# Patient Record
Sex: FEMALE | Race: WHITE | Employment: OTHER | URBAN - METROPOLITAN AREA
[De-identification: names, ages, dates, MRNs, and addresses within clinical notes are randomized per-mention and may not be internally consistent; named-entity substitution may affect disease eponyms.]

---

## 2017-01-17 ENCOUNTER — TELEPHONE (OUTPATIENT)
Dept: CARDIOLOGY CLINIC | Age: 67
End: 2017-01-17

## 2017-01-20 ENCOUNTER — OFFICE VISIT (OUTPATIENT)
Dept: BARIATRICS/WEIGHT MGMT | Age: 67
End: 2017-01-20

## 2017-01-20 VITALS
SYSTOLIC BLOOD PRESSURE: 139 MMHG | RESPIRATION RATE: 16 BRPM | BODY MASS INDEX: 41.23 KG/M2 | HEART RATE: 61 BPM | WEIGHT: 210 LBS | HEIGHT: 60 IN | DIASTOLIC BLOOD PRESSURE: 76 MMHG

## 2017-01-20 DIAGNOSIS — K95.09 GASTRIC BAND SLIPPAGE: Primary | ICD-10-CM

## 2017-01-20 PROCEDURE — 99024 POSTOP FOLLOW-UP VISIT: CPT | Performed by: SURGERY

## 2017-01-20 PROCEDURE — 1036F TOBACCO NON-USER: CPT | Performed by: SURGERY

## 2017-01-23 ENCOUNTER — TELEPHONE (OUTPATIENT)
Dept: CARDIOLOGY CLINIC | Age: 67
End: 2017-01-23

## 2017-01-26 ENCOUNTER — EPISODE CHANGES (OUTPATIENT)
Dept: CASE MANAGEMENT | Age: 67
End: 2017-01-26

## 2017-02-03 ENCOUNTER — OFFICE VISIT (OUTPATIENT)
Dept: CARDIOLOGY CLINIC | Age: 67
End: 2017-02-03

## 2017-02-03 VITALS
DIASTOLIC BLOOD PRESSURE: 70 MMHG | WEIGHT: 212 LBS | HEIGHT: 60 IN | SYSTOLIC BLOOD PRESSURE: 144 MMHG | OXYGEN SATURATION: 97 % | HEART RATE: 60 BPM | BODY MASS INDEX: 41.62 KG/M2

## 2017-02-03 DIAGNOSIS — I25.119 CORONARY ARTERY DISEASE INVOLVING NATIVE CORONARY ARTERY OF NATIVE HEART WITH ANGINA PECTORIS (HCC): Primary | ICD-10-CM

## 2017-02-03 DIAGNOSIS — E78.5 HYPERLIPIDEMIA LDL GOAL <70: ICD-10-CM

## 2017-02-03 DIAGNOSIS — I10 ESSENTIAL HYPERTENSION, BENIGN: ICD-10-CM

## 2017-02-03 PROCEDURE — 3014F SCREEN MAMMO DOC REV: CPT | Performed by: NURSE PRACTITIONER

## 2017-02-03 PROCEDURE — 1111F DSCHRG MED/CURRENT MED MERGE: CPT | Performed by: NURSE PRACTITIONER

## 2017-02-03 PROCEDURE — G8484 FLU IMMUNIZE NO ADMIN: HCPCS | Performed by: NURSE PRACTITIONER

## 2017-02-03 PROCEDURE — 1036F TOBACCO NON-USER: CPT | Performed by: NURSE PRACTITIONER

## 2017-02-03 PROCEDURE — G8400 PT W/DXA NO RESULTS DOC: HCPCS | Performed by: NURSE PRACTITIONER

## 2017-02-03 PROCEDURE — G8427 DOCREV CUR MEDS BY ELIG CLIN: HCPCS | Performed by: NURSE PRACTITIONER

## 2017-02-03 PROCEDURE — 1090F PRES/ABSN URINE INCON ASSESS: CPT | Performed by: NURSE PRACTITIONER

## 2017-02-03 PROCEDURE — G8417 CALC BMI ABV UP PARAM F/U: HCPCS | Performed by: NURSE PRACTITIONER

## 2017-02-03 PROCEDURE — 4040F PNEUMOC VAC/ADMIN/RCVD: CPT | Performed by: NURSE PRACTITIONER

## 2017-02-03 PROCEDURE — 1123F ACP DISCUSS/DSCN MKR DOCD: CPT | Performed by: NURSE PRACTITIONER

## 2017-02-03 PROCEDURE — G8598 ASA/ANTIPLAT THER USED: HCPCS | Performed by: NURSE PRACTITIONER

## 2017-02-03 PROCEDURE — 3017F COLORECTAL CA SCREEN DOC REV: CPT | Performed by: NURSE PRACTITIONER

## 2017-02-03 PROCEDURE — 99214 OFFICE O/P EST MOD 30 MIN: CPT | Performed by: NURSE PRACTITIONER

## 2017-02-03 RX ORDER — SUCRALFATE 1 G/1
1 TABLET ORAL 4 TIMES DAILY
COMMUNITY
End: 2017-06-20 | Stop reason: ALTCHOICE

## 2017-02-03 RX ORDER — RANOLAZINE 500 MG/1
500 TABLET, EXTENDED RELEASE ORAL 2 TIMES DAILY
Qty: 180 TABLET | Refills: 3 | Status: ON HOLD | OUTPATIENT
Start: 2017-02-03 | End: 2017-10-03 | Stop reason: HOSPADM

## 2017-02-03 RX ORDER — GABAPENTIN 100 MG/1
400 CAPSULE ORAL 3 TIMES DAILY
Status: ON HOLD | COMMUNITY
End: 2022-08-15

## 2017-03-03 ENCOUNTER — HOSPITAL ENCOUNTER (OUTPATIENT)
Dept: VASCULAR LAB | Age: 67
Discharge: HOME | End: 2017-03-03
Attending: GENERAL PRACTICE | Admitting: GENERAL PRACTICE

## 2017-03-03 DIAGNOSIS — H54.7 VISUAL LOSS: ICD-10-CM

## 2017-03-22 ENCOUNTER — OFFICE VISIT (OUTPATIENT)
Dept: BARIATRICS/WEIGHT MGMT | Age: 67
End: 2017-03-22

## 2017-03-22 VITALS
DIASTOLIC BLOOD PRESSURE: 70 MMHG | HEIGHT: 60 IN | SYSTOLIC BLOOD PRESSURE: 138 MMHG | BODY MASS INDEX: 40.93 KG/M2 | RESPIRATION RATE: 16 BRPM | WEIGHT: 208.5 LBS | HEART RATE: 72 BPM

## 2017-03-22 DIAGNOSIS — E66.01 MORBID OBESITY WITH BMI OF 40.0-44.9, ADULT (HCC): Primary | ICD-10-CM

## 2017-03-22 PROCEDURE — 1123F ACP DISCUSS/DSCN MKR DOCD: CPT | Performed by: SURGERY

## 2017-03-22 PROCEDURE — 1090F PRES/ABSN URINE INCON ASSESS: CPT | Performed by: SURGERY

## 2017-03-22 PROCEDURE — G8427 DOCREV CUR MEDS BY ELIG CLIN: HCPCS | Performed by: SURGERY

## 2017-03-22 PROCEDURE — 4040F PNEUMOC VAC/ADMIN/RCVD: CPT | Performed by: SURGERY

## 2017-03-22 PROCEDURE — 3014F SCREEN MAMMO DOC REV: CPT | Performed by: SURGERY

## 2017-03-22 PROCEDURE — 1036F TOBACCO NON-USER: CPT | Performed by: SURGERY

## 2017-03-22 PROCEDURE — G8400 PT W/DXA NO RESULTS DOC: HCPCS | Performed by: SURGERY

## 2017-03-22 PROCEDURE — G8417 CALC BMI ABV UP PARAM F/U: HCPCS | Performed by: SURGERY

## 2017-03-22 PROCEDURE — 99213 OFFICE O/P EST LOW 20 MIN: CPT | Performed by: SURGERY

## 2017-03-22 PROCEDURE — G8598 ASA/ANTIPLAT THER USED: HCPCS | Performed by: SURGERY

## 2017-03-22 PROCEDURE — 3017F COLORECTAL CA SCREEN DOC REV: CPT | Performed by: SURGERY

## 2017-03-22 PROCEDURE — G8484 FLU IMMUNIZE NO ADMIN: HCPCS | Performed by: SURGERY

## 2017-03-31 ENCOUNTER — TELEPHONE (OUTPATIENT)
Dept: ORTHOPEDIC SURGERY | Age: 67
End: 2017-03-31

## 2017-04-03 ENCOUNTER — OFFICE VISIT (OUTPATIENT)
Dept: ORTHOPEDIC SURGERY | Age: 67
End: 2017-04-03

## 2017-04-03 VITALS
BODY MASS INDEX: 41.23 KG/M2 | HEART RATE: 69 BPM | DIASTOLIC BLOOD PRESSURE: 74 MMHG | WEIGHT: 210 LBS | SYSTOLIC BLOOD PRESSURE: 142 MMHG | HEIGHT: 60 IN

## 2017-04-03 DIAGNOSIS — M67.951 TENDINOPATHY OF RIGHT GLUTEUS MEDIUS: Primary | ICD-10-CM

## 2017-04-03 DIAGNOSIS — M70.61 TROCHANTERIC BURSITIS OF RIGHT HIP: ICD-10-CM

## 2017-04-03 PROCEDURE — G8427 DOCREV CUR MEDS BY ELIG CLIN: HCPCS | Performed by: ORTHOPAEDIC SURGERY

## 2017-04-03 PROCEDURE — G8400 PT W/DXA NO RESULTS DOC: HCPCS | Performed by: ORTHOPAEDIC SURGERY

## 2017-04-03 PROCEDURE — 1090F PRES/ABSN URINE INCON ASSESS: CPT | Performed by: ORTHOPAEDIC SURGERY

## 2017-04-03 PROCEDURE — 99213 OFFICE O/P EST LOW 20 MIN: CPT | Performed by: ORTHOPAEDIC SURGERY

## 2017-04-03 PROCEDURE — 1036F TOBACCO NON-USER: CPT | Performed by: ORTHOPAEDIC SURGERY

## 2017-04-03 PROCEDURE — G8417 CALC BMI ABV UP PARAM F/U: HCPCS | Performed by: ORTHOPAEDIC SURGERY

## 2017-04-03 PROCEDURE — 3017F COLORECTAL CA SCREEN DOC REV: CPT | Performed by: ORTHOPAEDIC SURGERY

## 2017-04-03 PROCEDURE — 4040F PNEUMOC VAC/ADMIN/RCVD: CPT | Performed by: ORTHOPAEDIC SURGERY

## 2017-04-03 PROCEDURE — 3014F SCREEN MAMMO DOC REV: CPT | Performed by: ORTHOPAEDIC SURGERY

## 2017-04-03 PROCEDURE — 1123F ACP DISCUSS/DSCN MKR DOCD: CPT | Performed by: ORTHOPAEDIC SURGERY

## 2017-04-03 PROCEDURE — 20610 DRAIN/INJ JOINT/BURSA W/O US: CPT | Performed by: ORTHOPAEDIC SURGERY

## 2017-04-03 PROCEDURE — G8598 ASA/ANTIPLAT THER USED: HCPCS | Performed by: ORTHOPAEDIC SURGERY

## 2017-04-03 RX ORDER — ISOSORBIDE MONONITRATE 30 MG/1
10 TABLET, EXTENDED RELEASE ORAL DAILY
COMMUNITY
End: 2018-10-01 | Stop reason: DRUGHIGH

## 2017-04-24 ENCOUNTER — OFFICE VISIT (OUTPATIENT)
Dept: NEUROLOGY | Age: 67
End: 2017-04-24

## 2017-04-24 VITALS
HEART RATE: 74 BPM | HEIGHT: 60 IN | WEIGHT: 210 LBS | DIASTOLIC BLOOD PRESSURE: 70 MMHG | BODY MASS INDEX: 41.23 KG/M2 | SYSTOLIC BLOOD PRESSURE: 136 MMHG

## 2017-04-24 DIAGNOSIS — H53.123 VISUAL LOSS, TRANSIENT, BILATERAL: ICD-10-CM

## 2017-04-24 DIAGNOSIS — I65.23 BILATERAL CAROTID ARTERY STENOSIS: Primary | ICD-10-CM

## 2017-04-24 PROCEDURE — G8400 PT W/DXA NO RESULTS DOC: HCPCS | Performed by: PSYCHIATRY & NEUROLOGY

## 2017-04-24 PROCEDURE — 1090F PRES/ABSN URINE INCON ASSESS: CPT | Performed by: PSYCHIATRY & NEUROLOGY

## 2017-04-24 PROCEDURE — 3017F COLORECTAL CA SCREEN DOC REV: CPT | Performed by: PSYCHIATRY & NEUROLOGY

## 2017-04-24 PROCEDURE — G8427 DOCREV CUR MEDS BY ELIG CLIN: HCPCS | Performed by: PSYCHIATRY & NEUROLOGY

## 2017-04-24 PROCEDURE — 99215 OFFICE O/P EST HI 40 MIN: CPT | Performed by: PSYCHIATRY & NEUROLOGY

## 2017-04-24 PROCEDURE — G8417 CALC BMI ABV UP PARAM F/U: HCPCS | Performed by: PSYCHIATRY & NEUROLOGY

## 2017-04-24 PROCEDURE — G8598 ASA/ANTIPLAT THER USED: HCPCS | Performed by: PSYCHIATRY & NEUROLOGY

## 2017-04-24 PROCEDURE — 1036F TOBACCO NON-USER: CPT | Performed by: PSYCHIATRY & NEUROLOGY

## 2017-04-24 PROCEDURE — 1123F ACP DISCUSS/DSCN MKR DOCD: CPT | Performed by: PSYCHIATRY & NEUROLOGY

## 2017-04-24 PROCEDURE — 3014F SCREEN MAMMO DOC REV: CPT | Performed by: PSYCHIATRY & NEUROLOGY

## 2017-04-24 PROCEDURE — 4040F PNEUMOC VAC/ADMIN/RCVD: CPT | Performed by: PSYCHIATRY & NEUROLOGY

## 2017-04-28 ENCOUNTER — HOSPITAL ENCOUNTER (OUTPATIENT)
Dept: CT IMAGING | Age: 67
Discharge: HOME | End: 2017-04-28
Attending: PSYCHIATRY & NEUROLOGY | Admitting: PSYCHIATRY & NEUROLOGY

## 2017-04-28 DIAGNOSIS — I65.23 OCCLUSION AND STENOSIS OF BILATERAL CAROTID ARTERIES: ICD-10-CM

## 2017-04-28 DIAGNOSIS — H53.123 VISUAL LOSS, TRANSIENT, BILATERAL: ICD-10-CM

## 2017-04-28 DIAGNOSIS — I77.9 BILATERAL CAROTID ARTERY DISEASE (HCC): Primary | ICD-10-CM

## 2017-04-28 DIAGNOSIS — I65.23 BILATERAL CAROTID ARTERY STENOSIS: ICD-10-CM

## 2017-04-28 LAB
ANION GAP SERPL CALCULATED.3IONS-SCNC: 13 MMOL/L (ref 3–16)
BUN BLDV-MCNC: 13 MG/DL (ref 7–20)
CALCIUM SERPL-MCNC: 9.9 MG/DL (ref 8.3–10.6)
CHLORIDE BLD-SCNC: 93 MMOL/L (ref 99–110)
CO2: 25 MMOL/L (ref 21–32)
CREAT SERPL-MCNC: 0.6 MG/DL (ref 0.6–1.2)
GFR AFRICAN AMERICAN: >60
GFR NON-AFRICAN AMERICAN: >60
GLUCOSE BLD-MCNC: 115 MG/DL (ref 70–99)
POTASSIUM SERPL-SCNC: 4.7 MMOL/L (ref 3.5–5.1)
SODIUM BLD-SCNC: 131 MMOL/L (ref 136–145)

## 2017-04-28 RX ORDER — 0.9 % SODIUM CHLORIDE 0.9 %
10 VIAL (ML) INJECTION ONCE
Status: COMPLETED | OUTPATIENT
Start: 2017-04-28 | End: 2017-04-28

## 2017-04-28 RX ADMIN — Medication 10 ML: at 17:48

## 2017-05-03 ENCOUNTER — OFFICE VISIT (OUTPATIENT)
Dept: CARDIOLOGY CLINIC | Age: 67
End: 2017-05-03

## 2017-05-03 VITALS
BODY MASS INDEX: 41.43 KG/M2 | SYSTOLIC BLOOD PRESSURE: 130 MMHG | HEART RATE: 60 BPM | HEIGHT: 60 IN | WEIGHT: 211 LBS | DIASTOLIC BLOOD PRESSURE: 60 MMHG

## 2017-05-03 DIAGNOSIS — I10 ESSENTIAL HYPERTENSION, BENIGN: ICD-10-CM

## 2017-05-03 DIAGNOSIS — E78.5 HYPERLIPIDEMIA LDL GOAL <70: ICD-10-CM

## 2017-05-03 DIAGNOSIS — R07.89 CHEST DISCOMFORT: Primary | ICD-10-CM

## 2017-05-03 PROCEDURE — G8417 CALC BMI ABV UP PARAM F/U: HCPCS | Performed by: NURSE PRACTITIONER

## 2017-05-03 PROCEDURE — 1123F ACP DISCUSS/DSCN MKR DOCD: CPT | Performed by: NURSE PRACTITIONER

## 2017-05-03 PROCEDURE — G8427 DOCREV CUR MEDS BY ELIG CLIN: HCPCS | Performed by: NURSE PRACTITIONER

## 2017-05-03 PROCEDURE — 4040F PNEUMOC VAC/ADMIN/RCVD: CPT | Performed by: NURSE PRACTITIONER

## 2017-05-03 PROCEDURE — G8400 PT W/DXA NO RESULTS DOC: HCPCS | Performed by: NURSE PRACTITIONER

## 2017-05-03 PROCEDURE — 1036F TOBACCO NON-USER: CPT | Performed by: NURSE PRACTITIONER

## 2017-05-03 PROCEDURE — 1090F PRES/ABSN URINE INCON ASSESS: CPT | Performed by: NURSE PRACTITIONER

## 2017-05-03 PROCEDURE — 3017F COLORECTAL CA SCREEN DOC REV: CPT | Performed by: NURSE PRACTITIONER

## 2017-05-03 PROCEDURE — G8598 ASA/ANTIPLAT THER USED: HCPCS | Performed by: NURSE PRACTITIONER

## 2017-05-03 PROCEDURE — 3014F SCREEN MAMMO DOC REV: CPT | Performed by: NURSE PRACTITIONER

## 2017-05-03 PROCEDURE — 99214 OFFICE O/P EST MOD 30 MIN: CPT | Performed by: NURSE PRACTITIONER

## 2017-05-26 ENCOUNTER — OFFICE VISIT (OUTPATIENT)
Dept: NEUROLOGY | Age: 67
End: 2017-05-26

## 2017-05-26 VITALS
SYSTOLIC BLOOD PRESSURE: 136 MMHG | WEIGHT: 214 LBS | DIASTOLIC BLOOD PRESSURE: 70 MMHG | BODY MASS INDEX: 42.01 KG/M2 | HEIGHT: 60 IN | HEART RATE: 59 BPM

## 2017-05-26 PROCEDURE — 1036F TOBACCO NON-USER: CPT | Performed by: PSYCHIATRY & NEUROLOGY

## 2017-05-26 PROCEDURE — 1123F ACP DISCUSS/DSCN MKR DOCD: CPT | Performed by: PSYCHIATRY & NEUROLOGY

## 2017-05-26 PROCEDURE — G8417 CALC BMI ABV UP PARAM F/U: HCPCS | Performed by: PSYCHIATRY & NEUROLOGY

## 2017-05-26 PROCEDURE — 1090F PRES/ABSN URINE INCON ASSESS: CPT | Performed by: PSYCHIATRY & NEUROLOGY

## 2017-05-26 PROCEDURE — G8598 ASA/ANTIPLAT THER USED: HCPCS | Performed by: PSYCHIATRY & NEUROLOGY

## 2017-05-26 PROCEDURE — G8427 DOCREV CUR MEDS BY ELIG CLIN: HCPCS | Performed by: PSYCHIATRY & NEUROLOGY

## 2017-05-26 PROCEDURE — 99213 OFFICE O/P EST LOW 20 MIN: CPT | Performed by: PSYCHIATRY & NEUROLOGY

## 2017-05-26 PROCEDURE — 4040F PNEUMOC VAC/ADMIN/RCVD: CPT | Performed by: PSYCHIATRY & NEUROLOGY

## 2017-05-26 PROCEDURE — 3017F COLORECTAL CA SCREEN DOC REV: CPT | Performed by: PSYCHIATRY & NEUROLOGY

## 2017-05-26 PROCEDURE — 3014F SCREEN MAMMO DOC REV: CPT | Performed by: PSYCHIATRY & NEUROLOGY

## 2017-05-26 PROCEDURE — G8400 PT W/DXA NO RESULTS DOC: HCPCS | Performed by: PSYCHIATRY & NEUROLOGY

## 2017-06-20 PROBLEM — A41.9 SEPSIS DUE TO URINARY TRACT INFECTION (HCC): Status: ACTIVE | Noted: 2017-06-20

## 2017-06-20 PROBLEM — N39.0 SEPSIS DUE TO URINARY TRACT INFECTION (HCC): Status: ACTIVE | Noted: 2017-06-20

## 2017-06-23 ENCOUNTER — CARE COORDINATION (OUTPATIENT)
Dept: CASE MANAGEMENT | Age: 67
End: 2017-06-23

## 2017-06-24 ENCOUNTER — CARE COORDINATION (OUTPATIENT)
Dept: CASE MANAGEMENT | Age: 67
End: 2017-06-24

## 2017-06-27 ENCOUNTER — CARE COORDINATION (OUTPATIENT)
Dept: CASE MANAGEMENT | Age: 67
End: 2017-06-27

## 2017-07-05 ENCOUNTER — CARE COORDINATION (OUTPATIENT)
Dept: CASE MANAGEMENT | Age: 67
End: 2017-07-05

## 2017-07-06 ENCOUNTER — CARE COORDINATION (OUTPATIENT)
Dept: CASE MANAGEMENT | Age: 67
End: 2017-07-06

## 2017-10-02 ENCOUNTER — NURSE ONLY (OUTPATIENT)
Dept: CARDIOLOGY CLINIC | Age: 67
End: 2017-10-02

## 2017-10-02 DIAGNOSIS — R00.1 SYMPTOMATIC BRADYCARDIA: Primary | ICD-10-CM

## 2017-10-02 DIAGNOSIS — R00.1 SYMPTOMATIC BRADYCARDIA: ICD-10-CM

## 2017-10-03 ENCOUNTER — TELEPHONE (OUTPATIENT)
Dept: CARDIOLOGY CLINIC | Age: 67
End: 2017-10-03

## 2017-10-03 NOTE — TELEPHONE ENCOUNTER
Please call patient and schedule an ep follow up with rmm for about 6 wks out to review MCOT. She has been discharged already.  Thanks

## 2017-10-06 ENCOUNTER — TELEPHONE (OUTPATIENT)
Dept: CARDIOLOGY CLINIC | Age: 67
End: 2017-10-06

## 2017-10-06 NOTE — TELEPHONE ENCOUNTER
I talked to her about the situation, tried to reassure her that Dr. Rosario Fairly will still get the information he needs to evaluate her AF burden. And cell towers do not go down that often. She will keep in touch with [employer] if it occurs again.

## 2017-10-06 NOTE — TELEPHONE ENCOUNTER
Pt called and yesterday the monitor kept beeping and when she called the service they adv the [employer] cell towers are down. Pt has concerns about what would happen if something went wrong with her while she was wearing the monitor and the system was down. Please call pt to adv.   Thank you [facility]

## 2017-10-18 ENCOUNTER — TELEPHONE (OUTPATIENT)
Dept: CASE MANAGEMENT | Age: 67
End: 2017-10-18

## 2017-10-18 NOTE — TELEPHONE ENCOUNTER
[address] Transitions Follow Up Call    10/18/2017    Patient: Rema tSeele  Patient : 1950   MRN: [id number]  Reason for Admission: There are no discharge diagnoses documented for the most recent discharge. Discharge Date: 10/3/17 RARS: @MANJU([id number])@       Spoke with: Eric Berkowitz    Non-face-to-face services provided:  Still tired  Wearing 30 day Holter monitor - \"I'm pushing that button>\"      Primary C/O ankle swelling L > R  PCP provider encouraged her to wear elastic stockings - didn't want to wear them wit daria pants when visiting her sister @ [facility] today  Also encouraged her to limit salt intake - \"I'm trying\"    Toprol discontinued by cardiologist / low BP    Encouraged patient not to cancel upcoming OV as it'sbest to ensure she continues to recover post-discharged    Provided CB cell # for any additional questions of concerns      Inpatient Assessment  Care Transitions Subsequent and Final Call    Subsequent and Final Calls  Care Transitions Interventions  Other Interventions:             Follow Up  Future Appointments  Date Time Provider Elsa Sheth   2017 11:00 AM PRIETO Saucedoo Car NEELAM   2017 1:45 PM Iram Elmore MD [facility] NEELAM Vera RN

## 2017-11-08 ENCOUNTER — OFFICE VISIT (OUTPATIENT)
Dept: CARDIOLOGY CLINIC | Age: 67
End: 2017-11-08

## 2017-11-08 VITALS
WEIGHT: 224 LBS | HEART RATE: 62 BPM | DIASTOLIC BLOOD PRESSURE: 60 MMHG | HEIGHT: 60 IN | BODY MASS INDEX: 43.98 KG/M2 | SYSTOLIC BLOOD PRESSURE: 130 MMHG

## 2017-11-08 DIAGNOSIS — E78.2 MIXED HYPERLIPIDEMIA: ICD-10-CM

## 2017-11-08 DIAGNOSIS — R00.1 SYMPTOMATIC BRADYCARDIA: Primary | ICD-10-CM

## 2017-11-08 PROCEDURE — G8400 PT W/DXA NO RESULTS DOC: HCPCS | Performed by: NURSE PRACTITIONER

## 2017-11-08 PROCEDURE — 3014F SCREEN MAMMO DOC REV: CPT | Performed by: NURSE PRACTITIONER

## 2017-11-08 PROCEDURE — 1123F ACP DISCUSS/DSCN MKR DOCD: CPT | Performed by: NURSE PRACTITIONER

## 2017-11-08 PROCEDURE — 3017F COLORECTAL CA SCREEN DOC REV: CPT | Performed by: NURSE PRACTITIONER

## 2017-11-08 PROCEDURE — 99214 OFFICE O/P EST MOD 30 MIN: CPT | Performed by: NURSE PRACTITIONER

## 2017-11-08 PROCEDURE — G8427 DOCREV CUR MEDS BY ELIG CLIN: HCPCS | Performed by: NURSE PRACTITIONER

## 2017-11-08 PROCEDURE — 4040F PNEUMOC VAC/ADMIN/RCVD: CPT | Performed by: NURSE PRACTITIONER

## 2017-11-08 PROCEDURE — 1036F TOBACCO NON-USER: CPT | Performed by: NURSE PRACTITIONER

## 2017-11-08 PROCEDURE — 93228 REMOTE 30 DAY ECG REV/REPORT: CPT | Performed by: INTERNAL MEDICINE

## 2017-11-08 PROCEDURE — G8598 ASA/ANTIPLAT THER USED: HCPCS | Performed by: NURSE PRACTITIONER

## 2017-11-08 PROCEDURE — G8417 CALC BMI ABV UP PARAM F/U: HCPCS | Performed by: NURSE PRACTITIONER

## 2017-11-08 PROCEDURE — G8484 FLU IMMUNIZE NO ADMIN: HCPCS | Performed by: NURSE PRACTITIONER

## 2017-11-08 PROCEDURE — 1090F PRES/ABSN URINE INCON ASSESS: CPT | Performed by: NURSE PRACTITIONER

## 2017-11-08 RX ORDER — FLUTICASONE PROPIONATE 50 MCG
1 SPRAY, SUSPENSION (ML) NASAL DAILY
COMMUNITY
End: 2018-08-15

## 2017-11-08 RX ORDER — AMPICILLIN TRIHYDRATE 500 MG
CAPSULE ORAL DAILY
COMMUNITY
End: 2022-08-25 | Stop reason: ALTCHOICE

## 2017-11-08 NOTE — LETTER
[address] Cardiology [place] [address] [place]. [place] [address]  Phone: [phone]  Fax: [phone]    Tim Rodriguez NP        November 8, 2017     Hosea Velez, [address] [facility] [address]    Patient: Jn Felipe  MR Number: [id number]  YOB: 1950  Date of Visit: 11/8/2017    Dear Dr. Hosea Velez: Thank you for the request for consultation for Jn Felipe to me for the evaluation of ***. Below are the relevant portions of my assessment and plan of care. No notes on file  If you have questions, please do not hesitate to call me. I look forward to following Darold March along with you.     Sincerely,        Tim Rodriguez NP

## 2017-11-08 NOTE — PROGRESS NOTES
[address]     Outpatient Follow Up Note    CHIEF COMPLAINT / HPI:    Foster Gain is 78 y.o. female who presents today for a hospital  follow up. 10/17 Hospital Course:  Presented with symptomatic bradycardia and chest presure. She was seen by cardiology and her BB was discontinued. She was noted to be in a junctional rhythm.   Her  Ranolazine, BB were DC   Heart rates  improved. She was DC with MCOT monitor.     SInce home she feels good, weight has been stable  Subjective: There is AMAYA at times depending on what she's doing. She attributes some of her AMAYA to her weight gain after having her lab band reversed Nov '13. With regard to medication therapy the patient has been compliant with prescribed regimen. They have tolerated therapy to date. Past Medical History:   Diagnosis Date    Anemia     Anesthesia     troble after egd 10/2016    Atrial fibrillation ([address] 10/01/2017    A. fib with SVR    CAD (coronary artery disease)     Chest pain 10/01/2017    Chronic kidney disease     ? ??    Depression     GERD (gastroesophageal reflux disease)     Glaucoma     Hiatal hernia     Hyperlipidemia     Hypertension     Migraines, neuralgic     Morbid obesity ([address]     Osteoarthritis     Type II or unspecified type diabetes mellitus without mention of complication, not stated as uncontrolled     Unspecified sleep apnea     no longer uses cpap    Urinary incontinence     UTI (urinary tract infection)      Social History:    History   Smoking Status    Never Smoker   Smokeless Tobacco    Never Used     Current Medications:  Current Outpatient Prescriptions   Medication Sig Dispense Refill    fluticasone (FLONASE) 50 MCG/ACT nasal spray 1 spray by Nasal route daily      Cranberry 450 MG CAPS Take by mouth 2 times daily      pioglitazone (ACTOS) 15 MG tablet Take 15 mg by mouth daily      ferrous sulfate 325 (65 Fe) MG tablet Take 325 mg by mouth daily (with breakfast)      vitamin D (CHOLECALCIFEROL) 1000 UNIT TABS tablet Take 1,000 Units by mouth daily      spironolactone (ALDACTONE) 25 MG tablet Take 25 mg by mouth See Admin Instructions 1/2 tab day      dexlansoprazole (DEXILANT) 60 MG CPDR delayed release capsule Take 60 mg by mouth daily      isosorbide mononitrate (IMDUR) 30 MG extended release tablet Take 30 mg by mouth daily      gabapentin (NEURONTIN) 100 MG capsule Take 100 mg by mouth 2 times daily      TraZODone HCl (DESYREL PO) Take 50 mg by mouth daily      aspirin 81 MG EC tablet Take 1 tablet by mouth daily 30 tablet 3    amLODIPine (NORVASC) 5 MG tablet TK 1 T PO QD TAKES AT HS  3    losartan-hydrochlorothiazide (HYZAAR) 100-25 MG per tablet TK 1 T PO QAM  3    Calcium Citrate-Vitamin D (CALCIUM CITRATE + D PO) Take 2 tablets by mouth daily      SLOW-MAG 71.5-119 MG TBEC tablet Take 1 tablet by mouth daily   5    fexofenadine (ALLEGRA) 180 MG tablet Take 180 mg by mouth daily      rosuvastatin (CRESTOR) 40 MG tablet Take 20 mg by mouth every evening       metFORMIN (GLUCOPHAGE) 500 MG tablet Take 500 mg by mouth daily.  ezetimibe (ZETIA) 10 MG tablet Take 5 mg by mouth daily       escitalopram (LEXAPRO) 20 MG tablet Take 20 mg by mouth daily.  latanoprost (XALATAN) 0.005 % ophthalmic solution Place 1 drop into both eyes nightly 1 drop in each eye       nitroGLYCERIN (NITROLINGUAL) 0.4 MG/SPRAY spray Place 1 spray under the tongue every 5 minutes as needed. As directed for chest pain       solifenacin (VESICARE) 10 MG tablet Take 10 mg by mouth daily      phenazopyridine (PYRIDIUM) 100 MG tablet Take 100 mg by mouth 3 times daily as needed for Pain      ciprofloxacin (CIPRO) 500 MG tablet Take 500 mg by mouth 2 times daily       No current facility-administered medications for this visit. REVIEW OF SYSTEMS:   CONSTITUTIONAL: No major weight gain or loss, fatigue, weakness, night sweats or fever.  There's been no change in energy level, sleep pattern, or activity level. HEENT: No new vision difficulties or ringing in the ears. RESPIRATORY: No new SOB, PND, orthopnea or cough. CARDIOVASCULAR: See HPI  GI: No nausea, vomiting, diarrhea, constipation, abdominal pain or changes in bowel habits. : No urinary frequency, urgency, incontinence hematuria or dysuria. SKIN: No cyanosis or skin lesions. MUSCULOSKELETAL: No new muscle or joint pain. NEUROLOGICAL: No syncope or TIA-like symptoms. PSYCHIATRIC: No anxiety, pain, insomnia or depression    Objective:   PHYSICAL EXAM:        Vitals:    11/08/17 1124   BP: 130/60   Site: Left Arm   Position: Sitting   Cuff Size: Large Adult   Pulse: 62   Weight: 224 lb (101.6 kg)   Height: 4' 11.5\" (1.511 m)        BMI 42.1 kg/m2       CONSTITUTIONAL: Cooperative, no apparent distress, and appears well nourished / over weight  NEUROLOGIC:  Awake and orientated to person, place and time. PSYCH: Calm affect. SKIN: Warm and dry. HEENT: Sclera non-icteric, normocephalic, neck supple, no elevation of JVP, normal carotid pulses with no bruits and thyroid normal size. LUNGS:  No increased work of breathing and clear to auscultation, no crackles or wheezing. CARDIOVASCULAR:  Regular rate and rhythm with no murmurs, gallops, rubs, or abnormal heart sounds, normal PMI. The apical impulses not displaced. Heart tones are crisp and normal Cervical veins are not engorged                 JVP less than 8 cm H2O                                                                              The carotid upstroke is normal in amplitude and contour without delay or bruit    ABDOMEN:  Normal bowel sounds, non-distended and non-tender to palpation   EXT: Kota LE  Trace edema  no calf tenderness. Pulses are present bilaterally.     DATA:    Lab Results   Component Value Date    ALT 17 10/01/2017    AST 17 10/01/2017    ALKPHOS 68 10/01/2017    BILITOT <0.2 10/01/2017     Lab Results   Component Value Date    CREATININE 0.8 10/02/2017    BUN 19 10/02/2017     10/02/2017    K 4.6 10/02/2017     10/02/2017    CO2 26 10/02/2017     Lab Results   Component Value Date    TSH 1.63 09/23/2011    D2XEHQQ 5.7 04/26/2011     Lab Results   Component Value Date    WBC 7.8 10/02/2017    HGB 11.6 (L) 10/02/2017    HCT 34.6 (L) 10/02/2017    MCV 90.2 10/02/2017     10/02/2017     No components found for: CHLPL  Lab Results   Component Value Date    TRIG 265 (H) 01/24/2017    TRIG 303 (H) 01/18/2017    TRIG 311 (H) 09/24/2016     Lab Results   Component Value Date    HDL 35 (L) 01/24/2017    HDL 34 (L) 01/18/2017    HDL 35 (L) 09/24/2016     Lab Results   Component Value Date    LDLCALC 73 01/24/2017    [facility] see below 01/18/2017    [facility] see below 09/24/2016     Lab Results   Component Value Date    LABVLDL 53 01/24/2017    LABVLDL see below 01/18/2017    LABVLDL see below 09/24/2016     Radiology Review:  Pertinent images / reports were reviewed as a part of this visit and reveals the following:    Last Echo: Sept '15:  Summary   Technically difficult examination.   Normal left ventricle size, wall thickness and systolic function with an   estimated ejection fraction of 55%.   Mitral annular calcification is present.   Mild mitral regurgitation is present.   Aortic valve appears sclerotic but opens adequately.   No evidence of aortic valve regurgitation.   There is mild tricuspid regurgitation with RVSP estimated at 36 mmHg.   Mild pulmonic regurgitation present.         Last Angiogram: 1/24/17:  [facility] with grafts      Coronary Findings   LM Normal  LAD Mid 100%  Cx 30% mid, 30% OM1 mid  RCA Mid 100%, multiple 90% prox to mid lesions (small vessel)  LVG 55%  RAD-OM Patent  LIMA-LAD Patent      Intervention  ~None     Recommendation  Continued aggressive medical therapy  Pursuit of noncardiac sources of chest pain     Pt is chest pain free post procedure  At this time, I feel that anxiety likely has a large

## 2017-11-08 NOTE — COMMUNICATION BODY
[address]     Outpatient Follow Up Note    CHIEF COMPLAINT / HPI:    Jn Felipe is 78 y.o. female who presents today for a hospital  follow up. 10/17 Hospital Course:  Presented with symptomatic bradycardia and chest presure. She was seen by cardiology and her BB was discontinued. She was noted to be in a junctional rhythm.   Her  Ranolazine, BB were DC   Heart rates  improved. She was DC with MCOT monitor.     SInce home she feels good, weight has been stable  Subjective: There is AMAYA at times depending on what she's doing. She attributes some of her AMAYA to her weight gain after having her lab band reversed Nov '13. With regard to medication therapy the patient has been compliant with prescribed regimen. They have tolerated therapy to date. Past Medical History:   Diagnosis Date    Anemia     Anesthesia     troble after egd 10/2016    Atrial fibrillation ([address] 10/01/2017    A. fib with SVR    CAD (coronary artery disease)     Chest pain 10/01/2017    Chronic kidney disease     ? ??    Depression     GERD (gastroesophageal reflux disease)     Glaucoma     Hiatal hernia     Hyperlipidemia     Hypertension     Migraines, neuralgic     Morbid obesity ([address]     Osteoarthritis     Type II or unspecified type diabetes mellitus without mention of complication, not stated as uncontrolled     Unspecified sleep apnea     no longer uses cpap    Urinary incontinence     UTI (urinary tract infection)      Social History:    History   Smoking Status    Never Smoker   Smokeless Tobacco    Never Used     Current Medications:  Current Outpatient Prescriptions   Medication Sig Dispense Refill    fluticasone (FLONASE) 50 MCG/ACT nasal spray 1 spray by Nasal route daily      Cranberry 450 MG CAPS Take by mouth 2 times daily      pioglitazone (ACTOS) 15 MG tablet Take 15 mg by mouth daily      ferrous sulfate 325 (65 Fe) MG tablet Take 325 mg by mouth daily (with breakfast)      Date    CREATININE 0.8 10/02/2017    BUN 19 10/02/2017     10/02/2017    K 4.6 10/02/2017     10/02/2017    CO2 26 10/02/2017     Lab Results   Component Value Date    TSH 1.63 09/23/2011    [id number] 5.7 04/26/2011     Lab Results   Component Value Date    WBC 7.8 10/02/2017    HGB 11.6 (L) 10/02/2017    HCT 34.6 (L) 10/02/2017    MCV 90.2 10/02/2017     10/02/2017     No components found for: CHLPL  Lab Results   Component Value Date    TRIG 265 (H) 01/24/2017    TRIG 303 (H) 01/18/2017    TRIG 311 (H) 09/24/2016     Lab Results   Component Value Date    HDL 35 (L) 01/24/2017    HDL 34 (L) 01/18/2017    HDL 35 (L) 09/24/2016     Lab Results   Component Value Date    LDLCALC 73 01/24/2017 [address] see below 01/18/2017    [address] see below 09/24/2016     Lab Results   Component Value Date    LABVLDL 53 01/24/2017    LABVLDL see below 01/18/2017    LABVLDL see below 09/24/2016     Radiology Review:  Pertinent images / reports were reviewed as a part of this visit and reveals the following:    Last Echo: Sept '15:  Summary   Technically difficult examination.   Normal left ventricle size, wall thickness and systolic function with an   estimated ejection fraction of 55%.   Mitral annular calcification is present.   Mild mitral regurgitation is present.   Aortic valve appears sclerotic but opens adequately.   No evidence of aortic valve regurgitation.   There is mild tricuspid regurgitation with RVSP estimated at 36 mmHg.   Mild pulmonic regurgitation present.         Last Angiogram: 1/24/17:  [facility] with grafts      Coronary Findings   LM Normal  LAD Mid 100%  Cx 30% mid, 30% OM1 mid  RCA Mid 100%, multiple 90% prox to mid lesions (small vessel)  LVG 55%  RAD-OM Patent  LIMA-LAD Patent      Intervention  ~None     Recommendation  Continued aggressive medical therapy  Pursuit of noncardiac sources of chest pain     Pt is chest pain free post procedure  At this time, I feel that anxiety likely has a large

## 2017-11-08 NOTE — LETTER
[address] Cardiology [facility]  [address] [place]. [place] [address]  Phone: [phone]  Fax: [phone]    Sherice Kennedy NP        November 8, 2017     Yun Mauricio, [address] [facility] [address]    Patient: Aisha Granda  MR Number: [id number]  YOB: 1950  Date of Visit: 11/8/2017    Dear Dr. Yun Mauricio: Thank you for the request for consultation for Aisha Granda to me for the evaluation of ***. Below are the relevant portions of my assessment and plan of care. No notes on file  If you have questions, please do not hesitate to call me. I look forward to following Margarita Lemos along with you.     Sincerely,        Sherice Kennedy NP

## 2017-11-08 NOTE — LETTER
no longer uses cpap    Urinary incontinence     UTI (urinary tract infection)      Social History:    History   Smoking Status    Never Smoker   Smokeless Tobacco    Never Used     Current Medications:  Current Outpatient Prescriptions   Medication Sig Dispense Refill    fluticasone (FLONASE) 50 MCG/ACT nasal spray 1 spray by Nasal route daily      Cranberry 450 MG CAPS Take by mouth 2 times daily      pioglitazone (ACTOS) 15 MG tablet Take 15 mg by mouth daily      ferrous sulfate 325 (65 Fe) MG tablet Take 325 mg by mouth daily (with breakfast)      vitamin D (CHOLECALCIFEROL) 1000 UNIT TABS tablet Take 1,000 Units by mouth daily      spironolactone (ALDACTONE) 25 MG tablet Take 25 mg by mouth See Admin Instructions 1/2 tab day      dexlansoprazole (DEXILANT) 60 MG CPDR delayed release capsule Take 60 mg by mouth daily      isosorbide mononitrate (IMDUR) 30 MG extended release tablet Take 30 mg by mouth daily      gabapentin (NEURONTIN) 100 MG capsule Take 100 mg by mouth 2 times daily      TraZODone HCl (DESYREL PO) Take 50 mg by mouth daily      aspirin 81 MG EC tablet Take 1 tablet by mouth daily 30 tablet 3    amLODIPine (NORVASC) 5 MG tablet TK 1 T PO QD TAKES AT HS  3    losartan-hydrochlorothiazide (HYZAAR) 100-25 MG per tablet TK 1 T PO QAM  3    Calcium Citrate-Vitamin D (CALCIUM CITRATE + D PO) Take 2 tablets by mouth daily      SLOW-MAG 71.5-119 MG TBEC tablet Take 1 tablet by mouth daily   5    fexofenadine (ALLEGRA) 180 MG tablet Take 180 mg by mouth daily      rosuvastatin (CRESTOR) 40 MG tablet Take 20 mg by mouth every evening       metFORMIN (GLUCOPHAGE) 500 MG tablet Take 500 mg by mouth daily.  ezetimibe (ZETIA) 10 MG tablet Take 5 mg by mouth daily       escitalopram (LEXAPRO) 20 MG tablet Take 20 mg by mouth daily.       latanoprost (XALATAN) 0.005 % ophthalmic solution Place 1 drop into both eyes nightly 1 drop in each eye  Aortic valve appears sclerotic but opens adequately.   No evidence of aortic valve regurgitation.   There is mild tricuspid regurgitation with RVSP estimated at 36 mmHg.   Mild pulmonic regurgitation present. Last Angiogram: 1/24/17:  [facility] with grafts      Coronary Findings   LM Normal  LAD Mid 100%  Cx 30% mid, 30% OM1 mid  RCA Mid 100%, multiple 90% prox to mid lesions (small vessel)  LVG 55%  RAD-OM Patent  LIMA-LAD Patent      Intervention  ~None     Recommendation  Continued aggressive medical therapy  Pursuit of noncardiac sources of chest pain     Pt is chest pain free post procedure  At this time, I feel that anxiety likely has a large role in the avani of her chest pain  Treatment options are discussed with patient  She does not want to start anything without talking to her PCP    Assessment:     1. Coronary artery disease involving native coronary artery of native heart with angina pectoris ([address]   ~patent bypass conduits : radial > OM and LIMA > LAD  ~multiple lesions in the RCA mid & prox: small vessel >> states to have been referred to [address] [facility] for second option   ~denies recurrence of throat / ear radation after starting Ranexa  ~amlodipine / statin / ASA     2. Essential hypertension, benign   ~reasonable    3. Hyperlipidemia LDL goal <70   ~crestor / zetia     4. Edema-support hose  5. Bradycardia--resolved,  going to FU with DR. Lanier      Plan: Continue present management, no change medications          The patient  currently  is not smoking. The risks related to smoking were reviewed with the patient. Recommend maintaining a smoke-free lifestyle. Dual Antiplatelet therapy has been recommended / prescribed for this patient. Education conducted on adverse reactions including bleeding was discussed. The patient verbalizes understanding.     Pt is on a BB  Pt is on an ARB  Pt is on a statin      Saturated fat diet discussed  Exercise program discussed Thank you for allowing to us to participate in the care of Ricardo Rasta. [address]  Documentation of today's visit sent to PCP    If you have questions, please do not hesitate to call me. I look forward to following Jennifer Finney along with you.     Sincerely,        Julia Luna NP

## 2017-11-09 ENCOUNTER — TELEPHONE (OUTPATIENT)
Dept: CARDIOLOGY CLINIC | Age: 67
End: 2017-11-09

## 2017-11-14 ENCOUNTER — OFFICE VISIT (OUTPATIENT)
Dept: CARDIOLOGY CLINIC | Age: 67
End: 2017-11-14

## 2017-11-14 VITALS
BODY MASS INDEX: 43.81 KG/M2 | SYSTOLIC BLOOD PRESSURE: 136 MMHG | HEART RATE: 68 BPM | OXYGEN SATURATION: 96 % | DIASTOLIC BLOOD PRESSURE: 70 MMHG | HEIGHT: 60 IN | WEIGHT: 223.12 LBS

## 2017-11-14 DIAGNOSIS — G47.33 OSA (OBSTRUCTIVE SLEEP APNEA): ICD-10-CM

## 2017-11-14 DIAGNOSIS — I47.1 SVT (SUPRAVENTRICULAR TACHYCARDIA) (HCC): ICD-10-CM

## 2017-11-14 DIAGNOSIS — I10 ESSENTIAL HYPERTENSION: ICD-10-CM

## 2017-11-14 DIAGNOSIS — R00.2 PALPITATIONS: ICD-10-CM

## 2017-11-14 DIAGNOSIS — E66.01 MORBID OBESITY WITH BMI OF 40.0-44.9, ADULT (HCC): ICD-10-CM

## 2017-11-14 DIAGNOSIS — R00.1 SYMPTOMATIC BRADYCARDIA: Primary | ICD-10-CM

## 2017-11-14 DIAGNOSIS — I25.119 CORONARY ARTERY DISEASE INVOLVING NATIVE CORONARY ARTERY OF NATIVE HEART WITH ANGINA PECTORIS (HCC): ICD-10-CM

## 2017-11-14 PROCEDURE — 1123F ACP DISCUSS/DSCN MKR DOCD: CPT | Performed by: INTERNAL MEDICINE

## 2017-11-14 PROCEDURE — G8598 ASA/ANTIPLAT THER USED: HCPCS | Performed by: INTERNAL MEDICINE

## 2017-11-14 PROCEDURE — G8484 FLU IMMUNIZE NO ADMIN: HCPCS | Performed by: INTERNAL MEDICINE

## 2017-11-14 PROCEDURE — 99214 OFFICE O/P EST MOD 30 MIN: CPT | Performed by: INTERNAL MEDICINE

## 2017-11-14 PROCEDURE — G8427 DOCREV CUR MEDS BY ELIG CLIN: HCPCS | Performed by: INTERNAL MEDICINE

## 2017-11-14 PROCEDURE — G8417 CALC BMI ABV UP PARAM F/U: HCPCS | Performed by: INTERNAL MEDICINE

## 2017-11-14 PROCEDURE — 93000 ELECTROCARDIOGRAM COMPLETE: CPT | Performed by: INTERNAL MEDICINE

## 2017-11-14 PROCEDURE — 1036F TOBACCO NON-USER: CPT | Performed by: INTERNAL MEDICINE

## 2017-11-14 PROCEDURE — 3017F COLORECTAL CA SCREEN DOC REV: CPT | Performed by: INTERNAL MEDICINE

## 2017-11-14 PROCEDURE — G8400 PT W/DXA NO RESULTS DOC: HCPCS | Performed by: INTERNAL MEDICINE

## 2017-11-14 PROCEDURE — 3014F SCREEN MAMMO DOC REV: CPT | Performed by: INTERNAL MEDICINE

## 2017-11-14 PROCEDURE — 4040F PNEUMOC VAC/ADMIN/RCVD: CPT | Performed by: INTERNAL MEDICINE

## 2017-11-14 PROCEDURE — 1090F PRES/ABSN URINE INCON ASSESS: CPT | Performed by: INTERNAL MEDICINE

## 2017-11-14 NOTE — PROGRESS NOTES
[facility]   Electrophysiology   Date: 11/14/2017  I had the privilege of visiting Sarah Vallejo in the office. CC: Bradycardia, review MCOT results  HPI: Sarah Vallejo is a 78 y.o. is here for a hospital follow up. She has history of CAD, s/p CABG who presented to the hospital on 10/1/17 with chest pressure, associated with dizziness. Due to her recurrent chest pressure, she has had a recent cardiac catheterization by Dr. Gay Salazar which showed patent graft and medical therapy recommended. On the day of admission, she noted chest pressure, then checked her pulse which was in 40s and her BP was low at 80s. She felt lightheaded but no syncope. She came to the hospital and found to be in junctional rhythm. She has had similar episodes before. Heart rates increased with walking in the hospital.  She is morbidly obese and has history of RAHUL but not using her CPAP regularly. She has gained significant weight recently. Metoprolol was held during hospitalization and her HR improved. Event monitor was recommended at discharge. MCOT 10/3/ to 11/1/17 - SR with avg HR 63 ( bpm). Brief SVR noted. No Atrial fib    Interval History: Since discharge, she has had intermittent heart fluttering lasting seconds to minutes which bothers her somewhat. She was previously on Metoprolol 50 mg twice a day. She monitors her heart rate and blood pressure twice a day. HR averages in the 60's. SBP has been elevated at times in the evenings (150-180 mmHg). She denies dizziness, light headedness, or syncope. She is using CPAP since discharge. Past Medical History:   Diagnosis Date    Anemia     Anesthesia     troble after egd 10/2016    Atrial fibrillation ([address] 10/01/2017    A. fib with SVR    CAD (coronary artery disease)     Chest pain 10/01/2017    Chronic kidney disease     ? ??    Depression     GERD (gastroesophageal reflux disease)     Glaucoma     Hiatal hernia     Hyperlipidemia     Hypertension     Migraines, neuralgic     Morbid obesity (HCC)     Osteoarthritis     Type II or unspecified type diabetes mellitus without mention of complication, not stated as uncontrolled     Unspecified sleep apnea     no longer uses cpap    Urinary incontinence     UTI (urinary tract infection)         Past Surgical History:   Procedure Laterality Date    BREAST LUMPECTOMY      CARDIAC CATHETERIZATION      CARDIAC SURGERY  2/22/1999    quadruple by-pass, [facility] BYPASS GRAFT  1999    quad    COSMETIC SURGERY      face lift    ESOPHAGEAL DILATATION      GASTRIC BAND  12/20/11    hiatal hernia repair    GASTRIC BAND REMOVAL  11/03/2016    laparoscopic     HAMMER TOE SURGERY      JOINT REPLACEMENT  1995    both    JOINT REPLACEMENT  1995    [facility] knees, [facility]    KNEE ARTHROSCOPY      1982 left    ORIF HUMERUS DECOMPRESSION Left 2/25/2016    OPEN REDUCTION INTERNAL FIXATION LEFT PROXIMAL HUMERUS    OVARY REMOVAL  1997    UPPER GASTROINTESTINAL ENDOSCOPY      UPPER GASTROINTESTINAL ENDOSCOPY  10/30/15    UPPER GASTROINTESTINAL ENDOSCOPY  10/14/2016    with biopsy       Allergies   Allergen Reactions    Albuterol Other (See Comments)     Crushing chest pain    Heparin Other (See Comments)     MARKED BRUISING/HEMATOMAS    Avelox [Moxifloxacin Hcl In Nacl] Rash    Niaspan [Niacin Er] Itching and Rash    Proventil [Albuterol Sulfate] Palpitations    Tagamet [Cimetidine] Rash       Social History:  Reviewed. reports that she has never smoked. She has never used smokeless tobacco. She reports that she does not drink alcohol or use drugs. Family History:  Reviewed. family history includes Cancer in her mother; Heart Disease in her father; High Blood Pressure in her father and mother; Stroke in her sister. Review of System:  All other systems reviewed and are negative except for that noted above.  Pertinent negatives are:     · General: negative for fever, chills   · Ophthalmic ROS: negative for - eye pain or loss of vision  · ENT ROS: negative for - headaches, sore throat   · Respiratory: negative for - cough, sputum  · Cardiovascular: Reviewed in HPI  · Gastrointestinal: negative for - abdominal pain, diarrhea, N/V  · Hematology: negative for - bleeding, blood clots, bruising or jaundice  · Genito-Urinary:  negative for - Dysuria or incontinence  · Musculoskeletal: negative for - Joint swelling, muscle pain  · Neurological: negative for - confusion, dizziness, headaches   · Psychiatric: No anxiety, no depression. · Dermatological: negative for - rash    Physical Examination:  Vitals:    11/14/17 1350   BP: 136/70   Pulse: 68   SpO2: 96%        · Constitutional: Oriented. No distress. · Head: Normocephalic and atraumatic. · Mouth/Throat: Oropharynx is clear and moist.   · Eyes: Conjunctivae normal. EOM are normal.   · Neck: Neck supple. No rigidity. No JVD present. · Cardiovascular: Regular rate, regular rhythm, S1&S2. · Pulmonary/Chest: Bilateral respiratory sounds. No wheezes, No rhonchi. · Abdominal: Soft. Bowel sounds present. No distension, No tenderness. · Musculoskeletal: No tenderness. No edema    · Lymphadenopathy: Has no cervical adenopathy. · Neurological: Alert and oriented. Cranial nerve appears intact, No Gross deficit   · Skin: Skin is warm and dry. No rash noted. · Psychiatric: Has a normal behavior     Labs, diagnostic and imaging results reviewed.      ECG: Sinus rhythm, 63 bpm  Echo: 9/2015   Summary   Technically difficult examination.   Normal left ventricle size, wall thickness and systolic function with an   estimated ejection fraction of 55%.   Mitral annular calcification is present.   Mild mitral regurgitation is present.   Aortic valve appears sclerotic but opens adequately.   No evidence of aortic valve regurgitation.   There is mild tricuspid regurgitation with RVSP estimated at 36 mmHg.   Mild pulmonic regurgitation present.   Cath:  1/24/17  [facility] with grafts      Coronary Findings   LM Normal  LAD Mid 100%  Cx 30% mid, 30% OM1 mid  RCA Mid 100%, multiple 90% prox to mid lesions (small vessel)  LVG 55%  RAD-OM Patent  LIMA-LAD Patent        Medication:  Current Outpatient Prescriptions   Medication Sig Dispense Refill    fluticasone (FLONASE) 50 MCG/ACT nasal spray 1 spray by Nasal route daily      Cranberry 450 MG CAPS Take by mouth 2 times daily      pioglitazone (ACTOS) 15 MG tablet Take 15 mg by mouth daily      ferrous sulfate 325 (65 Fe) MG tablet Take 325 mg by mouth daily (with breakfast)      vitamin D (CHOLECALCIFEROL) 1000 UNIT TABS tablet Take 1,000 Units by mouth daily      solifenacin (VESICARE) 10 MG tablet Take 10 mg by mouth daily      phenazopyridine (PYRIDIUM) 100 MG tablet Take 100 mg by mouth 3 times daily as needed for Pain      ciprofloxacin (CIPRO) 500 MG tablet Take 500 mg by mouth 2 times daily      spironolactone (ALDACTONE) 25 MG tablet Take 25 mg by mouth See Admin Instructions 1/2 tab day      dexlansoprazole (DEXILANT) 60 MG CPDR delayed release capsule Take 60 mg by mouth daily      isosorbide mononitrate (IMDUR) 30 MG extended release tablet Take 30 mg by mouth daily      gabapentin (NEURONTIN) 100 MG capsule Take 100 mg by mouth 2 times daily      TraZODone HCl (DESYREL PO) Take 50 mg by mouth daily      aspirin 81 MG EC tablet Take 1 tablet by mouth daily 30 tablet 3    amLODIPine (NORVASC) 5 MG tablet TK 1 T PO QD TAKES AT HS  3    losartan-hydrochlorothiazide (HYZAAR) 100-25 MG per tablet TK 1 T PO QAM  3    Calcium Citrate-Vitamin D (CALCIUM CITRATE + D PO) Take 2 tablets by mouth daily      SLOW-MAG 71.5-119 MG TBEC tablet Take 1 tablet by mouth daily   5    fexofenadine (ALLEGRA) 180 MG tablet Take 180 mg by mouth daily      rosuvastatin (CRESTOR) 40 MG tablet Take 20 mg by mouth every evening       metFORMIN (GLUCOPHAGE) 500 MG

## 2017-11-14 NOTE — LETTER
[address] Cardiology - [facility]  [address] [address] [address] [address]  Phone: [phone]  Fax: [phone]    Leyda Wheeler MD        November 16, 2017     Mayela Caal, [address] [facility] [address]    Patient: Jose Grier  MR Number: [id number]  YOB: 1950  Date of Visit: 11/14/2017    Dear Dr. Mayela Caal:    Below are the relevant portions of my assessment and plan of care. [employer]   Electrophysiology   Date: 11/14/2017  I had the privilege of visiting Jose Grier in the office. CC: Bradycardia, review MCOT results  HPI: Jose Grier is a 78 y.o. is here for a hospital follow up. She has history of CAD, s/p CABG who presented to the hospital on 10/1/17 with chest pressure, associated with dizziness. Due to her recurrent chest pressure, she has had a recent cardiac catheterization by Dr. Loly Kelly which showed patent graft and medical therapy recommended. On the day of admission, she noted chest pressure, then checked her pulse which was in 40s and her BP was low at 80s. She felt lightheaded but no syncope. She came to the hospital and found to be in junctional rhythm. She has had similar episodes before. Heart rates increased with walking in the hospital.  She is morbidly obese and has history of RAHUL but not using her CPAP regularly. She has gained significant weight recently. Metoprolol was held during hospitalization and her HR improved. Event monitor was recommended at discharge. MCOT 10/3/ to 11/1/17 - SR with avg HR 63 ( bpm). Brief SVR noted. No Atrial fib    Interval History: Since discharge, she has had intermittent heart fluttering lasting seconds to minutes which bothers her somewhat. She was previously on Metoprolol 50 mg twice a day. She monitors her heart rate and blood pressure twice a day. HR averages in the 60's.   SBP has been

## 2017-11-15 ENCOUNTER — TELEPHONE (OUTPATIENT)
Dept: CARDIOLOGY CLINIC | Age: 67
End: 2017-11-15

## 2017-11-16 NOTE — COMMUNICATION BODY
36 mmHg.   Mild pulmonic regurgitation present.   Cath:  1/24/17  [facility] with grafts      Coronary Findings   LM Normal  LAD Mid 100%  Cx 30% mid, 30% OM1 mid  RCA Mid 100%, multiple 90% prox to mid lesions (small vessel)  LVG 55%  RAD-OM Patent  LIMA-LAD Patent        Medication:  Current Outpatient Prescriptions   Medication Sig Dispense Refill    fluticasone (FLONASE) 50 MCG/ACT nasal spray 1 spray by Nasal route daily      Cranberry 450 MG CAPS Take by mouth 2 times daily      pioglitazone (ACTOS) 15 MG tablet Take 15 mg by mouth daily      ferrous sulfate 325 (65 Fe) MG tablet Take 325 mg by mouth daily (with breakfast)      vitamin D (CHOLECALCIFEROL) 1000 UNIT TABS tablet Take 1,000 Units by mouth daily      solifenacin (VESICARE) 10 MG tablet Take 10 mg by mouth daily      phenazopyridine (PYRIDIUM) 100 MG tablet Take 100 mg by mouth 3 times daily as needed for Pain      ciprofloxacin (CIPRO) 500 MG tablet Take 500 mg by mouth 2 times daily      spironolactone (ALDACTONE) 25 MG tablet Take 25 mg by mouth See Admin Instructions 1/2 tab day      dexlansoprazole (DEXILANT) 60 MG CPDR delayed release capsule Take 60 mg by mouth daily      isosorbide mononitrate (IMDUR) 30 MG extended release tablet Take 30 mg by mouth daily      gabapentin (NEURONTIN) 100 MG capsule Take 100 mg by mouth 2 times daily      TraZODone HCl (DESYREL PO) Take 50 mg by mouth daily      aspirin 81 MG EC tablet Take 1 tablet by mouth daily 30 tablet 3    amLODIPine (NORVASC) 5 MG tablet TK 1 T PO QD TAKES AT HS  3    losartan-hydrochlorothiazide (HYZAAR) 100-25 MG per tablet TK 1 T PO QAM  3    Calcium Citrate-Vitamin D (CALCIUM CITRATE + D PO) Take 2 tablets by mouth daily      SLOW-MAG 71.5-119 MG TBEC tablet Take 1 tablet by mouth daily   5    fexofenadine (ALLEGRA) 180 MG tablet Take 180 mg by mouth daily      rosuvastatin (CRESTOR) 40 MG tablet Take 20 mg by mouth every evening       metFORMIN (GLUCOPHAGE) 500 MG clinical course and testing results. All questions and concerns were addressed to the patient/family. Alternatives to my treatment were discussed. I have discussed the above stated plan and the patient verbalized understanding and agreed with the plan.     Agapito Coe MD, MPH  [facility]   Office: [phone]

## 2018-02-17 ENCOUNTER — HOSPITAL ENCOUNTER (OUTPATIENT)
Dept: CT IMAGING | Age: 68
Discharge: HOME | End: 2018-02-17
Attending: UROLOGY | Admitting: UROLOGY

## 2018-02-17 DIAGNOSIS — R10.2 PELVIC AND PERINEAL PAIN: ICD-10-CM

## 2018-02-17 LAB
BUN BLDV-MCNC: 18 MG/DL (ref 7–20)
CREAT SERPL-MCNC: 0.7 MG/DL (ref 0.6–1.2)
GFR AFRICAN AMERICAN: >60
GFR NON-AFRICAN AMERICAN: >60

## 2018-04-04 ENCOUNTER — HOSPITAL ENCOUNTER (OUTPATIENT)
Dept: OTHER | Age: 68
Discharge: HOME | End: 2018-04-04
Attending: GENERAL PRACTICE | Admitting: GENERAL PRACTICE

## 2018-04-04 LAB
FERRITIN: 46.1 NG/ML (ref 15–150)
HCT VFR BLD CALC: 36.4 % (ref 36–48)
HEMOGLOBIN: 12.4 G/DL (ref 12–16)
IMMATURE RETIC FRACT: 0.44 (ref 0.21–0.37)
IRON SATURATION: 25 % (ref 15–50)
IRON: 100 UG/DL (ref 37–145)
MCH RBC QN AUTO: 31.1 PG (ref 26–34)
MCHC RBC AUTO-ENTMCNC: 34 G/DL (ref 31–36)
MCV RBC AUTO: 91.4 FL (ref 80–100)
PDW BLD-RTO: 14.1 % (ref 12.4–15.4)
PLATELET # BLD: 280 K/UL (ref 135–450)
PMV BLD AUTO: 8.8 FL (ref 5–10.5)
RBC # BLD: 3.98 M/UL (ref 4–5.2)
RETICULOCYTE ABSOLUTE COUNT: 0.09 M/UL (ref 0.02–0.1)
RETICULOCYTE COUNT PCT: 2.3 % (ref 0.5–2.18)
TOTAL IRON BINDING CAPACITY: 402 UG/DL (ref 260–445)
WBC # BLD: 9.2 K/UL (ref 4–11)

## 2018-04-19 ENCOUNTER — HOSPITAL ENCOUNTER (OUTPATIENT)
Dept: VASCULAR LAB | Age: 68
Discharge: HOME | End: 2018-04-19
Attending: NURSE PRACTITIONER | Admitting: NURSE PRACTITIONER

## 2018-04-19 DIAGNOSIS — I65.23 BILATERAL CAROTID ARTERY STENOSIS: Primary | ICD-10-CM

## 2018-04-19 DIAGNOSIS — I65.23 OCCLUSION AND STENOSIS OF BILATERAL CAROTID ARTERIES: ICD-10-CM

## 2018-05-15 ENCOUNTER — OFFICE VISIT (OUTPATIENT)
Dept: CARDIOLOGY CLINIC | Age: 68
End: 2018-05-15

## 2018-05-15 VITALS
BODY MASS INDEX: 43.27 KG/M2 | HEART RATE: 56 BPM | WEIGHT: 220.4 LBS | SYSTOLIC BLOOD PRESSURE: 124 MMHG | DIASTOLIC BLOOD PRESSURE: 84 MMHG | HEIGHT: 60 IN

## 2018-05-15 DIAGNOSIS — R00.2 PALPITATIONS: Primary | ICD-10-CM

## 2018-05-15 DIAGNOSIS — I25.119 CORONARY ARTERY DISEASE INVOLVING NATIVE CORONARY ARTERY OF NATIVE HEART WITH ANGINA PECTORIS (HCC): ICD-10-CM

## 2018-05-15 DIAGNOSIS — R00.1 SYMPTOMATIC BRADYCARDIA: ICD-10-CM

## 2018-05-15 DIAGNOSIS — I10 ESSENTIAL HYPERTENSION: ICD-10-CM

## 2018-05-15 DIAGNOSIS — G47.33 OSA (OBSTRUCTIVE SLEEP APNEA): ICD-10-CM

## 2018-05-15 PROCEDURE — G8400 PT W/DXA NO RESULTS DOC: HCPCS | Performed by: INTERNAL MEDICINE

## 2018-05-15 PROCEDURE — 93000 ELECTROCARDIOGRAM COMPLETE: CPT | Performed by: INTERNAL MEDICINE

## 2018-05-15 PROCEDURE — 3017F COLORECTAL CA SCREEN DOC REV: CPT | Performed by: INTERNAL MEDICINE

## 2018-05-15 PROCEDURE — G8417 CALC BMI ABV UP PARAM F/U: HCPCS | Performed by: INTERNAL MEDICINE

## 2018-05-15 PROCEDURE — G8599 NO ASA/ANTIPLAT THER USE RNG: HCPCS | Performed by: INTERNAL MEDICINE

## 2018-05-15 PROCEDURE — 1036F TOBACCO NON-USER: CPT | Performed by: INTERNAL MEDICINE

## 2018-05-15 PROCEDURE — 1123F ACP DISCUSS/DSCN MKR DOCD: CPT | Performed by: INTERNAL MEDICINE

## 2018-05-15 PROCEDURE — 1090F PRES/ABSN URINE INCON ASSESS: CPT | Performed by: INTERNAL MEDICINE

## 2018-05-15 PROCEDURE — G8427 DOCREV CUR MEDS BY ELIG CLIN: HCPCS | Performed by: INTERNAL MEDICINE

## 2018-05-15 PROCEDURE — 4040F PNEUMOC VAC/ADMIN/RCVD: CPT | Performed by: INTERNAL MEDICINE

## 2018-05-15 PROCEDURE — 99214 OFFICE O/P EST MOD 30 MIN: CPT | Performed by: INTERNAL MEDICINE

## 2018-06-21 ENCOUNTER — TELEPHONE (OUTPATIENT)
Dept: CARDIOLOGY CLINIC | Age: 68
End: 2018-06-21

## 2018-07-10 ENCOUNTER — TELEPHONE (OUTPATIENT)
Dept: CARDIOLOGY CLINIC | Age: 68
End: 2018-07-10

## 2018-07-18 ENCOUNTER — TELEPHONE (OUTPATIENT)
Dept: CARDIOLOGY CLINIC | Age: 68
End: 2018-07-18

## 2018-07-19 NOTE — TELEPHONE ENCOUNTER
Called patient to check on her to see how she is feeling and if she needs appt tomorrow at 8am as Fay Barcenas offered ? Patient states she is doing much better , doing everything Fay Barcenas advised her to do and she doesn't think she needs the appt of Friday . Told patient if things change or she has any questions please call the office and we will get her seen tomorrow .

## 2018-07-19 NOTE — TELEPHONE ENCOUNTER
Since RMM will be in office on Friday at 730 to see another patient would he see this patient as well?  There is no such thing as a nurse appt unless Abimael Bautista meant NP appt

## 2018-07-20 NOTE — TELEPHONE ENCOUNTER
Spoke to patient, her bruising is resolving with cool compress. She states it has improved and she will call Monday if she feels it worsens at all.

## 2018-08-08 ENCOUNTER — TELEPHONE (OUTPATIENT)
Dept: CARDIOLOGY CLINIC | Age: 68
End: 2018-08-08

## 2018-08-08 DIAGNOSIS — I48.91 ATRIAL FIBRILLATION, UNSPECIFIED TYPE (HCC): Primary | ICD-10-CM

## 2018-08-08 NOTE — TELEPHONE ENCOUNTER
Calling needs order for ct cardiac for vein mapping placed in system. Pt is scheduled 8/9/2018. Please call to advise,Thank You!

## 2018-08-09 ENCOUNTER — TELEPHONE (OUTPATIENT)
Dept: CARDIOLOGY CLINIC | Age: 68
End: 2018-08-09

## 2018-08-09 ENCOUNTER — HOSPITAL ENCOUNTER (OUTPATIENT)
Dept: CT IMAGING | Age: 68
Discharge: HOME | End: 2018-08-09
Attending: INTERNAL MEDICINE | Admitting: INTERNAL MEDICINE

## 2018-08-09 DIAGNOSIS — I48.91 ATRIAL FIBRILLATION, UNSPECIFIED TYPE (HCC): ICD-10-CM

## 2018-08-09 DIAGNOSIS — I48.0 PAROXYSMAL ATRIAL FIBRILLATION (HCC): ICD-10-CM

## 2018-08-09 DIAGNOSIS — R00.2 PALPITATION: ICD-10-CM

## 2018-08-09 DIAGNOSIS — I25.10 CORONARY ARTERY DISEASE INVOLVING NATIVE CORONARY ARTERY OF NATIVE HEART WITHOUT ANGINA PECTORIS: ICD-10-CM

## 2018-08-09 DIAGNOSIS — I47.1 SVT (SUPRAVENTRICULAR TACHYCARDIA) (HCC): ICD-10-CM

## 2018-08-09 DIAGNOSIS — R55 SYNCOPE AND COLLAPSE: ICD-10-CM

## 2018-08-09 DIAGNOSIS — I10 ESSENTIAL HYPERTENSION: ICD-10-CM

## 2018-08-09 LAB
BUN BLDV-MCNC: 17 MG/DL (ref 7–20)
CREAT SERPL-MCNC: 0.6 MG/DL (ref 0.6–1.2)
GFR AFRICAN AMERICAN: >60
GFR NON-AFRICAN AMERICAN: >60

## 2018-08-09 NOTE — TELEPHONE ENCOUNTER
Pt calling to adv rmm that she went into afib when she was having vein mapping w/contrast done. They took her to ED and now is back into rhythm and they are releasing her. Pt want to speak with someone before they release her.  Please call to adv thank you

## 2018-08-13 ENCOUNTER — TELEPHONE (OUTPATIENT)
Dept: CARDIOLOGY CLINIC | Age: 68
End: 2018-08-13

## 2018-08-15 PROBLEM — I48.0 PAROXYSMAL ATRIAL FIBRILLATION (HCC): Status: ACTIVE | Noted: 2018-08-15

## 2018-08-16 ENCOUNTER — HOSPITAL ENCOUNTER (OUTPATIENT)
Dept: CARDIAC CATH/INVASIVE PROCEDURES | Age: 68
Discharge: HOME | End: 2018-09-04
Attending: GENERAL PRACTICE | Admitting: INTERNAL MEDICINE

## 2018-08-17 ENCOUNTER — TELEPHONE (OUTPATIENT)
Dept: CARDIOLOGY CLINIC | Age: 68
End: 2018-08-17

## 2018-08-20 NOTE — TELEPHONE ENCOUNTER
gab made but she wants to speak to [facility] again because she thinks she needs to be seen sooner .

## 2018-08-22 ENCOUNTER — TELEPHONE (OUTPATIENT)
Dept: CARDIOLOGY CLINIC | Age: 68
End: 2018-08-22

## 2018-08-27 ENCOUNTER — TELEPHONE (OUTPATIENT)
Dept: CARDIOLOGY CLINIC | Age: 68
End: 2018-08-27

## 2018-08-27 NOTE — TELEPHONE ENCOUNTER
Calling again with another question for [facility] . Needs to speak to her today please .

## 2018-08-29 DIAGNOSIS — R06.02 SOB (SHORTNESS OF BREATH): ICD-10-CM

## 2018-08-29 DIAGNOSIS — I48.91 ATRIAL FIBRILLATION, UNSPECIFIED TYPE (HCC): Primary | ICD-10-CM

## 2018-08-30 ENCOUNTER — HOSPITAL ENCOUNTER (OUTPATIENT)
Dept: NON INVASIVE DIAGNOSTICS | Age: 68
Discharge: HOME | End: 2018-08-30
Attending: INTERNAL MEDICINE | Admitting: INTERNAL MEDICINE

## 2018-08-30 DIAGNOSIS — R06.02 SHORTNESS OF BREATH: ICD-10-CM

## 2018-08-30 LAB
LEFT VENTRICULAR EJECTION FRACTION HIGH VALUE: 55 %
LEFT VENTRICULAR EJECTION FRACTION MODE: NORMAL
LV EF: 55 %
LVEF MODALITY: NORMAL

## 2018-08-31 ENCOUNTER — TELEPHONE (OUTPATIENT)
Dept: CARDIOLOGY CLINIC | Age: 68
End: 2018-08-31

## 2018-09-10 ENCOUNTER — NURSE ONLY (OUTPATIENT)
Dept: CARDIOLOGY CLINIC | Age: 68
End: 2018-09-10

## 2018-09-10 ENCOUNTER — OFFICE VISIT (OUTPATIENT)
Dept: CARDIOLOGY CLINIC | Age: 68
End: 2018-09-10

## 2018-09-10 VITALS
BODY MASS INDEX: 44.14 KG/M2 | WEIGHT: 224.8 LBS | HEIGHT: 60 IN | DIASTOLIC BLOOD PRESSURE: 58 MMHG | HEART RATE: 49 BPM | SYSTOLIC BLOOD PRESSURE: 118 MMHG

## 2018-09-10 DIAGNOSIS — I47.1 SVT (SUPRAVENTRICULAR TACHYCARDIA) (HCC): ICD-10-CM

## 2018-09-10 DIAGNOSIS — R00.1 BRADYCARDIA: Primary | ICD-10-CM

## 2018-09-10 DIAGNOSIS — R00.1 BRADYCARDIA: ICD-10-CM

## 2018-09-10 PROCEDURE — 1090F PRES/ABSN URINE INCON ASSESS: CPT | Performed by: NURSE PRACTITIONER

## 2018-09-10 PROCEDURE — G8427 DOCREV CUR MEDS BY ELIG CLIN: HCPCS | Performed by: NURSE PRACTITIONER

## 2018-09-10 PROCEDURE — 3017F COLORECTAL CA SCREEN DOC REV: CPT | Performed by: NURSE PRACTITIONER

## 2018-09-10 PROCEDURE — 1036F TOBACCO NON-USER: CPT | Performed by: NURSE PRACTITIONER

## 2018-09-10 PROCEDURE — G8598 ASA/ANTIPLAT THER USED: HCPCS | Performed by: NURSE PRACTITIONER

## 2018-09-10 PROCEDURE — 1123F ACP DISCUSS/DSCN MKR DOCD: CPT | Performed by: NURSE PRACTITIONER

## 2018-09-10 PROCEDURE — 1101F PT FALLS ASSESS-DOCD LE1/YR: CPT | Performed by: NURSE PRACTITIONER

## 2018-09-10 PROCEDURE — G8417 CALC BMI ABV UP PARAM F/U: HCPCS | Performed by: NURSE PRACTITIONER

## 2018-09-10 PROCEDURE — 99215 OFFICE O/P EST HI 40 MIN: CPT | Performed by: NURSE PRACTITIONER

## 2018-09-10 PROCEDURE — 4040F PNEUMOC VAC/ADMIN/RCVD: CPT | Performed by: NURSE PRACTITIONER

## 2018-09-10 PROCEDURE — G8400 PT W/DXA NO RESULTS DOC: HCPCS | Performed by: NURSE PRACTITIONER

## 2018-09-10 PROCEDURE — 93000 ELECTROCARDIOGRAM COMPLETE: CPT | Performed by: NURSE PRACTITIONER

## 2018-09-10 NOTE — PROGRESS NOTES
Heart Disease in her father; High Blood Pressure in her father and mother; Stroke in her sister. Review of System:  All other systems reviewed and are negative except for that noted above. Pertinent negatives are:     · General: negative for fever, chills   · Ophthalmic ROS: negative for - eye pain or loss of vision  · ENT ROS: negative for - headaches, sore throat   · Respiratory: negative for - cough, sputum  · Cardiovascular: Reviewed in HPI  · Gastrointestinal: negative for - abdominal pain, diarrhea, N/V  · Hematology: negative for - bleeding, blood clots, bruising or jaundice  · Genito-Urinary:  negative for - Dysuria or incontinence  · Musculoskeletal: negative for - Joint swelling, muscle pain  · Neurological: negative for - confusion, dizziness, headaches   · Psychiatric: No anxiety, no depression. · Dermatological: negative for - rash    Physical Examination:  Vitals:    09/10/18 1515   BP: (!) 118/58   Pulse: (!) 49        · Constitutional: Oriented. No distress. · Head: Normocephalic and atraumatic. · Mouth/Throat: Oropharynx is clear and moist.   · Eyes: Conjunctivae normal. EOM are normal.   · Neck: Neck supple. No rigidity. No JVD present. · Cardiovascular: Normal rate, regular rhythm, S1&S2. · Pulmonary/Chest: Bilateral respiratory sounds. No wheezes, No rhonchi. · Abdominal: Soft. Bowel sounds present. No distension, No tenderness. · Musculoskeletal: No tenderness. No edema    · Lymphadenopathy: Has no cervical adenopathy. · Neurological: Alert and oriented. Cranial nerve appears intact, No Gross deficit   · Skin: Skin is warm and dry. No rash noted. · Psychiatric: Has a normal behavior     Labs, diagnostic and imaging results reviewed. ECG: SB, rate 49    Echo: 9/2015   Summary   Technically difficult examination. Normal left ventricle size, wall thickness and systolic function with an   estimated ejection fraction of 55%. Mitral annular calcification is present. 180 MG tablet Take 180 mg by mouth daily      rosuvastatin (CRESTOR) 40 MG tablet Take 20 mg by mouth every evening       metFORMIN (GLUCOPHAGE) 500 MG tablet Take 500 mg by mouth 2 times daily (with meals)       ezetimibe (ZETIA) 10 MG tablet Take 5 mg by mouth nightly       escitalopram (LEXAPRO) 20 MG tablet Take 20 mg by mouth daily.  latanoprost (XALATAN) 0.005 % ophthalmic solution Place 1 drop into both eyes nightly 1 drop in each eye       nitroGLYCERIN (NITROLINGUAL) 0.4 MG/SPRAY spray Place 1 spray under the tongue every 5 minutes as needed. As directed for chest pain       Ciprofloxacin (CIPRO PO) Take 1 tablet by mouth daily For 6 days starting 9/5/18      ferrous sulfate 325 (65 Fe) MG tablet Take 45 mg by mouth daily (with breakfast)       isosorbide mononitrate (IMDUR) 30 MG extended release tablet Take 10 mg by mouth daily Takes 1/2 tab in am 1/2 tab at night       No current facility-administered medications for this visit. Assessment and plan:     S/p RFCA AF Ablation (8/16/18) per Dr. Otto Lyons  -PVI  -Started on Xarelto  -Continue PPI  -Initially felt well post op and after discharge. Came to the ED (9/5/18) with symptoms (palps/shortness of breath/anxiety) and found to be back in AF w/RVR. .  Currently in SB.  -Reviewed case w/Dr. Harris Pop. Start low dose BB w/parapeters and/or prn  -Metoprolol 12.5 mg twice a day if your heart rate is 60 or above  -If you feel like you go back into AF check your pulse irregular and fast immediately take a metoprolol 12.5 mg.  Recheck your pulse in 30 minutes if your heart rate is greater than 80 take another metoprolol 12.5 mg.   If no improvement after 1 hour and your still symptomatic can come to the ER  -MCOT  -Will have to watch for tachy linda  -Over 45 minutes were spent discussing case, options, and plan with patient and        AF  -s/p ablation as above  -Xarelto, no BB 2/2 linda hx, was prev on metoprolol 50

## 2018-09-11 ENCOUNTER — TELEPHONE (OUTPATIENT)
Dept: CARDIOLOGY CLINIC | Age: 68
End: 2018-09-11

## 2018-09-11 NOTE — TELEPHONE ENCOUNTER
Pt calling. Said her event monitor stopped working so she had to call the company. They are sending her a new one and she should receive it on Wednesday.  Thank you

## 2018-09-20 ENCOUNTER — HOSPITAL ENCOUNTER (OUTPATIENT)
Dept: OTHER | Age: 68
Discharge: HOME | End: 2018-09-20
Attending: INTERNAL MEDICINE | Admitting: INTERNAL MEDICINE

## 2018-09-20 ENCOUNTER — OFFICE VISIT (OUTPATIENT)
Dept: CARDIOLOGY CLINIC | Age: 68
End: 2018-09-20

## 2018-09-20 VITALS
HEART RATE: 66 BPM | DIASTOLIC BLOOD PRESSURE: 68 MMHG | BODY MASS INDEX: 44.17 KG/M2 | SYSTOLIC BLOOD PRESSURE: 114 MMHG | WEIGHT: 225 LBS | HEIGHT: 60 IN

## 2018-09-20 DIAGNOSIS — G47.33 OSA (OBSTRUCTIVE SLEEP APNEA): ICD-10-CM

## 2018-09-20 DIAGNOSIS — I25.10 CORONARY ARTERY DISEASE INVOLVING NATIVE CORONARY ARTERY OF NATIVE HEART WITHOUT ANGINA PECTORIS: ICD-10-CM

## 2018-09-20 DIAGNOSIS — R00.1 BRADYCARDIA: ICD-10-CM

## 2018-09-20 DIAGNOSIS — R00.2 PALPITATIONS: ICD-10-CM

## 2018-09-20 DIAGNOSIS — I10 ESSENTIAL HYPERTENSION: ICD-10-CM

## 2018-09-20 DIAGNOSIS — I48.0 PAROXYSMAL ATRIAL FIBRILLATION (HCC): Primary | ICD-10-CM

## 2018-09-20 LAB
ANION GAP SERPL CALCULATED.3IONS-SCNC: 15 MMOL/L (ref 3–16)
BUN BLDV-MCNC: 19 MG/DL (ref 7–20)
CALCIUM SERPL-MCNC: 9.5 MG/DL (ref 8.3–10.6)
CHLORIDE BLD-SCNC: 100 MMOL/L (ref 99–110)
CO2: 20 MMOL/L (ref 21–32)
CREAT SERPL-MCNC: 0.7 MG/DL (ref 0.6–1.2)
GFR AFRICAN AMERICAN: >60
GFR NON-AFRICAN AMERICAN: >60
GLUCOSE BLD-MCNC: 108 MG/DL (ref 70–99)
MAGNESIUM: 2 MG/DL (ref 1.8–2.4)
POTASSIUM SERPL-SCNC: 5.1 MMOL/L (ref 3.5–5.1)
SODIUM BLD-SCNC: 135 MMOL/L (ref 136–145)

## 2018-09-20 PROCEDURE — G8417 CALC BMI ABV UP PARAM F/U: HCPCS | Performed by: INTERNAL MEDICINE

## 2018-09-20 PROCEDURE — 93000 ELECTROCARDIOGRAM COMPLETE: CPT | Performed by: INTERNAL MEDICINE

## 2018-09-20 PROCEDURE — G8427 DOCREV CUR MEDS BY ELIG CLIN: HCPCS | Performed by: INTERNAL MEDICINE

## 2018-09-20 PROCEDURE — G8400 PT W/DXA NO RESULTS DOC: HCPCS | Performed by: INTERNAL MEDICINE

## 2018-09-20 PROCEDURE — 1123F ACP DISCUSS/DSCN MKR DOCD: CPT | Performed by: INTERNAL MEDICINE

## 2018-09-20 PROCEDURE — 3017F COLORECTAL CA SCREEN DOC REV: CPT | Performed by: INTERNAL MEDICINE

## 2018-09-20 PROCEDURE — 4040F PNEUMOC VAC/ADMIN/RCVD: CPT | Performed by: INTERNAL MEDICINE

## 2018-09-20 PROCEDURE — 1090F PRES/ABSN URINE INCON ASSESS: CPT | Performed by: INTERNAL MEDICINE

## 2018-09-20 PROCEDURE — 99214 OFFICE O/P EST MOD 30 MIN: CPT | Performed by: INTERNAL MEDICINE

## 2018-09-20 PROCEDURE — 1036F TOBACCO NON-USER: CPT | Performed by: INTERNAL MEDICINE

## 2018-09-20 PROCEDURE — 1101F PT FALLS ASSESS-DOCD LE1/YR: CPT | Performed by: INTERNAL MEDICINE

## 2018-09-20 PROCEDURE — G8598 ASA/ANTIPLAT THER USED: HCPCS | Performed by: INTERNAL MEDICINE

## 2018-09-20 RX ORDER — POTASSIUM CHLORIDE 1.5 G/1.77G
20 POWDER, FOR SOLUTION ORAL 2 TIMES DAILY
Qty: 30 EACH | Refills: 3 | Status: SHIPPED | OUTPATIENT
Start: 2018-09-20 | End: 2018-10-01 | Stop reason: DRUGHIGH

## 2018-09-20 RX ORDER — FUROSEMIDE 40 MG/1
40 TABLET ORAL DAILY
Qty: 30 TABLET | Refills: 5 | Status: SHIPPED | OUTPATIENT
Start: 2018-09-20

## 2018-09-20 NOTE — PROGRESS NOTES
Hiatal hernia     Hyperlipidemia     Hypertension     Migraines, neuralgic     Morbid obesity ([facility] [address]     Osteoarthritis     Type II or unspecified type diabetes mellitus without mention of complication, not stated as uncontrolled     Unspecified sleep apnea     no longer uses cpap    Urinary incontinence     UTI (urinary tract infection)         Past Surgical History:   Procedure Laterality Date    ABLATION OF DYSRHYTHMIC FOCUS      BREAST LUMPECTOMY      CARDIAC CATHETERIZATION      CARDIAC SURGERY  2/22/1999    quadruple by-pass, [address]    quad    COSMETIC SURGERY      face lift    ESOPHAGEAL DILATATION      GASTRIC BAND  12/20/11    hiatal hernia repair    GASTRIC BAND REMOVAL  11/03/2016    laparoscopic     HAMMER TOE SURGERY      JOINT REPLACEMENT  1995    both    JOINT REPLACEMENT  1995    [facility]    KNEE ARTHROSCOPY      1982 left    ORIF HUMERUS DECOMPRESSION Left 2/25/2016    OPEN REDUCTION INTERNAL FIXATION LEFT PROXIMAL HUMERUS    OVARY REMOVAL  1997    UPPER GASTROINTESTINAL ENDOSCOPY      UPPER GASTROINTESTINAL ENDOSCOPY  10/30/15    UPPER GASTROINTESTINAL ENDOSCOPY  10/14/2016    with biopsy       Allergies   Allergen Reactions    Albuterol Other (See Comments)     Crushing chest pain    Heparin Other (See Comments)     MARKED BRUISING/HEMATOMAS    Avelox [Moxifloxacin Hcl In Nacl] Rash    Niaspan [Niacin Er] Itching and Rash    Proventil [Albuterol Sulfate] Palpitations    Tagamet [Cimetidine] Rash       Social History:  Reviewed. reports that she has never smoked. She has never used smokeless tobacco. She reports that she does not drink alcohol or use drugs. Family History:  Reviewed. family history includes Cancer in her mother; Heart Disease in her father; High Blood Pressure in her father and mother; Stroke in her sister.        Review of System:  All other systems but opens adequately.   -Mild aortic valve regurgitation.   -There is mild tricuspid regurgitation with a RVSP of 24 mmHg.   -Mild pulmonic regurgitation present.   -There is a trivial pericardial effusion noted. Cath:  1/24/17  [facility] with grafts      Coronary Findings   LM Normal  LAD Mid 100%  Cx 30% mid, 30% OM1 mid  RCA Mid 100%, multiple 90% prox to mid lesions (small vessel)  LVG 55%  RAD-OM Patent  LIMA-LAD Patent      Medication:  Current Outpatient Prescriptions   Medication Sig Dispense Refill    metoprolol tartrate (LOPRESSOR) 25 MG tablet Take 0.5 tablets by mouth 2 times daily 60 tablet 2    Meth-Hyo-M Bl-Na Phos-Ph Sal (URIBEL PO) Take by mouth      Ciprofloxacin (CIPRO PO) Take 1 tablet by mouth daily For 6 days starting 9/5/18      amLODIPine (NORVASC) 2.5 MG tablet Take 2.5 mg by mouth every evening      isosorbide mononitrate (ISMO;MONOKET) 10 MG tablet Take 5 mg by mouth 2 times daily      Ascorbic Acid (VITAMIN C) 1000 MG tablet Take 1,000 mg by mouth daily      Cyanocobalamin (VITAMIN B-12 PO) Take 3,000 mcg by mouth daily      estradiol (ESTRACE) 0.1 MG/GM vaginal cream Place 2 g vaginally nightly      rivaroxaban (XARELTO) 20 MG TABS tablet Take 1 tablet by mouth daily 90 tablet 5    Cranberry 450 MG CAPS Take by mouth 2 times daily      pioglitazone (ACTOS) 15 MG tablet Take 15 mg by mouth daily      ferrous sulfate 325 (65 Fe) MG tablet Take 45 mg by mouth daily (with breakfast)       vitamin D (CHOLECALCIFEROL) 1000 UNIT TABS tablet Take 1,000 Units by mouth 2 times daily       spironolactone (ALDACTONE) 25 MG tablet Take 25 mg by mouth daily In pm      dexlansoprazole (DEXILANT) 60 MG CPDR delayed release capsule Take 60 mg by mouth daily      isosorbide mononitrate (IMDUR) 30 MG extended release tablet Take 10 mg by mouth daily Takes 1/2 tab in am 1/2 tab at night      gabapentin (NEURONTIN) 100 MG capsule Take 400 mg by mouth 3 times daily.  Colby Baca TraZODone HCl (DESYREL PO) Take 50 mg by mouth nightly       aspirin 81 MG EC tablet Take 1 tablet by mouth daily 30 tablet 3    losartan-hydrochlorothiazide (HYZAAR) 100-25 MG per tablet TK 1 T PO QAM  3    Calcium Citrate-Vitamin D (CALCIUM CITRATE + D PO) Take 2 tablets by mouth daily      SLOW-MAG 71.5-119 MG TBEC tablet Take 1 tablet by mouth daily   5    fexofenadine (ALLEGRA) 180 MG tablet Take 180 mg by mouth daily      rosuvastatin (CRESTOR) 40 MG tablet Take 20 mg by mouth every evening       metFORMIN (GLUCOPHAGE) 500 MG tablet Take 500 mg by mouth 2 times daily (with meals)       ezetimibe (ZETIA) 10 MG tablet Take 5 mg by mouth nightly       escitalopram (LEXAPRO) 20 MG tablet Take 20 mg by mouth daily.  latanoprost (XALATAN) 0.005 % ophthalmic solution Place 1 drop into both eyes nightly 1 drop in each eye       nitroGLYCERIN (NITROLINGUAL) 0.4 MG/SPRAY spray Place 1 spray under the tongue every 5 minutes as needed. As directed for chest pain        No current facility-administered medications for this visit. Assessment and plan:     -SOB/ chest tightness/ fatigue   Recommend daily weights   Start lasix 40mg daily and then prn for weight gain >2lbs in a day   Mg and BMP ordered   Referral placed to cardiac rehab    - Paroxysmal atrial fibrillation:       S/p PVI on 8/16/2018   Remains in sinus rhythm   Continue with MCOT  High [id number] score and high risk for stroke and thromboembolism.  On Xarelto.      - History of bradycardia:               Admitted with chest pain and dizziness Oct 2017 and ECG showed Junctional rhythm, She was on Metorolol 50 mg twice a day                Metoprolol discontinued                 HR has been in 60's off beta blocker               MCOT monitor (Oct 2017) showed SR with avg HR 63     - CAD              Hx of CABG              1/2017 [facility] showed patent grafts              Follows with               No current anginal symptoms              On ASA and

## 2018-09-21 ENCOUNTER — TELEPHONE (OUTPATIENT)
Dept: CARDIOLOGY CLINIC | Age: 68
End: 2018-09-21

## 2018-09-21 NOTE — TELEPHONE ENCOUNTER
Attempted to reach patient's  on cell phone and home phone x 3 but was unable to reach him. Also unable to view detailed records from [facility] in [facility]. LMOR that we will try to contact them on Monday 9/24/18.

## 2018-09-21 NOTE — TELEPHONE ENCOUNTER
Calling to adv that pt is at [facility] and the Dr there stopped her Xarelto and  is concerned.  Please call to adv thank you

## 2018-09-24 NOTE — TELEPHONE ENCOUNTER
I spoke with Hernán Staples. He states EGD was negative for source of bleeding and he is unsure if Min Leal had colonoscopy during hospital stay. He states if she did not she is supposed to have one within a week of discharge. I informed him that we cannot restart her xarelto until she has been cleared by GI.  He will follow up to see if she has had colonoscopy and if so what the results are, if not he will follow up with GI.

## 2018-09-24 NOTE — TELEPHONE ENCOUNTER
Pt  Ad Carranza called back on answering service, pt was in recovery from EGD and he missed the call.  Pls call to advise Thank you

## 2018-09-25 ENCOUNTER — OFFICE VISIT (OUTPATIENT)
Dept: CARDIOLOGY CLINIC | Age: 68
End: 2018-09-25
Payer: MEDICARE

## 2018-09-25 VITALS
HEART RATE: 48 BPM | DIASTOLIC BLOOD PRESSURE: 60 MMHG | WEIGHT: 221.4 LBS | BODY MASS INDEX: 43.47 KG/M2 | SYSTOLIC BLOOD PRESSURE: 130 MMHG | HEIGHT: 60 IN

## 2018-09-25 DIAGNOSIS — I48.0 PAROXYSMAL ATRIAL FIBRILLATION (HCC): Primary | ICD-10-CM

## 2018-09-25 DIAGNOSIS — R00.1 SYMPTOMATIC BRADYCARDIA: ICD-10-CM

## 2018-09-25 DIAGNOSIS — D64.9 ANEMIA, UNSPECIFIED TYPE: ICD-10-CM

## 2018-09-25 PROCEDURE — 1101F PT FALLS ASSESS-DOCD LE1/YR: CPT | Performed by: NURSE PRACTITIONER

## 2018-09-25 PROCEDURE — 93000 ELECTROCARDIOGRAM COMPLETE: CPT | Performed by: NURSE PRACTITIONER

## 2018-09-25 PROCEDURE — G8427 DOCREV CUR MEDS BY ELIG CLIN: HCPCS | Performed by: NURSE PRACTITIONER

## 2018-09-25 PROCEDURE — 1123F ACP DISCUSS/DSCN MKR DOCD: CPT | Performed by: NURSE PRACTITIONER

## 2018-09-25 PROCEDURE — 1090F PRES/ABSN URINE INCON ASSESS: CPT | Performed by: NURSE PRACTITIONER

## 2018-09-25 PROCEDURE — 4040F PNEUMOC VAC/ADMIN/RCVD: CPT | Performed by: NURSE PRACTITIONER

## 2018-09-25 PROCEDURE — G8417 CALC BMI ABV UP PARAM F/U: HCPCS | Performed by: NURSE PRACTITIONER

## 2018-09-25 PROCEDURE — G8400 PT W/DXA NO RESULTS DOC: HCPCS | Performed by: NURSE PRACTITIONER

## 2018-09-25 PROCEDURE — 1036F TOBACCO NON-USER: CPT | Performed by: NURSE PRACTITIONER

## 2018-09-25 PROCEDURE — 3017F COLORECTAL CA SCREEN DOC REV: CPT | Performed by: NURSE PRACTITIONER

## 2018-09-25 PROCEDURE — 99215 OFFICE O/P EST HI 40 MIN: CPT | Performed by: NURSE PRACTITIONER

## 2018-09-25 PROCEDURE — G8598 ASA/ANTIPLAT THER USED: HCPCS | Performed by: NURSE PRACTITIONER

## 2018-09-25 RX ORDER — POTASSIUM CHLORIDE 1.5 G/1.77G
20 POWDER, FOR SOLUTION ORAL DAILY
COMMUNITY
End: 2018-11-16 | Stop reason: ALTCHOICE

## 2018-09-25 NOTE — PROGRESS NOTES
GI    4) Keep previously scheduled follow up to review KAREN w/Dr. Jas Blanchard        Thank you for allowing me to participate in the [facility]. Further evaluation will be based upon the patient's clinical course and testing results. All questions and concerns were addressed to the patient/family. Alternatives to my treatment were discussed. I have discussed the above stated plan and the patient verbalized understanding and agreed with the plan. NOTE: This report was transcribed using voice recognition software. Every effort was made to ensure accuracy, however, inadvertent computerized transcription errors may be present.        BILL [address]   Office: [phone]

## 2018-09-28 ENCOUNTER — HOSPITAL ENCOUNTER (OUTPATIENT)
Dept: ONCOLOGY | Age: 68
Setting detail: INFUSION SERIES
Discharge: HOME | End: 2018-09-28
Payer: MEDICARE

## 2018-09-28 LAB
HCT VFR BLD CALC: 26.7 % (ref 36–48)
HEMOGLOBIN: 8.8 G/DL (ref 12–16)
MCH RBC QN AUTO: 29.2 PG (ref 26–34)
MCHC RBC AUTO-ENTMCNC: 32.9 G/DL (ref 31–36)
MCV RBC AUTO: 88.6 FL (ref 80–100)
PDW BLD-RTO: 14.8 % (ref 12.4–15.4)
PLATELET # BLD: 281 K/UL (ref 135–450)
PMV BLD AUTO: 7.6 FL (ref 5–10.5)
RBC # BLD: 3.02 M/UL (ref 4–5.2)
WBC # BLD: 7.5 K/UL (ref 4–11)

## 2018-09-28 PROCEDURE — 99201 HC NEW PT, OUTPT VISIT LEVEL 1: CPT

## 2018-09-28 PROCEDURE — 85027 COMPLETE CBC AUTOMATED: CPT

## 2018-09-28 RX ORDER — SODIUM CHLORIDE 0.9 % (FLUSH) 0.9 %
10 SYRINGE (ML) INJECTION 2 TIMES DAILY
Status: DISCONTINUED | OUTPATIENT
Start: 2018-09-28 | End: 2018-09-28 | Stop reason: ALTCHOICE

## 2018-09-28 RX ORDER — 0.9 % SODIUM CHLORIDE 0.9 %
250 INTRAVENOUS SOLUTION INTRAVENOUS ONCE
Status: DISCONTINUED | OUTPATIENT
Start: 2018-09-28 | End: 2018-09-28 | Stop reason: ALTCHOICE

## 2018-09-28 NOTE — PROGRESS NOTES
Patient ambulatory to department from Dr Corky Crandall office. Here for tranfusion of one unit of prcs. After arrival, received a call from Dr Corky Crandall office requesting a stat cbc to recheck patient's level. Cbc drawn. hgb is 8.8. Tranfusion cancelled. Patient now going back to Dr Corky Crandall office for iron infusion .

## 2018-10-02 ENCOUNTER — ANESTHESIA EVENT (OUTPATIENT)
Dept: ENDOSCOPY | Age: 68
End: 2018-10-02
Payer: MEDICARE

## 2018-10-02 RX ORDER — SODIUM CHLORIDE 0.9 % (FLUSH) 0.9 %
10 SYRINGE (ML) INJECTION PRN
Status: CANCELLED | OUTPATIENT
Start: 2018-10-02

## 2018-10-02 RX ORDER — SODIUM CHLORIDE 0.9 % (FLUSH) 0.9 %
10 SYRINGE (ML) INJECTION EVERY 12 HOURS SCHEDULED
Status: CANCELLED | OUTPATIENT
Start: 2018-10-02

## 2018-10-02 RX ORDER — SODIUM CHLORIDE, SODIUM LACTATE, POTASSIUM CHLORIDE, CALCIUM CHLORIDE 600; 310; 30; 20 MG/100ML; MG/100ML; MG/100ML; MG/100ML
INJECTION, SOLUTION INTRAVENOUS CONTINUOUS
Status: CANCELLED | OUTPATIENT
Start: 2018-10-02

## 2018-10-02 RX ORDER — LIDOCAINE HYDROCHLORIDE 10 MG/ML
1 INJECTION, SOLUTION EPIDURAL; INFILTRATION; INTRACAUDAL; PERINEURAL
Status: CANCELLED | OUTPATIENT
Start: 2018-10-02 | End: 2018-10-02

## 2018-10-07 PROBLEM — D64.9 ANEMIA: Status: ACTIVE | Noted: 2018-10-07

## 2018-10-08 ENCOUNTER — ANESTHESIA (OUTPATIENT)
Dept: ENDOSCOPY | Age: 68
End: 2018-10-08
Payer: MEDICARE

## 2018-10-08 ENCOUNTER — HOSPITAL ENCOUNTER (OUTPATIENT)
Age: 68
Setting detail: OUTPATIENT SURGERY
Discharge: HOME | End: 2018-10-08
Attending: INTERNAL MEDICINE | Admitting: INTERNAL MEDICINE
Payer: MEDICARE

## 2018-10-08 VITALS — DIASTOLIC BLOOD PRESSURE: 58 MMHG | OXYGEN SATURATION: 96 % | SYSTOLIC BLOOD PRESSURE: 129 MMHG

## 2018-10-08 VITALS
WEIGHT: 218 LBS | BODY MASS INDEX: 42.8 KG/M2 | SYSTOLIC BLOOD PRESSURE: 144 MMHG | RESPIRATION RATE: 24 BRPM | OXYGEN SATURATION: 95 % | HEIGHT: 60 IN | DIASTOLIC BLOOD PRESSURE: 60 MMHG | TEMPERATURE: 98.2 F | HEART RATE: 59 BPM

## 2018-10-08 LAB
GLUCOSE BLD-MCNC: 112 MG/DL (ref 70–99)
GLUCOSE BLD-MCNC: 114 MG/DL (ref 70–99)
PERFORMED ON: ABNORMAL
PERFORMED ON: ABNORMAL

## 2018-10-08 PROCEDURE — 3700000000 HC ANESTHESIA ATTENDED CARE: Performed by: INTERNAL MEDICINE

## 2018-10-08 PROCEDURE — 7100000011 HC PHASE II RECOVERY - ADDTL 15 MIN: Performed by: INTERNAL MEDICINE

## 2018-10-08 PROCEDURE — 7100000001 HC PACU RECOVERY - ADDTL 15 MIN: Performed by: INTERNAL MEDICINE

## 2018-10-08 PROCEDURE — 3700000001 HC ADD 15 MINUTES (ANESTHESIA): Performed by: INTERNAL MEDICINE

## 2018-10-08 PROCEDURE — 88305 TISSUE EXAM BY PATHOLOGIST: CPT

## 2018-10-08 PROCEDURE — 2709999900 HC NON-CHARGEABLE SUPPLY: Performed by: INTERNAL MEDICINE

## 2018-10-08 PROCEDURE — 2580000003 HC RX 258: Performed by: ANESTHESIOLOGY

## 2018-10-08 PROCEDURE — 6360000002 HC RX W HCPCS

## 2018-10-08 PROCEDURE — 3609010600 HC COLONOSCOPY POLYPECTOMY SNARE/COLD BIOPSY: Performed by: INTERNAL MEDICINE

## 2018-10-08 PROCEDURE — 6360000002 HC RX W HCPCS: Performed by: NURSE ANESTHETIST, CERTIFIED REGISTERED

## 2018-10-08 PROCEDURE — 7100000000 HC PACU RECOVERY - FIRST 15 MIN: Performed by: INTERNAL MEDICINE

## 2018-10-08 PROCEDURE — 2500000003 HC RX 250 WO HCPCS: Performed by: NURSE ANESTHETIST, CERTIFIED REGISTERED

## 2018-10-08 PROCEDURE — 7100000010 HC PHASE II RECOVERY - FIRST 15 MIN: Performed by: INTERNAL MEDICINE

## 2018-10-08 RX ORDER — PROPOFOL 10 MG/ML
INJECTION, EMULSION INTRAVENOUS PRN
Status: DISCONTINUED | OUTPATIENT
Start: 2018-10-08 | End: 2018-10-08 | Stop reason: SDUPTHER

## 2018-10-08 RX ORDER — SODIUM CHLORIDE 9 MG/ML
INJECTION, SOLUTION INTRAVENOUS CONTINUOUS
Status: DISCONTINUED | OUTPATIENT
Start: 2018-10-08 | End: 2018-10-08 | Stop reason: HOSPADM

## 2018-10-08 RX ORDER — LORAZEPAM 2 MG/ML
INJECTION INTRAMUSCULAR
Status: COMPLETED
Start: 2018-10-08 | End: 2018-10-08

## 2018-10-08 RX ORDER — LORAZEPAM 2 MG/ML
2 INJECTION INTRAMUSCULAR ONCE
Status: COMPLETED | OUTPATIENT
Start: 2018-10-08 | End: 2018-10-08

## 2018-10-08 RX ORDER — MIDAZOLAM HYDROCHLORIDE 5 MG/ML
INJECTION INTRAMUSCULAR; INTRAVENOUS PRN
Status: DISCONTINUED | OUTPATIENT
Start: 2018-10-08 | End: 2018-10-08 | Stop reason: SDUPTHER

## 2018-10-08 RX ORDER — MIDAZOLAM HYDROCHLORIDE 1 MG/ML
INJECTION INTRAMUSCULAR; INTRAVENOUS
Status: COMPLETED
Start: 2018-10-08 | End: 2018-10-08

## 2018-10-08 RX ORDER — LIDOCAINE HYDROCHLORIDE 20 MG/ML
INJECTION, SOLUTION INFILTRATION; PERINEURAL PRN
Status: DISCONTINUED | OUTPATIENT
Start: 2018-10-08 | End: 2018-10-08 | Stop reason: SDUPTHER

## 2018-10-08 RX ORDER — MIDAZOLAM HYDROCHLORIDE 1 MG/ML
2 INJECTION INTRAMUSCULAR; INTRAVENOUS ONCE
Status: COMPLETED | OUTPATIENT
Start: 2018-10-08 | End: 2018-10-08

## 2018-10-08 RX ADMIN — SODIUM CHLORIDE: 9 INJECTION, SOLUTION INTRAVENOUS at 09:30

## 2018-10-08 RX ADMIN — LIDOCAINE HYDROCHLORIDE 20 MG: 20 INJECTION, SOLUTION INFILTRATION; PERINEURAL at 09:51

## 2018-10-08 RX ADMIN — MIDAZOLAM HYDROCHLORIDE 1 MG: 1 INJECTION INTRAMUSCULAR; INTRAVENOUS at 10:24

## 2018-10-08 RX ADMIN — MIDAZOLAM HYDROCHLORIDE 1 MG: 5 INJECTION INTRAMUSCULAR; INTRAVENOUS at 09:43

## 2018-10-08 RX ADMIN — LORAZEPAM 2 MG: 2 INJECTION INTRAMUSCULAR at 10:14

## 2018-10-08 RX ADMIN — LORAZEPAM 2 MG: 2 INJECTION INTRAMUSCULAR; INTRAVENOUS at 10:14

## 2018-10-08 RX ADMIN — LIDOCAINE HYDROCHLORIDE 20 MG: 20 INJECTION, SOLUTION INFILTRATION; PERINEURAL at 09:47

## 2018-10-08 RX ADMIN — PROPOFOL 40 MG: 10 INJECTION, EMULSION INTRAVENOUS at 09:50

## 2018-10-08 RX ADMIN — PROPOFOL 40 MG: 10 INJECTION, EMULSION INTRAVENOUS at 09:47

## 2018-10-08 RX ADMIN — MIDAZOLAM 1 MG: 1 INJECTION INTRAMUSCULAR; INTRAVENOUS at 10:24

## 2018-10-08 RX ADMIN — PROPOFOL 90 MG: 10 INJECTION, EMULSION INTRAVENOUS at 09:43

## 2018-10-08 ASSESSMENT — PULMONARY FUNCTION TESTS
PIF_VALUE: 0

## 2018-10-08 ASSESSMENT — PAIN - FUNCTIONAL ASSESSMENT: PAIN_FUNCTIONAL_ASSESSMENT: 0-10

## 2018-10-08 NOTE — PROGRESS NOTES
Pt arrived from gi lab, pt left side lying with HOB elevated. Pt asleep upon arrival. Pt had colonoscopy with 1 polyp removed.

## 2018-10-08 NOTE — ANESTHESIA PRE PROCEDURE
Department of Anesthesiology  Preprocedure Note       Name:  Carlos A Edouard   Age:  80 y.o.  :  1950                                          MRN:  [id number]         Date:  10/8/2018      Surgeon: Mery Anderson):  Jerry Ozuna MD    Procedure: Procedure(s):  COLONOSCOPY    Medications prior to admission:   Prior to Admission medications    Medication Sig Start Date End Date Taking? Authorizing Provider   potassium chloride (KLOR-CON) 20 MEQ packet Take 20 mEq by mouth daily   Yes Historical Provider, MD   furosemide (LASIX) 40 MG tablet Take 1 tablet by mouth daily 18  Yes Ena Riddle MD   amLODIPine (NORVASC) 2.5 MG tablet Take 2.5 mg by mouth every evening   Yes Historical Provider, MD   isosorbide mononitrate (ISMO;MONOKET) 10 MG tablet Take 5 mg by mouth 2 times daily   Yes Historical Provider, MD   Ascorbic Acid (VITAMIN C) 1000 MG tablet Take 1,000 mg by mouth daily   Yes Historical Provider, MD   Cyanocobalamin (VITAMIN B-12 PO) Take 3,000 mcg by mouth daily   Yes Historical Provider, MD   estradiol (ESTRACE) 0.1 MG/GM vaginal cream Place 2 g vaginally nightly   Yes Historical Provider, MD   Cranberry 450 MG CAPS Take by mouth 2 times daily   Yes Historical Provider, MD   pioglitazone (ACTOS) 15 MG tablet Take 15 mg by mouth daily   Yes Historical Provider, MD   ferrous sulfate 325 (65 Fe) MG tablet Take 45 mg by mouth daily (with breakfast)    Yes Historical Provider, MD   vitamin D (CHOLECALCIFEROL) 1000 UNIT TABS tablet Take 1,000 Units by mouth 2 times daily    Yes Historical Provider, MD   spironolactone (ALDACTONE) 25 MG tablet Take 25 mg by mouth daily In pm   Yes Historical Provider, MD   dexlansoprazole (DEXILANT) 60 MG CPDR delayed release capsule Take 60 mg by mouth daily   Yes Historical Provider, MD   gabapentin (NEURONTIN) 100 MG capsule Take 400 mg by mouth 3 times daily.  .   Yes Historical Provider, MD   TraZODone HCl (DESYREL PO) Take 50 mg by mouth nightly    Yes Historical

## 2018-10-08 NOTE — H&P
OPEN REDUCTION INTERNAL FIXATION LEFT PROXIMAL HUMERUS    OVARY REMOVAL  1997    UPPER GASTROINTESTINAL ENDOSCOPY      UPPER GASTROINTESTINAL ENDOSCOPY  10/30/15    UPPER GASTROINTESTINAL ENDOSCOPY  10/14/2016    with biopsy     Medications Prior to Admission:   No current facility-administered medications on file prior to encounter. Current Outpatient Prescriptions on File Prior to Encounter   Medication Sig Dispense Refill    potassium chloride (KLOR-CON) 20 MEQ packet Take 20 mEq by mouth daily      furosemide (LASIX) 40 MG tablet Take 1 tablet by mouth daily 30 tablet 5    amLODIPine (NORVASC) 2.5 MG tablet Take 2.5 mg by mouth every evening      isosorbide mononitrate (ISMO;MONOKET) 10 MG tablet Take 5 mg by mouth 2 times daily      Ascorbic Acid (VITAMIN C) 1000 MG tablet Take 1,000 mg by mouth daily      Cyanocobalamin (VITAMIN B-12 PO) Take 3,000 mcg by mouth daily      estradiol (ESTRACE) 0.1 MG/GM vaginal cream Place 2 g vaginally nightly      Cranberry 450 MG CAPS Take by mouth 2 times daily      pioglitazone (ACTOS) 15 MG tablet Take 15 mg by mouth daily      ferrous sulfate 325 (65 Fe) MG tablet Take 45 mg by mouth daily (with breakfast)       vitamin D (CHOLECALCIFEROL) 1000 UNIT TABS tablet Take 1,000 Units by mouth 2 times daily       spironolactone (ALDACTONE) 25 MG tablet Take 25 mg by mouth daily In pm      dexlansoprazole (DEXILANT) 60 MG CPDR delayed release capsule Take 60 mg by mouth daily      gabapentin (NEURONTIN) 100 MG capsule Take 400 mg by mouth 3 times daily.  Mariam Butts TraZODone HCl (DESYREL PO) Take 50 mg by mouth nightly       losartan-hydrochlorothiazide (HYZAAR) 100-25 MG per tablet TK 1 T PO QAM  3    Calcium Citrate-Vitamin D (CALCIUM CITRATE + D PO) Take 2 tablets by mouth daily      SLOW-MAG 71.5-119 MG TBEC tablet Take 1 tablet by mouth daily   5    fexofenadine (ALLEGRA) 180 MG tablet Take 180 mg by mouth daily      rosuvastatin (CRESTOR) 40 MG uvula visible  ____X______  Class IV: Hard palate only visible   __________      ASSESSMENT AND PLAN:    1. Patient is a 80 y.o. female here for COLONOSCOPY with deep sedation. 2.  Procedure options, risks and benefits reviewed with patient. Patient expresses understanding.       Chris Fernandez MD  [address] [facility]

## 2018-10-08 NOTE — PROGRESS NOTES
Reviewed patient's medical and surgical history in electronic record and with patient at the bedside. All questions regarding procedure answered. Scope number and equipment verified using a two person system. Family in waiting room.     Electronically signed by Pinky Boswell RN on 10/8/2018 at 9:39 AM

## 2018-10-09 ENCOUNTER — TELEPHONE (OUTPATIENT)
Dept: CARDIOLOGY CLINIC | Age: 68
End: 2018-10-09

## 2018-10-22 ENCOUNTER — OFFICE VISIT (OUTPATIENT)
Dept: ORTHOPEDIC SURGERY | Age: 68
End: 2018-10-22
Payer: MEDICARE

## 2018-10-22 VITALS
DIASTOLIC BLOOD PRESSURE: 52 MMHG | HEART RATE: 53 BPM | BODY MASS INDEX: 43.19 KG/M2 | SYSTOLIC BLOOD PRESSURE: 138 MMHG | HEIGHT: 60 IN | WEIGHT: 220 LBS

## 2018-10-22 DIAGNOSIS — M25.552 BILATERAL HIP PAIN: Primary | ICD-10-CM

## 2018-10-22 DIAGNOSIS — M25.551 BILATERAL HIP PAIN: Primary | ICD-10-CM

## 2018-10-22 DIAGNOSIS — M53.3 SACROILIAC JOINT PAIN: ICD-10-CM

## 2018-10-22 PROCEDURE — 1036F TOBACCO NON-USER: CPT | Performed by: ORTHOPAEDIC SURGERY

## 2018-10-22 PROCEDURE — 3017F COLORECTAL CA SCREEN DOC REV: CPT | Performed by: ORTHOPAEDIC SURGERY

## 2018-10-22 PROCEDURE — G8400 PT W/DXA NO RESULTS DOC: HCPCS | Performed by: ORTHOPAEDIC SURGERY

## 2018-10-22 PROCEDURE — 99213 OFFICE O/P EST LOW 20 MIN: CPT | Performed by: ORTHOPAEDIC SURGERY

## 2018-10-22 PROCEDURE — G8427 DOCREV CUR MEDS BY ELIG CLIN: HCPCS | Performed by: ORTHOPAEDIC SURGERY

## 2018-10-22 PROCEDURE — 1090F PRES/ABSN URINE INCON ASSESS: CPT | Performed by: ORTHOPAEDIC SURGERY

## 2018-10-22 PROCEDURE — 4040F PNEUMOC VAC/ADMIN/RCVD: CPT | Performed by: ORTHOPAEDIC SURGERY

## 2018-10-22 PROCEDURE — G8484 FLU IMMUNIZE NO ADMIN: HCPCS | Performed by: ORTHOPAEDIC SURGERY

## 2018-10-22 PROCEDURE — G8598 ASA/ANTIPLAT THER USED: HCPCS | Performed by: ORTHOPAEDIC SURGERY

## 2018-10-22 PROCEDURE — 1101F PT FALLS ASSESS-DOCD LE1/YR: CPT | Performed by: ORTHOPAEDIC SURGERY

## 2018-10-22 PROCEDURE — G8417 CALC BMI ABV UP PARAM F/U: HCPCS | Performed by: ORTHOPAEDIC SURGERY

## 2018-10-22 PROCEDURE — 1123F ACP DISCUSS/DSCN MKR DOCD: CPT | Performed by: ORTHOPAEDIC SURGERY

## 2018-10-22 NOTE — PROGRESS NOTES
at [address]    COSMETIC SURGERY      face lift    ESOPHAGEAL DILATATION      GASTRIC BAND  12/20/11    hiatal hernia repair    GASTRIC BAND REMOVAL  11/03/2016    laparoscopic     HAMMER TOE SURGERY      JOINT REPLACEMENT  1995    both    JOINT REPLACEMENT  1995    Left and [facility], [facility]    KNEE ARTHROSCOPY      1982 left    ORIF HUMERUS DECOMPRESSION Left 2/25/2016    OPEN REDUCTION INTERNAL FIXATION LEFT PROXIMAL HUMERUS    OVARY REMOVAL  1997    UPPER GASTROINTESTINAL ENDOSCOPY      UPPER GASTROINTESTINAL ENDOSCOPY  10/30/15    UPPER GASTROINTESTINAL ENDOSCOPY  10/14/2016    with biopsy       Allergies:  Albuterol; Heparin; Avelox [moxifloxacin hcl in nacl]; Niaspan [niacin er];  Proventil [albuterol sulfate]; and Tagamet [cimetidine]    Medications:  Outpatient Prescriptions Marked as Taking for the 10/22/18 encounter (Office Visit) with Chris Hopson MD   Medication Sig Dispense Refill    potassium chloride (KLOR-CON) 20 MEQ packet Take 20 mEq by mouth daily      furosemide (LASIX) 40 MG tablet Take 1 tablet by mouth daily 30 tablet 5    metoprolol tartrate (LOPRESSOR) 25 MG tablet Take 0.5 tablets by mouth 2 times daily (Patient taking differently: Take 12.5 mg by mouth as needed ) 60 tablet 2    amLODIPine (NORVASC) 2.5 MG tablet Take 2.5 mg by mouth every evening      isosorbide mononitrate (ISMO;MONOKET) 10 MG tablet Take 5 mg by mouth 2 times daily      Ascorbic Acid (VITAMIN C) 1000 MG tablet Take 1,000 mg by mouth daily      Cyanocobalamin (VITAMIN B-12 PO) Take 3,000 mcg by mouth daily      estradiol (ESTRACE) 0.1 MG/GM vaginal cream Place 2 g vaginally nightly      rivaroxaban (XARELTO) 20 MG TABS tablet Take 1 tablet by mouth daily 90 tablet 5    Cranberry 450 MG CAPS Take by mouth 2 times daily      pioglitazone (ACTOS) 15 MG tablet Take 15 mg by mouth daily      ferrous sulfate 325 (65 Fe) MG tablet lengths equal  [x] Ecchymosis:  [x] none  [] mild  [] moderate  [] severe   [x] Atrophy:  [x] none  [] mild  [] moderate  [] severe      Range of Motion:  [x] No flexion contracture         [] Deferred: acute injury/post-surgery/pain  [] Flexion contracture    Forward flexion: 100  Supine Internal rotation: 20 Negative labral stress test  Supine External rotation: 60  Abduction: 55  Adduction: 30    Palpation:   No Tenderness over greater trochanter    Provocative Tests:  [x] Negative: log roll, FADIR  Positive Tests:  [] Log Roll   [] Lisa Test: ITB Tightness   [] FADIR Anterior impingement   [] Posterior Impingement    [] Shuck test for insufficient suction seal   [] Dial test for capsular insufficiency:    [] Resisted adduction for athletic pubalgia   [] Resisted curl up for athletic pubalgia     Motor Function:  [x] No gross motor weakness of hip [x] No gross motor weakness of knee  [x] No gross motor weakness of ankle    [x] No gross motor weakness of great toe    [x] Motor strength:   [x] Hip Flex [x] 5/5 [] 4/5 [] 3/5 [] 2/5 [] 1/5 [] 0/5   [x] Hip ABductors [x] 5-/5 [] 4/5 [] 3/5 [] 2/5 [] 1/5 [] 0/5   [x] Hip ADductors [x] 5/5 [] 4/5 [] 3/5 [] 2/5 [] 1/5 [] 0/5     Neurologic:   [x] Sensation to light touch intact  [x] Coordination / proprioception intact  Motor function intact L2-S1    Circulation:  [x] The limb is warm and well perfused. [x] Capillary refill is intact.   [x] Edema:  [x] none  [] mild  [] moderate  [] severe     LEFT HIP ORTHOPAEDIC  EXAM:  Inspection:  [x] Skin intact without abrasion, lacerations or rashes  [x] Leg lengths equal  [x] Ecchymosis:  [x] none  [] mild  [] moderate  [] severe   [x] Atrophy:  [x] none  [] mild  [] moderate  [] severe      Range of Motion:  [x] No flexion contracture         [] Deferred: acute injury/post-surgery/pain  [] Flexion contracture     Forward flexion: 100  Supine Internal rotation: 20 Negative labral stress test  Supine External rotation: 60  Abduction: 55  Adduction: 30    Palpation:   No Tenderness over greater trochanter    Provocative Tests:  [x] Negative: log roll, FADIR  Positive Tests:  [] Log Roll   [] Lisa Test: ITB Tightness   [] FADIR Anterior impingement   [] Posterior Impingement    [] Shuck test for insufficient suction seal   [] Dial test for capsular insufficiency:    [] Resisted adduction for athletic pubalgia   [] Resisted curl up for athletic pubalgia     Motor Function:  [x] No gross motor weakness of hip [x] No gross motor weakness of knee  [x] No gross motor weakness of ankle    [x] No gross motor weakness of great toe    [x] Motor strength:   [x] Hip Flex [x] 5/5 [] 4/5 [] 3/5 [] 2/5 [] 1/5 [] 0/5   [x] Hip ABductors [x] 5-/5 [] 4/5 [] 3/5 [] 2/5 [] 1/5 [] 0/5   [x] Hip ADductors [x] 5/5 [] 4/5 [] 3/5 [] 2/5 [] 1/5 [] 0/5     Neurologic:  [x] Sensation to light touch intact  [x] Coordination / proprioception intact    Circulation:  [x] The limb is warm and well perfused. [x] Capillary refill is intact. [x] Edema:  [x] none  [] mild  [] moderate  [] severe     Data Reviewed:     XRays:  (2 views: Standing AP, 45 degree [place]) of her bilateral hip and pelvis taken today 10/22/18 in the office and reviewed by me personally showed: Well preserved joint space with mild degenerative changes. No radiolucent lines to suggest fracture or any osteoblastic lesions. Assessment:     Kelli Dee is a 80y.o. year old female who presents with low back SI joint pain since April with no known injury. Diagnosis:    Diagnosis Orders   1. Bilateral hip pain  XR HIP BILATERAL W AP PELVIS (2 VIEWS)         Plan:     I discussed the diagnosis and the treatment options with Kelli Dee today as well as the nature of the condition. Based on her exam today, her symptoms appear to be coming from her low back/ SI joint area. She does not have any pain laterally to suggest bursitis.  I do recommend that she follow up with Dr. Munira Matthews for further evaluation and treatment. She does have a appointment scheduled with her this Friday. All questions were answered today. She is in agreement with this plan. Return to Clinic/Follow - Up:  6-8 weeks MIRACLE Ricketts was instructed to call the office if her symptoms worsen or if new symptoms appear prior to the next scheduled visit. She is specifically instructed to contact the office between now & her scheduled appointment if she has concerns related to her condition or if she needs assistance in scheduling the above tests. She is welcome to call for an appointment sooner if she has any additional concerns or questions. Orders Placed This Encounter   Procedures    XR HIP BILATERAL W AP PELVIS (2 VIEWS)     Order Specific Question:   Reason for exam:     Answer:   room 11, ap and dun both sides. Refills/New Prescriptions:  No orders of the defined types were placed in this encounter. Today's prescription medications will be e-scribed (when appropriate) to the Patient's Preferred Pharmacy:   [facility] David Mckenzie, [address] [place] [address] [phone]  [facility] [address] [place] [id number]  Phone: [phone] Fax: [phone] [facility] [address] - P [phone] - Laila Rob [phone]  [address] [place] [address]  [address] [place] [address]  Phone: [phone] Fax: [phone]       Ole Combs PA-C  Physician Assistant, Orthopedic Surgery    During this examination, Ole Combs PA-C, functioned as a scribe for Dr. Yumiko Dumont. This dictation was performed with a verbal recognition program (DRAGON) and it was checked for errors. It is possible that there are still dictated errors within this office note. If so, please bring any errors to my attention for an addendum. All efforts were made to ensure that this office note is accurate.     Attending Attestation Note:    I, Dr. Yumiko Dumont MD,

## 2018-10-23 ENCOUNTER — HOSPITAL ENCOUNTER (OUTPATIENT)
Dept: CARDIAC REHAB | Age: 68
Setting detail: THERAPIES SERIES
Discharge: HOME | End: 2018-10-23

## 2018-10-26 ENCOUNTER — OFFICE VISIT (OUTPATIENT)
Dept: ORTHOPEDIC SURGERY | Age: 68
End: 2018-10-26
Payer: MEDICARE

## 2018-10-26 VITALS
WEIGHT: 220.02 LBS | HEIGHT: 59 IN | BODY MASS INDEX: 44.36 KG/M2 | DIASTOLIC BLOOD PRESSURE: 78 MMHG | SYSTOLIC BLOOD PRESSURE: 125 MMHG

## 2018-10-26 DIAGNOSIS — M51.36 DDD (DEGENERATIVE DISC DISEASE), LUMBAR: ICD-10-CM

## 2018-10-26 DIAGNOSIS — M47.816 SPONDYLOSIS OF LUMBAR REGION WITHOUT MYELOPATHY OR RADICULOPATHY: ICD-10-CM

## 2018-10-26 DIAGNOSIS — M48.062 LUMBAR STENOSIS WITH NEUROGENIC CLAUDICATION: ICD-10-CM

## 2018-10-26 DIAGNOSIS — M43.16 SPONDYLOLISTHESIS OF LUMBAR REGION: Primary | ICD-10-CM

## 2018-10-26 PROCEDURE — G8427 DOCREV CUR MEDS BY ELIG CLIN: HCPCS | Performed by: PHYSICAL MEDICINE & REHABILITATION

## 2018-10-26 PROCEDURE — 4040F PNEUMOC VAC/ADMIN/RCVD: CPT | Performed by: PHYSICAL MEDICINE & REHABILITATION

## 2018-10-26 PROCEDURE — G8400 PT W/DXA NO RESULTS DOC: HCPCS | Performed by: PHYSICAL MEDICINE & REHABILITATION

## 2018-10-26 PROCEDURE — 99214 OFFICE O/P EST MOD 30 MIN: CPT | Performed by: PHYSICAL MEDICINE & REHABILITATION

## 2018-10-26 PROCEDURE — G8417 CALC BMI ABV UP PARAM F/U: HCPCS | Performed by: PHYSICAL MEDICINE & REHABILITATION

## 2018-10-26 PROCEDURE — 1036F TOBACCO NON-USER: CPT | Performed by: PHYSICAL MEDICINE & REHABILITATION

## 2018-10-26 PROCEDURE — 3017F COLORECTAL CA SCREEN DOC REV: CPT | Performed by: PHYSICAL MEDICINE & REHABILITATION

## 2018-10-26 PROCEDURE — 1101F PT FALLS ASSESS-DOCD LE1/YR: CPT | Performed by: PHYSICAL MEDICINE & REHABILITATION

## 2018-10-26 PROCEDURE — 1123F ACP DISCUSS/DSCN MKR DOCD: CPT | Performed by: PHYSICAL MEDICINE & REHABILITATION

## 2018-10-26 PROCEDURE — 1090F PRES/ABSN URINE INCON ASSESS: CPT | Performed by: PHYSICAL MEDICINE & REHABILITATION

## 2018-10-26 PROCEDURE — G8598 ASA/ANTIPLAT THER USED: HCPCS | Performed by: PHYSICAL MEDICINE & REHABILITATION

## 2018-10-26 PROCEDURE — G8484 FLU IMMUNIZE NO ADMIN: HCPCS | Performed by: PHYSICAL MEDICINE & REHABILITATION

## 2018-10-26 NOTE — PROGRESS NOTES
  aspirin 81 MG EC tablet, Take 1 tablet by mouth daily, Disp: 30 tablet, Rfl: 3    losartan-hydrochlorothiazide (HYZAAR) 100-25 MG per tablet, TK 1 T PO QAM, Disp: , Rfl: 3    Calcium Citrate-Vitamin D (CALCIUM CITRATE + D PO), Take 2 tablets by mouth daily, Disp: , Rfl:     SLOW-MAG 71.5-119 MG TBEC tablet, Take 1 tablet by mouth daily , Disp: , Rfl: 5    fexofenadine (ALLEGRA) 180 MG tablet, Take 180 mg by mouth daily, Disp: , Rfl:     rosuvastatin (CRESTOR) 40 MG tablet, Take 20 mg by mouth every evening , Disp: , Rfl:     metFORMIN (GLUCOPHAGE) 500 MG tablet, Take 500 mg by mouth 2 times daily (with meals) , Disp: , Rfl:     ezetimibe (ZETIA) 10 MG tablet, Take 5 mg by mouth nightly , Disp: , Rfl:     escitalopram (LEXAPRO) 20 MG tablet, Take 20 mg by mouth daily. , Disp: , Rfl:     latanoprost (XALATAN) 0.005 % ophthalmic solution, Place 1 drop into both eyes nightly 1 drop in each eye , Disp: , Rfl:     nitroGLYCERIN (NITROLINGUAL) 0.4 MG/SPRAY spray, Place 1 spray under the tongue every 5 minutes as needed. As directed for chest pain , Disp: , Rfl:   Allergies:  Albuterol; Heparin; Avelox [moxifloxacin hcl in nacl]; Niaspan [niacin er]; Proventil [albuterol sulfate]; and Tagamet [cimetidine]  Social History:    reports that she has never smoked. She has never used smokeless tobacco. She reports that she does not drink alcohol or use drugs.   Family History:   Family History   Problem Relation Age of Onset   [facility] Cancer Mother         ovarian, colon    High Blood Pressure Mother     Heart Disease Father     High Blood Pressure Father     Stroke Sister         paralysed on right side B/C of stroke       REVIEW OF SYSTEMS:   CONSTITUTIONAL: Denies unexplained weight loss, fevers, chills or fatigue  NEUROLOGICAL: Denies unsteady gait or progressive weakness  MUSCULOSKELETAL: Denies joint swelling or redness  GI: Denies nausea, vomiting, diarrhea   : Denies bowel or bladder issues       PHYSICAL EXAM:    Vitals: Blood pressure 125/78, height 4' 11.49\" (1.511 m), weight 220 lb 0.3 oz (99.8 kg), not currently breastfeeding. GENERAL EXAM:  · General Apparence: Patient is adequately groomed with no evidence of malnutrition. · Psychiatric: Orientation: The patient is oriented to time, place and person. The patient's mood and affect are appropriate   · Vascular: Examination reveals no swelling and palpation reveals no tenderness in upper or lower extremities. Good capillary refill. · The lymphatic examination of the neck, axillae and groin reveals all areas to be without enlargement or induration  · Sensation is intact without deficit in the upper and lower extremities to light touch and pinprick  · Coordination of the upper and lower extremities are normal.    LUMBAR/SACRAL EXAMINATION:  · Inspection: Local inspection shows no step-off or bruising. Lumbar alignment is normal. No instability is noted. · Palpation:   No evidence of tenderness at the midline. No tenderness bilaterally at the paraspinal or trochanters. There is no paraspinal spasm. · Range of Motion: limited by 50% in all planes due to pain  · Strength:   Strength testing is 5/5 in all muscle groups tested. · Special Tests:   Straight leg raise and crossed SLR negative. · Skin: There are no rashes, ulcerations or lesions. · Reflexes: Reflexes are symmetrically 1+ at the patellar and ankle tendons. Clonus absent bilaterally at the feet. · Gait & station: normal, patient ambulates without assistance  · Additional Examinations:  · RIGHT LOWER EXTREMITY: Inspection/examination of the right lower extremity does not show any tenderness, deformity or injury. Range of motion is normal and pain-free. There is no gross instability. There are no rashes, ulcerations or lesions. Strength and tone are normal. No atrophy or abnormal movements are noted.   · LEFT LOWER EXTREMITY:  Inspection/examination of the left lower extremity does not show any

## 2018-10-26 NOTE — LETTER
Please schedule the following with:     Date:   2018 @ 9:30    Account: [de-identified]  Patient: Elissa Liz    : 1950  Address:  [address]    Phone (H):  [phone] (home)      ----------------------------------------------------------------------------------------------  Diagnosis:     ICD-10-CM    1. Spondylolisthesis of lumbar region M43.16    2. Lumbar stenosis with neurogenic claudication M48.062    3. DDD (degenerative disc disease), lumbar M51.36    4. Spondylosis of lumbar region without myelopathy or radiculopathy M47.816          Levels: L4/L5  midline  Interlaminar LEYLA  CPT Codes 73695    ----------------------------------------------------------------------------------------------  Injection #   [id number]    Attending Physician       Job Yue Rodriguez MD.      ----------------------------------------------------------------------------------------------  Injection Scheduled For:    At:    [facility] [facility]    Pre-Cert#    2[employer] [facility]    Pre-Cert#    Comments:    · Infection control  · Tested positive for MRSA in past 12 months:  no  · Tested positive for MSSA \"staph infection\" in past 12 months: no  · Tested positive for VRE (Vancomycin Resistant Enterococci) in past 12 months:   no  · Currently on any antibiotics for an infection: no  · Anticoagulants:  · On a blood thinner:  ASA/XARELTO HOLD 3 DAYS  [facility]   · Any history of bleeding disorder: yes GI BLEEDS  · Advanced Liver disease: no   · Advanced Renal disease: no   · Glaucoma: yes   · Diabetes: yes     Sedation:  No  -----------------------------------------------------------------------------------------------  Allergies   Allergen Reactions    Albuterol Other (See Comments)     Crushing chest pain    Heparin Other (See Comments)     MARKED BRUISING/HEMATOMAS    Avelox [Moxifloxacin Hcl In Nacl] Rash    Niaspan [Niacin Er] Itching and Rash    Proventil [Albuterol Sulfate] Palpitations

## 2018-10-29 ENCOUNTER — TELEPHONE (OUTPATIENT)
Dept: ORTHOPEDIC SURGERY | Age: 68
End: 2018-10-29

## 2018-10-29 ENCOUNTER — TELEPHONE (OUTPATIENT)
Dept: CARDIOLOGY CLINIC | Age: 68
End: 2018-10-29

## 2018-10-29 NOTE — TELEPHONE ENCOUNTER
L/m for approval to hold Afsaneh Luna for 3 days prior to [facility] on 11/5/2018. Patient aware of hold date.

## 2018-10-30 ENCOUNTER — OFFICE VISIT (OUTPATIENT)
Dept: CARDIOLOGY CLINIC | Age: 68
End: 2018-10-30
Payer: MEDICARE

## 2018-10-30 VITALS
HEART RATE: 51 BPM | SYSTOLIC BLOOD PRESSURE: 136 MMHG | BODY MASS INDEX: 45.36 KG/M2 | HEIGHT: 59 IN | DIASTOLIC BLOOD PRESSURE: 54 MMHG | WEIGHT: 225 LBS

## 2018-10-30 DIAGNOSIS — R55 SYNCOPE AND COLLAPSE: ICD-10-CM

## 2018-10-30 DIAGNOSIS — I10 ESSENTIAL HYPERTENSION: ICD-10-CM

## 2018-10-30 DIAGNOSIS — I47.1 SVT (SUPRAVENTRICULAR TACHYCARDIA) (HCC): ICD-10-CM

## 2018-10-30 DIAGNOSIS — I48.91 ATRIAL FIBRILLATION WITH RVR (HCC): Primary | ICD-10-CM

## 2018-10-30 DIAGNOSIS — I48.0 PAROXYSMAL ATRIAL FIBRILLATION (HCC): ICD-10-CM

## 2018-10-30 DIAGNOSIS — I25.10 CORONARY ARTERY DISEASE INVOLVING NATIVE CORONARY ARTERY OF NATIVE HEART WITHOUT ANGINA PECTORIS: ICD-10-CM

## 2018-10-30 PROCEDURE — 1123F ACP DISCUSS/DSCN MKR DOCD: CPT | Performed by: INTERNAL MEDICINE

## 2018-10-30 PROCEDURE — 1090F PRES/ABSN URINE INCON ASSESS: CPT | Performed by: INTERNAL MEDICINE

## 2018-10-30 PROCEDURE — 4040F PNEUMOC VAC/ADMIN/RCVD: CPT | Performed by: INTERNAL MEDICINE

## 2018-10-30 PROCEDURE — G8484 FLU IMMUNIZE NO ADMIN: HCPCS | Performed by: INTERNAL MEDICINE

## 2018-10-30 PROCEDURE — G8598 ASA/ANTIPLAT THER USED: HCPCS | Performed by: INTERNAL MEDICINE

## 2018-10-30 PROCEDURE — G8427 DOCREV CUR MEDS BY ELIG CLIN: HCPCS | Performed by: INTERNAL MEDICINE

## 2018-10-30 PROCEDURE — G8417 CALC BMI ABV UP PARAM F/U: HCPCS | Performed by: INTERNAL MEDICINE

## 2018-10-30 PROCEDURE — 3017F COLORECTAL CA SCREEN DOC REV: CPT | Performed by: INTERNAL MEDICINE

## 2018-10-30 PROCEDURE — G8400 PT W/DXA NO RESULTS DOC: HCPCS | Performed by: INTERNAL MEDICINE

## 2018-10-30 PROCEDURE — 1101F PT FALLS ASSESS-DOCD LE1/YR: CPT | Performed by: INTERNAL MEDICINE

## 2018-10-30 PROCEDURE — 99214 OFFICE O/P EST MOD 30 MIN: CPT | Performed by: INTERNAL MEDICINE

## 2018-10-30 PROCEDURE — 93000 ELECTROCARDIOGRAM COMPLETE: CPT | Performed by: INTERNAL MEDICINE

## 2018-10-30 PROCEDURE — 1036F TOBACCO NON-USER: CPT | Performed by: INTERNAL MEDICINE

## 2018-10-30 NOTE — COMMUNICATION BODY
by mouth daily (with breakfast)       vitamin D (CHOLECALCIFEROL) 1000 UNIT TABS tablet Take 1,000 Units by mouth 2 times daily       spironolactone (ALDACTONE) 25 MG tablet Take 25 mg by mouth daily In pm      dexlansoprazole (DEXILANT) 60 MG CPDR delayed release capsule Take 60 mg by mouth daily      gabapentin (NEURONTIN) 100 MG capsule Take 400 mg by mouth 3 times daily. Ova Rubin TraZODone HCl (DESYREL PO) Take 50 mg by mouth nightly       losartan-hydrochlorothiazide (HYZAAR) 100-25 MG per tablet TK 1 T PO QAM  3    Calcium Citrate-Vitamin D (CALCIUM CITRATE + D PO) Take 2 tablets by mouth daily      SLOW-MAG 71.5-119 MG TBEC tablet Take 1 tablet by mouth daily   5    fexofenadine (ALLEGRA) 180 MG tablet Take 180 mg by mouth daily      rosuvastatin (CRESTOR) 40 MG tablet Take 20 mg by mouth every evening       metFORMIN (GLUCOPHAGE) 500 MG tablet Take 500 mg by mouth 2 times daily (with meals)       ezetimibe (ZETIA) 10 MG tablet Take 5 mg by mouth nightly       escitalopram (LEXAPRO) 20 MG tablet Take 20 mg by mouth daily.  latanoprost (XALATAN) 0.005 % ophthalmic solution Place 1 drop into both eyes nightly 1 drop in each eye       nitroGLYCERIN (NITROLINGUAL) 0.4 MG/SPRAY spray Place 1 spray under the tongue every 5 minutes as needed. As directed for chest pain        No current facility-administered medications for this visit. Assessment and plan:     - Paroxysmal atrial fibrillation:    S/p PVI on 8/16/2018    Remains in sinus rhythm    OT 9/10/18> Sinus rhythm, short atrial runs   High [id number] score and high risk for stroke and thromboembolism. On Xarelto. Patient has had GI bleeding, has history of MGUS and receiving iron transfusion for anemia. Discussed options including left atrial appendage closure. Patient has high [id number] score and requires anticoagulation to prevent thromboembolic events.  Unfortunately patient has had GI bleeding and is at high risk of

## 2018-10-30 NOTE — PROGRESS NOTES
([facility] 10/01/2017    A. fib with SVR    CAD (coronary artery disease)     Chest pain 10/01/2017    Chronic kidney disease     ? ??    Depression     GERD (gastroesophageal reflux disease)     Glaucoma     Hiatal hernia     Hyperlipidemia     Hypertension     Migraines, neuralgic     Morbid obesity ([facility]     Osteoarthritis     Sleep apnea     uses CPAP    Type II or unspecified type diabetes mellitus without mention of complication, not stated as uncontrolled     Unspecified sleep apnea     no longer uses cpap    Urinary incontinence     UTI (urinary tract infection)         Past Surgical History:   Procedure Laterality Date    ABLATION OF DYSRHYTHMIC FOCUS      BREAST LUMPECTOMY      CARDIAC CATHETERIZATION      CARDIAC SURGERY  2/22/1999    quadruple by-pass, [facility]    COLONOSCOPY      COLONOSCOPY  10/08/2018    1 polyp removed    COLONOSCOPY N/A 10/8/2018    COLONOSCOPY POLYPECTOMY SNARE/COLD BIOPSY performed by Xiang Pleitez MD at [facility]    COSMETIC SURGERY      face lift    ESOPHAGEAL DILATATION      GASTRIC BAND  12/20/11    hiatal hernia repair    GASTRIC BAND REMOVAL  11/03/2016    laparoscopic     HAMMER TOE SURGERY      JOINT REPLACEMENT  1995    both   [address]    Left and Right knees, [facility]    KNEE ARTHROSCOPY      1982 left    ORIF HUMERUS DECOMPRESSION Left 2/25/2016    OPEN REDUCTION INTERNAL FIXATION LEFT PROXIMAL HUMERUS    OVARY REMOVAL  1997    UPPER GASTROINTESTINAL ENDOSCOPY      UPPER GASTROINTESTINAL ENDOSCOPY  10/30/15    UPPER GASTROINTESTINAL ENDOSCOPY  10/14/2016    with biopsy       Allergies   Allergen Reactions    Albuterol Other (See Comments)     Crushing chest pain    Heparin Other (See Comments)     MARKED BRUISING/HEMATOMAS    Avelox [Moxifloxacin Hcl In Nacl] Rash    Niaspan [Niacin Er] Itching and Rash    Proventil [Albuterol Sulfate] Palpitations   

## 2018-11-05 ENCOUNTER — APPOINTMENT (OUTPATIENT)
Dept: GENERAL RADIOLOGY | Age: 68
End: 2018-11-05
Attending: PHYSICAL MEDICINE & REHABILITATION
Payer: MEDICARE

## 2018-11-05 ENCOUNTER — HOSPITAL ENCOUNTER (OUTPATIENT)
Age: 68
Setting detail: OUTPATIENT SURGERY
Discharge: HOME | End: 2018-11-05
Attending: PHYSICAL MEDICINE & REHABILITATION | Admitting: PHYSICAL MEDICINE & REHABILITATION
Payer: MEDICARE

## 2018-11-05 VITALS
OXYGEN SATURATION: 94 % | DIASTOLIC BLOOD PRESSURE: 58 MMHG | SYSTOLIC BLOOD PRESSURE: 146 MMHG | RESPIRATION RATE: 18 BRPM | HEART RATE: 52 BPM

## 2018-11-05 LAB
GLUCOSE BLD-MCNC: 128 MG/DL (ref 70–99)
INR BLD: 1 (ref 0.86–1.14)
PERFORMED ON: ABNORMAL
PROTHROMBIN TIME: 11.4 SEC (ref 9.8–13)

## 2018-11-05 PROCEDURE — 3610000054 HC PAIN LEVEL 3 BASE (NON-OR): Performed by: PHYSICAL MEDICINE & REHABILITATION

## 2018-11-05 PROCEDURE — 77003 FLUOROGUIDE FOR SPINE INJECT: CPT

## 2018-11-05 PROCEDURE — 2500000003 HC RX 250 WO HCPCS: Performed by: PHYSICAL MEDICINE & REHABILITATION

## 2018-11-05 PROCEDURE — 36415 COLL VENOUS BLD VENIPUNCTURE: CPT

## 2018-11-05 PROCEDURE — 85610 PROTHROMBIN TIME: CPT

## 2018-11-05 PROCEDURE — 2709999900 HC NON-CHARGEABLE SUPPLY: Performed by: PHYSICAL MEDICINE & REHABILITATION

## 2018-11-05 PROCEDURE — 6360000002 HC RX W HCPCS: Performed by: PHYSICAL MEDICINE & REHABILITATION

## 2018-11-05 PROCEDURE — 2580000003 HC RX 258: Performed by: PHYSICAL MEDICINE & REHABILITATION

## 2018-11-05 PROCEDURE — 99152 MOD SED SAME PHYS/QHP 5/>YRS: CPT | Performed by: PHYSICAL MEDICINE & REHABILITATION

## 2018-11-05 RX ORDER — MIDAZOLAM HYDROCHLORIDE 1 MG/ML
INJECTION INTRAMUSCULAR; INTRAVENOUS
Status: COMPLETED | OUTPATIENT
Start: 2018-11-05 | End: 2018-11-05

## 2018-11-05 RX ORDER — LIDOCAINE HYDROCHLORIDE 10 MG/ML
INJECTION, SOLUTION INFILTRATION; PERINEURAL
Status: COMPLETED | OUTPATIENT
Start: 2018-11-05 | End: 2018-11-05

## 2018-11-05 RX ORDER — METHYLPREDNISOLONE ACETATE 80 MG/ML
INJECTION, SUSPENSION INTRA-ARTICULAR; INTRALESIONAL; INTRAMUSCULAR; SOFT TISSUE
Status: COMPLETED | OUTPATIENT
Start: 2018-11-05 | End: 2018-11-05

## 2018-11-05 RX ORDER — 0.9 % SODIUM CHLORIDE 0.9 %
VIAL (ML) INJECTION
Status: COMPLETED | OUTPATIENT
Start: 2018-11-05 | End: 2018-11-05

## 2018-11-05 ASSESSMENT — PAIN SCALES - GENERAL: PAINLEVEL_OUTOF10: 2

## 2018-11-12 NOTE — PROGRESS NOTES
[address]       H+P // CONSULT // OUTPATIENT VISIT // Abiodun Whittaker     Referring Doctor Marivel Donato, DO   Encounter Type Followup     CHIEF COMPLAINT     VisitType [] Acute [x] Chronic     Sxs [x] None [] CP [] SOB [] Dizzy [] Palps [] Fatigue     Problems CAD s/p CABG, pAFIB, HTN, CHOL, OBESITY      HISTORY OF PRESENT ILLNESS     Doing well. Denies cp, sob. Compliant with meds. Debating watchman device. Symptom Y N Frequency Duration Severity Modify Assoc Sx Other   CP [] [x]         SOB [] [x]         Dizzy [] [x]         Syncope [] [x]         Palpitations [] [x]           COMPLIANCE     Category Meds Diet Salt Exercise Tobacco Alcohol Drugs   Compliant [x] [x] [x] [x] [x] [x] [x]   [x]Counseling given on all above above categories    HISTORY/ALLERGIES/ROS     MedHx:  has a past medical history of Anemia; Anesthesia; Anesthesia complication; Atrial fibrillation ([address] CAD (coronary artery disease); Chest pain; Chronic kidney disease; Depression; GERD (gastroesophageal reflux disease); Glaucoma; Hiatal hernia; Hyperlipidemia; Hypertension; Migraines, neuralgic; Morbid obesity ([address] Osteoarthritis; Sleep apnea; Type II or unspecified type diabetes mellitus without mention of complication, not stated as uncontrolled; Unspecified sleep apnea; Urinary incontinence; and UTI (urinary tract infection). SurgHx:  has a past surgical history that includes Coronary artery bypass graft (1999); Hammer toe surgery; Ovary removal (1997); Esophagus dilation; Breast lumpectomy; Knee arthroscopy; Gastric Band (12/20/11); Colonoscopy; Upper gastrointestinal endoscopy; Cardiac surgery (2/22/1999); Upper gastrointestinal endoscopy (10/30/15); Cosmetic surgery; joint replacement (1995); joint replacement (1995); orif humerus decompression (Left, 2/25/2016); Upper gastrointestinal endoscopy (10/14/2016); Bariatric Surgery (11/03/2016);  Cardiac catheterization; ablation of dysrhythmic focus;

## 2018-11-14 ENCOUNTER — HOSPITAL ENCOUNTER (OUTPATIENT)
Dept: ULTRASOUND IMAGING | Age: 68
Discharge: HOME | End: 2018-11-14
Payer: MEDICARE

## 2018-11-14 ENCOUNTER — HOSPITAL ENCOUNTER (OUTPATIENT)
Dept: GENERAL RADIOLOGY | Age: 68
Discharge: HOME | End: 2018-11-14
Payer: MEDICARE

## 2018-11-14 ENCOUNTER — HOSPITAL ENCOUNTER (OUTPATIENT)
Dept: WOMENS IMAGING | Age: 68
Discharge: HOME | End: 2018-11-14
Payer: MEDICARE

## 2018-11-14 DIAGNOSIS — M85.852 OSTEOPENIA OF LEFT HIP: ICD-10-CM

## 2018-11-14 DIAGNOSIS — N63.23 BREAST LUMP ON LEFT SIDE AT 4 O'CLOCK POSITION: ICD-10-CM

## 2018-11-14 PROCEDURE — 77080 DXA BONE DENSITY AXIAL: CPT

## 2018-11-14 PROCEDURE — 76642 ULTRASOUND BREAST LIMITED: CPT

## 2018-11-14 PROCEDURE — G0279 TOMOSYNTHESIS, MAMMO: HCPCS

## 2018-11-16 ENCOUNTER — OFFICE VISIT (OUTPATIENT)
Dept: CARDIOLOGY CLINIC | Age: 68
End: 2018-11-16
Payer: MEDICARE

## 2018-11-16 VITALS
OXYGEN SATURATION: 97 % | SYSTOLIC BLOOD PRESSURE: 124 MMHG | HEIGHT: 59 IN | BODY MASS INDEX: 45.16 KG/M2 | DIASTOLIC BLOOD PRESSURE: 62 MMHG | HEART RATE: 72 BPM | WEIGHT: 224 LBS

## 2018-11-16 DIAGNOSIS — E66.9 OBESITY, UNSPECIFIED CLASSIFICATION, UNSPECIFIED OBESITY TYPE, UNSPECIFIED WHETHER SERIOUS COMORBIDITY PRESENT: ICD-10-CM

## 2018-11-16 DIAGNOSIS — I25.10 CORONARY ARTERY DISEASE INVOLVING NATIVE CORONARY ARTERY OF NATIVE HEART WITHOUT ANGINA PECTORIS: Primary | ICD-10-CM

## 2018-11-16 DIAGNOSIS — E78.00 HYPERCHOLESTEREMIA: ICD-10-CM

## 2018-11-16 DIAGNOSIS — I48.0 PAF (PAROXYSMAL ATRIAL FIBRILLATION) (HCC): ICD-10-CM

## 2018-11-16 DIAGNOSIS — I10 ESSENTIAL HYPERTENSION: ICD-10-CM

## 2018-11-16 PROCEDURE — G8417 CALC BMI ABV UP PARAM F/U: HCPCS | Performed by: INTERNAL MEDICINE

## 2018-11-16 PROCEDURE — G8598 ASA/ANTIPLAT THER USED: HCPCS | Performed by: INTERNAL MEDICINE

## 2018-11-16 PROCEDURE — 4040F PNEUMOC VAC/ADMIN/RCVD: CPT | Performed by: INTERNAL MEDICINE

## 2018-11-16 PROCEDURE — G8427 DOCREV CUR MEDS BY ELIG CLIN: HCPCS | Performed by: INTERNAL MEDICINE

## 2018-11-16 PROCEDURE — 1090F PRES/ABSN URINE INCON ASSESS: CPT | Performed by: INTERNAL MEDICINE

## 2018-11-16 PROCEDURE — 3017F COLORECTAL CA SCREEN DOC REV: CPT | Performed by: INTERNAL MEDICINE

## 2018-11-16 PROCEDURE — 1101F PT FALLS ASSESS-DOCD LE1/YR: CPT | Performed by: INTERNAL MEDICINE

## 2018-11-16 PROCEDURE — 99214 OFFICE O/P EST MOD 30 MIN: CPT | Performed by: INTERNAL MEDICINE

## 2018-11-16 PROCEDURE — G8399 PT W/DXA RESULTS DOCUMENT: HCPCS | Performed by: INTERNAL MEDICINE

## 2018-11-16 PROCEDURE — 1123F ACP DISCUSS/DSCN MKR DOCD: CPT | Performed by: INTERNAL MEDICINE

## 2018-11-16 PROCEDURE — G8484 FLU IMMUNIZE NO ADMIN: HCPCS | Performed by: INTERNAL MEDICINE

## 2018-11-16 PROCEDURE — 1036F TOBACCO NON-USER: CPT | Performed by: INTERNAL MEDICINE

## 2018-11-16 RX ORDER — POTASSIUM CHLORIDE 20 MEQ/1
20 TABLET, EXTENDED RELEASE ORAL DAILY
Qty: 90 TABLET | Refills: 3 | Status: SHIPPED | OUTPATIENT
Start: 2018-11-16 | End: 2019-09-16 | Stop reason: SDUPTHER

## 2018-11-26 ENCOUNTER — OFFICE VISIT (OUTPATIENT)
Dept: ORTHOPEDIC SURGERY | Age: 68
End: 2018-11-26
Payer: MEDICARE

## 2018-11-26 VITALS
DIASTOLIC BLOOD PRESSURE: 85 MMHG | HEIGHT: 59 IN | BODY MASS INDEX: 45.16 KG/M2 | SYSTOLIC BLOOD PRESSURE: 136 MMHG | WEIGHT: 224 LBS

## 2018-11-26 DIAGNOSIS — M54.16 LUMBAR RADICULOPATHY: ICD-10-CM

## 2018-11-26 DIAGNOSIS — M48.062 LUMBAR STENOSIS WITH NEUROGENIC CLAUDICATION: Primary | ICD-10-CM

## 2018-11-26 DIAGNOSIS — M47.816 SPONDYLOSIS OF LUMBAR REGION WITHOUT MYELOPATHY OR RADICULOPATHY: ICD-10-CM

## 2018-11-26 DIAGNOSIS — M51.36 DDD (DEGENERATIVE DISC DISEASE), LUMBAR: ICD-10-CM

## 2018-11-26 PROCEDURE — 1090F PRES/ABSN URINE INCON ASSESS: CPT | Performed by: PHYSICAL MEDICINE & REHABILITATION

## 2018-11-26 PROCEDURE — G8417 CALC BMI ABV UP PARAM F/U: HCPCS | Performed by: PHYSICAL MEDICINE & REHABILITATION

## 2018-11-26 PROCEDURE — 1101F PT FALLS ASSESS-DOCD LE1/YR: CPT | Performed by: PHYSICAL MEDICINE & REHABILITATION

## 2018-11-26 PROCEDURE — 3017F COLORECTAL CA SCREEN DOC REV: CPT | Performed by: PHYSICAL MEDICINE & REHABILITATION

## 2018-11-26 PROCEDURE — G8399 PT W/DXA RESULTS DOCUMENT: HCPCS | Performed by: PHYSICAL MEDICINE & REHABILITATION

## 2018-11-26 PROCEDURE — 4040F PNEUMOC VAC/ADMIN/RCVD: CPT | Performed by: PHYSICAL MEDICINE & REHABILITATION

## 2018-11-26 PROCEDURE — 1123F ACP DISCUSS/DSCN MKR DOCD: CPT | Performed by: PHYSICAL MEDICINE & REHABILITATION

## 2018-11-26 PROCEDURE — G8484 FLU IMMUNIZE NO ADMIN: HCPCS | Performed by: PHYSICAL MEDICINE & REHABILITATION

## 2018-11-26 PROCEDURE — G8427 DOCREV CUR MEDS BY ELIG CLIN: HCPCS | Performed by: PHYSICAL MEDICINE & REHABILITATION

## 2018-11-26 PROCEDURE — 99214 OFFICE O/P EST MOD 30 MIN: CPT | Performed by: PHYSICAL MEDICINE & REHABILITATION

## 2018-11-26 PROCEDURE — 1036F TOBACCO NON-USER: CPT | Performed by: PHYSICAL MEDICINE & REHABILITATION

## 2018-11-26 PROCEDURE — G8598 ASA/ANTIPLAT THER USED: HCPCS | Performed by: PHYSICAL MEDICINE & REHABILITATION

## 2018-11-26 RX ORDER — METHYLPREDNISOLONE 4 MG/1
TABLET ORAL
Qty: 1 KIT | Refills: 0 | Status: ON HOLD | OUTPATIENT
Start: 2018-11-26 | End: 2018-12-17 | Stop reason: ALTCHOICE

## 2018-11-26 NOTE — PROGRESS NOTES
OVARY REMOVAL  1997    AR NJX DX/THER SBST INTRLMNR CRV/THRC W/IMG GDN N/A 11/5/2018    MIDLINE L4/L5 LUMBAR INTERLAMINAR EPIDURAL STEROID INJECTION WITH FLUOROSCOPY performed by Marta Recinos MD at [address] [place] [address] ENDOSCOPY  10/30/15    UPPER GASTROINTESTINAL ENDOSCOPY  10/14/2016    with biopsy     Current Medications:     Current Outpatient Prescriptions:     potassium chloride (KLOR-CON M) 20 MEQ extended release tablet, Take 1 tablet by mouth daily, Disp: 90 tablet, Rfl: 3    aspirin 81 MG tablet, Take 81 mg by mouth daily, Disp: , Rfl:     furosemide (LASIX) 40 MG tablet, Take 1 tablet by mouth daily, Disp: 30 tablet, Rfl: 5    metoprolol tartrate (LOPRESSOR) 25 MG tablet, Take 0.5 tablets by mouth 2 times daily (Patient taking differently: Take 12.5 mg by mouth as needed ), Disp: 60 tablet, Rfl: 2    amLODIPine (NORVASC) 2.5 MG tablet, Take 2.5 mg by mouth every evening, Disp: , Rfl:     isosorbide mononitrate (ISMO;MONOKET) 10 MG tablet, Take 5 mg by mouth 2 times daily, Disp: , Rfl:     Ascorbic Acid (VITAMIN C) 1000 MG tablet, Take 1,000 mg by mouth daily, Disp: , Rfl:     Cyanocobalamin (VITAMIN B-12 PO), Take 3,000 mcg by mouth daily, Disp: , Rfl:     estradiol (ESTRACE) 0.1 MG/GM vaginal cream, Place 2 g vaginally nightly, Disp: , Rfl:     rivaroxaban (XARELTO) 20 MG TABS tablet, Take 1 tablet by mouth daily, Disp: 90 tablet, Rfl: 5    Cranberry 450 MG CAPS, Take by mouth 2 times daily, Disp: , Rfl:     pioglitazone (ACTOS) 15 MG tablet, Take 15 mg by mouth daily, Disp: , Rfl:     ferrous sulfate 325 (65 Fe) MG tablet, Take 45 mg by mouth daily (with breakfast) , Disp: , Rfl:     vitamin D (CHOLECALCIFEROL) 1000 UNIT TABS tablet, Take 1,000 Units by mouth 2 times daily , Disp: , Rfl:     spironolactone (ALDACTONE) 25 MG tablet, Take 25 mg by mouth daily In pm, Disp: , Rfl:     dexlansoprazole (DEXILANT) 60 MG CPDR delayed

## 2018-11-28 ENCOUNTER — HOSPITAL ENCOUNTER (OUTPATIENT)
Dept: MRI IMAGING | Age: 68
Discharge: HOME | End: 2018-11-28
Payer: MEDICARE

## 2018-11-28 DIAGNOSIS — M47.816 SPONDYLOSIS OF LUMBAR REGION WITHOUT MYELOPATHY OR RADICULOPATHY: ICD-10-CM

## 2018-11-28 DIAGNOSIS — M48.062 LUMBAR STENOSIS WITH NEUROGENIC CLAUDICATION: ICD-10-CM

## 2018-11-28 DIAGNOSIS — M54.16 LUMBAR RADICULOPATHY: ICD-10-CM

## 2018-11-28 DIAGNOSIS — M51.36 DDD (DEGENERATIVE DISC DISEASE), LUMBAR: ICD-10-CM

## 2018-11-28 PROCEDURE — 72148 MRI LUMBAR SPINE W/O DYE: CPT

## 2018-12-07 ENCOUNTER — OFFICE VISIT (OUTPATIENT)
Dept: ORTHOPEDIC SURGERY | Age: 68
End: 2018-12-07
Payer: MEDICARE

## 2018-12-07 VITALS
SYSTOLIC BLOOD PRESSURE: 129 MMHG | WEIGHT: 221 LBS | DIASTOLIC BLOOD PRESSURE: 85 MMHG | BODY MASS INDEX: 43.39 KG/M2 | HEIGHT: 60 IN

## 2018-12-07 DIAGNOSIS — M48.061 LUMBAR FORAMINAL STENOSIS: ICD-10-CM

## 2018-12-07 DIAGNOSIS — M48.061 SPINAL STENOSIS OF LUMBAR REGION WITHOUT NEUROGENIC CLAUDICATION: ICD-10-CM

## 2018-12-07 DIAGNOSIS — S16.1XXA STRAIN OF NECK MUSCLE, INITIAL ENCOUNTER: Primary | ICD-10-CM

## 2018-12-07 DIAGNOSIS — M53.3 SACROILIAC JOINT DYSFUNCTION OF LEFT SIDE: ICD-10-CM

## 2018-12-07 PROCEDURE — 1036F TOBACCO NON-USER: CPT | Performed by: PHYSICAL MEDICINE & REHABILITATION

## 2018-12-07 PROCEDURE — 1101F PT FALLS ASSESS-DOCD LE1/YR: CPT | Performed by: PHYSICAL MEDICINE & REHABILITATION

## 2018-12-07 PROCEDURE — G8598 ASA/ANTIPLAT THER USED: HCPCS | Performed by: PHYSICAL MEDICINE & REHABILITATION

## 2018-12-07 PROCEDURE — G8399 PT W/DXA RESULTS DOCUMENT: HCPCS | Performed by: PHYSICAL MEDICINE & REHABILITATION

## 2018-12-07 PROCEDURE — G8484 FLU IMMUNIZE NO ADMIN: HCPCS | Performed by: PHYSICAL MEDICINE & REHABILITATION

## 2018-12-07 PROCEDURE — G8427 DOCREV CUR MEDS BY ELIG CLIN: HCPCS | Performed by: PHYSICAL MEDICINE & REHABILITATION

## 2018-12-07 PROCEDURE — 1090F PRES/ABSN URINE INCON ASSESS: CPT | Performed by: PHYSICAL MEDICINE & REHABILITATION

## 2018-12-07 PROCEDURE — 99214 OFFICE O/P EST MOD 30 MIN: CPT | Performed by: PHYSICAL MEDICINE & REHABILITATION

## 2018-12-07 PROCEDURE — 4040F PNEUMOC VAC/ADMIN/RCVD: CPT | Performed by: PHYSICAL MEDICINE & REHABILITATION

## 2018-12-07 PROCEDURE — 1123F ACP DISCUSS/DSCN MKR DOCD: CPT | Performed by: PHYSICAL MEDICINE & REHABILITATION

## 2018-12-07 PROCEDURE — G8417 CALC BMI ABV UP PARAM F/U: HCPCS | Performed by: PHYSICAL MEDICINE & REHABILITATION

## 2018-12-07 PROCEDURE — 3017F COLORECTAL CA SCREEN DOC REV: CPT | Performed by: PHYSICAL MEDICINE & REHABILITATION

## 2018-12-07 RX ORDER — TIZANIDINE 4 MG/1
4 TABLET ORAL NIGHTLY
Qty: 30 TABLET | Refills: 0 | Status: SHIPPED | OUTPATIENT
Start: 2018-12-07 | End: 2019-01-06

## 2018-12-07 NOTE — PROGRESS NOTES
Follow up: Andrea Muhammad  1950  [id number]      CHIEF COMPLAINT:    Chief Complaint   Patient presents with    Lower Back Pain     MRI LSP RESULTS         HISTORY OF PRESENT ILLNESS:  Ms. Crys Monroe is a 80 y.o. female returns for a follow up visit for multiple medical problems. Her current presenting problems are   1. Strain of neck muscle, initial encounter    2. Sacroiliac joint dysfunction of left side    3. Spinal stenosis of lumbar region without neurogenic claudication    4. Lumbar foraminal stenosis    . As per information/history obtained from the PADT(patient assessment and documentation tool) - She complains of pain in the lower back with radiation to the upper leg Left and lower leg Left She rates the pain 9/10 and describes it as sharp, aching, throbbing. Pain is made worse by: movement, walking, standing, bending, lifting. She denies side effects from the current pain regimen. Patient reports that since the last follow up visit the physical functioning is unchanged, family/social relationships are unchanged, mood is unchanged and sleep patterns are unchanged, and that the overall functioning is unchanged. Patient denies neurological bowel or bladder. Patient follows up today for the results of the MRI lumbar spine completed on 11/28/18. Patient continues to have a constant discomfort that increases with movement. She presents today with continued low back pain starting in the left gluteal area rating to left knee. She reports her pain is persistent and not improving. She reports no significant relief from a  Prednisone pack. She denies having any focal weakness. She complains of new neck pain on the left side radiating to her left shoulder. She again denies having any focal weakness. Her pain is throbbing and aching and aggravated by laying on her left side. She denies having any alleviating measures. Her pain is a 6 out of 10.     Associated signs and show any tenderness, deformity or injury. Range of motion is normal and pain-free. There is no gross instability. There are no rashes, ulcerations or lesions. Strength and tone are normal. No atrophy or abnormal movements are noted. · LEFT UPPER EXTREMITY: Inspection/examination of the left upper extremity does not show any tenderness, deformity or injury. Range of motion is normal and pain-free. There is no gross instability. There are no rashes, ulcerations or lesions. Strength and tone are normal. No atrophy or abnormal movements are noted. LUMBAR/SACRAL EXAMINATION:  · Inspection: Local inspection shows no step-off or bruising. Lumbar alignment is normal. No instability is noted. · Palpation:   No evidence of tenderness at the midline. Lumbar paraspinal tenderness: Mild L4/5 and L5/S1 tenderness  Bursal tenderness No tenderness bilaterally  There is no paraspinal spasm. · Range of Motion: limited by 25% in all planes due to pain  · Strength:   Strength testing is 5/5 in all muscle groups tested. · Special Tests:   Straight leg raise and crossed SLR negative. Luis's testing is + on left. FADIR's testing is negative bilaterally. · Skin: There are no rashes, ulcerations or lesions. · Reflexes: Reflexes are symmetrically 1+ at the patellar and ankle tendons. Clonus absent bilaterally at the feet. · Gait & station: antalgic on the left  · Additional Examinations:  · RIGHT LOWER EXTREMITY: Inspection/examination of the right lower extremity does not show any tenderness, deformity or injury. Range of motion is normal and pain-free. There is no gross instability. There are no rashes, ulcerations or lesions. Strength and tone are normal. No atrophy or abnormal movements are noted. · LEFT LOWER EXTREMITY:  Inspection/examination of the left lower extremity does not show any tenderness, deformity or injury. Range of motion is normal and pain-free. There is no gross instability.  There are no rashes,

## 2018-12-10 ENCOUNTER — TELEPHONE (OUTPATIENT)
Dept: ORTHOPEDIC SURGERY | Age: 68
End: 2018-12-10

## 2018-12-17 ENCOUNTER — APPOINTMENT (OUTPATIENT)
Dept: GENERAL RADIOLOGY | Age: 68
End: 2018-12-17
Attending: PHYSICAL MEDICINE & REHABILITATION
Payer: MEDICARE

## 2018-12-17 ENCOUNTER — HOSPITAL ENCOUNTER (OUTPATIENT)
Age: 68
Setting detail: OUTPATIENT SURGERY
Discharge: HOME | End: 2018-12-17
Attending: PHYSICAL MEDICINE & REHABILITATION | Admitting: PHYSICAL MEDICINE & REHABILITATION
Payer: MEDICARE

## 2018-12-17 VITALS
RESPIRATION RATE: 16 BRPM | BODY MASS INDEX: 43.78 KG/M2 | TEMPERATURE: 97.1 F | WEIGHT: 223 LBS | SYSTOLIC BLOOD PRESSURE: 159 MMHG | HEIGHT: 60 IN | OXYGEN SATURATION: 99 % | DIASTOLIC BLOOD PRESSURE: 57 MMHG | HEART RATE: 59 BPM

## 2018-12-17 LAB
GLUCOSE BLD-MCNC: 132 MG/DL (ref 70–99)
PERFORMED ON: ABNORMAL

## 2018-12-17 PROCEDURE — 2500000003 HC RX 250 WO HCPCS: Performed by: PHYSICAL MEDICINE & REHABILITATION

## 2018-12-17 PROCEDURE — 2709999900 HC NON-CHARGEABLE SUPPLY: Performed by: PHYSICAL MEDICINE & REHABILITATION

## 2018-12-17 PROCEDURE — G0260 INJ FOR SACROILIAC JT ANESTH: HCPCS

## 2018-12-17 PROCEDURE — 6360000002 HC RX W HCPCS: Performed by: PHYSICAL MEDICINE & REHABILITATION

## 2018-12-17 PROCEDURE — 99152 MOD SED SAME PHYS/QHP 5/>YRS: CPT | Performed by: PHYSICAL MEDICINE & REHABILITATION

## 2018-12-17 PROCEDURE — 3610000054 HC PAIN LEVEL 3 BASE (NON-OR): Performed by: PHYSICAL MEDICINE & REHABILITATION

## 2018-12-17 RX ORDER — MIDAZOLAM HYDROCHLORIDE 1 MG/ML
INJECTION INTRAMUSCULAR; INTRAVENOUS
Status: COMPLETED | OUTPATIENT
Start: 2018-12-17 | End: 2018-12-17

## 2018-12-17 RX ORDER — BUPIVACAINE HYDROCHLORIDE 5 MG/ML
INJECTION, SOLUTION PERINEURAL
Status: COMPLETED | OUTPATIENT
Start: 2018-12-17 | End: 2018-12-17

## 2018-12-17 RX ORDER — METHYLPREDNISOLONE ACETATE 80 MG/ML
INJECTION, SUSPENSION INTRA-ARTICULAR; INTRALESIONAL; INTRAMUSCULAR; SOFT TISSUE
Status: COMPLETED | OUTPATIENT
Start: 2018-12-17 | End: 2018-12-17

## 2018-12-17 RX ORDER — LIDOCAINE HYDROCHLORIDE 10 MG/ML
INJECTION, SOLUTION EPIDURAL; INFILTRATION; INTRACAUDAL; PERINEURAL
Status: COMPLETED | OUTPATIENT
Start: 2018-12-17 | End: 2018-12-17

## 2018-12-17 ASSESSMENT — PAIN - FUNCTIONAL ASSESSMENT: PAIN_FUNCTIONAL_ASSESSMENT: 0-10

## 2018-12-17 ASSESSMENT — PAIN DESCRIPTION - DESCRIPTORS: DESCRIPTORS: ACHING

## 2018-12-17 ASSESSMENT — PAIN SCALES - GENERAL: PAINLEVEL_OUTOF10: 0

## 2018-12-17 NOTE — PROGRESS NOTES
IV discontinued, catheter intact, and dressing applied. Procedural dressing dry and intact. Bilateral lower extremities equal in strength. Discharge instructions reviewed with patient or responsible adult, signed and copy given. All home medications have been reviewed. All questions answered and patient or responsible adult verbalized understanding.   PAIN LEVEL AT DISCHARGE _0____

## 2018-12-17 NOTE — H&P
HISTORY AND PHYSICAL/PRE-SEDATION ASSESSMENT    Patient:  Katherine Caceres   :  1950  Medical Record No.:  [id number]   Date:  2018  Physician:  Davin Murry M.D. Facility: [facility]    Nursing History and Physical reviewed and agreed upon. Additional findings:    Allergies:  Albuterol; Heparin; Avelox [moxifloxacin hcl in nacl]; Niaspan [niacin er]; Proventil [albuterol sulfate]; and Tagamet [cimetidine]    Home Medications:    Prior to Admission medications    Medication Sig Start Date End Date Taking? Authorizing Provider   tiZANidine (ZANAFLEX) 4 MG tablet Take 1 tablet by mouth nightly 18  Davin Murry MD   methylPREDNISolone (MEDROL, MEGAN,) 4 MG tablet Take by mouth as directed.  18   Davin Murry MD   potassium chloride (KLOR-CON M) 20 MEQ extended release tablet Take 1 tablet by mouth daily 18   Allan Tripp MD   aspirin 81 MG tablet Take 81 mg by mouth daily    Historical Provider, MD   furosemide (LASIX) 40 MG tablet Take 1 tablet by mouth daily 18   Negra Ovalles MD   metoprolol tartrate (LOPRESSOR) 25 MG tablet Take 0.5 tablets by mouth 2 times daily  Patient taking differently: Take 12.5 mg by mouth as needed  9/10/18   BILL Black - CNP   amLODIPine (NORVASC) 2.5 MG tablet Take 2.5 mg by mouth every evening    Historical Provider, MD   isosorbide mononitrate (ISMO;MONOKET) 10 MG tablet Take 5 mg by mouth 2 times daily    Historical Provider, MD   Ascorbic Acid (VITAMIN C) 1000 MG tablet Take 1,000 mg by mouth daily    Historical Provider, MD   Cyanocobalamin (VITAMIN B-12 PO) Take 3,000 mcg by mouth daily    Historical Provider, MD   estradiol (ESTRACE) 0.1 MG/GM vaginal cream Place 2 g vaginally nightly    Historical Provider, MD   rivaroxaban (XARELTO) 20 MG TABS tablet Take 1 tablet by mouth daily 18   Negra Ovalles MD   Cranberry 450 MG CAPS Take by mouth 2 times daily    Historical Provider,

## 2019-01-28 ENCOUNTER — OFFICE VISIT (OUTPATIENT)
Dept: CARDIOLOGY CLINIC | Age: 69
End: 2019-01-28
Payer: MEDICARE

## 2019-01-28 VITALS
HEIGHT: 59 IN | SYSTOLIC BLOOD PRESSURE: 116 MMHG | HEART RATE: 77 BPM | BODY MASS INDEX: 46.93 KG/M2 | WEIGHT: 232.8 LBS | DIASTOLIC BLOOD PRESSURE: 66 MMHG

## 2019-01-28 DIAGNOSIS — E66.01 MORBID OBESITY WITH BMI OF 40.0-44.9, ADULT (HCC): ICD-10-CM

## 2019-01-28 DIAGNOSIS — I47.1 SVT (SUPRAVENTRICULAR TACHYCARDIA) (HCC): ICD-10-CM

## 2019-01-28 DIAGNOSIS — I48.0 PAF (PAROXYSMAL ATRIAL FIBRILLATION) (HCC): ICD-10-CM

## 2019-01-28 DIAGNOSIS — I25.810 CORONARY ARTERY DISEASE INVOLVING CORONARY BYPASS GRAFT OF NATIVE HEART WITHOUT ANGINA PECTORIS: ICD-10-CM

## 2019-01-28 DIAGNOSIS — G47.33 OSA (OBSTRUCTIVE SLEEP APNEA): Primary | ICD-10-CM

## 2019-01-28 DIAGNOSIS — I48.91 ATRIAL FIBRILLATION WITH RVR (HCC): ICD-10-CM

## 2019-01-28 DIAGNOSIS — R55 SYNCOPE AND COLLAPSE: ICD-10-CM

## 2019-01-28 PROCEDURE — 1090F PRES/ABSN URINE INCON ASSESS: CPT | Performed by: INTERNAL MEDICINE

## 2019-01-28 PROCEDURE — G8427 DOCREV CUR MEDS BY ELIG CLIN: HCPCS | Performed by: INTERNAL MEDICINE

## 2019-01-28 PROCEDURE — G8598 ASA/ANTIPLAT THER USED: HCPCS | Performed by: INTERNAL MEDICINE

## 2019-01-28 PROCEDURE — 99214 OFFICE O/P EST MOD 30 MIN: CPT | Performed by: INTERNAL MEDICINE

## 2019-01-28 PROCEDURE — G8399 PT W/DXA RESULTS DOCUMENT: HCPCS | Performed by: INTERNAL MEDICINE

## 2019-01-28 PROCEDURE — G8417 CALC BMI ABV UP PARAM F/U: HCPCS | Performed by: INTERNAL MEDICINE

## 2019-01-28 PROCEDURE — 1036F TOBACCO NON-USER: CPT | Performed by: INTERNAL MEDICINE

## 2019-01-28 PROCEDURE — 4040F PNEUMOC VAC/ADMIN/RCVD: CPT | Performed by: INTERNAL MEDICINE

## 2019-01-28 PROCEDURE — 1123F ACP DISCUSS/DSCN MKR DOCD: CPT | Performed by: INTERNAL MEDICINE

## 2019-01-28 PROCEDURE — G8484 FLU IMMUNIZE NO ADMIN: HCPCS | Performed by: INTERNAL MEDICINE

## 2019-01-28 PROCEDURE — 3017F COLORECTAL CA SCREEN DOC REV: CPT | Performed by: INTERNAL MEDICINE

## 2019-01-28 PROCEDURE — 1101F PT FALLS ASSESS-DOCD LE1/YR: CPT | Performed by: INTERNAL MEDICINE

## 2019-01-28 PROCEDURE — 93000 ELECTROCARDIOGRAM COMPLETE: CPT | Performed by: INTERNAL MEDICINE

## 2019-02-08 ENCOUNTER — OFFICE VISIT (OUTPATIENT)
Dept: ORTHOPEDIC SURGERY | Age: 69
End: 2019-02-08
Payer: MEDICARE

## 2019-02-08 VITALS
DIASTOLIC BLOOD PRESSURE: 49 MMHG | HEIGHT: 59 IN | BODY MASS INDEX: 46.93 KG/M2 | HEART RATE: 50 BPM | SYSTOLIC BLOOD PRESSURE: 109 MMHG | WEIGHT: 232.81 LBS

## 2019-02-08 DIAGNOSIS — M51.9 LUMBAR DISC DISEASE: ICD-10-CM

## 2019-02-08 DIAGNOSIS — E66.01 CLASS 3 SEVERE OBESITY DUE TO EXCESS CALORIES WITH BODY MASS INDEX (BMI) OF 45.0 TO 49.9 IN ADULT, UNSPECIFIED WHETHER SERIOUS COMORBIDITY PRESENT (HCC): ICD-10-CM

## 2019-02-08 DIAGNOSIS — M54.16 LUMBAR RADICULOPATHY: ICD-10-CM

## 2019-02-08 DIAGNOSIS — M48.061 LUMBAR FORAMINAL STENOSIS: Primary | ICD-10-CM

## 2019-02-08 PROCEDURE — G8399 PT W/DXA RESULTS DOCUMENT: HCPCS | Performed by: PHYSICAL MEDICINE & REHABILITATION

## 2019-02-08 PROCEDURE — 3017F COLORECTAL CA SCREEN DOC REV: CPT | Performed by: PHYSICAL MEDICINE & REHABILITATION

## 2019-02-08 PROCEDURE — 1101F PT FALLS ASSESS-DOCD LE1/YR: CPT | Performed by: PHYSICAL MEDICINE & REHABILITATION

## 2019-02-08 PROCEDURE — 1090F PRES/ABSN URINE INCON ASSESS: CPT | Performed by: PHYSICAL MEDICINE & REHABILITATION

## 2019-02-08 PROCEDURE — 99214 OFFICE O/P EST MOD 30 MIN: CPT | Performed by: PHYSICAL MEDICINE & REHABILITATION

## 2019-02-08 PROCEDURE — 1123F ACP DISCUSS/DSCN MKR DOCD: CPT | Performed by: PHYSICAL MEDICINE & REHABILITATION

## 2019-02-08 PROCEDURE — G8417 CALC BMI ABV UP PARAM F/U: HCPCS | Performed by: PHYSICAL MEDICINE & REHABILITATION

## 2019-02-08 PROCEDURE — G8598 ASA/ANTIPLAT THER USED: HCPCS | Performed by: PHYSICAL MEDICINE & REHABILITATION

## 2019-02-08 PROCEDURE — G8484 FLU IMMUNIZE NO ADMIN: HCPCS | Performed by: PHYSICAL MEDICINE & REHABILITATION

## 2019-02-08 PROCEDURE — G8427 DOCREV CUR MEDS BY ELIG CLIN: HCPCS | Performed by: PHYSICAL MEDICINE & REHABILITATION

## 2019-02-08 PROCEDURE — 4040F PNEUMOC VAC/ADMIN/RCVD: CPT | Performed by: PHYSICAL MEDICINE & REHABILITATION

## 2019-02-08 PROCEDURE — 1036F TOBACCO NON-USER: CPT | Performed by: PHYSICAL MEDICINE & REHABILITATION

## 2019-02-09 ENCOUNTER — APPOINTMENT (OUTPATIENT)
Dept: CT IMAGING | Age: 69
End: 2019-02-09
Payer: MEDICARE

## 2019-02-09 ENCOUNTER — HOSPITAL ENCOUNTER (EMERGENCY)
Age: 69
Discharge: HOME | End: 2019-02-09
Attending: EMERGENCY MEDICINE
Payer: MEDICARE

## 2019-02-09 VITALS
TEMPERATURE: 98.6 F | OXYGEN SATURATION: 99 % | DIASTOLIC BLOOD PRESSURE: 56 MMHG | BODY MASS INDEX: 45.16 KG/M2 | SYSTOLIC BLOOD PRESSURE: 133 MMHG | WEIGHT: 230 LBS | HEART RATE: 59 BPM | RESPIRATION RATE: 18 BRPM | HEIGHT: 60 IN

## 2019-02-09 DIAGNOSIS — Z87.19 HISTORY OF MELENA: ICD-10-CM

## 2019-02-09 DIAGNOSIS — R10.9 ABDOMINAL PAIN, UNSPECIFIED ABDOMINAL LOCATION: Primary | ICD-10-CM

## 2019-02-09 DIAGNOSIS — E87.5 HYPERKALEMIA: ICD-10-CM

## 2019-02-09 LAB
ANION GAP SERPL CALCULATED.3IONS-SCNC: 13 MMOL/L (ref 3–16)
BASOPHILS ABSOLUTE: 0 K/UL (ref 0–0.2)
BASOPHILS RELATIVE PERCENT: 0.3 %
BUN BLDV-MCNC: 33 MG/DL (ref 7–20)
CALCIUM SERPL-MCNC: 9.6 MG/DL (ref 8.3–10.6)
CHLORIDE BLD-SCNC: 94 MMOL/L (ref 99–110)
CO2: 23 MMOL/L (ref 21–32)
CREAT SERPL-MCNC: 0.8 MG/DL (ref 0.6–1.2)
EOSINOPHILS ABSOLUTE: 0.1 K/UL (ref 0–0.6)
EOSINOPHILS RELATIVE PERCENT: 1.5 %
GFR AFRICAN AMERICAN: >60
GFR NON-AFRICAN AMERICAN: >60
GLUCOSE BLD-MCNC: 138 MG/DL (ref 70–99)
HCT VFR BLD CALC: 34 % (ref 36–48)
HEMOGLOBIN: 11.4 G/DL (ref 12–16)
INR BLD: 1.49 (ref 0.86–1.14)
LYMPHOCYTES ABSOLUTE: 2.5 K/UL (ref 1–5.1)
LYMPHOCYTES RELATIVE PERCENT: 35.8 %
MCH RBC QN AUTO: 31.1 PG (ref 26–34)
MCHC RBC AUTO-ENTMCNC: 33.6 G/DL (ref 31–36)
MCV RBC AUTO: 92.7 FL (ref 80–100)
MONOCYTES ABSOLUTE: 0.8 K/UL (ref 0–1.3)
MONOCYTES RELATIVE PERCENT: 11.7 %
NEUTROPHILS ABSOLUTE: 3.5 K/UL (ref 1.7–7.7)
NEUTROPHILS RELATIVE PERCENT: 50.7 %
OCCULT BLOOD DIAGNOSTIC: NORMAL
PDW BLD-RTO: 14.3 % (ref 12.4–15.4)
PLATELET # BLD: 259 K/UL (ref 135–450)
PMV BLD AUTO: 8 FL (ref 5–10.5)
POTASSIUM SERPL-SCNC: 5.6 MMOL/L (ref 3.5–5.1)
PROTHROMBIN TIME: 17 SEC (ref 9.8–13)
RBC # BLD: 3.66 M/UL (ref 4–5.2)
SODIUM BLD-SCNC: 130 MMOL/L (ref 136–145)
WBC # BLD: 6.9 K/UL (ref 4–11)

## 2019-02-09 PROCEDURE — 85025 COMPLETE CBC W/AUTO DIFF WBC: CPT

## 2019-02-09 PROCEDURE — 96374 THER/PROPH/DIAG INJ IV PUSH: CPT

## 2019-02-09 PROCEDURE — 80048 BASIC METABOLIC PNL TOTAL CA: CPT

## 2019-02-09 PROCEDURE — 99285 EMERGENCY DEPT VISIT HI MDM: CPT

## 2019-02-09 PROCEDURE — 6360000004 HC RX CONTRAST MEDICATION: Performed by: EMERGENCY MEDICINE

## 2019-02-09 PROCEDURE — 74177 CT ABD & PELVIS W/CONTRAST: CPT

## 2019-02-09 PROCEDURE — 6360000002 HC RX W HCPCS: Performed by: EMERGENCY MEDICINE

## 2019-02-09 PROCEDURE — 85610 PROTHROMBIN TIME: CPT

## 2019-02-09 PROCEDURE — 96361 HYDRATE IV INFUSION ADD-ON: CPT

## 2019-02-09 PROCEDURE — 2580000003 HC RX 258: Performed by: EMERGENCY MEDICINE

## 2019-02-09 PROCEDURE — G0328 FECAL BLOOD SCRN IMMUNOASSAY: HCPCS

## 2019-02-09 PROCEDURE — 93005 ELECTROCARDIOGRAM TRACING: CPT | Performed by: EMERGENCY MEDICINE

## 2019-02-09 RX ORDER — 0.9 % SODIUM CHLORIDE 0.9 %
500 INTRAVENOUS SOLUTION INTRAVENOUS ONCE
Status: COMPLETED | OUTPATIENT
Start: 2019-02-09 | End: 2019-02-09

## 2019-02-09 RX ORDER — FUROSEMIDE 10 MG/ML
40 INJECTION INTRAMUSCULAR; INTRAVENOUS ONCE
Status: COMPLETED | OUTPATIENT
Start: 2019-02-09 | End: 2019-02-09

## 2019-02-09 RX ADMIN — FUROSEMIDE 40 MG: 10 INJECTION, SOLUTION INTRAVENOUS at 18:26

## 2019-02-09 RX ADMIN — IOPAMIDOL 75 ML: 755 INJECTION, SOLUTION INTRAVENOUS at 16:58

## 2019-02-09 RX ADMIN — SODIUM CHLORIDE 500 ML: 9 INJECTION, SOLUTION INTRAVENOUS at 16:10

## 2019-02-10 LAB
EKG ATRIAL RATE: 52 BPM
EKG DIAGNOSIS: NORMAL
EKG P AXIS: 53 DEGREES
EKG P-R INTERVAL: 206 MS
EKG Q-T INTERVAL: 438 MS
EKG QRS DURATION: 86 MS
EKG QTC CALCULATION (BAZETT): 407 MS
EKG R AXIS: 29 DEGREES
EKG T AXIS: 71 DEGREES
EKG VENTRICULAR RATE: 52 BPM

## 2019-02-10 PROCEDURE — 93010 ELECTROCARDIOGRAM REPORT: CPT | Performed by: INTERNAL MEDICINE

## 2019-02-12 ENCOUNTER — HOSPITAL ENCOUNTER (OUTPATIENT)
Age: 69
Discharge: HOME | End: 2019-02-12
Payer: MEDICARE

## 2019-02-12 LAB
BASOPHILS ABSOLUTE: 0 K/UL (ref 0–0.2)
BASOPHILS RELATIVE PERCENT: 0.7 %
EOSINOPHILS ABSOLUTE: 0.3 K/UL (ref 0–0.6)
EOSINOPHILS RELATIVE PERCENT: 5 %
HCT VFR BLD CALC: 32.6 % (ref 36–48)
HEMOGLOBIN: 11.1 G/DL (ref 12–16)
LYMPHOCYTES ABSOLUTE: 2.4 K/UL (ref 1–5.1)
LYMPHOCYTES RELATIVE PERCENT: 35.5 %
MCH RBC QN AUTO: 31.8 PG (ref 26–34)
MCHC RBC AUTO-ENTMCNC: 33.8 G/DL (ref 31–36)
MCV RBC AUTO: 93.8 FL (ref 80–100)
MONOCYTES ABSOLUTE: 0.8 K/UL (ref 0–1.3)
MONOCYTES RELATIVE PERCENT: 11.4 %
NEUTROPHILS ABSOLUTE: 3.2 K/UL (ref 1.7–7.7)
NEUTROPHILS RELATIVE PERCENT: 47.4 %
PDW BLD-RTO: 14.2 % (ref 12.4–15.4)
PLATELET # BLD: 294 K/UL (ref 135–450)
PMV BLD AUTO: 9.1 FL (ref 5–10.5)
RBC # BLD: 3.48 M/UL (ref 4–5.2)
WBC # BLD: 6.8 K/UL (ref 4–11)

## 2019-02-12 PROCEDURE — 36415 COLL VENOUS BLD VENIPUNCTURE: CPT

## 2019-02-12 PROCEDURE — 85025 COMPLETE CBC W/AUTO DIFF WBC: CPT

## 2019-02-19 RX ORDER — SUCRALFATE 1 G/1
1 TABLET ORAL 4 TIMES DAILY
Status: ON HOLD | COMMUNITY
End: 2019-04-30

## 2019-02-22 ENCOUNTER — TELEPHONE (OUTPATIENT)
Dept: CARDIOLOGY CLINIC | Age: 69
End: 2019-02-22

## 2019-02-26 ENCOUNTER — ANESTHESIA EVENT (OUTPATIENT)
Dept: ENDOSCOPY | Age: 69
End: 2019-02-26
Payer: MEDICARE

## 2019-02-26 ENCOUNTER — ANESTHESIA (OUTPATIENT)
Dept: ENDOSCOPY | Age: 69
End: 2019-02-26
Payer: MEDICARE

## 2019-02-26 ENCOUNTER — HOSPITAL ENCOUNTER (OUTPATIENT)
Age: 69
Setting detail: OUTPATIENT SURGERY
Discharge: HOME | End: 2019-02-26
Attending: INTERNAL MEDICINE | Admitting: INTERNAL MEDICINE
Payer: MEDICARE

## 2019-02-26 VITALS
HEIGHT: 60 IN | OXYGEN SATURATION: 92 % | DIASTOLIC BLOOD PRESSURE: 61 MMHG | SYSTOLIC BLOOD PRESSURE: 129 MMHG | HEART RATE: 74 BPM | BODY MASS INDEX: 45.75 KG/M2 | RESPIRATION RATE: 25 BRPM | TEMPERATURE: 97.8 F | WEIGHT: 233 LBS

## 2019-02-26 VITALS
DIASTOLIC BLOOD PRESSURE: 73 MMHG | SYSTOLIC BLOOD PRESSURE: 146 MMHG | OXYGEN SATURATION: 86 % | RESPIRATION RATE: 15 BRPM

## 2019-02-26 LAB
GLUCOSE BLD-MCNC: 134 MG/DL (ref 70–99)
GLUCOSE BLD-MCNC: 148 MG/DL (ref 70–99)
PERFORMED ON: ABNORMAL
PERFORMED ON: ABNORMAL

## 2019-02-26 PROCEDURE — 7100000011 HC PHASE II RECOVERY - ADDTL 15 MIN: Performed by: INTERNAL MEDICINE

## 2019-02-26 PROCEDURE — 2500000003 HC RX 250 WO HCPCS: Performed by: NURSE ANESTHETIST, CERTIFIED REGISTERED

## 2019-02-26 PROCEDURE — 2580000003 HC RX 258: Performed by: ANESTHESIOLOGY

## 2019-02-26 PROCEDURE — 6360000002 HC RX W HCPCS: Performed by: ANESTHESIOLOGY

## 2019-02-26 PROCEDURE — 7100000010 HC PHASE II RECOVERY - FIRST 15 MIN: Performed by: INTERNAL MEDICINE

## 2019-02-26 PROCEDURE — 7100000000 HC PACU RECOVERY - FIRST 15 MIN: Performed by: INTERNAL MEDICINE

## 2019-02-26 PROCEDURE — 6360000002 HC RX W HCPCS: Performed by: NURSE ANESTHETIST, CERTIFIED REGISTERED

## 2019-02-26 PROCEDURE — 3609017000 HC ENTEROSCOPY: Performed by: INTERNAL MEDICINE

## 2019-02-26 PROCEDURE — 2709999900 HC NON-CHARGEABLE SUPPLY: Performed by: INTERNAL MEDICINE

## 2019-02-26 PROCEDURE — 7100000001 HC PACU RECOVERY - ADDTL 15 MIN: Performed by: INTERNAL MEDICINE

## 2019-02-26 PROCEDURE — 3700000001 HC ADD 15 MINUTES (ANESTHESIA): Performed by: INTERNAL MEDICINE

## 2019-02-26 PROCEDURE — 3700000000 HC ANESTHESIA ATTENDED CARE: Performed by: INTERNAL MEDICINE

## 2019-02-26 RX ORDER — SODIUM CHLORIDE 9 MG/ML
INJECTION, SOLUTION INTRAVENOUS CONTINUOUS
Status: DISCONTINUED | OUTPATIENT
Start: 2019-02-26 | End: 2019-02-26 | Stop reason: HOSPADM

## 2019-02-26 RX ORDER — MIDAZOLAM HYDROCHLORIDE 1 MG/ML
INJECTION INTRAMUSCULAR; INTRAVENOUS
Status: DISCONTINUED
Start: 2019-02-26 | End: 2019-02-26 | Stop reason: HOSPADM

## 2019-02-26 RX ORDER — LIDOCAINE HYDROCHLORIDE 10 MG/ML
1 INJECTION, SOLUTION EPIDURAL; INFILTRATION; INTRACAUDAL; PERINEURAL
Status: DISCONTINUED | OUTPATIENT
Start: 2019-02-26 | End: 2019-02-26 | Stop reason: HOSPADM

## 2019-02-26 RX ORDER — KETAMINE HYDROCHLORIDE 10 MG/ML
INJECTION, SOLUTION INTRAMUSCULAR; INTRAVENOUS PRN
Status: DISCONTINUED | OUTPATIENT
Start: 2019-02-26 | End: 2019-02-26 | Stop reason: SDUPTHER

## 2019-02-26 RX ORDER — MIDAZOLAM HYDROCHLORIDE 1 MG/ML
INJECTION INTRAMUSCULAR; INTRAVENOUS PRN
Status: DISCONTINUED | OUTPATIENT
Start: 2019-02-26 | End: 2019-02-26 | Stop reason: SDUPTHER

## 2019-02-26 RX ORDER — SODIUM CHLORIDE 0.9 % (FLUSH) 0.9 %
10 SYRINGE (ML) INJECTION PRN
Status: DISCONTINUED | OUTPATIENT
Start: 2019-02-26 | End: 2019-02-26 | Stop reason: HOSPADM

## 2019-02-26 RX ORDER — MIDAZOLAM HYDROCHLORIDE 1 MG/ML
2 INJECTION INTRAMUSCULAR; INTRAVENOUS ONCE
Status: COMPLETED | OUTPATIENT
Start: 2019-02-26 | End: 2019-02-26

## 2019-02-26 RX ORDER — LIDOCAINE HYDROCHLORIDE 20 MG/ML
INJECTION, SOLUTION INFILTRATION; PERINEURAL PRN
Status: DISCONTINUED | OUTPATIENT
Start: 2019-02-26 | End: 2019-02-26 | Stop reason: SDUPTHER

## 2019-02-26 RX ORDER — PROPOFOL 10 MG/ML
INJECTION, EMULSION INTRAVENOUS PRN
Status: DISCONTINUED | OUTPATIENT
Start: 2019-02-26 | End: 2019-02-26 | Stop reason: SDUPTHER

## 2019-02-26 RX ORDER — ONDANSETRON 2 MG/ML
4 INJECTION INTRAMUSCULAR; INTRAVENOUS PRN
Status: DISCONTINUED | OUTPATIENT
Start: 2019-02-26 | End: 2019-02-26 | Stop reason: HOSPADM

## 2019-02-26 RX ORDER — SODIUM CHLORIDE 0.9 % (FLUSH) 0.9 %
10 SYRINGE (ML) INJECTION EVERY 12 HOURS SCHEDULED
Status: DISCONTINUED | OUTPATIENT
Start: 2019-02-26 | End: 2019-02-26 | Stop reason: HOSPADM

## 2019-02-26 RX ADMIN — KETAMINE HYDROCHLORIDE 2.5 MG: 10 INJECTION, SOLUTION INTRAMUSCULAR; INTRAVENOUS at 10:15

## 2019-02-26 RX ADMIN — SODIUM CHLORIDE: 9 INJECTION, SOLUTION INTRAVENOUS at 09:15

## 2019-02-26 RX ADMIN — PROPOFOL 50 MG: 10 INJECTION, EMULSION INTRAVENOUS at 10:15

## 2019-02-26 RX ADMIN — PROPOFOL 20 MG: 10 INJECTION, EMULSION INTRAVENOUS at 10:20

## 2019-02-26 RX ADMIN — PROPOFOL 40 MG: 10 INJECTION, EMULSION INTRAVENOUS at 10:30

## 2019-02-26 RX ADMIN — PROPOFOL 20 MG: 10 INJECTION, EMULSION INTRAVENOUS at 10:25

## 2019-02-26 RX ADMIN — MIDAZOLAM HYDROCHLORIDE 1 MG: 1 INJECTION, SOLUTION INTRAMUSCULAR; INTRAVENOUS at 10:15

## 2019-02-26 RX ADMIN — MIDAZOLAM HYDROCHLORIDE 2 MG: 1 INJECTION, SOLUTION INTRAMUSCULAR; INTRAVENOUS at 10:50

## 2019-02-26 RX ADMIN — LIDOCAINE HYDROCHLORIDE 100 MG: 20 INJECTION, SOLUTION INFILTRATION; PERINEURAL at 10:15

## 2019-02-26 ASSESSMENT — PULMONARY FUNCTION TESTS
PIF_VALUE: 3
PIF_VALUE: 2
PIF_VALUE: 1
PIF_VALUE: 2
PIF_VALUE: 0
PIF_VALUE: 1
PIF_VALUE: 3
PIF_VALUE: 2
PIF_VALUE: 1
PIF_VALUE: 4
PIF_VALUE: 2
PIF_VALUE: 1
PIF_VALUE: 2
PIF_VALUE: 2
PIF_VALUE: 5
PIF_VALUE: 2
PIF_VALUE: 1
PIF_VALUE: 2
PIF_VALUE: 2
PIF_VALUE: 1
PIF_VALUE: 2
PIF_VALUE: 1

## 2019-02-26 ASSESSMENT — PAIN - FUNCTIONAL ASSESSMENT: PAIN_FUNCTIONAL_ASSESSMENT: 0-10

## 2019-02-26 ASSESSMENT — ENCOUNTER SYMPTOMS: DYSPNEA ACTIVITY LEVEL: AFTER AMBULATING 1 FLIGHT OF STAIRS

## 2019-03-08 ENCOUNTER — HOSPITAL ENCOUNTER (OUTPATIENT)
Age: 69
Discharge: HOME | End: 2019-03-08
Payer: MEDICARE

## 2019-03-08 LAB
BASOPHILS ABSOLUTE: 0 K/UL (ref 0–0.2)
BASOPHILS RELATIVE PERCENT: 0.6 %
EOSINOPHILS ABSOLUTE: 0.2 K/UL (ref 0–0.6)
EOSINOPHILS RELATIVE PERCENT: 2.2 %
HCT VFR BLD CALC: 31.9 % (ref 36–48)
HEMOGLOBIN: 10.5 G/DL (ref 12–16)
LYMPHOCYTES ABSOLUTE: 2.2 K/UL (ref 1–5.1)
LYMPHOCYTES RELATIVE PERCENT: 31.3 %
MCH RBC QN AUTO: 31.5 PG (ref 26–34)
MCHC RBC AUTO-ENTMCNC: 33 G/DL (ref 31–36)
MCV RBC AUTO: 95.7 FL (ref 80–100)
MONOCYTES ABSOLUTE: 0.8 K/UL (ref 0–1.3)
MONOCYTES RELATIVE PERCENT: 11.4 %
NEUTROPHILS ABSOLUTE: 3.9 K/UL (ref 1.7–7.7)
NEUTROPHILS RELATIVE PERCENT: 54.5 %
PDW BLD-RTO: 15.1 % (ref 12.4–15.4)
PLATELET # BLD: 286 K/UL (ref 135–450)
PMV BLD AUTO: 8.5 FL (ref 5–10.5)
RBC # BLD: 3.33 M/UL (ref 4–5.2)
WBC # BLD: 7.1 K/UL (ref 4–11)

## 2019-03-08 PROCEDURE — 36415 COLL VENOUS BLD VENIPUNCTURE: CPT

## 2019-03-08 PROCEDURE — 85025 COMPLETE CBC W/AUTO DIFF WBC: CPT

## 2019-04-02 ENCOUNTER — NURSE ONLY (OUTPATIENT)
Dept: CARDIOLOGY CLINIC | Age: 69
End: 2019-04-02
Payer: MEDICARE

## 2019-04-02 DIAGNOSIS — I48.91 ATRIAL FIBRILLATION WITH RVR (HCC): ICD-10-CM

## 2019-04-23 ENCOUNTER — TELEPHONE (OUTPATIENT)
Dept: CARDIOLOGY CLINIC | Age: 69
End: 2019-04-23

## 2019-04-23 NOTE — TELEPHONE ENCOUNTER
ABNORMAL EKG already faxed . Auto trigger event showing severe ilnda HR 28 beats per minute with high rate of 44 beats per minute .
Spoke to pt and pt stated that she doesn't take that medication unless she is in afib - she stated that she does wear a c-pap at night as well. .... States the monitor comes of on may 2nd and that she does have her appt with Dr Lauren Ferrer and Dr Cristian Calvin. ..she will wait to see what they think of the final report and see them at her appt
Transmission was already reviewed and signed by RMM. During sleeping hours.   Rec dc metoprolol if she's been taking it and keep f/up w/RMM
IV discontinued, cath removed intact

## 2019-04-29 ENCOUNTER — TELEPHONE (OUTPATIENT)
Dept: CARDIOLOGY CLINIC | Age: 69
End: 2019-04-29

## 2019-04-29 NOTE — TELEPHONE ENCOUNTER
EGM and time of occurrence unavailable for review.   Continue w/same plan as previous transmission for now

## 2019-04-29 NOTE — TELEPHONE ENCOUNTER
ABNORMAL EKG already faxed to the office . Auto triggered event showing junctional rhythm dipped to 30 beats per minute meeting the severe linda criteria.

## 2019-04-30 ENCOUNTER — HOSPITAL ENCOUNTER (OUTPATIENT)
Age: 69
Setting detail: OBSERVATION
Discharge: HOME | End: 2019-05-02
Attending: EMERGENCY MEDICINE | Admitting: INTERNAL MEDICINE
Payer: MEDICARE

## 2019-04-30 ENCOUNTER — APPOINTMENT (OUTPATIENT)
Dept: GENERAL RADIOLOGY | Age: 69
End: 2019-04-30
Payer: MEDICARE

## 2019-04-30 ENCOUNTER — TELEPHONE (OUTPATIENT)
Dept: CARDIOLOGY CLINIC | Age: 69
End: 2019-04-30

## 2019-04-30 DIAGNOSIS — E87.1 HYPONATREMIA: ICD-10-CM

## 2019-04-30 DIAGNOSIS — R00.1 SYMPTOMATIC BRADYCARDIA: Primary | ICD-10-CM

## 2019-04-30 DIAGNOSIS — Z79.01 ANTICOAGULATED: ICD-10-CM

## 2019-04-30 LAB
A/G RATIO: 1.3 (ref 1.1–2.2)
ALBUMIN SERPL-MCNC: 4.3 G/DL (ref 3.4–5)
ALP BLD-CCNC: 58 U/L (ref 40–129)
ALT SERPL-CCNC: 23 U/L (ref 10–40)
ANION GAP SERPL CALCULATED.3IONS-SCNC: 12 MMOL/L (ref 3–16)
APTT: 40 SEC (ref 26–36)
AST SERPL-CCNC: 23 U/L (ref 15–37)
BASOPHILS ABSOLUTE: 0 K/UL (ref 0–0.2)
BASOPHILS RELATIVE PERCENT: 0.4 %
BILIRUB SERPL-MCNC: <0.2 MG/DL (ref 0–1)
BUN BLDV-MCNC: 33 MG/DL (ref 7–20)
CALCIUM SERPL-MCNC: 9.3 MG/DL (ref 8.3–10.6)
CHLORIDE BLD-SCNC: 93 MMOL/L (ref 99–110)
CO2: 24 MMOL/L (ref 21–32)
CREAT SERPL-MCNC: 0.9 MG/DL (ref 0.6–1.2)
EOSINOPHILS ABSOLUTE: 0.1 K/UL (ref 0–0.6)
EOSINOPHILS RELATIVE PERCENT: 1.8 %
GFR AFRICAN AMERICAN: >60
GFR NON-AFRICAN AMERICAN: >60
GLOBULIN: 3.3 G/DL
GLUCOSE BLD-MCNC: 175 MG/DL (ref 70–99)
HCT VFR BLD CALC: 34 % (ref 36–48)
HEMOGLOBIN: 11.3 G/DL (ref 12–16)
INR BLD: 1.12 (ref 0.86–1.14)
LYMPHOCYTES ABSOLUTE: 2.5 K/UL (ref 1–5.1)
LYMPHOCYTES RELATIVE PERCENT: 31.5 %
MAGNESIUM: 2.1 MG/DL (ref 1.8–2.4)
MCH RBC QN AUTO: 31.5 PG (ref 26–34)
MCHC RBC AUTO-ENTMCNC: 33.3 G/DL (ref 31–36)
MCV RBC AUTO: 94.7 FL (ref 80–100)
MONOCYTES ABSOLUTE: 1 K/UL (ref 0–1.3)
MONOCYTES RELATIVE PERCENT: 12.8 %
NEUTROPHILS ABSOLUTE: 4.2 K/UL (ref 1.7–7.7)
NEUTROPHILS RELATIVE PERCENT: 53.5 %
PDW BLD-RTO: 14.2 % (ref 12.4–15.4)
PLATELET # BLD: 259 K/UL (ref 135–450)
PMV BLD AUTO: 8.1 FL (ref 5–10.5)
POTASSIUM SERPL-SCNC: 5.2 MMOL/L (ref 3.5–5.1)
PRO-BNP: 1283 PG/ML (ref 0–124)
PROTHROMBIN TIME: 12.8 SEC (ref 9.8–13)
RBC # BLD: 3.59 M/UL (ref 4–5.2)
SODIUM BLD-SCNC: 129 MMOL/L (ref 136–145)
TOTAL PROTEIN: 7.6 G/DL (ref 6.4–8.2)
TROPONIN: <0.01 NG/ML
WBC # BLD: 7.9 K/UL (ref 4–11)

## 2019-04-30 PROCEDURE — G0378 HOSPITAL OBSERVATION PER HR: HCPCS

## 2019-04-30 PROCEDURE — 83735 ASSAY OF MAGNESIUM: CPT

## 2019-04-30 PROCEDURE — 93005 ELECTROCARDIOGRAM TRACING: CPT | Performed by: EMERGENCY MEDICINE

## 2019-04-30 PROCEDURE — 85025 COMPLETE CBC W/AUTO DIFF WBC: CPT

## 2019-04-30 PROCEDURE — 4500000025 HC ED LEVEL 5 PROCEDURE

## 2019-04-30 PROCEDURE — 83880 ASSAY OF NATRIURETIC PEPTIDE: CPT

## 2019-04-30 PROCEDURE — 71045 X-RAY EXAM CHEST 1 VIEW: CPT

## 2019-04-30 PROCEDURE — 84443 ASSAY THYROID STIM HORMONE: CPT

## 2019-04-30 PROCEDURE — 85610 PROTHROMBIN TIME: CPT

## 2019-04-30 PROCEDURE — 85730 THROMBOPLASTIN TIME PARTIAL: CPT

## 2019-04-30 PROCEDURE — 80053 COMPREHEN METABOLIC PANEL: CPT

## 2019-04-30 PROCEDURE — 36415 COLL VENOUS BLD VENIPUNCTURE: CPT

## 2019-04-30 PROCEDURE — 99285 EMERGENCY DEPT VISIT HI MDM: CPT

## 2019-04-30 PROCEDURE — 6370000000 HC RX 637 (ALT 250 FOR IP): Performed by: EMERGENCY MEDICINE

## 2019-04-30 PROCEDURE — 84484 ASSAY OF TROPONIN QUANT: CPT

## 2019-04-30 PROCEDURE — 94760 N-INVAS EAR/PLS OXIMETRY 1: CPT

## 2019-04-30 RX ORDER — ACETAMINOPHEN 325 MG/1
650 TABLET ORAL EVERY 4 HOURS PRN
Status: DISCONTINUED | OUTPATIENT
Start: 2019-04-30 | End: 2019-05-02 | Stop reason: HOSPADM

## 2019-04-30 RX ORDER — SODIUM CHLORIDE 0.9 % (FLUSH) 0.9 %
10 SYRINGE (ML) INJECTION PRN
Status: DISCONTINUED | OUTPATIENT
Start: 2019-04-30 | End: 2019-05-02 | Stop reason: HOSPADM

## 2019-04-30 RX ORDER — ONDANSETRON 2 MG/ML
4 INJECTION INTRAMUSCULAR; INTRAVENOUS EVERY 6 HOURS PRN
Status: DISCONTINUED | OUTPATIENT
Start: 2019-04-30 | End: 2019-05-02 | Stop reason: HOSPADM

## 2019-04-30 RX ORDER — DOXYCYCLINE HYCLATE 50 MG/1
21 CAPSULE, GELATIN COATED ORAL 2 TIMES DAILY
COMMUNITY
End: 2019-05-28 | Stop reason: ALTCHOICE

## 2019-04-30 RX ORDER — TIZANIDINE 4 MG/1
4 TABLET ORAL EVERY 6 HOURS PRN
Status: DISCONTINUED | OUTPATIENT
Start: 2019-04-30 | End: 2019-05-02 | Stop reason: HOSPADM

## 2019-04-30 RX ORDER — ACETAMINOPHEN 500 MG
1000 TABLET ORAL ONCE
Status: COMPLETED | OUTPATIENT
Start: 2019-04-30 | End: 2019-04-30

## 2019-04-30 RX ORDER — SODIUM CHLORIDE 0.9 % (FLUSH) 0.9 %
10 SYRINGE (ML) INJECTION EVERY 12 HOURS SCHEDULED
Status: DISCONTINUED | OUTPATIENT
Start: 2019-05-01 | End: 2019-05-02 | Stop reason: HOSPADM

## 2019-04-30 RX ADMIN — ACETAMINOPHEN 1000 MG: 500 TABLET ORAL at 19:53

## 2019-04-30 ASSESSMENT — PAIN DESCRIPTION - LOCATION: LOCATION: NECK

## 2019-04-30 ASSESSMENT — PAIN SCALES - GENERAL
PAINLEVEL_OUTOF10: 3
PAINLEVEL_OUTOF10: 10

## 2019-04-30 NOTE — ED PROVIDER NOTES
[facility] Emergency Department    CHIEF COMPLAINT  Chief Complaint   Patient presents with    Bradycardia     Pt reports that shes had holter monitor since 4/2 - states that she has gotten calls from monitoring company for low heart rate - pt feels like heart is skipping beats. Dr. Gibson Gabriel for electrophysiology sent her to ER for eval      HISTORY OF PRESENT ILLNESS  Bernard Loyola is a 80 y.o. female  who presents to the ED complaining of being told multiple times that her [facility] Holter monitor is recording bradycardia. She wears CPAP at night and has intermittent palpitations and \"thudding\" in her chest.  She feels like her heart is skipping beats. Has history of ablation by Dr. Gibson Gabriel and is getting the Holter in order to hopefully discontinue her Xarelto. She has a lengthy log of her HR's and they are mostly in the 50's-60's but as low as 48. She thinks she was told it was in the 30's at home when she was called. Per documentation it appears she may have been in a junctional rhythm at 30bpm.  She has not passed out. She does not take metoprolol except PRN when in afib and not recently. Currently when in ED cot she is asymptomatic but has intermittent palpitations sometimes since she has been here. No CP SOB or abd pain. She has off-and-on leg swelling but nothing new recently. No other complaints, modifying factors or associated symptoms. I have reviewed the following from the nursing documentation. Past Medical History:   Diagnosis Date    Anemia     Anesthesia     trouble after egd 10/2016. Anxiety and severe tremors after AF ablation 8/2018-responded well to Ativan    Anesthesia complication     flailing, yelling when waking up from anesthesia-ativan helps    Atrial fibrillation ([address] 10/01/2017    A. fib with SVR    CAD (coronary artery disease)     Chest pain 10/01/2017    Chronic kidney disease     ? ??    Depression     GERD (gastroesophageal reflux disease)  Glaucoma     Hiatal hernia     Hyperlipidemia     Hypertension     Migraines, neuralgic     Morbid obesity ([address]     Osteoarthritis     Sleep apnea     uses CPAP    Type II or unspecified type diabetes mellitus without mention of complication, not stated as uncontrolled     Unspecified sleep apnea     no longer uses cpap    Urinary incontinence     UTI (urinary tract infection)      Past Surgical History:   Procedure Laterality Date    ABLATION OF DYSRHYTHMIC FOCUS      BREAST LUMPECTOMY      CARDIAC CATHETERIZATION      CARDIAC SURGERY  2/22/1999    quadruple by-pass, [address] COLONOSCOPY  10/08/2018    1 polyp removed    COLONOSCOPY N/A 10/8/2018    COLONOSCOPY POLYPECTOMY SNARE/COLD BIOPSY performed by Kylee Petersen MD at [address]    COSMETIC SURGERY      face lift    ENTEROSCOPY N/A 2/26/2019    ENTEROSCOPY performed by [facility]e [facility]en MD at [facility]  12/20/11    hiatal hernia repair    GASTRIC BAND REMOVAL  11/03/2016    laparoscopic     HAMMER TOE SURGERY      [facility] INJECTION PROCEDURE FOR SACROILIAC JOINT Left 12/17/2018    SACROILIAC JOINT INJECTION ON THE LEFT WITH FLUOROSCOPY performed by Rick Colón MD at [address]    both   [address]    Left and Right knees, [facility]    KNEE ARTHROSCOPY      1982 left    ORIF HUMERUS DECOMPRESSION Left 2/25/2016    OPEN REDUCTION INTERNAL FIXATION LEFT PROXIMAL HUMERUS    OVARY REMOVAL Bilateral 1997    [facility] [address] DX/THER SBST INTRLMNR CRV/THRC W/IMG GDN N/A 11/5/2018    MIDLINE L4/L5 LUMBAR INTERLAMINAR EPIDURAL STEROID INJECTION WITH FLUOROSCOPY performed by Rick Colón MD at [address] ENDOSCOPY  10/30/15    UPPER GASTROINTESTINAL ENDOSCOPY  10/14/2016    with biopsy     Family History   Problem by mouth daily 90 tablet 3    aspirin 81 MG tablet Take 81 mg by mouth daily      furosemide (LASIX) 40 MG tablet Take 1 tablet by mouth daily 30 tablet 5    metoprolol tartrate (LOPRESSOR) 25 MG tablet Take 0.5 tablets by mouth 2 times daily (Patient taking differently: Take 12.5 mg by mouth as needed For a-fib, repeat in 1 hr.-if still symptomatic go to ER) 60 tablet 2    amLODIPine (NORVASC) 2.5 MG tablet Take 2.5 mg by mouth every evening      isosorbide mononitrate (ISMO;MONOKET) 10 MG tablet Take 5 mg by mouth 2 times daily      Ascorbic Acid (VITAMIN C) 1000 MG tablet Take 1,000 mg by mouth daily      Cyanocobalamin (VITAMIN B-12 PO) Take 3,000 mcg by mouth daily      estradiol (ESTRACE) 0.1 MG/GM vaginal cream Place 2 g vaginally nightly      rivaroxaban (XARELTO) 20 MG TABS tablet Take 1 tablet by mouth daily 90 tablet 5    Cranberry 450 MG CAPS Take by mouth 2 times daily      pioglitazone (ACTOS) 15 MG tablet Take 15 mg by mouth daily      ferrous sulfate 325 (65 Fe) MG tablet Take 45 mg by mouth daily (with breakfast)       vitamin D (CHOLECALCIFEROL) 1000 UNIT TABS tablet Take 1,000 Units by mouth 2 times daily       spironolactone (ALDACTONE) 25 MG tablet Take 25 mg by mouth daily In pm      dexlansoprazole (DEXILANT) 60 MG CPDR delayed release capsule Take 60 mg by mouth daily      gabapentin (NEURONTIN) 100 MG capsule Take 400 mg by mouth 3 times daily.  Dianna Agustin TraZODone HCl (DESYREL PO) Take 50 mg by mouth nightly       losartan-hydrochlorothiazide (HYZAAR) 100-25 MG per tablet TK 1 T PO QAM  3    Calcium Citrate-Vitamin D (CALCIUM CITRATE + D PO) Take 2 tablets by mouth daily      SLOW-MAG 71.5-119 MG TBEC tablet Take 1 tablet by mouth daily   5    fexofenadine (ALLEGRA) 180 MG tablet Take 180 mg by mouth daily      rosuvastatin (CRESTOR) 40 MG tablet Take 20 mg by mouth every evening       metFORMIN (GLUCOPHAGE) 500 MG tablet Take 500 mg by mouth 2 times daily (with meals)  ezetimibe (ZETIA) 10 MG tablet Take 5 mg by mouth nightly       escitalopram (LEXAPRO) 20 MG tablet Take 20 mg by mouth daily.  latanoprost (XALATAN) 0.005 % ophthalmic solution Place 1 drop into both eyes nightly 1 drop in each eye       nitroGLYCERIN (NITROLINGUAL) 0.4 MG/SPRAY spray Place 1 spray under the tongue every 5 minutes as needed. As directed for chest pain        Allergies   Allergen Reactions    Albuterol Other (See Comments)     Other reaction(s): Chest Pain  Crushing chest pain    Heparin Other (See Comments)     Caused bruises and hematomas immediately when drip started. MARKED BRUISING/HEMATOMAS    Niacin     Avelox [Moxifloxacin Hcl In Nacl] Rash    Moxifloxacin Rash    Niaspan [Niacin Er] Itching and Rash    Proventil [Albuterol Sulfate] Palpitations    Tagamet [Cimetidine] Rash       REVIEW OF SYSTEMS  10 systems reviewed, pertinent positives per HPI otherwise noted to be negative. PHYSICAL EXAM  BP (!) 151/58   Pulse 56   Temp 98.5 °F (36.9 °C)   Resp 23   Ht 4' 11\" (1.499 m)   Wt 232 lb 4.8 oz (105.4 kg)   SpO2 91%   BMI 46.92 kg/m²    GENERAL APPEARANCE: Awake and alert. Cooperative. No distress. HENT: Normocephalic. Atraumatic. Mucous membranes are moist.  NECK: Supple. EYES: PERRL. EOM's grossly intact. HEART/CHEST: Reg rhythm, borderline bradycardia. No murmurs. No chest wall tenderness. LUNGS: Respirations unlabored. CTAB. Good air exchange. Speaking comfortably in full sentences. ABDOMEN: No tenderness. Soft. Non-distended. No masses. No organomegaly. No guarding or rebound. Normal bowel sounds throughout. MUSCULOSKELETAL: No extremity edema. Compartments soft. No deformity. No tenderness in the extremities. All extremities neurovascularly intact. SKIN: Warm and dry. No acute rashes. NEUROLOGICAL: Alert and oriented. CN's 2-12 intact. No gross facial drooping. Strength 5/5, sensation intact. 2 plus DTR's in knees bilaterally.   Gait normal.  PSYCHIATRIC: Normal mood and affect. LABS  I have reviewed all labs for this visit.    Results for orders placed or performed during the hospital encounter of 04/30/19   CBC Auto Differential   Result Value Ref Range    WBC 7.9 4.0 - 11.0 K/uL    RBC 3.59 (L) 4.00 - 5.20 M/uL    Hemoglobin 11.3 (L) 12.0 - 16.0 g/dL    Hematocrit 34.0 (L) 36.0 - 48.0 %    MCV 94.7 80.0 - 100.0 fL    MCH 31.5 26.0 - 34.0 pg    MCHC 33.3 31.0 - 36.0 g/dL    RDW 14.2 12.4 - 15.4 %    Platelets 784 025 - 175 K/uL    MPV 8.1 5.0 - 10.5 fL    Neutrophils % 53.5 %    Lymphocytes % 31.5 %    Monocytes % 12.8 %    Eosinophils % 1.8 %    Basophils % 0.4 %    Neutrophils # 4.2 1.7 - 7.7 K/uL    Lymphocytes # 2.5 1.0 - 5.1 K/uL    Monocytes # 1.0 0.0 - 1.3 K/uL    Eosinophils # 0.1 0.0 - 0.6 K/uL    Basophils # 0.0 0.0 - 0.2 K/uL   Comprehensive Metabolic Panel   Result Value Ref Range    Sodium 129 (L) 136 - 145 mmol/L    Potassium 5.2 (H) 3.5 - 5.1 mmol/L    Chloride 93 (L) 99 - 110 mmol/L    CO2 24 21 - 32 mmol/L    Anion Gap 12 3 - 16    Glucose 175 (H) 70 - 99 mg/dL    BUN 33 (H) 7 - 20 mg/dL    CREATININE 0.9 0.6 - 1.2 mg/dL    GFR Non-African American >60 >60    GFR African American >60 >60    Calcium 9.3 8.3 - 10.6 mg/dL    Total Protein 7.6 6.4 - 8.2 g/dL    Alb 4.3 3.4 - 5.0 g/dL    Albumin/Globulin Ratio 1.3 1.1 - 2.2    Total Bilirubin <0.2 0.0 - 1.0 mg/dL    Alkaline Phosphatase 58 40 - 129 U/L    ALT 23 10 - 40 U/L    AST 23 15 - 37 U/L    Globulin 3.3 g/dL   Protime-INR   Result Value Ref Range    Protime 12.8 9.8 - 13.0 sec    INR 1.12 0.86 - 1.14   APTT   Result Value Ref Range    aPTT 40.0 (H) 26.0 - 36.0 sec   Troponin   Result Value Ref Range    Troponin <0.01 <0.01 ng/mL   Brain Natriuretic Peptide   Result Value Ref Range    Pro-BNP 1,283 (H) 0 - 124 pg/mL   Magnesium   Result Value Ref Range    Magnesium 2.10 1.80 - 2.40 mg/dL   EKG 12 Lead   Result Value Ref Range    Ventricular Rate 53 BPM    Atrial Rate 53 Nonetheless she has had multiple alerts stating that she has had severe bradycardia in the 30's at home. She may be having symptoms from it with the palpitations. Sodium slightly low but other electrolytes are generally unremarkable. No fever. May be slightly volume overloaded based on BNP and equivocal bibasilar edema on CXR but clinically she is without SOB or symptoms of acute CHF exacerbation. I consulted and spoke with Dr. Yael Landin from cardiology about the patient's ED history, physical, workup, and course so far. Recommendations from this consultant included admit, stop all rate slowing medicines, telemetry overnight. During the patient's ED course, the patient was given:  Medications   acetaminophen (TYLENOL) tablet 1,000 mg (1,000 mg Oral Given 4/30/19 1953)      CLINICAL IMPRESSION  1. Symptomatic bradycardia    2. Hyponatremia    3. Anticoagulated      Blood pressure (!) 151/58, pulse 56, temperature 98.5 °F (36.9 °C), resp. rate 23, height 4' 11\" (1.499 m), weight 232 lb 4.8 oz (105.4 kg), SpO2 91 %, not currently breastfeeding. DISPOSITION  Sirena Calles was admitted in fair condition. I have discussed the findings of today's workup with the patient and addressed the patient's questions and concerns. The plan is to admit to the hospital at this time under the hospitalist service. I spoke with hospitalist who accepted the patient and will take over the patient's care. The patient is agreeable with this plan. DISCLAIMER: This chart was created using Dragon dictation software. Efforts were made by me to ensure accuracy, however some errors may be present due to limitations of this technology and occasionally words are not transcribed correctly.         Lima Herrera MD  04/30/19 7723

## 2019-04-30 NOTE — TELEPHONE ENCOUNTER
Spoke to the pt with NPJM instructions below. The pt was very anxious on the phone and wanted to come to the ER today. She states she feels \"skipped heart beats daily. \" She is unsure of what is going on with her and wanted to be \"checked out. \"

## 2019-04-30 NOTE — TELEPHONE ENCOUNTER
[phone] (home)   Pt says doesn't take that everyday only prn if she is in a fib. Pt says she woke up this am when her heart rate low and that she wasn't getting air but she isn't sure if from her low heart rate or from her c pap. She is worried about this some but is not sure what to do?

## 2019-05-01 ENCOUNTER — APPOINTMENT (OUTPATIENT)
Dept: CARDIAC CATH/INVASIVE PROCEDURES | Age: 69
End: 2019-05-01
Payer: MEDICARE

## 2019-05-01 PROBLEM — Z95.0 PACEMAKER: Status: ACTIVE | Noted: 2019-05-01

## 2019-05-01 LAB
EKG ATRIAL RATE: 53 BPM
EKG DIAGNOSIS: NORMAL
EKG P AXIS: 32 DEGREES
EKG P-R INTERVAL: 206 MS
EKG Q-T INTERVAL: 436 MS
EKG QRS DURATION: 90 MS
EKG QTC CALCULATION (BAZETT): 409 MS
EKG R AXIS: 29 DEGREES
EKG T AXIS: 67 DEGREES
EKG VENTRICULAR RATE: 53 BPM
GLUCOSE BLD-MCNC: 110 MG/DL (ref 70–99)
GLUCOSE BLD-MCNC: 122 MG/DL (ref 70–99)
GLUCOSE BLD-MCNC: 140 MG/DL (ref 70–99)
PERFORMED ON: ABNORMAL
TSH REFLEX: 2.63 UIU/ML (ref 0.27–4.2)

## 2019-05-01 PROCEDURE — 6370000000 HC RX 637 (ALT 250 FOR IP): Performed by: NURSE PRACTITIONER

## 2019-05-01 PROCEDURE — 6370000000 HC RX 637 (ALT 250 FOR IP): Performed by: HOSPITALIST

## 2019-05-01 PROCEDURE — 99223 1ST HOSP IP/OBS HIGH 75: CPT | Performed by: INTERNAL MEDICINE

## 2019-05-01 PROCEDURE — G0378 HOSPITAL OBSERVATION PER HR: HCPCS

## 2019-05-01 PROCEDURE — C1887 CATHETER, GUIDING: HCPCS

## 2019-05-01 PROCEDURE — C1769 GUIDE WIRE: HCPCS

## 2019-05-01 PROCEDURE — 6360000002 HC RX W HCPCS

## 2019-05-01 PROCEDURE — C1898 LEAD, PMKR, OTHER THAN TRANS: HCPCS

## 2019-05-01 PROCEDURE — 99152 MOD SED SAME PHYS/QHP 5/>YRS: CPT

## 2019-05-01 PROCEDURE — 33208 INSRT HEART PM ATRIAL & VENT: CPT | Performed by: INTERNAL MEDICINE

## 2019-05-01 PROCEDURE — 33208 INSRT HEART PM ATRIAL & VENT: CPT

## 2019-05-01 PROCEDURE — 2580000003 HC RX 258: Performed by: NURSE PRACTITIONER

## 2019-05-01 PROCEDURE — 2500000003 HC RX 250 WO HCPCS

## 2019-05-01 PROCEDURE — 99153 MOD SED SAME PHYS/QHP EA: CPT

## 2019-05-01 PROCEDURE — C1785 PMKR, DUAL, RATE-RESP: HCPCS

## 2019-05-01 PROCEDURE — 93010 ELECTROCARDIOGRAM REPORT: CPT | Performed by: INTERNAL MEDICINE

## 2019-05-01 PROCEDURE — C1781 MESH (IMPLANTABLE): HCPCS

## 2019-05-01 PROCEDURE — C1894 INTRO/SHEATH, NON-LASER: HCPCS

## 2019-05-01 RX ORDER — FUROSEMIDE 40 MG/1
40 TABLET ORAL DAILY
Status: DISCONTINUED | OUTPATIENT
Start: 2019-05-01 | End: 2019-05-02 | Stop reason: HOSPADM

## 2019-05-01 RX ORDER — SODIUM CHLORIDE 0.9 % (FLUSH) 0.9 %
10 SYRINGE (ML) INJECTION PRN
Status: DISCONTINUED | OUTPATIENT
Start: 2019-05-01 | End: 2019-05-02 | Stop reason: HOSPADM

## 2019-05-01 RX ORDER — EZETIMIBE 10 MG/1
10 TABLET ORAL NIGHTLY
Status: DISCONTINUED | OUTPATIENT
Start: 2019-05-01 | End: 2019-05-02 | Stop reason: HOSPADM

## 2019-05-01 RX ORDER — HYDROCODONE BITARTRATE AND ACETAMINOPHEN 5; 325 MG/1; MG/1
2 TABLET ORAL EVERY 4 HOURS PRN
Status: DISCONTINUED | OUTPATIENT
Start: 2019-05-01 | End: 2019-05-02 | Stop reason: HOSPADM

## 2019-05-01 RX ORDER — CEFAZOLIN SODIUM 2 G/100ML
2 INJECTION, SOLUTION INTRAVENOUS
Status: ACTIVE | OUTPATIENT
Start: 2019-05-01 | End: 2019-05-01

## 2019-05-01 RX ORDER — EZETIMIBE 10 MG/1
5 TABLET ORAL NIGHTLY
Status: DISCONTINUED | OUTPATIENT
Start: 2019-05-01 | End: 2019-05-01 | Stop reason: CLARIF

## 2019-05-01 RX ORDER — ASPIRIN 81 MG/1
81 TABLET, CHEWABLE ORAL DAILY
Status: DISCONTINUED | OUTPATIENT
Start: 2019-05-01 | End: 2019-05-02 | Stop reason: HOSPADM

## 2019-05-01 RX ORDER — GABAPENTIN 400 MG/1
400 CAPSULE ORAL 3 TIMES DAILY
Status: DISCONTINUED | OUTPATIENT
Start: 2019-05-01 | End: 2019-05-02 | Stop reason: HOSPADM

## 2019-05-01 RX ORDER — CETIRIZINE HYDROCHLORIDE 10 MG/1
10 TABLET ORAL DAILY
Status: DISCONTINUED | OUTPATIENT
Start: 2019-05-01 | End: 2019-05-02 | Stop reason: HOSPADM

## 2019-05-01 RX ORDER — ISOSORBIDE MONONITRATE 30 MG/1
30 TABLET, EXTENDED RELEASE ORAL DAILY
Status: DISCONTINUED | OUTPATIENT
Start: 2019-05-01 | End: 2019-05-02 | Stop reason: HOSPADM

## 2019-05-01 RX ORDER — ESCITALOPRAM OXALATE 10 MG/1
20 TABLET ORAL DAILY
Status: DISCONTINUED | OUTPATIENT
Start: 2019-05-01 | End: 2019-05-02 | Stop reason: HOSPADM

## 2019-05-01 RX ORDER — SODIUM CHLORIDE 0.9 % (FLUSH) 0.9 %
10 SYRINGE (ML) INJECTION EVERY 12 HOURS SCHEDULED
Status: DISCONTINUED | OUTPATIENT
Start: 2019-05-01 | End: 2019-05-02 | Stop reason: HOSPADM

## 2019-05-01 RX ORDER — LATANOPROST 50 UG/ML
1 SOLUTION/ DROPS OPHTHALMIC NIGHTLY
Status: DISCONTINUED | OUTPATIENT
Start: 2019-05-01 | End: 2019-05-02 | Stop reason: HOSPADM

## 2019-05-01 RX ORDER — ROSUVASTATIN CALCIUM 40 MG/1
40 TABLET, COATED ORAL NIGHTLY
Status: DISCONTINUED | OUTPATIENT
Start: 2019-05-01 | End: 2019-05-02 | Stop reason: HOSPADM

## 2019-05-01 RX ORDER — TRAZODONE HYDROCHLORIDE 50 MG/1
50 TABLET ORAL NIGHTLY
Status: DISCONTINUED | OUTPATIENT
Start: 2019-05-01 | End: 2019-05-02 | Stop reason: HOSPADM

## 2019-05-01 RX ORDER — PANTOPRAZOLE SODIUM 40 MG/1
40 TABLET, DELAYED RELEASE ORAL
Status: DISCONTINUED | OUTPATIENT
Start: 2019-05-02 | End: 2019-05-02 | Stop reason: HOSPADM

## 2019-05-01 RX ORDER — HYDROCODONE BITARTRATE AND ACETAMINOPHEN 5; 325 MG/1; MG/1
1 TABLET ORAL EVERY 4 HOURS PRN
Status: DISCONTINUED | OUTPATIENT
Start: 2019-05-01 | End: 2019-05-02 | Stop reason: HOSPADM

## 2019-05-01 RX ADMIN — ACETAMINOPHEN 650 MG: 325 TABLET, FILM COATED ORAL at 21:48

## 2019-05-01 RX ADMIN — Medication 10 ML: at 09:55

## 2019-05-01 RX ADMIN — LATANOPROST 1 DROP: 50 SOLUTION OPHTHALMIC at 21:28

## 2019-05-01 RX ADMIN — ROSUVASTATIN CALCIUM 40 MG: 40 TABLET, FILM COATED ORAL at 22:42

## 2019-05-01 RX ADMIN — TRAZODONE HYDROCHLORIDE 50 MG: 50 TABLET ORAL at 22:42

## 2019-05-01 RX ADMIN — TIZANIDINE 4 MG: 4 TABLET ORAL at 04:40

## 2019-05-01 RX ADMIN — Medication 10 ML: at 21:28

## 2019-05-01 RX ADMIN — GABAPENTIN 400 MG: 400 CAPSULE ORAL at 21:28

## 2019-05-01 ASSESSMENT — PAIN SCALES - GENERAL
PAINLEVEL_OUTOF10: 0
PAINLEVEL_OUTOF10: 5
PAINLEVEL_OUTOF10: 4
PAINLEVEL_OUTOF10: 3
PAINLEVEL_OUTOF10: 0

## 2019-05-01 ASSESSMENT — PAIN DESCRIPTION - LOCATION: LOCATION: NECK

## 2019-05-01 ASSESSMENT — PAIN DESCRIPTION - PAIN TYPE: TYPE: CHRONIC PAIN

## 2019-05-01 NOTE — CARE COORDINATION
Discharge Planning Assessment     met with patient and spouse to discuss reason for admission, current living situation, and potential needs at the time of discharge    Demographics/Insurance verified : Yes    Current type of dwelling: Two-level home    Living arrangements: Patient reports that she lives at home with her spouse. Level of function/Support: Patient reports that she is independent in her ADLs. Patient reports that her spouse is supportive. PCP: Dr. Trina Patrick    Last Visit to PCP: About 3 months ago, per patient. DME: [employer] (which the patient uses in public); [employer]. Patient denies any other DME needs at this time. Active with any community resources/agencies/skilled home care: None identified. Patient denies home care needs at this time. Medication compliance issues: None identified. Financial issues that could impact healthcare: None identified. Transportation at the time of discharge: Family will transport. Tentative discharge plan: Home with family. Discussed with the patient and family at the bedside, who identify no other needs at this time.     Will continue to follow for support and discharge planning.    -[place], MSW, LSW

## 2019-05-01 NOTE — PROGRESS NOTES
[facility]    5/1/2019 10:29 AM        Name: Greg Townsend . Admitted: 4/30/2019  Primary Care Provider: Bobby Dykes DO (Tel: [phone])                        Subjective:  . No acute events overnight. Resting well. Pain control. Diet ok. Labs reviewed  Denies any chest pain sob. Reviewed interval ancillary notes    Current Medications    sodium chloride flush 0.9 % injection 10 mL 2 times per day   sodium chloride flush 0.9 % injection 10 mL PRN   magnesium hydroxide (MILK OF MAGNESIA) 400 MG/5ML suspension 30 mL Daily PRN   ondansetron (ZOFRAN) injection 4 mg Q6H PRN   acetaminophen (TYLENOL) tablet 650 mg Q4H PRN   tiZANidine (ZANAFLEX) tablet 4 mg Q6H PRN       Objective:  /66   Pulse 62   Temp 97.2 °F (36.2 °C) (Temporal)   Resp 16   Ht 4' 11.5\" (1.511 m)   Wt 235 lb 8 oz (106.8 kg)   SpO2 95%   BMI 46.77 kg/m²   No intake or output data in the 24 hours ending 05/01/19 1029 Wt Readings from Last 3 Encounters:   05/01/19 235 lb 8 oz (106.8 kg)   02/26/19 233 lb (105.7 kg)   02/09/19 230 lb (104.3 kg)       General appearance:  Appears comfortable  Eyes: Sclera clear. Pupils equal.  ENT: Moist oral mucosa. Trachea midline, no adenopathy. Cardiovascular: Regular rhythm, normal S1, S2. No murmur. No edema in lower extremities  Respiratory: Not using accessory muscles. Good inspiratory effort. Clear to auscultation bilaterally, no wheeze or crackles. GI: Abdomen soft, no tenderness, not distended, normal bowel sounds  Musculoskeletal: No cyanosis in digits, neck supple  Neurology: CN 2-12 grossly intact. No speech or motor deficits  Psych: Normal affect.  Alert and oriented in time, place and person  Skin: Warm, dry, normal turgor    Labs and Tests:  CBC:   Recent Labs     04/30/19  1749   WBC 7.9   HGB 11.3*    BMP:  Recent Labs     04/30/19  1749   *   K 5.2*   CL 93*   CO2 24   BUN 33*   CREATININE 0.9   GLUCOSE 175*     Hepatic: Recent Labs     04/30/19  1749   AST 23   ALT 23   BILITOT <0.2   ALKPHOS 58       Discussed care with family and patient             Spent 30  minutes with patient and family at bedside and on unit reviewing medical records and labs, spent greater than 50% time counseling patient and family on diagnosis and plan   Problem List  Principal Problem:    Bradycardia  Active Problems:    Essential hypertension    RAHUL (obstructive sleep apnea)  Resolved Problems:    * No resolved hospital problems. *       Assessment & Plan:   1.  Bradycardia  - with HR in 30s on holter monitor  - currently stable  - await EP recs  - holding Xarelto    HTN  Hold BB       DM   ssi    RAHUL            Diet: Diet NPO, After Midnight  Code:Full Code  DVT PPX lovenox       Shruti Fields MD   5/1/2019 10:29 AM

## 2019-05-01 NOTE — PROGRESS NOTES
4 Eyes Skin Assessment     The patient is being assess for  Admission    I agree that 2 RN's have performed a thorough Head to Toe Skin Assessment on the patient. ALL assessment sites listed below have been assessed. Areas assessed by both nurses:   [x]   Head, Face, and Ears   [x]   Shoulders, Back, and Chest  [x]   Arms, Elbows, and Hands   [x]   Coccyx, Sacrum, and IschIum  [x]   Legs, Feet, and Heels  Pt's skin folds also assessed. Does the Patient have Skin Breakdown?   No         Sergey Prevention initiated:  Yes   Wound Care Orders initiated:  NA      [facility] nurse consulted for Pressure Injury (Stage 3,4, Unstageable, DTI, NWPT, and Complex wounds), New and Established Ostomies:  NA      Nurse 1 eSignature: Electronically signed by Karo Wagner RN on 5/1/19 at 4:31 AM    **SHARE this note so that the co-signing nurse is able to place an eSignature**    Nurse 2 eSignature: Electronically signed by Foster Ramos RN on 5/1/19 at 4:33 AM

## 2019-05-01 NOTE — PROGRESS NOTES
Pt transferred to cath lab for pacer placement. Pt has new 20 left ac. Handoff bedside to Cath lab RN area of skin identified reddened from previous heart continuous monitor. Upper left chest .  Pt states Dr Angel Davidson stated he would go above this site. As reported per pt.   VSS AOx4

## 2019-05-01 NOTE — ED NOTES
Pt is alert and oriented x4, in bed with side rails up x2, wheels locked in lowest position with call light in reach    RN at bedside to introduce self to patient, updated pt and spouse on plan of care. 20 G IV placed to R wrist on first attempt without difficulty, pt tolerated well. Pt has easy, even RR. Sinus Wyatt, lung sounds CTA. Active bowel sounds in all quadrants, non distended abdomen.       Ambulatory to bathroom with out difficulty back in bed resting at this time     Nellie Oliva [facility]  04/30/19 2007

## 2019-05-01 NOTE — PLAN OF CARE
Pt remains free from falls. Safety precautions in place. Bed in lowest position, bed wheels locked, call light with in reach, bed alarm on, yellow blanket in place, fall risk wrist band on, SAFE outside of doorway. Will continue to monitor.

## 2019-05-01 NOTE — PROCEDURES
placed in the axillary vein. Medtronic C315 sheath in addition to Glide wire was used and the sheath was advanced into the RVOT. Then Medtronic lead 3830 was advanced into the sheath. The sheath and lead were used for His bundle location and pace mapping. Finding the location of the His bundle was challenging, but  we were able to have non-selective his bundle pacing. The the sheath was cut and removed. A new sheath was advanced over a second previously placed wire into the vein. The atrial lead was advanced to the right atrial appendage under fluoroscopic guidance. The lead was actively fixated. After confirming appropriate function, the sheath was split and removed. The lead was secured to the underlying tissue using suture. The pocket was irrigated with an antibiotic solution. The leads were then connected to the new pulse generator, dual chamber pacemaker, which was then placed into the cleaned pocket. A dual chamber pacemaker was implanted for non-reversible symptomatic bradycardia due to sinus node dysfunction, to provide physiological pacing, the need for atrial pacing and avoiding ventricular pacing in the setting of heart failure, and avoid pacemaker syndrome. The pulse generator was sutured inside the pocket. The pocket was then closed in three separate subcutaneous layers using 2-0 & 3-0 Vicryl and subcuticular layer using V-Loc. The skin was covered with pressure dressing. Patient has had allergic reaction to tape and has some blisters on skin. Tyrx was used to reduce infection. Dermabond was used on the skin. All sponge and needle counts were reported as correct at the end of the procedure. The patient tolerated the procedure well and there were no complications. Post-sedation evaluation was completed. Patient was transported to the holding area in stable condition.      Lead and device information:   Pacemaker generator is: Medtronic Medtronic Model: Lashonda XT DR COLÓN with Serial number: [id number] implant date 5/1/2019   Atrial lead is 5076 with serial number: [id number] implant date 5/1/2019   Ventricular lead is 3038 with serial number: [id number] implant date 5/1/2019   Right atrial sensing P wave of 4.3 mV with impedance of 761 and pacing threshold of 0.5@ 0.5 ms  Right ventricular lead sensing R  wave of 9.3 mV with impedance of 1073 and pacing threshold of 2@ 1 ms  Device was programmed to AAIR/DDD with lower rate of 60 and upper tracking rate of 130. Plan:   The patient will be admitted and have usual post-implant care, including chest x-ray, and antibiotic therapy and interrogation of the device.

## 2019-05-01 NOTE — CONSULTS
[address]   Electrophysiology Consultation   Date: 5/1/2019  Reason for Consultation: Bradycardia   Consult Requesting Physician: Mary Serrato MD     Chief Complaint   Patient presents with    Bradycardia     Pt reports that shes had holter monitor since 4/2 - states that she has gotten calls from monitoring company for low heart rate - pt feels like heart is skipping beats. Dr. Jeromy Haro for electrophysiology sent her to ER for eval     HPI: Aric Wolff is a 80 y.o. female past medical history significant for coronary artery disease, status post CABG, paroxysmal atrial fibrillation, status post PVI on 8/16/2018 who has presented to the hospital with complaining of fatigue, slow heart rate, and occasional lightheadedness dizziness. She is morbidly obese and uses her CPAP. She was seen in office because of her symptoms outpatient Holter monitor recommended. She's had multiple episodes of profound bradycardia with heart rate down to 30's. She is not on any beta blocker therapy or antiarrhythmic. She has history of sinus node dysfunction and junctional rhythm in the past.  She states that for the past few weeks she has been feeling fatigue and tired. She is compliant with her CPAP. While in hospital on telemetry she's had marked bradycardia heart rates down to 30's. Past Medical History:   Diagnosis Date    Anemia     Anesthesia     trouble after egd 10/2016. Anxiety and severe tremors after AF ablation 8/2018-responded well to Ativan    Anesthesia complication     flailing, yelling when waking up from anesthesia-ativan helps    Atrial fibrillation ([address] 10/01/2017    A. fib with SVR    CAD (coronary artery disease)     Chest pain 10/01/2017    Chronic kidney disease     ? ??    Depression     GERD (gastroesophageal reflux disease)     Glaucoma     Hiatal hernia     Hyperlipidemia     Hypertension     Migraines, neuralgic     Morbid obesity (HCC)     Osteoarthritis     Sleep apnea     uses CPAP    Type II or unspecified type diabetes mellitus without mention of complication, not stated as uncontrolled     Unspecified sleep apnea     uses cpap    Urinary incontinence     UTI (urinary tract infection)         Past Surgical History:   Procedure Laterality Date    ABLATION OF DYSRHYTHMIC FOCUS      BREAST LUMPECTOMY      CARDIAC CATHETERIZATION      CARDIAC SURGERY  2/22/1999    quadruple by-pass, [address] COLONOSCOPY  10/08/2018    1 polyp removed    COLONOSCOPY N/A 10/8/2018    COLONOSCOPY POLYPECTOMY SNARE/COLD BIOPSY performed by Roxanne Mackey MD at [facility]    COSMETIC SURGERY      face lift    ENTEROSCOPY N/A 2/26/2019    ENTEROSCOPY [facility]  12/20/11    hiatal hernia repair    GASTRIC BAND REMOVAL  11/03/2016    laparoscopic     HAMMER TOE SURGERY      [facility]. INJECTION PROCEDURE FOR SACROILIAC JOINT Left 12/17/2018    SACROILIAC JOINT INJECTION ON THE LEFT WITH FLUOROSCOPY performed by Anuja Figueroa MD at [address]    both   [address]    Left and Right knees, [facility]    KNEE ARTHROSCOPY      1982 left    ORIF HUMERUS DECOMPRESSION Left 2/25/2016    OPEN REDUCTION INTERNAL FIXATION LEFT PROXIMAL HUMERUS    OVARY REMOVAL Bilateral 1997    [place] [address] DX/THER SBST INTRLMNR CRV/THRC W/IMG GDN N/A 11/5/2018    MIDLINE L4/L5 LUMBAR INTERLAMINAR EPIDURAL STEROID INJECTION WITH FLUOROSCOPY performed by Anuja Figueroa MD at [address] ENDOSCOPY  10/30/15    UPPER GASTROINTESTINAL ENDOSCOPY  10/14/2016    with biopsy       Allergies   Allergen Reactions    Albuterol Other (See Comments)     Other reaction(s): Chest Pain  Crushing chest pain    Heparin Other (See Comments)     Caused bruises and hematomas immediately when drip started. MARKED BRUISING/HEMATOMAS    Niacin     Avelox [Moxifloxacin Hcl In Nacl] Rash    Moxifloxacin Rash    Niaspan [Niacin Er] Itching and Rash    Proventil [Albuterol Sulfate] Palpitations    Tagamet [Cimetidine] Rash       Social History:  Reviewed. reports that she has never smoked. She has never used smokeless tobacco. She reports that she does not drink alcohol or use drugs. Family History:  Reviewed. family history includes Cancer in her mother; Heart Disease in her father; High Blood Pressure in her father and mother; Stroke in her sister. Review of System:  All other systems reviewed except for that noted above. Pertinent negatives and positives are:       · General: negative for fever, chills + fatigue, Tired   · Ophthalmic ROS: negative for - eye pain or loss of vision  · ENT ROS: negative for - headaches, sore throat   · Respiratory: negative for - cough, sputum  · Cardiovascular: Reviewed in HPI  · Gastrointestinal: negative for - abdominal pain, diarrhea, N/V  · Hematology: negative for - bleeding, blood clots, bruising or jaundice  · Genito-Urinary:  negative for - Dysuria or incontinence  · Musculoskeletal: negative for - Joint swelling, muscle pain  · Neurological: negative for - confusion, dizziness, headaches   · Psychiatric: No anxiety, no depression. · Dermatological: negative for - rash    Physical Examination:  Vitals:    19 0811   BP: 133/66   Pulse: 62   Resp: 16   Temp: 97.2 °F (36.2 °C)   SpO2: 95%      No intake/output data recorded.    Wt Readings from Last 3 Encounters:   19 235 lb 8 oz (106.8 kg)   19 233 lb (105.7 kg)   19 230 lb (104.3 kg)     Temp  Av.7 °F (36.5 °C)  Min: 97.2 °F (36.2 °C)  Max: 98.5 °F (36.9 °C)  Pulse  Av.7  Min: 51  Max: 62  BP  Min: 119/45  Max: 181/59  SpO2  Av.3 %  Min: 91 %  Max: 97 %  No intake or output data in the 24 hours ending 19 1027    · Telemetry: Sinus mmHg.   -Mild pulmonic regurgitation present.   -There is a trivial pericardial effusion noted.       Scheduled Meds:   sodium chloride flush  10 mL Intravenous 2 times per day     Continuous Infusions:  PRN Meds:.sodium chloride flush, magnesium hydroxide, ondansetron, acetaminophen, tiZANidine     Patient Active Problem List    Diagnosis Date Noted    Atrial fibrillation with RVR ([facility]      Priority: High    Bradycardia 04/30/2019    Anemia 10/07/2018    Coronary artery disease involving coronary bypass graft of native heart without angina pectoris     PAF (paroxysmal atrial fibrillation) ([facility] 08/15/2018    SVT [facility]supraventricular tachycardia) [facility]     Symptomatic bradycardia     Obesity     Sepsis due to urinary tract infection ([facility] 06/20/2017    Chest discomfort     Gastric band slippage 11/03/2016    Chest pain at rest 10/15/2016    Syncope and collapse     New onset seizure ([facility]     Non-intractable cyclical vomiting with nausea 09/16/2016    Pharyngoesophageal dysphagia 09/16/2016    Closed fracture of humerus 02/18/2016    Hypercholesteremia 09/15/2015    Abdominal pain, LUQ 05/28/2014    Diabetes mellitus type 2 in obese ([facility] 04/23/2014    Dysphagia 12/18/2013    Status post gastric banding 01/04/2012    Vitamin D deficiency 10/24/2011    Osteoarthritis     Chronic kidney disease     Depression     GERD without esophagitis     Morbid obesity with BMI of 40.0-44.9, adult ([facility]     RAHUL (obstructive sleep apnea) 05/26/2011    Chronic respiratory failure ([facility] 05/26/2011    Restrictive lung disease 05/26/2011    Palpitation 04/26/2011    Coronary artery disease involving native coronary artery of native heart without angina pectoris 04/26/2011    Essential hypertension 04/26/2011    Mixed hyperlipidemia 04/26/2011      Active Hospital Problems    Diagnosis Date Noted    Bradycardia [R00.1] 04/30/2019    RAHUL (obstructive sleep apnea) [G47.33] 05/26/2011    Essential hypertension [I10] 04/26/2011       Assessment and plan:     - Symptomatic bradycardia   Patient has history of sinus node dysfunction and bradycardia, junctional rhythm in the past.   She has fatigue and is symptomatic with her bradycardia. No reversible cause identified. She needs PPM implantation. The risks, benefits and alternatives of the procedure were discussed with the patient. The risks including, but not limited to, the risks of bleeding, infection, pain, device malfunction, lead dislodgement, radiation exposure, injury to cardiac and surrounding structures (including pneumothorax), stroke, cardiac perforation, tamponade, need for emergent heart surgery, myocardial infarction and death were discussed in detail. Dual vs single chamber device, including risks and benefits were discussed with patient. The patient opted to proceed with the device implantation. Plan for PPM today. - History of persistent atrial fibrillation:    S/p ablation. No recurrence. Not on any antiarrhythmic therapy and/or BB due to bradycardia. - Morbid obesity: Body mass index is 46.77 kg/m². Thank you for allowing me to participate in the care of Mary Lagos       All questions and concerns were addressed to the patient/family. Alternatives to my treatment were discussed. I have discussed the above stated plan and the patient verbalized understanding and agreed with the plan. NOTE: This report was transcribed using voice recognition software. Every effort was made to ensure accuracy, however, inadvertent computerized transcription errors may be present.      Latanya aSba MD, MPH  [facility]   Office: [phone]

## 2019-05-01 NOTE — PRE SEDATION
Sedation Pre-Procedure Note    Patient Name: Arturo Schwab   YOB: 1950  Room/Bed: [facility]-3978/3255-68  Medical Record Number: [id number]  Date: 5/1/2019   Time: 2:36 PM       Indication:  PPM implantation     Consent: I have discussed with the patient and/or the patient representative the indication, alternatives, and the possible risks and/or complications of the planned procedure and the anesthesia methods. The patient and/or patient representative appear to understand and agree to proceed. Vital Signs:   Vitals:    05/01/19 1228   BP: (!) 145/61   Pulse: 56   Resp: 18   Temp: 97.4 °F (36.3 °C)   SpO2: 92%       Past Medical History:   has a past medical history of Anemia, Anesthesia, Anesthesia complication, Atrial fibrillation ([address] CAD (coronary artery disease), Chest pain, Chronic kidney disease, Depression, GERD (gastroesophageal reflux disease), Glaucoma, Hiatal hernia, Hyperlipidemia, Hypertension, Migraines, neuralgic, Morbid obesity ([address] Osteoarthritis, Sleep apnea, Type II or unspecified type diabetes mellitus without mention of complication, not stated as uncontrolled, Unspecified sleep apnea, Urinary incontinence, and UTI (urinary tract infection). Past Surgical History:   has a past surgical history that includes Coronary artery bypass graft (1999); Hammer toe surgery; Ovary removal (Bilateral, 1997); Esophagus dilation; Breast lumpectomy; Knee arthroscopy; Gastric Band (12/20/11); Colonoscopy; Upper gastrointestinal endoscopy; Upper gastrointestinal endoscopy (10/30/15); Cosmetic surgery; joint replacement (1995); joint replacement (1995); orif humerus decompression (Left, 2/25/2016); Upper gastrointestinal endoscopy (10/14/2016); Bariatric Surgery (11/03/2016); ablation of dysrhythmic focus; Colonoscopy (10/08/2018); Colonoscopy (N/A, 10/8/2018); pr njx dx/ther sbst intrlmnr crv/thrc w/img gdn (N/A, 11/5/2018); Cardiac surgery (2/22/1999); Cardiac catheterization;  Injection Procedure For Sacroiliac Joint (Left, 12/17/2018); and Enteroscopy (N/A, 2/26/2019). Medications:   Scheduled Meds:    sodium chloride flush  10 mL Intravenous 2 times per day    ceFAZolin (ANCEF) IVPB  2 g Intravenous On Call to OR    aspirin  81 mg Oral Daily    [START ON 5/2/2019] pantoprazole  40 mg Oral QAM AC    escitalopram  20 mg Oral Daily    cetirizine  10 mg Oral Daily    furosemide  40 mg Oral Daily    gabapentin  400 mg Oral TID    isosorbide mononitrate  30 mg Oral Daily    latanoprost  1 drop Both Eyes Nightly    sodium chloride flush  10 mL Intravenous 2 times per day     Continuous Infusions:   PRN Meds: sodium chloride flush, sodium chloride flush, magnesium hydroxide, ondansetron, acetaminophen, tiZANidine  Home Meds:   Prior to Admission medications    Medication Sig Start Date End Date Taking?  Authorizing Provider   ferrous gluconate (FERGON) 324 (38 Fe) MG tablet Take 21 mg by mouth 2 times daily   Yes Historical Provider, MD   Cyclobenzaprine HCl (FLEXERIL PO) Take by mouth as needed   Yes Historical Provider, MD   potassium chloride (KLOR-CON M) 20 MEQ extended release tablet Take 1 tablet by mouth daily 11/16/18  Yes Alexandria Murray MD   aspirin 81 MG tablet Take 81 mg by mouth daily   Yes Historical Provider, MD   furosemide (LASIX) 40 MG tablet Take 1 tablet by mouth daily 9/20/18  Yes Josee Turner MD   amLODIPine (NORVASC) 2.5 MG tablet Take 2.5 mg by mouth every evening   Yes Historical Provider, MD   isosorbide mononitrate (ISMO;MONOKET) 10 MG tablet Take 5 mg by mouth 2 times daily   Yes Historical Provider, MD   Ascorbic Acid (VITAMIN C) 1000 MG tablet Take 1,000 mg by mouth daily   Yes Historical Provider, MD   Cyanocobalamin (VITAMIN B-12 PO) Take 3,000 mcg by mouth daily   Yes Historical Provider, MD   estradiol (ESTRACE) 0.1 MG/GM vaginal cream Place 2 g vaginally nightly   Yes Historical Provider, MD   rivaroxaban (XARELTO) 20 MG TABS tablet Take 1 tablet by as needed For a-fib, repeat in 1 hr.-if still symptomatic go to ER 9/10/18   BILL Cruz - CNP   Cranberry 450 MG CAPS Take by mouth 2 times daily    Historical Provider, MD       Pre-Sedation Documentation and Exam:   I have personally completed a history, physical exam & review of systems for this patient (see notes).     Mallampati Airway Assessment:  normal    Prior History of Anesthesia Complications:   none    ASA Classification:  Class 2 - A normal healthy patient with mild systemic disease    Sedation/ Anesthesia Plan:   intravenous sedation    Medications Planned:   midazolam (Versed) intravenously    Patient is an appropriate candidate for plan of sedation: yes    Electronically signed by Yonatan Greenfield MD on 5/1/2019 at 2:36 PM

## 2019-05-01 NOTE — H&P
Procedure Laterality Date    ABLATION OF DYSRHYTHMIC FOCUS      BREAST LUMPECTOMY      CARDIAC CATHETERIZATION      CARDIAC SURGERY  2/22/1999    quadruple [facility]-pass, [facility]    COLONOSCOPY      COLONOSCOPY  10/08/2018    1 polyp removed    COLONOSCOPY N/A 10/8/2018    COLONOSCOPY POLYPECTOMY SNARE/COLD BIOPSY performed by Saritha Live MD at [address]    [facility]    COSMETIC SURGERY      face lift    ENTEROSCOPY N/A 2/26/2019    ENTEROSCOPY performed by Saritha Live MD at [address] GASTRIC BAND  12/20/11    hiatal hernia repair    GASTRIC BAND REMOVAL  11/03/2016    laparoscopic     HAMMER TOE SURGERY      [facility] INJECTION PROCEDURE FOR SACROILIAC JOINT Left 12/17/2018    SACROILIAC JOINT INJECTION ON THE LEFT WITH FLUOROSCOPY performed by Mary Escamilla MD at [address]    both   [address]    Left and Right knees, [facility]    KNEE ARTHROSCOPY      1982 left    ORIF HUMERUS DECOMPRESSION Left 2/25/2016    OPEN REDUCTION INTERNAL FIXATION LEFT PROXIMAL HUMERUS    OVARY REMOVAL Bilateral 1997    [place] [address] DX/THER SBST INTRLMNR CRV/THRC W/IMG GDN N/A 11/5/2018    MIDLINE L4/L5 LUMBAR INTERLAMINAR EPIDURAL STEROID INJECTION WITH FLUOROSCOPY performed by Mary Escamilla MD at [address] [address] ENDOSCOPY  10/30/15    UPPER GASTROINTESTINAL ENDOSCOPY  10/14/2016    with biopsy       Medications Prior to Admission:      Prior to Admission medications    Medication Sig Start Date End Date Taking?  Authorizing Provider   sucralfate (CARAFATE) 1 GM tablet Take 1 g by mouth 4 times daily    Historical Provider, MD   Cyclobenzaprine HCl (FLEXERIL PO) Take by mouth as needed    Historical Provider, MD   potassium chloride (KLOR-CON M) 20 MEQ extended release tablet Take 1 tablet by mouth daily 11/16/18   Ander Felix MD aspirin 81 MG tablet Take 81 mg by mouth daily    Historical Provider, MD   furosemide (LASIX) 40 MG tablet Take 1 tablet by mouth daily 9/20/18   Vidya Alves MD   metoprolol tartrate (LOPRESSOR) 25 MG tablet Take 0.5 tablets by mouth 2 times daily  Patient taking differently: Take 12.5 mg by mouth as needed For a-fib, repeat in 1 hr.-if still symptomatic go to ER 9/10/18   BILL Murphy - CNP   amLODIPine (NORVASC) 2.5 MG tablet Take 2.5 mg by mouth every evening    Historical Provider, MD   isosorbide mononitrate (ISMO;MONOKET) 10 MG tablet Take 5 mg by mouth 2 times daily    Historical Provider, MD   Ascorbic Acid (VITAMIN C) 1000 MG tablet Take 1,000 mg by mouth daily    Historical Provider, MD   Cyanocobalamin (VITAMIN B-12 PO) Take 3,000 mcg by mouth daily    Historical Provider, MD   estradiol (ESTRACE) 0.1 MG/GM vaginal cream Place 2 g vaginally nightly    Historical Provider, MD   rivaroxaban (XARELTO) 20 MG TABS tablet Take 1 tablet by mouth daily 8/29/18   Vidya Alves MD   Cranberry 450 MG CAPS Take by mouth 2 times daily    Historical Provider, MD   pioglitazone (ACTOS) 15 MG tablet Take 15 mg by mouth daily    Historical Provider, MD   ferrous sulfate 325 (65 Fe) MG tablet Take 45 mg by mouth daily (with breakfast)     Historical Provider, MD   vitamin D (CHOLECALCIFEROL) 1000 UNIT TABS tablet Take 1,000 Units by mouth 2 times daily     Historical Provider, MD   spironolactone (ALDACTONE) 25 MG tablet Take 25 mg by mouth daily In pm    Historical Provider, MD   dexlansoprazole (DEXILANT) 60 MG CPDR delayed release capsule Take 60 mg by mouth daily    Historical Provider, MD   gabapentin (NEURONTIN) 100 MG capsule Take 400 mg by mouth 3 times daily. Sara January     Historical Provider, MD   TraZODone HCl (DESYREL PO) Take 50 mg by mouth nightly     Historical Provider, MD   losartan-hydrochlorothiazide (HYZAAR) 100-25 MG per tablet TK 1 T PO QAM 9/13/16   Historical Provider, MD   Calcium 46.92 kg/m²     General appearance:  No apparent distress, appears stated age and cooperative. HEENT:  Normal cephalic, atraumatic without obvious deformity. Pupils equal, round, and reactive to light. Extra ocular muscles intact. Conjunctivae/corneas clear. Neck: Supple, with full range of motion. No jugular venous distention. Trachea midline. Respiratory:  Normal respiratory effort. Clear to auscultation, bilaterally without Rales/Wheezes/Rhonchi. Cardiovascular:  Bradycardic rate 38-52 and rhythm without murmurs, rubs or gallops. Is one pitting edema in the bilateral ankles  Abdomen: Soft, non-tender, non-distended, without rebound or guarding. Normal bowel sounds. Musculoskeletal:  No clubbing, cyanosis or edema bilaterally. Full range of motion without deformity. Skin: Skin color, texture, turgor normal.  No rashes or lesions. Neurologic:  Neurovascularly intact without any focal sensory/motor deficits.  Cranial nerves: II-XII intact, grossly non-focal.  Psychiatric:  Alert and oriented, thought content appropriate, normal insight  Capillary Refill: Brisk,< 3 seconds   Peripheral Pulses: +2 palpable, equal bilaterally       Labs:     Recent Labs     04/30/19  1749   WBC 7.9   HGB 11.3*   HCT 34.0*        Recent Labs     04/30/19  1749   *   K 5.2*   CL 93*   CO2 24   BUN 33*   CREATININE 0.9   CALCIUM 9.3     Recent Labs     04/30/19  1749   AST 23   ALT 23   BILITOT <0.2   ALKPHOS 58     Recent Labs     04/30/19  1749   INR 1.12     Recent Labs     04/30/19  1749   TROPONINI <0.01       Urinalysis:      Lab Results   Component Value Date    NITRU Negative 08/09/2018    WBCUA 0 08/09/2018    BACTERIA 4+ 06/20/2017    RBCUA 1 08/09/2018    BLOODU Negative 08/09/2018    SPECGRAV 1.021 08/09/2018    GLUCOSEU Negative 08/09/2018       Radiology:     CXR: I have reviewed the CXR with the following interpretation:   EKG:  I have reviewed the EKG with the following interpretation:     XR CHEST

## 2019-05-02 ENCOUNTER — APPOINTMENT (OUTPATIENT)
Dept: GENERAL RADIOLOGY | Age: 69
End: 2019-05-02
Payer: MEDICARE

## 2019-05-02 VITALS
RESPIRATION RATE: 16 BRPM | DIASTOLIC BLOOD PRESSURE: 75 MMHG | HEIGHT: 60 IN | HEART RATE: 68 BPM | SYSTOLIC BLOOD PRESSURE: 168 MMHG | OXYGEN SATURATION: 95 % | WEIGHT: 235.7 LBS | BODY MASS INDEX: 46.27 KG/M2 | TEMPERATURE: 97.3 F

## 2019-05-02 LAB
GLUCOSE BLD-MCNC: 138 MG/DL (ref 70–99)
GLUCOSE BLD-MCNC: 171 MG/DL (ref 70–99)
PERFORMED ON: ABNORMAL
PERFORMED ON: ABNORMAL

## 2019-05-02 PROCEDURE — G0378 HOSPITAL OBSERVATION PER HR: HCPCS

## 2019-05-02 PROCEDURE — 2580000003 HC RX 258: Performed by: NURSE PRACTITIONER

## 2019-05-02 PROCEDURE — 93280 PM DEVICE PROGR EVAL DUAL: CPT | Performed by: INTERNAL MEDICINE

## 2019-05-02 PROCEDURE — 6370000000 HC RX 637 (ALT 250 FOR IP): Performed by: NURSE PRACTITIONER

## 2019-05-02 PROCEDURE — 2580000003 HC RX 258: Performed by: INTERNAL MEDICINE

## 2019-05-02 PROCEDURE — 6370000000 HC RX 637 (ALT 250 FOR IP): Performed by: HOSPITALIST

## 2019-05-02 PROCEDURE — 71046 X-RAY EXAM CHEST 2 VIEWS: CPT

## 2019-05-02 RX ORDER — LOSARTAN POTASSIUM 100 MG/1
100 TABLET ORAL DAILY
Status: DISCONTINUED | OUTPATIENT
Start: 2019-05-02 | End: 2019-05-02 | Stop reason: HOSPADM

## 2019-05-02 RX ORDER — HYDROCHLOROTHIAZIDE 25 MG/1
25 TABLET ORAL DAILY
Status: DISCONTINUED | OUTPATIENT
Start: 2019-05-02 | End: 2019-05-02 | Stop reason: HOSPADM

## 2019-05-02 RX ORDER — MINOCYCLINE HYDROCHLORIDE 100 MG/1
100 TABLET ORAL 2 TIMES DAILY
Qty: 10 TABLET | Refills: 0 | Status: SHIPPED | OUTPATIENT
Start: 2019-05-02 | End: 2019-05-28 | Stop reason: ALTCHOICE

## 2019-05-02 RX ORDER — LOSARTAN POTASSIUM AND HYDROCHLOROTHIAZIDE 25; 100 MG/1; MG/1
1 TABLET ORAL DAILY
Status: DISCONTINUED | OUTPATIENT
Start: 2019-05-02 | End: 2019-05-02 | Stop reason: CLARIF

## 2019-05-02 RX ADMIN — Medication 10 ML: at 09:00

## 2019-05-02 RX ADMIN — ASPIRIN 81 MG 81 MG: 81 TABLET ORAL at 08:55

## 2019-05-02 RX ADMIN — INSULIN LISPRO 1 UNITS: 100 INJECTION, SOLUTION INTRAVENOUS; SUBCUTANEOUS at 08:58

## 2019-05-02 RX ADMIN — PANTOPRAZOLE SODIUM 40 MG: 40 TABLET, DELAYED RELEASE ORAL at 05:12

## 2019-05-02 RX ADMIN — ISOSORBIDE MONONITRATE 30 MG: 30 TABLET, EXTENDED RELEASE ORAL at 08:55

## 2019-05-02 RX ADMIN — CETIRIZINE HYDROCHLORIDE 10 MG: 10 TABLET, FILM COATED ORAL at 08:56

## 2019-05-02 RX ADMIN — LOSARTAN POTASSIUM 100 MG: 100 TABLET, FILM COATED ORAL at 08:56

## 2019-05-02 RX ADMIN — ACETAMINOPHEN 650 MG: 325 TABLET, FILM COATED ORAL at 05:12

## 2019-05-02 RX ADMIN — HYDROCHLOROTHIAZIDE 25 MG: 25 TABLET ORAL at 08:55

## 2019-05-02 RX ADMIN — ESCITALOPRAM OXALATE 20 MG: 10 TABLET ORAL at 08:55

## 2019-05-02 RX ADMIN — GABAPENTIN 400 MG: 400 CAPSULE ORAL at 08:55

## 2019-05-02 RX ADMIN — FUROSEMIDE 40 MG: 40 TABLET ORAL at 08:55

## 2019-05-02 ASSESSMENT — PAIN SCALES - GENERAL
PAINLEVEL_OUTOF10: 3
PAINLEVEL_OUTOF10: 8

## 2019-05-02 ASSESSMENT — PAIN SCALES - WONG BAKER: WONGBAKER_NUMERICALRESPONSE: 0

## 2019-05-02 NOTE — PROGRESS NOTES
ADVANCED CARE PLANNING    Name:Lexie Villa       :  1950              MRN:  [id number]      Purpose of Encounter: Advanced care planning in light of problem listed above   Parties in attendance: :Kristina Kemp MD, Family members:  Decisional Capacity:Yes    Diagnosis: Principal Problem:    Bradycardia  Active Problems:    Essential hypertension    RAHUL (obstructive sleep apnea)    Sinus node dysfunction ([facility]  Resolved Problems:    * No resolved hospital problems. *    Patients Medical Story:Presented with worsening symptom of dx above. With at risk for life threatening event. Procedure and testing as noted in progress noted. We discussed patient long term goal and also wishes and aggressive care. Discussed in detail about code status and what it means with detailed explanation. Goals of Care Determinations: Patient wishes to focus on full code with aggressive care, CPR, intubation long term vent and facility as well. Plan: Will notify Butch Rob DO of change in care plan. Will look at further interventions as needed. Code Status: At this time patient wishes to be Full Code  Time Spent with Patient: 21 minutes      Electronically signed by Alex Lux MD on 2019 at 3:22 PM  Thank you Butch Rob DO for the opportunity to be involved in this patient's care.

## 2019-05-02 NOTE — DISCHARGE SUMMARY
[facility] DISCHARGE SUMMARY    Patient Demographics    Patient. Miguelina Ramos  Date of Birth. 1950  MRN. [id number]     Primary care provider. [address] [place], [place]  (Tel: [phone])    Admit date: 4/30/2019    Discharge date (blank if same as Note Date): 5/2/2019  Note Date: 5/2/2019     Reason for Hospitalization. Chief Complaint   Patient presents with    Bradycardia     Pt reports that shes had holter monitor since 4/2 - states that she has gotten calls from monitoring company for low heart rate - pt feels like heart is skipping beats. Dr. Eric Ashford for electrophysiology [facility]. Bradycardia  - s/p pacemaker placement  - did well post op  - interrogated  - resume home meds  - AC  - disharged stable     Consults. IP CONSULT TO CARDIOLOGY  IP CONSULT TO HOSPITALIST  IP CONSULT TO CARDIOLOGY    Physical examination on discharge day. BP (!) 168/75   Pulse 68   Temp 97.3 °F (36.3 °C) (Temporal)   Resp 16   Ht 4' 11.5\" (1.511 m)   Wt 235 lb 11.2 oz (106.9 kg)   SpO2 95%   BMI 46.81 kg/m²   General appearance. Alert. Looks comfortable. HEENT. Sclera clear. Moist mucus membranes. Cardiovascular. Regular rate and rhythm, normal S1, S2. No murmur. Respiratory. Not using accessory muscles. Clear to auscultation bilaterally, no wheeze. Gastrointestinal. Abdomen soft, non-tender, not distended, normal bowel sounds  Neurology. Facial symmetry. No speech deficits. Moving all extremities equally. Extremities. No edema in lower extremities. Skin. Warm, dry, normal turgor    Condition at time of discharge stable     Medication instructions provided to patient at discharge.      Medication List      START taking these medications    minocycline 100 MG tablet  Commonly known as:  DYNACIN  Take 1 tablet by mouth 2 times daily        CONTINUE swelling of face, hands, legs and/or feet     FLEXERIL PO  Notes to patient:  Cyclobenzaprine (Flexeril)  Use: Calm the muscles, ease pain  Side Effects: feeling lightheaded, sleepy     furosemide 40 MG tablet  Commonly known as:  LASIX  Take 1 tablet by mouth daily  Notes to patient:  Use: treat heart failure, fluid retention, lower blood pressure. Side effects: frequent urination, weakness, muscle cramps, increased sensitivity to light, nausea and dizziness. gabapentin 100 MG capsule  Commonly known as:  NEURONTIN  Notes to patient:  Gabapentin (Neurontin)  Use: to treat nerve pain, seizures  Side effects: nausea, unsteady gait     isosorbide mononitrate 10 MG tablet  Commonly known as:  ISMO;MONOKET  Notes to patient:  Use: prevent and treat chest pain, treat heart failure,  Side effects: headache, flushing, and dizziness     latanoprost 0.005 % ophthalmic solution  Commonly known as:  XALATAN  Notes to patient:  Use: decrease elevated pressure in the eye  Side effects: blurred vision, eyelash changes     LEXAPRO 20 MG tablet  Generic drug:  escitalopram  Notes to patient:  Escitalopram (Lexapro)  Use: anti-depressant  Side effects: fatigue, dizziness, confusion, headache, fast or irregular heartbeat     metFORMIN 500 MG tablet  Commonly known as:  GLUCOPHAGE  Notes to patient:  Use: treats diabetes or high blood sugar  Side effects: upset stomach, low blood sugar     nitroGLYCERIN 0.4 MG/SPRAY 0.4 mg spray  Commonly known as:  Delfino Mohs  Notes to patient:  Use: Treat chest pain  Side effects: headache, flushing, dizziness    ** Take 1 tablet every 5 minutes for chest pain up to 3 times. Call doctor right away.      pioglitazone 15 MG tablet  Commonly known as:  ACTOS  Notes to patient:  Pioglitazone/ Actos  Use: treats diabetes or high blood sugar  Side effects: increased water weight gain, low blood sugar, shakiness, dizziness, headache, upset stomach     potassium chloride 20 MEQ extended release breathing. Diuretic:  Use: to prevent fluid retention  Side effects: abdominal discomfort, stomach upset, leg or back pain, tarry stools, bleeding gums           Where to Get Your Medications      These medications were sent to [address] [place] [address]  Abrahan Watkins [address]    Phone:  [phone]   · minocycline 100 MG tablet         Discharge recommendations given to patient. Follow Up. pcp in 1 week   Disposition. home  Activity. activity as tolerated  Diet: DIET CARDIAC; Carb Control: 4 carb choices (60 gms)/meal      Spent 45  minutes in discharge process.     Signed:  Brissa Valdez MD     5/2/2019 3:23 PM

## 2019-05-02 NOTE — PLAN OF CARE
Problem: Pain:  Goal: Control of acute pain  Description  Control of acute pain  Outcome: Ongoing  Note:   Tylenol given x1. Pt satisfied and resting in bed with swath in place. Will continue to monitor. Problem: Falls - Risk of:  Goal: Will remain free from falls  Description  Will remain free from falls  Outcome: Ongoing  Note:   Pt remains free from falls and accidental injury at this time. Bed locked and in lowest position. Floor free from obstacles and pt encouraged to call before getting OOB and as needed. Using call light appropriately. Will continue to monitor additional needs. Problem: Cardiovascular  Goal: Hemodynamic stability  Outcome: Ongoing  Note:   Vitals:    05/02/19 0035   BP: (!) 161/70   Pulse: 62   Resp: 16   Temp: 96.2 °F (35.7 °C)   SpO2: 97%     Will continue to monitor.

## 2019-05-06 PROCEDURE — 93228 REMOTE 30 DAY ECG REV/REPORT: CPT | Performed by: INTERNAL MEDICINE

## 2019-05-07 ENCOUNTER — FOLLOWUP TELEPHONE ENCOUNTER (OUTPATIENT)
Dept: INPATIENT UNIT | Age: 69
End: 2019-05-07

## 2019-05-08 ENCOUNTER — PROCEDURE VISIT (OUTPATIENT)
Dept: CARDIOLOGY CLINIC | Age: 69
End: 2019-05-08
Payer: MEDICARE

## 2019-05-08 DIAGNOSIS — R00.1 BRADYCARDIA: ICD-10-CM

## 2019-05-08 DIAGNOSIS — Z95.0 PACEMAKER: Primary | ICD-10-CM

## 2019-05-08 PROCEDURE — 93280 PM DEVICE PROGR EVAL DUAL: CPT | Performed by: INTERNAL MEDICINE

## 2019-05-08 NOTE — PROGRESS NOTES
Patient comes in for programming evaluation for her pacemaker. All sensing and pacing parameters are stable. Her R wave was 3.7 from 9 at implant. Her incision is healing nicely. No redness or drainage seen. No changes need to be made at this time. Please see interrogation for more detail. Patient will follow up in 3 months in office or remotely.

## 2019-05-13 ENCOUNTER — TELEPHONE (OUTPATIENT)
Dept: CARDIOLOGY CLINIC | Age: 69
End: 2019-05-13

## 2019-05-22 NOTE — PROGRESS NOTES
[address]       H+P // CONSULT // OUTPATIENT VISIT // FOLLOWUP VISIT     Referring Doctor Hernan Salvador, DO   Encounter Type Followup     CHIEF COMPLAINT     Visit Type Chronic   Symptoms None   Problems CAD s/p CABG, pAFIB, HTN, CHOL, OBESITY      HISTORY OF PRESENT ILLNESS      GEN - Doing well. No new concerns. Stable mild sob.  CAD - Denies cp, dizziness, syncope, palpitations. Mild chronic sob unchanged   pAFIB - no palpitations. Recent device interrogation with no afib.  HTN - Ambulatory BP readings in good range   CHOL - Last cholesterol reviewed and in good range.  OBESITY - Remains problematic. Noncompliant with diet and exercise.  MED - Compliant with CV meds listed below without notable side effects     COMPLIANCE     Category Meds Diet Salt Exercise Tobacco Alcohol Drugs   Compliant [x] [] [] [] [x] [x] [x]   [x]Counseling given on all above above categories    HISTORY/ALLERGIES/ROS     MedHx:  has a past medical history of Anemia, Anesthesia, Anesthesia complication, Atrial fibrillation ([address] CAD (coronary artery disease), Chest pain, Chronic kidney disease, Depression, GERD (gastroesophageal reflux disease), Glaucoma, Hiatal hernia, Hyperlipidemia, Hypertension, Migraines, neuralgic, Morbid obesity ([address] Osteoarthritis, Sleep apnea, Type II or unspecified type diabetes mellitus without mention of complication, not stated as uncontrolled, Unspecified sleep apnea, Urinary incontinence, and UTI (urinary tract infection). SurgHx:  has a past surgical history that includes Coronary artery bypass graft (1999); Hammer toe surgery; Ovary removal (Bilateral, 1997); Esophagus dilation; Breast lumpectomy; Knee arthroscopy; Gastric Band (12/20/11); Colonoscopy; Upper gastrointestinal endoscopy; Upper gastrointestinal endoscopy (10/30/15); Cosmetic surgery; joint replacement (1995); joint replacement (1995); orif humerus decompression (Left, 2/25/2016);  Upper gastrointestinal endoscopy (10/14/2016); Bariatric Surgery (11/03/2016); ablation of dysrhythmic focus; Colonoscopy (10/08/2018); Colonoscopy (N/A, 10/8/2018); pr njx dx/ther sbst intrlmnr crv/thrc w/img gdn (N/A, 11/5/2018); Cardiac surgery (2/22/1999); Cardiac catheterization; Injection Procedure For Sacroiliac Joint (Left, 12/17/2018); and Enteroscopy (N/A, 2/26/2019). SocHx:   reports that she has never smoked. She has never used smokeless tobacco. She reports that she does not drink alcohol or use drugs. FamHx: family history includes Cancer in her mother; Heart Disease in her father; High Blood Pressure in her father and mother; Stroke in her sister. Allergies: Albuterol; Heparin; Niacin; Avelox [moxifloxacin hcl in nacl]; Moxifloxacin; Niaspan [niacin er];  Proventil [albuterol sulfate]; and Tagamet [cimetidine]   ROS:  [x]Full ROS obtained and negative except as mentioned in HPI     MEDICATIONS      Current Outpatient Medications   Medication Sig Dispense Refill    minocycline (DYNACIN) 100 MG tablet Take 1 tablet by mouth 2 times daily 10 tablet 0    ferrous gluconate (FERGON) 324 (38 Fe) MG tablet Take 21 mg by mouth 2 times daily      Cyclobenzaprine HCl (FLEXERIL PO) Take by mouth as needed      potassium chloride (KLOR-CON M) 20 MEQ extended release tablet Take 1 tablet by mouth daily 90 tablet 3    aspirin 81 MG tablet Take 81 mg by mouth daily      furosemide (LASIX) 40 MG tablet Take 1 tablet by mouth daily 30 tablet 5    amLODIPine (NORVASC) 2.5 MG tablet Take 2.5 mg by mouth every evening      isosorbide mononitrate (ISMO;MONOKET) 10 MG tablet Take 5 mg by mouth 2 times daily      Ascorbic Acid (VITAMIN C) 1000 MG tablet Take 1,000 mg by mouth daily      Cyanocobalamin (VITAMIN B-12 PO) Take 3,000 mcg by mouth daily      estradiol (ESTRACE) 0.1 MG/GM vaginal cream Place 2 g vaginally nightly      rivaroxaban (XARELTO) 20 MG TABS tablet Take 1 tablet by mouth daily 90 tablet 5    Cranberry 450 MG CAPS Take by mouth 2 times daily      pioglitazone (ACTOS) 15 MG tablet Take 15 mg by mouth daily      vitamin D (CHOLECALCIFEROL) 1000 UNIT TABS tablet Take 1,000 Units by mouth 2 times daily       spironolactone (ALDACTONE) 25 MG tablet Take 25 mg by mouth daily In pm      dexlansoprazole (DEXILANT) 60 MG CPDR delayed release capsule Take 60 mg by mouth daily      gabapentin (NEURONTIN) 100 MG capsule Take 400 mg by mouth 3 times daily. [place] TraZODone HCl (DESYREL PO) Take 50 mg by mouth nightly       losartan-hydrochlorothiazide (HYZAAR) 100-25 MG per tablet TK 1 T PO QAM  3    Calcium Citrate-Vitamin D (CALCIUM CITRATE + D PO) Take 2 tablets by mouth daily      SLOW-MAG 71.5-119 MG TBEC tablet Take 1 tablet by mouth daily   5    fexofenadine (ALLEGRA) 180 MG tablet Take 180 mg by mouth daily      rosuvastatin (CRESTOR) 40 MG tablet Take 20 mg by mouth every evening       metFORMIN (GLUCOPHAGE) 500 MG tablet Take 500 mg by mouth 2 times daily (with meals)       ezetimibe (ZETIA) 10 MG tablet Take 5 mg by mouth nightly       escitalopram (LEXAPRO) 20 MG tablet Take 20 mg by mouth daily.  latanoprost (XALATAN) 0.005 % ophthalmic solution Place 1 drop into both eyes nightly 1 drop in each eye       nitroGLYCERIN (NITROLINGUAL) 0.4 MG/SPRAY spray Place 1 spray under the tongue every 5 minutes as needed. As directed for chest pain        No current facility-administered medications for this visit.       Reviewed with patient and will remain unchanged except as mentioned in A/P  PHYSICAL EXAM     Vitals:    05/24/19 1338   BP: (!) 104/44   Pulse: 64      Gen Alert, coop, no distress Heart  Rrr, 1/6   Head NC, AT, no abnorm Abd  Soft, NT, +BS, no mass, no OM   Eyes PER, conj/corn clear Ext  Ext nl, AT, no C/C/E   Nose Nares nl, no drain, NT Pulse 2+ and symmetric   Throat Lips, mucosa, tongue nl Skin Col/text/turg nl, no vis rash/les   Neck S/S, TM, NT, no bruit/JVD Psych Nl mood and affect   Lung CTA-B, unlabored, no DTP Lymph   No cervical or axillary LA   Ch wall NT, no deform Neuro  Nl gross M/S exam     ASSESSMENT AND PLAN     ~CAD   Date EF Results   Sx   No concerning   Hx 1999  CABGx3 LIMA-LAD, [id number] (ANNA)   C 9/15  1/17 55%  55% Patent grafts Reshma Mess)  Patent grafts Reshma Mess)   MPI 9/15 72% Anteroapical ischemia   TTE 9/15  8/18 55%  55%    Plan   Continue aggressive medical treatment at doses above   ~AFIB  Onset Type CV Ablation ILR CVS AP/AC Rhythm Today    parox  8/18 None >1 Xarelto Regular   Plan Continue current medical regimen at doses above   FU with EP  ~HTN  Today BP Controlled   Counseling Counseled on diet/salt, exercise and ideal body weight   Plan Continue current meds at doses above   ~Hyperchol  Goal LDL <70   Counseling Counseled on diet, exercise and ideal body weight   Plan PCP liver/lipid surveillance   ~Obesity  BMI Body mass index is 45.24 kg/m².     Plan Counseled on diet, exercise, weight loss options in detail   ~Followup  Interval: 6 months  Tests/Labs: None

## 2019-05-23 NOTE — PROGRESS NOTES
CAD (coronary artery disease)     Chest pain 10/01/2017    Chronic kidney disease     ? ??    Depression     GERD (gastroesophageal reflux disease)     Glaucoma     Hiatal hernia     Hyperlipidemia     Hypertension     Migraines, neuralgic     Morbid obesity ([address]     Osteoarthritis     Sleep apnea     uses CPAP    Type II or unspecified type diabetes mellitus without mention of complication, not stated as uncontrolled     Unspecified sleep apnea     uses cpap    Urinary incontinence     UTI (urinary tract infection)         Past Surgical History:   Procedure Laterality Date    ABLATION OF DYSRHYTHMIC FOCUS      BREAST LUMPECTOMY      CARDIAC CATHETERIZATION      CARDIAC SURGERY  2/22/1999    quadruple by-pass, [address] COLONOSCOPY  10/08/2018    1 polyp removed    COLONOSCOPY N/A 10/8/2018    COLONOSCOPY POLYPECTOMY SNARE/COLD BIOPSY performed by Eppie Cogan, MD at [facility]    COSMETIC SURGERY      face lift    ENTEROSCOPY N/A 2/26/2019    ENTEROSCOPY performed by Eppie Cogan, MD at [facility]  12/20/11    hiatal hernia repair    GASTRIC BAND REMOVAL  11/03/2016    laparoscopic     HAMMER TOE SURGERY      [place] [facility] [place]. INJECTION PROCEDURE FOR SACROILIAC JOINT Left 12/17/2018    SACROILIAC JOINT INJECTION ON THE LEFT WITH FLUOROSCOPY performed by Jordon Berkowitz MD at [address]    both   [address]    Left and Right knees, [facility]    KNEE ARTHROSCOPY      1982 left    ORIF HUMERUS DECOMPRESSION Left 2/25/2016    OPEN REDUCTION INTERNAL FIXATION LEFT PROXIMAL HUMERUS    OVARY REMOVAL Bilateral 1997    [place] [address] DX/THER SBST INTRLMNR CRV/THRC W/IMG GDN N/A 11/5/2018    MIDLINE L4/L5 LUMBAR INTERLAMINAR EPIDURAL STEROID INJECTION WITH FLUOROSCOPY performed by Jordon Berkowitz MD [address]  potassium chloride (KLOR-CON M) 20 MEQ extended release tablet Take 1 tablet by mouth daily 90 tablet 3    aspirin 81 MG tablet Take 81 mg by mouth daily      furosemide (LASIX) 40 MG tablet Take 1 tablet by mouth daily 30 tablet 5    amLODIPine (NORVASC) 2.5 MG tablet Take 2.5 mg by mouth every evening      isosorbide mononitrate (ISMO;MONOKET) 10 MG tablet Take 5 mg by mouth 2 times daily      Ascorbic Acid (VITAMIN C) 1000 MG tablet Take 1,000 mg by mouth daily      Cyanocobalamin (VITAMIN B-12 PO) Take 3,000 mcg by mouth daily      estradiol (ESTRACE) 0.1 MG/GM vaginal cream Place 2 g vaginally nightly      Cranberry 450 MG CAPS Take by mouth 2 times daily      pioglitazone (ACTOS) 15 MG tablet Take 15 mg by mouth daily      vitamin D (CHOLECALCIFEROL) 1000 UNIT TABS tablet Take 1,000 Units by mouth 2 times daily       spironolactone (ALDACTONE) 25 MG tablet Take 25 mg by mouth daily In pm      dexlansoprazole (DEXILANT) 60 MG CPDR delayed release capsule Take 60 mg by mouth daily      gabapentin (NEURONTIN) 100 MG capsule Take 400 mg by mouth 3 times daily. [facility] TraZODone HCl (DESYREL PO) Take 50 mg by mouth nightly       losartan-hydrochlorothiazide (HYZAAR) 100-25 MG per tablet TK 1 T PO QAM  3    Calcium Citrate-Vitamin D (CALCIUM CITRATE + D PO) Take 2 tablets by mouth daily      SLOW-MAG 71.5-119 MG TBEC tablet Take 1 tablet by mouth daily   5    fexofenadine (ALLEGRA) 180 MG tablet Take 180 mg by mouth daily      rosuvastatin (CRESTOR) 40 MG tablet Take 20 mg by mouth every evening       metFORMIN (GLUCOPHAGE) 500 MG tablet Take 500 mg by mouth 2 times daily (with meals)       ezetimibe (ZETIA) 10 MG tablet Take 5 mg by mouth nightly       escitalopram (LEXAPRO) 20 MG tablet Take 20 mg by mouth daily.       latanoprost (XALATAN) 0.005 % ophthalmic solution Place 1 drop into both eyes nightly 1 drop in each eye       nitroGLYCERIN (NITROLINGUAL) 0.4 MG/SPRAY spray Place 1 spray under the tongue every 5 minutes as needed. As directed for chest pain       rivaroxaban (XARELTO) 20 MG TABS tablet Take 1 tablet by mouth daily 90 tablet 5     No current facility-administered medications for this visit. Assessment and plan:      - Symptomatic bradycardia, sinus node dysfunction   S/p dual chamber PPM 5/1/19   AP 71% normal device function   The CIED was interrogated and programmed and I supervised and reviewed all the data. All findings and changes are in device interrogation sheat and reflect my personal interpretation and changes and is scanned to [facility]. Follow up in device clinic.     - Paroxysmal atrial fibrillation:    Patient is  Anemic. She has seen Dr. Jane Andrade for anemia and bone marrow dysplasia. Xarelto has been stopped. She hasn't had any Afib. Device with only brief AT. S/p PVI on 8/16/2018     MCOT 9/10/18> Sinus rhythm, short atrial runs     - GI bleeding:    Patient has had GI bleeding, has history of MGUS and receiving iron transfusion for anemia. Discussed options including left atrial appendage closure. Patient has high [id number] score and requires anticoagulation to prevent thromboembolic events. Unfortunately patient has had GI bleeding and is at high risk of bleeding and not a good candidate for long term anticoagulation therapy. I have discussed this in detail with patient and family members.    Tong Wills discussed left atrial colsure with watchman device. Alternatives including surgical ligation of MEENU also discussed. Information was given. If she develops atrial fibrillation and has recurrent GI bleeding, we'll consider ANGELITO and will proceed with watchman procedure.       - CAD              Hx of CABG              1/2017 [facility] showed patent grafts              Follows with Dr. Miroslava Waller              No current anginal symptoms              Remain on statin.      - RAHUL              Treated with CPAP     Morbid Obesity Body mass index is 43.1 kg/m².

## 2019-05-24 ENCOUNTER — OFFICE VISIT (OUTPATIENT)
Dept: CARDIOLOGY CLINIC | Age: 69
End: 2019-05-24
Payer: MEDICARE

## 2019-05-24 VITALS
WEIGHT: 227.8 LBS | BODY MASS INDEX: 44.72 KG/M2 | SYSTOLIC BLOOD PRESSURE: 104 MMHG | HEART RATE: 64 BPM | HEIGHT: 60 IN | DIASTOLIC BLOOD PRESSURE: 44 MMHG

## 2019-05-24 DIAGNOSIS — I25.810 CORONARY ARTERY DISEASE INVOLVING CORONARY BYPASS GRAFT OF NATIVE HEART WITHOUT ANGINA PECTORIS: Primary | ICD-10-CM

## 2019-05-24 DIAGNOSIS — E66.01 CLASS 3 SEVERE OBESITY DUE TO EXCESS CALORIES WITH BODY MASS INDEX (BMI) OF 45.0 TO 49.9 IN ADULT, UNSPECIFIED WHETHER SERIOUS COMORBIDITY PRESENT (HCC): ICD-10-CM

## 2019-05-24 DIAGNOSIS — I10 ESSENTIAL HYPERTENSION: ICD-10-CM

## 2019-05-24 DIAGNOSIS — E78.00 HYPERCHOLESTEREMIA: ICD-10-CM

## 2019-05-24 DIAGNOSIS — I48.0 PAF (PAROXYSMAL ATRIAL FIBRILLATION) (HCC): ICD-10-CM

## 2019-05-24 PROCEDURE — 99214 OFFICE O/P EST MOD 30 MIN: CPT | Performed by: INTERNAL MEDICINE

## 2019-05-24 PROCEDURE — G8598 ASA/ANTIPLAT THER USED: HCPCS | Performed by: INTERNAL MEDICINE

## 2019-05-24 PROCEDURE — 3017F COLORECTAL CA SCREEN DOC REV: CPT | Performed by: INTERNAL MEDICINE

## 2019-05-24 PROCEDURE — 1036F TOBACCO NON-USER: CPT | Performed by: INTERNAL MEDICINE

## 2019-05-24 PROCEDURE — G8427 DOCREV CUR MEDS BY ELIG CLIN: HCPCS | Performed by: INTERNAL MEDICINE

## 2019-05-24 PROCEDURE — 1090F PRES/ABSN URINE INCON ASSESS: CPT | Performed by: INTERNAL MEDICINE

## 2019-05-24 PROCEDURE — G8399 PT W/DXA RESULTS DOCUMENT: HCPCS | Performed by: INTERNAL MEDICINE

## 2019-05-24 PROCEDURE — 4040F PNEUMOC VAC/ADMIN/RCVD: CPT | Performed by: INTERNAL MEDICINE

## 2019-05-24 PROCEDURE — 1123F ACP DISCUSS/DSCN MKR DOCD: CPT | Performed by: INTERNAL MEDICINE

## 2019-05-24 PROCEDURE — G8417 CALC BMI ABV UP PARAM F/U: HCPCS | Performed by: INTERNAL MEDICINE

## 2019-05-24 RX ORDER — TIZANIDINE 4 MG/1
4 TABLET ORAL EVERY 6 HOURS PRN
COMMUNITY
End: 2022-01-20

## 2019-05-28 ENCOUNTER — PROCEDURE VISIT (OUTPATIENT)
Dept: CARDIOLOGY CLINIC | Age: 69
End: 2019-05-28
Payer: MEDICARE

## 2019-05-28 ENCOUNTER — OFFICE VISIT (OUTPATIENT)
Dept: CARDIOLOGY CLINIC | Age: 69
End: 2019-05-28
Payer: MEDICARE

## 2019-05-28 VITALS
WEIGHT: 217 LBS | HEART RATE: 60 BPM | RESPIRATION RATE: 16 BRPM | HEIGHT: 60 IN | SYSTOLIC BLOOD PRESSURE: 114 MMHG | BODY MASS INDEX: 42.6 KG/M2 | DIASTOLIC BLOOD PRESSURE: 65 MMHG

## 2019-05-28 DIAGNOSIS — Z95.0 PACEMAKER: ICD-10-CM

## 2019-05-28 DIAGNOSIS — I48.0 PAF (PAROXYSMAL ATRIAL FIBRILLATION) (HCC): Primary | ICD-10-CM

## 2019-05-28 DIAGNOSIS — E78.00 HYPERCHOLESTEREMIA: ICD-10-CM

## 2019-05-28 DIAGNOSIS — R00.1 BRADYCARDIA: ICD-10-CM

## 2019-05-28 DIAGNOSIS — I49.5 SINUS NODE DYSFUNCTION (HCC): ICD-10-CM

## 2019-05-28 DIAGNOSIS — I10 ESSENTIAL HYPERTENSION: ICD-10-CM

## 2019-05-28 DIAGNOSIS — I25.10 CORONARY ARTERY DISEASE INVOLVING NATIVE CORONARY ARTERY OF NATIVE HEART WITHOUT ANGINA PECTORIS: ICD-10-CM

## 2019-05-28 PROCEDURE — G8417 CALC BMI ABV UP PARAM F/U: HCPCS | Performed by: INTERNAL MEDICINE

## 2019-05-28 PROCEDURE — 3017F COLORECTAL CA SCREEN DOC REV: CPT | Performed by: INTERNAL MEDICINE

## 2019-05-28 PROCEDURE — G8399 PT W/DXA RESULTS DOCUMENT: HCPCS | Performed by: INTERNAL MEDICINE

## 2019-05-28 PROCEDURE — G8427 DOCREV CUR MEDS BY ELIG CLIN: HCPCS | Performed by: INTERNAL MEDICINE

## 2019-05-28 PROCEDURE — 1123F ACP DISCUSS/DSCN MKR DOCD: CPT | Performed by: INTERNAL MEDICINE

## 2019-05-28 PROCEDURE — G8598 ASA/ANTIPLAT THER USED: HCPCS | Performed by: INTERNAL MEDICINE

## 2019-05-28 PROCEDURE — 99024 POSTOP FOLLOW-UP VISIT: CPT | Performed by: INTERNAL MEDICINE

## 2019-05-28 PROCEDURE — 93280 PM DEVICE PROGR EVAL DUAL: CPT | Performed by: INTERNAL MEDICINE

## 2019-05-28 PROCEDURE — 1090F PRES/ABSN URINE INCON ASSESS: CPT | Performed by: INTERNAL MEDICINE

## 2019-05-28 PROCEDURE — 4040F PNEUMOC VAC/ADMIN/RCVD: CPT | Performed by: INTERNAL MEDICINE

## 2019-05-28 PROCEDURE — 93000 ELECTROCARDIOGRAM COMPLETE: CPT | Performed by: INTERNAL MEDICINE

## 2019-05-28 PROCEDURE — 1036F TOBACCO NON-USER: CPT | Performed by: INTERNAL MEDICINE

## 2019-05-28 RX ORDER — AMLODIPINE BESYLATE 5 MG/1
5 TABLET ORAL EVERY EVENING
Qty: 90 TABLET | Refills: 3 | Status: ON HOLD | OUTPATIENT
Start: 2019-05-28 | End: 2022-05-05 | Stop reason: HOSPADM

## 2019-05-28 NOTE — PROGRESS NOTES
Patient comes in for programming evaluation for her pacemaker. All sensing and pacing parameters are within normal range. Noted AT. No changes need to be made at this time. Please see interrogation for more detail. Patient will follow up in 3 months in office or remotely.

## 2019-06-07 ENCOUNTER — TELEPHONE (OUTPATIENT)
Dept: BARIATRICS/WEIGHT MGMT | Age: 69
End: 2019-06-07

## 2019-06-10 ENCOUNTER — TELEPHONE (OUTPATIENT)
Dept: CARDIOLOGY CLINIC | Age: 69
End: 2019-06-10

## 2019-06-10 NOTE — TELEPHONE ENCOUNTER
Wants to know if she can get her dntal work (bridge and crown) done now since she had procedure on 5/2/19. Also wants to know if she is allowed to swim ?

## 2019-06-11 NOTE — TELEPHONE ENCOUNTER
DCE can you please address this message? RENEE is OOT. Procedure 5/1/19  Procedures performed:     · Insertion of MRI compatible right ventricular pacing lead under fluoroscopy. · Insertion of MRI compatible right atrial lead under fluoroscopy  · Insertion of a MRI compatible dual chamber Pacemaker  · IV sedation.         Last OV with DCE 5/24/19  Last OV with RENEE 5/28/19

## 2019-06-12 ENCOUNTER — TELEPHONE (OUTPATIENT)
Dept: BARIATRICS/WEIGHT MGMT | Age: 69
End: 2019-06-12

## 2019-07-09 NOTE — TELEPHONE ENCOUNTER
Pt called back because she never got refund. I advised her to call Customer service Billing and gave her the phone # [phone].

## 2019-07-22 ENCOUNTER — TELEPHONE (OUTPATIENT)
Dept: CARDIOLOGY CLINIC | Age: 69
End: 2019-07-22

## 2019-07-22 NOTE — TELEPHONE ENCOUNTER
Pt called back and she will take her monitor with her. I advised pt that if she is gone and she has a problem we cannot get her to send a transmission so she will just take in case, I advised pt that the was a good idea.

## 2019-08-20 ENCOUNTER — PROCEDURE VISIT (OUTPATIENT)
Dept: CARDIOLOGY CLINIC | Age: 69
End: 2019-08-20
Payer: MEDICARE

## 2019-08-20 DIAGNOSIS — Z95.0 PACEMAKER: ICD-10-CM

## 2019-08-20 DIAGNOSIS — R00.1 BRADYCARDIA: ICD-10-CM

## 2019-08-20 PROCEDURE — 93280 PM DEVICE PROGR EVAL DUAL: CPT | Performed by: INTERNAL MEDICINE

## 2019-08-29 ENCOUNTER — TELEPHONE (OUTPATIENT)
Dept: CARDIOLOGY CLINIC | Age: 69
End: 2019-08-29

## 2019-09-13 ENCOUNTER — HOSPITAL ENCOUNTER (OUTPATIENT)
Dept: NON INVASIVE DIAGNOSTICS | Age: 69
Discharge: HOME | End: 2019-09-13
Payer: MEDICARE

## 2019-09-13 LAB
LV EF: 55 %
LVEF MODALITY: NORMAL

## 2019-09-13 PROCEDURE — 93306 TTE W/DOPPLER COMPLETE: CPT

## 2019-09-18 RX ORDER — POTASSIUM CHLORIDE 20 MEQ/1
20 TABLET, EXTENDED RELEASE ORAL DAILY
Qty: 90 TABLET | Refills: 2 | Status: SHIPPED | OUTPATIENT
Start: 2019-09-18 | End: 2020-02-25 | Stop reason: SDUPTHER

## 2019-09-26 ENCOUNTER — TELEPHONE (OUTPATIENT)
Dept: ORTHOPEDIC SURGERY | Age: 69
End: 2019-09-26

## 2019-10-04 ENCOUNTER — OFFICE VISIT (OUTPATIENT)
Dept: SURGERY | Age: 69
End: 2019-10-04
Payer: MEDICARE

## 2019-10-04 VITALS
DIASTOLIC BLOOD PRESSURE: 74 MMHG | WEIGHT: 222 LBS | HEIGHT: 59 IN | BODY MASS INDEX: 44.76 KG/M2 | SYSTOLIC BLOOD PRESSURE: 112 MMHG

## 2019-10-04 DIAGNOSIS — K43.9 VENTRAL HERNIA WITHOUT OBSTRUCTION OR GANGRENE: Primary | ICD-10-CM

## 2019-10-04 PROCEDURE — 1090F PRES/ABSN URINE INCON ASSESS: CPT | Performed by: SURGERY

## 2019-10-04 PROCEDURE — G8417 CALC BMI ABV UP PARAM F/U: HCPCS | Performed by: SURGERY

## 2019-10-04 PROCEDURE — 99213 OFFICE O/P EST LOW 20 MIN: CPT | Performed by: SURGERY

## 2019-10-04 PROCEDURE — G8427 DOCREV CUR MEDS BY ELIG CLIN: HCPCS | Performed by: SURGERY

## 2019-10-04 PROCEDURE — G8484 FLU IMMUNIZE NO ADMIN: HCPCS | Performed by: SURGERY

## 2019-10-04 ASSESSMENT — ENCOUNTER SYMPTOMS
EYES NEGATIVE: 1
ALLERGIC/IMMUNOLOGIC NEGATIVE: 1
RESPIRATORY NEGATIVE: 1
ABDOMINAL PAIN: 1

## 2019-10-22 ENCOUNTER — HOSPITAL ENCOUNTER (OUTPATIENT)
Age: 69
Discharge: HOME | End: 2019-10-22
Payer: MEDICARE

## 2019-10-22 ENCOUNTER — TELEPHONE (OUTPATIENT)
Dept: CARDIOLOGY CLINIC | Age: 69
End: 2019-10-22

## 2019-10-22 DIAGNOSIS — Z01.818 PREOP TESTING: ICD-10-CM

## 2019-10-22 LAB
ANION GAP SERPL CALCULATED.3IONS-SCNC: 19 MMOL/L (ref 3–16)
BUN BLDV-MCNC: 20 MG/DL (ref 7–20)
CALCIUM SERPL-MCNC: 9.5 MG/DL (ref 8.3–10.6)
CHLORIDE BLD-SCNC: 94 MMOL/L (ref 99–110)
CO2: 23 MMOL/L (ref 21–32)
CREAT SERPL-MCNC: 0.8 MG/DL (ref 0.6–1.2)
GFR AFRICAN AMERICAN: >60
GFR NON-AFRICAN AMERICAN: >60
GLUCOSE BLD-MCNC: 217 MG/DL (ref 70–99)
HCT VFR BLD CALC: 34 % (ref 36–48)
HEMOGLOBIN: 11.2 G/DL (ref 12–16)
MCH RBC QN AUTO: 30.9 PG (ref 26–34)
MCHC RBC AUTO-ENTMCNC: 33.1 G/DL (ref 31–36)
MCV RBC AUTO: 93.3 FL (ref 80–100)
PDW BLD-RTO: 14.6 % (ref 12.4–15.4)
PLATELET # BLD: 269 K/UL (ref 135–450)
PMV BLD AUTO: 8.9 FL (ref 5–10.5)
POTASSIUM SERPL-SCNC: 4.2 MMOL/L (ref 3.5–5.1)
RBC # BLD: 3.64 M/UL (ref 4–5.2)
SODIUM BLD-SCNC: 136 MMOL/L (ref 136–145)
WBC # BLD: 8.1 K/UL (ref 4–11)

## 2019-10-22 PROCEDURE — 85027 COMPLETE CBC AUTOMATED: CPT

## 2019-10-22 PROCEDURE — 36415 COLL VENOUS BLD VENIPUNCTURE: CPT

## 2019-10-22 PROCEDURE — 80048 BASIC METABOLIC PNL TOTAL CA: CPT

## 2019-10-24 ENCOUNTER — TELEPHONE (OUTPATIENT)
Dept: CARDIOLOGY CLINIC | Age: 69
End: 2019-10-24

## 2019-10-29 ENCOUNTER — HOSPITAL ENCOUNTER (OUTPATIENT)
Age: 69
Setting detail: OUTPATIENT SURGERY
Discharge: HOME | End: 2019-10-29
Attending: SURGERY | Admitting: SURGERY
Payer: MEDICARE

## 2019-10-29 ENCOUNTER — ANESTHESIA (OUTPATIENT)
Dept: OPERATING ROOM | Age: 69
End: 2019-10-29
Payer: MEDICARE

## 2019-10-29 ENCOUNTER — ANESTHESIA EVENT (OUTPATIENT)
Dept: OPERATING ROOM | Age: 69
End: 2019-10-29
Payer: MEDICARE

## 2019-10-29 VITALS
RESPIRATION RATE: 16 BRPM | HEIGHT: 60 IN | SYSTOLIC BLOOD PRESSURE: 144 MMHG | OXYGEN SATURATION: 92 % | WEIGHT: 226 LBS | HEART RATE: 61 BPM | DIASTOLIC BLOOD PRESSURE: 52 MMHG | TEMPERATURE: 97 F | BODY MASS INDEX: 44.37 KG/M2

## 2019-10-29 VITALS
OXYGEN SATURATION: 99 % | TEMPERATURE: 97 F | SYSTOLIC BLOOD PRESSURE: 171 MMHG | RESPIRATION RATE: 20 BRPM | DIASTOLIC BLOOD PRESSURE: 71 MMHG

## 2019-10-29 DIAGNOSIS — Z01.818 PREOP TESTING: Primary | ICD-10-CM

## 2019-10-29 DIAGNOSIS — K43.9 VENTRAL HERNIA WITHOUT OBSTRUCTION OR GANGRENE: ICD-10-CM

## 2019-10-29 PROCEDURE — 3600000002 HC SURGERY LEVEL 2 BASE: Performed by: SURGERY

## 2019-10-29 PROCEDURE — 2500000003 HC RX 250 WO HCPCS: Performed by: NURSE ANESTHETIST, CERTIFIED REGISTERED

## 2019-10-29 PROCEDURE — 49560 PR REPAIR INCISIONAL HERNIA,REDUCIBLE: CPT | Performed by: SURGERY

## 2019-10-29 PROCEDURE — 6360000002 HC RX W HCPCS: Performed by: ANESTHESIOLOGY

## 2019-10-29 PROCEDURE — 2580000003 HC RX 258: Performed by: SURGERY

## 2019-10-29 PROCEDURE — 2580000003 HC RX 258: Performed by: NURSE ANESTHETIST, CERTIFIED REGISTERED

## 2019-10-29 PROCEDURE — 3700000000 HC ANESTHESIA ATTENDED CARE: Performed by: SURGERY

## 2019-10-29 PROCEDURE — 7100000001 HC PACU RECOVERY - ADDTL 15 MIN: Performed by: SURGERY

## 2019-10-29 PROCEDURE — 7100000000 HC PACU RECOVERY - FIRST 15 MIN: Performed by: SURGERY

## 2019-10-29 PROCEDURE — C1781 MESH (IMPLANTABLE): HCPCS | Performed by: SURGERY

## 2019-10-29 PROCEDURE — 7100000011 HC PHASE II RECOVERY - ADDTL 15 MIN: Performed by: SURGERY

## 2019-10-29 PROCEDURE — 49585 REPAIR UMBILICAL HERN,5+Y/O,REDUC: CPT | Performed by: SURGERY

## 2019-10-29 PROCEDURE — 3700000001 HC ADD 15 MINUTES (ANESTHESIA): Performed by: SURGERY

## 2019-10-29 PROCEDURE — 2500000003 HC RX 250 WO HCPCS: Performed by: SURGERY

## 2019-10-29 PROCEDURE — 2709999900 HC NON-CHARGEABLE SUPPLY: Performed by: SURGERY

## 2019-10-29 PROCEDURE — 7100000010 HC PHASE II RECOVERY - FIRST 15 MIN: Performed by: SURGERY

## 2019-10-29 PROCEDURE — 6360000002 HC RX W HCPCS

## 2019-10-29 PROCEDURE — 3600000012 HC SURGERY LEVEL 2 ADDTL 15MIN: Performed by: SURGERY

## 2019-10-29 PROCEDURE — 6360000002 HC RX W HCPCS: Performed by: NURSE ANESTHETIST, CERTIFIED REGISTERED

## 2019-10-29 DEVICE — PATCH HERN M DIA2.5IN CIR W/ STRP SEPRA TECHNOLOGY ABSRB: Type: IMPLANTABLE DEVICE | Site: ABDOMEN | Status: FUNCTIONAL

## 2019-10-29 RX ORDER — SODIUM CHLORIDE 9 MG/ML
INJECTION, SOLUTION INTRAVENOUS CONTINUOUS PRN
Status: DISCONTINUED | OUTPATIENT
Start: 2019-10-29 | End: 2019-10-29 | Stop reason: SDUPTHER

## 2019-10-29 RX ORDER — LABETALOL HYDROCHLORIDE 5 MG/ML
5 INJECTION, SOLUTION INTRAVENOUS EVERY 10 MIN PRN
Status: DISCONTINUED | OUTPATIENT
Start: 2019-10-29 | End: 2019-10-29 | Stop reason: HOSPADM

## 2019-10-29 RX ORDER — SODIUM CHLORIDE, SODIUM LACTATE, POTASSIUM CHLORIDE, CALCIUM CHLORIDE 600; 310; 30; 20 MG/100ML; MG/100ML; MG/100ML; MG/100ML
INJECTION, SOLUTION INTRAVENOUS CONTINUOUS
Status: DISCONTINUED | OUTPATIENT
Start: 2019-10-29 | End: 2019-10-29 | Stop reason: HOSPADM

## 2019-10-29 RX ORDER — CEFAZOLIN SODIUM 2 G/100ML
INJECTION, SOLUTION INTRAVENOUS
Status: COMPLETED
Start: 2019-10-29 | End: 2019-10-29

## 2019-10-29 RX ORDER — OXYCODONE HYDROCHLORIDE AND ACETAMINOPHEN 5; 325 MG/1; MG/1
1 TABLET ORAL
Status: DISCONTINUED | OUTPATIENT
Start: 2019-10-29 | End: 2019-10-29 | Stop reason: HOSPADM

## 2019-10-29 RX ORDER — LORAZEPAM 2 MG/ML
1 INJECTION INTRAMUSCULAR ONCE
Status: DISCONTINUED | OUTPATIENT
Start: 2019-10-29 | End: 2019-10-29 | Stop reason: HOSPADM

## 2019-10-29 RX ORDER — FENTANYL CITRATE 50 UG/ML
INJECTION, SOLUTION INTRAMUSCULAR; INTRAVENOUS PRN
Status: DISCONTINUED | OUTPATIENT
Start: 2019-10-29 | End: 2019-10-29 | Stop reason: SDUPTHER

## 2019-10-29 RX ORDER — MAGNESIUM HYDROXIDE 1200 MG/15ML
LIQUID ORAL CONTINUOUS PRN
Status: COMPLETED | OUTPATIENT
Start: 2019-10-29 | End: 2019-10-29

## 2019-10-29 RX ORDER — LIDOCAINE HYDROCHLORIDE 20 MG/ML
INJECTION, SOLUTION EPIDURAL; INFILTRATION; INTRACAUDAL; PERINEURAL PRN
Status: DISCONTINUED | OUTPATIENT
Start: 2019-10-29 | End: 2019-10-29 | Stop reason: SDUPTHER

## 2019-10-29 RX ORDER — ROCURONIUM BROMIDE 10 MG/ML
INJECTION, SOLUTION INTRAVENOUS PRN
Status: DISCONTINUED | OUTPATIENT
Start: 2019-10-29 | End: 2019-10-29 | Stop reason: SDUPTHER

## 2019-10-29 RX ORDER — MIDAZOLAM HYDROCHLORIDE 1 MG/ML
INJECTION INTRAMUSCULAR; INTRAVENOUS PRN
Status: DISCONTINUED | OUTPATIENT
Start: 2019-10-29 | End: 2019-10-29 | Stop reason: SDUPTHER

## 2019-10-29 RX ORDER — CEFAZOLIN SODIUM 2 G/100ML
2 INJECTION, SOLUTION INTRAVENOUS ONCE
Status: COMPLETED | OUTPATIENT
Start: 2019-10-29 | End: 2019-10-29

## 2019-10-29 RX ORDER — LORAZEPAM 2 MG/ML
0.5 INJECTION INTRAMUSCULAR ONCE
Status: COMPLETED | OUTPATIENT
Start: 2019-10-29 | End: 2019-10-29

## 2019-10-29 RX ORDER — BUPIVACAINE HYDROCHLORIDE AND EPINEPHRINE 5; 5 MG/ML; UG/ML
INJECTION, SOLUTION EPIDURAL; INTRACAUDAL; PERINEURAL
Status: COMPLETED | OUTPATIENT
Start: 2019-10-29 | End: 2019-10-29

## 2019-10-29 RX ORDER — FENTANYL CITRATE 50 UG/ML
25 INJECTION, SOLUTION INTRAMUSCULAR; INTRAVENOUS EVERY 5 MIN PRN
Status: DISCONTINUED | OUTPATIENT
Start: 2019-10-29 | End: 2019-10-29 | Stop reason: HOSPADM

## 2019-10-29 RX ORDER — SODIUM CHLORIDE 0.9 % (FLUSH) 0.9 %
10 SYRINGE (ML) INJECTION PRN
Status: DISCONTINUED | OUTPATIENT
Start: 2019-10-29 | End: 2019-10-29 | Stop reason: HOSPADM

## 2019-10-29 RX ORDER — LIDOCAINE HYDROCHLORIDE 10 MG/ML
1 INJECTION, SOLUTION EPIDURAL; INFILTRATION; INTRACAUDAL; PERINEURAL
Status: DISCONTINUED | OUTPATIENT
Start: 2019-10-29 | End: 2019-10-29 | Stop reason: HOSPADM

## 2019-10-29 RX ORDER — SODIUM CHLORIDE 0.9 % (FLUSH) 0.9 %
10 SYRINGE (ML) INJECTION EVERY 12 HOURS SCHEDULED
Status: DISCONTINUED | OUTPATIENT
Start: 2019-10-29 | End: 2019-10-29 | Stop reason: HOSPADM

## 2019-10-29 RX ORDER — ONDANSETRON 2 MG/ML
4 INJECTION INTRAMUSCULAR; INTRAVENOUS
Status: DISCONTINUED | OUTPATIENT
Start: 2019-10-29 | End: 2019-10-29 | Stop reason: HOSPADM

## 2019-10-29 RX ORDER — SUCCINYLCHOLINE/SOD CL,ISO/PF 200MG/10ML
SYRINGE (ML) INTRAVENOUS PRN
Status: DISCONTINUED | OUTPATIENT
Start: 2019-10-29 | End: 2019-10-29 | Stop reason: SDUPTHER

## 2019-10-29 RX ORDER — HYDROCODONE BITARTRATE AND ACETAMINOPHEN 5; 325 MG/1; MG/1
1-2 TABLET ORAL EVERY 4 HOURS PRN
Qty: 18 TABLET | Refills: 0 | Status: SHIPPED | OUTPATIENT
Start: 2019-10-29 | End: 2019-11-01

## 2019-10-29 RX ORDER — ONDANSETRON 2 MG/ML
INJECTION INTRAMUSCULAR; INTRAVENOUS PRN
Status: DISCONTINUED | OUTPATIENT
Start: 2019-10-29 | End: 2019-10-29 | Stop reason: SDUPTHER

## 2019-10-29 RX ORDER — HYDRALAZINE HYDROCHLORIDE 20 MG/ML
5 INJECTION INTRAMUSCULAR; INTRAVENOUS EVERY 10 MIN PRN
Status: DISCONTINUED | OUTPATIENT
Start: 2019-10-29 | End: 2019-10-29 | Stop reason: HOSPADM

## 2019-10-29 RX ORDER — PROCHLORPERAZINE EDISYLATE 5 MG/ML
5 INJECTION INTRAMUSCULAR; INTRAVENOUS
Status: DISCONTINUED | OUTPATIENT
Start: 2019-10-29 | End: 2019-10-29 | Stop reason: HOSPADM

## 2019-10-29 RX ORDER — HYDROMORPHONE HCL 110MG/55ML
0.5 PATIENT CONTROLLED ANALGESIA SYRINGE INTRAVENOUS EVERY 5 MIN PRN
Status: DISCONTINUED | OUTPATIENT
Start: 2019-10-29 | End: 2019-10-29 | Stop reason: HOSPADM

## 2019-10-29 RX ORDER — PROPOFOL 10 MG/ML
INJECTION, EMULSION INTRAVENOUS PRN
Status: DISCONTINUED | OUTPATIENT
Start: 2019-10-29 | End: 2019-10-29 | Stop reason: SDUPTHER

## 2019-10-29 RX ORDER — DEXAMETHASONE SODIUM PHOSPHATE 4 MG/ML
INJECTION, SOLUTION INTRA-ARTICULAR; INTRALESIONAL; INTRAMUSCULAR; INTRAVENOUS; SOFT TISSUE PRN
Status: DISCONTINUED | OUTPATIENT
Start: 2019-10-29 | End: 2019-10-29 | Stop reason: SDUPTHER

## 2019-10-29 RX ADMIN — DEXAMETHASONE SODIUM PHOSPHATE 4 MG: 4 INJECTION, SOLUTION INTRAMUSCULAR; INTRAVENOUS at 13:00

## 2019-10-29 RX ADMIN — LIDOCAINE HYDROCHLORIDE 100 MG: 20 INJECTION, SOLUTION EPIDURAL; INFILTRATION; INTRACAUDAL; PERINEURAL at 12:53

## 2019-10-29 RX ADMIN — LORAZEPAM 0.5 MG: 2 INJECTION, SOLUTION INTRAMUSCULAR; INTRAVENOUS at 14:14

## 2019-10-29 RX ADMIN — ONDANSETRON 4 MG: 2 INJECTION INTRAMUSCULAR; INTRAVENOUS at 13:00

## 2019-10-29 RX ADMIN — FENTANYL CITRATE 50 MCG: 50 INJECTION, SOLUTION INTRAMUSCULAR; INTRAVENOUS at 12:53

## 2019-10-29 RX ADMIN — ROCURONIUM BROMIDE 30 MG: 10 INJECTION, SOLUTION INTRAVENOUS at 13:00

## 2019-10-29 RX ADMIN — CEFAZOLIN SODIUM 2 G: 2 INJECTION, SOLUTION INTRAVENOUS at 12:43

## 2019-10-29 RX ADMIN — SUGAMMADEX 200 MG: 100 INJECTION, SOLUTION INTRAVENOUS at 13:22

## 2019-10-29 RX ADMIN — FENTANYL CITRATE 50 MCG: 50 INJECTION, SOLUTION INTRAMUSCULAR; INTRAVENOUS at 13:25

## 2019-10-29 RX ADMIN — MIDAZOLAM 2 MG: 1 INJECTION INTRAMUSCULAR; INTRAVENOUS at 12:46

## 2019-10-29 RX ADMIN — PROPOFOL 150 MG: 10 INJECTION, EMULSION INTRAVENOUS at 12:53

## 2019-10-29 RX ADMIN — SODIUM CHLORIDE: 9 INJECTION, SOLUTION INTRAVENOUS at 12:46

## 2019-10-29 RX ADMIN — Medication 160 MG: at 12:53

## 2019-10-29 ASSESSMENT — PULMONARY FUNCTION TESTS
PIF_VALUE: 17
PIF_VALUE: 27
PIF_VALUE: 30
PIF_VALUE: 27
PIF_VALUE: 0
PIF_VALUE: 29
PIF_VALUE: 30
PIF_VALUE: 28
PIF_VALUE: 27
PIF_VALUE: 14
PIF_VALUE: 28
PIF_VALUE: 31
PIF_VALUE: 28
PIF_VALUE: 32
PIF_VALUE: 42
PIF_VALUE: 2
PIF_VALUE: 4
PIF_VALUE: 27
PIF_VALUE: 14
PIF_VALUE: 59
PIF_VALUE: 2
PIF_VALUE: 26
PIF_VALUE: 31
PIF_VALUE: 1
PIF_VALUE: 27
PIF_VALUE: 0
PIF_VALUE: 3
PIF_VALUE: 32
PIF_VALUE: 27
PIF_VALUE: 32
PIF_VALUE: 58
PIF_VALUE: 31
PIF_VALUE: 30
PIF_VALUE: 23
PIF_VALUE: 31
PIF_VALUE: 2
PIF_VALUE: 28
PIF_VALUE: 27
PIF_VALUE: 27
PIF_VALUE: 0
PIF_VALUE: 28
PIF_VALUE: 24
PIF_VALUE: 28
PIF_VALUE: 30
PIF_VALUE: 33
PIF_VALUE: 28
PIF_VALUE: 0
PIF_VALUE: 27
PIF_VALUE: 20
PIF_VALUE: 29

## 2019-10-29 ASSESSMENT — PAIN DESCRIPTION - DESCRIPTORS: DESCRIPTORS: PATIENT UNABLE TO DESCRIBE

## 2019-10-29 ASSESSMENT — PAIN SCALES - GENERAL
PAINLEVEL_OUTOF10: 0

## 2019-10-29 ASSESSMENT — PAIN DESCRIPTION - LOCATION: LOCATION: ABDOMEN

## 2019-10-29 ASSESSMENT — PAIN - FUNCTIONAL ASSESSMENT: PAIN_FUNCTIONAL_ASSESSMENT: 0-10

## 2019-10-29 ASSESSMENT — PAIN DESCRIPTION - PAIN TYPE: TYPE: SURGICAL PAIN

## 2019-11-01 ENCOUNTER — OFFICE VISIT (OUTPATIENT)
Dept: SURGERY | Age: 69
End: 2019-11-01

## 2019-11-01 VITALS — DIASTOLIC BLOOD PRESSURE: 86 MMHG | WEIGHT: 226 LBS | BODY MASS INDEX: 44.88 KG/M2 | SYSTOLIC BLOOD PRESSURE: 140 MMHG

## 2019-11-01 DIAGNOSIS — K43.9 VENTRAL HERNIA WITHOUT OBSTRUCTION OR GANGRENE: Primary | ICD-10-CM

## 2019-11-01 PROCEDURE — 99024 POSTOP FOLLOW-UP VISIT: CPT | Performed by: SURGERY

## 2019-11-01 RX ORDER — AMOXICILLIN AND CLAVULANATE POTASSIUM 875; 125 MG/1; MG/1
1 TABLET, FILM COATED ORAL 2 TIMES DAILY
Qty: 14 TABLET | Refills: 0 | Status: SHIPPED | OUTPATIENT
Start: 2019-11-01 | End: 2019-11-08

## 2019-11-08 ENCOUNTER — OFFICE VISIT (OUTPATIENT)
Dept: SURGERY | Age: 69
End: 2019-11-08

## 2019-11-08 VITALS — DIASTOLIC BLOOD PRESSURE: 82 MMHG | SYSTOLIC BLOOD PRESSURE: 140 MMHG | WEIGHT: 228 LBS | BODY MASS INDEX: 45.28 KG/M2

## 2019-11-08 DIAGNOSIS — K43.9 VENTRAL HERNIA WITHOUT OBSTRUCTION OR GANGRENE: Primary | ICD-10-CM

## 2019-11-08 PROCEDURE — 99024 POSTOP FOLLOW-UP VISIT: CPT | Performed by: SURGERY

## 2019-11-22 ENCOUNTER — OFFICE VISIT (OUTPATIENT)
Dept: SURGERY | Age: 69
End: 2019-11-22

## 2019-11-22 VITALS — SYSTOLIC BLOOD PRESSURE: 130 MMHG | BODY MASS INDEX: 44.88 KG/M2 | WEIGHT: 226 LBS | DIASTOLIC BLOOD PRESSURE: 80 MMHG

## 2019-11-22 DIAGNOSIS — K43.9 VENTRAL HERNIA WITHOUT OBSTRUCTION OR GANGRENE: Primary | ICD-10-CM

## 2019-11-22 PROCEDURE — 99024 POSTOP FOLLOW-UP VISIT: CPT | Performed by: SURGERY

## 2019-12-02 ENCOUNTER — PROCEDURE VISIT (OUTPATIENT)
Dept: CARDIOLOGY CLINIC | Age: 69
End: 2019-12-02
Payer: MEDICARE

## 2019-12-02 ENCOUNTER — OFFICE VISIT (OUTPATIENT)
Dept: CARDIOLOGY CLINIC | Age: 69
End: 2019-12-02
Payer: MEDICARE

## 2019-12-02 VITALS
RESPIRATION RATE: 18 BRPM | DIASTOLIC BLOOD PRESSURE: 64 MMHG | HEIGHT: 59 IN | WEIGHT: 214 LBS | SYSTOLIC BLOOD PRESSURE: 130 MMHG | BODY MASS INDEX: 43.14 KG/M2 | HEART RATE: 78 BPM

## 2019-12-02 DIAGNOSIS — I48.91 ATRIAL FIBRILLATION WITH RVR (HCC): ICD-10-CM

## 2019-12-02 DIAGNOSIS — E66.09 OBESITY DUE TO EXCESS CALORIES WITH SERIOUS COMORBIDITY AND BODY MASS INDEX (BMI) IN 95TH TO 98TH PERCENTILE FOR AGE IN PEDIATRIC PATIENT: ICD-10-CM

## 2019-12-02 DIAGNOSIS — G47.33 OSA (OBSTRUCTIVE SLEEP APNEA): ICD-10-CM

## 2019-12-02 DIAGNOSIS — I49.5 SINUS NODE DYSFUNCTION (HCC): ICD-10-CM

## 2019-12-02 DIAGNOSIS — I48.0 PAF (PAROXYSMAL ATRIAL FIBRILLATION) (HCC): Primary | ICD-10-CM

## 2019-12-02 DIAGNOSIS — Z95.0 PACEMAKER: ICD-10-CM

## 2019-12-02 DIAGNOSIS — R55 SYNCOPE AND COLLAPSE: ICD-10-CM

## 2019-12-02 PROCEDURE — 1036F TOBACCO NON-USER: CPT | Performed by: INTERNAL MEDICINE

## 2019-12-02 PROCEDURE — G8598 ASA/ANTIPLAT THER USED: HCPCS | Performed by: INTERNAL MEDICINE

## 2019-12-02 PROCEDURE — G8417 CALC BMI ABV UP PARAM F/U: HCPCS | Performed by: INTERNAL MEDICINE

## 2019-12-02 PROCEDURE — G8484 FLU IMMUNIZE NO ADMIN: HCPCS | Performed by: INTERNAL MEDICINE

## 2019-12-02 PROCEDURE — 1123F ACP DISCUSS/DSCN MKR DOCD: CPT | Performed by: INTERNAL MEDICINE

## 2019-12-02 PROCEDURE — G8399 PT W/DXA RESULTS DOCUMENT: HCPCS | Performed by: INTERNAL MEDICINE

## 2019-12-02 PROCEDURE — 4040F PNEUMOC VAC/ADMIN/RCVD: CPT | Performed by: INTERNAL MEDICINE

## 2019-12-02 PROCEDURE — 93000 ELECTROCARDIOGRAM COMPLETE: CPT | Performed by: INTERNAL MEDICINE

## 2019-12-02 PROCEDURE — 99214 OFFICE O/P EST MOD 30 MIN: CPT | Performed by: INTERNAL MEDICINE

## 2019-12-02 PROCEDURE — 1090F PRES/ABSN URINE INCON ASSESS: CPT | Performed by: INTERNAL MEDICINE

## 2019-12-02 PROCEDURE — G8427 DOCREV CUR MEDS BY ELIG CLIN: HCPCS | Performed by: INTERNAL MEDICINE

## 2019-12-02 PROCEDURE — 93280 PM DEVICE PROGR EVAL DUAL: CPT | Performed by: INTERNAL MEDICINE

## 2019-12-02 PROCEDURE — 3017F COLORECTAL CA SCREEN DOC REV: CPT | Performed by: INTERNAL MEDICINE

## 2019-12-06 ENCOUNTER — OFFICE VISIT (OUTPATIENT)
Dept: SURGERY | Age: 69
End: 2019-12-06

## 2019-12-06 VITALS — SYSTOLIC BLOOD PRESSURE: 130 MMHG | BODY MASS INDEX: 46.66 KG/M2 | WEIGHT: 231 LBS | DIASTOLIC BLOOD PRESSURE: 74 MMHG

## 2019-12-06 DIAGNOSIS — K43.9 VENTRAL HERNIA WITHOUT OBSTRUCTION OR GANGRENE: Primary | ICD-10-CM

## 2019-12-06 PROCEDURE — 99024 POSTOP FOLLOW-UP VISIT: CPT | Performed by: SURGERY

## 2019-12-19 ENCOUNTER — HOSPITAL ENCOUNTER (OUTPATIENT)
Dept: CT IMAGING | Age: 69
Discharge: HOME | End: 2019-12-19
Payer: MEDICARE

## 2019-12-19 ENCOUNTER — HOSPITAL ENCOUNTER (OUTPATIENT)
Age: 69
Discharge: HOME | End: 2019-12-19
Payer: MEDICARE

## 2019-12-19 DIAGNOSIS — J37.0 CHRONIC LARYNGITIS: ICD-10-CM

## 2019-12-19 LAB
CREAT SERPL-MCNC: 1 MG/DL (ref 0.6–1.2)
GFR AFRICAN AMERICAN: >60
GFR NON-AFRICAN AMERICAN: 55

## 2019-12-19 PROCEDURE — 6360000004 HC RX CONTRAST MEDICATION: Performed by: GENERAL PRACTICE

## 2019-12-19 PROCEDURE — 82565 ASSAY OF CREATININE: CPT

## 2019-12-19 PROCEDURE — 70491 CT SOFT TISSUE NECK W/DYE: CPT

## 2019-12-19 PROCEDURE — 36415 COLL VENOUS BLD VENIPUNCTURE: CPT

## 2019-12-19 RX ADMIN — IOPAMIDOL 75 ML: 755 INJECTION, SOLUTION INTRAVENOUS at 11:20

## 2019-12-19 NOTE — TELEPHONE ENCOUNTER
BROUGHT IN BY [facility] AND PLACED IN BED #6, TRIAGED AND REPORT GIVEN TO 
SIENNA Pt says that her low HR wakes her up. Pt notified and verbalized understanding.

## 2020-01-02 ENCOUNTER — APPOINTMENT (OUTPATIENT)
Dept: GENERAL RADIOLOGY | Age: 70
End: 2020-01-02
Payer: MEDICARE

## 2020-01-02 ENCOUNTER — HOSPITAL ENCOUNTER (EMERGENCY)
Age: 70
Discharge: HOME | End: 2020-01-02
Payer: MEDICARE

## 2020-01-02 VITALS
OXYGEN SATURATION: 93 % | DIASTOLIC BLOOD PRESSURE: 81 MMHG | SYSTOLIC BLOOD PRESSURE: 135 MMHG | HEART RATE: 60 BPM | RESPIRATION RATE: 16 BRPM | TEMPERATURE: 97.9 F

## 2020-01-02 PROCEDURE — 99283 EMERGENCY DEPT VISIT LOW MDM: CPT

## 2020-01-02 PROCEDURE — 73502 X-RAY EXAM HIP UNI 2-3 VIEWS: CPT

## 2020-01-02 RX ORDER — METHOCARBAMOL 750 MG/1
750 TABLET, FILM COATED ORAL 3 TIMES DAILY
Qty: 15 TABLET | Refills: 0 | Status: SHIPPED | OUTPATIENT
Start: 2020-01-02 | End: 2020-01-07

## 2020-01-02 ASSESSMENT — PAIN SCALES - GENERAL: PAINLEVEL_OUTOF10: 8

## 2020-01-02 ASSESSMENT — ENCOUNTER SYMPTOMS
VOMITING: 0
ABDOMINAL PAIN: 0
NAUSEA: 0
SHORTNESS OF BREATH: 0

## 2020-01-03 NOTE — ED PROVIDER NOTES
[facility]        Pt Name: Joce Conner  MRN: [id number]  [place] 1950  Date of evaluation: 1/2/2020  Provider: Jj Massey PA-C  PCP: Janie Ledbetter, DO    This patient was not seen and evaluated by the attending physician No att. providers found. CHIEF COMPLAINT       Chief Complaint   Patient presents with    Hip Pain     patient states she was taking food out of the oven around 1800, felt a crushing pain to R hip, denies falling or injury       HISTORY OF PRESENT ILLNESS   (Location/Symptom, Timing/Onset, Context/Setting, Quality, Duration, Modifying Factors, Severity)  Note limiting factors. Jcoe Conner is a 77 y.o. female patient presents emergency department for evaluation of right-sided hip pain while moving 6 PM.  Patient states she was bending at the waist up with straight knees when she developed a sharp stabbing pain that radiated from her buttocks down the leg. Patient states currently the pain is all in her right-sided buttocks. She denies any numbness or tingling in her leg or feet. Patient is able to ambulate with some discomfort. She denies falling on the area. She denies any bruising. Patient states she does see a spine doctor who gives her injections for pain. She takes daily gabapentin. Patient is not able to take NSAID medications due to bleeding risk. Patient also takes Zanaflex nightly. Nursing Notes were all reviewed and agreed with or any disagreements were addressed in the HPI. REVIEW OF SYSTEMS    (2-9 systems for level 4, 10 or more for level 5)     Review of Systems   Constitutional: Negative for fatigue and fever. HENT: Negative. Eyes: Negative for visual disturbance. Respiratory: Negative for shortness of breath. Cardiovascular: Negative for chest pain. Gastrointestinal: Negative for abdominal pain, nausea and vomiting. Genitourinary: Negative. dailyHistorical Med      Cyanocobalamin (VITAMIN B-12 PO) Take 3,000 mcg by mouth dailyHistorical Med      estradiol (ESTRACE) 0.1 MG/GM vaginal cream Place 2 g vaginally See Admin Instructions Twice a weekHistorical Med      Cranberry 450 MG CAPS Take by mouth 2 times dailyHistorical Med      pioglitazone (ACTOS) 15 MG tablet Take 15 mg by mouth dailyHistorical Med      vitamin D (CHOLECALCIFEROL) 1000 UNIT TABS tablet Take 1,000 Units by mouth daily Historical Med      spironolactone (ALDACTONE) 25 MG tablet Take 25 mg by mouth daily In pmHistorical Med      dexlansoprazole (DEXILANT) 60 MG CPDR delayed release capsule Take 60 mg by mouth dailyHistorical Med      gabapentin (NEURONTIN) 100 MG capsule Take 400 mg by mouth 3 times daily. Chandrika Wall Historical Med      TraZODone HCl (DESYREL PO) Take 50 mg by mouth nightly Historical Med      losartan-hydrochlorothiazide (HYZAAR) 100-25 MG per tablet TK 1 T PO QAM, R-3      Calcium Citrate-Vitamin D (CALCIUM CITRATE + D PO) Take 2 tablets by mouth daily      SLOW-MAG 71.5-119 MG TBEC tablet Take 1 tablet by mouth daily 143 mg, R-5Historical Med      fexofenadine (ALLEGRA) 180 MG tablet Take 180 mg by mouth daily      rosuvastatin (CRESTOR) 40 MG tablet Take 20 mg by mouth every evening       metFORMIN (GLUCOPHAGE) 500 MG tablet Take 500 mg by mouth 2 times daily (with meals) Historical Med      ezetimibe (ZETIA) 10 MG tablet Take 5 mg by mouth nightly Historical Med      escitalopram (LEXAPRO) 20 MG tablet Take 20 mg by mouth daily. nitroGLYCERIN (NITROLINGUAL) 0.4 MG/SPRAY spray Place 1 spray under the tongue every 5 minutes as needed. As directed for chest pain                ALLERGIES     Albuterol; Heparin; Niacin; Avelox [moxifloxacin hcl in nacl]; Moxifloxacin; Niaspan [niacin er];  Proventil [albuterol sulfate]; and Tagamet [cimetidine]    FAMILYHISTORY       Family History   Problem Relation Age of Onset    Cancer Mother         ovarian, colon    High Blood Behavior: Behavior normal.         DIAGNOSTIC RESULTS   LABS:    Labs Reviewed - No data to display    All other labs were within normal range or not returned as of this dictation. EKG: All EKG's are interpreted by the Emergency Department Physician in the absence of a cardiologist.  Please see their note for interpretation of EKG. RADIOLOGY:   Non-plain film images such as CT, Ultrasound and MRI are read by the radiologist. Plain radiographic images are visualized and preliminarily interpreted by the  ED Provider with the below findings:        Interpretation per the Radiologist below, if available at the time of this note:    XR HIP RIGHT (2-3 VIEWS)   Final Result   Negative examination. No of acute process. Mild degenerative changes of the   right hip joint. These are symmetric to the left hip. Xr Hip Right (2-3 Views)    Result Date: 1/2/2020  EXAMINATION: TWO XRAY VIEWS OF THE RIGHT HIP 1/2/2020 7:44 pm COMPARISON: None. HISTORY: ORDERING SYSTEM PROVIDED HISTORY: hip pain TECHNOLOGIST PROVIDED HISTORY: Reason for exam:->hip pain Reason for Exam: Rt hip pain Acuity: Acute Type of Exam: Initial FINDINGS: No acute finding of the bony structures and joints. No fracture demonstrated, and no dislocation. Mild symmetric degenerative changes of both hip joints. Negative examination. No of acute process. Mild degenerative changes of the right hip joint. These are symmetric to the left hip. PROCEDURES   Unless otherwise noted below, none     Procedures    CRITICAL CARE TIME   N/A    CONSULTS:  None      EMERGENCY DEPARTMENT COURSE and DIFFERENTIAL DIAGNOSIS/MDM:   Vitals:    Vitals:    01/02/20 1915   BP: 135/81   Pulse: 60   Resp: 16   Temp: 97.9 °F (36.6 °C)   SpO2: 93%       Patient was given thefollowing medications:  Medications - No data to display    Patient presents emergency department for evaluation of acute right hip pain that radiates into her right sided buttocks. Patient denies any pain to internal or external rotation of the right leg. She is able to flex the leg of the hip and extend without pain. No pain in the knee or ankle joint. Dorsalis pedis pulses 2+. Normal strength and coordination with plantarflexion and dorsiflexion. Patient has no spinal tenderness of cervical, thoracic, lumbar spine. No tenderness to bony pelvis. Tenderness to the distribution of the right sciatic nerve. Patient was able to ambulate. She has no numbness in her groin, legs or feet. She has had no bowel or bladder incontinence. She does not use IV drugs. I suspect sciatica. Patient has degenerative changes on x-ray without acute bony fracture. Patient will be treated with Robaxin throughout the day and will continue to take her Zanaflex at night. Patient will use Tylenol for further pain relief as well as heat. Patient was also shown stretching exercises to help alleviate her symptoms. She was encouraged to follow-up with her primary care doctor if symptoms worsen. Patient is amenable to this outpatient plan will be discharged home with one new prescription. Pt denies any history of new numbness, weakness, incontinence of bowel or bladder, constipation, saddle anesthesia or paresthesias. I estimate there is LOW risk for ABDOMINAL AORTIC ANEURYSM, CAUDA EQUINA SYNDROME, EPIDURAL MASS LESION, OR CORD COMPRESSION, thus I consider the discharge disposition reasonable. FINAL IMPRESSION      1. Sciatica of right side    2.  Acute pain of right hip          DISPOSITION/PLAN   DISPOSITION Decision To Discharge 01/02/2020 09:12:12 PM      PATIENT REFERREDTO:  DO Ragini Joshi [address]  [facility] [address]  [phone]          [facility] Emergency Department  [address] [place] [address]  [phone]    If symptoms worsen      DISCHARGE MEDICATIONS:  Discharge Medication List as of 1/2/2020  9:24 PM      START taking these medications Details   methocarbamol (ROBAXIN-750) 750 MG tablet Take 1 tablet by mouth 3 times daily for 5 days Use as needed for muscle pain or soreness.  May take 2 at bedtime to aid sleep., Disp-15 tablet, R-0Print             DISCONTINUED MEDICATIONS:  Discharge Medication List as of 1/2/2020  9:24 PM                 (Please note that portions of this note were completed with a voice recognition program.  Efforts were made to edit the dictations but occasionally words are mis-transcribed.)    Marly Klein PA-C (electronically signed)            Marly Klein PA-C  01/02/20 [id number]

## 2020-01-03 NOTE — ED NOTES
Pt discharged in stable condition, VSS, no signs of distress. Discharge instructions and meds reviewed. Pt verbalizes understanding and states no further questions or concerns unaddressed.        Amy Salmon RN  01/02/20 [id number]

## 2020-01-07 ENCOUNTER — TELEPHONE (OUTPATIENT)
Dept: ORTHOPEDIC SURGERY | Age: 70
End: 2020-01-07

## 2020-01-07 NOTE — TELEPHONE ENCOUNTER
S/W PATIENT offered sooner appt w/ PA-C, however patient declines. States she will call back periodically to see if there are any cancellations.

## 2020-01-13 ENCOUNTER — HOSPITAL ENCOUNTER (OUTPATIENT)
Dept: WOMENS IMAGING | Age: 70
Discharge: HOME | End: 2020-01-13
Payer: MEDICARE

## 2020-01-13 ENCOUNTER — HOSPITAL ENCOUNTER (OUTPATIENT)
Dept: ULTRASOUND IMAGING | Age: 70
Discharge: HOME | End: 2020-01-13
Payer: MEDICARE

## 2020-01-13 PROCEDURE — G0279 TOMOSYNTHESIS, MAMMO: HCPCS

## 2020-01-13 PROCEDURE — 76642 ULTRASOUND BREAST LIMITED: CPT

## 2020-01-17 ENCOUNTER — OFFICE VISIT (OUTPATIENT)
Dept: ORTHOPEDIC SURGERY | Age: 70
End: 2020-01-17
Payer: MEDICARE

## 2020-01-17 VITALS
BODY MASS INDEX: 46.58 KG/M2 | HEART RATE: 62 BPM | DIASTOLIC BLOOD PRESSURE: 54 MMHG | SYSTOLIC BLOOD PRESSURE: 128 MMHG | WEIGHT: 231.04 LBS | HEIGHT: 59 IN

## 2020-01-17 PROCEDURE — G8427 DOCREV CUR MEDS BY ELIG CLIN: HCPCS | Performed by: PHYSICAL MEDICINE & REHABILITATION

## 2020-01-17 PROCEDURE — G8417 CALC BMI ABV UP PARAM F/U: HCPCS | Performed by: PHYSICAL MEDICINE & REHABILITATION

## 2020-01-17 PROCEDURE — 1036F TOBACCO NON-USER: CPT | Performed by: PHYSICAL MEDICINE & REHABILITATION

## 2020-01-17 PROCEDURE — 1123F ACP DISCUSS/DSCN MKR DOCD: CPT | Performed by: PHYSICAL MEDICINE & REHABILITATION

## 2020-01-17 PROCEDURE — 99214 OFFICE O/P EST MOD 30 MIN: CPT | Performed by: PHYSICAL MEDICINE & REHABILITATION

## 2020-01-17 PROCEDURE — 3017F COLORECTAL CA SCREEN DOC REV: CPT | Performed by: PHYSICAL MEDICINE & REHABILITATION

## 2020-01-17 PROCEDURE — G8484 FLU IMMUNIZE NO ADMIN: HCPCS | Performed by: PHYSICAL MEDICINE & REHABILITATION

## 2020-01-17 PROCEDURE — G8399 PT W/DXA RESULTS DOCUMENT: HCPCS | Performed by: PHYSICAL MEDICINE & REHABILITATION

## 2020-01-17 PROCEDURE — 4040F PNEUMOC VAC/ADMIN/RCVD: CPT | Performed by: PHYSICAL MEDICINE & REHABILITATION

## 2020-01-17 PROCEDURE — 1090F PRES/ABSN URINE INCON ASSESS: CPT | Performed by: PHYSICAL MEDICINE & REHABILITATION

## 2020-01-17 NOTE — PROGRESS NOTES
Follow up: Michael Roy  1950  [id number]         Chief Complaint   Patient presents with    Lower Back Pain     CK LSP lov 2/8/2019         HISTORY OF PRESENT ILLNESS:  Ms. Jonah Mc is a 77 y.o. female returns for a follow up visit for multiple medical problems. Her current presenting problems are   1. Lumbar disc disease    2. Lumbar radiculopathy    3. Lumbar foraminal stenosis    4. Class 3 severe obesity due to excess calories with body mass index (BMI) of 45.0 to 49.9 in adult, unspecified whether serious comorbidity present ([facility] [address]    . As per information/history obtained from the PADT(patient assessment and documentation tool) - She complains of pain in the lower back with radiation to the buttocks, hips Right, upper leg Right, knees Right and groin Right She rates the pain 8/10 and describes it as sharp, aching, throbbing, spasm. Pain is made worse by: movement. She denies side effects from the current pain regimen. Patient reports that since the last follow up visit the physical functioning is worse, family/social relationships are worse, mood is worse and sleep patterns are worse, and that the overall functioning is worse. Patient denies neurological bowel or bladder. Patient presents today following her last visit on 2/8/2019. She describes that since her last visit, she has undergone pacemaker implantation and a GI bleed. Patient also had a hernia repair in October 2019. She describes being unable to attend physical therapy. Ms Jonah Mc states her year 2019 was just awful. Patient describes feeling increased, severe pain around 1/2/2020 for which she went to the emergency department. She was taking food out of the oven when this occurred. Patient was advised to use heat over her pain locations as well as ice. This has helped her pain, however she describes this to be a short term help. Patient also describes her pain as constant and severe.  She is currently ambulating with a cane today in the office. Associated signs and symptoms:   Neurogenic bowel or bladder symptoms:  no   Perceived weakness:  yes   Difficulty walking:  yes              Past Medical History:   Past Medical History:   Diagnosis Date    Anemia     Anesthesia     trouble after egd 10/2016. Anxiety and severe tremors after AF ablation 8/2018-responded well to Ativan    Anesthesia complication     flailing, yelling when waking up from anesthesia-ativan helps (swkqrj03/29/19: ativans works within seconds of administration)    Atrial fibrillation ([address] 10/01/2017    A. fib with SVR    CAD (coronary artery disease)     Chest pain 10/01/2017    Chronic kidney disease     ? ??    Depression     GERD (gastroesophageal reflux disease)     Glaucoma     Hiatal hernia     Hyperlipidemia     Hypertension     Migraines, neuralgic     Morbid obesity ([address]     Osteoarthritis     Sleep apnea     uses CPAP    Type II or unspecified type diabetes mellitus without mention of complication, not stated as uncontrolled     Unspecified sleep apnea     uses cpap    Urinary incontinence     UTI (urinary tract infection)       Past Surgical History:     Past Surgical History:   Procedure Laterality Date    ABLATION OF DYSRHYTHMIC FOCUS      BREAST LUMPECTOMY      CARDIAC CATHETERIZATION      CARDIAC SURGERY  2/22/1999    quadruple by-pass, [facility]    COLONOSCOPY      COLONOSCOPY  10/08/2018    1 polyp removed    COLONOSCOPY N/A 10/8/2018    COLONOSCOPY POLYPECTOMY SNARE/COLD BIOPSY performed by Malick Ta MD at [address] [place] [address]    COSMETIC SURGERY      face lift    ENTEROSCOPY N/A 2/26/2019    ENTEROSCOPY performed by [facility]ick Ta [facility] at [facility] 15  12/20/11    hiatal hernia repair    GASTRIC BAND REMOVAL  11/03/2016    laparoscopic     HAMMER TOE SURGERY      HC INJECTION PROCEDURE FOR SACROILIAC JOINT Left 12/17/2018    SACROILIAC JOINT INJECTION ON THE LEFT WITH FLUOROSCOPY performed by Fely Vanegas MD at [address]    both   [address]    Left and Right knees, [facility]    KNEE ARTHROSCOPY      1982 left    ORIF HUMERUS DECOMPRESSION Left 2/25/2016    OPEN REDUCTION INTERNAL FIXATION LEFT PROXIMAL HUMERUS    OVARY REMOVAL Bilateral 1997    VA NJX DX/THER SBST INTRLMNR CRV/THRC W/IMG GDN N/A 11/5/2018    MIDLINE L4/L5 LUMBAR INTERLAMINAR EPIDURAL STEROID INJECTION WITH FLUOROSCOPY performed by Fely Vanegas MD at [address] ENDOSCOPY  10/30/15    UPPER GASTROINTESTINAL ENDOSCOPY  10/14/2016    with biopsy    VENTRAL HERNIA REPAIR N/A 10/29/2019    OPEN VENTRAL HERNIA REPAIR WITH  MESH performed by Wilfred Lima MD at [address]     Current Medications:     Current Outpatient Medications:     METHOCARBAMOL PO, Take by mouth, Disp: , Rfl:     ferrous gluconate (FERGON) 225 (27 Fe) MG tablet, Take 240 mg by mouth 2 times daily, Disp: , Rfl:     potassium chloride (KLOR-CON M) 20 MEQ extended release tablet, Take 1 tablet by mouth daily, Disp: 90 tablet, Rfl: 2    amLODIPine (NORVASC) 5 MG tablet, Take 1 tablet by mouth every evening, Disp: 90 tablet, Rfl: 3    tiZANidine (ZANAFLEX) 4 MG tablet, Take 4 mg by mouth every 6 hours as needed (ONLY TAKES AT HS) , Disp: , Rfl:     metoprolol tartrate (LOPRESSOR) 25 MG tablet, Take 12.5 mg by mouth as needed (FOR A-FIB) , Disp: , Rfl:     aspirin 81 MG tablet, Take 81 mg by mouth daily, Disp: , Rfl:     furosemide (LASIX) 40 MG tablet, Take 1 tablet by mouth daily, Disp: 30 tablet, Rfl: 5    isosorbide mononitrate (ISMO;MONOKET) 10 MG tablet, Take 5 mg by mouth 2 times daily, Disp: , Rfl:     Ascorbic Acid (VITAMIN C) 1000 MG tablet, Take 1,000 mg by mouth daily, Disp: , Rfl:     Cyanocobalamin (VITAMIN B-12 PO), Take 3,000 mcg by mouth alcohol or use drugs. Family History:   Family History   Problem Relation Age of Onset   Jose Luis Guillen Cancer Mother         ovarian, colon    High Blood Pressure Mother     Heart Disease Father     High Blood Pressure Father     Stroke Sister         paralysed on right side B/C of stroke       REVIEW OF SYSTEMS:   CONSTITUTIONAL: Denies unexplained weight loss, fevers, chills or fatigue  NEUROLOGICAL: Denies unsteady gait or progressive weakness  MUSCULOSKELETAL: Denies joint swelling or redness  GI: Denies nausea, vomiting, diarrhea   : Denies bowel or bladder issues       PHYSICAL EXAM:    Vitals: Blood pressure (!) 128/54, pulse 62, height 4' 11.02\" (1.499 m), weight 231 lb 0.7 oz (104.8 kg), not currently breastfeeding. GENERAL EXAM:  · General Apparence: Patient is adequately groomed with no evidence of malnutrition. · Psychiatric: Orientation: The patient is oriented to time, place and person. The patient's mood and affect are appropriate   · Vascular: Examination reveals no swelling and palpation reveals no tenderness in upper or lower extremities. Good capillary refill. · The lymphatic examination of the neck, axillae and groin reveals all areas to be without enlargement or induration  · Sensation is intact without deficit in the upper and lower extremities to light touch and pinprick  · Coordination of the upper and lower extremities are normal.  · Additional Examinations:  · RIGHT UPPER EXTREMITY:  Inspection/examination of the right upper extremity does not show any tenderness, deformity or injury. Range of motion is unremarkable and pain-free. There is no gross instability. There are no rashes, ulcerations or lesions. Strength and tone are normal. No atrophy or abnormal movements are noted. · LEFT UPPER EXTREMITY: Inspection/examination of the left upper extremity does not show any tenderness, deformity or injury. Range of motion is unremarkable and pain-free. There is no gross instability.   There stenosis    4. Class 3 severe obesity due to excess calories with body mass index (BMI) of 45.0 to 49.9 in adult, unspecified whether serious comorbidity present [facility])        Plan:  Clinical Course: Above diagnoses are worsening    I discussed the diagnosis and the treatment options with Hildajoy Polanco today. In Summary:  The various treatment options were outlined and discussed with Hilda Collin including:  Conservative care options: physical therapy, ice, medications, bracing, and activity modification. The indications for therapeutic injections. The indications for additional imaging/laboratory studies. The indications for (possible future) interventions. After considering the various options discussed, Hilda Polanco elected to pursue a course of treatment that includes the followin. Medications:  No further recommendations for new medications. 2. PT:  I will start the patient on a trial of PT to work on a lumbar stabilization program to focus on core strengthening, core stabilizing, lumbar stretches, hamstring flexibility, modalities as indicated for 6-8 visits over the next 4-6 weeks. 3. Further studies:  CT lumbar spine to evaluate for foraminal stenosis    4. Interventional:  After further imaging is obtained, interventional options will be reviewed and recommended. 5. Follow up:  1-2 weeks      Hilda Polanco was instructed to call the office if her symptoms worsen or if new symptoms appear prior to the next scheduled visit. She is specifically instructed to contact the office between now & her scheduled appointment if she has concerns related to her condition or if she needs assistance in scheduling the above tests. She is welcome to call for an appointment sooner if she has any additional concerns or questions. Gabe Soliman ATC, am scribing for and in the presence of Dr. Misael Valenzuela.    20 11:55 AM Raya Klein ATC    The physical examination was performed

## 2020-01-25 ENCOUNTER — HOSPITAL ENCOUNTER (OUTPATIENT)
Dept: CT IMAGING | Age: 70
Discharge: HOME | End: 2020-01-25
Payer: MEDICARE

## 2020-01-25 PROCEDURE — 72131 CT LUMBAR SPINE W/O DYE: CPT

## 2020-01-28 ENCOUNTER — HOSPITAL ENCOUNTER (OUTPATIENT)
Dept: PHYSICAL THERAPY | Age: 70
Setting detail: THERAPIES SERIES
Discharge: HOME | End: 2020-01-28
Payer: MEDICARE

## 2020-01-28 PROCEDURE — 97161 PT EVAL LOW COMPLEX 20 MIN: CPT

## 2020-01-28 PROCEDURE — 97110 THERAPEUTIC EXERCISES: CPT

## 2020-01-28 PROCEDURE — 97140 MANUAL THERAPY 1/> REGIONS: CPT

## 2020-01-28 NOTE — FLOWSHEET NOTE
[facility]    Physical Therapy Treatment Note/ Progress Report:     Date:  2020    Patient Name:  Nilson Wilson    :  1950  MRN: [id number]  Restrictions/Precautions:    Medical/Treatment Diagnosis Information:  Diagnosis: DDD  Treatment Diagnosis: low back pain   Insurance/Certification information:  PT Insurance Information: Medicare  Physician Information:  Referring Practitioner: Dr. Francisco Javier Gracia of care signed (Y/N):     Date of Patient follow up with Physician:      Progress Report: []  Yes  [x]  No     Functional Scale: oswestry 27   Date: 20    Date Range for reporting period:  Beginning:   Ending:      Progress report due (10 Rx/or 30 days whichever is less):     Recertification due (POC duration/ or 90 days whichever is less):  3/30/20     Visit # Insurance Allowable Auth Needed   1 [facility] []Yes    [x]No     Pain level:  7/10     SUBJECTIVE:  See eval    OBJECTIVE: See eval   Observation:    Test measurements:      RESTRICTIONS/PRECAUTIONS: lumbar pain    Exercises/Interventions:     Therapeutic Ex Wt / Resistance sets/sec reps notes   SKTC  LTR  3 30s    Supine nerve glide 90-90  x10     Pelvic tilts  2 10           Manual Intervention               Prone PA       GISTM/STM  8 min   B glute med, glute min, piriformis   Lumbar Manip       SI Manip       Hip belt mobs       Hip LA distraction              NMR re-education        Mf Activation- re-ed       TrA Re-ed activation       Glute Max re-ed activation  10 10 Prone                    Therapeutic Exercise and NMR EXR  [x] (06705) Provided verbal/tactile cueing for activities related to strengthening, flexibility, endurance, ROM  for improvements in proximal hip and core control with self care, mobility, lifting and ambulation.  [] (08633) Provided verbal/tactile cueing for activities related to improving balance, coordination, kinesthetic sense, posture, Goals:   Patient stated goal: \"gain more strength and stamina\" \"easier walking\"  [] Progressing: [] Met: [] Not Met: [] Adjusted    Therapist goals for Patient:   Short Term Goals: To be achieved in: 2 weeks  1. Independent in HEP and progression per patient tolerance, in order to prevent re-injury. [] Progressing: [] Met: [x] Not Met: [] Adjusted  2. Patient will have a decrease in pain to facilitate improvement in movement, function, and ADLs as indicated by Functional Deficits. [] Progressing: [] Met: [] Not Met: [] Adjusted    Long Term Goals: To be achieved in: 8 weeks  1. Disability index score of 35% or less for the GRIS to assist with reaching prior level of function. [] Progressing: [] Met: [x] Not Met: [] Adjusted  2. Patient will demonstrate increased AROM to WNL, good LS mobility, good hip ROM to allow for proper joint functioning as indicated by patients Functional Deficits. [] Progressing: [] Met: [x] Not Met: [] Adjusted  3. Patient will demonstrate an increase in Strength to good proximal hip and core activation to allow for proper functional mobility as indicated by patients Functional Deficits. [] Progressing: [] Met: [x] Not Met: [] Adjusted  4. Patient will return to all functional activities without increased symptoms or restriction. [] Progressing: [] Met: [] Not Met: [] Adjusted  5. Pt will ambulate with a normalized gait pattern with LRAD    [] Progressing: [] Met: [x] Not Met: [] Adjusted    Progression Towards Functional goals:  [] Patient is progressing as expected towards functional goals listed. [] Progression is slowed due to complexities listed. [] Progression has been slowed due to co-morbidities.   [x] Plan just implemented, too soon to assess goals progression  [] Other:     ASSESSMENT:  See eval    Treatment/Activity Tolerance:  [x] Patient tolerated treatment well [] Patient limited by fatique  [] Patient limited by pain  [] Patient limited by other medical complications  [] Other:     Overall Progression Towards Functional goals/ Treatment Progress Update:  [] Patient is progressing as expected towards functional goals listed. [] Progression is slowed due to complexities/Impairments listed. [] Progression has been slowed due to co-morbidities. [x] Plan just implemented, too soon to assess goals progression <30days   [] Goals require adjustment due to lack of progress  [] Patient is not progressing as expected and requires additional follow up with physician  [] Other:    Prognosis for POC: [x] Good [] Fair  [] Poor    Patient requires continued skilled intervention: [x] Yes  [] No        PLAN: See eval  [] Continue per plan of care [] Alter current plan (see comments)  [x] Plan of care initiated [] Hold pending MD visit [] Discharge    Electronically signed by: Liz Cabral, PT    Note: If patient does not return for scheduled/recommended follow up visits, this note will serve as a discharge from care along with the most recent update on progress.

## 2020-01-28 NOTE — PLAN OF CARE
[facility], [address] [address]  Phone [phone]    Fax [phone]                                                       Physical Therapy Certification    Dear Referring Practitioner: Dr. Katherine Mclaughlin,    We had the pleasure of evaluating the following patient for physical therapy services at [address] [place] [address]. A summary of our findings can be found in the initial assessment below. This includes our plan of care. If you have any questions or concerns regarding these findings, please do not hesitate to contact me at the office phone number checked above. Thank you for the referral.       Physician Signature:_______________________________Date:__________________  By signing above (or electronic signature), therapists plan is approved by physician      Patient: Judith Barton   : 1950   MRN: [id number]  Referring Physician: Referring Practitioner: Dr. Katherine Mclaughlin      Evaluation Date: 2020      Medical Diagnosis Information:  Diagnosis: DDD   Treatment Diagnosis: low back pain                                          Insurance information: PT Insurance Information: Medicare     Precautions/ Contra-indications: low back pain  Latex Allergy:  [x]NO      []YES  Preferred Language for Healthcare:   [x]English       []other:    SUBJECTIVE: Patient stated complaint:Bent forward to pick something up in the kitchen  and fell to the floor d/t pain in the back. Went to ER, xray neg for fx, opinion was DDD. Most pain in the R LE. Some relief with stretches given in ER. Follow up with Dr. Dao Matthews d/t weakness and pain in L>R, sent for MRI, referred here. NOW low back pain R>L. Pt reports weakness R>L, ambulates with a SPC d/t safety and weakness. Pt reports she uses ramps and uses a chair lift at home (since ). Pain relief when standing but starts to have pain after prolonged standing. Pain with fwd bending. No pain when sleeping, best in the mornings. Pt reports the L LE has progressively gotten weaker. Relevant Medical History: pacemaker, diabetes, high BP, osteopenia  Functional Disability Index/G-Codes:   Oswestry 28    Pain Scale: 9/10 at its worst   Easing factors: rest  Provocative factors: sitting, prolonged standing    Type: []Constant   [x]Intermittent  [x]Radiating []Localized []other:     Numbness/Tingling: NT R posterior LE just above the knee    Occupation/School: retired     Living Status/Prior Level of Function: Independent with ADLs and IADLs    OBJECTIVE:     ROM     LUMBAR FLEX 60*    LUMBAR EXT 10*    Sidebend     Rotation      LEFT RIGHT   HIP Flex     HIP Abd     HIP ER     HIP IR     Knee Flex     Knee ext     Hamstring FLEX     Piriformis                Strength  LEFT RIGHT   MfA     TrA     HIP Flexors 3 3   HIP Abductors 3 3   HIP ER     Hip IR     Knee EXT (quad) 3+ 3+   Knee Flex (HS)     Ankle DF     Ankle PF     Great Toe Ext       Reflexes/Sensation:   [x]Dermatomes/Myotomes intact    []UE Reflexes     []Normal []Hypo      []Hyper   [x]LE Reflexes     []Normal [x]Hypo leroy to elicit L4     []Hyper   [x]Babinski/Clonus/Hoffmans: neg   []Other:    Joint mobility:    []Normal    [x]Hypo-SI   []Hyper    Palpation: Moderate tenderness to palpation B glute med, glute min, piriformis     Functional Mobility/Transfers: pt has difficulty with sit to stand transfers d/t increased LE weakness    Posture: WNL    Bandages/Dressings/Incisions: NA    Gait: (include devices/WB status) Pt ambulates with a slow cautious gait pattern    Orthopedic Special Tests: slump neg, SLR pos R, MIRANDA pos R                       [x] Patient history, allergies, meds reviewed. Medical chart reviewed. See intake form. Review Of Systems (ROS):  [x]Performed Review of systems (Integumentary, CardioPulmonary, Neurological) by intake and observation.  Intake form has been scanned into medical record. Patient has been instructed to contact their primary care physician regarding ROS issues if not already being addressed at this time. Co-morbidities/Complexities (which will affect course of rehabilitation):   []None           Arthritic conditions   []Rheumatoid arthritis (M05.9)  [x]Osteoarthritis (M19.91)   Cardiovascular conditions   [x]Hypertension (I10)  []Hyperlipidemia (E78.5)  []Angina pectoris (I20)  []Atherosclerosis (I70)   Musculoskeletal conditions   []Disc pathology   []Congenital spine pathologies   []Prior surgical intervention  []Osteoporosis (M81.8)  [x]Osteopenia (M85.8)   Endocrine conditions   []Hypothyroid (E03.9)  []Hyperthyroid Gastrointestinal conditions   []Constipation (O98.94)   Metabolic conditions   []Morbid obesity (E66.01)  [x]Diabetes type 1(E10.65) or 2 (E11.65)   []Neuropathy (G60.9)     Pulmonary conditions   []Asthma (J45)  []Coughing   []COPD (J44.9)   Psychological Disorders  [x]Anxiety (F41.9)  [x]Depression (F32.9)   []Other:   []Other:          Barriers to/and or personal factors that will affect rehab potential:              [x]Age  [x]Sex              [x]Motivation/Lack of Motivation                        [x]Co-Morbidities              []Cognitive Function, education/learning barriers              []Environmental, home barriers              []profession/work barriers  []past PT/medical experience  []other:  Justification:     Falls Risk Assessment (30 days)  [x] Falls Risk assessed and no intervention required.   [] Falls Risk assessed and Patient requires intervention due to being higher risk   TUG score (>12s at risk):     [] Falls education provided, including         ASSESSMENT:   Functional Impairments:     []Noted lumbar/proximal hip hypomobility   []Noted lumbosacral and/or generalized hypermobility   [x]Decreased Lumbosacral/hip/LE functional ROM   [x]Decreased core/proximal hip strength and neuromuscular control    []Decreased LE functional strength []Abnormal reflexes/sensation/myotomal/dermatomal deficits  [x]Reduced balance/proprioceptive control    []other:      Functional Activity Limitations (from functional questionnaire and intake)   []Reduced ability to tolerate prolonged functional positions   []Reduced ability or difficulty with changes of positions or transfers between positions   [x]Reduced ability to maintain good posture and demonstrate good body mechanics with sitting, bending, and lifting   []Reduced ability to sleep   [] Reduced ability or tolerance with driving and/or computer work   [x]Reduced ability to perform lifting, reaching, carrying tasks   [x]Reduced ability to squat   [x]Reduced ability to forward bend   [x]Reduced ability to ambulate prolonged functional periods/distances/surfaces   [x]Reduced ability to ascend/descend stairs   []other:       Participation Restrictions   [x]Reduced participation in self care activities   [x]Reduced participation in home management activities   [x]Reduced participation in work activities   [x]Reduced participation in social activities. [x]Reduced participation in sport/recreation activities. Classification:   [x]Signs/symptoms consistent with Lumbar instability/stabilization subgroup. []Signs/symptoms consistent with Lumbar mobilization/manipulation subgroup, myotomes and dermatomes intact. Meets manipulation criteria. []Signs/symptoms consistent with Lumbar direction specific/centralization subgroup   [x]Signs/symptoms consistent with Lumbar traction subgroup     []Signs/symptoms consistent with lumbar facet dysfunction   []Signs/symptoms consistent with lumbar stenosis type dysfunction   []Signs/symptoms consistent with nerve root involvement including myotome & dermatome dysfunction   []Signs/symptoms consistent with post-surgical status including: decreased ROM, strength and function.    []signs/symptoms consistent with pathology which may benefit from Dry needling     []other:

## 2020-01-30 ENCOUNTER — HOSPITAL ENCOUNTER (OUTPATIENT)
Dept: PHYSICAL THERAPY | Age: 70
Setting detail: THERAPIES SERIES
Discharge: HOME | End: 2020-01-30
Payer: MEDICARE

## 2020-01-30 PROCEDURE — 97110 THERAPEUTIC EXERCISES: CPT | Performed by: SPECIALIST/TECHNOLOGIST

## 2020-01-30 PROCEDURE — 97140 MANUAL THERAPY 1/> REGIONS: CPT | Performed by: SPECIALIST/TECHNOLOGIST

## 2020-01-30 NOTE — FLOWSHEET NOTE
[facility]    Physical Therapy Treatment Note/ Progress Report:     Date:  2020    Patient Name:  Terrell Mccormick    :  1950  MRN: [id number]  Restrictions/Precautions:    Medical/Treatment Diagnosis Information:  Diagnosis: DDD  Treatment Diagnosis: low back pain   Insurance/Certification information:  PT Insurance Information: Medicare  Physician Information:  Referring Practitioner: Dr. Polo Tellez of care signed (Y/N):     Date of Patient follow up with Physician:      Progress Report: []  Yes  [x]  No     Functional Scale: oswestry 27   Date: 20    Date Range for reporting period:  Beginning:   Ending:      Progress report due (10 Rx/or 30 days whichever is less):     Recertification due (POC duration/ or 90 days whichever is less):  3/30/20     Visit # Insurance Allowable Auth Needed   2 [facility] []Yes    [x]No     Pain level:  7/10     SUBJECTIVE: Pt reports having increased pain across the LT side of her hip but also has pain across the central spine. Continues to have pain down both legs and RT leg has numbness down back of leg to central knee. OBJECTIVE:    Observation:    Test measurements:      20 Cane redirected to left side due to Rt leg weakness.      RESTRICTIONS/PRECAUTIONS: lumbar pain    Exercises/Interventions:  28'  Therapeutic Ex Wt / Resistance sets/sec reps notes   SKTC  LTR  3 30s    Supine nerve glide 90-90 reviewed x10     Pelvic tilts w/ bridge/BS  2 10x    Standing incline board   30\" 3x    SL clams in HL    SL ABD [place]         Manual Intervention        LLA Traction B 5'      Prone PA              GISTM/STM  RT hip/  18 min   B glute med, glute min, piriformis/ STM to Rt side glute/ hip into  ITB Rt side PSIS   Lumbar Manip       SI Manip       Hip belt mobs       Hip LA distraction       Neutral gapping  NPV      NMR re-education        Mf Activation- re-ed       TrA Re-ed activation       Glute Max re-ed activation  10 10x Prone                    Therapeutic Exercise and NMR EXR  [x] (75503) Provided verbal/tactile cueing for activities related to strengthening, flexibility, endurance, ROM  for improvements in proximal hip and core control with self care, mobility, lifting and ambulation.  [] (44147) Provided verbal/tactile cueing for activities related to improving balance, coordination, kinesthetic sense, posture, motor skill, proprioception  to assist with core control in self care, mobility, lifting, and ambulation. Therapeutic Activities:    [] (77556 or 26013) Provided verbal/tactile cueing for activities related to improving balance, coordination, kinesthetic sense, posture, motor skill, proprioception and motor activation to allow for proper function  with self care and ADLs  [] (31700) Provided training and instruction to the patient for proper core and proximal hip recruitment and positioning with ambulation re-education     Home Exercise Program:    [x] (19392) Reviewed/Progressed HEP activities related to strengthening, flexibility, endurance, ROM of core, proximal hip and LE for functional self-care, mobility, lifting and ambulation   [] (04336) Reviewed/Progressed HEP activities related to improving balance, coordination, kinesthetic sense, posture, motor skill, proprioception of core, proximal hip and LE for self care, mobility, lifting, and ambulation      Manual Treatments:  PROM / STM / Oscillations-Mobs:  G-I, II, III, IV (PA's, Inf., Post.)  [x] (59044) Provided manual therapy to mobilize proximal hip and LS spine soft tissue/joints for the purpose of modulating pain, promoting relaxation,  increasing ROM, reducing/eliminating soft tissue swelling/inflammation/restriction, improving soft tissue extensibility and allowing for proper ROM for normal function with self care, mobility, lifting and ambulation.      Modalities:    10'/ seated      Charges:  Timed

## 2020-02-04 ENCOUNTER — HOSPITAL ENCOUNTER (OUTPATIENT)
Dept: PHYSICAL THERAPY | Age: 70
Setting detail: THERAPIES SERIES
Discharge: HOME | End: 2020-02-04
Payer: MEDICARE

## 2020-02-04 PROCEDURE — 97140 MANUAL THERAPY 1/> REGIONS: CPT | Performed by: SPECIALIST/TECHNOLOGIST

## 2020-02-04 PROCEDURE — 97110 THERAPEUTIC EXERCISES: CPT | Performed by: SPECIALIST/TECHNOLOGIST

## 2020-02-04 PROCEDURE — 97112 NEUROMUSCULAR REEDUCATION: CPT | Performed by: SPECIALIST/TECHNOLOGIST

## 2020-02-04 NOTE — FLOWSHEET NOTE
Adjusted  5. Pt will ambulate with a normalized gait pattern with LRAD    [] Progressing: [] Met: [x] Not Met: [] Adjusted    Progression Towards Functional goals:  [] Patient is progressing as expected towards functional goals listed. [] Progression is slowed due to complexities listed. [] Progression has been slowed due to co-morbidities. [x] Plan just implemented, too soon to assess goals progression  [] Other:     ASSESSMENT:  Pt. Reports having less  pain with transitioning on the table from hip to hip and performing exercises today. Has excellent flexibility with lower extremity with hamstrings/ hips. Has mild tightness with anterior hip flexors but denied pain with manual stretching. Tolerated progressions with hip strengthening but had increased burning demonstrated with SL abduction and with heel taps. Improved  Tandem balance but required hand held assist required to stabilize herself. Treatment/Activity Tolerance:  [x] Patient tolerated treatment well [] Patient limited by fatique  [] Patient limited by pain  [] Patient limited by other medical complications  [] Other:     Overall Progression Towards Functional goals/ Treatment Progress Update:  [] Patient is progressing as expected towards functional goals listed. [] Progression is slowed due to complexities/Impairments listed. [] Progression has been slowed due to co-morbidities. [x] Plan just implemented, too soon to assess goals progression <30days   [] Goals require adjustment due to lack of progress  [] Patient is not progressing as expected and requires additional follow up with physician  [] Other:    Prognosis for POC: [x] Good [] Fair  [] Poor    Patient requires continued skilled intervention: [x] Yes  [] No        PLAN: Increase core stability/ hip strength. sin on Friday, injection?     [x] Continue per plan of care [] Alter current plan (see comments)  [] Plan of care initiated [] Hold pending MD visit []

## 2020-02-06 ENCOUNTER — HOSPITAL ENCOUNTER (OUTPATIENT)
Dept: PHYSICAL THERAPY | Age: 70
Setting detail: THERAPIES SERIES
Discharge: HOME | End: 2020-02-06
Payer: MEDICARE

## 2020-02-06 PROCEDURE — 97110 THERAPEUTIC EXERCISES: CPT | Performed by: SPECIALIST/TECHNOLOGIST

## 2020-02-06 PROCEDURE — 97140 MANUAL THERAPY 1/> REGIONS: CPT | Performed by: SPECIALIST/TECHNOLOGIST

## 2020-02-06 PROCEDURE — 97112 NEUROMUSCULAR REEDUCATION: CPT | Performed by: SPECIALIST/TECHNOLOGIST

## 2020-02-06 NOTE — FLOWSHEET NOTE
[facility]    Physical Therapy Treatment Note/ Progress Report:     Date:  2020    Patient Name:  Joce Conner    :  1950  MRN: [id number]  Restrictions/Precautions:    Medical/Treatment Diagnosis Information:  Diagnosis: DDD  Treatment Diagnosis: low back pain   Insurance/Certification information:  PT Insurance Information: Medicare  Physician Information:  Referring Practitioner: Dr. Janina Chirinos of care signed (Y/N):     Date of Patient follow up with Physician: Gus Toscano   Progress Report: []  Yes  [x]  No     Functional Scale: oswestry 27   Date: 20    Date Range for reporting period:  Beginning:   Ending:      Progress report due (10 Rx/or 30 days whichever is less):     Recertification due (POC duration/ or 90 days whichever is less):  3/30/20     Visit # Insurance Allowable Auth Needed   3 [facility] []Yes    [x]No     Pain level:  4/10 RT SI primarily    SUBJECTIVE:  Pt was given muscle relaxer for her neck but only takes PRN. But lowback is doing better with overall mobility. Pain in left groin for the last 24-48 hours and unsure what it is related to. OBJECTIVE:    Observation:    Test measurements:      20 Cane redirected to left side due to Rt leg weakness.      RESTRICTIONS/PRECAUTIONS: lumbar pain    Exercises/Interventions:  39'  Therapeutic Ex Wt / Resistance sets/sec reps notes   SKTC  LTR  Supine Hip ER stretch manual 3 30s    Supine nerve glide 90-90 Reviewed/ HEP x10     Pelvic tilts w/ bridge/BS W/ teal loop 2 10x    Standing incline board    TR  B   x20 30\" 3x    SAQ   B   2 10x    SL clams    SL ABD Hold L Leg Teal      X 20   15x EA    Manual Intervention 20'       LLA Traction    Monitor ankle pain   Prone PA/ SIDE  5'             GISTM/STM- [employer]  RT hip   15 min   B glute med, glute min, piriformis/ STM to Rt side glute/ hip into  ITB Rt side PSIS   Lumbar Manip       SI Manip       Hip belt mobs       Hip distraction/ scour with 5'      Neutral gapping  NPV PRN      NMR re-education        Mf Activation- re-ed       TrA Re-ed activation       Supine alt leg heel taps X 15      Glute Max re-ed activation  10x 10x Prone    B / standing ext  15x      Sit 2 stand from Hi/lo x10 with no arm assist      Tandem stance 20\" x2 each side        Therapeutic Exercise and NMR EXR  [x] (29769) Provided verbal/tactile cueing for activities related to strengthening, flexibility, endurance, ROM  for improvements in proximal hip and core control with self care, mobility, lifting and ambulation.  [] (18036) Provided verbal/tactile cueing for activities related to improving balance, coordination, kinesthetic sense, posture, motor skill, proprioception  to assist with core control in self care, mobility, lifting, and ambulation.      Therapeutic Activities:    [] (49774 or 80721) Provided verbal/tactile cueing for activities related to improving balance, coordination, kinesthetic sense, posture, motor skill, proprioception and motor activation to allow for proper function  with self care and ADLs  [] (61154) Provided training and instruction to the patient for proper core and proximal hip recruitment and positioning with ambulation re-education     Home Exercise Program:    [x] (54523) Reviewed/Progressed HEP activities related to strengthening, flexibility, endurance, ROM of core, proximal hip and LE for functional self-care, mobility, lifting and ambulation   [] (33701) Reviewed/Progressed HEP activities related to improving balance, coordination, kinesthetic sense, posture, motor skill, proprioception of core, proximal hip and LE for self care, mobility, lifting, and ambulation      Manual Treatments:  PROM / STM / Oscillations-Mobs:  G-I, II, III, IV (PA's, Inf., Post.)  [x] (45638) Provided manual therapy to mobilize proximal hip and LS spine soft tissue/joints for the purpose of modulating pain, promoting Patient will return to all functional activities without increased symptoms or restriction. [] Progressing: [] Met: [] Not Met: [] Adjusted  5. Pt will ambulate with a normalized gait pattern with LRAD    [] Progressing: [] Met: [x] Not Met: [] Adjusted    Progression Towards Functional goals:  [] Patient is progressing as expected towards functional goals listed. [] Progression is slowed due to complexities listed. [] Progression has been slowed due to co-morbidities. [x] Plan just implemented, too soon to assess goals progression  [] Other:     ASSESSMENT:  Pt. Reports having less  pain with transitioning on the table from hip to hip and performing exercises today. Has excellent flexibility with lower extremity with hamstrings/ hips but had some increased left groin/ hip adductor strain today noticed after performing SAQ. Has mild tightness with anterior hip flexors but denied pain with manual stretching. Tolerated progressions with hip strengthening but had increased burning demonstrated with  RT SL abduction. Pt performed standing hip extension today focusing on core stabilization and glute max performance. Pt improved with tandem balance but required hand held assist required to stabilize herself at times. Increased challenge required   Treatment/Activity Tolerance:  [x] Patient tolerated treatment well [] Patient limited by fatique  [] Patient limited by pain  [] Patient limited by other medical complications  [] Other:     Overall Progression Towards Functional goals/ Treatment Progress Update:  [] Patient is progressing as expected towards functional goals listed. [] Progression is slowed due to complexities/Impairments listed. [] Progression has been slowed due to co-morbidities.   [x] Plan just implemented, too soon to assess goals progression <30days   [] Goals require adjustment due to lack of progress  [] Patient is not progressing as expected and requires additional follow up with

## 2020-02-07 ENCOUNTER — OFFICE VISIT (OUTPATIENT)
Dept: ORTHOPEDIC SURGERY | Age: 70
End: 2020-02-07
Payer: MEDICARE

## 2020-02-07 VITALS
SYSTOLIC BLOOD PRESSURE: 128 MMHG | DIASTOLIC BLOOD PRESSURE: 60 MMHG | WEIGHT: 231 LBS | HEIGHT: 59 IN | HEART RATE: 63 BPM | BODY MASS INDEX: 46.57 KG/M2

## 2020-02-07 PROCEDURE — 1090F PRES/ABSN URINE INCON ASSESS: CPT | Performed by: PHYSICAL MEDICINE & REHABILITATION

## 2020-02-07 PROCEDURE — 1036F TOBACCO NON-USER: CPT | Performed by: PHYSICAL MEDICINE & REHABILITATION

## 2020-02-07 PROCEDURE — G8484 FLU IMMUNIZE NO ADMIN: HCPCS | Performed by: PHYSICAL MEDICINE & REHABILITATION

## 2020-02-07 PROCEDURE — 3017F COLORECTAL CA SCREEN DOC REV: CPT | Performed by: PHYSICAL MEDICINE & REHABILITATION

## 2020-02-07 PROCEDURE — G8427 DOCREV CUR MEDS BY ELIG CLIN: HCPCS | Performed by: PHYSICAL MEDICINE & REHABILITATION

## 2020-02-07 PROCEDURE — 99213 OFFICE O/P EST LOW 20 MIN: CPT | Performed by: PHYSICAL MEDICINE & REHABILITATION

## 2020-02-07 PROCEDURE — G8399 PT W/DXA RESULTS DOCUMENT: HCPCS | Performed by: PHYSICAL MEDICINE & REHABILITATION

## 2020-02-07 PROCEDURE — G8417 CALC BMI ABV UP PARAM F/U: HCPCS | Performed by: PHYSICAL MEDICINE & REHABILITATION

## 2020-02-07 PROCEDURE — 1123F ACP DISCUSS/DSCN MKR DOCD: CPT | Performed by: PHYSICAL MEDICINE & REHABILITATION

## 2020-02-07 PROCEDURE — 4040F PNEUMOC VAC/ADMIN/RCVD: CPT | Performed by: PHYSICAL MEDICINE & REHABILITATION

## 2020-02-07 NOTE — PROGRESS NOTES
Follow up: Lenin Kemp  1950  [id number]         Chief Complaint   Patient presents with    Lower Back Pain     TR CT LSP         HISTORY OF PRESENT ILLNESS:  Ms. Quoc Rodas is a 77 y.o. female returns for a follow up visit for multiple medical problems. Her current presenting problems are   1. Spondylosis without myelopathy or radiculopathy, lumbar region    2. Left hip pain    3. Degenerative disc disease, lumbar    . As per information/history obtained from the PADT(patient assessment and documentation tool) - She complains of pain in the lower back with radiation to the buttocks, hips Right, upper leg Right, knees Right and groin Bilateral She rates the pain 6/10 and describes it as throbbing. Pain is made worse by: movement. She denies side effects from the current pain regimen. Patient reports that since the last follow up visit the physical functioning is unchanged, family/social relationships are unchanged, mood is unchanged and sleep patterns are unchanged, and that the overall functioning is unchanged. Patient denies neurological bowel or bladder. Ms. Quoc Rodas presents today for follow up of her ongoing low back and right leg pain. She states at last visit having right groin pain as well and is now having left sided groin pain, ongoing for 2 days. She denies any new injuries or traumas to her back or leg but feels she may have \"pulled something\" while doing her exercisew to cause onset of left groin pain. She recently underwent a CT of her lumbar spine on 1/25/20 and is here today for results. She today her pain level today to be 6/10. Associated signs and symptoms:   Neurogenic bowel or bladder symptoms:  no   Perceived weakness:  yes   Difficulty walking:  yes              Past Medical History:   Past Medical History:   Diagnosis Date    Anemia     Anesthesia     trouble after egd 10/2016.   Anxiety and severe tremors after AF ablation 8/2018-responded well to Ativan    Anesthesia complication     flailing, yelling when waking up from anesthesia-ativan helps ([id number]/29/19: ativans works within seconds of administration)    Atrial fibrillation ([address] 10/01/2017    A. fib with SVR    CAD (coronary artery disease)     Chest pain 10/01/2017    Chronic kidney disease     ? ??    Depression     GERD (gastroesophageal reflux disease)     Glaucoma     Hiatal hernia     Hyperlipidemia     Hypertension     Migraines, neuralgic     Morbid obesity (HCC)     Osteoarthritis     Sleep apnea     uses CPAP    Type II or unspecified type diabetes mellitus without mention of complication, not stated as uncontrolled     Unspecified sleep apnea     uses cpap    Urinary incontinence     UTI (urinary tract infection)       Past Surgical History:     Past Surgical History:   Procedure Laterality Date    ABLATION OF DYSRHYTHMIC FOCUS      BREAST LUMPECTOMY      CARDIAC CATHETERIZATION      CARDIAC SURGERY  2/22/1999    quadruple by-pass, [facility]    COLONOSCOPY      COLONOSCOPY  10/08/2018    1 polyp removed    COLONOSCOPY N/A 10/8/2018    COLONOSCOPY POLYPECTOMY SNARE/COLD BIOPSY performed by Moris Haro MD at [facility]    COSMETIC SURGERY      face lift    ENTEROSCOPY N/A 2/26/2019    ENTEROSCOPY performed by Moris [facility]o MD at [facility] 15  12/20/11    hiatal hernia repair    GASTRIC BAND REMOVAL  11/03/2016    laparoscopic     HAMMER TOE SURGERY      [facility] INJECTION PROCEDURE FOR SACROILIAC JOINT Left 12/17/2018    SACROILIAC JOINT INJECTION ON THE LEFT WITH FLUOROSCOPY performed by Reeves Goltz, MD at [address]    both   [address]    Left and Right knees, [facility]    KNEE ARTHROSCOPY      1982 left    ORIF HUMERUS DECOMPRESSION Left 2/25/2016    OPEN REDUCTION INTERNAL FIXATION LEFT PROXIMAL HUMERUS    lumbar paraspinals  Bursal tenderness No tenderness bilaterally  There is no paraspinal spasm. · Range of Motion: limited by 25% in all planes due to pain  · Strength:   Strength testing is 5/5 in all muscle groups tested. · Special Tests:   Straight leg raise and crossed SLR negative. · Skin: There are no rashes, ulcerations or lesions. · Reflexes: Reflexes are symmetrically 2+ at the patellar and ankle tendons. Clonus absent bilaterally at the feet. · Gait & station: uses a single point cane to ambulate and no ataxia  · Additional Examinations:  · RIGHT LOWER EXTREMITY: Inspection/examination of the right lower extremity does not show any tenderness, deformity or injury. Range of motion is normal and pain-free. There is no gross instability. There are no rashes, ulcerations or lesions. Strength and tone are normal. No atrophy or abnormal movements are noted. · LEFT LOWER EXTREMITY:  Inspection/examination of the left lower extremity does not show any tenderness, deformity or injury. Range of motion is normal and pain-free. There is no gross instability. There are no rashes, ulcerations or lesions. Strength and tone are normal. No atrophy or abnormal movements are noted. Diagnostic Testing:    CT lumbar spine shows 1/25/20:  Impression   Moderate spondylosis similar to 2017. Results for orders placed or performed during the hospital encounter of 12/19/19   Creatinine, Serum   Result Value Ref Range    CREATININE 1.0 0.6 - 1.2 mg/dL    GFR Non-African American 55 (A) >60    GFR  >60 >60     Impression:       1. Spondylosis without myelopathy or radiculopathy, lumbar region    2. Left hip pain    3. Degenerative disc disease, lumbar        Plan:  Clinical Course: Low back pain is improving, Left groin pain is worsening    I discussed the diagnosis and the treatment options with Faye Corbett today.      In Summary:  The various treatment options were outlined and discussed with

## 2020-02-11 ENCOUNTER — HOSPITAL ENCOUNTER (OUTPATIENT)
Dept: PHYSICAL THERAPY | Age: 70
Setting detail: THERAPIES SERIES
Discharge: HOME | End: 2020-02-11
Payer: MEDICARE

## 2020-02-11 PROCEDURE — 97110 THERAPEUTIC EXERCISES: CPT

## 2020-02-11 PROCEDURE — 97140 MANUAL THERAPY 1/> REGIONS: CPT

## 2020-02-11 NOTE — FLOWSHEET NOTE
proper ROM for normal function with self care, mobility, lifting and ambulation. Modalities:   MH 10'/ seated      Charges:  Timed Code Treatment Minutes: 45   Total Treatment Minutes: 60 min       [] EVAL (LOW) 83060 (typically 20 minutes face-to-face)  [] EVAL (MOD) 45259 (typically 30 minutes face-to-face)  [] EVAL (HIGH) 24381 (typically 45 minutes face-to-face)  [] RE-EVAL     [x] PS(25068) x  1   [] IONTO (40482)  [x] NMR (25583) x   [] VASO (17354)  [x] Manual (39852) x    2 [] Other:  [] TA (99982)x     [] Mech Traction (31702)  [] ES(attended) (72331)     [] ES (un) (42578):       Goals:   Patient stated goal: \"gain more strength and stamina\" \"easier walking\"  [] Progressing: [] Met: [] Not Met: [] Adjusted    Therapist goals for Patient:   Short Term Goals: To be achieved in: 2 weeks  1. Independent in HEP and progression per patient tolerance, in order to prevent re-injury. [] Progressing: [] Met: [x] Not Met: [] Adjusted  2. Patient will have a decrease in pain to facilitate improvement in movement, function, and ADLs as indicated by Functional Deficits. [] Progressing: [] Met: [] Not Met: [] Adjusted    Long Term Goals: To be achieved in: 8 weeks  1. Disability index score of 35% or less for the GRIS to assist with reaching prior level of function. [] Progressing: [] Met: [x] Not Met: [] Adjusted  2. Patient will demonstrate increased AROM to WNL, good LS mobility, good hip ROM to allow for proper joint functioning as indicated by patients Functional Deficits. [] Progressing: [] Met: [x] Not Met: [] Adjusted  3. Patient will demonstrate an increase in Strength to good proximal hip and core activation to allow for proper functional mobility as indicated by patients Functional Deficits. [] Progressing: [] Met: [x] Not Met: [] Adjusted  4. Patient will return to all functional activities without increased symptoms or restriction. [] Progressing: [] Met: [] Not Met: [] Adjusted  5.  Pt will ambulate with a normalized gait pattern with LRAD    [] Progressing: [] Met: [x] Not Met: [] Adjusted    Progression Towards Functional goals:  [] Patient is progressing as expected towards functional goals listed. [] Progression is slowed due to complexities listed. [] Progression has been slowed due to co-morbidities. [x] Plan just implemented, too soon to assess goals progression  [] Other:     ASSESSMENT:  Pt unable to tolerate standing exercises d/t increases in dizziness. Pt reports blood sugar was 130 (normal range for her) and BP was taken at 170/62 (normal range for her). Standing progressions with held, pt  came to pick her up d/t symptoms. Treatment/Activity Tolerance:  [x] Patient tolerated treatment well [] Patient limited by fatique  [] Patient limited by pain  [] Patient limited by other medical complications  [] Other:     Overall Progression Towards Functional goals/ Treatment Progress Update:  [] Patient is progressing as expected towards functional goals listed. [] Progression is slowed due to complexities/Impairments listed. [] Progression has been slowed due to co-morbidities. [x] Plan just implemented, too soon to assess goals progression <30days   [] Goals require adjustment due to lack of progress  [] Patient is not progressing as expected and requires additional follow up with physician  [] Other:    Prognosis for POC: [x] Good [] Fair  [] Poor    Patient requires continued skilled intervention: [x] Yes  [] No        PLAN: Increase core stability/ hip strength. sin on Friday, injection? [x] Continue per plan of care [] Alter current plan (see comments)  [] Plan of care initiated [] Hold pending MD visit [] Discharge    Electronically signed by: Liz Cabral, PT    Note: If patient does not return for scheduled/recommended follow up visits, this note will serve as a discharge from care along with the most recent update on progress.

## 2020-02-13 ENCOUNTER — APPOINTMENT (OUTPATIENT)
Dept: PHYSICAL THERAPY | Age: 70
End: 2020-02-13
Payer: MEDICARE

## 2020-02-14 ENCOUNTER — HOSPITAL ENCOUNTER (OUTPATIENT)
Age: 70
Discharge: HOME | End: 2020-02-14
Payer: MEDICARE

## 2020-02-14 LAB
BANDED NEUTROPHILS RELATIVE PERCENT: 2 % (ref 0–7)
BASOPHILS ABSOLUTE: 0.1 K/UL (ref 0–0.2)
BASOPHILS RELATIVE PERCENT: 1 %
CHOLESTEROL, TOTAL: 142 MG/DL (ref 0–199)
EOSINOPHILS ABSOLUTE: 0.4 K/UL (ref 0–0.6)
EOSINOPHILS RELATIVE PERCENT: 3 %
HCT VFR BLD CALC: 36 % (ref 36–48)
HDLC SERPL-MCNC: 37 MG/DL (ref 40–60)
HEMOGLOBIN: 12 G/DL (ref 12–16)
LDL CHOLESTEROL CALCULATED: 51 MG/DL
LYMPHOCYTES ABSOLUTE: 3.2 K/UL (ref 1–5.1)
LYMPHOCYTES RELATIVE PERCENT: 27 %
MCH RBC QN AUTO: 31 PG (ref 26–34)
MCHC RBC AUTO-ENTMCNC: 33.3 G/DL (ref 31–36)
MCV RBC AUTO: 93.1 FL (ref 80–100)
MONOCYTES ABSOLUTE: 1.3 K/UL (ref 0–1.3)
MONOCYTES RELATIVE PERCENT: 11 %
NEUTROPHILS ABSOLUTE: 7 K/UL (ref 1.7–7.7)
NEUTROPHILS RELATIVE PERCENT: 56 %
PDW BLD-RTO: 13.9 % (ref 12.4–15.4)
PLATELET # BLD: 270 K/UL (ref 135–450)
PLATELET SLIDE REVIEW: ADEQUATE
PMV BLD AUTO: 9 FL (ref 5–10.5)
RBC # BLD: 3.87 M/UL (ref 4–5.2)
RBC # BLD: NORMAL 10*6/UL
REASON FOR REJECTION: NORMAL
REJECTED TEST: NORMAL
SLIDE REVIEW: ABNORMAL
TRIGL SERPL-MCNC: 269 MG/DL (ref 0–150)
TSH SERPL DL<=0.05 MIU/L-ACNC: 1.53 UIU/ML (ref 0.27–4.2)
VLDLC SERPL CALC-MCNC: 54 MG/DL
WBC # BLD: 12 K/UL (ref 4–11)

## 2020-02-14 PROCEDURE — 36415 COLL VENOUS BLD VENIPUNCTURE: CPT

## 2020-02-14 PROCEDURE — 84443 ASSAY THYROID STIM HORMONE: CPT

## 2020-02-14 PROCEDURE — 80061 LIPID PANEL: CPT

## 2020-02-14 PROCEDURE — 85025 COMPLETE CBC W/AUTO DIFF WBC: CPT

## 2020-02-17 ENCOUNTER — HOSPITAL ENCOUNTER (OUTPATIENT)
Age: 70
Discharge: HOME | End: 2020-02-17
Payer: MEDICARE

## 2020-02-17 LAB
A/G RATIO: 1.4 (ref 1.1–2.2)
ALBUMIN SERPL-MCNC: 4.4 G/DL (ref 3.4–5)
ALP BLD-CCNC: 60 U/L (ref 40–129)
ALT SERPL-CCNC: 30 U/L (ref 10–40)
ANION GAP SERPL CALCULATED.3IONS-SCNC: 15 MMOL/L (ref 3–16)
AST SERPL-CCNC: 25 U/L (ref 15–37)
BILIRUB SERPL-MCNC: 0.3 MG/DL (ref 0–1)
BUN BLDV-MCNC: 16 MG/DL (ref 7–20)
CALCIUM SERPL-MCNC: 9.5 MG/DL (ref 8.3–10.6)
CHLORIDE BLD-SCNC: 92 MMOL/L (ref 99–110)
CO2: 23 MMOL/L (ref 21–32)
CREAT SERPL-MCNC: 0.7 MG/DL (ref 0.6–1.2)
GFR AFRICAN AMERICAN: >60
GFR NON-AFRICAN AMERICAN: >60
GLOBULIN: 3.2 G/DL
GLUCOSE BLD-MCNC: 155 MG/DL (ref 70–99)
POTASSIUM SERPL-SCNC: 4.9 MMOL/L (ref 3.5–5.1)
SODIUM BLD-SCNC: 130 MMOL/L (ref 136–145)
TOTAL PROTEIN: 7.6 G/DL (ref 6.4–8.2)

## 2020-02-17 PROCEDURE — 36415 COLL VENOUS BLD VENIPUNCTURE: CPT

## 2020-02-17 PROCEDURE — 80053 COMPREHEN METABOLIC PANEL: CPT

## 2020-02-18 ENCOUNTER — APPOINTMENT (OUTPATIENT)
Dept: PHYSICAL THERAPY | Age: 70
End: 2020-02-18
Payer: MEDICARE

## 2020-02-19 ENCOUNTER — OFFICE VISIT (OUTPATIENT)
Dept: ORTHOPEDIC SURGERY | Age: 70
End: 2020-02-19
Payer: MEDICARE

## 2020-02-19 VITALS
HEART RATE: 67 BPM | BODY MASS INDEX: 45.16 KG/M2 | HEIGHT: 59 IN | DIASTOLIC BLOOD PRESSURE: 57 MMHG | SYSTOLIC BLOOD PRESSURE: 123 MMHG | WEIGHT: 224 LBS

## 2020-02-19 PROCEDURE — G8417 CALC BMI ABV UP PARAM F/U: HCPCS | Performed by: ORTHOPAEDIC SURGERY

## 2020-02-19 PROCEDURE — 1036F TOBACCO NON-USER: CPT | Performed by: ORTHOPAEDIC SURGERY

## 2020-02-19 PROCEDURE — 99213 OFFICE O/P EST LOW 20 MIN: CPT | Performed by: ORTHOPAEDIC SURGERY

## 2020-02-19 PROCEDURE — G8427 DOCREV CUR MEDS BY ELIG CLIN: HCPCS | Performed by: ORTHOPAEDIC SURGERY

## 2020-02-19 PROCEDURE — G8484 FLU IMMUNIZE NO ADMIN: HCPCS | Performed by: ORTHOPAEDIC SURGERY

## 2020-02-19 PROCEDURE — 3017F COLORECTAL CA SCREEN DOC REV: CPT | Performed by: ORTHOPAEDIC SURGERY

## 2020-02-19 PROCEDURE — G8399 PT W/DXA RESULTS DOCUMENT: HCPCS | Performed by: ORTHOPAEDIC SURGERY

## 2020-02-19 PROCEDURE — 1123F ACP DISCUSS/DSCN MKR DOCD: CPT | Performed by: ORTHOPAEDIC SURGERY

## 2020-02-19 PROCEDURE — 1090F PRES/ABSN URINE INCON ASSESS: CPT | Performed by: ORTHOPAEDIC SURGERY

## 2020-02-19 PROCEDURE — 4040F PNEUMOC VAC/ADMIN/RCVD: CPT | Performed by: ORTHOPAEDIC SURGERY

## 2020-02-19 NOTE — PROGRESS NOTES
Sonny Pratt MD  [address] [place] [address]    History of Present Illness:  Chief Complaint   Patient presents with    Hip Pain     New, LT hip pain for 2 weeks, Rod Clark is a 77 y.o. female here for evaluation of left hip pain that has persisted constantly for the past 2 weeks. Patient was referred here for orthopaedic evaluation by Dr. Sid Green. She rates today's pain a 5/10 in severity. She complains of pain in the lower back and inguinal area that radiates down her left leg. She states that the hip feels like it will \"kick out\" at times. She states the pain is aggravated further by walking and standing for long periods of time. She had previously been doing a course of physical therapy and epidural injections for her lower back, but states that this was stopped due to her history of vertigo. She also has a previous history of sciatica and spondylosis of the lumbar spine and had a CT scan performed on 1/25/2020.     Pain Assessment  Location of Pain: Pelvis  Location Modifiers: Left  Severity of Pain: 5  Quality of Pain: Dull, Aching  Duration of Pain: Persistent  Frequency of Pain: Constant  Aggravating Factors: Walking, Standing  Limiting Behavior: Yes  Relieving Factors: Rest  Result of Injury: No  Work-Related Injury: No  Are there other pain locations you wish to document?: No    Medication Review:  Current Outpatient Medications   Medication Sig Dispense Refill    METHOCARBAMOL PO Take by mouth      ferrous gluconate (FERGON) 225 (27 Fe) MG tablet Take 240 mg by mouth 2 times daily      potassium chloride (KLOR-CON M) 20 MEQ extended release tablet Take 1 tablet by mouth daily 90 tablet 2    amLODIPine (NORVASC) 5 MG tablet Take 1 tablet by mouth every evening 90 tablet 3    tiZANidine (ZANAFLEX) 4 MG tablet Take 4 mg by mouth every 6 hours as needed (ONLY TAKES AT HS)       metoprolol tartrate (LOPRESSOR) 25 MG tablet Take 12.5 mg by mouth as needed (FOR A-FIB)       aspirin 81 MG tablet Take 81 mg by mouth daily      furosemide (LASIX) 40 MG tablet Take 1 tablet by mouth daily 30 tablet 5    isosorbide mononitrate (ISMO;MONOKET) 10 MG tablet Take 5 mg by mouth 2 times daily      Ascorbic Acid (VITAMIN C) 1000 MG tablet Take 1,000 mg by mouth daily      Cyanocobalamin (VITAMIN B-12 PO) Take 3,000 mcg by mouth daily      estradiol (ESTRACE) 0.1 MG/GM vaginal cream Place 2 g vaginally See Admin Instructions Twice a week      Cranberry 450 MG CAPS Take by mouth 2 times daily      pioglitazone (ACTOS) 15 MG tablet Take 15 mg by mouth daily      vitamin D (CHOLECALCIFEROL) 1000 UNIT TABS tablet Take 1,000 Units by mouth daily       spironolactone (ALDACTONE) 25 MG tablet Take 25 mg by mouth daily In pm      dexlansoprazole (DEXILANT) 60 MG CPDR delayed release capsule Take 60 mg by mouth daily      gabapentin (NEURONTIN) 100 MG capsule Take 400 mg by mouth 3 times daily. Sumi Medeiros TraZODone HCl (DESYREL PO) Take 50 mg by mouth nightly       losartan-hydrochlorothiazide (HYZAAR) 100-25 MG per tablet TK 1 T PO QAM  3    Calcium Citrate-Vitamin D (CALCIUM CITRATE + D PO) Take 2 tablets by mouth daily      SLOW-MAG 71.5-119 MG TBEC tablet Take 1 tablet by mouth daily 143 mg  5    fexofenadine (ALLEGRA) 180 MG tablet Take 180 mg by mouth daily      rosuvastatin (CRESTOR) 40 MG tablet Take 20 mg by mouth every evening       metFORMIN (GLUCOPHAGE) 500 MG tablet Take 500 mg by mouth 2 times daily (with meals)       ezetimibe (ZETIA) 10 MG tablet Take 5 mg by mouth nightly       escitalopram (LEXAPRO) 20 MG tablet Take 20 mg by mouth daily.  nitroGLYCERIN (NITROLINGUAL) 0.4 MG/SPRAY spray Place 1 spray under the tongue every 5 minutes as needed.  As directed for chest pain        No current facility-administered medications for this FLUOROSCOPY performed by Edison Robles MD at [facility]    Left and Right knees, [facility]    KNEE ARTHROSCOPY      1982 left    ORIF HUMERUS DECOMPRESSION Left 2/25/2016    OPEN REDUCTION INTERNAL FIXATION LEFT PROXIMAL HUMERUS    OVARY REMOVAL Bilateral 1997    DE NJX DX/THER SBST INTRLMNR CRV/THRC W/IMG GDN N/A 11/5/2018    MIDLINE L4/L5 LUMBAR INTERLAMINAR EPIDURAL STEROID INJECTION WITH FLUOROSCOPY performed by Edison Robles MD at [address] [address] ENDOSCOPY  10/30/15    UPPER GASTROINTESTINAL ENDOSCOPY  10/14/2016    with biopsy    VENTRAL HERNIA REPAIR N/A 10/29/2019    OPEN VENTRAL HERNIA REPAIR WITH  MESH performed by Yamilex Perez MD at [address], social and family histories, and medications were reviewed and updated as appropriate. General Exam:  Vital Signs:  BP (!) 123/57   Pulse 67   Ht 4' 11\" (1.499 m)   Wt 224 lb (101.6 kg)   BMI 45.24 kg/m²    Constitutional: Patient is adequately groomed with no evidence of malnutrition. Mental Status: The patient is oriented to time, place and person. The patient's mood and affect are appropriate. Lymphatic: The lymphatic examination bilaterally reveals all areas to be without enlargement or induration. Vascular: Examination reveals no swelling or calf tenderness. 2+ palpable pulses distally. Neurological: The patient has good coordination. There is no focal weakness or sensory deficit. Focal examination of left hip is as follows: Inspection:  Normal muscle contours and no significant limb length discrepancy. No gross atrophy in any particular myotome. Palpation: No tenderness to the greater trochanter/trochanteric bursa. Moderate tenderness to the sacroiliac joint. Mild tenderness to the knee joint. Range of Motion:    Hip flexion 90°. Hip abduction 30°.      Hip internal

## 2020-02-20 ENCOUNTER — APPOINTMENT (OUTPATIENT)
Dept: PHYSICAL THERAPY | Age: 70
End: 2020-02-20
Payer: MEDICARE

## 2020-02-25 ENCOUNTER — TELEPHONE (OUTPATIENT)
Dept: CARDIOLOGY CLINIC | Age: 70
End: 2020-02-25

## 2020-02-25 NOTE — TELEPHONE ENCOUNTER
Medication Refill    Medication needing refilled:potassium     Doseage of the medication:    How are you taking this medication (QD, BID, TID, QID, PRN):    30 or 90 day supply called in:  90 day     Which Pharmacy are we sending the medication to?:  [facility]

## 2020-02-26 RX ORDER — POTASSIUM CHLORIDE 20 MEQ/1
20 TABLET, EXTENDED RELEASE ORAL DAILY
Qty: 90 TABLET | Refills: 3 | Status: SHIPPED | OUTPATIENT
Start: 2020-02-26

## 2020-02-28 ENCOUNTER — TELEPHONE (OUTPATIENT)
Dept: ORTHOPEDIC SURGERY | Age: 70
End: 2020-02-28

## 2020-02-28 NOTE — TELEPHONE ENCOUNTER
Pt is calling about see if we heard anything about her injection that Dr Vaughan Shall order on 2/19  Please advise.

## 2020-03-04 ENCOUNTER — HOSPITAL ENCOUNTER (OUTPATIENT)
Dept: GENERAL RADIOLOGY | Age: 70
Discharge: HOME | End: 2020-03-04
Payer: MEDICARE

## 2020-03-04 PROCEDURE — 20610 DRAIN/INJ JOINT/BURSA W/O US: CPT

## 2020-03-04 PROCEDURE — 6360000002 HC RX W HCPCS

## 2020-03-04 PROCEDURE — 2500000003 HC RX 250 WO HCPCS

## 2020-03-04 PROCEDURE — 6360000004 HC RX CONTRAST MEDICATION

## 2020-03-04 RX ORDER — LIDOCAINE HYDROCHLORIDE 10 MG/ML
INJECTION, SOLUTION EPIDURAL; INFILTRATION; INTRACAUDAL; PERINEURAL
Status: COMPLETED
Start: 2020-03-04 | End: 2020-03-04

## 2020-03-04 RX ORDER — BETAMETHASONE SODIUM PHOSPHATE AND BETAMETHASONE ACETATE 3; 3 MG/ML; MG/ML
INJECTION, SUSPENSION INTRA-ARTICULAR; INTRALESIONAL; INTRAMUSCULAR; SOFT TISSUE
Status: COMPLETED
Start: 2020-03-04 | End: 2020-03-04

## 2020-03-04 RX ADMIN — BETAMETHASONE SODIUM PHOSPHATE AND BETAMETHASONE ACETATE 6 MG: 3; 3 INJECTION, SUSPENSION INTRA-ARTICULAR; INTRALESIONAL; INTRAMUSCULAR at 14:21

## 2020-03-04 RX ADMIN — IOPAMIDOL 5 ML: 612 INJECTION, SOLUTION INTRATHECAL at 14:22

## 2020-03-04 RX ADMIN — LIDOCAINE HYDROCHLORIDE 8 ML: 10 INJECTION, SOLUTION EPIDURAL; INFILTRATION; INTRACAUDAL; PERINEURAL at 14:23

## 2020-03-10 ENCOUNTER — HOSPITAL ENCOUNTER (OUTPATIENT)
Dept: PHYSICAL THERAPY | Age: 70
Setting detail: THERAPIES SERIES
Discharge: HOME | End: 2020-03-10
Payer: MEDICARE

## 2020-03-10 PROCEDURE — 97110 THERAPEUTIC EXERCISES: CPT | Performed by: SPECIALIST/TECHNOLOGIST

## 2020-03-10 PROCEDURE — 97140 MANUAL THERAPY 1/> REGIONS: CPT | Performed by: SPECIALIST/TECHNOLOGIST

## 2020-03-10 NOTE — FLOWSHEET NOTE
RT hip/ Lt hip PSIS  15 min   B glute med, glute min, piriformis/ STM to Rt side glute/ hip into  ITB Rt side PSIS   Lumbar Manip       SI Manip       Hip belt mobs       Hip LA distraction       Neutral gapping  NPV      NMR re-education        Mf Activation- re-ed       TrA Re-ed activation       Supine alt leg heel taps     Glute Max re-ed activation  10x Prone    B Prone ext      Tandem stance 1        Therapeutic Exercise and NMR EXR  [x] (16270) Provided verbal/tactile cueing for activities related to strengthening, flexibility, endurance, ROM  for improvements in proximal hip and core control with self care, mobility, lifting and ambulation.  [] (76676) Provided verbal/tactile cueing for activities related to improving balance, coordination, kinesthetic sense, posture, motor skill, proprioception  to assist with core control in self care, mobility, lifting, and ambulation.      Therapeutic Activities:    [] (64546 or 42438) Provided verbal/tactile cueing for activities related to improving balance, coordination, kinesthetic sense, posture, motor skill, proprioception and motor activation to allow for proper function  with self care and ADLs  [] (47954) Provided training and instruction to the patient for proper core and proximal hip recruitment and positioning with ambulation re-education     Home Exercise Program:    [x] (74202) Reviewed/Progressed HEP activities related to strengthening, flexibility, endurance, ROM of core, proximal hip and LE for functional self-care, mobility, lifting and ambulation   [] (09114) Reviewed/Progressed HEP activities related to improving balance, coordination, kinesthetic sense, posture, motor skill, proprioception of core, proximal hip and LE for self care, mobility, lifting, and ambulation      Manual Treatments:  PROM / STM / Oscillations-Mobs:  G-I, II, III, IV (PA's, Inf., Post.)  [x] (62359) Provided manual therapy to mobilize proximal hip and LS spine soft tissue/joints Progressing: [] Met: [x] Not Met: [] Adjusted  4. Patient will return to all functional activities without increased symptoms or restriction. [] Progressing: [] Met: [x] Not Met: [] Adjusted  5. Pt will ambulate with a normalized gait pattern with LRAD    [] Progressing: [x] Met: [] Not Met: [] Adjusted    Progression Towards Functional goals:  [] Patient is progressing as expected towards functional goals listed. [] Progression is slowed due to complexities listed. [] Progression has been slowed due to co-morbidities. [x] Plan just implemented, too soon to assess goals progression  [] Other:     ASSESSMENT:  Pt. Reports having some increased soreness over her Rt SI joint w/  sciatic leg pain. Pt reports difficulty performing PPT and maintaining good posture for seated and supine posture. Left hip and LB are doing better subjectively  But continue to have weakness and deficits. Treatment/Activity Tolerance:  [x] Patient tolerated treatment well [] Patient limited by fatique  [] Patient limited by pain  [] Patient limited by other medical complications  [] Other:     Overall Progression Towards Functional goals/ Treatment Progress Update:  [] Patient is progressing as expected towards functional goals listed. [] Progression is slowed due to complexities/Impairments listed. [] Progression has been slowed due to co-morbidities.   [] Plan just implemented, too soon to assess goals progression <30days   [] Goals require adjustment due to lack of progress  [] Patient is not progressing as expected and requires additional follow up with physician  [x] Other: New RX for RT hip/ OA  Prognosis for POC: [x] Good [] Fair  [] Poor    Patient requires continued skilled intervention:   [] Yes  [x] No        PLAN:  [] Continue per plan of care [] Alter current plan (see comments)  [] Plan of care initiated [] Hold pending MD visit  [x] Discharge due to new Rx for Rt hip  Electronically signed by: Delia Moody PTA,

## 2020-03-12 ENCOUNTER — APPOINTMENT (OUTPATIENT)
Dept: PHYSICAL THERAPY | Age: 70
End: 2020-03-12
Payer: MEDICARE

## 2020-03-12 ENCOUNTER — HOSPITAL ENCOUNTER (OUTPATIENT)
Dept: PHYSICAL THERAPY | Age: 70
Setting detail: THERAPIES SERIES
Discharge: HOME | End: 2020-03-12
Payer: MEDICARE

## 2020-03-12 PROCEDURE — 97110 THERAPEUTIC EXERCISES: CPT

## 2020-03-12 PROCEDURE — 97140 MANUAL THERAPY 1/> REGIONS: CPT

## 2020-03-12 PROCEDURE — 97164 PT RE-EVAL EST PLAN CARE: CPT

## 2020-03-12 NOTE — FLOWSHEET NOTE
Oscillations-Mobs:  G-I, II, III, IV (PA's, Inf., Post.)  [x] (52964) Provided manual therapy to mobilize proximal hip and LS spine soft tissue/joints for the purpose of modulating pain, promoting relaxation,  increasing ROM, reducing/eliminating soft tissue swelling/inflammation/restriction, improving soft tissue extensibility and allowing for proper ROM for normal function with self care, mobility, lifting and ambulation. Modalities:   MH 10'/ seated      Charges:  Timed Code Treatment Minutes: 45   Total Treatment Minutes:      55 min       [] EVAL (LOW) 23011 (typically 20 minutes face-to-face)  [] EVAL (MOD) 12469 (typically 30 minutes face-to-face)  [] EVAL (HIGH) 50304 (typically 45 minutes face-to-face)  [] RE-EVAL     [x] ZB(82556) x  1   [] IONTO (81143)  [] NMR (98341) x   [] VASO (98451)  [x] Manual (09718) x    2 [] Other:  [] TA (27030)x     [] Mech Traction (81107)  [] ES(attended) (35683)     [] ES (un) (19753):       Goals:   Patient stated goal: \"gain more strength and stamina\" \"easier walking\"  [] Progressing: [] Met: [] Not Met: [] Adjusted    Therapist goals for Patient:   Short Term Goals: To be achieved in: 2 weeks  1. Independent in HEP and progression per patient tolerance, in order to prevent re-injury. [] Progressing: [] Met: [x] Not Met: [] Adjusted  2. Patient will have a decrease in pain to facilitate improvement in movement, function, and ADLs as indicated by Functional Deficits. [] Progressing: [] Met: [x] Not Met: [] Adjusted    Long Term Goals: To be achieved in: 8 weeks  1. Disability index score of 35% or less for the GRIS to assist with reaching prior level of function. [] Progressing: [] Met: [x] Not Met: [] Adjusted  2. Patient will demonstrate increased AROM to WNL, good LS mobility, good hip ROM to allow for proper joint functioning as indicated by patients Functional Deficits. [] Progressing: [] Met: [x] Not Met: [] Adjusted  3.  Patient will demonstrate an increase

## 2020-03-17 ENCOUNTER — APPOINTMENT (OUTPATIENT)
Dept: PHYSICAL THERAPY | Age: 70
End: 2020-03-17
Payer: MEDICARE

## 2020-03-18 ENCOUNTER — APPOINTMENT (OUTPATIENT)
Dept: PHYSICAL THERAPY | Age: 70
End: 2020-03-18
Payer: MEDICARE

## 2020-03-18 ENCOUNTER — HOSPITAL ENCOUNTER (OUTPATIENT)
Dept: PHYSICAL THERAPY | Age: 70
Setting detail: THERAPIES SERIES
End: 2020-03-18
Payer: MEDICARE

## 2020-03-20 ENCOUNTER — APPOINTMENT (OUTPATIENT)
Dept: PHYSICAL THERAPY | Age: 70
End: 2020-03-20
Payer: MEDICARE

## 2020-03-23 ENCOUNTER — NURSE ONLY (OUTPATIENT)
Dept: CARDIOLOGY CLINIC | Age: 70
End: 2020-03-23
Payer: MEDICARE

## 2020-03-23 PROCEDURE — 93294 REM INTERROG EVL PM/LDLS PM: CPT | Performed by: INTERNAL MEDICINE

## 2020-03-23 PROCEDURE — 93296 REM INTERROG EVL PM/IDS: CPT | Performed by: INTERNAL MEDICINE

## 2020-03-23 NOTE — LETTER
[address] [facility] [phone]  [facility] [phone]  [facility] [address] [facility] [phone]    Pacemaker/Defibrillator Clinic          03/23/20        Travis Miramontes  [address]  [facility] [address]        Dear Travis Miramontes    This letter is to inform you that we received the transmission from your monitor at home that checks your implanted heart device. The next date your monitor will automatically transmit will be 6-24-20. If your report needs attention we will notify you. Your device and monitor are wireless and most transmit cellularly, but please periodically check your monitor is still plugged in to the electrical outlet. If you still use the telephone land line to send please ensure the connection to the phone cynthia is secure. This will help to ensure successful automatic transmissions in the future. Also, the monitor needs to be close to you while sleeping at night. Please be aware that the remote device transmission sites are periodically monitored only during regular business hours during which simultaneous in-office device clinics are being run. If your transmission requires attention, we will contact you as soon as possible. Thank you.             [facility]

## 2020-03-24 ENCOUNTER — APPOINTMENT (OUTPATIENT)
Dept: PHYSICAL THERAPY | Age: 70
End: 2020-03-24
Payer: MEDICARE

## 2020-03-27 ENCOUNTER — APPOINTMENT (OUTPATIENT)
Dept: PHYSICAL THERAPY | Age: 70
End: 2020-03-27
Payer: MEDICARE

## 2020-04-22 ENCOUNTER — TELEPHONE (OUTPATIENT)
Dept: BARIATRICS/WEIGHT MGMT | Age: 70
End: 2020-04-22

## 2020-05-22 ENCOUNTER — HOSPITAL ENCOUNTER (OUTPATIENT)
Age: 70
Discharge: HOME | End: 2020-05-22
Payer: MEDICARE

## 2020-05-22 LAB
A/G RATIO: 1.4 (ref 1.1–2.2)
ALBUMIN SERPL-MCNC: 4.3 G/DL (ref 3.4–5)
ALP BLD-CCNC: 55 U/L (ref 40–129)
ALT SERPL-CCNC: 34 U/L (ref 10–40)
ANION GAP SERPL CALCULATED.3IONS-SCNC: 14 MMOL/L (ref 3–16)
AST SERPL-CCNC: 30 U/L (ref 15–37)
BASOPHILS ABSOLUTE: 0.1 K/UL (ref 0–0.2)
BASOPHILS RELATIVE PERCENT: 0.7 %
BILIRUB SERPL-MCNC: 0.3 MG/DL (ref 0–1)
BUN BLDV-MCNC: 21 MG/DL (ref 7–20)
CALCIUM SERPL-MCNC: 9.6 MG/DL (ref 8.3–10.6)
CHLORIDE BLD-SCNC: 100 MMOL/L (ref 99–110)
CHOLESTEROL, TOTAL: 109 MG/DL (ref 0–199)
CO2: 23 MMOL/L (ref 21–32)
CREAT SERPL-MCNC: 0.7 MG/DL (ref 0.6–1.2)
EOSINOPHILS ABSOLUTE: 0.2 K/UL (ref 0–0.6)
EOSINOPHILS RELATIVE PERCENT: 2.2 %
GFR AFRICAN AMERICAN: >60
GFR NON-AFRICAN AMERICAN: >60
GLOBULIN: 3.1 G/DL
GLUCOSE BLD-MCNC: 133 MG/DL (ref 70–99)
HCT VFR BLD CALC: 34.3 % (ref 36–48)
HDLC SERPL-MCNC: 36 MG/DL (ref 40–60)
HEMOGLOBIN: 11.3 G/DL (ref 12–16)
LDL CHOLESTEROL CALCULATED: 47 MG/DL
LYMPHOCYTES ABSOLUTE: 2.1 K/UL (ref 1–5.1)
LYMPHOCYTES RELATIVE PERCENT: 29.2 %
MCH RBC QN AUTO: 31.1 PG (ref 26–34)
MCHC RBC AUTO-ENTMCNC: 33 G/DL (ref 31–36)
MCV RBC AUTO: 94.4 FL (ref 80–100)
MONOCYTES ABSOLUTE: 0.8 K/UL (ref 0–1.3)
MONOCYTES RELATIVE PERCENT: 10.8 %
NEUTROPHILS ABSOLUTE: 4.2 K/UL (ref 1.7–7.7)
NEUTROPHILS RELATIVE PERCENT: 57.1 %
PDW BLD-RTO: 14 % (ref 12.4–15.4)
PLATELET # BLD: 279 K/UL (ref 135–450)
PMV BLD AUTO: 8.4 FL (ref 5–10.5)
POTASSIUM SERPL-SCNC: 5.2 MMOL/L (ref 3.5–5.1)
RBC # BLD: 3.63 M/UL (ref 4–5.2)
SODIUM BLD-SCNC: 137 MMOL/L (ref 136–145)
TOTAL PROTEIN: 7.4 G/DL (ref 6.4–8.2)
TRIGL SERPL-MCNC: 132 MG/DL (ref 0–150)
TSH SERPL DL<=0.05 MIU/L-ACNC: 1.95 UIU/ML (ref 0.27–4.2)
VLDLC SERPL CALC-MCNC: 26 MG/DL
WBC # BLD: 7.3 K/UL (ref 4–11)

## 2020-05-22 PROCEDURE — 36415 COLL VENOUS BLD VENIPUNCTURE: CPT

## 2020-05-22 PROCEDURE — 80053 COMPREHEN METABOLIC PANEL: CPT

## 2020-05-22 PROCEDURE — 80061 LIPID PANEL: CPT

## 2020-05-22 PROCEDURE — 85025 COMPLETE CBC W/AUTO DIFF WBC: CPT

## 2020-05-22 PROCEDURE — 84443 ASSAY THYROID STIM HORMONE: CPT

## 2020-06-04 ENCOUNTER — HOSPITAL ENCOUNTER (OUTPATIENT)
Age: 70
Discharge: HOME | End: 2020-06-04
Payer: MEDICARE

## 2020-06-04 PROCEDURE — 83036 HEMOGLOBIN GLYCOSYLATED A1C: CPT

## 2020-06-04 PROCEDURE — 36415 COLL VENOUS BLD VENIPUNCTURE: CPT

## 2020-06-05 LAB
ESTIMATED AVERAGE GLUCOSE: 159.9 MG/DL
HBA1C MFR BLD: 7.2 %

## 2020-06-24 ENCOUNTER — NURSE ONLY (OUTPATIENT)
Dept: CARDIOLOGY CLINIC | Age: 70
End: 2020-06-24
Payer: MEDICARE

## 2020-06-24 PROCEDURE — 93294 REM INTERROG EVL PM/LDLS PM: CPT | Performed by: INTERNAL MEDICINE

## 2020-06-24 PROCEDURE — 93296 REM INTERROG EVL PM/IDS: CPT | Performed by: INTERNAL MEDICINE

## 2020-06-24 NOTE — PROGRESS NOTES
Carelink transmission shows normal sensing and pacing function. Noted NSVT, pt is on Lopressor. See interrogation for more details.

## 2020-07-14 ENCOUNTER — HOSPITAL ENCOUNTER (OUTPATIENT)
Age: 70
Discharge: HOME | End: 2020-07-14
Payer: MEDICARE

## 2020-07-14 LAB
BASOPHILS ABSOLUTE: 0 K/UL (ref 0–0.2)
BASOPHILS RELATIVE PERCENT: 0.7 %
EOSINOPHILS ABSOLUTE: 0.2 K/UL (ref 0–0.6)
EOSINOPHILS RELATIVE PERCENT: 2.5 %
HCT VFR BLD CALC: 33.4 % (ref 36–48)
HEMOGLOBIN: 11.1 G/DL (ref 12–16)
LYMPHOCYTES ABSOLUTE: 1.8 K/UL (ref 1–5.1)
LYMPHOCYTES RELATIVE PERCENT: 26.3 %
MCH RBC QN AUTO: 30.8 PG (ref 26–34)
MCHC RBC AUTO-ENTMCNC: 33.2 G/DL (ref 31–36)
MCV RBC AUTO: 93 FL (ref 80–100)
MONOCYTES ABSOLUTE: 0.9 K/UL (ref 0–1.3)
MONOCYTES RELATIVE PERCENT: 13.5 %
NEUTROPHILS ABSOLUTE: 4 K/UL (ref 1.7–7.7)
NEUTROPHILS RELATIVE PERCENT: 57 %
PDW BLD-RTO: 13.7 % (ref 12.4–15.4)
PLATELET # BLD: 291 K/UL (ref 135–450)
PMV BLD AUTO: 8.4 FL (ref 5–10.5)
RBC # BLD: 3.59 M/UL (ref 4–5.2)
WBC # BLD: 7 K/UL (ref 4–11)

## 2020-07-14 PROCEDURE — 36415 COLL VENOUS BLD VENIPUNCTURE: CPT

## 2020-07-14 PROCEDURE — 85025 COMPLETE CBC W/AUTO DIFF WBC: CPT

## 2020-08-06 ENCOUNTER — INITIAL CONSULT (OUTPATIENT)
Dept: SURGERY | Age: 70
End: 2020-08-06
Payer: MEDICARE

## 2020-08-06 VITALS
TEMPERATURE: 97.4 F | WEIGHT: 222 LBS | DIASTOLIC BLOOD PRESSURE: 80 MMHG | SYSTOLIC BLOOD PRESSURE: 123 MMHG | BODY MASS INDEX: 44.84 KG/M2

## 2020-08-06 PROCEDURE — 1090F PRES/ABSN URINE INCON ASSESS: CPT | Performed by: SURGERY

## 2020-08-06 PROCEDURE — 99213 OFFICE O/P EST LOW 20 MIN: CPT | Performed by: SURGERY

## 2020-08-06 PROCEDURE — G8399 PT W/DXA RESULTS DOCUMENT: HCPCS | Performed by: SURGERY

## 2020-08-06 PROCEDURE — 1123F ACP DISCUSS/DSCN MKR DOCD: CPT | Performed by: SURGERY

## 2020-08-06 PROCEDURE — 1036F TOBACCO NON-USER: CPT | Performed by: SURGERY

## 2020-08-06 PROCEDURE — G8417 CALC BMI ABV UP PARAM F/U: HCPCS | Performed by: SURGERY

## 2020-08-06 PROCEDURE — G8427 DOCREV CUR MEDS BY ELIG CLIN: HCPCS | Performed by: SURGERY

## 2020-08-06 PROCEDURE — 3017F COLORECTAL CA SCREEN DOC REV: CPT | Performed by: SURGERY

## 2020-08-06 PROCEDURE — 4040F PNEUMOC VAC/ADMIN/RCVD: CPT | Performed by: SURGERY

## 2020-08-12 ENCOUNTER — TELEPHONE (OUTPATIENT)
Dept: SURGERY | Age: 70
End: 2020-08-12

## 2020-08-14 ENCOUNTER — HOSPITAL ENCOUNTER (OUTPATIENT)
Age: 70
Discharge: HOME | End: 2020-08-14
Payer: MEDICARE

## 2020-08-14 LAB
ALBUMIN SERPL-MCNC: 4.4 G/DL (ref 3.4–5)
ALP BLD-CCNC: 58 U/L (ref 40–129)
ALT SERPL-CCNC: 32 U/L (ref 10–40)
ANION GAP SERPL CALCULATED.3IONS-SCNC: 11 MMOL/L (ref 3–16)
AST SERPL-CCNC: 27 U/L (ref 15–37)
BILIRUB SERPL-MCNC: 0.4 MG/DL (ref 0–1)
BILIRUBIN DIRECT: <0.2 MG/DL (ref 0–0.3)
BILIRUBIN, INDIRECT: NORMAL MG/DL (ref 0–1)
BUN BLDV-MCNC: 28 MG/DL (ref 7–20)
CALCIUM SERPL-MCNC: 10.1 MG/DL (ref 8.3–10.6)
CHLORIDE BLD-SCNC: 94 MMOL/L (ref 99–110)
CO2: 25 MMOL/L (ref 21–32)
CREAT SERPL-MCNC: 0.8 MG/DL (ref 0.6–1.2)
GFR AFRICAN AMERICAN: >60
GFR NON-AFRICAN AMERICAN: >60
GLUCOSE BLD-MCNC: 136 MG/DL (ref 70–99)
MAGNESIUM: 2 MG/DL (ref 1.8–2.4)
PHOSPHORUS: 4.1 MG/DL (ref 2.5–4.9)
POTASSIUM SERPL-SCNC: 5.2 MMOL/L (ref 3.5–5.1)
SODIUM BLD-SCNC: 130 MMOL/L (ref 136–145)
TOTAL PROTEIN: 7.7 G/DL (ref 6.4–8.2)

## 2020-08-14 PROCEDURE — 83735 ASSAY OF MAGNESIUM: CPT

## 2020-08-14 PROCEDURE — 36415 COLL VENOUS BLD VENIPUNCTURE: CPT

## 2020-08-14 PROCEDURE — 80076 HEPATIC FUNCTION PANEL: CPT

## 2020-08-14 PROCEDURE — 80069 RENAL FUNCTION PANEL: CPT

## 2020-08-20 ENCOUNTER — OFFICE VISIT (OUTPATIENT)
Dept: SURGERY | Age: 70
End: 2020-08-20
Payer: MEDICARE

## 2020-08-20 VITALS
DIASTOLIC BLOOD PRESSURE: 68 MMHG | TEMPERATURE: 97.1 F | WEIGHT: 221 LBS | BODY MASS INDEX: 44.64 KG/M2 | SYSTOLIC BLOOD PRESSURE: 126 MMHG

## 2020-08-20 PROCEDURE — 1090F PRES/ABSN URINE INCON ASSESS: CPT | Performed by: SURGERY

## 2020-08-20 PROCEDURE — G8417 CALC BMI ABV UP PARAM F/U: HCPCS | Performed by: SURGERY

## 2020-08-20 PROCEDURE — 1123F ACP DISCUSS/DSCN MKR DOCD: CPT | Performed by: SURGERY

## 2020-08-20 PROCEDURE — 4040F PNEUMOC VAC/ADMIN/RCVD: CPT | Performed by: SURGERY

## 2020-08-20 PROCEDURE — G8399 PT W/DXA RESULTS DOCUMENT: HCPCS | Performed by: SURGERY

## 2020-08-20 PROCEDURE — 99213 OFFICE O/P EST LOW 20 MIN: CPT | Performed by: SURGERY

## 2020-08-20 PROCEDURE — G8427 DOCREV CUR MEDS BY ELIG CLIN: HCPCS | Performed by: SURGERY

## 2020-08-20 PROCEDURE — 1036F TOBACCO NON-USER: CPT | Performed by: SURGERY

## 2020-08-20 PROCEDURE — 3017F COLORECTAL CA SCREEN DOC REV: CPT | Performed by: SURGERY

## 2020-08-27 ENCOUNTER — OFFICE VISIT (OUTPATIENT)
Dept: PRIMARY CARE CLINIC | Age: 70
End: 2020-08-27
Payer: MEDICARE

## 2020-08-27 PROCEDURE — G8417 CALC BMI ABV UP PARAM F/U: HCPCS | Performed by: NURSE PRACTITIONER

## 2020-08-27 PROCEDURE — 99211 OFF/OP EST MAY X REQ PHY/QHP: CPT | Performed by: NURSE PRACTITIONER

## 2020-08-27 PROCEDURE — G8428 CUR MEDS NOT DOCUMENT: HCPCS | Performed by: NURSE PRACTITIONER

## 2020-08-27 NOTE — PATIENT INSTRUCTIONS

## 2020-08-27 NOTE — PROGRESS NOTES
Salvador Eid received a viral test for COVID-19. They were educated on isolation and quarantine as appropriate. For any symptoms, they were directed to seek care from their PCP, given contact information to establish with a doctor, directed to an urgent care or the emergency room.

## 2020-08-28 ENCOUNTER — TELEPHONE (OUTPATIENT)
Dept: CARDIOLOGY CLINIC | Age: 70
End: 2020-08-28

## 2020-08-28 LAB — SARS-COV-2, NAA: NOT DETECTED

## 2020-08-28 NOTE — LETTER
[facility] CARDIOLOGY-[facility]  Dept: [phone]  Dept Fax: [phone]    Gisele Matthews  1950 8/28/20      To Whom It May Concern:      Gisele Matthews has been seen in the electrophysiology office for SSS s/p PPM and atrial fibrillation. She has not had any atrial fibrillation in several months. She is considered acceptable risk for her abdominal surgery and can proceed as scheduled. They already on BB therapy. There are no apparent interventions to ameliorate the cardiac risk. This clinical assessment assumes a full anesthesia/pulmonary/internal medicine evaluation for overall risk of airway management, type and route of anesthetic, and other relevant anesthesia-specific considerations. Discussion about risks, benefits and alternative of procedure per surgical team.       Please contact my office with any further questions or concerns.          Sincerely,        Natalie De La O MD

## 2020-08-28 NOTE — PROGRESS NOTES
Name_______________________________________Printed:____________________  Date and time of surgery________9/1/20 1415________________Arrival Time:______1245 main__________   1. The instructions given regarding when and if a patient needs to stop oral intake prior to surgery varies. Follow the specific instructions you were given                  _x__Nothing to eat or to drink after Midnight the night before.                             ____Endoscopy patient follow your DRS instructions-generally you will be doing a part of the prep after Midnight                   ____Carbo loading or ERAS instructions will be given to select patients-if you have been given those instructions -please do the following                           The evening before your surgery after dinner before midnight drink 40 ounces of gatorade. If you are diabetic use sugar free. The morning of surgery drink 40 ounces of water. This needs to be finished 3 hours prior to your surgery start time. 2. Take the following pills with a small sip of water on the morning of surgery________norvasc, isosorbide___________________________________________                  Do not take blood pressure medications ending in pril or sartan the bhavik prior to surgery or the morning of surgery_   3. Aspirin, Ibuprofen, Advil, Naproxen, Vitamin E and other Anti-inflammatory products and supplements should be stopped for 5 -7days before surgery or as directed by your physician. 4. Check with your Doctor regarding stopping Plavix, Coumadin,Eliquis, Lovenox,Effient,Pradaxa,Xarelto, Fragmin or other blood thinners and follow their instructions. 5. Do not smoke, and do not drink any alcoholic beverages 24 hours prior to surgery. This includes NA Beer. Refrain from the usage of any recreational drugs. 6. You may brush your teeth and gargle the morning of surgery. DO NOT SWALLOW WATER   7.  You MUST make arrangements for a responsible adult to stay on site while you are limited to one in the room at any given time. 18.  Please bring picture ID and insurance card. 19.  Visit our web site for additional information:  [email or web address]              20.During flu season no children under the age of 15 are permitted in the hospital for the safety of all patients. 21. If you take a long acting insulin in the evening only  take half of your usual  dose the night  before your procedure              22. If you use a c-pap please bring DOS if staying overnight,             23.For your convenience [address] [facility] has a pharmacy on site to fill your prescriptions. 24. If you use oxygen and have a portable tank please bring it  with you the DOS             25. Bring a complete list of all your medications with name and dose include any supplements. 26. Other__________________________________________   *Please call pre admission testing if you any further questions   [address] [place] [address]. Airy  [phone]   [address] [place] [address]       All above information reviewed with patient in person or by phone. Patient verbalizes understanding. All questions and concerns addressed. Patient/Rep________pt____________    There is a one visitor policy at [facility] for all surgeries and endoscopies. Whether the visitor can stay or will be asked to wait in the car will depend on the current policy and if social distancing can be maintained. The policy is subject to change at any time. Please make sure the visitor has a cell phone that is on,charged and able to accept calls, as this may be the way that the staff communicates with them. Pain management is NO VISITOR policyThe patients ride is expected to remain in the car with a cell phone for communication. If the ride is leaving the hospital grounds please make sure they are back in time for pickup. Have the patient inform the staff on arrival what their rides plans are while the patient is in the facility. At the MAIN there is one visitor allowed. Please note that the visitor policy is subject to change.                                                                                                                                     PRE OP INSTRUCTIONS

## 2020-08-28 NOTE — TELEPHONE ENCOUNTER
CARDIAC CLEARANCE     What type of procedure are you having? Abdominal surgery repair     Which physician is performing your procedure? Dr. Jameson Gentile    When is your procedure scheduled for?09/01/20    Where are you having this procedure? [facility]    Are you taking Blood Thinners? Baby asprin   If so what? (Name/dose/frequesncy)     Does the surgeon want you to stop your blood thinner? If so for how long?     Phone Number and Contact Name for Physicians Tai Delgadillo    Fax number to send information:

## 2020-09-01 ENCOUNTER — HOSPITAL ENCOUNTER (OUTPATIENT)
Age: 70
Setting detail: OUTPATIENT SURGERY
Discharge: HOME | End: 2020-09-01
Attending: SURGERY | Admitting: SURGERY
Payer: MEDICARE

## 2020-09-01 ENCOUNTER — ANESTHESIA EVENT (OUTPATIENT)
Dept: OPERATING ROOM | Age: 70
End: 2020-09-01
Payer: MEDICARE

## 2020-09-01 ENCOUNTER — ANESTHESIA (OUTPATIENT)
Dept: OPERATING ROOM | Age: 70
End: 2020-09-01
Payer: MEDICARE

## 2020-09-01 VITALS — SYSTOLIC BLOOD PRESSURE: 101 MMHG | DIASTOLIC BLOOD PRESSURE: 52 MMHG | OXYGEN SATURATION: 98 %

## 2020-09-01 VITALS
OXYGEN SATURATION: 96 % | WEIGHT: 218.19 LBS | TEMPERATURE: 97 F | DIASTOLIC BLOOD PRESSURE: 55 MMHG | SYSTOLIC BLOOD PRESSURE: 133 MMHG | HEIGHT: 60 IN | HEART RATE: 60 BPM | BODY MASS INDEX: 42.84 KG/M2 | RESPIRATION RATE: 16 BRPM

## 2020-09-01 LAB
ANION GAP SERPL CALCULATED.3IONS-SCNC: 9 MMOL/L (ref 3–16)
BUN BLDV-MCNC: 29 MG/DL (ref 7–20)
CALCIUM SERPL-MCNC: 9.7 MG/DL (ref 8.3–10.6)
CHLORIDE BLD-SCNC: 102 MMOL/L (ref 99–110)
CO2: 26 MMOL/L (ref 21–32)
CREAT SERPL-MCNC: 1 MG/DL (ref 0.6–1.2)
GFR AFRICAN AMERICAN: >60
GFR NON-AFRICAN AMERICAN: 55
GLUCOSE BLD-MCNC: 135 MG/DL (ref 70–99)
GLUCOSE BLD-MCNC: 143 MG/DL (ref 70–99)
HCT VFR BLD CALC: 31.8 % (ref 36–48)
HEMOGLOBIN: 10.7 G/DL (ref 12–16)
MCH RBC QN AUTO: 31 PG (ref 26–34)
MCHC RBC AUTO-ENTMCNC: 33.5 G/DL (ref 31–36)
MCV RBC AUTO: 92.6 FL (ref 80–100)
PDW BLD-RTO: 14.2 % (ref 12.4–15.4)
PERFORMED ON: ABNORMAL
PLATELET # BLD: 275 K/UL (ref 135–450)
PMV BLD AUTO: 7.9 FL (ref 5–10.5)
POTASSIUM SERPL-SCNC: 4.5 MMOL/L (ref 3.5–5.1)
RBC # BLD: 3.43 M/UL (ref 4–5.2)
SODIUM BLD-SCNC: 137 MMOL/L (ref 136–145)
WBC # BLD: 7.6 K/UL (ref 4–11)

## 2020-09-01 PROCEDURE — 7100000000 HC PACU RECOVERY - FIRST 15 MIN: Performed by: SURGERY

## 2020-09-01 PROCEDURE — 3700000001 HC ADD 15 MINUTES (ANESTHESIA): Performed by: SURGERY

## 2020-09-01 PROCEDURE — 3600000012 HC SURGERY LEVEL 2 ADDTL 15MIN: Performed by: SURGERY

## 2020-09-01 PROCEDURE — 80048 BASIC METABOLIC PNL TOTAL CA: CPT

## 2020-09-01 PROCEDURE — 2580000003 HC RX 258: Performed by: SURGERY

## 2020-09-01 PROCEDURE — 6360000002 HC RX W HCPCS: Performed by: SURGERY

## 2020-09-01 PROCEDURE — 3600000002 HC SURGERY LEVEL 2 BASE: Performed by: SURGERY

## 2020-09-01 PROCEDURE — 7100000001 HC PACU RECOVERY - ADDTL 15 MIN: Performed by: SURGERY

## 2020-09-01 PROCEDURE — 88305 TISSUE EXAM BY PATHOLOGIST: CPT

## 2020-09-01 PROCEDURE — 3700000000 HC ANESTHESIA ATTENDED CARE: Performed by: SURGERY

## 2020-09-01 PROCEDURE — 7100000011 HC PHASE II RECOVERY - ADDTL 15 MIN: Performed by: SURGERY

## 2020-09-01 PROCEDURE — 6360000002 HC RX W HCPCS

## 2020-09-01 PROCEDURE — 13160 SEC CLSR SURG WND/DEHSN XTN: CPT | Performed by: SURGERY

## 2020-09-01 PROCEDURE — 85027 COMPLETE CBC AUTOMATED: CPT

## 2020-09-01 PROCEDURE — 7100000010 HC PHASE II RECOVERY - FIRST 15 MIN: Performed by: SURGERY

## 2020-09-01 PROCEDURE — 2709999900 HC NON-CHARGEABLE SUPPLY: Performed by: SURGERY

## 2020-09-01 PROCEDURE — 2500000003 HC RX 250 WO HCPCS: Performed by: NURSE ANESTHETIST, CERTIFIED REGISTERED

## 2020-09-01 PROCEDURE — 88342 IMHCHEM/IMCYTCHM 1ST ANTB: CPT

## 2020-09-01 PROCEDURE — 2500000003 HC RX 250 WO HCPCS: Performed by: SURGERY

## 2020-09-01 PROCEDURE — 36415 COLL VENOUS BLD VENIPUNCTURE: CPT

## 2020-09-01 PROCEDURE — 2580000003 HC RX 258: Performed by: NURSE ANESTHETIST, CERTIFIED REGISTERED

## 2020-09-01 PROCEDURE — 6360000002 HC RX W HCPCS: Performed by: NURSE ANESTHETIST, CERTIFIED REGISTERED

## 2020-09-01 RX ORDER — LIDOCAINE HYDROCHLORIDE 10 MG/ML
1 INJECTION, SOLUTION EPIDURAL; INFILTRATION; INTRACAUDAL; PERINEURAL
Status: CANCELLED | OUTPATIENT
Start: 2020-09-01 | End: 2020-09-01

## 2020-09-01 RX ORDER — HYDROCODONE BITARTRATE AND ACETAMINOPHEN 5; 325 MG/1; MG/1
1 TABLET ORAL
Status: CANCELLED | OUTPATIENT
Start: 2020-09-01 | End: 2020-09-01

## 2020-09-01 RX ORDER — PROPOFOL 10 MG/ML
INJECTION, EMULSION INTRAVENOUS CONTINUOUS PRN
Status: DISCONTINUED | OUTPATIENT
Start: 2020-09-01 | End: 2020-09-01 | Stop reason: SDUPTHER

## 2020-09-01 RX ORDER — HYDROMORPHONE HCL 110MG/55ML
0.25 PATIENT CONTROLLED ANALGESIA SYRINGE INTRAVENOUS EVERY 5 MIN PRN
Status: CANCELLED | OUTPATIENT
Start: 2020-09-01

## 2020-09-01 RX ORDER — ONDANSETRON 2 MG/ML
INJECTION INTRAMUSCULAR; INTRAVENOUS PRN
Status: DISCONTINUED | OUTPATIENT
Start: 2020-09-01 | End: 2020-09-01 | Stop reason: SDUPTHER

## 2020-09-01 RX ORDER — PROPOFOL 10 MG/ML
INJECTION, EMULSION INTRAVENOUS PRN
Status: DISCONTINUED | OUTPATIENT
Start: 2020-09-01 | End: 2020-09-01 | Stop reason: SDUPTHER

## 2020-09-01 RX ORDER — MAGNESIUM HYDROXIDE 1200 MG/15ML
LIQUID ORAL CONTINUOUS PRN
Status: COMPLETED | OUTPATIENT
Start: 2020-09-01 | End: 2020-09-01

## 2020-09-01 RX ORDER — MIDAZOLAM HYDROCHLORIDE 1 MG/ML
INJECTION INTRAMUSCULAR; INTRAVENOUS PRN
Status: DISCONTINUED | OUTPATIENT
Start: 2020-09-01 | End: 2020-09-01 | Stop reason: SDUPTHER

## 2020-09-01 RX ORDER — SODIUM CHLORIDE 9 MG/ML
INJECTION, SOLUTION INTRAVENOUS CONTINUOUS
Status: CANCELLED | OUTPATIENT
Start: 2020-09-01

## 2020-09-01 RX ORDER — GLYCOPYRROLATE 0.2 MG/ML
INJECTION INTRAMUSCULAR; INTRAVENOUS PRN
Status: DISCONTINUED | OUTPATIENT
Start: 2020-09-01 | End: 2020-09-01 | Stop reason: SDUPTHER

## 2020-09-01 RX ORDER — LORAZEPAM 2 MG/ML
INJECTION INTRAMUSCULAR
Status: COMPLETED
Start: 2020-09-01 | End: 2020-09-01

## 2020-09-01 RX ORDER — HYDROMORPHONE HCL 110MG/55ML
0.5 PATIENT CONTROLLED ANALGESIA SYRINGE INTRAVENOUS EVERY 5 MIN PRN
Status: CANCELLED | OUTPATIENT
Start: 2020-09-01

## 2020-09-01 RX ORDER — HYDROCODONE BITARTRATE AND ACETAMINOPHEN 5; 325 MG/1; MG/1
1-2 TABLET ORAL EVERY 6 HOURS PRN
Qty: 12 TABLET | Refills: 0 | Status: SHIPPED | OUTPATIENT
Start: 2020-09-01 | End: 2020-09-04

## 2020-09-01 RX ORDER — ZINC SULFATE 50(220)MG
50 CAPSULE ORAL 4 TIMES DAILY
COMMUNITY

## 2020-09-01 RX ORDER — SODIUM CHLORIDE 9 MG/ML
INJECTION, SOLUTION INTRAVENOUS CONTINUOUS PRN
Status: DISCONTINUED | OUTPATIENT
Start: 2020-09-01 | End: 2020-09-01 | Stop reason: SDUPTHER

## 2020-09-01 RX ORDER — LIDOCAINE HYDROCHLORIDE AND EPINEPHRINE 10; 10 MG/ML; UG/ML
INJECTION, SOLUTION INFILTRATION; PERINEURAL
Status: COMPLETED | OUTPATIENT
Start: 2020-09-01 | End: 2020-09-01

## 2020-09-01 RX ORDER — ONDANSETRON 2 MG/ML
4 INJECTION INTRAMUSCULAR; INTRAVENOUS
Status: CANCELLED | OUTPATIENT
Start: 2020-09-01 | End: 2020-09-01

## 2020-09-01 RX ORDER — LORAZEPAM 2 MG/ML
1 INJECTION INTRAMUSCULAR ONCE
Status: COMPLETED | OUTPATIENT
Start: 2020-09-01 | End: 2020-09-01

## 2020-09-01 RX ORDER — DEXAMETHASONE SODIUM PHOSPHATE 4 MG/ML
INJECTION, SOLUTION INTRA-ARTICULAR; INTRALESIONAL; INTRAMUSCULAR; INTRAVENOUS; SOFT TISSUE PRN
Status: DISCONTINUED | OUTPATIENT
Start: 2020-09-01 | End: 2020-09-01 | Stop reason: SDUPTHER

## 2020-09-01 RX ORDER — LIDOCAINE HYDROCHLORIDE 20 MG/ML
INJECTION, SOLUTION INFILTRATION; PERINEURAL PRN
Status: DISCONTINUED | OUTPATIENT
Start: 2020-09-01 | End: 2020-09-01 | Stop reason: SDUPTHER

## 2020-09-01 RX ADMIN — PROPOFOL 10 MG: 10 INJECTION, EMULSION INTRAVENOUS at 15:00

## 2020-09-01 RX ADMIN — PHENYLEPHRINE HYDROCHLORIDE 100 MCG: 10 INJECTION INTRAVENOUS at 14:47

## 2020-09-01 RX ADMIN — PHENYLEPHRINE HYDROCHLORIDE 100 MCG: 10 INJECTION INTRAVENOUS at 14:32

## 2020-09-01 RX ADMIN — LORAZEPAM 1 MG: 2 INJECTION INTRAMUSCULAR; INTRAVENOUS at 15:34

## 2020-09-01 RX ADMIN — PHENYLEPHRINE HYDROCHLORIDE 100 MCG: 10 INJECTION INTRAVENOUS at 15:01

## 2020-09-01 RX ADMIN — MIDAZOLAM 1 MG: 1 INJECTION INTRAMUSCULAR; INTRAVENOUS at 15:12

## 2020-09-01 RX ADMIN — PHENYLEPHRINE HYDROCHLORIDE 100 MCG: 10 INJECTION INTRAVENOUS at 14:54

## 2020-09-01 RX ADMIN — LIDOCAINE HYDROCHLORIDE 100 MG: 20 INJECTION, SOLUTION INFILTRATION; PERINEURAL at 14:25

## 2020-09-01 RX ADMIN — GLYCOPYRROLATE 0.2 MG: 0.2 INJECTION INTRAMUSCULAR; INTRAVENOUS at 14:24

## 2020-09-01 RX ADMIN — PROPOFOL 20 MG: 10 INJECTION, EMULSION INTRAVENOUS at 14:35

## 2020-09-01 RX ADMIN — PHENYLEPHRINE HYDROCHLORIDE 100 MCG: 10 INJECTION INTRAVENOUS at 14:50

## 2020-09-01 RX ADMIN — SODIUM CHLORIDE: 9 INJECTION, SOLUTION INTRAVENOUS at 14:20

## 2020-09-01 RX ADMIN — PROPOFOL 100 MCG/KG/MIN: 10 INJECTION, EMULSION INTRAVENOUS at 14:25

## 2020-09-01 RX ADMIN — PHENYLEPHRINE HYDROCHLORIDE 100 MCG: 10 INJECTION INTRAVENOUS at 14:38

## 2020-09-01 RX ADMIN — LORAZEPAM 1 MG: 2 INJECTION INTRAMUSCULAR at 15:34

## 2020-09-01 RX ADMIN — PROPOFOL 50 MG: 10 INJECTION, EMULSION INTRAVENOUS at 14:26

## 2020-09-01 RX ADMIN — PROPOFOL 20 MG: 10 INJECTION, EMULSION INTRAVENOUS at 14:54

## 2020-09-01 RX ADMIN — ONDANSETRON 4 MG: 2 INJECTION INTRAMUSCULAR; INTRAVENOUS at 14:30

## 2020-09-01 RX ADMIN — PHENYLEPHRINE HYDROCHLORIDE 100 MCG: 10 INJECTION INTRAVENOUS at 15:06

## 2020-09-01 RX ADMIN — MIDAZOLAM 1 MG: 1 INJECTION INTRAMUSCULAR; INTRAVENOUS at 14:24

## 2020-09-01 RX ADMIN — DEXAMETHASONE SODIUM PHOSPHATE 10 MG: 4 INJECTION, SOLUTION INTRAMUSCULAR; INTRAVENOUS at 14:29

## 2020-09-01 RX ADMIN — PHENYLEPHRINE HYDROCHLORIDE 100 MCG: 10 INJECTION INTRAVENOUS at 14:44

## 2020-09-01 RX ADMIN — PHENYLEPHRINE HYDROCHLORIDE 100 MCG: 10 INJECTION INTRAVENOUS at 14:41

## 2020-09-01 RX ADMIN — CEFAZOLIN 2 G: 10 INJECTION, POWDER, FOR SOLUTION INTRAVENOUS at 14:18

## 2020-09-01 RX ADMIN — PHENYLEPHRINE HYDROCHLORIDE 100 MCG: 10 INJECTION INTRAVENOUS at 14:58

## 2020-09-01 ASSESSMENT — PULMONARY FUNCTION TESTS
PIF_VALUE: 1

## 2020-09-01 NOTE — ANESTHESIA POSTPROCEDURE EVALUATION
Department of Anesthesiology  Postprocedure Note    Patient: Joao Tian  MRN: [id number]  YOB: 1950  Date of evaluation: 9/1/2020  Time:  5:39 PM     Procedure Summary     Date:  09/01/20 Room / Location:  [address]    Anesthesia Start:  2733 Anesthesia Stop:  3058    Procedure:  EXPLORATION OF ABDOMINAL WOUND (N/A Abdomen) Diagnosis:  (S31.109A  CHRONIC ABDOMINAL WOUND)    Surgeon:  Yadi Ramírez MD Responsible Provider:  John Carrillo MD    Anesthesia Type:  general ASA Status:  3          Anesthesia Type: general    Rhonda Phase I: Rhonda Score: 8    Rhonda Phase II: Rhonda Score: 10    Last vitals: Reviewed and per EMR flowsheets.        Anesthesia Post Evaluation    Patient location during evaluation: PACU  Patient participation: complete - patient participated  Level of consciousness: awake  Airway patency: patent  Nausea & Vomiting: no vomiting  Complications: no  Cardiovascular status: hemodynamically stable  Respiratory status: acceptable  Hydration status: euvolemic

## 2020-09-01 NOTE — PROGRESS NOTES
CLINICAL PHARMACY NOTE: MEDS TO [address] Select Patient?: No  Total # of Prescriptions Filled: 1   The following medications were delivered to the patient:  · Norco 5-325mg  Total # of Interventions Completed: 0  Time Spent (min): 15    Additional Documentation:  Delivered to Patient     Simon David Carpenter

## 2020-09-01 NOTE — PROGRESS NOTES
Pt discharged home per private vehicle with a  responsible adult who states they will be with them for the next 24 hours. Wheeled to front of the hospital myself.

## 2020-09-01 NOTE — PROGRESS NOTES
Pt arrived to PACU from OR in stable condition and is alert to physical stimuli. Pt can move extremities to command. Respirations are even on 10L O2 per nonrebreather mask. Skin warm, dry, and with appropriate for ethnicity color. Abd is soft. Pain is tolerable at this time.   Abdominal surgical site(s) intact with dressing= dermabond prineo   7mm flat SHARON drain to bulb suction coming from below incision; entry site is covered with guaze and tape      S/P: s/p abdominal wound exploration with Dr. Savage Wheeler at [address], RN, [facility]  PACU, Pre-op, SDS

## 2020-09-01 NOTE — ANESTHESIA PRE PROCEDURE
Department of Anesthesiology  Preprocedure Note       Name:  Virgil Hill   Age:  72 y.o.  :  1950                                          MRN:  [id number]         Date:  2020      Surgeon: Karri Galindo):  Traci Lopez MD    Procedure: Procedure(s):  EXPLORATION OF ABDOMINAL WOUND    Medications prior to admission:   Prior to Admission medications    Medication Sig Start Date End Date Taking?  Authorizing Provider   zinc sulfate (ZINCATE) 220 (50 Zn) MG capsule Take 50 mg by mouth daily   Yes Historical Provider, MD   potassium chloride (KLOR-CON M) 20 MEQ extended release tablet Take 1 tablet by mouth daily 20  Yes Keisha Shrestha MD   METHOCARBAMOL PO Take by mouth   Yes Historical Provider, MD   ferrous gluconate (FERGON) 225 (27 Fe) MG tablet Take 240 mg by mouth 2 times daily   Yes Historical Provider, MD   amLODIPine (NORVASC) 5 MG tablet Take 1 tablet by mouth every evening 19  Yes Rommel Farooq MD   tiZANidine (ZANAFLEX) 4 MG tablet Take 4 mg by mouth every 6 hours as needed (ONLY TAKES AT HS)    Yes Historical Provider, MD   aspirin 81 MG tablet Take 81 mg by mouth daily   Yes Historical Provider, MD   furosemide (LASIX) 40 MG tablet Take 1 tablet by mouth daily 18  Yes Rommel Farooq MD   isosorbide mononitrate (ISMO;MONOKET) 10 MG tablet Take 5 mg by mouth 2 times daily   Yes Historical Provider, MD   Ascorbic Acid (VITAMIN C) 1000 MG tablet Take 1,000 mg by mouth daily   Yes Historical Provider, MD   Cyanocobalamin (VITAMIN B-12 PO) Take 3,000 mcg by mouth daily   Yes Historical Provider, MD   Cranberry 450 MG CAPS Take by mouth 2 times daily   Yes Historical Provider, MD   pioglitazone (ACTOS) 15 MG tablet Take 15 mg by mouth daily   Yes Historical Provider, MD   vitamin D (CHOLECALCIFEROL) 1000 UNIT TABS tablet Take 1,000 Units by mouth daily    Yes Historical Provider, MD   spironolactone (ALDACTONE) 25 MG tablet Take 25 mg by mouth daily In pm   Yes Historical Provider, MD   dexlansoprazole (DEXILANT) 60 MG CPDR delayed release capsule Take 60 mg by mouth daily   Yes Historical Provider, MD   gabapentin (NEURONTIN) 100 MG capsule Take 400 mg by mouth 3 times daily. .   Yes Historical Provider, MD   TraZODone HCl (DESYREL PO) Take 50 mg by mouth nightly    Yes Historical Provider, MD   losartan-hydrochlorothiazide (HYZAAR) 100-25 MG per tablet TK 1 T PO QAM 9/13/16  Yes Historical Provider, MD   Calcium Citrate-Vitamin D (CALCIUM CITRATE + D PO) Take 2 tablets by mouth daily   Yes Historical Provider, MD   SLOW-MAG 71.5-119 MG TBEC tablet Take 1 tablet by mouth daily 143 mg 11/3/15  Yes Historical Provider, MD   fexofenadine (ALLEGRA) 180 MG tablet Take 180 mg by mouth daily   Yes Historical Provider, MD   rosuvastatin (CRESTOR) 40 MG tablet Take 20 mg by mouth every evening    Yes Historical Provider, MD   metFORMIN (GLUCOPHAGE) 500 MG tablet Take 500 mg by mouth 2 times daily (with meals)    Yes Historical Provider, MD   ezetimibe (ZETIA) 10 MG tablet Take 5 mg by mouth nightly    Yes Historical Provider, MD   escitalopram (LEXAPRO) 20 MG tablet Take 20 mg by mouth daily. Yes Historical Provider, MD   ATORVASTATIN CALCIUM PO Take by mouth    Historical Provider, MD   metoprolol tartrate (LOPRESSOR) 25 MG tablet Take 12.5 mg by mouth as needed (FOR A-FIB)     Historical Provider, MD   estradiol (ESTRACE) 0.1 MG/GM vaginal cream Place 2 g vaginally See Admin Instructions Twice a week    Historical Provider, MD   nitroGLYCERIN (NITROLINGUAL) 0.4 MG/SPRAY spray Place 1 spray under the tongue every 5 minutes as needed. As directed for chest pain     Historical Provider, MD       Current medications:    No current facility-administered medications for this encounter. Allergies:     Allergies   Allergen Reactions    Albuterol Other (See Comments)     Other reaction(s): Chest Pain  Crushing chest pain    Heparin Other (See Comments)     Caused bruises and hematomas Diagnosis Date    Anemia     Anesthesia     trouble after egd 10/2016. Anxiety and severe tremors after AF ablation 8/2018-responded well to Ativan    Anesthesia complication     flailing, yelling when waking up from anesthesia-ativan helps ([id number]/29/19: ativans works within seconds of administration)    Atrial fibrillation ([facility] [address] 10/01/2017    A. fib with SVR    CAD (coronary artery disease)     Chest pain 10/01/2017    Chronic kidney disease     ? ??    Depression     GERD (gastroesophageal reflux disease)     Glaucoma     Hiatal hernia     Hyperlipidemia     Hypertension     Migraines, neuralgic     Morbid obesity ([address]     Osteoarthritis     Sleep apnea     uses CPAP    Type II or unspecified type diabetes mellitus without mention of complication, not stated as uncontrolled     Unspecified sleep apnea     uses cpap    Urinary incontinence     UTI (urinary tract infection)        Past Surgical History:        Procedure Laterality Date    ABLATION OF DYSRHYTHMIC FOCUS      BREAST LUMPECTOMY      CARDIAC CATHETERIZATION      CARDIAC SURGERY  2/22/1999    quad[facility]uple [facility]pass, [facility]    COLONOSCOPY      COLONOSCOPY  10/08/2018    1 polyp removed    COLONOSCOPY N/A 10/8/2018    COLONOSCOPY POLYPECTOMY SNARE/COLD BIOPSY performed by Gina Muñoz MD at [address] [place] [address]    [address] [place] [address]      face lift    ENTEROSCOPY N/A 2/26/2019    ENTEROSCOPY performed by Gina Muñoz MD at [facility] 15  12/20/11    hiatal hernia repair    GASTRIC BAND REMOVAL  11/03/2016    laparoscopic     HAMMER TOE SURGERY      [facility] INJECTION PROCEDURE FOR SACROILIAC JOINT Left 12/17/2018    SACROILIAC JOINT INJECTION ON THE LEFT WITH FLUOROSCOPY performed by Mary West MD at [facility]    Left and Right knees, [address] [place] [address] ARTHROSCOPY      1982 left    ORIF HUMERUS DECOMPRESSION Left 2/25/2016    OPEN REDUCTION INTERNAL FIXATION LEFT PROXIMAL HUMERUS    OVARY REMOVAL Bilateral 1997    [place] NJX DX/THER SBST INTRLMNR CRV/THRC W/IMG GDN N/A 11/5/2018    MIDLINE L4/L5 LUMBAR INTERLAMINAR EPIDURAL STEROID INJECTION WITH FLUOROSCOPY performed [facility] END[facility]COPY  10/30/15    UPPER GASTROINTESTINAL ENDOSCOPY  10/14/2016    with biopsy    VENTRAL HERNIA REPAIR N/A 10/29/2019    OPEN VENTRAL HERNIA REPAIR WITH  MESH performed by Kendall Ko MD at [address] [facility] [place] History:    Social History     Tobacco Use    Smoking status: Never Smoker    Smokeless tobacco: Never Used   Substance Use Topics    Alcohol use: No     Alcohol/week: 0.0 standard drinks                                Counseling given: Not Answered      Vital Signs (Current):   Vitals:    08/28/20 1413   Weight: 221 lb (100.2 kg)   Height: 4' 11.5\" (1.511 m)                                              BP Readings from Last 3 Encounters:   08/20/20 126/68   08/06/20 123/80   02/19/20 (!) 123/57       NPO Status:                                                                                 BMI:   Wt Readings from Last 3 Encounters:   08/28/20 221 lb (100.2 kg)   08/20/20 221 lb (100.2 kg)   08/06/20 222 lb (100.7 kg)     Body mass index is 43.89 kg/m².     CBC:   Lab Results   Component Value Date    WBC 7.0 07/14/2020    RBC 3.59 07/14/2020    HGB 11.1 07/14/2020    HCT 33.4 07/14/2020    MCV 93.0 07/14/2020    RDW 13.7 07/14/2020     07/14/2020       CMP:   Lab Results   Component Value Date     08/14/2020    K 5.2 08/14/2020    K 4.7 08/15/2018    CL 94 08/14/2020    CO2 25 08/14/2020    BUN 28 08/14/2020    CREATININE 0.8 08/14/2020    GFRAA >60 08/14/2020    GFRAA >60 10/26/2012    AGRATIO 1.4 05/22/2020    LABGLOM >60 08/14/2020    LABGLOM 100 06/26/2012 GLUCOSE 136 08/14/2020    GLUCOSE 143 09/23/2011    PROT 7.7 08/14/2020    PROT 7.8 10/26/2012    CALCIUM 10.1 08/14/2020    BILITOT 0.4 08/14/2020    ALKPHOS 58 08/14/2020    AST 27 08/14/2020    ALT 32 08/14/2020       POC Tests: No results for input(s): POCGLU, POCNA, POCK, POCCL, POCBUN, POCHEMO, POCHCT in the last 72 hours. Coags:   Lab Results   Component Value Date    PROTIME 12.8 04/30/2019    INR 1.12 04/30/2019    APTT 40.0 04/30/2019       HCG (If Applicable): No results found for: PREGTESTUR, PREGSERUM, HCG, HCGQUANT     ABGs: No results found for: PHART, PO2ART, IDA9CIU, ISS5HRC, BEART, F1VDOLQP     Type & Screen (If Applicable):  No results found for: LABABO, LABRH    Drug/Infectious Status (If Applicable):  No results found for: HIV, HEPCAB    COVID-19 Screening (If Applicable):   Lab Results   Component Value Date    COVID19 NOT DETECTED 08/27/2020         Anesthesia Evaluation  Patient summary reviewed history of anesthetic complications (wakes up wild, does well with ativan post op): Airway: Mallampati: II  TM distance: >3 FB   Neck ROM: full  Mouth opening: > = 3 FB Dental: normal exam         Pulmonary: breath sounds clear to auscultation  (+) sleep apnea: on CPAP,                             Cardiovascular:    (+) hypertension:, pacemaker: pacemaker, CAD:, CABG/stent (CABG 1999):, dysrhythmias (with RVR in the past, sinus node dysfunction): atrial fibrillation,         Rhythm: regular  Rate: normal                    Neuro/Psych:   (+) headaches:, psychiatric history:   (-) seizures, TIA and CVA            ROS comment: glaucoma GI/Hepatic/Renal:   (+) hiatal hernia, GERD: well controlled,           Endo/Other:    (+) Diabetes, : arthritis: OA., .                 Abdominal:   (+) obese,         Vascular:                                        Anesthesia Plan      general     ASA 3     (OETT)  Induction: intravenous.     MIPS: Postoperative opioids intended, Prophylactic antiemetics administered and Postoperative trial extubation. Anesthetic plan and risks discussed with patient. Use of blood products discussed with patient whom consented to blood products. Plan discussed with CRNA.                   Shania Goode MD   9/1/2020

## 2020-09-01 NOTE — H&P
Kimberli Chandler     CC-chronic abdominal wound      HPI: 72year old female with a chronic abdominal wound    Past Medical History:   Diagnosis Date    Anemia     Anesthesia     trouble after egd 10/2016. Anxiety and severe tremors after AF ablation 8/2018-responded well to Ativan    Anesthesia complication     flailing, yelling when waking up from anesthesia-ativan helps ([id number]/29/19: ativans works within seconds of administration)    Atrial fibrillation ([address] 10/01/2017    A. fib with SVR    CAD (coronary artery disease)     Chest pain 10/01/2017    Chronic kidney disease     ? ??    Depression     GERD (gastroesophageal reflux disease)     Glaucoma     Hiatal hernia     Hyperlipidemia     Hypertension     Migraines, neuralgic     Morbid obesity ([address]     Osteoarthritis     Sleep apnea     uses CPAP    Type II or unspecified type diabetes mellitus without mention of complication, not stated as uncontrolled     Unspecified sleep apnea     uses cpap    Urinary incontinence     UTI (urinary tract infection)        Past Surgical History:   Procedure Laterality Date    ABLATION OF DYSRHYTHMIC FOCUS      BREAST LUMPECTOMY      CARDIAC CATHETERIZATION      CARDIAC SURGERY  2/22/1999    [facility]    COLONOSCOPY      COLONOSCOPY  10/08/2018    1 polyp removed    COLONOSCOPY N/A 10/8/2018    COLONOSCOPY POLYPECTOMY SNARE/COLD BIOPSY performed by Santana Mckeon MD at [address] [place] [address]      face lift    ENTEROSCOPY N/A 2/26/2019    ENTEROSCOPY performed by Santana Mckeon MD at [place] [address]  12/20/11    hiatal hernia repair    GASTRIC BAND REMOVAL  11/03/2016    laparoscopic     HAMMER TOE SURGERY      [facility] INJECTION PROCEDURE FOR SACROILIAC JOINT Left 12/17/2018    SACROILIAC JOINT INJECTION ON THE LEFT WITH FLUOROSCOPY performed by Nazario Langford MD at [facility] Historical Provider, MD   pioglitazone (ACTOS) 15 MG tablet Take 15 mg by mouth daily   Yes Historical Provider, MD   vitamin D (CHOLECALCIFEROL) 1000 UNIT TABS tablet Take 1,000 Units by mouth daily    Yes Historical Provider, MD   spironolactone (ALDACTONE) 25 MG tablet Take 25 mg by mouth daily In pm   Yes Historical Provider, MD   dexlansoprazole (DEXILANT) 60 MG CPDR delayed release capsule Take 60 mg by mouth daily   Yes Historical Provider, MD   gabapentin (NEURONTIN) 100 MG capsule Take 400 mg by mouth 3 times daily. .   Yes Historical Provider, MD   TraZODone HCl (DESYREL PO) Take 50 mg by mouth nightly    Yes Historical Provider, MD   losartan-hydrochlorothiazide (HYZAAR) 100-25 MG per tablet TK 1 T PO QAM 9/13/16  Yes Historical Provider, MD   Calcium Citrate-Vitamin D (CALCIUM CITRATE + D PO) Take 2 tablets by mouth daily   Yes Historical Provider, MD   SLOW-MAG 71.5-119 MG TBEC tablet Take 1 tablet by mouth daily 143 mg 11/3/15  Yes Historical Provider, MD   fexofenadine (ALLEGRA) 180 MG tablet Take 180 mg by mouth daily   Yes Historical Provider, MD   rosuvastatin (CRESTOR) 40 MG tablet Take 20 mg by mouth every evening    Yes Historical Provider, MD   metFORMIN (GLUCOPHAGE) 500 MG tablet Take 500 mg by mouth 2 times daily (with meals)    Yes Historical Provider, MD   ezetimibe (ZETIA) 10 MG tablet Take 5 mg by mouth nightly    Yes Historical Provider, MD   escitalopram (LEXAPRO) 20 MG tablet Take 20 mg by mouth daily. Yes Historical Provider, MD   ATORVASTATIN CALCIUM PO Take by mouth    Historical Provider, MD   metoprolol tartrate (LOPRESSOR) 25 MG tablet Take 12.5 mg by mouth as needed (FOR A-FIB)     Historical Provider, MD   estradiol (ESTRACE) 0.1 MG/GM vaginal cream Place 2 g vaginally See Admin Instructions Twice a week    Historical Provider, MD   nitroGLYCERIN (NITROLINGUAL) 0.4 MG/SPRAY spray Place 1 spray under the tongue every 5 minutes as needed.  As directed for chest pain

## 2020-09-01 NOTE — PROGRESS NOTES
Pt had an episode of violent arm and feet shaking. 1mg Ativan given per order;  says this will make it stop.     Khoa MONTES DE OCAN, RN, [facility]  Pre-Op/Recovery   Same Day Surgery

## 2020-09-01 NOTE — PROGRESS NOTES
Pt meets d/c criteria for phase 1 in PACU and has been seen by anesthesia. Ok to transition to phase 2 care. Will alert anyone in waiting room for them and the nursing unit if applicable. Will continue to monitor for safety and comfort.     Meron MONTES DE OCAN, RN, [facility]  Pre-Op/Recovery   Same Day Surgery

## 2020-09-01 NOTE — BRIEF OP NOTE
Brief Postoperative Note      Patient: Kimberli Chandler  YOB: 1950  MRN: [id number]    Date of Procedure: 9/1/2020    Pre-Op Diagnosis: S31.109A  CHRONIC ABDOMINAL WOUND    Post-Op Diagnosis: Same       Procedure(s):  EXPLORATION OF ABDOMINAL WOUND    Surgeon(s):  Mony Doshi MD    Assistant:  First Assistant: Xiomara Vazquez RN    Anesthesia: Monitor Anesthesia Care    Estimated Blood Loss (mL): Minimal    Complications: None    Specimens:   ID Type Source Tests Collected by Time Destination   A : A) ABDOMINAL TISSUE Tissue Tissue SURGICAL PATHOLOGY Mony Doshi MD 9/1/2020 1450        Implants:  * No implants in log *      Drains:   Closed/Suction Drain LUQ Bulb 7 Bulgarian (Active)       Findings: seroma cavity from previous lap gastric band port site    Electronically signed by Mony Doshi MD on 9/1/2020 at 3:05 PM

## 2020-09-01 NOTE — PROGRESS NOTES
Pt has settled down. Refuses to open her eyes though she seems to be awake. Will continue to monitor for safety and comfort.

## 2020-09-02 NOTE — OP NOTE
[facility]                     [facility], [address] [place] [address]                                OPERATIVE REPORT    PATIENT NAME: Haydee Boucher                   :        1950  MED REC NO:   [id number]                          ROOM:  ACCOUNT NO:   [de-identified]                           ADMIT DATE: 2020  PROVIDER:     Opal Park MD    DATE OF PROCEDURE:  2020    PREOPERATIVE DIAGNOSIS:  Chronic left upper quadrant abdominal wound. POSTOPERATIVE DIAGNOSIS:  Chronic left upper quadrant abdominal wound. PROCEDURE:  Exploration of left upper quadrant abdominal wound. SURGEON:  Abbe Mendez MD    ANESTHESIA:  MAC with local.    EBL-minimal    OPERATIVE INDICATIONS AND CONSENT:  The patient is a 67-year-old female  with chronic left upper quadrant abdominal wound. This is the previous  site of her port from her gastric band, which was removed in 2016. She  was brought in today for wound exploration with possible closure. She  was explained the risks, benefits and possible complications. DETAILS OF THE PROCEDURE:  The patient was brought to the operative  suite and placed in a supine position. After MAC, she was prepped and  draped in the usual sterile fashion. The skin and subcutaneous tissue  were injected with 1% lidocaine. We made an elliptical skin incision,  oriented transversely surrounding the opening. This was approximately  2.5 to 3 cm in length. We had placed a probe within the tract. We  continued to dissect through the subcutaneous tissue circumferentially  around the tract. The tract actually went very deep and there was  initial concern that it went into the abdominal cavity. We enlarged the incision to a total length of about 8 cm. We opened the  tract and then it was evident that it was draining and that the tract  entered into a larger chronic seroma cavity.   The seroma cavity was  anterior to the fascia. There was also significantly thickened  surrounding inflammatory tissue. The entire tract and the seroma cavity  were excised using cautery. The pocket measured about 3-1/2 cm in  greatest diameter. The deepest point of dissection was down through the  external abdominal oblique. We then inspected the wound. Hemostasis was achieved with cautery. We  then irrigated the wound with warm saline. A 7-flat SHARON drain was placed  anterior to the external abdominal oblique and brought out through a  separate stab incision inferior to the main incision. The drain was  sutured in place with a 2-0 nylon suture. The deepest subcutaneous  layer was closed with interrupted 2-0 Vicryl sutures. The superficial  fascia and deep dermis was closed with interrupted 3-0 Vicryl sutures. The skin was closed with running 4-0 subcuticular suture. Dermabond  mesh was then applied. The incision length was 8 cm. The patient  tolerated the procedure without difficulty and was transferred to  recovery room in stable condition. Perez Menon.  Ayesha Tristan MD    D: 09/01/2020 17:40:42       T: 09/01/2020 17:46:59     [email or web address]  Job#: [id number]     Doc#: [id number]    CC:

## 2020-09-09 ENCOUNTER — OFFICE VISIT (OUTPATIENT)
Dept: SURGERY | Age: 70
End: 2020-09-09

## 2020-09-09 VITALS
DIASTOLIC BLOOD PRESSURE: 58 MMHG | SYSTOLIC BLOOD PRESSURE: 126 MMHG | TEMPERATURE: 97.4 F | BODY MASS INDEX: 43.29 KG/M2 | WEIGHT: 218 LBS

## 2020-09-09 PROCEDURE — 99024 POSTOP FOLLOW-UP VISIT: CPT | Performed by: SURGERY

## 2020-09-15 ENCOUNTER — TELEPHONE (OUTPATIENT)
Dept: CARDIOLOGY CLINIC | Age: 70
End: 2020-09-15

## 2020-09-15 NOTE — TELEPHONE ENCOUNTER
Tried to call the patient about the message below and was unable to left message on machine due to it not picking up.

## 2020-09-15 NOTE — TELEPHONE ENCOUNTER
Pt calling to let RMM that she is out of town for a week and will be back Saturday. pt wants to know if she can be seen next week? She is having some quivering feelings in her heart.  pls call to advise thank you

## 2020-09-21 ENCOUNTER — TELEPHONE (OUTPATIENT)
Dept: CARDIOLOGY CLINIC | Age: 70
End: 2020-09-21

## 2020-09-21 NOTE — TELEPHONE ENCOUNTER
Pt calling back regarding previous  9/15/20 message. She is sending Transmission now. Pt is still having some quivering in chest.     Also wanting to make sure her pace maker is MRI Compatible. Please call to discuss.

## 2020-09-21 NOTE — TELEPHONE ENCOUNTER
We received remote transmission from patient's monitor at home. Transmission shows normal sensing and pacing function. Current ECG shows APVS.  Battery life 12.4 years. AP 79.3%.  <0.1%. No episodes noted. EP physician will review. See interrogation under cardiology tab in the [address] [place] [address] [address] field for more details. Patient has MRI safe Pacemaker. Please advise on appointment for this week from encounter on 9/15/2020.  Thanks

## 2020-09-22 ENCOUNTER — NURSE ONLY (OUTPATIENT)
Dept: CARDIOLOGY CLINIC | Age: 70
End: 2020-09-22
Payer: MEDICARE

## 2020-09-22 ENCOUNTER — OFFICE VISIT (OUTPATIENT)
Dept: CARDIOLOGY CLINIC | Age: 70
End: 2020-09-22
Payer: MEDICARE

## 2020-09-22 VITALS
BODY MASS INDEX: 44.58 KG/M2 | DIASTOLIC BLOOD PRESSURE: 66 MMHG | HEIGHT: 59 IN | SYSTOLIC BLOOD PRESSURE: 126 MMHG | WEIGHT: 221.12 LBS | HEART RATE: 70 BPM | OXYGEN SATURATION: 96 %

## 2020-09-22 PROCEDURE — 93280 PM DEVICE PROGR EVAL DUAL: CPT | Performed by: INTERNAL MEDICINE

## 2020-09-22 PROCEDURE — 3017F COLORECTAL CA SCREEN DOC REV: CPT | Performed by: NURSE PRACTITIONER

## 2020-09-22 PROCEDURE — 99214 OFFICE O/P EST MOD 30 MIN: CPT | Performed by: NURSE PRACTITIONER

## 2020-09-22 PROCEDURE — G8399 PT W/DXA RESULTS DOCUMENT: HCPCS | Performed by: NURSE PRACTITIONER

## 2020-09-22 PROCEDURE — 93000 ELECTROCARDIOGRAM COMPLETE: CPT | Performed by: NURSE PRACTITIONER

## 2020-09-22 PROCEDURE — G8427 DOCREV CUR MEDS BY ELIG CLIN: HCPCS | Performed by: NURSE PRACTITIONER

## 2020-09-22 PROCEDURE — 1036F TOBACCO NON-USER: CPT | Performed by: NURSE PRACTITIONER

## 2020-09-22 PROCEDURE — 1123F ACP DISCUSS/DSCN MKR DOCD: CPT | Performed by: NURSE PRACTITIONER

## 2020-09-22 PROCEDURE — G8417 CALC BMI ABV UP PARAM F/U: HCPCS | Performed by: NURSE PRACTITIONER

## 2020-09-22 PROCEDURE — 4040F PNEUMOC VAC/ADMIN/RCVD: CPT | Performed by: NURSE PRACTITIONER

## 2020-09-22 PROCEDURE — 1090F PRES/ABSN URINE INCON ASSESS: CPT | Performed by: NURSE PRACTITIONER

## 2020-09-22 NOTE — PATIENT INSTRUCTIONS
- No medication changes  - Follow up with Dr. Kevin Feng for yearly follow up first available  - Follow up with EP in 6-9 months   - If symptoms worsen call office for sooner appt.

## 2020-09-22 NOTE — PROGRESS NOTES
Hcl In Nacl] Rash    Moxifloxacin Rash    Niaspan [Niacin Er] Itching and Rash    Proventil [Albuterol Sulfate] Palpitations    Tagamet [Cimetidine] Rash     Home Medications:  Prior to Visit Medications    Medication Sig Taking?  Authorizing Provider   zinc sulfate (ZINCATE) 220 (50 Zn) MG capsule Take 50 mg by mouth daily  Historical Provider, MD   ATORVASTATIN CALCIUM PO Take by mouth  Historical Provider, MD   potassium chloride (KLOR-CON M) 20 MEQ extended release tablet Take 1 tablet by mouth daily  Emeka Cabrera MD   METHOCARBAMOL PO Take by mouth  Historical Provider, MD   ferrous gluconate (FERGON) 225 (27 Fe) MG tablet Take 240 mg by mouth 2 times daily  Historical Provider, MD   amLODIPine (NORVASC) 5 MG tablet Take 1 tablet by mouth every evening  Stevie Hilliard MD   tiZANidine (ZANAFLEX) 4 MG tablet Take 4 mg by mouth every 6 hours as needed (ONLY TAKES AT HS)   Historical Provider, MD   metoprolol tartrate (LOPRESSOR) 25 MG tablet Take 12.5 mg by mouth as needed (FOR A-FIB)   Historical Provider, MD   aspirin 81 MG tablet Take 81 mg by mouth daily  Historical Provider, MD   furosemide (LASIX) 40 MG tablet Take 1 tablet by mouth daily  Stevie Hilliard MD   isosorbide mononitrate (ISMO;MONOKET) 10 MG tablet Take 5 mg by mouth 2 times daily  Historical Provider, MD   Ascorbic Acid (VITAMIN C) 1000 MG tablet Take 1,000 mg by mouth daily  Historical Provider, MD   Cyanocobalamin (VITAMIN B-12 PO) Take 3,000 mcg by mouth daily  Historical Provider, MD   estradiol (ESTRACE) 0.1 MG/GM vaginal cream Place 2 g vaginally See Admin Instructions Twice a week  Historical Provider, MD   Cranberry 450 MG CAPS Take by mouth 2 times daily  Historical Provider, MD   pioglitazone (ACTOS) 15 MG tablet Take 15 mg by mouth daily  Historical Provider, MD   vitamin D (CHOLECALCIFEROL) 1000 UNIT TABS tablet Take 1,000 Units by mouth daily   Historical Provider, MD   spironolactone (ALDACTONE) 25 MG tablet Take 25 mg by mouth daily In pm  Historical Provider, MD   dexlansoprazole (DEXILANT) 60 MG CPDR delayed release capsule Take 60 mg by mouth daily  Historical Provider, MD   gabapentin (NEURONTIN) 100 MG capsule Take 400 mg by mouth 3 times daily. Abdi Geronimo Historical Provider, MD   TraZODone HCl (DESYREL PO) Take 50 mg by mouth nightly   Historical Provider, MD   losartan-hydrochlorothiazide (HYZAAR) 100-25 MG per tablet TK 1 T PO QAM  Historical Provider, MD   Calcium Citrate-Vitamin D (CALCIUM CITRATE + D PO) Take 2 tablets by mouth daily  Historical Provider, MD   SLOW-MAG 71.5-119 MG TBEC tablet Take 1 tablet by mouth daily 143 mg  Historical Provider, MD   fexofenadine (ALLEGRA) 180 MG tablet Take 180 mg by mouth daily  Historical Provider, MD   rosuvastatin (CRESTOR) 40 MG tablet Take 20 mg by mouth every evening   Historical Provider, MD   metFORMIN (GLUCOPHAGE) 500 MG tablet Take 500 mg by mouth 2 times daily (with meals)   Historical Provider, MD   ezetimibe (ZETIA) 10 MG tablet Take 5 mg by mouth nightly   Historical Provider, MD   escitalopram (LEXAPRO) 20 MG tablet Take 20 mg by mouth daily. Historical Provider, MD   nitroGLYCERIN (NITROLINGUAL) 0.4 MG/SPRAY spray Place 1 spray under the tongue every 5 minutes as needed. As directed for chest pain   Historical Provider, MD      Past Medical History:  Past Medical History:   Diagnosis Date    Anemia     Anesthesia     trouble after egd 10/2016. Anxiety and severe tremors after AF ablation 8/2018-responded well to Ativan    Anesthesia complication     flailing, yelling when waking up from anesthesia-ativan helps (bfttoz46/29/19: ativans works within seconds of administration)    Atrial fibrillation ([address] 10/01/2017    A. fib with SVR    CAD (coronary artery disease)     Chest pain 10/01/2017    Chronic kidney disease     ? ??    Depression     GERD (gastroesophageal reflux disease)     Glaucoma     Hiatal hernia     Hyperlipidemia     Hypertension     Migraines, neuralgic     Morbid obesity (HCC)     Osteoarthritis     Sleep apnea     uses CPAP    Type II or unspecified type diabetes mellitus without mention of complication, not stated as uncontrolled     Unspecified sleep apnea     uses cpap    Urinary incontinence     UTI (urinary tract infection)      Past Surgical History:    has a past surgical history that includes Coronary artery bypass graft (1999); Hammer toe surgery; Ovary removal (Bilateral, 1997); Esophagus dilation; Breast lumpectomy; Knee arthroscopy; Gastric Band (12/20/11); Colonoscopy; Upper gastrointestinal endoscopy; Upper gastrointestinal endoscopy (10/30/15); Cosmetic surgery; joint replacement (1995); joint replacement (1995); orif humerus decompression (Left, 2/25/2016); Upper gastrointestinal endoscopy (10/14/2016); Bariatric Surgery (11/03/2016); ablation of dysrhythmic focus; Colonoscopy (10/08/2018); Colonoscopy (N/A, 10/8/2018); pr njx dx/ther sbst intrlmnr crv/thrc w/img gdn (N/A, 11/5/2018); Cardiac surgery (2/22/1999); Cardiac catheterization; Injection Procedure For Sacroiliac Joint (Left, 12/17/2018); Enteroscopy (N/A, 2/26/2019); ventral hernia repair (N/A, 10/29/2019); and Abdomen surgery (N/A, 9/1/2020). Social History:  Reviewed. reports that she has never smoked. She has never used smokeless tobacco. She reports that she does not drink alcohol or use drugs. Family History:  Reviewed. family history includes Cancer in her mother; Heart Disease in her father; High Blood Pressure in her father and mother; Stroke in her sister. Denies family history of sudden cardiac death, arrhythmia, premature CAD    Review of System:  · Constitutional: No fevers, chills, weight changes or weakness  · HEENT: No visual changes. No mouth sores or sore throat. · Cardiovascular: denies chest pain, admits to dyspnea on exertion, admits to palpitations or denies loss of consciousness.  No cough, hemoptysis, denies pleuritic pain, or phlebitis. · Respiratory: denies cough or wheezing. No hematemesis. · Gastrointestinal: No abdominal pain, blood in stools. · Genitourinary: No dysuria, urgency or hematuria. · Musculoskeletal: denies gait disturbance, No muscle weakness. · Integumentary: No rash or pruritis. · Neurological: No headache, change in muscle strength, numbness or tingling. · Psychiatric: No confusion, anxiety, or depression. · Endocrine: No temperature intolerance. No excessive thirst, fluid intake, or urination. · Hem/Lymph: No abnormal bruising or bleeding, blood clots or swollen lymph nodes. Physical Examination:  There were no vitals filed for this visit. Wt Readings from Last 3 Encounters:   09/09/20 218 lb (98.9 kg)   09/01/20 218 lb 3 oz (99 kg)   08/20/20 221 lb (100.2 kg)       Constitutional: Cooperative and in no apparent distress, and appears well nourished  Skin: Warm and pink; no pallor, cyanosis, bruising, or clubbing  HEENT: Symmetric and normocephalic. PERRL, EOM intact. Conjunctiva pink with clear sclera. Mucus membranes pink and moist. Teeth intact. Thyroid smooth without nodules or goiter  Respiratory: Respirations symmetric and unlabored. Lungs clear to auscultation bilaterally, no wheezing, rhonchi, or crackles  Cardiovascular:  regular rate and rhythm. S1 & S2 present without murmurs, rubs, or gallops. Peripheral pulses 2+, capillary refill < 3 seconds. negative elevation of JVP. No peripheral edema  Gastrointestinal: Abdomen soft and round. Bowel sounds normoactive in all quadrants without tenderness or masses. Musculoskeletal: Bilateral upper and lower extremity strength 5/5 with full ROM. Neurological/Psych: Awake and orientated to person, place and time. Calm affect, appropriate mood.      Pertinent labs, diagnostic, device, and imaging results reviewed as a part of this visit    LABS    CBC:   Lab Results   Component Value Date    WBC 7.6 09/01/2020    HGB 10.7 (L) 09/01/2020    HCT 31.8 Lopressor 12.5 mg as needed  - No episodes on device  - [id number] score: 5 (age, gender, HTN, DM, CAD); DFP2YX1 Vasc score and anticoagulation discussed. High risk for stroke and thromboembolism.   ~ She has been off AC due to GI bleeding and acute anemia  ~ MEENU closure has been discussed in the past, however she had not had any recurrent AF, will consider if recurrent  - Afib risk factors including age, HTN, obesity, inactivity and RAHUL were discussed with patient. Risk factor modification recommended   ~ TSH 1.95 (5/22/2020)     - Treatment options including cardioversion, rate control strategy, antiarrhythmics, anticoagulation and possible ablation were discussed with patient. Risks, benefits and alternative of each treatment options were explained. All questions answered    ~ No recurrence on device, can consider ablation is recurrent  HTN-goal <130/80   - Controlled   - Continue current medications   - Encouraged patient to check BP at home, log and bring to office visits  - Discussed lifestyle modifications, weight loss, low sodium diet  CAD   - Remote CABG 1999   - [facility] 1/2017 showed patent grafts   - No reports of angina   - Continue medical therapy  Plan  - No medication changes  - Follow up with Dr. Breezy Torres for routine yearly exam    F/U: Follow-up with EP in 9 months   -Follow up with device clinic as scheduled  -Call [facility] at [phone] with any questions    Diet & Exercise:   The patient is counseled to follow a low salt diet to assure blood pressure remains controlled for cardiovascular risk factor modification   The patient is counseled to avoid excess caffeine, and energy drinks as this may exacerbated ectopy and arrhythmia   The patient is counseled to lose weight to control cardiovascular risk factors   Exercise program discussed:  To improve overall cardiovascular health, the patient is instructed to increase cardiovascular related activities with a goal of 150 min/week of moderate level activity or 10,000 steps per day. Encouraged to perform as much activity as tolerated    Quality Metrics  1. Tobacco Cessation Counseling: Nonsmoker, N/A   2. Retake of BP if >140/90: N/A  3. Documentation to PCP: Note sent to PCP office visit  4. CAD patient on anti-platelet: yes  5. CAD patient on STATIN therapy: yes  6. Patient with history of CHF and atrial fibrillation on anticoagulation: no     I have addressed the patient's cardiac risk factors and adjusted pharmacologic treatment as needed. In addition, I have reinforced the need for patient directed risk factor modification. I independently reviewed the device check interrogation and ECG    All questions and concerns were addressed with the patient. Alternatives to treatment were discussed. Thank you for allowing to us to participate in the care of Haleigh Magdaleno.     BILL Poole-CNP  [facility]   Office: [phone]

## 2020-09-23 NOTE — PROGRESS NOTES
Patient comes in for programming evaluation for her pacemaker. All sensing and pacing parameters are within normal range. Battery life 12.3 years. AP 77%.  <0.1%. 1 NSVT episode noted. Last on 5/18/2020 lasting 2 seconds. Patient remains on metoprolol. No changes need to be made at this time. Please see interrogation for more detail. Patient will see Dr. Denis Loyd today and will follow up in 3 months in office or remotely.

## 2020-09-28 ENCOUNTER — NURSE ONLY (OUTPATIENT)
Dept: CARDIOLOGY CLINIC | Age: 70
End: 2020-09-28
Payer: MEDICARE

## 2020-09-28 PROCEDURE — 93294 REM INTERROG EVL PM/LDLS PM: CPT | Performed by: INTERNAL MEDICINE

## 2020-09-28 PROCEDURE — 93296 REM INTERROG EVL PM/IDS: CPT | Performed by: INTERNAL MEDICINE

## 2020-09-28 NOTE — LETTER
[address] [facility] [phone]  [place]- [address] [facility] [phone]    Pacemaker/Defibrillator Clinic          09/28/20        Gildardo Almaguer  [address]  [facility] [address]        Dear Gildardo Almaguer    This letter is to inform you that we received the transmission from your monitor at home that checks your implanted heart device. The next date your monitor will automatically transmit will be 12-29-20. If your report needs attention we will notify you. Your device and monitor are wireless and most transmit cellularly, but please periodically check your monitor is still plugged in to the electrical outlet. If you still use the telephone land line to send please ensure the connection to the phone cynthia is secure. This will help to ensure successful automatic transmissions in the future. Also, the monitor needs to be close to you while sleeping at night. Please be aware that the remote device transmission sites are periodically monitored only during regular business hours during which simultaneous in-office device clinics are being run. If your transmission requires attention, we will contact you as soon as possible. Thank you.             [address]

## 2020-09-28 NOTE — PROGRESS NOTES
We received remote transmission from patient's monitor at home. Transmission shows normal sensing and pacing function. EP physician will review. See interrogation under cardiology tab in the [facility] [address] field for more details.

## 2020-10-01 ENCOUNTER — OFFICE VISIT (OUTPATIENT)
Dept: SURGERY | Age: 70
End: 2020-10-01

## 2020-10-01 VITALS
DIASTOLIC BLOOD PRESSURE: 50 MMHG | TEMPERATURE: 97.1 F | BODY MASS INDEX: 44.43 KG/M2 | WEIGHT: 220 LBS | SYSTOLIC BLOOD PRESSURE: 118 MMHG

## 2020-10-01 PROCEDURE — 99024 POSTOP FOLLOW-UP VISIT: CPT | Performed by: SURGERY

## 2020-10-01 NOTE — PROGRESS NOTES
[facility] Note  Staff: NADIA SaraviaC    PCP: Dr. Oscar Kumar    Chief Complaint: Recent unexplained weight loss, Hx of bladder cancer    Subjective:        HPI: Cristopher Hollins is a 76 y.o. male NEW PT presents today for  workup referred by his PCP for recent unexplained weight loss.  Pt has lost 16lbs within last 6 months, and also has hx of bladder cancer in the 1970s.    [facility] Bladder [facility]  [facility] [place] [facility]  Underwent iodine treatments    Questions asked the pt during ov today:  Dysuria: No  Gross Hematuria:No    Pt has history of Bladder cancer after he returned from [place] in , then the bladder cancer relapsed in     Recent PSA done 2020:  0.88    Recent Imaging performed on 2020:  IMPRESSION:  Diverticulosis coli without CT evidence of diverticulitis.  Calcified pleural plaque at the left posterior lung gutter may be due to prior bouts of pleurisy.  Similar finding is not seen on the right.  Heavy atherosclerotic calcification of the abdominal aorta and at the origin of the superior mesenteric artery.  Chronic degenerative disc disease at every level of the lumbar spine.    Last PSA Screening:   Lab Results   Component Value Date    PSA 1.2 10/03/2014    PSA 1.54 2012    PSADIAG 0.88 2020     AUA SS Today: 4/0  Feeling of ICBE: 0  Frequency:2  Intermittency:1  Urgency:0  Weak urine stream:0  Strainin  Nocturia:1    REVIEW OF SYSTEMS:  A comprehensive 10 system review was performed and is negative except as noted above in HPI    PMHx:  Past Medical History:   Diagnosis Date    Cancer     bladder    Cataract     Hypertension     Neck pain     Wears dentures     upper and lower bridge    Wears glasses      Kidney stones: No  Cataracts? None, wears glasses    PSHx:  Past Surgical History:   Procedure Laterality Date    BLADDER SURGERY      EYE SURGERY  Subjective:      Patient ID: Gil Lin is a 72 y.o. female. HPI    Review of Systems    Objective:   Physical Exam  Incision healing well without evidence of infection  There is a rash around the incision  Assessment:      70-year-old female status post excision of a chronic left upper quadrant abdominal wound with primary closure. The incision is healing well without evidence of infection. There is a rash around the incision which is most likely an allergic reaction to the glue or the prep. This should continue to resolve with time. Plan:      Follow up in 2 weeks if the skin rash has not completely resolved.         Jose Holden MD Bilateral     cataract    HERNIA REPAIR Bilateral 04/21/2016    INJECTION OF ANESTHETIC AGENT AROUND MEDIAL BRANCH NERVES INNERVATING CERVICAL FACET JOINT Left 10/11/2018    Procedure: Block-nerve-medial branch-cervical;  Surgeon: Bon Charles MD;  Location: [facility] OR;  Service: Pain Management;  Laterality: Left;  C3, 4, 5,6     KNEE ARTHROPLASTY Left 11/27/2018    Procedure: ARTHROPLASTY, KNEE;  Surgeon: Aashish Hall MD;  Location: Health system OR;  Service: Orthopedics;  Laterality: Left;    KNEE SURGERY  1965    left knee repair     Fam Hx:   malignancies: No    kidney stones: No     Soc Hx:  +tobacco use, Cigars socially    Allergies:  Oxycontin [oxycodone]    Medications: reviewed   Anticoagulation: No    Objective:     Vitals:    07/10/20 1445   BP: 133/73   Pulse: 74     General:WDWN in NAD  Eyes: PERRLA, normal conjunctiva  Respiratory: no increased work on breathing, clear to auscultation  Cardiovascular: regular rate and rhythm. No obvious extremity edema.  GI: palpation of masses. No tenderness. No hepatosplenomegaly to palpation.  Musculoskeletal: normal range of motion of bilateral upper extremities. Normal muscle strength and tone.  Skin: no obvious rashes or lesions. No tightening of skin noted.  Neurologic: CN grossly normal. Normal sensation.   Psychiatric: awake, alert and oriented x 3. Mood and affect normal. Cooperative.    :  Inspection of anus and perineum normal  CHARITO: 25-30g mildy enlarged prostate gland without nodules, masses, tenderness. SV not palpable. Normal sphincter tone.   +Outer hemhorroids.    LABS REVIEW:  UA today:  Color:Clear, Yellow  Spec. Grav.  1.020  PH  5.0  Negative for leukocytes, nitrates, protein, glucose, ketones, urobili, bili, and blood.    Assessment:       1. Lower urinary tract symptoms    2. History of bladder cancer    3. Recent unexplained weight loss          Plan:   Hx of bladder cancer, re-establish care with Uro, recent weight loss:    1.  Urine  culture, and Urine cytology to be performed due to pt's hx of bladder cancer and no recent f/up.    2.  Consult with Urologist to see if pt is a current candidate to undergo Cystoscopy to rule out any abnormalities at this time due to hx of bladder cancer and recent weight loss issues.    F/u with Urologist.    [facility]: Active    Carla Redmond, FNP-C

## 2020-10-06 ENCOUNTER — HOSPITAL ENCOUNTER (OUTPATIENT)
Dept: MRI IMAGING | Age: 70
Discharge: HOME | End: 2020-10-06
Payer: MEDICARE

## 2020-10-06 PROCEDURE — 72148 MRI LUMBAR SPINE W/O DYE: CPT

## 2020-10-13 ENCOUNTER — HOSPITAL ENCOUNTER (OUTPATIENT)
Dept: GENERAL RADIOLOGY | Age: 70
Discharge: HOME | End: 2020-10-13
Payer: MEDICARE

## 2020-10-13 ENCOUNTER — HOSPITAL ENCOUNTER (OUTPATIENT)
Age: 70
Discharge: HOME | End: 2020-10-13
Payer: MEDICARE

## 2020-10-13 PROCEDURE — 72110 X-RAY EXAM L-2 SPINE 4/>VWS: CPT

## 2020-12-04 NOTE — PROGRESS NOTES
[address]       H+P // CONSULT // OUTPATIENT VISIT // FOLLOWUP VISIT     Referring Doctor Jo Garza, DO   Encounter Type Followup     CHIEF COMPLAINT     Visit Type Chronic   Symptoms Mild sob   Problems CAD s/p CABG, pAFIB, SSS, HTN, CHOL, OBESITY      HISTORY OF PRESENT ILLNESS      GEN - Doing well. No new concerns. Stable mild sob.  CAD - Denies cp, dizziness, syncope. Rare palpitations. Mild chronic sob unchanged   pAFIB - no palpitations. Recent device interrogation with no afib.  HTN - Ambulatory BP readings in good range. No ha or dizziness.  CHOL - Last cholesterol reviewed and in good range. Tolerating statin.  OBESITY - Remains problematic. Noncompliant with diet and exercise.  MED - Compliant with CV meds listed below without notable side effects   DATA - Most recent testing including but not limited to LHC, TTE, EKG reviewed personally and independently interpreted. HISTORY/ALLERGIES/ROS     MedHx:  has a past medical history of Anemia, Anesthesia, Anesthesia complication, Atrial fibrillation ([address] CAD (coronary artery disease), Chest pain, Chronic kidney disease, Depression, GERD (gastroesophageal reflux disease), Glaucoma, Hiatal hernia, Hyperlipidemia, Hypertension, Migraines, neuralgic, Morbid obesity ([address] Osteoarthritis, Sleep apnea, Type II or unspecified type diabetes mellitus without mention of complication, not stated as uncontrolled, Unspecified sleep apnea, Urinary incontinence, and UTI (urinary tract infection). SurgHx:  has a past surgical history that includes Coronary artery bypass graft (1999); Hammer toe surgery; Ovary removal (Bilateral, 1997); Esophagus dilation; Breast lumpectomy; Knee arthroscopy; Gastric Band (12/20/11); Colonoscopy; Upper gastrointestinal endoscopy; Upper gastrointestinal endoscopy (10/30/15);  Cosmetic surgery; joint replacement (1995); joint replacement (1995); orif humerus decompression (Left, 2/25/2016); Upper gastrointestinal endoscopy (10/14/2016); Bariatric Surgery (11/03/2016); ablation of dysrhythmic focus; Colonoscopy (10/08/2018); Colonoscopy (N/A, 10/8/2018); pr njx dx/ther sbst intrlmnr crv/thrc w/img gdn (N/A, 11/5/2018); Cardiac surgery (2/22/1999); Cardiac catheterization; Injection Procedure For Sacroiliac Joint (Left, 12/17/2018); Enteroscopy (N/A, 2/26/2019); ventral hernia repair (N/A, 10/29/2019); and Abdomen surgery (N/A, 9/1/2020). SocHx:  reports that she has never smoked. She has never used smokeless tobacco. She reports that she does not drink alcohol or use drugs. FamHx: family history includes Cancer in her mother; Heart Disease in her father; High Blood Pressure in her father and mother; Stroke in her sister. Allergies:Albuterol; Heparin; Niacin; Avelox [moxifloxacin hcl in nacl]; Moxifloxacin; Niaspan [niacin er];  Proventil [albuterol sulfate]; and Tagamet [cimetidine]   ROS: [x]Full ROS obtained and negative except as mentioned in HPI     MEDICATIONS      Current Outpatient Medications   Medication Sig Dispense Refill    zinc sulfate (ZINCATE) 220 (50 Zn) MG capsule Take 50 mg by mouth daily      ATORVASTATIN CALCIUM PO Take by mouth      potassium chloride (KLOR-CON M) 20 MEQ extended release tablet Take 1 tablet by mouth daily 90 tablet 3    METHOCARBAMOL PO Take by mouth      ferrous gluconate (FERGON) 225 (27 Fe) MG tablet Take 240 mg by mouth 2 times daily      amLODIPine (NORVASC) 5 MG tablet Take 1 tablet by mouth every evening 90 tablet 3    tiZANidine (ZANAFLEX) 4 MG tablet Take 4 mg by mouth every 6 hours as needed (ONLY TAKES AT HS)       metoprolol tartrate (LOPRESSOR) 25 MG tablet Take 12.5 mg by mouth as needed (FOR A-FIB)       aspirin 81 MG tablet Take 81 mg by mouth daily      furosemide (LASIX) 40 MG tablet Take 1 tablet by mouth daily 30 tablet 5    isosorbide mononitrate (ISMO;MONOKET) 10 MG tablet Take 5 mg by mouth 2 times daily      Ascorbic Acid (VITAMIN C) 1000 MG tablet Take 1,000 mg by mouth daily      Cyanocobalamin (VITAMIN B-12 PO) Take 3,000 mcg by mouth daily      estradiol (ESTRACE) 0.1 MG/GM vaginal cream Place 2 g vaginally See Admin Instructions Twice a week      Cranberry 450 MG CAPS Take by mouth 2 times daily      pioglitazone (ACTOS) 15 MG tablet Take 15 mg by mouth daily      vitamin D (CHOLECALCIFEROL) 1000 UNIT TABS tablet Take 1,000 Units by mouth daily       spironolactone (ALDACTONE) 25 MG tablet Take 25 mg by mouth daily In pm      dexlansoprazole (DEXILANT) 60 MG CPDR delayed release capsule Take 60 mg by mouth daily      gabapentin (NEURONTIN) 100 MG capsule Take 400 mg by mouth 3 times daily. Raynold Glad TraZODone HCl (DESYREL PO) Take 50 mg by mouth nightly       losartan-hydrochlorothiazide (HYZAAR) 100-25 MG per tablet TK 1 T PO QAM  3    Calcium Citrate-Vitamin D (CALCIUM CITRATE + D PO) Take 2 tablets by mouth daily      SLOW-MAG 71.5-119 MG TBEC tablet Take 1 tablet by mouth daily 143 mg  5    fexofenadine (ALLEGRA) 180 MG tablet Take 180 mg by mouth daily      rosuvastatin (CRESTOR) 40 MG tablet Take 20 mg by mouth every evening       metFORMIN (GLUCOPHAGE) 500 MG tablet Take 500 mg by mouth 2 times daily (with meals)       ezetimibe (ZETIA) 10 MG tablet Take 5 mg by mouth nightly       escitalopram (LEXAPRO) 20 MG tablet Take 20 mg by mouth daily.  nitroGLYCERIN (NITROLINGUAL) 0.4 MG/SPRAY spray Place 1 spray under the tongue every 5 minutes as needed. As directed for chest pain        No current facility-administered medications for this visit.       Reviewed with patient and will remain unchanged except as mentioned in A/P  PHYSICAL EXAM     Vitals:    12/07/20 1419   BP: (!) 120/58   Pulse: 59   SpO2: 94%      Gen Alert, coop, no distress Heart  Rrr, 1/6   Head NC, AT, no abnorm Abd  Soft, NT, +BS, no mass, no OM   Eyes PER, conj/corn clear Ext  Ext nl, AT, no C/C/E   Nose Nares nl, no supervision. Any additions to this intellectual property were performed in my presence and at my direction. Furthermore, the content and accuracy of this note have been reviewed by carlos Levine MD). 12/7/2020 12:07 PM     CODING     Category Diagnosis   Stable chronic illness  (00875/34806 - 2 or more) CAD  AFIB  HTN  CHOL   Chronic illness with: Exac, progr or SA of Tx  (14626/90374 - 1 or more) OBESITY   Undiagnosed new problem with: uncertain prognosis  (41165/54426 - 1 or more)    Acute illness with systemic Sx  (07202/16883 - 1 or more)    Acute, complicated injury  (09030/23412 - 1 or more)    20154 1 or more chronic illness with exacerbation, progression or SA of treatment    Time  30-39 minutes spent preparing to see patient including reviewing patient history/prior tests/prior consults, performing a medical exam, counseling and educating patient/family/caregiver, ordering medications/tests/procedures, referring and communicating with PCPs and other pertinent consultants, documenting information in the EMR, independently interpreting results and communicating to family and coordination of patient care.

## 2020-12-07 ENCOUNTER — OFFICE VISIT (OUTPATIENT)
Dept: CARDIOLOGY CLINIC | Age: 70
End: 2020-12-07
Payer: MEDICARE

## 2020-12-07 VITALS
DIASTOLIC BLOOD PRESSURE: 58 MMHG | HEART RATE: 59 BPM | WEIGHT: 222 LBS | SYSTOLIC BLOOD PRESSURE: 120 MMHG | HEIGHT: 60 IN | BODY MASS INDEX: 43.59 KG/M2 | OXYGEN SATURATION: 94 %

## 2020-12-07 PROCEDURE — G8484 FLU IMMUNIZE NO ADMIN: HCPCS | Performed by: INTERNAL MEDICINE

## 2020-12-07 PROCEDURE — 1090F PRES/ABSN URINE INCON ASSESS: CPT | Performed by: INTERNAL MEDICINE

## 2020-12-07 PROCEDURE — G8399 PT W/DXA RESULTS DOCUMENT: HCPCS | Performed by: INTERNAL MEDICINE

## 2020-12-07 PROCEDURE — 99214 OFFICE O/P EST MOD 30 MIN: CPT | Performed by: INTERNAL MEDICINE

## 2020-12-07 PROCEDURE — 3017F COLORECTAL CA SCREEN DOC REV: CPT | Performed by: INTERNAL MEDICINE

## 2020-12-07 PROCEDURE — 1036F TOBACCO NON-USER: CPT | Performed by: INTERNAL MEDICINE

## 2020-12-07 PROCEDURE — G8417 CALC BMI ABV UP PARAM F/U: HCPCS | Performed by: INTERNAL MEDICINE

## 2020-12-07 PROCEDURE — G8427 DOCREV CUR MEDS BY ELIG CLIN: HCPCS | Performed by: INTERNAL MEDICINE

## 2020-12-07 PROCEDURE — 4040F PNEUMOC VAC/ADMIN/RCVD: CPT | Performed by: INTERNAL MEDICINE

## 2020-12-07 PROCEDURE — 1123F ACP DISCUSS/DSCN MKR DOCD: CPT | Performed by: INTERNAL MEDICINE

## 2020-12-07 NOTE — LETTER
[address] [place] [address] [facility]iology [place] [address] [facility] [address]  Phone: [phone]  Fax: [phone]    Cherene Kehr, MD        December 10, 2020     Kylah Baldwin , [address] [place] [id number]    Patient: Christine Goodman  MR Number: [id number]  YOB: 1950  Date of Visit: 12/7/2020    Dear Dr. Montero Denny :      [address]       H+P // CONSULT // OUTPATIENT VISIT // Niko Moore     Referring Doctor Kylah Denny , DO   Encounter Type Followup     CHIEF COMPLAINT     Visit Type Chronic   Symptoms Mild sob   Problems CAD s/p CABG, pAFIB, SSS, HTN, CHOL, OBESITY      HISTORY OF PRESENT ILLNESS     ? GEN - Doing well. No new concerns. Stable mild sob. ? CAD - Denies cp, dizziness, syncope. Rare palpitations. Mild chronic sob unchanged  ? pAFIB - no palpitations. Recent device interrogation with no afib. ? HTN - Ambulatory BP readings in good range. No ha or dizziness. ? CHOL - Last cholesterol reviewed and in good range. Tolerating statin. ? OBESITY - Remains problematic. Noncompliant with diet and exercise. ? MED - Compliant with CV meds listed below without notable side effects  ? DATA - Most recent testing including but not limited to LHC, TTE, EKG reviewed personally and independently interpreted. HISTORY/ALLERGIES/ROS     MedHx:  has a past medical history of Anemia, Anesthesia, Anesthesia complication, Atrial fibrillation ([address] CAD (coronary artery disease), Chest pain, Chronic kidney disease, Depression, GERD (gastroesophageal reflux disease), Glaucoma, Hiatal hernia, Hyperlipidemia, Hypertension, Migraines, neuralgic, Morbid obesity ([address] Osteoarthritis, Sleep apnea, Type II or unspecified type diabetes mellitus without mention of complication, not stated as uncontrolled, Unspecified sleep apnea, Urinary incontinence, and UTI (urinary tract infection). SurgHx:  has a past surgical history that includes Coronary artery bypass graft (1999); Hammer toe surgery; Ovary removal (Bilateral, 1997); Esophagus dilation; Breast lumpectomy; Knee arthroscopy; Gastric Band (12/20/11); Colonoscopy; Upper gastrointestinal endoscopy; Upper gastrointestinal endoscopy (10/30/15); Cosmetic surgery; joint replacement (1995); joint replacement (1995); orif humerus decompression (Left, 2/25/2016); Upper gastrointestinal endoscopy (10/14/2016); Bariatric Surgery (11/03/2016); ablation of dysrhythmic focus; Colonoscopy (10/08/2018); Colonoscopy (N/A, 10/8/2018); pr njx dx/ther sbst intrlmnr crv/thrc w/img gdn (N/A, 11/5/2018); Cardiac surgery (2/22/1999); Cardiac catheterization; Injection Procedure For Sacroiliac Joint (Left, 12/17/2018); Enteroscopy (N/A, 2/26/2019); ventral hernia repair (N/A, 10/29/2019); and Abdomen surgery (N/A, 9/1/2020). SocHx:  reports that she has never smoked. She has never used smokeless tobacco. She reports that she does not drink alcohol or use drugs. FamHx: family history includes Cancer in her mother; Heart Disease in her father; High Blood Pressure in her father and mother; Stroke in her sister. Allergies:Albuterol; Heparin; Niacin; Avelox [moxifloxacin hcl in nacl]; Moxifloxacin; Niaspan [niacin er];  Proventil [albuterol sulfate]; and Tagamet [cimetidine]   ROS: [x]Full ROS obtained and negative except as mentioned in HPI     MEDICATIONS      Current Outpatient Medications   Medication Sig Dispense Refill    zinc sulfate (ZINCATE) 220 (50 Zn) MG capsule Take 50 mg by mouth daily      ATORVASTATIN CALCIUM PO Take by mouth      potassium chloride (KLOR-CON M) 20 MEQ extended release tablet Take 1 tablet by mouth daily 90 tablet 3    METHOCARBAMOL PO Take by mouth      ferrous gluconate (FERGON) 225 (27 Fe) MG tablet Take 240 mg by mouth 2 times daily      amLODIPine (NORVASC) 5 MG tablet Take 1 tablet by mouth every evening 90 tablet 3 tongue every 5 minutes as needed. As directed for chest pain        No current facility-administered medications for this visit. Reviewed with patient and will remain unchanged except as mentioned in A/P  PHYSICAL EXAM     Vitals:    12/07/20 1419   BP: (!) 120/58   Pulse: 59   SpO2: 94%      Gen Alert, coop, no distress Heart  Rrr, 1/6   Head NC, AT, no abnorm Abd  Soft, NT, +BS, no mass, no OM   Eyes PER, conj/corn clear Ext  Ext nl, AT, no C/C/E   Nose Nares nl, no drain, NT Pulse 2+ and symmetric   Throat Lips, mucosa, tongue nl Skin Col/text/turg nl, no vis rash/les   Neck S/S, TM, NT, no bruit/JVD Psych Nl mood and affect   Lung CTA-B, unlabored, no DTP Lymph   No cervical or axillary LA   Ch wall NT, no deform Neuro  Nl gross M/S exam     ASSESSMENT AND PLAN     *CAD   Date EF Results   Sx   No concerning   Hx 1999  CABGx3 LIMA-LAD, [id number] (ANNA)   LHC 9/15  1/17 55%  55% Patent grafts Faith Most)  Patent grafts Faith Most)   MPI 9/15 72% Anteroapical ischemia   TTE 9/15  8/18  9/19 55%  55%  55%    Plan   Continue aggressive medical treatment at doses above   *AFIB/SSS  Status parox, 8/18 Ablation, 5/19 PM implant, most recent TTE, monitors, EP procedures reviewed personally   Plan No further afib by device interrogations. Continue asa. *HTN  Status Controlled, vitals and available ambulatory monitoring logs reviewed personally  Plan Counseled on diet/salt/exercise/weight, continue meds at doses above  *CHOL  Status  Controlled with last LDL of 47 (goal <70) and HDL of 36 (5/20), labs reviewed personally  Plan Counseled on diet/exercise/weight, continue high-intensity statin, lipid/liver surveillance per PCP  *OBESITY  Status Uncontrolled with a BMI of Body mass index is 44.09 kg/m². , available historical weights reviewed personally  Plan Counseled on diet, exercise, weight loss options in detail  *COMPLIANCE  Status Compliant  Plan Discussed importance of compliance with meds/diet/salt/exercise; avoid tob/alc/drugs; patient verbalized understanding  *FOLLOWUP  12 months    [address], am scribing for and in the presence of Kassie Low MD.   SignedJosé 12/04/20 12:37 PM   Provider Clifford Ward is working as a scribe for and in the presence of me Kassie Low MD). Working as a scribe, José Ramirez may have prepopulated components of this note with my historical  intellectual property under my direct supervision. Any additions to this intellectual property were performed in my presence and at my direction. Furthermore, the content and accuracy of this note have been reviewed by me Kassie Low MD). 12/7/2020 12:07 PM     CODING     Category Diagnosis   Stable chronic illness  (34570/07903 - 2 or more) CAD  AFIB  HTN  CHOL   Chronic illness with: Exac, progr or SA of Tx  (71944/03486 - 1 or more) OBESITY   Undiagnosed new problem with: uncertain prognosis  (09225/02348 - 1 or more)    Acute illness with systemic Sx  (09740/36486 - 1 or more)    Acute, complicated injury  (63706/26453 - 1 or more)    02653 1 or more chronic illness with exacerbation, progression or SA of treatment    Time  30-39 minutes spent preparing to see patient including reviewing patient history/prior tests/prior consults, performing a medical exam, counseling and educating patient/family/caregiver, ordering medications/tests/procedures, referring and communicating with PCPs and other pertinent consultants, documenting information in the EMR, independently interpreting results and communicating to family and coordination of patient care. If you have questions, please do not hesitate to call me. I look forward to following Lesia Merida along with you.     Sincerely,        Gretchen Ashraf MD

## 2020-12-21 ENCOUNTER — HOSPITAL ENCOUNTER (OUTPATIENT)
Dept: WOMENS IMAGING | Age: 70
Discharge: HOME | End: 2020-12-21
Payer: MEDICARE

## 2020-12-21 ENCOUNTER — HOSPITAL ENCOUNTER (OUTPATIENT)
Dept: ULTRASOUND IMAGING | Age: 70
Discharge: HOME | End: 2020-12-21
Payer: MEDICARE

## 2020-12-21 PROCEDURE — 76642 ULTRASOUND BREAST LIMITED: CPT

## 2020-12-21 PROCEDURE — G0279 TOMOSYNTHESIS, MAMMO: HCPCS

## 2020-12-29 ENCOUNTER — TELEPHONE (OUTPATIENT)
Dept: CARDIOLOGY CLINIC | Age: 70
End: 2020-12-29

## 2020-12-29 NOTE — TELEPHONE ENCOUNTER
[facility]EE[facility] Gra[facility] She got a letter from this office that her phone transmission was not received . She called [employer] and they said her \"unit\" isnt working and they are sending her a new one . She will call when she gets the new monitor .

## 2020-12-29 NOTE — TELEPHONE ENCOUNTER
Called and spoke with the patient and requested that when she received her new monitor to send a transmission and advise once complete. She voiced understanding .

## 2021-01-04 ENCOUNTER — NURSE ONLY (OUTPATIENT)
Dept: CARDIOLOGY CLINIC | Age: 71
End: 2021-01-04
Payer: MEDICARE

## 2021-01-04 DIAGNOSIS — R00.1 BRADYCARDIA: ICD-10-CM

## 2021-01-04 DIAGNOSIS — Z95.0 PACEMAKER: ICD-10-CM

## 2021-01-04 PROCEDURE — 93294 REM INTERROG EVL PM/LDLS PM: CPT | Performed by: INTERNAL MEDICINE

## 2021-01-04 PROCEDURE — 93296 REM INTERROG EVL PM/IDS: CPT | Performed by: INTERNAL MEDICINE

## 2021-03-07 ENCOUNTER — APPOINTMENT (OUTPATIENT)
Dept: GENERAL RADIOLOGY | Age: 71
End: 2021-03-07
Payer: MEDICARE

## 2021-03-07 ENCOUNTER — HOSPITAL ENCOUNTER (EMERGENCY)
Age: 71
Discharge: HOME | End: 2021-03-07
Payer: MEDICARE

## 2021-03-07 ENCOUNTER — APPOINTMENT (OUTPATIENT)
Dept: CT IMAGING | Age: 71
End: 2021-03-07
Payer: MEDICARE

## 2021-03-07 VITALS
RESPIRATION RATE: 18 BRPM | HEART RATE: 82 BPM | SYSTOLIC BLOOD PRESSURE: 161 MMHG | DIASTOLIC BLOOD PRESSURE: 69 MMHG | OXYGEN SATURATION: 96 % | TEMPERATURE: 97.1 F

## 2021-03-07 DIAGNOSIS — S09.90XA CLOSED HEAD INJURY, INITIAL ENCOUNTER: Primary | ICD-10-CM

## 2021-03-07 DIAGNOSIS — S00.03XA SCALP HEMATOMA, INITIAL ENCOUNTER: ICD-10-CM

## 2021-03-07 PROCEDURE — 73562 X-RAY EXAM OF KNEE 3: CPT

## 2021-03-07 PROCEDURE — 72131 CT LUMBAR SPINE W/O DYE: CPT

## 2021-03-07 PROCEDURE — 72125 CT NECK SPINE W/O DYE: CPT

## 2021-03-07 PROCEDURE — 99282 EMERGENCY DEPT VISIT SF MDM: CPT

## 2021-03-07 PROCEDURE — 70450 CT HEAD/BRAIN W/O DYE: CPT

## 2021-03-07 ASSESSMENT — ENCOUNTER SYMPTOMS
ABDOMINAL PAIN: 0
SHORTNESS OF BREATH: 0
VOMITING: 0
NAUSEA: 0

## 2021-03-07 NOTE — ED PROVIDER NOTES
[address]        Pt Name: Kenyetta Berg  MRN: [id number]  [place] 1950  Date of evaluation: 3/7/2021  Provider: Magdaleno Pritchett PA-C  PCP: Ramírez Ledbetter,     OK. I have evaluated this patient. My supervising physician was available for consultation. CHIEF COMPLAINT       Chief Complaint   Patient presents with    Fall     pt. walking down steps and fell over and landed on rocks. pt. states she hit her head. c/o head pain, back pain, right knee pain       HISTORY OF PRESENT ILLNESS   (Location, Timing/Onset, Context/Setting, Quality, Duration, Modifying Factors, Severity, Associated Signs and Symptoms)  Note limiting factors. Kenyetta Berg is a 72 y.o. female patient presents emergency department for evaluation of injury after a fall. Patient was leaving her daughter's house when she tripped and fell into her daughter's quite pond. She states she struck her head on some loose rocks. She did not have any loss of consciousness. Patient states she has bilateral knee pain and has a history of bilateral knee replacements. She states she is having some neck and lower back pain. Patient states she has chronic lower back pain which hurts worse after the fall. Patient states she is having pain to her bilateral neck muscles. Denies any other injuries at this time. Denies any chest pain, shortness of breath, abdominal pain, nausea vomiting or diarrhea. Patient's  who is at bedside states she has been acting normal since the incident. Nursing Notes were all reviewed and agreed with or any disagreements were addressed in the HPI. REVIEW OF SYSTEMS    (2-9 systems for level 4, 10 or more for level 5)     Review of Systems   Constitutional: Negative for fatigue and fever. HENT: Negative. Eyes: Negative for visual disturbance. Respiratory: Negative for shortness of breath.     Cardiovascular: Negative for chest pain. Gastrointestinal: Negative for abdominal pain, nausea and vomiting. Musculoskeletal: Positive for arthralgias. Skin: Negative. Neurological: Negative. Positives and Pertinent negatives as per HPI. Except as noted above in the ROS, all other systems were reviewed and negative. PAST MEDICAL HISTORY     Past Medical History:   Diagnosis Date    Anemia     Anesthesia     trouble after egd 10/2016. Anxiety and severe tremors after AF ablation 8/2018-responded well to Ativan    Anesthesia complication     flailing, yelling when waking up from anesthesia-ativan helps ([id number]/19: ativans works within seconds of administration)    Atrial fibrillation ([facility] [address] 10/01/2017    A. fib with SVR    CAD (coronary artery disease)     Chest pain 10/01/2017    Chronic kidney disease     ? ??    Depression     GERD (gastroesophageal reflux disease)     Glaucoma     Hiatal hernia     Hyperlipidemia     Hypertension     Migraines, neuralgic     Morbid obesity ([facility] [address]     Osteoarthritis     Sleep apnea     uses CPAP    Type II or unspecified type diabetes mellitus without mention of complication, not stated as uncontrolled     Unspecified sleep apnea     uses cpap    Urinary incontinence     UTI (urinary tract infection)          SURGICAL HISTORY     Past Surgical History:   Procedure Laterality Date    ABDOMEN SURGERY N/A 9/1/2020    EXPLORATION OF ABDOMINAL WOUND performed by Erminia Primrose, MD at [facility]  2/22/1999    quadruple by-pass, [address] COLONOSCOPY  10/08/2018    1 polyp removed    COLONOSCOPY N/A 10/8/2018    COLONOSCOPY POLYPECTOMY SNARE/COLD BIOPSY performed by Rico Lincoln MD at [address] [place] [address]    COSMETIC SURGERY      face lift    ENTEROSCOPY N/A 2/26/2019 ENTEROSCOPY performed by Alison Marino MD at [address] GASTRIC BAND  12/20/11    hiatal hernia repair    GASTRIC BAND REMOVAL  11/03/2016    laparoscopic     HAMMER TOE SURGERY      HC INJECTION PROCEDURE FOR SACROILIAC JOINT Left 12/17/2018    SACROILIAC JOINT INJECTION ON THE LEFT WITH FLUOROSCOPY performed by Zina Gonzalez MD at [address]    both   [address]    Left and Right knees, [facility]    KNEE ARTHROSCOPY      1982 left    ORIF HUMERUS DECOMPRESSION Left 2/25/2016    OPEN REDUCTION INTERNAL FIXATION LEFT PROXIMAL HUMERUS    OVARY REMOVAL Bilateral 1997    MS NJX DX/THER SBST INTRLMNR CRV/THRC W/IMG GDN N/A 11/5/2018    MIDLINE L4/L5 LUMBAR INTERLAMINAR EPIDURAL STEROID INJECTION WITH FLUOROSCOPY performed by Zina Gonzalez MD at [address] [address] ENDOSCOPY  10/30/15    UPPER GASTROINTESTINAL ENDOSCOPY  10/14/2016    with biopsy    VENTRAL HERNIA REPAIR N/A 10/29/2019    OPEN VENTRAL HERNIA REPAIR WITH  MESH performed by Estanislado Krabbe, MD at [address] [place] [address]       Previous Medications    AMLODIPINE (NORVASC) 5 MG TABLET    Take 1 tablet by mouth every evening    ASCORBIC ACID (VITAMIN C) 1000 MG TABLET    Take 1,000 mg by mouth daily    ASPIRIN 81 MG TABLET    Take 81 mg by mouth daily    CALCIUM CITRATE-VITAMIN D (CALCIUM CITRATE + D PO)    Take 2 tablets by mouth daily    CRANBERRY 450 MG CAPS    Take by mouth 2 times daily    CYANOCOBALAMIN (VITAMIN B-12 PO)    Take 3,000 mcg by mouth daily    DEXLANSOPRAZOLE (DEXILANT) 60 MG CPDR DELAYED RELEASE CAPSULE    Take 60 mg by mouth daily    ESCITALOPRAM (LEXAPRO) 20 MG TABLET    Take 20 mg by mouth daily.     ESTRADIOL (ESTRACE) 0.1 MG/GM VAGINAL CREAM    Place 2 g vaginally See Admin Instructions Twice a week    EZETIMIBE (ZETIA) 10 MG TABLET    Take 5 mg by mouth nightly     FERROUS GLUCONATE (FERGON) 225 (27 FE) MG TABLET    Take 240 mg by mouth 2 times daily    FEXOFENADINE (ALLEGRA) 180 MG TABLET    Take 180 mg by mouth daily    FUROSEMIDE (LASIX) 40 MG TABLET    Take 1 tablet by mouth daily    GABAPENTIN (NEURONTIN) 100 MG CAPSULE    Take 400 mg by mouth 3 times daily. .    ISOSORBIDE MONONITRATE (ISMO;MONOKET) 10 MG TABLET    Take 5 mg by mouth 2 times daily    LOSARTAN-HYDROCHLOROTHIAZIDE (HYZAAR) 100-25 MG PER TABLET    TK 1 T PO QAM    METFORMIN (GLUCOPHAGE) 500 MG TABLET    Take 500 mg by mouth 2 times daily (with meals)     METOPROLOL TARTRATE (LOPRESSOR) 25 MG TABLET    Take 12.5 mg by mouth as needed (FOR A-FIB)     NITROGLYCERIN (NITROLINGUAL) 0.4 MG/SPRAY SPRAY    Place 1 spray under the tongue every 5 minutes as needed.  As directed for chest pain     PIOGLITAZONE (ACTOS) 15 MG TABLET    Take 15 mg by mouth daily    POTASSIUM CHLORIDE (KLOR-CON M) 20 MEQ EXTENDED RELEASE TABLET    Take 1 tablet by mouth daily    ROSUVASTATIN (CRESTOR) 40 MG TABLET    Take 20 mg by mouth every evening     SLOW-MAG 71.5-119 MG TBEC TABLET    Take 1 tablet by mouth daily 143 mg    SPIRONOLACTONE (ALDACTONE) 25 MG TABLET    Take 25 mg by mouth daily In pm    TIZANIDINE (ZANAFLEX) 4 MG TABLET    Take 4 mg by mouth every 6 hours as needed (ONLY TAKES AT HS)     TRAZODONE HCL (DESYREL PO)    Take 50 mg by mouth nightly     VITAMIN D (CHOLECALCIFEROL) 1000 UNIT TABS TABLET    Take 1,000 Units by mouth daily     ZINC SULFATE (ZINCATE) 220 (50 ZN) MG CAPSULE    Take 50 mg by mouth daily         ALLERGIES     Albuterol, Heparin, Niacin, Avelox [moxifloxacin hcl in nacl], Moxifloxacin, Niaspan [niacin er], Proventil [albuterol sulfate], and Tagamet [cimetidine]    FAMILYHISTORY       Family History   Problem Relation Age of Onset    Cancer Mother         ovarian, colon    High Blood Pressure Mother     Heart Disease Father     High Blood Pressure Father     Stroke Sister         paralysed on right side B/C of stroke          SOCIAL HISTORY       Social History     Tobacco Use    Smoking status: Never Smoker    Smokeless tobacco: Never Used   Substance Use Topics    Alcohol use: No     Alcohol/week: 0.0 standard drinks    Drug use: No       SCREENINGS             PHYSICAL EXAM    (up to 7 for level 4, 8 or more for level 5)     ED Triage Vitals [03/07/21 0104]   BP Temp Temp src Pulse Resp SpO2 Height Weight   (!) 161/69 97.1 °F (36.2 °C) -- 82 18 96 % -- --       Physical Exam  Vitals signs and nursing note reviewed. Constitutional:       General: She is not in acute distress. Appearance: Normal appearance. She is well-developed. She is not ill-appearing, toxic-appearing or diaphoretic. HENT:      Head: Normocephalic. Right Ear: Tympanic membrane, ear canal and external ear normal.      Left Ear: Tympanic membrane, ear canal and external ear normal.      Nose: Nose normal.      Mouth/Throat:      Mouth: Mucous membranes are moist.      Pharynx: Oropharynx is clear. Eyes:      General:         Right eye: No discharge. Left eye: No discharge. Conjunctiva/sclera: Conjunctivae normal.      Pupils: Pupils are equal, round, and reactive to light. Neck:      Musculoskeletal: Full passive range of motion without pain, normal range of motion and neck supple. Muscular tenderness present. No pain with movement or spinous process tenderness. Cardiovascular:      Rate and Rhythm: Normal rate and regular rhythm. Heart sounds: Normal heart sounds. No murmur. No gallop. Pulmonary:      Effort: Pulmonary effort is normal. No respiratory distress. Breath sounds: Normal breath sounds. No wheezing, rhonchi or rales. Abdominal:      General: Bowel sounds are normal.      Tenderness: There is no abdominal tenderness. There is no guarding or rebound. Musculoskeletal: Normal range of motion. General: No swelling. Right hip: Normal.      Right knee: Tenderness found. Left knee: Normal.      Right ankle: Normal.      Left ankle: Normal.      Cervical back: Normal.      Thoracic back: Normal.      Lumbar back: Normal.   Skin:     General: Skin is warm and dry. Capillary Refill: Capillary refill takes less than 2 seconds. Coloration: Skin is not jaundiced or pale. Neurological:      General: No focal deficit present. Mental Status: She is alert and oriented to person, place, and time. GCS: GCS eye subscore is 4. GCS verbal subscore is 5. GCS motor subscore is 6. Cranial Nerves: Cranial nerves are intact. Sensory: Sensation is intact. Motor: Motor function is intact. Coordination: Coordination is intact. Gait: Gait is intact. Comments: Normal strength to plantarflexion, dorsiflexion, straight leg legs bilaterally. Psychiatric:         Mood and Affect: Mood normal.         Behavior: Behavior normal.         DIAGNOSTIC RESULTS   LABS:    Labs Reviewed - No data to display    All other labs were within normal range or not returned as of this dictation. EKG: All EKG's are interpreted by the Emergency Department Physician in the absence of a cardiologist.  Please see their note for interpretation of EKG. RADIOLOGY:   Non-plain film images such as CT, Ultrasound and MRI are read by the radiologist. Plain radiographic images are visualized and preliminarily interpreted by the ED Provider with the below findings:        Interpretation per the Radiologist below, if available at the time of this note:    XR KNEE LEFT (3 VIEWS)   Final Result   Total knee arthroplasty without acute hardware complication. CT LUMBAR SPINE WO CONTRAST   Final Result   1. No acute abnormality of the lumbar spine. 2. L4 on L5 anterolisthesis measuring 4 mm, likely degenerative.    3. Unchanged L3/L4 severe right neural foraminal narrowing secondary to   encroachment by foraminal disc extrusion with associated compression of the   exiting right L3 nerve root. 4. L1/L2 moderate central canal stenosis secondary to encroachment by   posterior disc osteophyte complex. 5. L4/L5 borderline central canal compromise secondary to encroachment by   posterior disc extrusion. 6. L1/L2 mild bilateral, L4/L5 mild bilateral neural foraminal stenosis, as   discussed above. 7. Multilevel moderate to severe facet degenerative arthropathy, greatest at   levels L3/L4-L5/S1. CT CERVICAL SPINE WO CONTRAST   Final Result   No acute cervical spine fracture. Multilevel degenerative changes in the   cervical spine. No acute intracranial abnormality. Chronic small vessel ischemic disease. CT Head WO Contrast   Final Result   No acute cervical spine fracture. Multilevel degenerative changes in the   cervical spine. No acute intracranial abnormality. Chronic small vessel ischemic disease. XR KNEE RIGHT (3 VIEWS)   Final Result   Postsurgical changes of cemented total knee arthroplasty with patellar   resurfacing. No acute fracture or malalignment. Xr Knee Left (3 Views)    Result Date: 3/7/2021  EXAMINATION: THREE XRAY VIEWS OF THE LEFT KNEE 3/7/2021 3:21 am COMPARISON: None. HISTORY: ORDERING SYSTEM PROVIDED HISTORY: fall TECHNOLOGIST PROVIDED HISTORY: Reason for exam:->fall FINDINGS: There is a total knee arthroplasty with cemented components. No acute fracture or dislocation. No acute hardware failure or loosening. Grossly normal alignment. No evidence of joint effusion is identified. Total knee arthroplasty without acute hardware complication. Xr Knee Right (3 Views)    Result Date: 3/7/2021  EXAMINATION: THREE XRAY VIEWS OF THE RIGHT KNEE 3/7/2021 2:36 am COMPARISON: January 27, 2012. HISTORY: ORDERING SYSTEM PROVIDED HISTORY: fall, pain TECHNOLOGIST PROVIDED HISTORY: Reason for exam:->fall, pain FINDINGS: Frontal, lateral and sunrise patellar views of the right knee. No focal soft tissue abnormality. Postsurgical changes of cemented total knee arthroplasty with patellar resurfacing. Alignment is grossly normal.  No acute fracture. No aggressive skeletal lesion. Atherosclerosis. Postsurgical changes of cemented total knee arthroplasty with patellar resurfacing. No acute fracture or malalignment. Ct Head Wo Contrast    Result Date: 3/7/2021  EXAMINATION: CT OF THE HEAD WITHOUT CONTRAST; CT OF THE CERVICAL SPINE WITHOUT CONTRAST 3/7/2021 2:50 am TECHNIQUE: CT of the head was performed without the administration of intravenous contrast. Dose modulation, iterative reconstruction, and/or weight based adjustment of the mA/kV was utilized to reduce the radiation dose to as low as reasonably achievable.; CT of the cervical spine was performed without the administration of intravenous contrast. Multiplanar reformatted images are provided for review. Dose modulation, iterative reconstruction, and/or weight based adjustment of the mA/kV was utilized to reduce the radiation dose to as low as reasonably achievable. COMPARISON: February 21, 2017. HISTORY: ORDERING SYSTEM PROVIDED HISTORY: fall, hit head on rocks, hematoma to left frontal scalp TECHNOLOGIST PROVIDED HISTORY: Reason for exam:->fall, hit head on rocks, hematoma to left frontal scalp Has a \"code stroke\" or \"stroke alert\" been called? ->No Decision Support Exception->Emergency Medical Condition (MA) Reason for Exam: Fall (pt. walking down steps and fell over and landed on rocks. pt. states she hit her head. c/o head pain, back pain, right knee pain). Fall, hit head on rocks, hematoma to left frontal scalp. Acuity: Acute Type of Exam: Initial; ORDERING SYSTEM PROVIDED HISTORY: pain after fall, hit head on rocks TECHNOLOGIST PROVIDED HISTORY: Reason for exam:->pain after fall, hit head on rocks Decision Support Exception->Emergency Medical Condition (MA) Reason for Exam: Fall (pt. walking down steps and fell over and landed on rocks.  pt. states she hit her of the cervical spine was performed without the administration of intravenous contrast. Multiplanar reformatted images are provided for review. Dose modulation, iterative reconstruction, and/or weight based adjustment of the mA/kV was utilized to reduce the radiation dose to as low as reasonably achievable. COMPARISON: February 21, 2017. HISTORY: ORDERING SYSTEM PROVIDED HISTORY: fall, hit head on rocks, hematoma to left frontal scalp TECHNOLOGIST PROVIDED HISTORY: Reason for exam:->fall, hit head on rocks, hematoma to left frontal scalp Has a \"code stroke\" or \"stroke alert\" been called? ->No Decision Support Exception->Emergency Medical Condition (MA) Reason for Exam: Fall (pt. walking down steps and fell over and landed on rocks. pt. states she hit her head. c/o head pain, back pain, right knee pain). Fall, hit head on rocks, hematoma to left frontal scalp. Acuity: Acute Type of Exam: Initial; ORDERING SYSTEM PROVIDED HISTORY: pain after fall, hit head on rocks TECHNOLOGIST PROVIDED HISTORY: Reason for exam:->pain after fall, hit head on rocks Decision Support Exception->Emergency Medical Condition (MA) Reason for Exam: Fall (pt. walking down steps and fell over and landed on rocks. pt. states she hit her head. c/o head pain, back pain, right knee pain). Pain after fall, hit head on rocks. Acuity: Acute Type of Exam: Initial FINDINGS: CT CERVICAL SPINE: BONES/ALIGNMENT: No acute fracture. Diffusely heterogeneous marrow suggests osteopenia. Straightening of the normal cervical lordosis. Trace multilevel degenerative listhesis. The craniocervical junction is intact. No destructive osseous lesion. DEGENERATIVE CHANGES: Multilevel degenerative changes throughout the cervical spine. SOFT TISSUES: There is no prevertebral soft tissue swelling. Atherosclerosis. CT HEAD: BRAIN/VENTRICLES: There is no acute intracranial hemorrhage, mass effect or midline shift. No abnormal extra-axial fluid collection.   The gray-white differentiation is maintained without evidence of an acute infarct. There is prominence of the ventricles and sulci due to global parenchymal volume loss. There are nonspecific areas of hypoattenuation within the periventricular and subcortical white matter, which likely represent chronic microvascular ischemic change. Atherosclerosis. ORBITS: The visualized portion of the orbits demonstrate no acute abnormality. SINUSES: The visualized paranasal sinuses and mastoid air cells demonstrate no acute abnormality. SOFT TISSUES/SKULL: High left frontal and parietal scalp soft tissue swelling. No acute displaced skull fracture. No acute cervical spine fracture. Multilevel degenerative changes in the cervical spine. No acute intracranial abnormality. Chronic small vessel ischemic disease. Ct Lumbar Spine Wo Contrast    Result Date: 3/7/2021  EXAMINATION: CT OF THE LUMBAR SPINE WITHOUT CONTRAST  3/7/2021 TECHNIQUE: CT of the lumbar spine was performed without the administration of intravenous contrast. Multiplanar reformatted images are provided for review. Dose modulation, iterative reconstruction, and/or weight based adjustment of the mA/kV was utilized to reduce the radiation dose to as low as reasonably achievable. COMPARISON: Lumbar spine minimum four views October 13, 2020. MRI lumbar spine without contrast October 6, 2020. CT lumbar spine without contrast January 25, 2020. HISTORY: ORDERING SYSTEM PROVIDED HISTORY: BACK PAIN TECHNOLOGIST PROVIDED HISTORY: Reason for exam:->fall, worsening of chronic back pain Decision Support Exception->Emergency Medical Condition (MA) Reason for Exam: Fall (pt. walking down steps and fell over and landed on rocks. pt. states she hit her head. c/o head pain, back pain, right knee pain). Fall, worsening of chronic back pain. Back pain Acuity: Acute Type of Exam: Initial FINDINGS: BONES/ALIGNMENT: L4 on L5 anterolisthesis is visualized which measures 4 mm.  Lumbar spine lordosis and alignment is otherwise unremarkable. .  The vertebral body heights are maintained. No osseous destructive lesion is seen. DEGENERATIVE CHANGES: L1/L2: Severe disc height loss is noted with vacuum disc phenomenon in association with posterior disc osteophyte complex which indents the ventral thecal sac narrowing the midline AP thecal sac diameter to 8 mm consistent with moderate central canal stenosis. Disc osteophyte complex encroaches upon and causes mild bilateral neural foraminal stenosis. L3/L4: Severe right neural foraminal narrowing secondary to encroachment by foraminal disc extrusion is noted with compression of the exiting right L3 nerve root. L4/L5: Mild disc height loss, vacuum disc phenomenon is noted in association with posterior disc extrusion which indents the ventral thecal sac narrowing the midline AP thecal sac diameter to 10 mm consistent with borderline central canal compromise. Disc extrusion extends into and mildly narrows the bilateral neural foramina. Otherwise, multilevel mild spondylosis is present without further evidence of significant central canal stenosis/compromise identified. Multilevel moderate to severe facet degenerative hypertrophy is noted, greatest at levels L3/L4-L5/S1. SOFT TISSUES/RETROPERITONEUM: No paraspinal mass is seen. 1. No acute abnormality of the lumbar spine. 2. L4 on L5 anterolisthesis measuring 4 mm, likely degenerative. 3. Unchanged L3/L4 severe right neural foraminal narrowing secondary to encroachment by foraminal disc extrusion with associated compression of the exiting right L3 nerve root. 4. L1/L2 moderate central canal stenosis secondary to encroachment by posterior disc osteophyte complex. 5. L4/L5 borderline central canal compromise secondary to encroachment by posterior disc extrusion. 6. L1/L2 mild bilateral, L4/L5 mild bilateral neural foraminal stenosis, as discussed above.  7. Multilevel moderate to severe facet degenerative arthropathy, greatest at levels L3/L4-L5/S1. PROCEDURES   Unless otherwise noted below, none     Procedures    CRITICAL CARE TIME   N/A    CONSULTS:  None      EMERGENCY DEPARTMENT COURSE and DIFFERENTIAL DIAGNOSIS/MDM:   Vitals:    Vitals:    03/07/21 0104   BP: (!) 161/69   Pulse: 82   Resp: 18   Temp: 97.1 °F (36.2 °C)   SpO2: 96%       Patient was given the following medications:  Medications - No data to display        Patient presents emergency department for evaluation of mechanical fall. Patient states she does not feel lightheaded or dizzy currently. Patient states she has some pain to left frontal scalp as well as cervical spine, lumbar spine and bilateral knees. Patient is able to ambulate here in the emergency department without altered gait. Patient has bilateral surgical scars and point tenderness to superior anterior right knee. No decreased range of motion. No point tenderness to the left knee. No lacerations or abrasions noted. X-ray showed no acute bony fractures. Patient has a scalp hematoma to left frontal/parietal scalp. No underlying bony crepitus or deformity. Patient does have not have any point tenderness to cervical spine however does have bilateral trapezius muscle tenderness. Patient does not have any tenderness to lumbar spine however she states that her chronic back pain is acutely worse. Denies any saddle anesthesia, loss of bowel or bladder function. She denies any numbness in her feet. CT of the patient's head cervical and lumbar spine show multiple degenerative changes. Patient is already being followed by neurosurgery and has a procedure for chronic back pain scheduled in the next few weeks. I did encourage the patient to call her neurosurgeon and mention that she had a scan done today for his review. She was encouraged to follow-up with him for any acute worsening of her pain. She was given return precautions for concussion.   Patient is amenable to

## 2021-03-19 ENCOUNTER — TELEPHONE (OUTPATIENT)
Dept: CARDIOLOGY CLINIC | Age: 71
End: 2021-03-19

## 2021-03-19 NOTE — TELEPHONE ENCOUNTER
Spoke to the pt. She has a CT scan after she fell. The device moves more freely now. Will send in a transmission to be reviewed.

## 2021-03-19 NOTE — TELEPHONE ENCOUNTER
Pt fell a few weeks ago on left side. She was taking a shower yesterday before her therapy appointment and states it feels like her pacemaker flipped, but went back in place. She wants to know if it is normal to do that, or if something is wrong. She states she has never felt it move like that. Pls call to advise. Thank you.

## 2021-03-23 ENCOUNTER — NURSE ONLY (OUTPATIENT)
Dept: CARDIOLOGY CLINIC | Age: 71
End: 2021-03-23
Payer: MEDICARE

## 2021-03-23 DIAGNOSIS — Z95.0 PACEMAKER: ICD-10-CM

## 2021-03-23 DIAGNOSIS — I49.5 SINUS NODE DYSFUNCTION (HCC): ICD-10-CM

## 2021-03-23 DIAGNOSIS — R00.1 BRADYCARDIA: ICD-10-CM

## 2021-03-23 DIAGNOSIS — R00.1 SYMPTOMATIC BRADYCARDIA: ICD-10-CM

## 2021-03-23 DIAGNOSIS — I48.0 PAROXYSMAL ATRIAL FIBRILLATION (HCC): ICD-10-CM

## 2021-03-23 DIAGNOSIS — I48.91 ATRIAL FIBRILLATION WITH RVR (HCC): ICD-10-CM

## 2021-03-23 PROCEDURE — 93280 PM DEVICE PROGR EVAL DUAL: CPT | Performed by: INTERNAL MEDICINE

## 2021-03-23 NOTE — PROGRESS NOTES
Patient comes in for programming evaluation for her pacemaker. All sensing and pacing parameters are within normal range. Battery life 11.8 years  AP 75.8%.  <0.1%. No episodes noted. Patient remains on metoprolol  No changes need to be made at this time. Please see interrogation for more detail. Patient will follow up in 3 months in office or remotely. Patient came into the office today to have her device pocket assessed. Patient had a fall on her left side on 3/7/2021 and felt her device had moved since the full. Dr. Servando Lopez reviewed pocket and was intact.

## 2021-05-25 ENCOUNTER — OFFICE VISIT (OUTPATIENT)
Dept: CARDIOLOGY CLINIC | Age: 71
End: 2021-05-25
Payer: MEDICARE

## 2021-05-25 ENCOUNTER — NURSE ONLY (OUTPATIENT)
Dept: CARDIOLOGY CLINIC | Age: 71
End: 2021-05-25
Payer: MEDICARE

## 2021-05-25 VITALS
HEIGHT: 60 IN | OXYGEN SATURATION: 97 % | BODY MASS INDEX: 42.96 KG/M2 | DIASTOLIC BLOOD PRESSURE: 68 MMHG | HEART RATE: 60 BPM | SYSTOLIC BLOOD PRESSURE: 118 MMHG | WEIGHT: 218.8 LBS

## 2021-05-25 DIAGNOSIS — R00.1 BRADYCARDIA: ICD-10-CM

## 2021-05-25 DIAGNOSIS — I25.10 CORONARY ARTERY DISEASE DUE TO LIPID RICH PLAQUE: ICD-10-CM

## 2021-05-25 DIAGNOSIS — R00.1 SYMPTOMATIC BRADYCARDIA: ICD-10-CM

## 2021-05-25 DIAGNOSIS — I48.0 PAROXYSMAL ATRIAL FIBRILLATION (HCC): ICD-10-CM

## 2021-05-25 DIAGNOSIS — I25.83 CORONARY ARTERY DISEASE DUE TO LIPID RICH PLAQUE: ICD-10-CM

## 2021-05-25 DIAGNOSIS — I10 ESSENTIAL HYPERTENSION: ICD-10-CM

## 2021-05-25 DIAGNOSIS — E66.09 OBESITY DUE TO EXCESS CALORIES WITH SERIOUS COMORBIDITY IN PEDIATRIC PATIENT, UNSPECIFIED BMI: ICD-10-CM

## 2021-05-25 DIAGNOSIS — G47.33 OSA (OBSTRUCTIVE SLEEP APNEA): ICD-10-CM

## 2021-05-25 DIAGNOSIS — Z95.0 PACEMAKER: ICD-10-CM

## 2021-05-25 DIAGNOSIS — I49.5 SINUS NODE DYSFUNCTION (HCC): ICD-10-CM

## 2021-05-25 DIAGNOSIS — I47.1 SVT (SUPRAVENTRICULAR TACHYCARDIA) (HCC): ICD-10-CM

## 2021-05-25 DIAGNOSIS — I48.91 ATRIAL FIBRILLATION WITH RVR (HCC): Primary | ICD-10-CM

## 2021-05-25 DIAGNOSIS — I48.91 ATRIAL FIBRILLATION WITH RVR (HCC): ICD-10-CM

## 2021-05-25 PROCEDURE — 1036F TOBACCO NON-USER: CPT | Performed by: INTERNAL MEDICINE

## 2021-05-25 PROCEDURE — 1123F ACP DISCUSS/DSCN MKR DOCD: CPT | Performed by: INTERNAL MEDICINE

## 2021-05-25 PROCEDURE — 1090F PRES/ABSN URINE INCON ASSESS: CPT | Performed by: INTERNAL MEDICINE

## 2021-05-25 PROCEDURE — 99214 OFFICE O/P EST MOD 30 MIN: CPT | Performed by: INTERNAL MEDICINE

## 2021-05-25 PROCEDURE — G8417 CALC BMI ABV UP PARAM F/U: HCPCS | Performed by: INTERNAL MEDICINE

## 2021-05-25 PROCEDURE — G8399 PT W/DXA RESULTS DOCUMENT: HCPCS | Performed by: INTERNAL MEDICINE

## 2021-05-25 PROCEDURE — 4040F PNEUMOC VAC/ADMIN/RCVD: CPT | Performed by: INTERNAL MEDICINE

## 2021-05-25 PROCEDURE — 3017F COLORECTAL CA SCREEN DOC REV: CPT | Performed by: INTERNAL MEDICINE

## 2021-05-25 PROCEDURE — G8427 DOCREV CUR MEDS BY ELIG CLIN: HCPCS | Performed by: INTERNAL MEDICINE

## 2021-05-25 NOTE — PROGRESS NOTES
[address]   Electrophysiology Follow up   Date: 5/25/2021  I had the privilege of visiting Criselda Jama in the office. CC: Follow up   HPI: Criselda Jama is a 72 y.o. female with a history of CAD, s/p CABG and paroxysmal atrial fibrillation     10/1/17 presented with chest pressure, and had a cardiac catheterization by Dr. Anderson Thompson which showed patent graft and medical therapy recommended.      She is morbidly obese and has history of RAHUL      Had recurrent atrial fibrillation.      She underwent EPS and ablation for atrial fibrillation on 8/16/18.      Admitted to [facility] Sept 2018 with GI bleeding and had EGD, then OP colonoscopy which showed polyps.      EGD 9/21/18>  In the gastric antrum, linear streaks of erythematous mucosa with erosions seen suggestive of GAVE, biopsies not obtained. Rec'd iron infusions. History of MGUS and receiving Iron transfusion.      S/p dual chamber PPM 5/1/19 for sinus node dysfunction     Liliya Juan presents today in follow up. She states she feels well from a cardiac perspective . She states she does feel an occasional flutter that is very brief. She denies Any further GI bleeding. She states she has lost about 7 pounds. She states she has had back issues and was not able to move around as much as she likes. States she had ablation L3-L5 at [facility]. Assessment and plan:     Sinus node dysfunction              S/p dual chamber PPM 5/1/19   - The CIED was interrogated and programmed and I supervised and reviewed all the data.  All findings and changes are in device interrogation sheet and reflect my personal interpretation and changes and is scanned to Epic.   11.6years remaining, AP 72.5 % ,  <0.1%%                                   Follow up in device clinic.      Paroxysmal atrial fibrillation:    EKG today: Sinus Rhythm    Lopressor 12.5 mg BID              S/p PVI on 8/16/2018                MCOT 9/10/18> Sinus rhythm, short atrial runs She has been off Xarelto due to anemia and José Miguel bleeding                 Patient also has history of MGUS and receiving iron transfusion for anemia. I have discussed the left atrial appendage closure with her if she develops atrial fibrillation again. Continue monitoring   No recurrent atrial fibrillation.        CAD             - Hx of CABG 1999             - 1/2017 [facility] showed patent grafts             - Follows with Dr. Brittney Olivares            - BB,ASA, imdur, statin               RAHUL  Stable: Uses CPAP  Encourage to use CPAP to prevent long term effects of untreated RAHUL    Morbid Obesity Body mass index is 43.45 kg/m². - lab band surgery 2013, with recent repair of chronic wound 09/01/2020  Excessive weight is complicating assessment and treatment. It is placing patient at risk for multiple co-morbidities as well as early death and contributing to the patient's presentation. Discussed weight management with diet and exercise         HTN  Controlled  BP goal <130/80  Home BP monitoring encouraged, printed information provided on how to accurately measure BP at home. Counseled to follow a low salt diet to assure blood pressure remains controlled for cardiovascular risk factor modification. The patient is counseled to get regular exercise 3-5 times per week and maintain a healthy weight reduce cardiovascular risk factors.       Plan  No change in medication  Follow up in one year  Follow with device clinic as scheduled       Patient Active Problem List    Diagnosis Date Noted    Atrial fibrillation with RVR ([address]     Chronic wound infection of abdomen     Ventral hernia without obstruction or gangrene     Umbilical hernia without obstruction and without gangrene     Pacemaker 05/01/2019    Sinus node dysfunction ([address]     Bradycardia 04/30/2019    Anemia 10/07/2018    Coronary artery disease involving coronary bypass graft of native heart without angina pectoris     of GAVE, biopsies not obtained. Rec'd iron infusions.     MCOT 10/3/ to 11/1/17 - SR with avg HR 63 ( bpm).  Brief SVR noted. No Atrial fib  MCOT 9/10-10/9/18> SR, atrial runs.         Echo 9/22/18 ()  Summary:  The left ventricular wall motion is normal.  Overall left ventricular ejection fraction is estimated to be 60-65%. The diastolic function is impaired and classified as Grade 2  (psuedonormalization). The left atrium is moderately dilated. The mitral valve leaflets are mildly calcified in appearance. The Aortic Valve is mildly calcified. Mild valvular aortic stenosis. No significant valvular insufficiency noted.     Cath:  1/24/17  [facility] with grafts      Coronary Findings   LM Normal  LAD Mid 100%  Cx 30% mid, 30% OM1 mid  RCA Mid 100%, multiple 90% prox to mid lesions (small vessel)  LVG 55%  RAD-OM Patent  LIMA-LAD Patent    I independently reviewed the cardiac diagnostic studies, ECG and relevant imaging studies. Lab Results   Component Value Date    LVEF 55 09/13/2019    LVEFMODE Echo 08/30/2018     Lab Results   Component Value Date    TSH 1.95 05/22/2020         Physical Examination:  Vitals:    05/25/21 1526   BP: 118/68   Pulse: 60   SpO2: 97%      Wt Readings from Last 3 Encounters:   05/25/21 218 lb 12.8 oz (99.2 kg)   12/07/20 222 lb (100.7 kg)   10/01/20 220 lb (99.8 kg)       · Constitutional: Oriented. No distress. · Head: Normocephalic and atraumatic. · Mouth/Throat: Oropharynx is clear and moist.   · Eyes: Conjunctivae normal. EOM are normal.   · Neck: Neck supple. No JVD present. · Cardiovascular: Normal rate, regular rhythm, S1&S2. · Pulmonary/Chest: Bilateral respiratory sounds. No rhonchi. · Abdominal: Soft. No tenderness. · Musculoskeletal: No tenderness. No edema    · Lymphadenopathy: Has no cervical adenopathy. · Neurological: Alert and oriented. Follows command, No Gross deficit   · Skin: Skin is warm, No rash noted.    · Psychiatric: Has a normal behavior       Review of System:  [x] Full ROS obtained and negative except as mentioned in HPI    Prior to Admission medications    Medication Sig Start Date End Date Taking?  Authorizing Provider   zinc sulfate (ZINCATE) 220 (50 Zn) MG capsule Take 50 mg by mouth daily   Yes Historical Provider, MD   potassium chloride (KLOR-CON M) 20 MEQ extended release tablet Take 1 tablet by mouth daily 2/26/20  Yes Yohan Curtis MD   ferrous gluconate (FERGON) 225 (27 Fe) MG tablet Take 240 mg by mouth Take 2 tablets by mouth daily   Yes Historical Provider, MD   amLODIPine (NORVASC) 5 MG tablet Take 1 tablet by mouth every evening  Patient taking differently: Take 2.5 mg by mouth 2 times daily Take 0.5 tablet by mouth every morning and every evening 5/28/19  Yes Susie Campos MD   tiZANidine (ZANAFLEX) 4 MG tablet Take 4 mg by mouth every 6 hours as needed (ONLY TAKES AT HS)    Yes Historical Provider, MD   metoprolol tartrate (LOPRESSOR) 25 MG tablet Take 12.5 mg by mouth as needed (FOR A-FIB)    Yes Historical Provider, MD   aspirin 81 MG tablet Take 81 mg by mouth daily   Yes Historical Provider, MD   furosemide (LASIX) 40 MG tablet Take 1 tablet by mouth daily 9/20/18  Yes Susie Campos MD   isosorbide mononitrate (ISMO;MONOKET) 10 MG tablet Take 5 mg by mouth 2 times daily   Yes Historical Provider, MD   Ascorbic Acid (VITAMIN C) 1000 MG tablet Take 1,000 mg by mouth daily   Yes Historical Provider, MD   Cyanocobalamin (VITAMIN B-12 PO) Take 3,000 mcg by mouth daily   Yes Historical Provider, MD   Cranberry 450 MG CAPS Take by mouth daily    Yes Historical Provider, MD   pioglitazone (ACTOS) 15 MG tablet Take 15 mg by mouth daily   Yes Historical Provider, MD   vitamin D (CHOLECALCIFEROL) 1000 UNIT TABS tablet Take 1,000 Units by mouth daily    Yes Historical Provider, MD   spironolactone (ALDACTONE) 25 MG tablet Take 25 mg by mouth daily In pm   Yes Historical Provider, MD   dexlansoprazole (DEXILANT) 60 MG CPDR delayed release capsule Take 60 mg by mouth daily   Yes Historical Provider, MD   gabapentin (NEURONTIN) 100 MG capsule Take 400 mg by mouth 3 times daily. .   Yes Historical Provider, MD   TraZODone HCl (DESYREL PO) Take 50 mg by mouth nightly    Yes Historical Provider, MD   losartan-hydrochlorothiazide (HYZAAR) 100-25 MG per tablet TK 1 T PO QAM 9/13/16  Yes Historical Provider, MD   Calcium Citrate-Vitamin D (CALCIUM CITRATE + D PO) Take 2 tablets by mouth daily   Yes Historical Provider, MD   SLOW-MAG 71.5-119 MG TBEC tablet Take 1 tablet by mouth daily 143 mg 11/3/15  Yes Historical Provider, MD   fexofenadine (ALLEGRA) 180 MG tablet Take 180 mg by mouth daily   Yes Historical Provider, MD   rosuvastatin (CRESTOR) 40 MG tablet Take 20 mg by mouth every evening    Yes Historical Provider, MD   metFORMIN (GLUCOPHAGE) 500 MG tablet Take 500 mg by mouth 2 times daily (with meals)    Yes Historical Provider, MD   ezetimibe (ZETIA) 10 MG tablet Take 5 mg by mouth nightly    Yes Historical Provider, MD   escitalopram (LEXAPRO) 20 MG tablet Take 20 mg by mouth daily. Yes Historical Provider, MD   nitroGLYCERIN (NITROLINGUAL) 0.4 MG/SPRAY spray Place 1 spray under the tongue every 5 minutes as needed. As directed for chest pain    Yes Historical Provider, MD   estradiol (ESTRACE) 0.1 MG/GM vaginal cream Place 2 g vaginally See Admin Instructions Twice a week    Historical Provider, MD       Allergies   Allergen Reactions    Albuterol Other (See Comments)     Other reaction(s): Chest Pain  Crushing chest pain    Heparin Other (See Comments)     Caused bruises and hematomas immediately when drip started. MARKED BRUISING/HEMATOMAS    Niacin     Avelox [Moxifloxacin Hcl In Nacl] Rash    Moxifloxacin Rash    Niaspan [Niacin Er] Itching and Rash    Proventil [Albuterol Sulfate] Palpitations    Tagamet [Cimetidine] Rash       Social History:  Reviewed. reports that she has never smoked.  She has never used smokeless tobacco. She reports that she does not drink alcohol and does not use drugs. Family History:  Reviewed. Reviewed. No family history of SCD. Relevant and available labs, and cardiovascular diagnostics reviewed. Reviewed. I independently reviewed relevant and available cardiac diagnostic tests ECG, CXR, Echo, Stress test, Device interrogation, Holter, CT scan. Complex medical condition with multiple medical problems affecting prognosis and outcome of EP interventions    All questions and concerns were addressed to the patient/family. Alternatives to my treatment were discussed. I have discussed the above stated plan and the patient verbalized understanding and agreed with the plan. Scribe attestation: This note was scribed in the presence of Rose Leahy MD by Jory Yusuf RN  Physician Attestation: I, Dr. Rose Leahy, confirm that the scribe's documentation has been prepared under my direction and personally reviewed by me in its entirety. I also confirm that the note above accurately reflects all work, treatment, procedures, and medical decision making performed by me. NOTE: This report was transcribed using voice recognition software. Every effort was made to ensure accuracy, however, inadvertent computerized transcription errors may be present.      Rose Leahy MD, MPH  [place] [address]   Office: [phone]  Fax: [phone]

## 2021-05-26 PROCEDURE — 93280 PM DEVICE PROGR EVAL DUAL: CPT | Performed by: INTERNAL MEDICINE

## 2021-05-26 NOTE — PROGRESS NOTES
Patient comes in for programming evaluation for her pacemaker. All sensing and pacing parameters are within normal range. Battery life 11.6 years  AP 72.5%.  <0%. No episodes noted. Patient remains on metoprolol. No changes need to be made at this time. Please see interrogation for more detail. Patient will see Dr. Klaudia Franco today and follow up in 3 months in office or remotely.

## 2021-06-22 ENCOUNTER — NURSE ONLY (OUTPATIENT)
Dept: CARDIOLOGY CLINIC | Age: 71
End: 2021-06-22
Payer: MEDICARE

## 2021-06-22 DIAGNOSIS — Z95.0 PACEMAKER: Primary | ICD-10-CM

## 2021-06-22 DIAGNOSIS — R00.1 BRADYCARDIA: ICD-10-CM

## 2021-06-22 PROCEDURE — 93294 REM INTERROG EVL PM/LDLS PM: CPT | Performed by: INTERNAL MEDICINE

## 2021-06-22 PROCEDURE — 93296 REM INTERROG EVL PM/IDS: CPT | Performed by: INTERNAL MEDICINE

## 2021-06-22 NOTE — PROGRESS NOTES
We received remote transmission from patient's monitor at home. Transmission shows normal sensing and pacing function. EP physician will review. See interrogation under cardiology tab in the [address] [facility] [place] [address] field for more details.

## 2021-06-22 NOTE — LETTER
[address] [facility] [phone]  [facility] [phone]  Efrain Guidry [address] [place] [address] [phone]    Pacemaker/Defibrillator Clinic    06/22/21      Ayesha Maldonado S[address] [facility] [address]      Dear Ayesha James    This letter is to inform you that we received the transmission from your monitor at home that checks your implanted heart device. The next date your monitor will automatically transmit will be 9-21-21. If your report needs attention we will notify you. Your device and monitor are wireless and most transmit cellularly, but please periodically check your monitor is still plugged in to the electrical outlet. If you still use the telephone land line to send please ensure the connection to the phone cynthia is secure. This will help to ensure successful automatic transmissions in the future. Also, the monitor needs to be close to you while sleeping at night. Please be aware that the remote device transmission sites are periodically monitored only during regular business hours during which simultaneous in-office device clinics are being run. If your transmission requires attention, we will contact you as soon as possible. **PLEASE NOTE** that our [facility] policy and processes are changing to ensure a more seamless approach for all parties involved, allowing more time for our nurses to address patient issues and concerns. We will no longer be sending letters for NORMAL remote transmissions. You will be contacted by phone if your transmission requires attention (as previously done), and letters will only be sent regarding monitor disconnections or missed transmissions if you are unable to be reached by phone. Please do not be alarmed by this new process, as we will continue to contact you if your transmission report requires attention. This will be your final \"remote received\" letter.   From this point forward, the [facility] will be utilizing the no news is good news approach. As always, please feel free to contact your nurse with any questions or concerns. Thank you.     [address]

## 2021-08-25 ENCOUNTER — HOSPITAL ENCOUNTER (OUTPATIENT)
Dept: ULTRASOUND IMAGING | Age: 71
Discharge: HOME | End: 2021-08-25
Payer: MEDICARE

## 2021-08-25 ENCOUNTER — HOSPITAL ENCOUNTER (OUTPATIENT)
Dept: WOMENS IMAGING | Age: 71
Discharge: HOME | End: 2021-08-25
Payer: MEDICARE

## 2021-08-25 DIAGNOSIS — R92.1 BREAST CALCIFICATION SEEN ON MAMMOGRAM: ICD-10-CM

## 2021-08-25 PROCEDURE — G0279 TOMOSYNTHESIS, MAMMO: HCPCS

## 2021-08-25 PROCEDURE — 76641 ULTRASOUND BREAST COMPLETE: CPT

## 2021-09-21 ENCOUNTER — NURSE ONLY (OUTPATIENT)
Dept: CARDIOLOGY CLINIC | Age: 71
End: 2021-09-21
Payer: MEDICARE

## 2021-09-21 DIAGNOSIS — R00.1 BRADYCARDIA: ICD-10-CM

## 2021-09-21 DIAGNOSIS — Z95.0 PACEMAKER: ICD-10-CM

## 2021-09-21 NOTE — PROGRESS NOTES
We received remote transmission from patient's monitor at home. Transmission shows normal sensing and pacing function. Noted AT. EP physician will review. See interrogation under cardiology tab in the [address] [facility] [address] field for more details.

## 2021-09-22 PROCEDURE — 93296 REM INTERROG EVL PM/IDS: CPT | Performed by: INTERNAL MEDICINE

## 2021-09-22 PROCEDURE — 93294 REM INTERROG EVL PM/LDLS PM: CPT | Performed by: INTERNAL MEDICINE

## 2021-12-14 NOTE — PROGRESS NOTES
[address]  H+P // Alysia Williamson // OP VISIT // FU VISIT    REFERRING Hung Calle, DO  ENC TYPE FU    CC HX HPI   GEN  Doing well. No new concerns. CAD CABG Denies cp, sob, dizzy, syncope, palps. AFIB/SSS ABLAT  PPM Follows with EP. No palpitations. No afib last interrogation   HTN  Amb bp in good range, no ha or dizzy. CHOL  Last chol reviewed, on statin w/out sa   MED  Compliant with CV meds listed below w/out reported sa. HISTORY/ALLLERGY/ROS   MedHx  has a past medical history of Anemia, Anesthesia, Anesthesia complication, Atrial fibrillation ([address] CAD (coronary artery disease), Chest pain, Chronic kidney disease, Depression, GERD (gastroesophageal reflux disease), Glaucoma, Hiatal hernia, Hyperlipidemia, Hypertension, Migraines, neuralgic, Morbid obesity ([address] Osteoarthritis, Sleep apnea, Type II or unspecified type diabetes mellitus without mention of complication, not stated as uncontrolled, Unspecified sleep apnea, Urinary incontinence, and UTI (urinary tract infection). SurgHx  has a past surgical history that includes Coronary artery bypass graft (1999); Hammer toe surgery; Ovary removal (Bilateral, 1997); Esophagus dilation; Breast lumpectomy; Knee arthroscopy; Gastric Band (12/20/11); Colonoscopy; Upper gastrointestinal endoscopy; Upper gastrointestinal endoscopy (10/30/15); Cosmetic surgery; joint replacement (1995); joint replacement (1995); orif humerus decompression (Left, 2/25/2016); Upper gastrointestinal endoscopy (10/14/2016); Bariatric Surgery (11/03/2016); ablation of dysrhythmic focus; Colonoscopy (10/08/2018); Colonoscopy (N/A, 10/8/2018); pr njx dx/ther sbst intrlmnr crv/thrc w/img gdn (N/A, 11/5/2018); Cardiac surgery (2/22/1999); Cardiac catheterization; Injection Procedure For Sacroiliac Joint (Left, 12/17/2018); Enteroscopy (N/A, 2/26/2019); ventral hernia repair (N/A, 10/29/2019); and Abdomen surgery (N/A, 9/1/2020).    SocHx  reports that she has never smoked. She has never used smokeless tobacco. She reports that she does not drink alcohol and does not use drugs. FamHx family history includes Cancer in her mother; Heart Disease in her father; High Blood Pressure in her father and mother; Stroke in her sister.    Allerg Albuterol, Heparin, Niacin, Avelox [moxifloxacin hcl in nacl], Moxifloxacin, Niaspan [niacin er], Proventil [albuterol sulfate], and Tagamet [cimetidine]   ROS [x]Full ROS obtained and negative except as mentioned in HPI      MEDICATIONS      Current Outpatient Medications   Medication Sig Dispense Refill    zinc sulfate (ZINCATE) 220 (50 Zn) MG capsule Take 50 mg by mouth daily      potassium chloride (KLOR-CON M) 20 MEQ extended release tablet Take 1 tablet by mouth daily 90 tablet 3    ferrous gluconate (FERGON) 225 (27 Fe) MG tablet Take 240 mg by mouth Take 2 tablets by mouth daily      amLODIPine (NORVASC) 5 MG tablet Take 1 tablet by mouth every evening (Patient taking differently: Take 2.5 mg by mouth 2 times daily Take 0.5 tablet by mouth every morning and every evening) 90 tablet 3    tiZANidine (ZANAFLEX) 4 MG tablet Take 4 mg by mouth every 6 hours as needed (ONLY TAKES AT HS)       metoprolol tartrate (LOPRESSOR) 25 MG tablet Take 12.5 mg by mouth as needed (FOR A-FIB)       aspirin 81 MG tablet Take 81 mg by mouth daily      furosemide (LASIX) 40 MG tablet Take 1 tablet by mouth daily 30 tablet 5    isosorbide mononitrate (ISMO;MONOKET) 10 MG tablet Take 5 mg by mouth 2 times daily      Ascorbic Acid (VITAMIN C) 1000 MG tablet Take 1,000 mg by mouth daily      Cyanocobalamin (VITAMIN B-12 PO) Take 3,000 mcg by mouth daily      estradiol (ESTRACE) 0.1 MG/GM vaginal cream Place 2 g vaginally See Admin Instructions Twice a week      Cranberry 450 MG CAPS Take by mouth daily       pioglitazone (ACTOS) 15 MG tablet Take 15 mg by mouth daily      vitamin D (CHOLECALCIFEROL) 1000 UNIT TABS tablet Take 1,000 Units by mouth daily       spironolactone (ALDACTONE) 25 MG tablet Take 25 mg by mouth daily In pm      dexlansoprazole (DEXILANT) 60 MG CPDR delayed release capsule Take 60 mg by mouth daily      gabapentin (NEURONTIN) 100 MG capsule Take 400 mg by mouth 3 times daily. Ronen Pisano TraZODone HCl (DESYREL PO) Take 50 mg by mouth nightly       losartan-hydrochlorothiazide (HYZAAR) 100-25 MG per tablet TK 1 T PO QAM  3    Calcium Citrate-Vitamin D (CALCIUM CITRATE + D PO) Take 2 tablets by mouth daily      SLOW-MAG 71.5-119 MG TBEC tablet Take 1 tablet by mouth daily 143 mg  5    fexofenadine (ALLEGRA) 180 MG tablet Take 180 mg by mouth daily      rosuvastatin (CRESTOR) 40 MG tablet Take 20 mg by mouth every evening       metFORMIN (GLUCOPHAGE) 500 MG tablet Take 500 mg by mouth 2 times daily (with meals)       ezetimibe (ZETIA) 10 MG tablet Take 5 mg by mouth nightly       escitalopram (LEXAPRO) 20 MG tablet Take 20 mg by mouth daily.  nitroGLYCERIN (NITROLINGUAL) 0.4 MG/SPRAY spray Place 1 spray under the tongue every 5 minutes as needed. As directed for chest pain        No current facility-administered medications for this visit.      [x]Reviewed with patient and will remain unchanged except as mentioned in A/P    PHYSICAL EXAM   Vitals:    12/16/21 1347   BP: 112/60   Pulse: 60   SpO2: 98%        Gen Alert, coop, no distress Heart  Rrr, 1/6   Head NC, AT, no abnorm Abd  Soft, NT, +BS, no mass, no OM   Eyes PER, conj/corn clear Ext  Ext nl, AT, no C/C/E   Nose Nares nl, no drain, NT Pulse 2+ and symmetric   Throat Lips, mucosa, tongue nl Skin Col/text/turg nl, no vis rash/les   Neck S/S, TM, NT, no bruit/JVD Psych Nl mood and affect   Lung CTA-B, unlabored, no DTP Lymph   No cervical or axillary LA   Ch wall NT, no deform Neuro  Nl gross M/S exam     ASSESSMENT AND PLAN     *CAD   Date EF Results   Sx   No concerning   Hx 1999  CABGx3 LIMA-LAD, [id number] (ANNA)   [facility] 9/15  1/17 55%  55% Patent grafts Reese Vasquez)  Patent grafts Reese Vasquez)   MPI 9/15 72% Anteroapical ischemia   TTE 9/15  8/18  9/19 55%  55%  55%    Plan   Continue aggressive medical treatment at doses above   *AFIB/SSS  Status parox, 8/18 Ablation, 5/19 PM implant, most recent TTE, monitors, EP procedures reviewed personally   Plan No further afib by device interrogations. Continue asa. *HTN  Status Controlled, vitals and available ambulatory monitoring logs reviewed personally  Plan Counseled on diet/salt/exercise/weight, continue meds at doses above  *CHOL  Status  Controlled with last LDL of 47 (goal <70) and HDL of 36 (5/20), labs reviewed personally  Plan Counseled on diet/exercise/weight, continue high-intensity statin, lipid/liver surveillance per PCP  *OBESITY  Status Uncontrolled with a BMI of Body mass index is 41.94 kg/m². , available historical weights reviewed personally  Plan Counseled on diet, exercise, weight loss options in detail  *COMPLIANCE  Status Compliant  Plan Discussed importance of compliance with meds/diet/salt/exercise; avoid tob/alc/drugs; patient verbalized understanding  *FOLLOWUP  12 months    [address] Kirsten Figueredo, am scribing for and in the presence of Jayashree Holder MD.   SignedKirsten 12/14/21 1:44 PM   Provider David Arzola is working as a scribe for and in the presence of carlos Holder MD). Working as a scribe, Kirsten Boss may have prepopulated components of this note with my historical  intellectual property under my direct supervision. Any additions to this intellectual property were performed in my presence and at my direction.   Furthermore, the content and accuracy of this note have been reviewed by carlos Holder MD).  12/16/2021 1:58 PM    CODING   Category Diagnosis   Stable chronic illness  (00762/77709 - 2 or more) CAD, AFIB, SSS, HTN, CHOL, OBESITY   Chronic illness w/: Exac, progr or SA of Tx  (25842/47234 - 1 or more)    Time 30-39 minutes spent preparing to see patient including reviewing patient history/prior tests/prior consults, performing a medical exam, counseling and educating patient/family/caregiver, ordering medications/tests/procedures, referring and communicating with PCPs and other pertinent consultants, documenting information in the EMR, independently interpreting results and communicating to family and coordination of patient care.

## 2021-12-16 ENCOUNTER — OFFICE VISIT (OUTPATIENT)
Dept: CARDIOLOGY CLINIC | Age: 71
End: 2021-12-16
Payer: MEDICARE

## 2021-12-16 VITALS
SYSTOLIC BLOOD PRESSURE: 112 MMHG | HEART RATE: 60 BPM | OXYGEN SATURATION: 98 % | WEIGHT: 211.2 LBS | HEIGHT: 60 IN | DIASTOLIC BLOOD PRESSURE: 60 MMHG | BODY MASS INDEX: 41.46 KG/M2

## 2021-12-16 DIAGNOSIS — I48.0 PAROXYSMAL ATRIAL FIBRILLATION (HCC): ICD-10-CM

## 2021-12-16 DIAGNOSIS — Z95.0 PACEMAKER: ICD-10-CM

## 2021-12-16 DIAGNOSIS — I25.83 CORONARY ARTERY DISEASE DUE TO LIPID RICH PLAQUE: Primary | ICD-10-CM

## 2021-12-16 DIAGNOSIS — I10 ESSENTIAL HYPERTENSION: ICD-10-CM

## 2021-12-16 DIAGNOSIS — E78.00 HYPERCHOLESTEREMIA: ICD-10-CM

## 2021-12-16 DIAGNOSIS — R00.1 BRADYCARDIA: ICD-10-CM

## 2021-12-16 DIAGNOSIS — I25.10 CORONARY ARTERY DISEASE DUE TO LIPID RICH PLAQUE: Primary | ICD-10-CM

## 2021-12-16 PROCEDURE — 1090F PRES/ABSN URINE INCON ASSESS: CPT | Performed by: INTERNAL MEDICINE

## 2021-12-16 PROCEDURE — G8427 DOCREV CUR MEDS BY ELIG CLIN: HCPCS | Performed by: INTERNAL MEDICINE

## 2021-12-16 PROCEDURE — G8484 FLU IMMUNIZE NO ADMIN: HCPCS | Performed by: INTERNAL MEDICINE

## 2021-12-16 PROCEDURE — G8399 PT W/DXA RESULTS DOCUMENT: HCPCS | Performed by: INTERNAL MEDICINE

## 2021-12-16 PROCEDURE — G8417 CALC BMI ABV UP PARAM F/U: HCPCS | Performed by: INTERNAL MEDICINE

## 2021-12-16 PROCEDURE — 1123F ACP DISCUSS/DSCN MKR DOCD: CPT | Performed by: INTERNAL MEDICINE

## 2021-12-16 PROCEDURE — 1036F TOBACCO NON-USER: CPT | Performed by: INTERNAL MEDICINE

## 2021-12-16 PROCEDURE — 4040F PNEUMOC VAC/ADMIN/RCVD: CPT | Performed by: INTERNAL MEDICINE

## 2021-12-16 PROCEDURE — 3017F COLORECTAL CA SCREEN DOC REV: CPT | Performed by: INTERNAL MEDICINE

## 2021-12-16 PROCEDURE — 99214 OFFICE O/P EST MOD 30 MIN: CPT | Performed by: INTERNAL MEDICINE

## 2021-12-21 ENCOUNTER — NURSE ONLY (OUTPATIENT)
Dept: CARDIOLOGY CLINIC | Age: 71
End: 2021-12-21
Payer: MEDICARE

## 2021-12-21 DIAGNOSIS — R00.1 BRADYCARDIA: ICD-10-CM

## 2021-12-21 DIAGNOSIS — Z95.0 PACEMAKER: ICD-10-CM

## 2021-12-21 NOTE — PROGRESS NOTES
We received remote transmission from patient's monitor at home. Transmission shows normal sensing and pacing function. EP physician will review. See interrogation under cardiology tab in the [address] [facility] [address] field for more details.

## 2021-12-22 PROCEDURE — 93296 REM INTERROG EVL PM/IDS: CPT | Performed by: INTERNAL MEDICINE

## 2021-12-22 PROCEDURE — 93294 REM INTERROG EVL PM/LDLS PM: CPT | Performed by: INTERNAL MEDICINE

## 2021-12-25 ENCOUNTER — APPOINTMENT (OUTPATIENT)
Dept: GENERAL RADIOLOGY | Age: 71
DRG: 871 | End: 2021-12-25
Payer: MEDICARE

## 2021-12-25 ENCOUNTER — APPOINTMENT (OUTPATIENT)
Dept: CT IMAGING | Age: 71
DRG: 871 | End: 2021-12-25
Payer: MEDICARE

## 2021-12-25 ENCOUNTER — HOSPITAL ENCOUNTER (INPATIENT)
Age: 71
LOS: 8 days | Discharge: HOME | DRG: 871 | End: 2022-01-02
Attending: INTERNAL MEDICINE | Admitting: INTERNAL MEDICINE
Payer: MEDICARE

## 2021-12-25 DIAGNOSIS — D64.9 ANEMIA, UNSPECIFIED TYPE: ICD-10-CM

## 2021-12-25 DIAGNOSIS — J96.01 ACUTE RESPIRATORY FAILURE WITH HYPOXIA (HCC): ICD-10-CM

## 2021-12-25 DIAGNOSIS — J18.9 PNEUMONIA DUE TO INFECTIOUS ORGANISM, UNSPECIFIED LATERALITY, UNSPECIFIED PART OF LUNG: Primary | ICD-10-CM

## 2021-12-25 DIAGNOSIS — R77.8 ELEVATED TROPONIN: ICD-10-CM

## 2021-12-25 DIAGNOSIS — R09.02 HYPOXIA: ICD-10-CM

## 2021-12-25 PROBLEM — Z20.822 SUSPECTED COVID-19 VIRUS INFECTION: Status: ACTIVE | Noted: 2021-12-25

## 2021-12-25 PROBLEM — I10 HTN (HYPERTENSION): Status: ACTIVE | Noted: 2021-12-25

## 2021-12-25 PROBLEM — I21.4 NSTEMI (NON-ST ELEVATED MYOCARDIAL INFARCTION) (HCC): Status: ACTIVE | Noted: 2021-12-25

## 2021-12-25 LAB
A/G RATIO: 1.2 (ref 1.1–2.2)
ALBUMIN SERPL-MCNC: 3.9 G/DL (ref 3.4–5)
ALP BLD-CCNC: 72 U/L (ref 40–129)
ALT SERPL-CCNC: 20 U/L (ref 10–40)
ANION GAP SERPL CALCULATED.3IONS-SCNC: 16 MMOL/L (ref 3–16)
APTT: 37.3 SEC (ref 26.2–38.6)
AST SERPL-CCNC: 34 U/L (ref 15–37)
BASOPHILS ABSOLUTE: 0.1 K/UL (ref 0–0.2)
BASOPHILS RELATIVE PERCENT: 2.2 %
BILIRUB SERPL-MCNC: 0.3 MG/DL (ref 0–1)
BUN BLDV-MCNC: 31 MG/DL (ref 7–20)
C-REACTIVE PROTEIN: 24.9 MG/L (ref 0–5.1)
CALCIUM SERPL-MCNC: 9.3 MG/DL (ref 8.3–10.6)
CHLORIDE BLD-SCNC: 97 MMOL/L (ref 99–110)
CO2: 19 MMOL/L (ref 21–32)
CREAT SERPL-MCNC: 1.1 MG/DL (ref 0.6–1.2)
EOSINOPHILS ABSOLUTE: 0 K/UL (ref 0–0.6)
EOSINOPHILS RELATIVE PERCENT: 0.1 %
GFR AFRICAN AMERICAN: 59
GFR NON-AFRICAN AMERICAN: 49
GLUCOSE BLD-MCNC: 143 MG/DL (ref 70–99)
GLUCOSE BLD-MCNC: 207 MG/DL (ref 70–99)
HCT VFR BLD CALC: 27.6 % (ref 36–48)
HEMOGLOBIN: 8.9 G/DL (ref 12–16)
INR BLD: 1.12 (ref 0.88–1.12)
LACTIC ACID, SEPSIS: 1.8 MMOL/L (ref 0.4–1.9)
LACTIC ACID, SEPSIS: 2.2 MMOL/L (ref 0.4–1.9)
LYMPHOCYTES ABSOLUTE: 0.6 K/UL (ref 1–5.1)
LYMPHOCYTES RELATIVE PERCENT: 8.3 %
MCH RBC QN AUTO: 30.1 PG (ref 26–34)
MCHC RBC AUTO-ENTMCNC: 32.4 G/DL (ref 31–36)
MCV RBC AUTO: 92.8 FL (ref 80–100)
MONOCYTES ABSOLUTE: 1 K/UL (ref 0–1.3)
MONOCYTES RELATIVE PERCENT: 14 %
NEUTROPHILS ABSOLUTE: 5.2 K/UL (ref 1.7–7.7)
NEUTROPHILS RELATIVE PERCENT: 75.4 %
PDW BLD-RTO: 15 % (ref 12.4–15.4)
PERFORMED ON: ABNORMAL
PLATELET # BLD: 243 K/UL (ref 135–450)
PMV BLD AUTO: 8.2 FL (ref 5–10.5)
POTASSIUM REFLEX MAGNESIUM: 4.9 MMOL/L (ref 3.5–5.1)
PROCALCITONIN: 0.11 NG/ML (ref 0–0.15)
PROTHROMBIN TIME: 12.7 SEC (ref 9.9–12.7)
RAPID INFLUENZA  B AGN: NEGATIVE
RAPID INFLUENZA A AGN: NEGATIVE
RBC # BLD: 2.97 M/UL (ref 4–5.2)
SODIUM BLD-SCNC: 132 MMOL/L (ref 136–145)
TOTAL PROTEIN: 7.2 G/DL (ref 6.4–8.2)
TROPONIN: 0.45 NG/ML
TROPONIN: 0.56 NG/ML
WBC # BLD: 6.9 K/UL (ref 4–11)

## 2021-12-25 PROCEDURE — 84145 PROCALCITONIN (PCT): CPT

## 2021-12-25 PROCEDURE — 87804 INFLUENZA ASSAY W/OPTIC: CPT

## 2021-12-25 PROCEDURE — 1200000000 HC SEMI PRIVATE

## 2021-12-25 PROCEDURE — U0005 INFEC AGEN DETEC AMPLI PROBE: HCPCS

## 2021-12-25 PROCEDURE — 93005 ELECTROCARDIOGRAM TRACING: CPT | Performed by: GENERAL ACUTE CARE HOSPITAL

## 2021-12-25 PROCEDURE — 80053 COMPREHEN METABOLIC PANEL: CPT

## 2021-12-25 PROCEDURE — 96375 TX/PRO/DX INJ NEW DRUG ADDON: CPT

## 2021-12-25 PROCEDURE — 6360000004 HC RX CONTRAST MEDICATION: Performed by: GENERAL ACUTE CARE HOSPITAL

## 2021-12-25 PROCEDURE — 96374 THER/PROPH/DIAG INJ IV PUSH: CPT

## 2021-12-25 PROCEDURE — 6360000002 HC RX W HCPCS: Performed by: GENERAL ACUTE CARE HOSPITAL

## 2021-12-25 PROCEDURE — 85025 COMPLETE CBC W/AUTO DIFF WBC: CPT

## 2021-12-25 PROCEDURE — 6370000000 HC RX 637 (ALT 250 FOR IP): Performed by: GENERAL ACUTE CARE HOSPITAL

## 2021-12-25 PROCEDURE — 2580000003 HC RX 258: Performed by: GENERAL ACUTE CARE HOSPITAL

## 2021-12-25 PROCEDURE — XW033E5 INTRODUCTION OF REMDESIVIR ANTI-INFECTIVE INTO PERIPHERAL VEIN, PERCUTANEOUS APPROACH, NEW TECHNOLOGY GROUP 5: ICD-10-PCS | Performed by: INTERNAL MEDICINE

## 2021-12-25 PROCEDURE — U0003 INFECTIOUS AGENT DETECTION BY NUCLEIC ACID (DNA OR RNA); SEVERE ACUTE RESPIRATORY SYNDROME CORONAVIRUS 2 (SARS-COV-2) (CORONAVIRUS DISEASE [COVID-19]), AMPLIFIED PROBE TECHNIQUE, MAKING USE OF HIGH THROUGHPUT TECHNOLOGIES AS DESCRIBED BY CMS-2020-01-R: HCPCS

## 2021-12-25 PROCEDURE — 71260 CT THORAX DX C+: CPT

## 2021-12-25 PROCEDURE — 84484 ASSAY OF TROPONIN QUANT: CPT

## 2021-12-25 PROCEDURE — 83605 ASSAY OF LACTIC ACID: CPT

## 2021-12-25 PROCEDURE — 36415 COLL VENOUS BLD VENIPUNCTURE: CPT

## 2021-12-25 PROCEDURE — 99284 EMERGENCY DEPT VISIT MOD MDM: CPT

## 2021-12-25 PROCEDURE — 2580000003 HC RX 258: Performed by: INTERNAL MEDICINE

## 2021-12-25 PROCEDURE — 85730 THROMBOPLASTIN TIME PARTIAL: CPT

## 2021-12-25 PROCEDURE — 85610 PROTHROMBIN TIME: CPT

## 2021-12-25 PROCEDURE — 6370000000 HC RX 637 (ALT 250 FOR IP): Performed by: INTERNAL MEDICINE

## 2021-12-25 PROCEDURE — 71045 X-RAY EXAM CHEST 1 VIEW: CPT

## 2021-12-25 PROCEDURE — 86140 C-REACTIVE PROTEIN: CPT

## 2021-12-25 PROCEDURE — XW033H5 INTRODUCTION OF TOCILIZUMAB INTO PERIPHERAL VEIN, PERCUTANEOUS APPROACH, NEW TECHNOLOGY GROUP 5: ICD-10-PCS | Performed by: INTERNAL MEDICINE

## 2021-12-25 PROCEDURE — 87040 BLOOD CULTURE FOR BACTERIA: CPT

## 2021-12-25 RX ORDER — POTASSIUM CHLORIDE 20 MEQ/1
20 TABLET, EXTENDED RELEASE ORAL DAILY
Status: DISCONTINUED | OUTPATIENT
Start: 2021-12-26 | End: 2021-12-28

## 2021-12-25 RX ORDER — SODIUM CHLORIDE 0.9 % (FLUSH) 0.9 %
10 SYRINGE (ML) INJECTION PRN
Status: DISCONTINUED | OUTPATIENT
Start: 2021-12-25 | End: 2022-01-02 | Stop reason: HOSPADM

## 2021-12-25 RX ORDER — ZINC SULFATE 50(220)MG
50 CAPSULE ORAL DAILY
Status: DISCONTINUED | OUTPATIENT
Start: 2021-12-26 | End: 2022-01-02 | Stop reason: HOSPADM

## 2021-12-25 RX ORDER — FEXOFENADINE HCL 180 MG/1
180 TABLET ORAL DAILY
Status: DISCONTINUED | OUTPATIENT
Start: 2021-12-26 | End: 2021-12-25 | Stop reason: CLARIF

## 2021-12-25 RX ORDER — NITROGLYCERIN 400 UG/1
1 SPRAY ORAL EVERY 5 MIN PRN
Status: DISCONTINUED | OUTPATIENT
Start: 2021-12-25 | End: 2022-01-02 | Stop reason: HOSPADM

## 2021-12-25 RX ORDER — ESCITALOPRAM OXALATE 10 MG/1
20 TABLET ORAL DAILY
Status: DISCONTINUED | OUTPATIENT
Start: 2021-12-26 | End: 2022-01-02 | Stop reason: HOSPADM

## 2021-12-25 RX ORDER — ISOSORBIDE MONONITRATE 10 MG/1
5 TABLET ORAL 2 TIMES DAILY
Status: DISCONTINUED | OUTPATIENT
Start: 2021-12-25 | End: 2021-12-25 | Stop reason: CLARIF

## 2021-12-25 RX ORDER — DEXLANSOPRAZOLE 60 MG/1
60 CAPSULE, DELAYED RELEASE ORAL DAILY
Status: DISCONTINUED | OUTPATIENT
Start: 2021-12-26 | End: 2021-12-25 | Stop reason: CLARIF

## 2021-12-25 RX ORDER — FERROUS GLUCONATE 324(37.5)
162 TABLET ORAL DAILY
Status: DISCONTINUED | OUTPATIENT
Start: 2021-12-26 | End: 2022-01-02 | Stop reason: HOSPADM

## 2021-12-25 RX ORDER — ISOSORBIDE MONONITRATE 30 MG/1
30 TABLET, EXTENDED RELEASE ORAL DAILY
Status: DISCONTINUED | OUTPATIENT
Start: 2021-12-26 | End: 2022-01-02 | Stop reason: HOSPADM

## 2021-12-25 RX ORDER — LOSARTAN POTASSIUM AND HYDROCHLOROTHIAZIDE 25; 100 MG/1; MG/1
1 TABLET ORAL DAILY
Status: DISCONTINUED | OUTPATIENT
Start: 2021-12-26 | End: 2021-12-25 | Stop reason: CLARIF

## 2021-12-25 RX ORDER — PANTOPRAZOLE SODIUM 40 MG/1
40 TABLET, DELAYED RELEASE ORAL
Status: DISCONTINUED | OUTPATIENT
Start: 2021-12-26 | End: 2021-12-28 | Stop reason: ALTCHOICE

## 2021-12-25 RX ORDER — ASCORBIC ACID 500 MG
1000 TABLET ORAL DAILY
Status: DISCONTINUED | OUTPATIENT
Start: 2021-12-26 | End: 2022-01-02 | Stop reason: HOSPADM

## 2021-12-25 RX ORDER — ONDANSETRON 2 MG/ML
4 INJECTION INTRAMUSCULAR; INTRAVENOUS EVERY 6 HOURS PRN
Status: DISCONTINUED | OUTPATIENT
Start: 2021-12-25 | End: 2022-01-02 | Stop reason: HOSPADM

## 2021-12-25 RX ORDER — VITAMIN B COMPLEX
1000 TABLET ORAL DAILY
Status: DISCONTINUED | OUTPATIENT
Start: 2021-12-26 | End: 2022-01-02 | Stop reason: HOSPADM

## 2021-12-25 RX ORDER — ONDANSETRON 4 MG/1
4 TABLET, ORALLY DISINTEGRATING ORAL EVERY 8 HOURS PRN
Status: DISCONTINUED | OUTPATIENT
Start: 2021-12-25 | End: 2022-01-02 | Stop reason: HOSPADM

## 2021-12-25 RX ORDER — GABAPENTIN 400 MG/1
400 CAPSULE ORAL 3 TIMES DAILY
Status: DISCONTINUED | OUTPATIENT
Start: 2021-12-25 | End: 2022-01-02 | Stop reason: HOSPADM

## 2021-12-25 RX ORDER — ASPIRIN 81 MG/1
81 TABLET ORAL DAILY
Status: DISCONTINUED | OUTPATIENT
Start: 2021-12-26 | End: 2022-01-02 | Stop reason: HOSPADM

## 2021-12-25 RX ORDER — CETIRIZINE HYDROCHLORIDE 10 MG/1
10 TABLET ORAL DAILY
Status: DISCONTINUED | OUTPATIENT
Start: 2021-12-26 | End: 2022-01-02 | Stop reason: HOSPADM

## 2021-12-25 RX ORDER — AMLODIPINE BESYLATE 5 MG/1
2.5 TABLET ORAL 2 TIMES DAILY
Status: DISCONTINUED | OUTPATIENT
Start: 2021-12-25 | End: 2022-01-02 | Stop reason: HOSPADM

## 2021-12-25 RX ORDER — IPRATROPIUM BROMIDE AND ALBUTEROL SULFATE 2.5; .5 MG/3ML; MG/3ML
1 SOLUTION RESPIRATORY (INHALATION)
Status: DISCONTINUED | OUTPATIENT
Start: 2021-12-26 | End: 2021-12-26

## 2021-12-25 RX ORDER — TIZANIDINE 4 MG/1
4 TABLET ORAL NIGHTLY PRN
Status: DISCONTINUED | OUTPATIENT
Start: 2021-12-26 | End: 2022-01-02 | Stop reason: HOSPADM

## 2021-12-25 RX ORDER — SODIUM CHLORIDE 9 MG/ML
INJECTION, SOLUTION INTRAVENOUS CONTINUOUS
Status: DISCONTINUED | OUTPATIENT
Start: 2021-12-25 | End: 2021-12-26

## 2021-12-25 RX ORDER — ACETAMINOPHEN 650 MG/1
650 SUPPOSITORY RECTAL EVERY 6 HOURS PRN
Status: DISCONTINUED | OUTPATIENT
Start: 2021-12-25 | End: 2022-01-02 | Stop reason: HOSPADM

## 2021-12-25 RX ORDER — ROSUVASTATIN CALCIUM 20 MG/1
20 TABLET, COATED ORAL EVERY EVENING
Status: DISCONTINUED | OUTPATIENT
Start: 2021-12-25 | End: 2022-01-02 | Stop reason: HOSPADM

## 2021-12-25 RX ORDER — ACETAMINOPHEN 500 MG
1000 TABLET ORAL ONCE
Status: COMPLETED | OUTPATIENT
Start: 2021-12-25 | End: 2021-12-25

## 2021-12-25 RX ORDER — ACETAMINOPHEN 325 MG/1
650 TABLET ORAL EVERY 6 HOURS PRN
Status: DISCONTINUED | OUTPATIENT
Start: 2021-12-25 | End: 2022-01-02 | Stop reason: HOSPADM

## 2021-12-25 RX ORDER — FUROSEMIDE 40 MG/1
40 TABLET ORAL DAILY
Status: DISCONTINUED | OUTPATIENT
Start: 2021-12-26 | End: 2022-01-02 | Stop reason: HOSPADM

## 2021-12-25 RX ORDER — ASPIRIN 81 MG/1
324 TABLET, CHEWABLE ORAL ONCE
Status: COMPLETED | OUTPATIENT
Start: 2021-12-25 | End: 2021-12-25

## 2021-12-25 RX ORDER — SPIRONOLACTONE 25 MG/1
25 TABLET ORAL DAILY
Status: DISCONTINUED | OUTPATIENT
Start: 2021-12-26 | End: 2021-12-28

## 2021-12-25 RX ORDER — EZETIMIBE 10 MG/1
5 TABLET ORAL NIGHTLY
Status: DISCONTINUED | OUTPATIENT
Start: 2021-12-25 | End: 2022-01-02 | Stop reason: HOSPADM

## 2021-12-25 RX ORDER — PIOGLITAZONEHYDROCHLORIDE 15 MG/1
15 TABLET ORAL DAILY
Status: DISCONTINUED | OUTPATIENT
Start: 2021-12-26 | End: 2022-01-02 | Stop reason: HOSPADM

## 2021-12-25 RX ORDER — ESTRADIOL 0.1 MG/G
2 CREAM VAGINAL
Status: DISCONTINUED | OUTPATIENT
Start: 2021-12-27 | End: 2022-01-02 | Stop reason: HOSPADM

## 2021-12-25 RX ORDER — SODIUM CHLORIDE 0.9 % (FLUSH) 0.9 %
5-40 SYRINGE (ML) INJECTION EVERY 12 HOURS SCHEDULED
Status: DISCONTINUED | OUTPATIENT
Start: 2021-12-25 | End: 2022-01-02 | Stop reason: HOSPADM

## 2021-12-25 RX ORDER — LOSARTAN POTASSIUM 100 MG/1
100 TABLET ORAL DAILY
Status: DISCONTINUED | OUTPATIENT
Start: 2021-12-26 | End: 2021-12-28

## 2021-12-25 RX ORDER — HYDROCHLOROTHIAZIDE 25 MG/1
25 TABLET ORAL DAILY
Status: DISCONTINUED | OUTPATIENT
Start: 2021-12-26 | End: 2022-01-02 | Stop reason: HOSPADM

## 2021-12-25 RX ORDER — DEXAMETHASONE SODIUM PHOSPHATE 10 MG/ML
10 INJECTION, SOLUTION INTRAMUSCULAR; INTRAVENOUS ONCE
Status: COMPLETED | OUTPATIENT
Start: 2021-12-25 | End: 2021-12-25

## 2021-12-25 RX ORDER — AZITHROMYCIN 250 MG/1
500 TABLET, FILM COATED ORAL EVERY 24 HOURS
Status: DISPENSED | OUTPATIENT
Start: 2021-12-26 | End: 2021-12-29

## 2021-12-25 RX ORDER — SODIUM CHLORIDE 9 MG/ML
25 INJECTION, SOLUTION INTRAVENOUS PRN
Status: DISCONTINUED | OUTPATIENT
Start: 2021-12-25 | End: 2022-01-02 | Stop reason: HOSPADM

## 2021-12-25 RX ADMIN — ACETAMINOPHEN 1000 MG: 500 TABLET ORAL at 16:35

## 2021-12-25 RX ADMIN — ASPIRIN 81 MG 324 MG: 81 TABLET ORAL at 19:35

## 2021-12-25 RX ADMIN — AZITHROMYCIN MONOHYDRATE 500 MG: 500 INJECTION, POWDER, LYOPHILIZED, FOR SOLUTION INTRAVENOUS at 19:38

## 2021-12-25 RX ADMIN — AMLODIPINE BESYLATE 2.5 MG: 5 TABLET ORAL at 23:55

## 2021-12-25 RX ADMIN — SODIUM CHLORIDE: 9 INJECTION, SOLUTION INTRAVENOUS at 23:09

## 2021-12-25 RX ADMIN — ROSUVASTATIN CALCIUM 20 MG: 20 TABLET, FILM COATED ORAL at 23:55

## 2021-12-25 RX ADMIN — IOPAMIDOL 75 ML: 755 INJECTION, SOLUTION INTRAVENOUS at 17:46

## 2021-12-25 RX ADMIN — DEXAMETHASONE SODIUM PHOSPHATE 10 MG: 10 INJECTION, SOLUTION INTRAMUSCULAR; INTRAVENOUS at 16:57

## 2021-12-25 RX ADMIN — GABAPENTIN 400 MG: 400 CAPSULE ORAL at 23:55

## 2021-12-25 RX ADMIN — EZETIMIBE 5 MG: 10 TABLET ORAL at 23:55

## 2021-12-25 RX ADMIN — Medication 1000 MG: at 19:32

## 2021-12-25 ASSESSMENT — ENCOUNTER SYMPTOMS
COUGH: 1
CHEST TIGHTNESS: 0
SORE THROAT: 0
ABDOMINAL PAIN: 0
BACK PAIN: 0
SHORTNESS OF BREATH: 1
WHEEZING: 0
VOMITING: 0
NAUSEA: 0

## 2021-12-25 ASSESSMENT — PAIN SCALES - GENERAL
PAINLEVEL_OUTOF10: 8
PAINLEVEL_OUTOF10: 8
PAINLEVEL_OUTOF10: 0

## 2021-12-25 NOTE — ED NOTES
Bed: 07  Expected date:   Expected time:   Means of arrival:   Comments:  [facility]  12/25/21 1598

## 2021-12-25 NOTE — ED PROVIDER NOTES
[facility]        Pt Name: Say Rodriguez  MRN: [id number]  [place] 1950  Date of evaluation: 12/25/2021  Provider: BILL Padilla - JAMES  PCP: Marliss Boast Scheidler, DO  Note Started: 6:41 PM EST       OK. I have evaluated this patient. My supervising physician was available for consultation. CHIEF COMPLAINT       Chief Complaint   Patient presents with    Fatigue     pt states weakness, sob, fever, chills, since last night, vaccinated, no flu shoot/booster, had exposure, pt 84% on RA, placed on 4 LPM via nc       HISTORY OF PRESENT ILLNESS   (Location, Timing/Onset, Context/Setting, Quality, Duration, Modifying Factors, Severity, Associated Signs and Symptoms)  Note limiting factors. Chief Complaint: Shortness of breath, fever, cough, and generalized weakness     Say Rodriguez is a 80 y.o. female who presents to the emergency department today reporting sudden onset of shortness of breath, cough, fever, and generalized weakness which began last night. Patient has been vaccinated against Covid. She has had no flu shot however. There is no history of COPD or asthma. Patient does not wear supplemental oxygen. Patient adamantly denies having any chest pain. Her shortness of breath is worse with exertion. Her cough is productive of thick sputum. She denies hemoptysis. She denies history of DVTs or PEs. She denies recent mobilization or surgeries. She denies having any lower extremity pain or swelling. Patient does report Covid exposure. Patient has not taken anything for her symptoms. Nursing Notes were all reviewed and agreed with or any disagreements were addressed in the HPI. REVIEW OF SYSTEMS    (2-9 systems for level 4, 10 or more for level 5)     Review of Systems   Constitutional: Positive for chills, fatigue and fever. Negative for activity change and appetite change.    HENT: Negative for congestion and sore throat. Eyes: Negative for visual disturbance. Respiratory: Positive for cough and shortness of breath. Negative for chest tightness and wheezing. Cardiovascular: Negative for chest pain, palpitations and leg swelling. Gastrointestinal: Negative for abdominal pain, nausea and vomiting. Endocrine: Negative for polydipsia and polyuria. Genitourinary: Negative for difficulty urinating and dysuria. Musculoskeletal: Negative for back pain, neck pain and neck stiffness. Skin: Negative for rash and wound. Allergic/Immunologic: Negative for immunocompromised state. Neurological: Positive for weakness. Negative for dizziness and light-headedness. Hematological: Does not bruise/bleed easily. Psychiatric/Behavioral: Negative for suicidal ideas. Positives and Pertinent negatives as per HPI. Except as noted above in the ROS, all other systems were reviewed and negative. PAST MEDICAL HISTORY     Past Medical History:   Diagnosis Date    Anemia     Anesthesia     trouble after egd 10/2016. Anxiety and severe tremors after AF ablation 8/2018-responded well to Ativan    Anesthesia complication     flailing, yelling when waking up from anesthesia-ativan helps (ordpsv83/29/19: ativans works within seconds of administration)    Atrial fibrillation ([address] 10/01/2017    A. fib with SVR    CAD (coronary artery disease)     Chest pain 10/01/2017    Chronic kidney disease     ? ??    Depression     GERD (gastroesophageal reflux disease)     Glaucoma     Hiatal hernia     Hyperlipidemia     Hypertension     Migraines, neuralgic     Morbid obesity (HCC)     Osteoarthritis     Sleep apnea     uses CPAP    Type II or unspecified type diabetes mellitus without mention of complication, not stated as uncontrolled     Unspecified sleep apnea     uses cpap    Urinary incontinence     UTI (urinary tract infection)          SURGICAL HISTORY     Past Surgical History: Procedure Laterality Date    ABDOMEN SURGERY N/A 9/1/2020    EXPLORATION OF ABDOMINAL WOUND performed by Chari Rocha MD at [address] [facility] SURGERY  2/22/1999    quadruple by-pass, [address] COLONOSCOPY  10/08/2018    1 polyp removed    COLONOSCOPY N/A 10/8/2018    COLONOSCOPY POLYPECTOMY SNARE/COLD BIOPSY performed by Jodi Nguyen MD at [facility]    COSMETIC SURGERY      face lift    ENTEROSCOPY N/A 2/26/2019    ENTEROSCOPY performed by Jodi Nguyen MD at [address] GASTRIC BAND  12/20/11    hiatal hernia repair    GASTRIC BAND REMOVAL  11/03/2016    laparoscopic     HAMMER TOE SURGERY      [facility] INJECTION PROCEDURE FOR SACROILIAC JOINT Left 12/17/2018    SACROILIAC JOINT INJECTION ON THE LEFT WITH FLUOROSCOPY performed by Alton Hernandez MD at [address]    both   [address]    Left and Right knees, [facility]    KNEE ARTHROSCOPY      1982 left    ORIF HUMERUS DECOMPRESSION Left 2/25/2016    OPEN REDUCTION INTERNAL FIXATION LEFT PROXIMAL HUMERUS    OVARY REMOVAL Bilateral 1997    [facility] [address] DX/THER SBST INTRLMNR CRV/THRC W/IMG GDN N/A 11/5/2018    MIDLINE L4/L5 LUMBAR INTERLAMINAR EPIDURAL STEROID INJECTION WITH FLUOROSCOPY performed by Alton Hernandez MD at [address] [facility] ENDOSCOPY  10/30/15    UPPER GASTROINTESTINAL ENDOSCOPY  10/14/2016    with biopsy    VENTRAL HERNIA REPAIR N/A 10/29/2019    OPEN VENTRAL HERNIA REPAIR WITH  MESH [facility]       Previous Medications    AMLODIPINE (NORVASC) 5 MG TABLET    Take 1 tablet by mouth every evening    ASCORBIC ACID (VITAMIN C) 1000 MG TABLET    Take 1,000 mg by mouth daily ASPIRIN 81 MG TABLET    Take 81 mg by mouth daily    CALCIUM CITRATE-VITAMIN D (CALCIUM CITRATE + D PO)    Take 2 tablets by mouth daily    CRANBERRY 450 MG CAPS    Take by mouth daily     CYANOCOBALAMIN (VITAMIN B-12 PO)    Take 3,000 mcg by mouth daily    DEXLANSOPRAZOLE (DEXILANT) 60 MG CPDR DELAYED RELEASE CAPSULE    Take 60 mg by mouth daily    ESCITALOPRAM (LEXAPRO) 20 MG TABLET    Take 20 mg by mouth daily. ESTRADIOL (ESTRACE) 0.1 MG/GM VAGINAL CREAM    Place 2 g vaginally See Admin Instructions Twice a week    EZETIMIBE (ZETIA) 10 MG TABLET    Take 5 mg by mouth nightly     FERROUS GLUCONATE (FERGON) 225 (27 FE) MG TABLET    Take 240 mg by mouth Take 2 tablets by mouth daily    FEXOFENADINE (ALLEGRA) 180 MG TABLET    Take 180 mg by mouth daily    FUROSEMIDE (LASIX) 40 MG TABLET    Take 1 tablet by mouth daily    GABAPENTIN (NEURONTIN) 100 MG CAPSULE    Take 400 mg by mouth 3 times daily. .    ISOSORBIDE MONONITRATE (ISMO;MONOKET) 10 MG TABLET    Take 5 mg by mouth 2 times daily    LOSARTAN-HYDROCHLOROTHIAZIDE (HYZAAR) 100-25 MG PER TABLET    TK 1 T PO QAM    METFORMIN (GLUCOPHAGE) 500 MG TABLET    Take 500 mg by mouth 2 times daily (with meals)     METOPROLOL TARTRATE (LOPRESSOR) 25 MG TABLET    Take 12.5 mg by mouth as needed (FOR A-FIB)     NITROGLYCERIN (NITROLINGUAL) 0.4 MG/SPRAY SPRAY    Place 1 spray under the tongue every 5 minutes as needed.  As directed for chest pain     PIOGLITAZONE (ACTOS) 15 MG TABLET    Take 15 mg by mouth daily    POTASSIUM CHLORIDE (KLOR-CON M) 20 MEQ EXTENDED RELEASE TABLET    Take 1 tablet by mouth daily    ROSUVASTATIN (CRESTOR) 40 MG TABLET    Take 20 mg by mouth every evening     SLOW-MAG 71.5-119 MG TBEC TABLET    Take 1 tablet by mouth daily 143 mg    SPIRONOLACTONE (ALDACTONE) 25 MG TABLET    Take 25 mg by mouth daily In pm    TIZANIDINE (ZANAFLEX) 4 MG TABLET    Take 4 mg by mouth every 6 hours as needed (ONLY TAKES AT HS)     TRAZODONE HCL (DESYREL PO)    Take 50 mg by mouth nightly     VITAMIN D (CHOLECALCIFEROL) 1000 UNIT TABS TABLET    Take 1,000 Units by mouth daily     ZINC SULFATE (ZINCATE) 220 (50 ZN) MG CAPSULE    Take 50 mg by mouth daily         ALLERGIES     Albuterol, Heparin, Niacin, Avelox [moxifloxacin hcl in nacl], Moxifloxacin, Niaspan [niacin er], Proventil [albuterol sulfate], and Tagamet [cimetidine]    FAMILYHISTORY       Family History   Problem Relation Age of Onset    Cancer Mother         ovarian, colon    High Blood Pressure Mother     Heart Disease Father     High Blood Pressure Father     Stroke Sister         paralysed on right side B/C of stroke          SOCIAL HISTORY       Social History     Tobacco Use    Smoking status: Never Smoker    Smokeless tobacco: Never Used   Vaping Use    Vaping Use: Never used   Substance Use Topics    Alcohol use: No     Alcohol/week: 0.0 standard drinks    Drug use: No       SCREENINGS    [place] Coma Scale  Eye Opening: Spontaneous  Best Verbal Response: Oriented  Best Motor Response: Obeys commands  [place] Coma Scale Score: 15        PHYSICAL EXAM    (up to 7 for level 4, 8 or more for level 5)     ED Triage Vitals [12/25/21 1603]   BP Temp Temp Source Pulse Resp SpO2 Height Weight   (!) 163/47 102.9 °F (39.4 °C) Oral 69 18 94 % 4' 11.5\" (1.511 m) 211 lb (95.7 kg)       Physical Exam  Vitals and nursing note reviewed. Constitutional:       General: She is not in acute distress. Appearance: Normal appearance. She is not ill-appearing, toxic-appearing or diaphoretic. HENT:      Head: Normocephalic and atraumatic. Right Ear: Tympanic membrane, ear canal and external ear normal.      Left Ear: Tympanic membrane, ear canal and external ear normal.      Nose: Nose normal.      Mouth/Throat:      Mouth: Mucous membranes are moist.   Eyes:      General:         Right eye: No discharge. Left eye: No discharge. Extraocular Movements: Extraocular movements intact. Conjunctiva/sclera: Conjunctivae normal.   Cardiovascular:      Rate and Rhythm: Normal rate and regular rhythm. Pulses: Normal pulses. Heart sounds: Normal heart sounds. Pulmonary:      Effort: Pulmonary effort is normal. Tachypnea present. No accessory muscle usage, respiratory distress or retractions. Breath sounds: Decreased air movement present. No wheezing, rhonchi or rales. Abdominal:      General: Bowel sounds are normal.      Palpations: Abdomen is soft. Tenderness: There is no abdominal tenderness. There is no right CVA tenderness or left CVA tenderness. Musculoskeletal:         General: No tenderness, deformity or signs of injury. Cervical back: Normal range of motion and neck supple. Right lower leg: No edema. Left lower leg: No edema. Skin:     General: Skin is warm and dry. Capillary Refill: Capillary refill takes less than 2 seconds. Neurological:      General: No focal deficit present. Mental Status: She is alert and oriented to person, place, and time. Psychiatric:         Mood and Affect: Mood normal.         Behavior: Behavior normal.         Thought Content:  Thought content normal.         Judgment: Judgment normal.         DIAGNOSTIC RESULTS   LABS:    Labs Reviewed   LACTATE, SEPSIS - Abnormal; Notable for the following components:       Result Value    Lactic Acid, Sepsis 2.2 (*)     All other components within normal limits    Narrative:     Performed at:  [facility] [address], [place], [address] [facility]   Phone [phone]   CBC WITH AUTO DIFFERENTIAL - Abnormal; Notable for the following components:    RBC 2.97 (*)     Hemoglobin 8.9 (*)     Hematocrit 27.6 (*)     Lymphocytes Absolute 0.6 (*)     All other components within normal limits    Narrative:     Performed at:  [facility] [address], [place], [facility]   Phone [phone] METABOLIC PANEL W/ REFLEX TO MG FOR LOW K - Abnormal; Notable for the following components:    Sodium 132 (*)     Chloride 97 (*)     CO2 19 (*)     Glucose 143 (*)     BUN 31 (*)     GFR Non- 49 (*)     GFR  59 (*)     All other components within normal limits    Narrative:     Performed at:  [facility] [address] [address] [place]   Phone [phone]   TROPONIN - Abnormal; Notable for the following components:    Troponin 0.45 (*)     All other components within normal limits    Narrative:     Performed at:  [facility] [address] [address] [place]   Phone [phone]   C-REACTIVE PROTEIN - Abnormal; Notable for the following components:    CRP 24.9 (*)     All other components within normal limits    Narrative:     Performed at:  [facility] [address] [address] [place]   Phone [phone]   RAPID INFLUENZA A/B ANTIGENS    Narrative:     Performed at:  [facility] [address] [address] [place]   Phone [phone]   CULTURE, BLOOD 1   CULTURE, BLOOD 2   PROCALCITONIN    Narrative:     Performed at:  [facility]  [address] [place]   Phone [phone]   PROTIME-INR    Narrative:     Performed at:  [facility] [address]   Phone [phone]   APTT    Narrative:     Performed at:  [facility] [address] [address] [place]   Phone [phone]   LACTATE, SEPSIS   COVID-19   TROPONIN       When ordered only abnormal lab results are displayed. All other labs were within normal range or not returned as of this dictation. EKG:  When ordered, EKG's are interpreted by the Emergency Department Physician in the absence of a cardiologist.  Please see their note for interpretation of EKG. RADIOLOGY:   Non-plain film images such as CT, Ultrasound and MRI are read by the radiologist. Plain radiographic images are visualized and preliminarily interpreted by the ED Provider with the below findings:        Interpretation per the Radiologist below, if available at the time of this note:    CT CHEST PULMONARY EMBOLISM W CONTRAST   Preliminary Result   1. No evidence of pulmonary embolic disease. 2. Consolidation in the right upper lobe consistent with pneumonia. The   findings are more typical of bacterial etiology and are not consistent with   COVID-19 pneumonia. Additional areas of patchy infiltrate in the right   middle lobe and superior segment of the right lower lobe. Continued plain   film follow-up recommended to ensure clearing. 3. Trace right pleural effusion. Mild bibasilar atelectasis. 4. Cardiomegaly. Coronary vascular calcification. RECOMMENDATIONS:   Plain film follow-up recommended to ensure clearing. XR CHEST PORTABLE   Final Result   Findings suspicious for bilateral infiltrates in the lungs with large   consolidation in the right mid lung. Underlying right lung mass difficult to   exclude. CT of the chest may be obtained for further clarification. XR CHEST PORTABLE    Result Date: 12/25/2021  EXAMINATION: ONE XRAY VIEW OF THE CHEST 12/25/2021 4:18 pm COMPARISON: 2019 comparison HISTORY: ORDERING SYSTEM PROVIDED HISTORY: SOB TECHNOLOGIST PROVIDED HISTORY: Reason for exam:->SOB FINDINGS: Mild cardiomegaly. Cardiac pacemaker leads in stable/satisfactory position with no evidence of pneumothorax. Large consolidation identified in the right mid lung. Additional moderate diffuse infiltrates and/or congestion identified in the lungs. Significant osteopenic changes and degenerative changes noted in the bony structures.      Findings suspicious for bilateral infiltrates in the lungs with large consolidation in the right mid lung.  Underlying right lung mass difficult to exclude. CT of the chest may be obtained for further clarification. CT CHEST PULMONARY EMBOLISM W CONTRAST    Result Date: 12/25/2021  EXAMINATION: CTA OF THE CHEST 12/25/2021 5:46 pm TECHNIQUE: CTA of the chest was performed after the administration of intravenous contrast.  Multiplanar reformatted images are provided for review. MIP images are provided for review. Dose modulation, iterative reconstruction, and/or weight based adjustment of the mA/kV was utilized to reduce the radiation dose to as low as reasonably achievable. COMPARISON: 09/05/2018. Chest x-ray performed earlier today. HISTORY: ORDERING SYSTEM PROVIDED HISTORY: SOB/fever/abnormal chest xray TECHNOLOGIST PROVIDED HISTORY: Reason for exam:->SOB/fever/abnormal chest xray Decision Support Exception - unselect if not a suspected or confirmed emergency medical condition->Emergency Medical Condition (MA) Reason for Exam: SOB/fever/abnormal chest xray FINDINGS: Pulmonary Arteries: Pulmonary arteries are adequately opacified for evaluation. No evidence of intraluminal filling defect to suggest pulmonary embolism. Main pulmonary artery is normal in caliber. Mediastinum: The thoracic aorta is normal in caliber with calcified atherosclerotic plaque. Cardiomegaly with coronary vascular calcification and previous median sternotomy. No pericardial effusion. Pacer leads are in place. Multiple mediastinal nodes are not enlarged by size criteria. The esophagus is unremarkable. Lungs/pleura: Dense consolidation with air bronchograms in the posterior segment of the right upper lobe. Patchy infiltrate is noted elsewhere within the right upper lobe as well as in the right middle lobe and superior segment of the right lower lobe. The left lung is clear. Dependent atelectasis in the lower lobes bilaterally. Trace right pleural effusion. The central airways are patent. Upper Abdomen:  The visualized upper abdominal viscera are unremarkable. There is a stable 2.3 cm left adrenal myelolipoma. Soft Tissues/Bones: No acute bone or soft tissue abnormality. 1. No evidence of pulmonary embolic disease. 2. Consolidation in the right upper lobe consistent with pneumonia. The findings are more typical of bacterial etiology and are not consistent with COVID-19 pneumonia. Additional areas of patchy infiltrate in the right middle lobe and superior segment of the right lower lobe. Continued plain film follow-up recommended to ensure clearing. 3. Trace right pleural effusion. Mild bibasilar atelectasis. 4. Cardiomegaly. Coronary vascular calcification. RECOMMENDATIONS: Plain film follow-up recommended to ensure clearing. PROCEDURES   Unless otherwise noted below, none     Procedures    CRITICAL CARE TIME   The total critical care time spent while evaluating and treating this patient was at least 31 minutes. This excludes time spent doing separately billable procedures. This includes time at the bedside, data interpretation, medication management, obtaining critical history from collateral sources if the patient is unable to provide it directly, and physician consultation. Specifics of interventions taken and potentially life-threatening diagnostic considerations are listed above in the medical decision making.         CONSULTS:  IP CONSULT TO HOSPITALIST  IP CONSULT TO CARDIOLOGY  IP CONSULT TO INTERNAL MEDICINE      EMERGENCY DEPARTMENT COURSE and DIFFERENTIAL DIAGNOSIS/MDM:   Vitals:    Vitals:    12/25/21 1603 12/25/21 1645 12/25/21 1730   BP: (!) 163/47 (!) 173/44 (!) 115/47   Pulse: 69  63   Resp: 18  26   Temp: 102.9 °F (39.4 °C)     TempSrc: Oral     SpO2: 94%  93%   Weight: 211 lb (95.7 kg)     Height: 4' 11.5\" (1.511 m)         Patient was given the following medications:  Medications   azithromycin (ZITHROMAX) 500 mg in D5W 250ml Vial Mate (500 mg IntraVENous New Bag 12/25/21 1938)   acetaminophen (TYLENOL) tablet 1,000 mg (1,000 mg Oral Given 12/25/21 1635)   dexamethasone (PF) (DECADRON) injection 10 mg (10 mg IntraVENous Given 12/25/21 1657)   iopamidol (ISOVUE-370) 76 % injection 75 mL (75 mLs IntraVENous Given 12/25/21 1746)   cefTRIAXone (ROCEPHIN) 1000 mg in sterile water 10 mL IV syringe (1,000 mg IntraVENous Given 12/25/21 1932)   aspirin chewable tablet 324 mg (324 mg Oral Given 12/25/21 1935)     ED Course as of 12/25/21 1952   Sat Dec 25, 2021   1857 Spoke with on call cardiologist Dr. Otilio Wallcae who agrees to consult. No further interventions recommended at this time. [TV]      ED Course User Index  [TV] Albert Ram, APRN - CNP      Previous records reviewed in order to gain further information regarding patient's PMH as well as her HPI. Nursing notes reviewed. This is a 45-year-old  female with history of atrial fibrillation, chronic kidney disease, coronary artery disease, depression, hypertension, hyperlipidemia, and diabetes who presents to the emergency department today reporting shortness of breath, cough, and fever. She also reports generalized weakness. Symptoms began yesterday. She has been vaccinated against Covid but has not received the flu vaccine. Patient arrives to the emergency department hypoxic with initial oxygen saturation of 84% on room air. She is a non-smoker with no history of COPD or asthma. Patient also febrile with a temp of 102.9. She is normotensive. She is not tachycardic. Sepsis work-up initiated. EKG interpreted by ED physician reviewed by myself is negative for acute ST elevation. Influenza test is negative. Covid test is pending. Chest x-ray shows pneumonia. CT of her chest is negative for PE but does confirm the pneumonia which appears bacterial in nature. IV antibiotics initiated. Laboratory studies show an elevated troponin of 0.45, remaining laboratory studies been unremarkable or consistent with previous. Patient given chewable aspirin.  Again, she denies having any chest pain. Patient does report improvement of symptoms after intervention however she has continued to require supplemental oxygen. Cardiologist consulted. Spoke to Dr. Pillo Palma who agrees to admit patient for further evaluation and treatment. Patient and family are in agreement with plan of care. FINAL IMPRESSION      1. Pneumonia due to infectious organism, unspecified laterality, unspecified part of lung    2. Hypoxia    3. Elevated troponin    4. Anemia, unspecified type          DISPOSITION/PLAN   DISPOSITION Decision To Admit 12/25/2021 06:49:02 PM      PATIENT REFERRED TO:  No follow-up provider specified.     DISCHARGE MEDICATIONS:  New Prescriptions    No medications on file       DISCONTINUED MEDICATIONS:  Discontinued Medications    No medications on file              (Please note that portions of this note were completed with a voice recognition program.  Efforts were made to edit the dictations but occasionally words are mis-transcribed.)    BILL Howard CNP (electronically signed)            BILL Howard CNP  12/25/21 1952

## 2021-12-26 ENCOUNTER — APPOINTMENT (OUTPATIENT)
Dept: GENERAL RADIOLOGY | Age: 71
DRG: 871 | End: 2021-12-26
Payer: MEDICARE

## 2021-12-26 LAB
A/G RATIO: 1.3 (ref 1.1–2.2)
ALBUMIN SERPL-MCNC: 3.7 G/DL (ref 3.4–5)
ALP BLD-CCNC: 61 U/L (ref 40–129)
ALT SERPL-CCNC: 18 U/L (ref 10–40)
ANION GAP SERPL CALCULATED.3IONS-SCNC: 11 MMOL/L (ref 3–16)
AST SERPL-CCNC: 24 U/L (ref 15–37)
BASOPHILS ABSOLUTE: 0 K/UL (ref 0–0.2)
BASOPHILS RELATIVE PERCENT: 0.1 %
BILIRUB SERPL-MCNC: <0.2 MG/DL (ref 0–1)
BUN BLDV-MCNC: 35 MG/DL (ref 7–20)
CALCIUM SERPL-MCNC: 9 MG/DL (ref 8.3–10.6)
CHLORIDE BLD-SCNC: 101 MMOL/L (ref 99–110)
CO2: 23 MMOL/L (ref 21–32)
CREAT SERPL-MCNC: 1 MG/DL (ref 0.6–1.2)
D DIMER: 356 NG/ML DDU (ref 0–229)
EKG ATRIAL RATE: 66 BPM
EKG DIAGNOSIS: NORMAL
EKG P AXIS: 28 DEGREES
EKG P-R INTERVAL: 188 MS
EKG Q-T INTERVAL: 382 MS
EKG QRS DURATION: 80 MS
EKG QTC CALCULATION (BAZETT): 400 MS
EKG R AXIS: 17 DEGREES
EKG T AXIS: 80 DEGREES
EKG VENTRICULAR RATE: 66 BPM
EOSINOPHILS ABSOLUTE: 0 K/UL (ref 0–0.6)
EOSINOPHILS RELATIVE PERCENT: 0 %
GFR AFRICAN AMERICAN: >60
GFR NON-AFRICAN AMERICAN: 55
GLUCOSE BLD-MCNC: 171 MG/DL (ref 70–99)
GLUCOSE BLD-MCNC: 198 MG/DL (ref 70–99)
HCT VFR BLD CALC: 24.4 % (ref 36–48)
HEMOGLOBIN: 7.9 G/DL (ref 12–16)
LYMPHOCYTES ABSOLUTE: 0.6 K/UL (ref 1–5.1)
LYMPHOCYTES RELATIVE PERCENT: 12.8 %
MCH RBC QN AUTO: 30.1 PG (ref 26–34)
MCHC RBC AUTO-ENTMCNC: 32.5 G/DL (ref 31–36)
MCV RBC AUTO: 92.6 FL (ref 80–100)
MONOCYTES ABSOLUTE: 0.5 K/UL (ref 0–1.3)
MONOCYTES RELATIVE PERCENT: 9.6 %
NEUTROPHILS ABSOLUTE: 3.8 K/UL (ref 1.7–7.7)
NEUTROPHILS RELATIVE PERCENT: 77.5 %
PDW BLD-RTO: 15 % (ref 12.4–15.4)
PERFORMED ON: ABNORMAL
PLATELET # BLD: 217 K/UL (ref 135–450)
PMV BLD AUTO: 8.3 FL (ref 5–10.5)
POTASSIUM REFLEX MAGNESIUM: 4.7 MMOL/L (ref 3.5–5.1)
PROCALCITONIN: 0.14 NG/ML (ref 0–0.15)
RBC # BLD: 2.63 M/UL (ref 4–5.2)
SARS-COV-2: DETECTED
SODIUM BLD-SCNC: 135 MMOL/L (ref 136–145)
TOTAL PROTEIN: 6.5 G/DL (ref 6.4–8.2)
TROPONIN: 0.45 NG/ML
WBC # BLD: 4.9 K/UL (ref 4–11)

## 2021-12-26 PROCEDURE — 85025 COMPLETE CBC W/AUTO DIFF WBC: CPT

## 2021-12-26 PROCEDURE — 2700000000 HC OXYGEN THERAPY PER DAY

## 2021-12-26 PROCEDURE — 6370000000 HC RX 637 (ALT 250 FOR IP): Performed by: INTERNAL MEDICINE

## 2021-12-26 PROCEDURE — 99223 1ST HOSP IP/OBS HIGH 75: CPT | Performed by: INTERNAL MEDICINE

## 2021-12-26 PROCEDURE — 93005 ELECTROCARDIOGRAM TRACING: CPT | Performed by: INTERNAL MEDICINE

## 2021-12-26 PROCEDURE — 6360000002 HC RX W HCPCS: Performed by: INTERNAL MEDICINE

## 2021-12-26 PROCEDURE — 84484 ASSAY OF TROPONIN QUANT: CPT

## 2021-12-26 PROCEDURE — 94660 CPAP INITIATION&MGMT: CPT

## 2021-12-26 PROCEDURE — 94640 AIRWAY INHALATION TREATMENT: CPT

## 2021-12-26 PROCEDURE — 85379 FIBRIN DEGRADATION QUANT: CPT

## 2021-12-26 PROCEDURE — 36415 COLL VENOUS BLD VENIPUNCTURE: CPT

## 2021-12-26 PROCEDURE — 1200000000 HC SEMI PRIVATE

## 2021-12-26 PROCEDURE — 94761 N-INVAS EAR/PLS OXIMETRY MLT: CPT

## 2021-12-26 PROCEDURE — 80053 COMPREHEN METABOLIC PANEL: CPT

## 2021-12-26 PROCEDURE — 71045 X-RAY EXAM CHEST 1 VIEW: CPT

## 2021-12-26 PROCEDURE — 93010 ELECTROCARDIOGRAM REPORT: CPT | Performed by: INTERNAL MEDICINE

## 2021-12-26 PROCEDURE — 84145 PROCALCITONIN (PCT): CPT

## 2021-12-26 PROCEDURE — 2580000003 HC RX 258: Performed by: INTERNAL MEDICINE

## 2021-12-26 RX ORDER — ALBUTEROL SULFATE 90 UG/1
2 AEROSOL, METERED RESPIRATORY (INHALATION) 4 TIMES DAILY
Status: DISCONTINUED | OUTPATIENT
Start: 2021-12-26 | End: 2021-12-28

## 2021-12-26 RX ORDER — DEXAMETHASONE SODIUM PHOSPHATE 4 MG/ML
6 INJECTION, SOLUTION INTRA-ARTICULAR; INTRALESIONAL; INTRAMUSCULAR; INTRAVENOUS; SOFT TISSUE EVERY 24 HOURS
Status: DISCONTINUED | OUTPATIENT
Start: 2021-12-26 | End: 2021-12-27 | Stop reason: DRUGHIGH

## 2021-12-26 RX ORDER — ALBUTEROL SULFATE 90 UG/1
2 AEROSOL, METERED RESPIRATORY (INHALATION) EVERY 4 HOURS PRN
Status: DISCONTINUED | OUTPATIENT
Start: 2021-12-26 | End: 2022-01-02 | Stop reason: HOSPADM

## 2021-12-26 RX ORDER — ALPRAZOLAM 0.5 MG/1
0.25 TABLET ORAL 2 TIMES DAILY PRN
Status: DISCONTINUED | OUTPATIENT
Start: 2021-12-26 | End: 2022-01-02 | Stop reason: HOSPADM

## 2021-12-26 RX ADMIN — METFORMIN HYDROCHLORIDE 500 MG: 500 TABLET ORAL at 11:00

## 2021-12-26 RX ADMIN — OXYCODONE HYDROCHLORIDE AND ACETAMINOPHEN 1000 MG: 500 TABLET ORAL at 11:00

## 2021-12-26 RX ADMIN — GABAPENTIN 400 MG: 400 CAPSULE ORAL at 21:41

## 2021-12-26 RX ADMIN — ESCITALOPRAM OXALATE 20 MG: 10 TABLET ORAL at 11:00

## 2021-12-26 RX ADMIN — Medication 1000 MG: at 23:01

## 2021-12-26 RX ADMIN — ISOSORBIDE MONONITRATE 30 MG: 30 TABLET, EXTENDED RELEASE ORAL at 11:26

## 2021-12-26 RX ADMIN — GABAPENTIN 400 MG: 400 CAPSULE ORAL at 11:00

## 2021-12-26 RX ADMIN — POTASSIUM CHLORIDE 20 MEQ: 1500 TABLET, EXTENDED RELEASE ORAL at 11:26

## 2021-12-26 RX ADMIN — SPIRONOLACTONE 25 MG: 25 TABLET ORAL at 11:00

## 2021-12-26 RX ADMIN — DEXAMETHASONE SODIUM PHOSPHATE 6 MG: 4 INJECTION, SOLUTION INTRAMUSCULAR; INTRAVENOUS at 11:26

## 2021-12-26 RX ADMIN — ENOXAPARIN SODIUM 40 MG: 40 INJECTION SUBCUTANEOUS at 11:00

## 2021-12-26 RX ADMIN — Medication 2 PUFF: at 13:42

## 2021-12-26 RX ADMIN — ASPIRIN 81 MG: 81 TABLET, COATED ORAL at 11:00

## 2021-12-26 RX ADMIN — METFORMIN HYDROCHLORIDE 500 MG: 500 TABLET ORAL at 18:07

## 2021-12-26 RX ADMIN — ALPRAZOLAM 0.25 MG: 0.5 TABLET ORAL at 23:00

## 2021-12-26 RX ADMIN — PIOGLITAZONE 15 MG: 15 TABLET ORAL at 11:00

## 2021-12-26 RX ADMIN — Medication 50 MG: at 11:00

## 2021-12-26 RX ADMIN — FUROSEMIDE 40 MG: 40 TABLET ORAL at 11:00

## 2021-12-26 RX ADMIN — PANTOPRAZOLE SODIUM 40 MG: 40 TABLET, DELAYED RELEASE ORAL at 05:45

## 2021-12-26 RX ADMIN — Medication 2 PUFF: at 20:09

## 2021-12-26 RX ADMIN — HYDROCHLOROTHIAZIDE 25 MG: 25 TABLET ORAL at 11:00

## 2021-12-26 RX ADMIN — ROSUVASTATIN CALCIUM 20 MG: 20 TABLET, FILM COATED ORAL at 18:15

## 2021-12-26 RX ADMIN — Medication 10 ML: at 21:42

## 2021-12-26 RX ADMIN — CETIRIZINE HYDROCHLORIDE 10 MG: 10 TABLET, FILM COATED ORAL at 11:00

## 2021-12-26 RX ADMIN — Medication 162 MG: at 11:26

## 2021-12-26 RX ADMIN — Medication 1000 UNITS: at 11:00

## 2021-12-26 RX ADMIN — Medication 2 PUFF: at 16:31

## 2021-12-26 RX ADMIN — LOSARTAN POTASSIUM 100 MG: 100 TABLET, FILM COATED ORAL at 11:26

## 2021-12-26 RX ADMIN — Medication 10 ML: at 11:00

## 2021-12-26 RX ADMIN — ACETAMINOPHEN 650 MG: 325 TABLET ORAL at 23:00

## 2021-12-26 RX ADMIN — AMLODIPINE BESYLATE 2.5 MG: 5 TABLET ORAL at 21:41

## 2021-12-26 RX ADMIN — GABAPENTIN 400 MG: 400 CAPSULE ORAL at 18:06

## 2021-12-26 RX ADMIN — AMLODIPINE BESYLATE 2.5 MG: 5 TABLET ORAL at 11:00

## 2021-12-26 ASSESSMENT — PAIN SCALES - GENERAL
PAINLEVEL_OUTOF10: 0

## 2021-12-26 NOTE — ED PROVIDER NOTES
I did not perform history or physical on [facility]. This patient was seen independently by an OK. I did review the EKG, which is documented below. EKG  The Ekg interpreted by me in the absence of a cardiologist shows. normal sinus rhythm with a rate of 66  Axis is   Normal  QTc is  normal  Intervals and Durations are unremarkable.       T-wave inversions in leads I and aVL  No significant change from prior EKG dated 5/25/2021           Toribio Landaverde MD  12/25/21 [id number]

## 2021-12-26 NOTE — PROGRESS NOTES
Aspiration Screen    Name: Armand Washington  : 1950  Medical Diagnosis: Hypoxia [R09.02]  Elevated troponin [R77.8]  [facility]STEM[facility] (non[facility]ST [facility] [I21.4]  Anemia, unspecified type [D64.9]  Pneumonia due to infectious organism, unspecified laterality, unspecified part of lung [J18.9]    Swallow screen to rule out aspiration completed per pneumonia protocol. Patient demonstrates no significant high risk indicators for potential aspiration per swallow screen at this time. No further swallowing intervention is warranted at this time. Continue current diet as tolerated. Please consult speech therapy for bedside swallow evaluation if symptoms of swallowing dysfunction arise. Thank you,    Bonita ENG  CCC-SLP S.P. [id number]  Speech-Language Pathologist

## 2021-12-26 NOTE — PROGRESS NOTES
Progress Note - Dr. Janey Abel - Internal Medicine  PCP: Eleanor Parker, [address] [facility] / [facility] [id number] [phone]    Hospital Day: 1  Code Status: Full Code  Current Diet: ADULT DIET; Regular        CC: follow up on medical issues    Subjective:   Mitzi Arreola is a 80 y.o. female. Pt seen and examined  Chart reviewed since last visit, labs and imaging below      Doing ok  Still very dyspneic  D/W RN overnight  Trop cont to trend upwards, 0.56 at last check  Did not start heparin overnight due to pt's stated allergies; also per ER report, Cards did not recommend this yesterday as well    She denies chest pain, complains of shortness of breath, denies nausea,  denies emesis. 10 system Review of Systems is reviewed with patient, and pertinent positives are noted in HPI above . Otherwise, Review of systems is negative. I have reviewed the patient's medical and social history in detail and updated the computerized patient record. To recap: She  has a past medical history of Anemia, Anesthesia, Anesthesia complication, Atrial fibrillation ([address] CAD (coronary artery disease), Chest pain, Chronic kidney disease, Depression, GERD (gastroesophageal reflux disease), Glaucoma, Hiatal hernia, Hyperlipidemia, Hypertension, Migraines, neuralgic, Morbid obesity ([address] Osteoarthritis, Sleep apnea, Type II or unspecified type diabetes mellitus without mention of complication, not stated as uncontrolled, Unspecified sleep apnea, Urinary incontinence, and UTI (urinary tract infection). . She  has a past surgical history that includes Coronary artery bypass graft (1999); Hammer toe surgery; Ovary removal (Bilateral, 1997); Esophagus dilation; Breast lumpectomy; Knee arthroscopy; Gastric Band (12/20/11); Colonoscopy; Upper gastrointestinal endoscopy; Upper gastrointestinal endoscopy (10/30/15);  Cosmetic surgery; joint replacement (1995); joint replacement (1995); orif humerus decompression (Left, 2/25/2016); Upper gastrointestinal endoscopy (10/14/2016); Bariatric Surgery (11/03/2016); ablation of dysrhythmic focus; Colonoscopy (10/08/2018); Colonoscopy (N/A, 10/8/2018); pr njx dx/ther sbst intrlmnr crv/thrc w/img gdn (N/A, 11/5/2018); Cardiac surgery (2/22/1999); Cardiac catheterization; Injection Procedure For Sacroiliac Joint (Left, 12/17/2018); Enteroscopy (N/A, 2/26/2019); ventral hernia repair (N/A, 10/29/2019); and Abdomen surgery (N/A, 9/1/2020). . She  reports that she has never smoked. She has never used smokeless tobacco. She reports that she does not drink alcohol and does not use drugs. .        Active Hospital Problems    Diagnosis Date Noted    NSTEMI (non-ST elevated myocardial infarction) ([facility] [address] [I21.4] 12/25/2021    Pneumonia [J18.9] 12/25/2021    Acute respiratory failure with hypoxia ([facility] [address] [J96.01] 12/25/2021    Suspected COVID-19 virus infection [Z20.822] 12/25/2021    HTN (hypertension) [I10] 12/25/2021       Current Facility-Administered Medications: albuterol sulfate  (90 Base) MCG/ACT inhaler 2 puff, 2 puff, Inhalation, 4x daily  ipratropium (ATROVENT HFA) 17 MCG/ACT inhaler 2 puff, 2 puff, Inhalation, 4x daily  albuterol sulfate  (90 Base) MCG/ACT inhaler 2 puff, 2 puff, Inhalation, Q4H PRN  escitalopram (LEXAPRO) tablet 20 mg, 20 mg, Oral, Daily  nitroGLYCERIN (NITROLINGUAL) 0.4 mg spray 1 spray, 1 spray, SubLINGual, Q5 Min PRN  ezetimibe (ZETIA) tablet 5 mg - PATIENT SUPPLIED, 5 mg, Oral, Nightly  metFORMIN (GLUCOPHAGE) tablet 500 mg, 500 mg, Oral, BID WC  rosuvastatin (CRESTOR) tablet 20 mg, 20 mg, Oral, QPM  gabapentin (NEURONTIN) capsule 400 mg, 400 mg, Oral, TID  spironolactone (ALDACTONE) tablet 25 mg, 25 mg, Oral, Daily  pioglitazone (ACTOS) tablet 15 mg, 15 mg, Oral, Daily  vitamin D (CHOLECALCIFEROL) tablet 1,000 Units, 1,000 Units, Oral, Daily  ascorbic acid (VITAMIN C) tablet 1,000 mg, 1,000 mg, Oral, Daily  [START ON 12/27/2021] estradiol (ESTRACE) vaginal cream 2 g, 2 g, Vaginal, Once per day on Mon Thu  furosemide (LASIX) tablet 40 mg, 40 mg, Oral, Daily  aspirin EC tablet 81 mg, 81 mg, Oral, Daily  tiZANidine (ZANAFLEX) tablet 4 mg, 4 mg, Oral, Nightly PRN  metoprolol tartrate (LOPRESSOR) tablet 12.5 mg, 12.5 mg, Oral, PRN  amLODIPine (NORVASC) tablet 2.5 mg, 2.5 mg, Oral, BID  ferrous gluconate 324 (37.5 Fe) MG tablet 162 mg, 162 mg, Oral, Daily  potassium chloride (KLOR-CON M) extended release tablet 20 mEq, 20 mEq, Oral, Daily  zinc sulfate (ZINCATE) capsule 50 mg, 50 mg, Oral, Daily  0.9 % sodium chloride infusion, , IntraVENous, Continuous  sodium chloride flush 0.9 % injection 5-40 mL, 5-40 mL, IntraVENous, 2 times per day  sodium chloride flush 0.9 % injection 10 mL, 10 mL, IntraVENous, PRN  0.9 % sodium chloride infusion, 25 mL, IntraVENous, PRN  enoxaparin (LOVENOX) injection 40 mg, 40 mg, SubCUTAneous, Daily  ondansetron (ZOFRAN-ODT) disintegrating tablet 4 mg, 4 mg, Oral, Q8H PRN **OR** ondansetron (ZOFRAN) injection 4 mg, 4 mg, IntraVENous, Q6H PRN  magnesium hydroxide (MILK OF MAGNESIA) 400 MG/5ML suspension 30 mL, 30 mL, Oral, Daily PRN  acetaminophen (TYLENOL) tablet 650 mg, 650 mg, Oral, Q6H PRN **OR** acetaminophen (TYLENOL) suppository 650 mg, 650 mg, Rectal, Q6H PRN  cefTRIAXone (ROCEPHIN) 1000 mg in sterile water 10 mL IV syringe, 1,000 mg, IntraVENous, Q24H **AND** azithromycin (ZITHROMAX) tablet 500 mg, 500 mg, Oral, Q24H  influenza quadrivalent split vaccine (FLUZONE;FLUARIX;FLULAVAL;AFLURIA) injection 0.5 mL, 0.5 mL, IntraMUSCular, Prior to discharge  cetirizine (ZYRTEC) tablet 10 mg, 10 mg, Oral, Daily  losartan (COZAAR) tablet 100 mg, 100 mg, Oral, Daily **AND** hydroCHLOROthiazide (HYDRODIURIL) tablet 25 mg, 25 mg, Oral, Daily  pantoprazole (PROTONIX) tablet 40 mg, 40 mg, Oral, QAM AC  isosorbide mononitrate (IMDUR) extended release tablet 30 mg, 30 mg, Oral, Daily         Objective:  BP (!) 144/63   Pulse 66   Temp 98.4 °F (36.9 °C) (Oral)   Resp 20   Ht 4' 11.5\" (1.511 m)   Wt 209 lb 3.2 oz (94.9 kg)   SpO2 93%   BMI 41.55 kg/m²      Patient Vitals for the past 24 hrs:   BP Temp Temp src Pulse Resp SpO2 Height Weight   12/26/21 0813 -- -- -- -- 20 93 % -- --   12/26/21 0545 (!) 144/63 98.4 °F (36.9 °C) Oral 66 20 90 % -- --   12/26/21 0100 134/72 97.3 °F (36.3 °C) Axillary 62 20 98 % -- --   12/26/21 0058 -- -- -- -- 23 -- -- --   12/25/21 2215 (!) 152/66 98.4 °F (36.9 °C) Oral 66 20 95 % -- 209 lb 3.2 oz (94.9 kg)   12/25/21 2030 (!) 117/50 -- -- 64 23 93 % -- --   12/25/21 2015 (!) 121/56 -- -- 66 26 95 % -- --   12/25/21 1945 (!) 128/42 -- -- 61 21 95 % -- --   12/25/21 1930 (!) 121/49 -- -- 65 23 92 % -- --   12/25/21 1845 (!) 121/41 -- -- 71 21 91 % -- --   12/25/21 1830 (!) 121/44 -- -- 65 24 92 % -- --   12/25/21 1730 (!) 115/47 -- -- 63 26 93 % -- --   12/25/21 1645 (!) 173/44 -- -- -- -- -- -- --   12/25/21 1603 (!) 163/47 102.9 °F (39.4 °C) Oral 69 18 94 % 4' 11.5\" (1.511 m) 211 lb (95.7 kg)     Patient Vitals for the past 96 hrs (Last 3 readings):   Weight   12/25/21 2215 209 lb 3.2 oz (94.9 kg)   12/25/21 1603 211 lb (95.7 kg)           Intake/Output Summary (Last 24 hours) at 12/26/2021 0820  Last data filed at 12/26/2021 0545  Gross per 24 hour   Intake --   Output 840 ml   Net -840 ml         Physical Exam:   Vitals as above  General appearance: alert, appears stated age and cooperative    Head: Normocephalic, without obvious abnormality, atraumatic    Lungs: crackles bilat  Heart: regular rate and rhythm, S1, S2 normal, no murmur   Abdomen: soft, non-tender; bowel sounds normal; no masses, no organomegaly    Extremities: extremities normal, atraumatic, no cyanosis, no edema      Labs:  Lab Results   Component Value Date    WBC 4.9 12/26/2021    HGB 7.9 (L) 12/26/2021    HCT 24.4 (L) 12/26/2021     12/26/2021    CHOL 109 05/22/2020    TRIG 132 05/22/2020    HDL 36 (L) 05/22/2020    LDLDIRECT 90 01/18/2017 ALT 18 12/26/2021    AST 24 12/26/2021     (L) 12/26/2021    K 4.7 12/26/2021     12/26/2021    CREATININE 1.0 12/26/2021    BUN 35 (H) 12/26/2021    CO2 23 12/26/2021    TSH 1.95 05/22/2020    INR 1.12 12/25/2021    LABA1C 7.2 06/04/2020    LABMICR YES 08/09/2018     Lab Results   Component Value Date    CKTOTAL 157 01/17/2017    TROPONINI 0.56 ([place]) 12/25/2021       Recent Imaging Results are Reviewed:  XR CHEST PORTABLE    Result Date: 12/25/2021  EXAMINATION: ONE XRAY VIEW OF THE CHEST 12/25/2021 4:18 pm COMPARISON: 2019 comparison HISTORY: ORDERING SYSTEM PROVIDED HISTORY: SOB TECHNOLOGIST PROVIDED HISTORY: Reason for exam:->SOB FINDINGS: Mild cardiomegaly. Cardiac pacemaker leads in stable/satisfactory position with no evidence of pneumothorax. Large consolidation identified in the right mid lung. Additional moderate diffuse infiltrates and/or congestion identified in the lungs. Significant osteopenic changes and degenerative changes noted in the bony structures. Findings suspicious for bilateral infiltrates in the lungs with large consolidation in the right mid lung. Underlying right lung mass difficult to exclude. CT of the chest may be obtained for further clarification. CT CHEST PULMONARY EMBOLISM W CONTRAST    Result Date: 12/25/2021  EXAMINATION: CTA OF THE CHEST 12/25/2021 5:46 pm TECHNIQUE: CTA of the chest was performed after the administration of intravenous contrast.  Multiplanar reformatted images are provided for review. MIP images are provided for review. Dose modulation, iterative reconstruction, and/or weight based adjustment of the mA/kV was utilized to reduce the radiation dose to as low as reasonably achievable. COMPARISON: 09/05/2018. Chest x-ray performed earlier today.  HISTORY: ORDERING SYSTEM PROVIDED HISTORY: SOB/fever/abnormal chest xray TECHNOLOGIST PROVIDED HISTORY: Reason for exam:->SOB/fever/abnormal chest xray Decision Support Exception - unselect if not a suspected or confirmed emergency medical condition->Emergency Medical Condition (MA) Reason for Exam: SOB/fever/abnormal chest xray FINDINGS: Pulmonary Arteries: Pulmonary arteries are adequately opacified for evaluation. No evidence of intraluminal filling defect to suggest pulmonary embolism. Main pulmonary artery is normal in caliber. Mediastinum: The thoracic aorta is normal in caliber with calcified atherosclerotic plaque. Cardiomegaly with coronary vascular calcification and previous median sternotomy. No pericardial effusion. Pacer leads are in place. Multiple mediastinal nodes are not enlarged by size criteria. The esophagus is unremarkable. Lungs/pleura: Dense consolidation with air bronchograms in the posterior segment of the right upper lobe. Patchy infiltrate is noted elsewhere within the right upper lobe as well as in the right middle lobe and superior segment of the right lower lobe. The left lung is clear. Dependent atelectasis in the lower lobes bilaterally. Trace right pleural effusion. The central airways are patent. Upper Abdomen: The visualized upper abdominal viscera are unremarkable. There is a stable 2.3 cm left adrenal myelolipoma. Soft Tissues/Bones: No acute bone or soft tissue abnormality. 1. No evidence of pulmonary embolic disease. 2. Consolidation in the right upper lobe consistent with pneumonia. The findings are more typical of bacterial etiology and are not consistent with COVID-19 pneumonia. Additional areas of patchy infiltrate in the right middle lobe and superior segment of the right lower lobe. Continued plain film follow-up recommended to ensure clearing. 3. Trace right pleural effusion. Mild bibasilar atelectasis. 4. Cardiomegaly. Coronary vascular calcification. RECOMMENDATIONS: Plain film follow-up recommended to ensure clearing. Repeat PA and lateral chest x-ray in 1-2 months following therapy would be reasonable.        Lab Results   Component Value

## 2021-12-26 NOTE — CONSULTS
infection). Surgical History:   has a past surgical history that includes Coronary artery bypass graft (1999); Hammer toe surgery; Ovary removal (Bilateral, 1997); Esophagus dilation; Breast lumpectomy; Knee arthroscopy; Gastric Band (12/20/11); Colonoscopy; Upper gastrointestinal endoscopy; Upper gastrointestinal endoscopy (10/30/15); Cosmetic surgery; joint replacement (1995); joint replacement (1995); orif humerus decompression (Left, 2/25/2016); Upper gastrointestinal endoscopy (10/14/2016); Bariatric Surgery (11/03/2016); ablation of dysrhythmic focus; Colonoscopy (10/08/2018); Colonoscopy (N/A, 10/8/2018); pr njx dx/ther sbst intrlmnr crv/thrc w/img gdn (N/A, 11/5/2018); Cardiac surgery (2/22/1999); Cardiac catheterization; Injection Procedure For Sacroiliac Joint (Left, 12/17/2018); Enteroscopy (N/A, 2/26/2019); ventral hernia repair (N/A, 10/29/2019); and Abdomen surgery (N/A, 9/1/2020). Social History:   reports that she has never smoked. She has never used smokeless tobacco. She reports that she does not drink alcohol and does not use drugs. Family History:  Heart disease in father and son     Home Medications:  Were reviewed and are listed in nursing record. and/or listed below  Prior to Admission medications    Medication Sig Start Date End Date Taking?  Authorizing Provider   zinc sulfate (ZINCATE) 220 (50 Zn) MG capsule Take 50 mg by mouth daily   Yes Historical Provider, MD   potassium chloride (KLOR-CON M) 20 MEQ extended release tablet Take 1 tablet by mouth daily 2/26/20  Yes Marisa Armijo MD   ferrous gluconate (FERGON) 225 (27 Fe) MG tablet Take 240 mg by mouth Take 2 tablets by mouth daily   Yes Historical Provider, MD   amLODIPine (NORVASC) 5 MG tablet Take 1 tablet by mouth every evening  Patient taking differently: Take 2.5 mg by mouth 2 times daily Take 0.5 tablet by mouth every morning and every evening 5/28/19  Yes Abi Greenberg MD   tiZANidine (ZANAFLEX) 4 MG tablet Take 4 mg by mouth every 6 hours as needed (ONLY TAKES AT HS)    Yes Historical Provider, MD   aspirin 81 MG tablet Take 81 mg by mouth daily   Yes Historical Provider, MD   furosemide (LASIX) 40 MG tablet Take 1 tablet by mouth daily 9/20/18  Yes Nicole Ojeda MD   isosorbide mononitrate (ISMO;MONOKET) 10 MG tablet Take 5 mg by mouth 2 times daily   Yes Historical Provider, MD   Ascorbic Acid (VITAMIN C) 1000 MG tablet Take 1,000 mg by mouth daily   Yes Historical Provider, MD   Cyanocobalamin (VITAMIN B-12 PO) Take 3,000 mcg by mouth daily   Yes Historical Provider, MD   estradiol (ESTRACE) 0.1 MG/GM vaginal cream Place 2 g vaginally See Admin Instructions Twice a week   Yes Historical Provider, MD   Cranberry 450 MG CAPS Take by mouth daily    Yes Historical Provider, MD   pioglitazone (ACTOS) 15 MG tablet Take 15 mg by mouth daily   Yes Historical Provider, MD   vitamin D (CHOLECALCIFEROL) 1000 UNIT TABS tablet Take 1,000 Units by mouth daily    Yes Historical Provider, MD   spironolactone (ALDACTONE) 25 MG tablet Take 25 mg by mouth daily In pm   Yes Historical Provider, MD   dexlansoprazole (DEXILANT) 60 MG CPDR delayed release capsule Take 60 mg by mouth daily   Yes Historical Provider, MD   gabapentin (NEURONTIN) 100 MG capsule Take 400 mg by mouth 3 times daily.  .   Yes Historical Provider, MD   TraZODone HCl (DESYREL PO) Take 50 mg by mouth nightly    Yes Historical Provider, MD   losartan-hydrochlorothiazide (HYZAAR) 100-25 MG per tablet TK 1 T PO QAM 9/13/16  Yes Historical Provider, MD   Calcium Citrate-Vitamin D (CALCIUM CITRATE + D PO) Take 2 tablets by mouth daily   Yes Historical Provider, MD   SLOW-MAG 71.5-119 MG TBEC tablet Take 1 tablet by mouth daily 143 mg 11/3/15  Yes Historical Provider, MD   fexofenadine (ALLEGRA) 180 MG tablet Take 180 mg by mouth daily   Yes Historical Provider, MD   rosuvastatin (CRESTOR) 40 MG tablet Take 20 mg by mouth every evening    Yes Historical Provider, MD   metFORMIN (GLUCOPHAGE) 500 MG tablet Take 500 mg by mouth 2 times daily (with meals)    Yes Historical Provider, MD   ezetimibe (ZETIA) 10 MG tablet Take 5 mg by mouth nightly    Yes Historical Provider, MD   escitalopram (LEXAPRO) 20 MG tablet Take 20 mg by mouth daily. Yes Historical Provider, MD   metoprolol tartrate (LOPRESSOR) 25 MG tablet Take 12.5 mg by mouth as needed (FOR A-FIB)     Historical Provider, MD   nitroGLYCERIN (NITROLINGUAL) 0.4 MG/SPRAY spray Place 1 spray under the tongue every 5 minutes as needed.  As directed for chest pain     Historical Provider, MD        Current Medications:  Current Facility-Administered Medications   Medication Dose Route Frequency Provider Last Rate Last Admin    albuterol sulfate  (90 Base) MCG/ACT inhaler 2 puff  2 puff Inhalation 4x daily Randi Roberts MD        ipratropium (ATROVENT HFA) 17 MCG/ACT inhaler 2 puff  2 puff Inhalation 4x daily Rnadi Roberts MD        albuterol sulfate  (90 Base) MCG/ACT inhaler 2 puff  2 puff Inhalation Q4H PRN Randi Roberts MD        dexamethasone (DECADRON) injection 6 mg  6 mg IntraVENous Q24H Randi Roberts MD   6 mg at 12/26/21 1126    escitalopram (LEXAPRO) tablet 20 mg  20 mg Oral Daily Randi Roberts MD   20 mg at 12/26/21 1100    nitroGLYCERIN (NITROLINGUAL) 0.4 mg spray 1 spray  1 spray SubLINGual Q5 Min PRN Randi Roberts MD        ezetimibe (ZETIA) tablet 5 mg - PATIENT SUPPLIED  5 mg Oral Nightly Randi Roberts MD   5 mg at 12/25/21 2355    metFORMIN (GLUCOPHAGE) tablet 500 mg  500 mg Oral BID WC Randi Roberts MD   500 mg at 12/26/21 1100    rosuvastatin (CRESTOR) tablet 20 mg  20 mg Oral QPM Randi Roberts MD   20 mg at 12/25/21 2355    gabapentin (NEURONTIN) capsule 400 mg  400 mg Oral TID Randi Roberts MD   400 mg at 12/26/21 1100    spironolactone (ALDACTONE) tablet 25 mg  25 mg Oral Daily Randi Roberts MD   25 mg at 12/26/21 1100    pioglitazone (ACTOS) tablet 15 mg  15 mg Oral Daily Rere Sanchez MD   15 mg at 12/26/21 1100    vitamin D (CHOLECALCIFEROL) tablet 1,000 Units  1,000 Units Oral Daily Rere Sanchez MD   1,000 Units at 12/26/21 1100    ascorbic acid (VITAMIN C) tablet 1,000 mg  1,000 mg Oral Daily Rere Sanchez MD   1,000 mg at 12/26/21 1100    [START ON 12/27/2021] estradiol (ESTRACE) vaginal cream 2 g  2 g Vaginal Once per day on Mon Thu Rere Sanchez MD        furosemide (LASIX) tablet 40 mg  40 mg Oral Daily Rere Sanchez MD   40 mg at 12/26/21 1100    aspirin EC tablet 81 mg  81 mg Oral Daily Rere Sanchez MD   81 mg at 12/26/21 1100    tiZANidine (ZANAFLEX) tablet 4 mg  4 mg Oral Nightly PRN Rere Sanchez MD        metoprolol tartrate (LOPRESSOR) tablet 12.5 mg  12.5 mg Oral PRN Rere Sanchez MD        amLODIPine (NORVASC) tablet 2.5 mg  2.5 mg Oral BID Rere Sanchez MD   2.5 mg at 12/26/21 1100    ferrous gluconate 324 (37.5 Fe) MG tablet 162 mg  162 mg Oral Daily Rere Sanchez MD   162 mg at 12/26/21 1126    potassium chloride (KLOR-CON M) extended release tablet 20 mEq  20 mEq Oral Daily Rere Sanchez MD   20 mEq at 12/26/21 1126    zinc sulfate (ZINCATE) capsule 50 mg  50 mg Oral Daily Rere Sanchez MD   50 mg at 12/26/21 1100    0.9 % sodium chloride infusion   IntraVENous Continuous Rere Sanchez MD 75 mL/hr at 12/25/21 2309 New Bag at 12/25/21 2309    sodium chloride flush 0.9 % injection 5-40 mL  5-40 mL IntraVENous 2 times per day Rere Sanchez MD   10 mL at 12/26/21 1100    sodium chloride flush 0.9 % injection 10 mL  10 mL IntraVENous PRN Rere Sanchez MD        0.9 % sodium chloride infusion  25 mL IntraVENous PRN Rere Sanchez MD        enoxaparin (LOVENOX) injection 40 mg  40 mg SubCUTAneous Daily Rere Sanchez MD   40 mg at 12/26/21 1100    ondansetron (ZOFRAN-ODT) disintegrating tablet 4 mg  4 mg Oral Q8H PRN Rere Sanchez MD        Or    ondansetron [facility]) injection 4 mg  4 mg IntraVENous Q6H PRN Lorie Fermin MD        magnesium hydroxide (MILK OF MAGNESIA) 400 MG/5ML suspension 30 mL  30 mL Oral Daily PRN Lorie Fermin MD        acetaminophen (TYLENOL) tablet 650 mg  650 mg Oral Q6H PRN Lorie Fermin MD        Or    acetaminophen (TYLENOL) suppository 650 mg  650 mg Rectal Q6H PRN Lorie Fermin MD        cefTRIAXone (ROCEPHIN) 1000 mg in sterile water 10 mL IV syringe  1,000 mg IntraVENous Q24H Lorie Fermin MD        And    azithromycin [facility]) tablet 500 mg  500 mg Oral Q24H Lorie Fermin MD        influenza quadrivalent split vaccine (FLUZONE;FLUARIX;FLULAVAL;AFLURIA) injection 0.5 mL  0.5 mL IntraMUSCular Prior to discharge Lorie Fermin MD        cetirizine (ZYRTEC) tablet 10 mg  10 mg Oral Daily Lorie Fermin MD   10 mg at 12/26/21 1100    losartan (COZAAR) tablet 100 mg  100 mg Oral Daily Lorie Fermin MD   100 mg at 12/26/21 1126    And    hydroCHLOROthiazide (HYDRODIURIL) tablet 25 mg  25 mg Oral Daily Lorie Fermin MD   25 mg at 12/26/21 1100    pantoprazole (PROTONIX) tablet 40 mg  40 mg Oral QAM AC Lorie Fermin MD   40 mg at 12/26/21 0545    isosorbide mononitrate (IMDUR) extended release tablet 30 mg  30 mg Oral Daily Lorie Fermin MD   30 mg at 12/26/21 1126        Allergies:  Albuterol, Heparin, Niacin, Avelox [moxifloxacin hcl in nacl], Moxifloxacin, Niaspan [niacin er], Proventil [albuterol sulfate], and Tagamet [cimetidine]     Review of Systems:     · Constitutional: there has been no unanticipated weight loss. +fatigue + fever  · Eyes: No visual changes or diplopia. No scleral icterus. · ENT: No Headaches, hearing loss or vertigo. +trouble swallowing   · Cardiovascular: Reviewed in HPI  · Respiratory: +cough    · Gastrointestinal: No abdominal pain, appetite loss, blood in stools. No change in bowel or bladder habits. · Genitourinary: No dysuria, trouble voiding, or hematuria.   · Musculoskeletal:  No gait disturbance, weakness or joint complaints. · Integumentary: No rash or pruritis. · Neurological: No headache, diplopia, change in muscle strength, numbness or tingling. No change in gait, balance, coordination, mood, affect, memory, mentation, behavior. · Psychiatric: No anxiety, no depression. · Endocrine: No malaise, fatigue or temperature intolerance. No excessive thirst, fluid intake, or urination. No tremor. · Hematologic/Lymphatic: No abnormal bruising or bleeding, blood clots or swollen lymph nodes. · Allergic/Immunologic: No nasal congestion or hives.   ·     Physical Examination:    Vitals:    12/26/21 1100   BP: 136/64   Pulse: 60   Resp:    Temp: 98.5 °F (36.9 °C)   SpO2: 97%    Weight: 209 lb 3.2 oz (94.9 kg)         General Appearance:  Alert, cooperative, no distress, appears stated age   Head:  Normocephalic, without obvious abnormality, atraumatic   Eyes:  PERRL, conjunctiva/corneas clear       Nose: Nares normal, no drainage or sinus tenderness   Throat: Lips, mucosa, and tongue normal   Neck: Supple, symmetrical, trachea midline, no adenopathy, thyroid: not enlarged, symmetric, no tenderness/mass/nodules, no carotid bruit or JVD       Lungs:   Clear to auscultation bilaterally, respirations unlabored   Chest Wall:  Prior median sternotomy    Heart:  Regular rate and rhythm, S1, S2 normal, no murmur, rub or gallop, pacemaker in place    Abdomen:   Soft, non-tender, bowel sounds active all four quadrants,  no masses, obese           Extremities: Extremities normal, atraumatic, + trace edema    Pulses: 2+ and symmetric   Skin: Skin color, texture, turgor normal, no rashes or lesions   Pysch: Normal mood and affect   Neurologic: Normal gross motor and sensory exam.         Labs  CBC:   Lab Results   Component Value Date    WBC 4.9 12/26/2021    RBC 2.63 12/26/2021    HGB 7.9 12/26/2021    HCT 24.4 12/26/2021    MCV 92.6 12/26/2021    RDW 15.0 12/26/2021     12/26/2021     CMP:    Lab Results   Component Value Date     12/26/2021    K 4.7 12/26/2021     12/26/2021    CO2 23 12/26/2021    BUN 35 12/26/2021    CREATININE 1.0 12/26/2021    GFRAA >60 12/26/2021    GFRAA >60 10/26/2012    AGRATIO 1.3 12/26/2021    LABGLOM 55 12/26/2021    LABGLOM 100 06/26/2012    GLUCOSE 171 12/26/2021    GLUCOSE 143 09/23/2011    PROT 6.5 12/26/2021    PROT 7.8 10/26/2012    CALCIUM 9.0 12/26/2021    BILITOT <0.2 12/26/2021    ALKPHOS 61 12/26/2021    AST 24 12/26/2021    ALT 18 12/26/2021     PT/INR:  No results found for: PTINR  Lab Results   Component Value Date    CKTOTAL 157 01/17/2017    TROPONINI 0.56 ([facility]) 12/25/2021       EKG:  I have reviewed EKG with the following interpretation:  Impression:  Sinus rhythm, worsening of lateral t wave inversions     Echo:  -Technically marginal study.   -Normal left ventricle size, wall thickness and systolic function with an   estimated ejection fraction of 55%. - No regional wall motion abnormalities are seen. E/e\"= 7.29 .   -The right ventricle is normal in size and function.   -Catheter is visualized in the right atrium. TAPSE = 2.35 cm. -IVC size is normal (<2.1cm) and collapses > 50% with respiration consistent   with normal RA pressure (3mmHg). -The aortic valve appears sclerotic but opens well. Mild aortic   regurgitation.   -Mitral annular calcification. No significant mitral regurgitation. Stress:       Myocardial perfusion imaging with small to moderate area of decreased    perfusion in the anteroapical wall with stress with redistribution at rest    consistent with ischemia.            Cath: LM                   Normal  LAD                 Mid 100%  Cx                    30% mid, 30% OM1 mid  RCA                 Mid 100%, multiple 90% prox to mid lesions (small vessel)  LVG                 55%  RAD-OM         Patent  LIMA-LAD       Patent      Old notes reviewed  Telemetry reviewd  Ekg personally reviewed  Chest xray personally reviewed  Echo and cath reviewed  Medications and labs reviewed  high complexity/medical decision making due to extensive data review, extensive history review, independent review of data, potential for decompensation   high risk due to acute illness, evaluation of drug-drug interactions, medication management and diagnostic interventions    Assessment  Patient Active Problem List   Diagnosis    Palpitation    Coronary artery disease due to lipid rich plaque    Essential hypertension    Mixed hyperlipidemia    RAHUL (obstructive sleep apnea)    Chronic respiratory failure (HCC)    Restrictive lung disease    Osteoarthritis    Chronic kidney disease    Depression    GERD without esophagitis    Morbid obesity with BMI of 40.0-44.9, adult ([facility])    Vitamin D deficiency    Status post gastric banding    Dysphagia    Diabetes mellitus type 2 in obese ([facility])    Abdominal pain, LUQ    Hypercholesteremia    Closed fracture of humerus    Non-intractable cyclical vomiting with nausea    Pharyngoesophageal dysphagia    New onset seizure ([address]    Chest pain at rest    Syncope and collapse    Gastric band slippage    Chest discomfort    Sepsis due to urinary tract infection ([facility])    Symptomatic bradycardia    Obesity    SVT (supraventricular tachycardia) ([facility])    Paroxysmal atrial fibrillation ([facility])    Atrial fibrillation with RVR ([address]    Coronary artery disease involving coronary bypass graft of native heart without angina pectoris    Anemia    Bradycardia    Sinus node dysfunction ([facility])    Pacemaker    Ventral hernia without obstruction or gangrene    Umbilical hernia without obstruction and without gangrene    Chronic wound infection of abdomen    NSTEMI (non-ST elevated myocardial infarction) ([address]    Pneumonia    Acute respiratory failure with hypoxia ([facility])    Suspected COVID-19 virus infection    HTN (hypertension)     I assumed care of this patient at 7 am on 12/26/21.      Plan:    I had the opportunity to review the clinical symptoms and presentation of Haleigh Magdaleno. Assessment/Plan:  1. Elevated troponin  - new problem  - likely secondary to demand from hypoxia in the setting of anemia (hgb 7.9) and fever (102.9)  Plan:  - trend troponin and repeat ekg  - aspirin and statin  - treat underlying cause of hypoxia  - no indication for heparin at this time given anemia and concern for bleeding    Acute hypoxic respiratory failure   - new problem  Plan:  - evaluate for flu and covid   - may have aspiration     History of CABG '99  - stable  Plan:  - continue aspirin and statin  - may need ischemic evaluation when infectious concerns resolved    Paroxsymal atrial fibrillation s/p ablation and pacemaker  -stable  Plan:  - interrogate device    Obesity    Mixed hyperlipidemia     All questions and concerns were addressed to the patient/family. Alternatives to my treatment were discussed. The note was completed using EMR. Every effort was made to ensure accuracy; however, inadvertent computerized transcription errors may be present.   Beronica Angel,  12/26/2021 11:41 AM

## 2021-12-26 NOTE — H&P
History and Physical  Dr. Rufus Lin  12/25/2021    PCP: Milena Adair DO    Cc:   Chief Complaint   Patient presents with    Fatigue     pt states weakness, sob, fever, chills, since last night, vaccinated, no flu shoot/booster, had exposure, pt 84% on RA, placed on 4 LPM via nc       HPI:  Verenice Adams is a 80 y.o. female who has a past medical history of Anemia, Anesthesia, Anesthesia complication, Atrial fibrillation ([address] CAD (coronary artery disease), Chest pain, Chronic kidney disease, Depression, GERD (gastroesophageal reflux disease), Glaucoma, Hiatal hernia, Hyperlipidemia, Hypertension, Migraines, neuralgic, Morbid obesity ([address] Osteoarthritis, Sleep apnea, Type II or unspecified type diabetes mellitus without mention of complication, not stated as uncontrolled, Unspecified sleep apnea, Urinary incontinence, and UTI (urinary tract infection). Patient presents with NSTEMI (non-ST elevated myocardial infarction) ([address] HPI  (1-3 for expanded problem focused, ?4 for detailed/comprehensive)     80 y.o. female who presents to the emergency department today reporting sudden onset of shortness of breath, cough, fever, and generalized weakness which began last night. Patient has been vaccinated against Covid. She has had no flu shot however. There is no history of COPD or asthma. Patient does not wear supplemental oxygen. Patient adamantly denies having any chest pain. Her shortness of breath is worse with exertion. Her cough is productive of thick sputum. Ct chest from ER reviewed by self     1. No evidence of pulmonary embolic disease. 2. Consolidation in the right upper lobe consistent with pneumonia. The findings are more typical of bacterial etiology and are not consistent with COVID-19 pneumonia. Additional areas of patchy infiltrate in the right middle lobe and superior segment of the right lower lobe. Continued plain film follow-up recommended to ensure clearing.  3. Trace right pleural effusion. Mild bibasilar atelectasis. 4. Cardiomegaly. Coronary vascular calcification. Patient also febrile with a temp of 102.9. She is normotensive. She is not tachycardic. Laboratory studies show an elevated troponin of 0.45    Cards has been consulted  They recommend admission but did not recommend anything emergent per d/w ER NP    Problem list of hospitalization thus far: Active Hospital Problems    Diagnosis     NSTEMI (non-ST elevated myocardial infarction) [facility] [I21.4]     Pneumonia [J18.9]     Acute respiratory failure with hypoxia ([facility] [J96.01]     Suspected COVID-19 virus infection [Z20.822]     HTN (hypertension) [I10]          Review of Systems: (1 system for EPF, 2-9 for detailed, 10+ for comprehensive)  Constitutional: Negative for chills and positive for fever. HENT: Negative for dental problem, nosebleeds and rhinorrhea. Eyes: Negative for photophobia and visual disturbance. Respiratory: Positive for cough, chest tightness and shortness of breath. Cardiovascular: Negative for chest pain and leg swelling. Gastrointestinal: Negative for diarrhea, nausea and vomiting. Endocrine: Negative for polydipsia and polyphagia. Genitourinary: Negative for frequency, hematuria and urgency. Musculoskeletal: Negative for back pain and myalgias. Skin: Negative for rash. Allergic/Immunologic: Negative for food allergies. Neurological: Negative for dizziness, seizures, syncope and facial asymmetry. +weakness  Hematological: Negative for adenopathy. Psychiatric/Behavioral: Negative for dysphoric mood. The patient is not nervous/anxious. Past Medical History:   Past Medical History:   Diagnosis Date    Anemia     Anesthesia     trouble after egd 10/2016.   Anxiety and severe tremors after AF ablation 8/2018-responded well to Ativan    Anesthesia complication     flailing, yelling when waking up from anesthesia-ativan helps ([id number]/29/19: Kalyan Fraire works within seconds of administration)    Atrial fibrillation ([address] 10/01/2017    A. fib with SVR    CAD (coronary artery disease)     Chest pain 10/01/2017    Chronic kidney disease     ? ??    Depression     GERD (gastroesophageal reflux disease)     Glaucoma     Hiatal hernia     Hyperlipidemia     Hypertension     Migraines, neuralgic     Morbid obesity ([address]     Osteoarthritis     Sleep apnea     uses CPAP    Type II or unspecified type diabetes mellitus without mention of complication, not stated as uncontrolled     Unspecified sleep apnea     uses cpap    Urinary incontinence     UTI (urinary tract infection)        Past Surgical History:   Past Surgical History:   Procedure Laterality Date    ABDOMEN SURGERY N/A 9/1/2020    EXPLORATION OF ABDOMINAL WOUND performed by Brendan Hernandez MD at [facility]  2/22/1999    quadruple by-pass, [address] COLONOSCOPY  10/08/2018    1 polyp removed    COLONOSCOPY N/A 10/8/2018    COLONOSCOPY POLYPECTOMY SNARE/COLD BIOPSY performed by Mariela Hagan MD at [address] [place] [address]    COSMETIC SURGERY      face lift    ENTEROSCOPY N/A 2/26/2019    ENTEROSCOPY performed by Mariela Hagan MD at [place] [address]  12/20/11    hiatal hernia repair    GASTRIC BAND REMOVAL  11/03/2016    laparoscopic     HAMMER TOE SURGERY      [facility]. INJECTION PROCEDURE FOR SACROILIAC JOINT Left 12/17/2018    SACROILIAC JOINT INJECTION ON THE LEFT WITH FLUOROSCOPY performed by Denise Lott MD at [address]    both   [address]    Left and Right knees, [facility] ARTHROSCOPY      1982 left    ORIF HUMERUS DECOMPRESSION Left 2/25/2016    OPEN REDUCTION INTERNAL FIXATION LEFT PROXIMAL HUMERUS    OVARY REMOVAL Bilateral 1997    LA NJX DX/THER SBST INTRLMNR CRV/THRC W/IMG GDN N/A 11/5/2018    MIDLINE L4/L5 LUMBAR INTERLAMINAR EPIDURAL STEROID INJECTION WITH FLUOROSCOPY performed by Tonja Singh MD at [address] [facility] ENDOSCOPY  10/30/15    UPPER GASTROINTESTINAL ENDOSCOPY  10/14/2016    with biopsy    VENTRAL HERNIA REPAIR N/A 10/29/2019    OPEN VENTRAL HERNIA REPAIR WITH  MESH performed by Gabriel Nath MD at [address] [place] [address] History:   Social History     Tobacco History     Smoking Status  Never Smoker    Smokeless Tobacco Use  Never Used          Alcohol History     Alcohol Use Status  No          Drug Use     Drug Use Status  No          Sexual Activity     Sexually Active  Yes Partners  Male                Fam History:   Family History   Problem Relation Age of Onset    Cancer Mother         ovarian, colon    High Blood Pressure Mother     Heart Disease Father     High Blood Pressure Father     Stroke Sister         paralysed on right side B/C of stroke       PFSH: The above PMHx, PSHx, SocHx, FamHx has been reviewed by myself. (1 area for detailed, 2-3 for comprehensive)      Code Status: Prior    Meds - following list of home medications fromelectronic chart has been reviewed by myself  Prior to Admission medications    Medication Sig Start Date End Date Taking?  Authorizing Provider   zinc sulfate (ZINCATE) 220 (50 Zn) MG capsule Take 50 mg by mouth daily    Historical Provider, MD   potassium chloride (KLOR-CON M) 20 MEQ extended release tablet Take 1 tablet by mouth daily 2/26/20   Geovani Luna MD   ferrous gluconate (FERGON) 225 (27 Fe) MG tablet Take 240 mg by mouth Take 2 tablets by mouth daily    Historical Provider, MD   amLODIPine (NORVASC) 5 MG tablet Take 1 tablet by mouth every evening  Patient taking differently: Take 2.5 mg by mouth 2 times daily Take 0.5 tablet by mouth every morning and every evening 5/28/19   Ramona Vasquez MD   tiZANidine (ZANAFLEX) 4 MG tablet Take 4 mg by mouth every 6 hours as needed (ONLY TAKES AT HS)     Historical Provider, MD   metoprolol tartrate (LOPRESSOR) 25 MG tablet Take 12.5 mg by mouth as needed (FOR A-FIB)     Historical Provider, MD   aspirin 81 MG tablet Take 81 mg by mouth daily    Historical Provider, MD   furosemide (LASIX) 40 MG tablet Take 1 tablet by mouth daily 9/20/18   Ramona Vasquez MD   isosorbide mononitrate (ISMO;MONOKET) 10 MG tablet Take 5 mg by mouth 2 times daily    Historical Provider, MD   Ascorbic Acid (VITAMIN C) 1000 MG tablet Take 1,000 mg by mouth daily    Historical Provider, MD   Cyanocobalamin (VITAMIN B-12 PO) Take 3,000 mcg by mouth daily    Historical Provider, MD   estradiol (ESTRACE) 0.1 MG/GM vaginal cream Place 2 g vaginally See Admin Instructions Twice a week    Historical Provider, MD   Cranberry 450 MG CAPS Take by mouth daily     Historical Provider, MD   pioglitazone (ACTOS) 15 MG tablet Take 15 mg by mouth daily    Historical Provider, MD   vitamin D (CHOLECALCIFEROL) 1000 UNIT TABS tablet Take 1,000 Units by mouth daily     Historical Provider, MD   spironolactone (ALDACTONE) 25 MG tablet Take 25 mg by mouth daily In pm    Historical Provider, MD   dexlansoprazole (DEXILANT) 60 MG CPDR delayed release capsule Take 60 mg by mouth daily    Historical Provider, MD   gabapentin (NEURONTIN) 100 MG capsule Take 400 mg by mouth 3 times daily. Anne Colder     Historical Provider, MD   TraZODone HCl (DESYREL PO) Take 50 mg by mouth nightly     Historical Provider, MD   losartan-hydrochlorothiazide (HYZAAR) 100-25 MG per tablet TK 1 T PO QAM 9/13/16   Historical Provider, MD   Calcium Citrate-Vitamin D (CALCIUM CITRATE + D PO) Take 2 tablets by mouth daily    Historical Provider, MD   SLOW-MAG 71.5-119 MG TBEC tablet Take 1 tablet by mouth daily 143 mg 11/3/15   Historical Provider, MD   fexofenadine (ALLEGRA) 180 MG tablet Take 180 mg by mouth daily    Historical Provider, MD   rosuvastatin (CRESTOR) 40 MG tablet Take 20 mg by mouth every evening     Historical Provider, MD   metFORMIN (GLUCOPHAGE) 500 MG tablet Take 500 mg by mouth 2 times daily (with meals)     Historical Provider, MD   ezetimibe (ZETIA) 10 MG tablet Take 5 mg by mouth nightly     Historical Provider, MD   escitalopram (LEXAPRO) 20 MG tablet Take 20 mg by mouth daily. Historical Provider, MD   nitroGLYCERIN (NITROLINGUAL) 0.4 MG/SPRAY spray Place 1 spray under the tongue every 5 minutes as needed. As directed for chest pain     Historical Provider, MD         Allergies   Allergen Reactions    Albuterol Other (See Comments)     Other reaction(s): Chest Pain  Crushing chest pain    Heparin Other (See Comments)     Caused bruises and hematomas immediately when drip started.   MARKED BRUISING/HEMATOMAS    Niacin     Avelox [Moxifloxacin Hcl In Nacl] Rash    Moxifloxacin Rash    Niaspan [Niacin Er] Itching and Rash    Proventil [Albuterol Sulfate] Palpitations    Tagamet [Cimetidine] Rash             EXAM: (2-7 system for EPF/Detailed, ?8 for Comprehensive)  BP (!) 115/47   Pulse 63   Temp 102.9 °F (39.4 °C) (Oral)   Resp 26   Ht 4' 11.5\" (1.511 m)   Wt 211 lb (95.7 kg)   SpO2 93%   BMI 41.90 kg/m²   Constitutional: vitals as above: alert, appears stated age and cooperative    Psychiatric: normal insight and judgment, oriented to person, place, time, and general circumstances    Head: Normocephalic, without obvious abnormality, atraumatic    Eyes:lids and lashes normal, conjunctivae and sclerae normal and pupils equal, round, reactive to light and accomodation    EMNT: external ears normal, nares midline    Neck: no carotid bruit, supple, symmetrical, trachea midline and thyroid not enlarged, symmetric, no tenderness/mass/nodules     Respiratory: clear to auscultation and percussion bilaterally with normal respiratory effort    Cardiovascular: normal rate, regular rhythm, Narrative:     Performed at:  [facility]  [address],  [place], [address]   Phone [phone]   CULTURE, BLOOD 1   CULTURE, BLOOD 2   PROCALCITONIN    Narrative:     Performed at:  [facility]  [address],  [place], [address]   Phone [phone]   PROTIME-INR    Narrative:     Performed at:  [facility]  [address] [facility],  [place], [address]   Phone [phone]   APTT    Narrative:     Performed at:  [facility]  [address] [facility],  [place], [address]   Phone [phone]   LACTATE, SEPSIS   COVID-19   TROPONIN         IMAGING:  Imaging results from the ER have been reviewed in the computerized chart. XR CHEST PORTABLE    Result Date: 12/25/2021  EXAMINATION: ONE XRAY VIEW OF THE CHEST 12/25/2021 4:18 pm COMPARISON: 2019 comparison HISTORY: ORDERING SYSTEM PROVIDED HISTORY: SOB TECHNOLOGIST PROVIDED HISTORY: Reason for exam:->SOB FINDINGS: Mild cardiomegaly. Cardiac pacemaker leads in stable/satisfactory position with no evidence of pneumothorax. Large consolidation identified in the right mid lung. Additional moderate diffuse infiltrates and/or congestion identified in the lungs. Significant osteopenic changes and degenerative changes noted in the bony structures. Findings suspicious for bilateral infiltrates in the lungs with large consolidation in the right mid lung. Underlying right lung mass difficult to exclude. CT of the chest may be obtained for further clarification. CT CHEST PULMONARY EMBOLISM W CONTRAST    Result Date: 12/25/2021  EXAMINATION: CTA OF THE CHEST 12/25/2021 5:46 pm TECHNIQUE: CTA of the chest was performed after the administration of intravenous contrast.  Multiplanar reformatted images are provided for review. MIP images are provided for review.  Dose modulation, iterative reconstruction, and/or weight based adjustment of the mA/kV was utilized to reduce the radiation dose to as low as reasonably achievable. COMPARISON: 09/05/2018. Chest x-ray performed earlier today. HISTORY: ORDERING SYSTEM PROVIDED HISTORY: SOB/fever/abnormal chest xray TECHNOLOGIST PROVIDED HISTORY: Reason for exam:->SOB/fever/abnormal chest xray Decision Support Exception - unselect if not a suspected or confirmed emergency medical condition->Emergency Medical Condition (MA) Reason for Exam: SOB/fever/abnormal chest xray FINDINGS: Pulmonary Arteries: Pulmonary arteries are adequately opacified for evaluation. No evidence of intraluminal filling defect to suggest pulmonary embolism. Main pulmonary artery is normal in caliber. Mediastinum: The thoracic aorta is normal in caliber with calcified atherosclerotic plaque. Cardiomegaly with coronary vascular calcification and previous median sternotomy. No pericardial effusion. Pacer leads are in place. Multiple mediastinal nodes are not enlarged by size criteria. The esophagus is unremarkable. Lungs/pleura: Dense consolidation with air bronchograms in the posterior segment of the right upper lobe. Patchy infiltrate is noted elsewhere within the right upper lobe as well as in the right middle lobe and superior segment of the right lower lobe. The left lung is clear. Dependent atelectasis in the lower lobes bilaterally. Trace right pleural effusion. The central airways are patent. Upper Abdomen: The visualized upper abdominal viscera are unremarkable. There is a stable 2.3 cm left adrenal myelolipoma. Soft Tissues/Bones: No acute bone or soft tissue abnormality. 1. No evidence of pulmonary embolic disease. 2. Consolidation in the right upper lobe consistent with pneumonia. The findings are more typical of bacterial etiology and are not consistent with COVID-19 pneumonia. Additional areas of patchy infiltrate in the right middle lobe and superior segment of the right lower lobe.   Continued plain film follow-up recommended to ensure clearing. 3. Trace right pleural effusion. Mild bibasilar atelectasis. 4. Cardiomegaly. Coronary vascular calcification. RECOMMENDATIONS: Plain film follow-up recommended to ensure clearing. EKG:   EKG from ER, reviewed by self - it shows sinus rhythm at 77  Old chart reviewed, EKG dated 4/30/19 is reviewed, there is difference noted. Old study shows sinus linda at 48    Lab Results   Component Value Date    GLUCOSE 143 12/25/2021    GLUCOSE 143 09/23/2011     Lab Results   Component Value Date    POCGLU 135 09/01/2020     BP (!) 115/47   Pulse 63   Temp 102.9 °F (39.4 °C) (Oral)   Resp 26   Ht 4' 11.5\" (1.511 m)   Wt 211 lb (95.7 kg)   SpO2 93%   BMI 41.90 kg/m²     MEDICAL DECISION MAKING:    Principal Problem:    NSTEMI (non-ST elevated myocardial infarction) ([address] -New Problem to me. Trop elevated 0.45, some chest pain, dyspnea  Plan: admit, trend enzymes, cards consulted thru ER already. Active Problems:    Pneumonia -New Problem to me. Seen on ct chest, bacterial seems more likely  Plan: iv abx started - also must r/o covid. DuoNeb HHN ordered. O2 as needed. Will check serial CXR. WBC ordered for AM.  Respiratory therapy asked to see. Monitor for signs of antibiotic toxicity with serial exam and lab studies    Acute respiratory failure with hypoxia ([address] -New Problem to me. On o2, not normally requiring supplemental o2  Plan: tx as above    Suspected COVID-19 virus infection -New Problem to me. Plan: place in droplet plus isolation until swab result gabriela    HTN (hypertension) -Established problem. Stable. 115/47  Plan: Pt home BP meds reviewed and will be continued. IV Hydralazine ordered for control of extremely high blood pressures. Will monitor labs to assess Creat/K for possible complications of medications. Diagnoses as listed above, designated as new or established and plan outlined for each.      Data Reviewed:   (1) Lab tests were reviewed or ordered. (1) Radiology tests were reviewed or ordered. (1) Medical test (Echo, EKG, PFT/ranjan) were ordered. (1)History was not obtained from someone other than patient  (1) Old records were reviewed - see HPI/MDM for pertinent details if review done. (2) Case was discussed with another health care provider: nelida GALVIN NP  (2) Imaging was viewed by myself. (2) EKG  was viewed by myself. The patient is being placed in inpatient status with the expectation of requiring a hospital stay spanning at least two midnights for care and treatment of the problems noted in the problem list.  This determination is also based on thepatients comorbidities and past medical history, the severity and timing of the signs and symptoms upon presentation.     (Please note that portions of this note were completed with a voice recognition program.  Efforts were made to edit the dictations but occasionally words are mis-transcribed.)      Electronically signed by: Maryam Conrad MD 12/25/2021

## 2021-12-26 NOTE — RT PROTOCOL NOTE
RT Inhaler-Nebulizer Bronchodilator Protocol Note    There is a bronchodilator order in the chart from a provider indicating to follow the RT Bronchodilator Protocol and there is an Initiate RT Inhaler-Nebulizer Bronchodilator Protocol order as well (see protocol at bottom of note). CXR Findings:  XR CHEST PORTABLE    Result Date: 12/25/2021  Findings suspicious for bilateral infiltrates in the lungs with large consolidation in the right mid lung. Underlying right lung mass difficult to exclude. CT of the chest may be obtained for further clarification. The findings from the last RT Protocol Assessment were as follows:   History Pulmonary Disease: None or smoker <15 pack years  Respiratory Pattern: Mild dyspnea at rest, irregular pattern, or RR 21-25 bpm  Breath Sounds: Severe inspiratory and expiratory wheezing or severely diminished  Cough: Strong, spontaneous, non-productive  Indication for Bronchodilator Therapy: Decreased or absent breath sounds  Bronchodilator Assessment Score: 12    Aerosolized bronchodilator medication orders have been revised according to the RT Inhaler-Nebulizer Bronchodilator Protocol below. Respiratory Therapist to perform RT Therapy Protocol Assessment initially then follow the protocol. Repeat RT Therapy Protocol Assessment PRN for score 0-3 or on second treatment, BID, and PRN for scores above 3. No Indications - adjust the frequency to every 6 hours PRN wheezing or bronchospasm, if no treatments needed after 48 hours then discontinue using Per Protocol order mode. If indication present, adjust the RT bronchodilator orders based on the Bronchodilator Assessment Score as indicated below.   Use Inhaler orders unless patient has one or more of the following: on home nebulizer, not able to hold breath for 10 seconds, is not alert and oriented, cannot activate and use MDI correctly, or respiratory rate 25 breaths per minute or more, then use the equivalent nebulizer order(s) with same Frequency and PRN reasons based on the score. If a patient is on this medication at home then do not decrease Frequency below that used at home. 0-3 - enter or revise RT bronchodilator order(s) to equivalent RT Bronchodilator order with Frequency of every 4 hours PRN for wheezing or increased work of breathing using Per Protocol order mode. 4-6 - enter or revise RT Bronchodilator order(s) to two equivalent RT bronchodilator orders with one order with BID Frequency and one order with Frequency of every 4 hours PRN wheezing or increased work of breathing using Per Protocol order mode. 7-10 - enter or revise RT Bronchodilator order(s) to two equivalent RT bronchodilator orders with one order with TID Frequency and one order with Frequency of every 4 hours PRN wheezing or increased work of breathing using Per Protocol order mode. 11-13 - enter or revise RT Bronchodilator order(s) to one equivalent RT bronchodilator order with QID Frequency and an Albuterol order with Frequency of every 4 hours PRN wheezing or increased work of breathing using Per Protocol order mode. Greater than 13 - enter or revise RT Bronchodilator order(s) to one equivalent RT bronchodilator order with every 4 hours Frequency and an Albuterol order with Frequency of every 2 hours PRN wheezing or increased work of breathing using Per Protocol order mode. RT to enter RT Home Evaluation for COPD & MDI Assessment order using Per Protocol order mode.     Electronically signed by Jori Crawford RCP on 12/26/2021 at 2:13 AM

## 2021-12-26 NOTE — PLAN OF CARE
Problem: Falls - Risk of:  Goal: Will remain free from falls  Description: Will remain free from falls  12/26/2021 0148 by Katie Kelsey RN  Outcome: Ongoing  12/26/2021 0148 by Katie Kelsey RN  Outcome: Ongoing  12/25/2021 2320 by Katie Kelsey RN  Outcome: Ongoing  Goal: Absence of physical injury  Description: Absence of physical injury  12/26/2021 0148 by Katie Kelsey RN  Outcome: Ongoing  12/26/2021 0148 by Katie Kelsey RN  Outcome: Ongoing  12/25/2021 2320 by Katie Kelsey RN  Outcome: Ongoing     Problem: Discharge Planning:  Goal: Discharged to appropriate level of care  Description: Discharged to appropriate level of care  Outcome: Ongoing     Problem: Cardiac Output - Decreased:  Goal: Hemodynamic stability will improve  Description: Hemodynamic stability will improve  Outcome: Ongoing     Problem: Serum Glucose Level - Abnormal:  Goal: Ability to maintain appropriate glucose levels will improve  Description: Ability to maintain appropriate glucose levels will improve  Outcome: Ongoing     Problem: Pain:  Goal: Pain level will decrease  Description: Pain level will decrease  Outcome: Ongoing  Goal: Control of acute pain  Description: Control of acute pain  Outcome: Ongoing  Goal: Control of chronic pain  Description: Control of chronic pain  Outcome: Ongoing     Problem: Tissue Perfusion - Cardiopulmonary, Altered:  Goal: Absence of angina  Description: Absence of angina  Outcome: Ongoing  Goal: Circulatory function within specified parameters  Description: Circulatory function within specified parameters  Outcome: Ongoing  Goal: Hemodynamic stability will improve  Description: Hemodynamic stability will improve  Outcome: Ongoing     Problem: Tobacco Use:  Goal: Will participate in inpatient tobacco-use cessation counseling  Description: Will participate in inpatient tobacco-use cessation counseling  Outcome: Ongoing     Pt introduction made. A+Ox4. VSS. No signs of SOB. POC reviewed w/ pt.  Bed

## 2021-12-27 ENCOUNTER — APPOINTMENT (OUTPATIENT)
Dept: GENERAL RADIOLOGY | Age: 71
DRG: 871 | End: 2021-12-27
Payer: MEDICARE

## 2021-12-27 PROBLEM — U07.1 COVID-19: Status: ACTIVE | Noted: 2021-12-27

## 2021-12-27 LAB
ANION GAP SERPL CALCULATED.3IONS-SCNC: 14 MMOL/L (ref 3–16)
BASOPHILS ABSOLUTE: 0 K/UL (ref 0–0.2)
BASOPHILS RELATIVE PERCENT: 0.2 %
BUN BLDV-MCNC: 46 MG/DL (ref 7–20)
C-REACTIVE PROTEIN: 70.5 MG/L (ref 0–5.1)
CALCIUM SERPL-MCNC: 9.1 MG/DL (ref 8.3–10.6)
CHLORIDE BLD-SCNC: 98 MMOL/L (ref 99–110)
CO2: 22 MMOL/L (ref 21–32)
CREAT SERPL-MCNC: 1.6 MG/DL (ref 0.6–1.2)
EKG ATRIAL RATE: 63 BPM
EKG DIAGNOSIS: NORMAL
EKG P AXIS: 59 DEGREES
EKG P-R INTERVAL: 206 MS
EKG Q-T INTERVAL: 418 MS
EKG QRS DURATION: 86 MS
EKG QTC CALCULATION (BAZETT): 427 MS
EKG R AXIS: 35 DEGREES
EKG T AXIS: 74 DEGREES
EKG VENTRICULAR RATE: 63 BPM
EOSINOPHILS ABSOLUTE: 0 K/UL (ref 0–0.6)
EOSINOPHILS RELATIVE PERCENT: 0 %
GFR AFRICAN AMERICAN: 39
GFR NON-AFRICAN AMERICAN: 32
GLUCOSE BLD-MCNC: 129 MG/DL (ref 70–99)
GLUCOSE BLD-MCNC: 132 MG/DL (ref 70–99)
GLUCOSE BLD-MCNC: 135 MG/DL (ref 70–99)
GLUCOSE BLD-MCNC: 145 MG/DL (ref 70–99)
HCT VFR BLD CALC: 26.3 % (ref 36–48)
HEMOGLOBIN: 8.4 G/DL (ref 12–16)
LV EF: 65 %
LVEF MODALITY: NORMAL
LYMPHOCYTES ABSOLUTE: 1.6 K/UL (ref 1–5.1)
LYMPHOCYTES RELATIVE PERCENT: 20.7 %
MCH RBC QN AUTO: 29.7 PG (ref 26–34)
MCHC RBC AUTO-ENTMCNC: 32 G/DL (ref 31–36)
MCV RBC AUTO: 92.8 FL (ref 80–100)
MONOCYTES ABSOLUTE: 0.8 K/UL (ref 0–1.3)
MONOCYTES RELATIVE PERCENT: 10.7 %
NEUTROPHILS ABSOLUTE: 5.3 K/UL (ref 1.7–7.7)
NEUTROPHILS RELATIVE PERCENT: 68.4 %
PDW BLD-RTO: 15.3 % (ref 12.4–15.4)
PERFORMED ON: ABNORMAL
PLATELET # BLD: 246 K/UL (ref 135–450)
PMV BLD AUTO: 8.4 FL (ref 5–10.5)
POTASSIUM SERPL-SCNC: 4.6 MMOL/L (ref 3.5–5.1)
RBC # BLD: 2.83 M/UL (ref 4–5.2)
SODIUM BLD-SCNC: 134 MMOL/L (ref 136–145)
TROPONIN: 0.72 NG/ML
WBC # BLD: 7.7 K/UL (ref 4–11)

## 2021-12-27 PROCEDURE — 2700000000 HC OXYGEN THERAPY PER DAY

## 2021-12-27 PROCEDURE — 94640 AIRWAY INHALATION TREATMENT: CPT

## 2021-12-27 PROCEDURE — 6360000002 HC RX W HCPCS: Performed by: INTERNAL MEDICINE

## 2021-12-27 PROCEDURE — 85025 COMPLETE CBC W/AUTO DIFF WBC: CPT

## 2021-12-27 PROCEDURE — 6370000000 HC RX 637 (ALT 250 FOR IP): Performed by: INTERNAL MEDICINE

## 2021-12-27 PROCEDURE — 2060000000 HC ICU INTERMEDIATE R&B

## 2021-12-27 PROCEDURE — 93306 TTE W/DOPPLER COMPLETE: CPT

## 2021-12-27 PROCEDURE — 94660 CPAP INITIATION&MGMT: CPT

## 2021-12-27 PROCEDURE — 84484 ASSAY OF TROPONIN QUANT: CPT

## 2021-12-27 PROCEDURE — 94761 N-INVAS EAR/PLS OXIMETRY MLT: CPT

## 2021-12-27 PROCEDURE — 86140 C-REACTIVE PROTEIN: CPT

## 2021-12-27 PROCEDURE — 80048 BASIC METABOLIC PNL TOTAL CA: CPT

## 2021-12-27 PROCEDURE — 2580000003 HC RX 258: Performed by: INTERNAL MEDICINE

## 2021-12-27 PROCEDURE — 99233 SBSQ HOSP IP/OBS HIGH 50: CPT | Performed by: INTERNAL MEDICINE

## 2021-12-27 PROCEDURE — 36415 COLL VENOUS BLD VENIPUNCTURE: CPT

## 2021-12-27 PROCEDURE — 93010 ELECTROCARDIOGRAM REPORT: CPT | Performed by: INTERNAL MEDICINE

## 2021-12-27 PROCEDURE — 2500000003 HC RX 250 WO HCPCS: Performed by: INTERNAL MEDICINE

## 2021-12-27 RX ORDER — GUAIFENESIN/DEXTROMETHORPHAN 100-10MG/5
10 SYRUP ORAL EVERY 4 HOURS PRN
Status: DISCONTINUED | OUTPATIENT
Start: 2021-12-27 | End: 2022-01-02 | Stop reason: HOSPADM

## 2021-12-27 RX ORDER — LORAZEPAM 2 MG/ML
0.5 INJECTION INTRAMUSCULAR ONCE
Status: COMPLETED | OUTPATIENT
Start: 2021-12-27 | End: 2021-12-27

## 2021-12-27 RX ADMIN — Medication 2 PUFF: at 09:07

## 2021-12-27 RX ADMIN — TOCILIZUMAB 800 MG: 20 INJECTION, SOLUTION, CONCENTRATE INTRAVENOUS at 22:58

## 2021-12-27 RX ADMIN — Medication 162 MG: at 09:22

## 2021-12-27 RX ADMIN — GABAPENTIN 400 MG: 400 CAPSULE ORAL at 13:55

## 2021-12-27 RX ADMIN — SODIUM CHLORIDE 25 ML: 9 INJECTION, SOLUTION INTRAVENOUS at 22:57

## 2021-12-27 RX ADMIN — SPIRONOLACTONE 25 MG: 25 TABLET ORAL at 09:14

## 2021-12-27 RX ADMIN — Medication 2 PUFF: at 16:15

## 2021-12-27 RX ADMIN — METFORMIN HYDROCHLORIDE 500 MG: 500 TABLET ORAL at 09:14

## 2021-12-27 RX ADMIN — AZITHROMYCIN MONOHYDRATE 500 MG: 250 TABLET ORAL at 09:13

## 2021-12-27 RX ADMIN — AMLODIPINE BESYLATE 2.5 MG: 5 TABLET ORAL at 21:38

## 2021-12-27 RX ADMIN — HYDROCHLOROTHIAZIDE 25 MG: 25 TABLET ORAL at 09:22

## 2021-12-27 RX ADMIN — Medication 1000 UNITS: at 13:55

## 2021-12-27 RX ADMIN — LOSARTAN POTASSIUM 100 MG: 100 TABLET, FILM COATED ORAL at 09:14

## 2021-12-27 RX ADMIN — ENOXAPARIN SODIUM 40 MG: 100 INJECTION SUBCUTANEOUS at 21:39

## 2021-12-27 RX ADMIN — ASPIRIN 81 MG: 81 TABLET, COATED ORAL at 09:14

## 2021-12-27 RX ADMIN — Medication 2 PUFF: at 12:21

## 2021-12-27 RX ADMIN — PANTOPRAZOLE SODIUM 40 MG: 40 TABLET, DELAYED RELEASE ORAL at 06:08

## 2021-12-27 RX ADMIN — Medication 10 ML: at 09:18

## 2021-12-27 RX ADMIN — PIOGLITAZONE 15 MG: 15 TABLET ORAL at 09:13

## 2021-12-27 RX ADMIN — Medication 2 PUFF: at 20:12

## 2021-12-27 RX ADMIN — ACETAMINOPHEN 650 MG: 325 TABLET ORAL at 17:50

## 2021-12-27 RX ADMIN — Medication 1000 MG: at 21:39

## 2021-12-27 RX ADMIN — POTASSIUM CHLORIDE 20 MEQ: 1500 TABLET, EXTENDED RELEASE ORAL at 09:16

## 2021-12-27 RX ADMIN — ACETAMINOPHEN 650 MG: 325 TABLET ORAL at 09:13

## 2021-12-27 RX ADMIN — ESTRADIOL 2 G: 0.1 CREAM VAGINAL at 13:54

## 2021-12-27 RX ADMIN — SODIUM CHLORIDE 25 ML: 9 INJECTION, SOLUTION INTRAVENOUS at 21:52

## 2021-12-27 RX ADMIN — AMLODIPINE BESYLATE 2.5 MG: 5 TABLET ORAL at 09:15

## 2021-12-27 RX ADMIN — METFORMIN HYDROCHLORIDE 500 MG: 500 TABLET ORAL at 17:36

## 2021-12-27 RX ADMIN — ISOSORBIDE MONONITRATE 30 MG: 30 TABLET, EXTENDED RELEASE ORAL at 09:13

## 2021-12-27 RX ADMIN — ESCITALOPRAM OXALATE 20 MG: 10 TABLET ORAL at 09:14

## 2021-12-27 RX ADMIN — LORAZEPAM 0.5 MG: 2 INJECTION INTRAMUSCULAR; INTRAVENOUS at 23:30

## 2021-12-27 RX ADMIN — ROSUVASTATIN CALCIUM 20 MG: 20 TABLET, FILM COATED ORAL at 17:36

## 2021-12-27 RX ADMIN — Medication 10 ML: at 21:38

## 2021-12-27 RX ADMIN — FUROSEMIDE 40 MG: 40 TABLET ORAL at 09:13

## 2021-12-27 RX ADMIN — DEXAMETHASONE SODIUM PHOSPHATE 6 MG: 4 INJECTION, SOLUTION INTRAMUSCULAR; INTRAVENOUS at 09:17

## 2021-12-27 RX ADMIN — REMDESIVIR 200 MG: 100 INJECTION, POWDER, LYOPHILIZED, FOR SOLUTION INTRAVENOUS at 21:54

## 2021-12-27 RX ADMIN — OXYCODONE HYDROCHLORIDE AND ACETAMINOPHEN 1000 MG: 500 TABLET ORAL at 09:15

## 2021-12-27 RX ADMIN — CETIRIZINE HYDROCHLORIDE 10 MG: 10 TABLET, FILM COATED ORAL at 09:17

## 2021-12-27 RX ADMIN — GABAPENTIN 400 MG: 400 CAPSULE ORAL at 21:39

## 2021-12-27 RX ADMIN — GABAPENTIN 400 MG: 400 CAPSULE ORAL at 09:17

## 2021-12-27 RX ADMIN — Medication 50 MG: at 09:13

## 2021-12-27 ASSESSMENT — PAIN SCALES - GENERAL
PAINLEVEL_OUTOF10: 2
PAINLEVEL_OUTOF10: 0
PAINLEVEL_OUTOF10: 3
PAINLEVEL_OUTOF10: 0

## 2021-12-27 NOTE — PLAN OF CARE
Problem: Falls - Risk of:  Goal: Will remain free from falls  Description: Will remain free from falls  12/27/2021 1602 by Umm Ramos, RN  Outcome: Ongoing   Pt will remain free from falls. Fall precautions in place and alarm engaged. Bedside table and call light within reach. Pt aware to use call light for assistance ambulating.     Problem: Serum Glucose Level - Abnormal:  Goal: Ability to maintain appropriate glucose levels will improve  Description: Ability to maintain appropriate glucose levels will improve  Outcome: Ongoing   Monitored ACHS and wnl  Problem: Gas Exchange - Impaired  Goal: Promote optimal lung function  12/27/2021 0354 by Jody Serrato RN  Outcome: Ongoing   Pt now on 10 liters High flow NC and sat 94%

## 2021-12-27 NOTE — PROGRESS NOTES
Pt assisted to [facility] and returned to chair. Refusing chair alarm. VSS, assessment complete and medications given. Fresh wter provied and pt denies any needs. Call light in reach.

## 2021-12-27 NOTE — PROGRESS NOTES
Progress Note - Dr. Debra Negrete - Internal Medicine  PCP: Sundeep Rojas [place], [address]  / [facility] [address] Nish Serrano 1 Day: 2  Code Status: Full Code  Current Diet: ADULT DIET; Regular        CC: follow up on medical issues    Subjective:   Glenn Roberts is a 80 y.o. female. Pt seen and examined  Chart reviewed since last visit, labs and imaging below        Doing ok  covid test is positive  As pt is on oxygen, pharmacy consulted for poss remdesivir, but creat is near cutoff for giving this    Trop cont to rise, now 0.72  Cards on board    She denies chest pain, denies shortness of breath, denies nausea,  denies emesis. 10 system Review of Systems is reviewed with patient, and pertinent positives are noted in HPI above . Otherwise, Review of systems is negative. I have reviewed the patient's medical and social history in detail and updated the computerized patient record. To recap: She  has a past medical history of Anemia, Anesthesia, Anesthesia complication, Atrial fibrillation ([address] CAD (coronary artery disease), Chest pain, Chronic kidney disease, Depression, GERD (gastroesophageal reflux disease), Glaucoma, Hiatal hernia, Hyperlipidemia, Hypertension, Migraines, neuralgic, Morbid obesity ([address] Osteoarthritis, Sleep apnea, Type II or unspecified type diabetes mellitus without mention of complication, not stated as uncontrolled, Unspecified sleep apnea, Urinary incontinence, and UTI (urinary tract infection). . She  has a past surgical history that includes Coronary artery bypass graft (1999); Hammer toe surgery; Ovary removal (Bilateral, 1997); Esophagus dilation; Breast lumpectomy; Knee arthroscopy; Gastric Band (12/20/11); Colonoscopy; Upper gastrointestinal endoscopy; Upper gastrointestinal endoscopy (10/30/15); Cosmetic surgery; joint replacement (1995); joint replacement (1995); orif humerus decompression (Left, 2/25/2016);  Upper gastrointestinal endoscopy (10/14/2016); Bariatric Surgery (11/03/2016); ablation of dysrhythmic focus; Colonoscopy (10/08/2018); Colonoscopy (N/A, 10/8/2018); pr njx dx/ther sbst intrlmnr crv/thrc w/img gdn (N/A, 11/5/2018); Cardiac surgery (2/22/1999); Cardiac catheterization; Injection Procedure For Sacroiliac Joint (Left, 12/17/2018); Enteroscopy (N/A, 2/26/2019); ventral hernia repair (N/A, 10/29/2019); and Abdomen surgery (N/A, 9/1/2020). . She  reports that she has never smoked. She has never used smokeless tobacco. She reports that she does not drink alcohol and does not use drugs. .        Active Hospital Problems    Diagnosis Date Noted    COVID-19 [U07.1] 12/27/2021    NSTEMI (non-ST elevated myocardial infarction) ([facility] [I21.4] 12/25/2021    Pneumonia [J18.9] 12/25/2021    Acute respiratory failure with hypoxia (HCC) [J96.01] 12/25/2021    HTN (hypertension) [I10] 12/25/2021       Current Facility-Administered Medications: enoxaparin (LOVENOX) injection 40 mg, 40 mg, SubCUTAneous, BID  albuterol sulfate  (90 Base) MCG/ACT inhaler 2 puff, 2 puff, Inhalation, 4x daily  ipratropium (ATROVENT HFA) 17 MCG/ACT inhaler 2 puff, 2 puff, Inhalation, 4x daily  albuterol sulfate  (90 Base) MCG/ACT inhaler 2 puff, 2 puff, Inhalation, Q4H PRN  dexamethasone (DECADRON) injection 6 mg, 6 mg, IntraVENous, Q24H  perflutren lipid microspheres (DEFINITY) injection 1.65 mg, 1.5 mL, IntraVENous, ONCE PRN  ALPRAZolam (XANAX) tablet 0.25 mg, 0.25 mg, Oral, BID PRN  escitalopram (LEXAPRO) tablet 20 mg, 20 mg, Oral, Daily  nitroGLYCERIN (NITROLINGUAL) 0.4 mg spray 1 spray, 1 spray, SubLINGual, Q5 Min PRN  ezetimibe (ZETIA) tablet 5 mg - PATIENT SUPPLIED, 5 mg, Oral, Nightly  metFORMIN (GLUCOPHAGE) tablet 500 mg, 500 mg, Oral, BID WC  rosuvastatin (CRESTOR) tablet 20 mg, 20 mg, Oral, QPM  gabapentin (NEURONTIN) capsule 400 mg, 400 mg, Oral, TID  spironolactone (ALDACTONE) tablet 25 mg, 25 mg, Oral, Daily  pioglitazone (ACTOS) tablet 15 mg, 15 mg, Oral, Daily  vitamin D (CHOLECALCIFEROL) tablet 1,000 Units, 1,000 Units, Oral, Daily  ascorbic acid (VITAMIN C) tablet 1,000 mg, 1,000 mg, Oral, Daily  estradiol (ESTRACE) vaginal cream 2 g, 2 g, Vaginal, Once per day on Mon Thu  furosemide (LASIX) tablet 40 mg, 40 mg, Oral, Daily  aspirin EC tablet 81 mg, 81 mg, Oral, Daily  tiZANidine (ZANAFLEX) tablet 4 mg, 4 mg, Oral, Nightly PRN  metoprolol tartrate (LOPRESSOR) tablet 12.5 mg, 12.5 mg, Oral, PRN  amLODIPine (NORVASC) tablet 2.5 mg, 2.5 mg, Oral, BID  ferrous gluconate 324 (37.5 Fe) MG tablet 162 mg, 162 mg, Oral, Daily  potassium chloride (KLOR-CON M) extended release tablet 20 mEq, 20 mEq, Oral, Daily  zinc sulfate (ZINCATE) capsule 50 mg, 50 mg, Oral, Daily  sodium chloride flush 0.9 % injection 5-40 mL, 5-40 mL, IntraVENous, 2 times per day  sodium chloride flush 0.9 % injection 10 mL, 10 mL, IntraVENous, PRN  0.9 % sodium chloride infusion, 25 mL, IntraVENous, PRN  ondansetron (ZOFRAN-ODT) disintegrating tablet 4 mg, 4 mg, Oral, Q8H PRN **OR** ondansetron (ZOFRAN) injection 4 mg, 4 mg, IntraVENous, Q6H PRN  magnesium hydroxide (MILK OF MAGNESIA) 400 MG/5ML suspension 30 mL, 30 mL, Oral, Daily PRN  acetaminophen (TYLENOL) tablet 650 mg, 650 mg, Oral, Q6H PRN **OR** acetaminophen (TYLENOL) suppository 650 mg, 650 mg, Rectal, Q6H PRN  cefTRIAXone (ROCEPHIN) 1000 mg in sterile water 10 mL IV syringe, 1,000 mg, IntraVENous, Q24H **AND** azithromycin (ZITHROMAX) tablet 500 mg, 500 mg, Oral, Q24H  influenza quadrivalent split vaccine (FLUZONE;FLUARIX;FLULAVAL;AFLURIA) injection 0.5 mL, 0.5 mL, IntraMUSCular, Prior to discharge  cetirizine (ZYRTEC) tablet 10 mg, 10 mg, Oral, Daily  losartan (COZAAR) tablet 100 mg, 100 mg, Oral, Daily **AND** hydroCHLOROthiazide (HYDRODIURIL) tablet 25 mg, 25 mg, Oral, Daily  pantoprazole (PROTONIX) tablet 40 mg, 40 mg, Oral, QAM AC  isosorbide mononitrate (IMDUR) extended release tablet 30 mg, 30 mg, Oral, Daily         Objective:  BP 138/71   Pulse 82   Temp 98.7 °F (37.1 °C) (Oral)   Resp 20   Ht 4' 11.5\" (1.511 m)   Wt 209 lb 3.2 oz (94.9 kg)   SpO2 92%   BMI 41.55 kg/m²      Patient Vitals for the past 24 hrs:   BP Temp Temp src Pulse Resp SpO2   12/27/21 0600 138/71 98.7 °F (37.1 °C) Oral 82 20 92 %   12/27/21 0432 -- -- -- -- 22 99 %   12/26/21 2330 (!) 160/66 99.1 °F (37.3 °C) Oral 86 18 96 %   12/26/21 2235 -- -- -- -- (!) 34 --   12/26/21 2130 133/60 99.8 °F (37.7 °C) Oral 78 18 95 %   12/26/21 2008 -- -- -- -- -- 90 %   12/26/21 1600 125/85 98.6 °F (37 °C) Oral 78 -- 98 %   12/26/21 1400 125/72 98.2 °F (36.8 °C) -- 72 -- 98 %   12/26/21 1200 -- -- -- 65 -- --   12/26/21 1100 136/64 98.5 °F (36.9 °C) Oral 60 -- 97 %   12/26/21 1000 -- -- -- 65 -- --     Patient Vitals for the past 96 hrs (Last 3 readings):   Weight   12/25/21 2215 209 lb 3.2 oz (94.9 kg)   12/25/21 1603 211 lb (95.7 kg)           Intake/Output Summary (Last 24 hours) at 12/27/2021 0817  Last data filed at 12/26/2021 1400  Gross per 24 hour   Intake 480 ml   Output --   Net 480 ml         Physical Exam:   Vitals as above  General appearance: alert, appears stated age and cooperative    Head: Normocephalic, without obvious abnormality, atraumatic    Lungs: crackles bilat  Heart: regular rate and rhythm, S1, S2 normal, no murmur    Abdomen: soft, non-tender; bowel sounds normal; no masses, no organomegaly    Extremities: extremities normal, atraumatic, no cyanosis, no edema      Labs:  Lab Results   Component Value Date    WBC 7.7 12/27/2021    HGB 8.4 (L) 12/27/2021    HCT 26.3 (L) 12/27/2021     12/27/2021    CHOL 109 05/22/2020    TRIG 132 05/22/2020    HDL 36 (L) 05/22/2020    LDLDIRECT 90 01/18/2017    ALT 18 12/26/2021    AST 24 12/26/2021     (L) 12/27/2021    K 4.6 12/27/2021    CL 98 (L) 12/27/2021    CREATININE 1.6 (H) 12/27/2021    BUN 46 (H) 12/27/2021    CO2 22 12/27/2021    TSH 1.95 05/22/2020    INR 1.12 12/25/2021    LABA1C 7.2 06/04/2020 LABMICR YES 08/09/2018     Lab Results   Component Value Date    BNUNSDR 718 01/17/2017    TROPONINI 0.72 ([facility]) 12/27/2021       Recent Imaging Results are Reviewed:  XR CHEST PORTABLE    Result Date: 12/26/2021  EXAMINATION: ONE XRAY VIEW OF THE CHEST 12/26/2021 1:17 pm COMPARISON: 12/25/2021 HISTORY: ORDERING SYSTEM PROVIDED HISTORY: pneumonia TECHNOLOGIST PROVIDED HISTORY: Reason for exam:->pneumonia Reason for Exam: pneumonia FINDINGS: Generalized cardiomegaly is noted. Dense alveolar consolidation in the right upper lobe along the minor fissure is unchanged. There is some hazy parenchymal lower lobe opacity bilaterally. There is a left dual lead pacemaker. Postop changes in the proximal left humerus. Persistent dense alveolar consolidation in the right upper lobe compatible with pneumonia. Cardiomegaly and mild pulmonary venous hypertension. XR CHEST PORTABLE    Result Date: 12/25/2021  EXAMINATION: ONE XRAY VIEW OF THE CHEST 12/25/2021 4:18 pm COMPARISON: 2019 comparison HISTORY: ORDERING SYSTEM PROVIDED HISTORY: SOB TECHNOLOGIST PROVIDED HISTORY: Reason for exam:->SOB FINDINGS: Mild cardiomegaly. Cardiac pacemaker leads in stable/satisfactory position with no evidence of pneumothorax. Large consolidation identified in the right mid lung. Additional moderate diffuse infiltrates and/or congestion identified in the lungs. Significant osteopenic changes and degenerative changes noted in the bony structures. Findings suspicious for bilateral infiltrates in the lungs with large consolidation in the right mid lung. Underlying right lung mass difficult to exclude. CT of the chest may be obtained for further clarification. CT CHEST PULMONARY EMBOLISM W CONTRAST    Result Date: 12/25/2021  EXAMINATION: CTA OF THE CHEST 12/25/2021 5:46 pm TECHNIQUE: CTA of the chest was performed after the administration of intravenous contrast.  Multiplanar reformatted images are provided for review.   MIP images are provided for review. Dose modulation, iterative reconstruction, and/or weight based adjustment of the mA/kV was utilized to reduce the radiation dose to as low as reasonably achievable. COMPARISON: 09/05/2018. Chest x-ray performed earlier today. HISTORY: ORDERING SYSTEM PROVIDED HISTORY: SOB/fever/abnormal chest xray TECHNOLOGIST PROVIDED HISTORY: Reason for exam:->SOB/fever/abnormal chest xray Decision Support Exception - unselect if not a suspected or confirmed emergency medical condition->Emergency Medical Condition (MA) Reason for Exam: SOB/fever/abnormal chest xray FINDINGS: Pulmonary Arteries: Pulmonary arteries are adequately opacified for evaluation. No evidence of intraluminal filling defect to suggest pulmonary embolism. Main pulmonary artery is normal in caliber. Mediastinum: The thoracic aorta is normal in caliber with calcified atherosclerotic plaque. Cardiomegaly with coronary vascular calcification and previous median sternotomy. No pericardial effusion. Pacer leads are in place. Multiple mediastinal nodes are not enlarged by size criteria. The esophagus is unremarkable. Lungs/pleura: Dense consolidation with air bronchograms in the posterior segment of the right upper lobe. Patchy infiltrate is noted elsewhere within the right upper lobe as well as in the right middle lobe and superior segment of the right lower lobe. The left lung is clear. Dependent atelectasis in the lower lobes bilaterally. Trace right pleural effusion. The central airways are patent. Upper Abdomen: The visualized upper abdominal viscera are unremarkable. There is a stable 2.3 cm left adrenal myelolipoma. Soft Tissues/Bones: No acute bone or soft tissue abnormality. 1. No evidence of pulmonary embolic disease. 2. Consolidation in the right upper lobe consistent with pneumonia. The findings are more typical of bacterial etiology and are not consistent with COVID-19 pneumonia.   Additional areas of patchy infiltrate in the right middle lobe and superior segment of the right lower lobe. Continued plain film follow-up recommended to ensure clearing. 3. Trace right pleural effusion. Mild bibasilar atelectasis. 4. Cardiomegaly. Coronary vascular calcification. RECOMMENDATIONS: Plain film follow-up recommended to ensure clearing. Repeat PA and lateral chest x-ray in 1-2 months following therapy would be reasonable. Lab Results   Component Value Date    GLUCOSE 129 12/27/2021    GLUCOSE 143 09/23/2011     Lab Results   Component Value Date    POCGLU 198 12/26/2021     /71   Pulse 82   Temp 98.7 °F (37.1 °C) (Oral)   Resp 20   Ht 4' 11.5\" (1.511 m)   Wt 209 lb 3.2 oz (94.9 kg)   SpO2 92%   BMI 41.55 kg/m²     Assessment and Plan:  Principal Problem:    NSTEMI (non-ST elevated myocardial infarction) ([address] -Established problem. Stable. Trop trending up  Plan: await re-eval per cards today. Active Problems:    Pneumonia -Established problem. Stable. 2/2 covid  Plan: cont steroids, will see if she is able to get remdesivir    Acute respiratory failure with hypoxia ([address] -Established problem. Stable. Cont on 4L  Plan: as above    COVID-19 virus infection -Established problem. Stable. Plan: Continue present orders/plan. HTN (hypertension) -Established problem. Stable.   138/71  Plan: stay on same meds      (Please note that portions of this note were completed with a voice recognition program.  Efforts were made to edit the dictations but occasionally words are mis-transcribed.)        Tristan Jennings MD  12/27/2021

## 2021-12-27 NOTE — PROGRESS NOTES
Pt awake in bed and assisted to the [facility] and returned to chair. Denies any needs and VSS. Call light in reach.

## 2021-12-27 NOTE — PROGRESS NOTES
Clinical Pharmacy Note    Patient is now on 10L HFNC and CRP up to 70.5. Will order actemra x 1 dose.     Laura Wang, PharmD, BCPS  Clinical Pharmacist  [id number]

## 2021-12-27 NOTE — PROGRESS NOTES
[address]  Daily Progress Note    Admit: 12/25/2021      CC: SOB, COVID,   Subj: Ongoing management of COVID. Troponin elevation noted.     Obj:  BP (!) 165/86   Pulse 86   Temp 99.3 °F (37.4 °C) (Axillary)   Resp 20   Ht 4' 11.5\" (1.511 m)   Wt 209 lb 3.2 oz (94.9 kg)   SpO2 (!) 88%   BMI 41.55 kg/m²     Intake/Output Summary (Last 24 hours) at 12/27/2021 1202  Last data filed at 12/27/2021 4359  Gross per 24 hour   Intake 240 ml   Output 250 ml   Net -10 ml     Medications:    enoxaparin  40 mg SubCUTAneous BID    remdesivir IVPB  200 mg IntraVENous Once    Followed by   Lonza Bent ON 12/28/2021] remdesivir IVPB  100 mg IntraVENous Q24H    albuterol sulfate HFA  2 puff Inhalation 4x daily    ipratropium  2 puff Inhalation 4x daily    dexamethasone  6 mg IntraVENous Q24H    escitalopram  20 mg Oral Daily    ezetimibe  5 mg Oral Nightly    metFORMIN  500 mg Oral BID WC    rosuvastatin  20 mg Oral QPM    gabapentin  400 mg Oral TID    spironolactone  25 mg Oral Daily    pioglitazone  15 mg Oral Daily    Vitamin D  1,000 Units Oral Daily    vitamin C  1,000 mg Oral Daily    estradiol  2 g Vaginal Once per day on Mon Thu    furosemide  40 mg Oral Daily    aspirin  81 mg Oral Daily    amLODIPine  2.5 mg Oral BID    ferrous gluconate  162 mg Oral Daily    potassium chloride  20 mEq Oral Daily    zinc sulfate  50 mg Oral Daily    sodium chloride flush  5-40 mL IntraVENous 2 times per day    cefTRIAXone (ROCEPHIN) IV  1,000 mg IntraVENous Q24H    And    azithromycin  500 mg Oral Q24H    influenza virus vaccine  0.5 mL IntraMUSCular Prior to discharge    cetirizine  10 mg Oral Daily    losartan  100 mg Oral Daily    And    hydroCHLOROthiazide  25 mg Oral Daily    pantoprazole  40 mg Oral QAM AC    isosorbide mononitrate  30 mg Oral Daily      sodium chloride       Lab Data: Relevant and available CV data reviewed    Plan:  *CAD  Status Hx CABG 1999, Elevated troponin but febrile stable  LHC 1/17 Patent grafts  TTE 9/19 55%  Plan Poor candidate for invasive with anemia, infection, worsening renal function   Ischemic evaluation once infectious, hematologic and renal issues improve. *AFIB  Status Hx ablation and PPM, stable  Plan AC as tolerated with anemia, especially with coexistent COVID  *Anemia  Status Persistent 7-8  Plan Per medicine  *COVID  Plan Per medicine      Time:  More than 35 minutes spent reviewing patient chart (including but not limited to notes, labs, imaging and other testing), interviewing patient and/or family members, performing a physical exam, documentation of my findings above and subsequent follow-up of ordered testing. More than 50% of that time spent face to face with patient discussing clinical condition and counseling regarding treatment plan.

## 2021-12-27 NOTE — CONSULTS
Clinical Pharmacy Note    Pharmacy consulted by Dr. Kenna Chairez for Angelic Isi and remdesivir. Repeat CRP, if not trending up, will consider baricitinib instead of actemra as does not meet criteria. Orders for remdesivir have been placed. Pharmacy will continue to monitor closely as eGFR is 33 mL/min right now.      Anuradha Crooks, PharmD, [facility]  Clinical Pharmacist  [id number]

## 2021-12-28 ENCOUNTER — APPOINTMENT (OUTPATIENT)
Dept: GENERAL RADIOLOGY | Age: 71
DRG: 871 | End: 2021-12-28
Payer: MEDICARE

## 2021-12-28 PROBLEM — U07.1 ACUTE RESPIRATORY FAILURE DUE TO SEVERE ACUTE RESPIRATORY SYNDROME CORONAVIRUS 2 (SARS-COV-2) INFECTION (HCC): Status: ACTIVE | Noted: 2021-12-28

## 2021-12-28 PROBLEM — J96.00 ACUTE RESPIRATORY FAILURE DUE TO SEVERE ACUTE RESPIRATORY SYNDROME CORONAVIRUS 2 (SARS-COV-2) INFECTION (HCC): Status: ACTIVE | Noted: 2021-12-28

## 2021-12-28 PROBLEM — N17.9 AKI (ACUTE KIDNEY INJURY) (HCC): Status: ACTIVE | Noted: 2021-12-28

## 2021-12-28 LAB
ABO/RH: NORMAL
ANION GAP SERPL CALCULATED.3IONS-SCNC: 11 MMOL/L (ref 3–16)
ANION GAP SERPL CALCULATED.3IONS-SCNC: 14 MMOL/L (ref 3–16)
ANTIBODY SCREEN: NORMAL
BASOPHILS ABSOLUTE: 0 K/UL (ref 0–0.2)
BASOPHILS RELATIVE PERCENT: 0.4 %
BILIRUBIN URINE: NEGATIVE
BLOOD BANK DISPENSE STATUS: NORMAL
BLOOD BANK PRODUCT CODE: NORMAL
BLOOD, URINE: NEGATIVE
BPU ID: NORMAL
BUN BLDV-MCNC: 58 MG/DL (ref 7–20)
BUN BLDV-MCNC: 62 MG/DL (ref 7–20)
CALCIUM SERPL-MCNC: 8.6 MG/DL (ref 8.3–10.6)
CALCIUM SERPL-MCNC: 8.6 MG/DL (ref 8.3–10.6)
CHLORIDE BLD-SCNC: 100 MMOL/L (ref 99–110)
CHLORIDE BLD-SCNC: 98 MMOL/L (ref 99–110)
CLARITY: CLEAR
CO2: 18 MMOL/L (ref 21–32)
CO2: 22 MMOL/L (ref 21–32)
COLOR: YELLOW
CREAT SERPL-MCNC: 1.6 MG/DL (ref 0.6–1.2)
CREAT SERPL-MCNC: 1.6 MG/DL (ref 0.6–1.2)
CREATININE URINE: 50.6 MG/DL (ref 28–259)
D DIMER: 688 NG/ML DDU (ref 0–229)
DESCRIPTION BLOOD BANK: NORMAL
EOSINOPHILS ABSOLUTE: 0 K/UL (ref 0–0.6)
EOSINOPHILS RELATIVE PERCENT: 0 %
EPITHELIAL CELLS, UA: 2 /HPF (ref 0–5)
GFR AFRICAN AMERICAN: 39
GFR AFRICAN AMERICAN: 39
GFR NON-AFRICAN AMERICAN: 32
GFR NON-AFRICAN AMERICAN: 32
GLUCOSE BLD-MCNC: 177 MG/DL (ref 70–99)
GLUCOSE BLD-MCNC: 189 MG/DL (ref 70–99)
GLUCOSE BLD-MCNC: 203 MG/DL (ref 70–99)
GLUCOSE BLD-MCNC: 225 MG/DL (ref 70–99)
GLUCOSE BLD-MCNC: 248 MG/DL (ref 70–99)
GLUCOSE BLD-MCNC: 312 MG/DL (ref 70–99)
GLUCOSE URINE: NEGATIVE MG/DL
HCT VFR BLD CALC: 21.6 % (ref 36–48)
HCT VFR BLD CALC: 21.9 % (ref 36–48)
HEMOGLOBIN: 7 G/DL (ref 12–16)
HEMOGLOBIN: 7.1 G/DL (ref 12–16)
HYALINE CASTS: 6 /LPF (ref 0–8)
KETONES, URINE: NEGATIVE MG/DL
LEUKOCYTE ESTERASE, URINE: NEGATIVE
LYMPHOCYTES ABSOLUTE: 0.5 K/UL (ref 1–5.1)
LYMPHOCYTES RELATIVE PERCENT: 15 %
MCH RBC QN AUTO: 29.5 PG (ref 26–34)
MCHC RBC AUTO-ENTMCNC: 32.1 G/DL (ref 31–36)
MCV RBC AUTO: 91.9 FL (ref 80–100)
MICROSCOPIC EXAMINATION: YES
MONOCYTES ABSOLUTE: 0.1 K/UL (ref 0–1.3)
MONOCYTES RELATIVE PERCENT: 3.5 %
NEUTROPHILS ABSOLUTE: 2.9 K/UL (ref 1.7–7.7)
NEUTROPHILS RELATIVE PERCENT: 81.1 %
NITRITE, URINE: NEGATIVE
OCCULT BLOOD DIAGNOSTIC: ABNORMAL
PDW BLD-RTO: 15 % (ref 12.4–15.4)
PERFORMED ON: ABNORMAL
PH UA: 5 (ref 5–8)
PLATELET # BLD: 207 K/UL (ref 135–450)
PMV BLD AUTO: 8.8 FL (ref 5–10.5)
POTASSIUM SERPL-SCNC: 4.9 MMOL/L (ref 3.5–5.1)
POTASSIUM SERPL-SCNC: 5.9 MMOL/L (ref 3.5–5.1)
PROTEIN PROTEIN: 30 MG/DL
PROTEIN UA: 30 MG/DL
RBC # BLD: 2.39 M/UL (ref 4–5.2)
RBC UA: 1 /HPF (ref 0–4)
REASON FOR REJECTION: NORMAL
REJECTED TEST: NORMAL
SODIUM BLD-SCNC: 130 MMOL/L (ref 136–145)
SODIUM BLD-SCNC: 133 MMOL/L (ref 136–145)
SODIUM URINE: 57 MMOL/L
SPECIFIC GRAVITY UA: 1.01 (ref 1–1.03)
URINE TYPE: ABNORMAL
UROBILINOGEN, URINE: 0.2 E.U./DL
WBC # BLD: 3.6 K/UL (ref 4–11)
WBC UA: 3 /HPF (ref 0–5)

## 2021-12-28 PROCEDURE — 86900 BLOOD TYPING SEROLOGIC ABO: CPT

## 2021-12-28 PROCEDURE — 85025 COMPLETE CBC W/AUTO DIFF WBC: CPT

## 2021-12-28 PROCEDURE — 99233 SBSQ HOSP IP/OBS HIGH 50: CPT | Performed by: INTERNAL MEDICINE

## 2021-12-28 PROCEDURE — 81001 URINALYSIS AUTO W/SCOPE: CPT

## 2021-12-28 PROCEDURE — 82270 OCCULT BLOOD FECES: CPT

## 2021-12-28 PROCEDURE — 94640 AIRWAY INHALATION TREATMENT: CPT

## 2021-12-28 PROCEDURE — 85018 HEMOGLOBIN: CPT

## 2021-12-28 PROCEDURE — 94761 N-INVAS EAR/PLS OXIMETRY MLT: CPT

## 2021-12-28 PROCEDURE — 84300 ASSAY OF URINE SODIUM: CPT

## 2021-12-28 PROCEDURE — P9016 RBC LEUKOCYTES REDUCED: HCPCS

## 2021-12-28 PROCEDURE — 86923 COMPATIBILITY TEST ELECTRIC: CPT

## 2021-12-28 PROCEDURE — 36430 TRANSFUSION BLD/BLD COMPNT: CPT

## 2021-12-28 PROCEDURE — 2700000000 HC OXYGEN THERAPY PER DAY

## 2021-12-28 PROCEDURE — 71045 X-RAY EXAM CHEST 1 VIEW: CPT

## 2021-12-28 PROCEDURE — 6370000000 HC RX 637 (ALT 250 FOR IP): Performed by: INTERNAL MEDICINE

## 2021-12-28 PROCEDURE — 2580000003 HC RX 258: Performed by: INTERNAL MEDICINE

## 2021-12-28 PROCEDURE — 84156 ASSAY OF PROTEIN URINE: CPT

## 2021-12-28 PROCEDURE — 6360000002 HC RX W HCPCS: Performed by: INTERNAL MEDICINE

## 2021-12-28 PROCEDURE — 85014 HEMATOCRIT: CPT

## 2021-12-28 PROCEDURE — 80048 BASIC METABOLIC PNL TOTAL CA: CPT

## 2021-12-28 PROCEDURE — 2060000000 HC ICU INTERMEDIATE R&B

## 2021-12-28 PROCEDURE — 86901 BLOOD TYPING SEROLOGIC RH(D): CPT

## 2021-12-28 PROCEDURE — 85379 FIBRIN DEGRADATION QUANT: CPT

## 2021-12-28 PROCEDURE — 83036 HEMOGLOBIN GLYCOSYLATED A1C: CPT

## 2021-12-28 PROCEDURE — 36415 COLL VENOUS BLD VENIPUNCTURE: CPT

## 2021-12-28 PROCEDURE — 86850 RBC ANTIBODY SCREEN: CPT

## 2021-12-28 PROCEDURE — 2500000003 HC RX 250 WO HCPCS: Performed by: INTERNAL MEDICINE

## 2021-12-28 PROCEDURE — 51702 INSERT TEMP BLADDER CATH: CPT

## 2021-12-28 PROCEDURE — C9113 INJ PANTOPRAZOLE SODIUM, VIA: HCPCS | Performed by: INTERNAL MEDICINE

## 2021-12-28 PROCEDURE — 94660 CPAP INITIATION&MGMT: CPT

## 2021-12-28 PROCEDURE — 82570 ASSAY OF URINE CREATININE: CPT

## 2021-12-28 RX ORDER — SODIUM CHLORIDE 9 MG/ML
INJECTION, SOLUTION INTRAVENOUS PRN
Status: DISCONTINUED | OUTPATIENT
Start: 2021-12-28 | End: 2022-01-02 | Stop reason: HOSPADM

## 2021-12-28 RX ORDER — DEXTROSE MONOHYDRATE 50 MG/ML
100 INJECTION, SOLUTION INTRAVENOUS PRN
Status: DISCONTINUED | OUTPATIENT
Start: 2021-12-28 | End: 2022-01-02 | Stop reason: HOSPADM

## 2021-12-28 RX ORDER — PANTOPRAZOLE SODIUM 40 MG/10ML
40 INJECTION, POWDER, LYOPHILIZED, FOR SOLUTION INTRAVENOUS 2 TIMES DAILY
Status: DISCONTINUED | OUTPATIENT
Start: 2021-12-29 | End: 2022-01-02 | Stop reason: HOSPADM

## 2021-12-28 RX ORDER — INSULIN LISPRO 100 [IU]/ML
0-12 INJECTION, SOLUTION INTRAVENOUS; SUBCUTANEOUS
Status: DISCONTINUED | OUTPATIENT
Start: 2021-12-28 | End: 2022-01-01

## 2021-12-28 RX ORDER — DEXTROSE MONOHYDRATE 25 G/50ML
12.5 INJECTION, SOLUTION INTRAVENOUS PRN
Status: DISCONTINUED | OUTPATIENT
Start: 2021-12-28 | End: 2022-01-02 | Stop reason: HOSPADM

## 2021-12-28 RX ORDER — INSULIN LISPRO 100 [IU]/ML
0-6 INJECTION, SOLUTION INTRAVENOUS; SUBCUTANEOUS NIGHTLY
Status: DISCONTINUED | OUTPATIENT
Start: 2021-12-28 | End: 2022-01-01

## 2021-12-28 RX ORDER — NICOTINE POLACRILEX 4 MG
15 LOZENGE BUCCAL PRN
Status: DISCONTINUED | OUTPATIENT
Start: 2021-12-28 | End: 2022-01-02 | Stop reason: HOSPADM

## 2021-12-28 RX ORDER — PANTOPRAZOLE SODIUM 40 MG/10ML
40 INJECTION, POWDER, LYOPHILIZED, FOR SOLUTION INTRAVENOUS DAILY
Status: DISCONTINUED | OUTPATIENT
Start: 2021-12-28 | End: 2021-12-28

## 2021-12-28 RX ADMIN — GABAPENTIN 400 MG: 400 CAPSULE ORAL at 10:13

## 2021-12-28 RX ADMIN — OXYCODONE HYDROCHLORIDE AND ACETAMINOPHEN 1000 MG: 500 TABLET ORAL at 10:13

## 2021-12-28 RX ADMIN — ENOXAPARIN SODIUM 40 MG: 100 INJECTION SUBCUTANEOUS at 10:15

## 2021-12-28 RX ADMIN — PANTOPRAZOLE SODIUM 40 MG: 40 TABLET, DELAYED RELEASE ORAL at 05:36

## 2021-12-28 RX ADMIN — ISOSORBIDE MONONITRATE 30 MG: 30 TABLET, EXTENDED RELEASE ORAL at 10:13

## 2021-12-28 RX ADMIN — ASPIRIN 81 MG: 81 TABLET, COATED ORAL at 10:12

## 2021-12-28 RX ADMIN — FUROSEMIDE 40 MG: 40 TABLET ORAL at 10:14

## 2021-12-28 RX ADMIN — ESCITALOPRAM OXALATE 20 MG: 10 TABLET ORAL at 10:14

## 2021-12-28 RX ADMIN — PIOGLITAZONE 15 MG: 15 TABLET ORAL at 10:43

## 2021-12-28 RX ADMIN — LOSARTAN POTASSIUM 100 MG: 100 TABLET, FILM COATED ORAL at 10:13

## 2021-12-28 RX ADMIN — Medication 2 PUFF: at 12:09

## 2021-12-28 RX ADMIN — SODIUM CHLORIDE 25 ML: 9 INJECTION, SOLUTION INTRAVENOUS at 13:12

## 2021-12-28 RX ADMIN — CETIRIZINE HYDROCHLORIDE 10 MG: 10 TABLET, FILM COATED ORAL at 10:13

## 2021-12-28 RX ADMIN — INSULIN LISPRO 4 UNITS: 100 INJECTION, SOLUTION INTRAVENOUS; SUBCUTANEOUS at 21:29

## 2021-12-28 RX ADMIN — REMDESIVIR 100 MG: 100 INJECTION, POWDER, LYOPHILIZED, FOR SOLUTION INTRAVENOUS at 10:40

## 2021-12-28 RX ADMIN — AZITHROMYCIN MONOHYDRATE 500 MG: 250 TABLET ORAL at 10:14

## 2021-12-28 RX ADMIN — INSULIN LISPRO 4 UNITS: 100 INJECTION, SOLUTION INTRAVENOUS; SUBCUTANEOUS at 14:45

## 2021-12-28 RX ADMIN — Medication 1000 UNITS: at 10:12

## 2021-12-28 RX ADMIN — AMLODIPINE BESYLATE 2.5 MG: 5 TABLET ORAL at 10:13

## 2021-12-28 RX ADMIN — GABAPENTIN 400 MG: 400 CAPSULE ORAL at 21:16

## 2021-12-28 RX ADMIN — Medication 10 ML: at 10:42

## 2021-12-28 RX ADMIN — Medication 2 PUFF: at 08:39

## 2021-12-28 RX ADMIN — Medication 50 MG: at 10:12

## 2021-12-28 RX ADMIN — DEXAMETHASONE SODIUM PHOSPHATE 20 MG: 4 INJECTION, SOLUTION INTRA-ARTICULAR; INTRALESIONAL; INTRAMUSCULAR; INTRAVENOUS; SOFT TISSUE at 13:13

## 2021-12-28 RX ADMIN — DEXAMETHASONE SODIUM PHOSPHATE 20 MG: 4 INJECTION, SOLUTION INTRA-ARTICULAR; INTRALESIONAL; INTRAMUSCULAR; INTRAVENOUS; SOFT TISSUE at 01:36

## 2021-12-28 RX ADMIN — GABAPENTIN 400 MG: 400 CAPSULE ORAL at 14:45

## 2021-12-28 RX ADMIN — ENOXAPARIN SODIUM 40 MG: 100 INJECTION SUBCUTANEOUS at 21:16

## 2021-12-28 RX ADMIN — Medication 162 MG: at 10:14

## 2021-12-28 RX ADMIN — HYDROCHLOROTHIAZIDE 25 MG: 25 TABLET ORAL at 10:13

## 2021-12-28 RX ADMIN — AMLODIPINE BESYLATE 2.5 MG: 5 TABLET ORAL at 21:16

## 2021-12-28 RX ADMIN — INSULIN LISPRO 2 UNITS: 100 INJECTION, SOLUTION INTRAVENOUS; SUBCUTANEOUS at 17:09

## 2021-12-28 RX ADMIN — Medication 2 PUFF: at 16:04

## 2021-12-28 RX ADMIN — SPIRONOLACTONE 25 MG: 25 TABLET ORAL at 10:14

## 2021-12-28 RX ADMIN — PANTOPRAZOLE SODIUM 40 MG: 40 INJECTION, POWDER, FOR SOLUTION INTRAVENOUS at 17:04

## 2021-12-28 RX ADMIN — ROSUVASTATIN CALCIUM 20 MG: 20 TABLET, FILM COATED ORAL at 17:04

## 2021-12-28 ASSESSMENT — PAIN SCALES - GENERAL
PAINLEVEL_OUTOF10: 0
PAINLEVEL_OUTOF10: 0

## 2021-12-28 NOTE — PROGRESS NOTES
Clinical Pharmacy Note:       Shabana Castaneda is a 80 y.o. female with a positive result for COVID.  The patient is receiving the appropriate medications:    CORTICOSTEROID THERAPY: dexamethasone    ANTICOAGULATION THERAPY: enoxaparin    ANTIVIRAL THERAPY: remdiesivir    KINASE INHIBITOR OR INTERLEUKIN 6 RECEPTOR ANTAGONIST: tocilizumab on 12/27/21    Juana Marrero PharmD 12/28/2021

## 2021-12-28 NOTE — PROGRESS NOTES
Saw patient in rapid response. She was hypoxic, COVID admission to Dr. Jeovany Keen.  On exam, has exp wheezes, basilar crackles, sats 80% on 10 L/min suppl oxygen. Ordered CXR. Started on bipap with immediate improvement in sats to >90%. Ordered IV ativan 0.5 mg X1, IV decadron 20 mg. Transfer to stepdown unit.     SIGNED: Brunilda Ivey MD, MPH . 12/27/2021

## 2021-12-28 NOTE — CONSULTS
obesity ([address]     Osteoarthritis     Sleep apnea     uses CPAP    Type II or unspecified type diabetes mellitus without mention of complication, not stated as uncontrolled     Unspecified sleep apnea     uses cpap    Urinary incontinence     UTI (urinary tract infection)        Past Surgical History:    Past Surgical History:   Procedure Laterality Date    ABDOMEN SURGERY N/A 9/1/2020    EXPLORATION OF ABDOMINAL WOUND performed by Josep Landis MD at [facility]  2/22/1999    quadruple by-pass, [address] COLONOSCOPY  10/08/2018    1 polyp removed    COLONOSCOPY N/A 10/8/2018    COLONOSCOPY POLYPECTOMY SNARE/COLD BIOPSY performed by Derek Mcconnell MD at [facility]    COSMETIC SURGERY      face lift    ENTEROSCOPY N/A 2/26/2019    ENTEROSCOPY performed by Derek Mcconnell MD at [facility] 15  12/20/11    hiatal hernia repair    GASTRIC BAND REMOVAL  11/03/2016    laparoscopic     HAMMER TOE SURGERY      [facility] INJECTION PROCEDURE FOR SACROILIAC JOINT Left 12/17/2018    SACROILIAC JOINT INJECTION ON THE LEFT WITH FLUOROSCOPY performed by Tatum English MD at [address]    both   [address]    Left and Right knees, [facility]    KNEE ARTHROSCOPY      1982 left    ORIF HUMERUS DECOMPRESSION Left 2/25/2016    OPEN REDUCTION INTERNAL FIXATION LEFT PROXIMAL HUMERUS    OVARY REMOVAL Bilateral 1997    [facility] [address] DX/THER SBST INTRLMNR CRV/THRC W/IMG GDN N/A 11/5/2018    MIDLINE L4/L5 LUMBAR INTERLAMINAR EPIDURAL STEROID INJECTION WITH FLUOROSCOPY performed by Tatum English MD at [address] ENDOSCOPY  10/30/15    UPPER GASTROINTESTINAL ENDOSCOPY  10/14/2016    with biopsy    VENTRAL HERNIA REPAIR N/A 10/29/2019    OPEN VENTRAL HERNIA REPAIR WITH  MESH [facility] History:     · Tobacco use:   reports that she has never smoked. She has never used smokeless tobacco.  · Alcohol use:   reports no history of alcohol use. · Currently lives in: [facility]  ·  reports no history of drug use. Family History:       Problem Relation Age of Onset    Cancer Mother         ovarian, colon    High Blood Pressure Mother     Heart Disease Father     High Blood Pressure Father     Stroke Sister         paralysed on right side B/C of stroke         Medications:    insulin lispro  0-12 Units SubCUTAneous TID WC    insulin lispro  0-6 Units SubCUTAneous Nightly    pantoprazole  40 mg IntraVENous Daily    enoxaparin  40 mg SubCUTAneous BID    remdesivir IVPB  100 mg IntraVENous Q24H    dexamethasone (DECADRON) IVPB  20 mg IntraVENous Daily    albuterol sulfate HFA  2 puff Inhalation 4x daily    ipratropium  2 puff Inhalation 4x daily    escitalopram  20 mg Oral Daily    ezetimibe  5 mg Oral Nightly    rosuvastatin  20 mg Oral QPM    gabapentin  400 mg Oral TID    pioglitazone  15 mg Oral Daily    Vitamin D  1,000 Units Oral Daily    vitamin C  1,000 mg Oral Daily    estradiol  2 g Vaginal Once per day on Mon Thu    furosemide  40 mg Oral Daily    aspirin  81 mg Oral Daily    amLODIPine  2.5 mg Oral BID    ferrous gluconate  162 mg Oral Daily    zinc sulfate  50 mg Oral Daily    sodium chloride flush  5-40 mL IntraVENous 2 times per day    cefTRIAXone (ROCEPHIN) IV  1,000 mg IntraVENous Q24H    And    azithromycin  500 mg Oral Q24H    influenza virus vaccine  0.5 mL IntraMUSCular Prior to discharge    cetirizine  10 mg Oral Daily    hydroCHLOROthiazide  25 mg Oral Daily    isosorbide mononitrate  30 mg Oral Daily            REVIEW OF SYSTEMS:       Pertinent items are noted in HPI.     Objective:   PHYSICAL EXAM: Vitals:   Vitals:    12/28/21 1105 12/28/21 1211 12/28/21 1500 12/28/21 1605   BP: 123/62  126/63    Pulse: 60  68    Resp: 19 18 18 18   Temp: 98 °F (36.7 °C)  98.1 °F (36.7 °C)    TempSrc: Oral  Oral    SpO2: 95% 94% 95% 94%   Weight:       Height:           General appearance: alert, cooperative, no distress, appears stated age  Eyes: Anicteric  Head: Normocephalic, without obvious abnormality  Lungs: crackles  Heart: regular rate and rhythm, normal S1 and S2, no murmurs or rubs  Abdomen: soft, non-tender. Bowel sounds normal. No masses,  no organomegaly. Extremities: atraumatic, no cyanosis or edema  Skin: warm and dry, no jaundice  Neuro: Grossly intact, A&OX3    Intake and output:   I/O last 3 completed shifts:  In: -   Out: 1450 [Urine:1450]    Lab Data:      CBC:   Recent Labs     12/26/21  0533 12/26/21  0533 12/27/21  0702 12/28/21  0457 12/28/21  1312   WBC 4.9  --  7.7 3.6*  --    RBC 2.63*  --  2.83* 2.39*  --    HGB 7.9*   < > 8.4* 7.0* 7.1*   HCT 24.4*   < > 26.3* 21.9* 21.6*     --  246 207  --    MCV 92.6  --  92.8 91.9  --    MCH 30.1  --  29.7 29.5  --    MCHC 32.5  --  32.0 32.1  --    RDW 15.0  --  15.3 15.0  --     < > = values in this interval not displayed. BMP:  Recent Labs     12/27/21  0702 12/28/21  0457 12/28/21  1321   * 133* 130*   K 4.6 5.9* 4.9   CL 98* 100 98*   CO2 22 22 18*   BUN 46* 58* 62*   CREATININE 1.6* 1.6* 1.6*   CALCIUM 9.1 8.6 8.6   GLUCOSE 129* 189* 203*        Hepatic Function Panel:   Recent Labs     12/26/21  0533   AST 24   ALT 18   BILITOT <0.2   ALKPHOS 61       No results for input(s): LIPASE, AMYLASE in the last 72 hours. No results for input(s): PROTIME, INR in the last 72 hours. No results for input(s): PTT in the last 72 hours. No results for input(s): OCCULTBLD in the last 72 hours. Imaging:    XR CHEST PORTABLE   Final Result   Mildly increased right-side predominant airspace opacities.          XR CHEST PORTABLE   Final Result   Persistent dense alveolar consolidation in the right upper lobe compatible   with pneumonia. Cardiomegaly and mild pulmonary venous hypertension. CT CHEST PULMONARY EMBOLISM W CONTRAST   Final Result   1. No evidence of pulmonary embolic disease. 2. Consolidation in the right upper lobe consistent with pneumonia. The   findings are more typical of bacterial etiology and are not consistent with   COVID-19 pneumonia. Additional areas of patchy infiltrate in the right   middle lobe and superior segment of the right lower lobe. Continued plain   film follow-up recommended to ensure clearing. 3. Trace right pleural effusion. Mild bibasilar atelectasis. 4. Cardiomegaly. Coronary vascular calcification. RECOMMENDATIONS:   Plain film follow-up recommended to ensure clearing. Repeat PA and lateral   chest x-ray in 1-2 months following therapy would be reasonable. XR CHEST PORTABLE   Final Result   Findings suspicious for bilateral infiltrates in the lungs with large   consolidation in the right mid lung. Underlying right lung mass difficult to   exclude. CT of the chest may be obtained for further clarification. Known drug Allergies: Allergies   Allergen Reactions    Albuterol Other (See Comments)     Other reaction(s): Chest Pain  Crushing chest pain    Heparin Other (See Comments)     Caused bruises and hematomas immediately when drip started.   MARKED BRUISING/HEMATOMAS    Niacin     Avelox [Moxifloxacin Hcl In Nacl] Rash    Moxifloxacin Rash    Niaspan [Niacin Er] Itching and Rash    Proventil [Albuterol Sulfate] Palpitations    Tagamet [Cimetidine] Rash           Assessment:   The patient is a 80 y.o. old female  with following problems:    Principal Problem:    NSTEMI (non-ST elevated myocardial infarction) ([facility]  Active Problems:    Anemia    Pneumonia    Acute respiratory failure with hypoxia (HCC)    HTN (hypertension)    COVID-19    Acute respiratory failure due to severe acute respiratory syndrome coronavirus 2 (SARS-CoV-2) infection (HCC)    MALCOLM (acute kidney injury) ([facility] [address]  Resolved Problems:    * No resolved hospital problems. *    Melena and anemia - ? PUD. Denies NSAID use.    Covid pneumonia   CAD s/p remote CABG  MDS    Recommendations:   IV PPI BID   Transfuse pRBC to keep Hb>8  Monitor H/H Q8  Hold off EGD unless overtly bleeding and not responding to blood transfusions  Consult [facility] for h/o MDS    Sabina Jensen MD, MSc   [facility]   12/28/21

## 2021-12-28 NOTE — CONSULTS
Nephrology Consult Note  [phone]  [phone]   [email or web address].        Reason for Consult:  MALCOLM , Hyperkalemia     HISTORY OF PRESENT ILLNESS:      The patient is a 80 y.o. female with significant past medical history of anemia, fibrillation, coronary artery disease, depression, hyperlipidemia, hypertension,  obesity, sleep apnea, diabetes mellitus type 2, who presented to the ER with sudden onset of shortness of breath accompanied by a nonproductive cough generalized weakness and fever  She tested positive for COVID-19  CTA was done on 12/25 using IV contrast and revealed a right lobar consolidation  Serum creatinine on 12/25 was 1.1 on 12/26 was 1, 1227 was 1.6 where it remains today  Calcium is elevated at 5.9  Is making urine there is no dysuria gross hematuria  She has never had a kidney stone though she says she has had acute kidney injury in the past from which she recovered completely  She  does not take NSAIDs  She says her stool is black, sees gastroenterologist Dr. Denys Kapoor  He also sees a hematologist oncologist for bone marrow problem      Past Medical History:        Diagnosis Date    Anemia     Anesthesia     trouble after egd 10/2016. Anxiety and severe tremors after AF ablation 8/2018-responded well to Ativan    Anesthesia complication     flailing, yelling when waking up from anesthesia-ativan helps (rybykz24/29/19: ativans works within seconds of administration)    Atrial fibrillation ([address] 10/01/2017    A. fib with SVR    CAD (coronary artery disease)     Chest pain 10/01/2017    Chronic kidney disease     ? ??    Depression     GERD (gastroesophageal reflux disease)     Glaucoma     Hiatal hernia     Hyperlipidemia     Hypertension     Migraines, neuralgic     Morbid obesity (HCC)     Osteoarthritis     Sleep apnea     uses CPAP    Type II or unspecified type diabetes mellitus without mention of complication, not stated as uncontrolled     Unspecified sleep apnea     uses cpap  Urinary incontinence     UTI (urinary tract infection)        Past Surgical History:        Procedure Laterality Date    ABDOMEN SURGERY N/A 9/1/2020    EXPLORATION OF ABDOMINAL WOUND performed by Meeta Curtis MD at [address] [facility] SURGERY  2/22/1999    [address] COLONOSCOPY  10/08/2018    1 polyp removed    COLONOSCOPY N/A 10/8/2018    COLONOSCOPY POLYPECTOMY SNARE/COLD BIOPSY performed by Nitza Hernandez MD at [facility]    COSMETIC SURGERY      face lift    ENTEROSCOPY N/A 2/26/2019    ENTEROSCOPY performed by Nitza Hernandez MD at [address] GASTRIC BAND  12/20/11    hiatal hernia repair    GASTRIC BAND REMOVAL  11/03/2016    laparoscopic     HAMMER TOE SURGERY      [facility] INJECTION PROCEDURE FOR SACROILIAC JOINT Left 12/17/2018    SACROILIAC JOINT INJECTION ON THE LEFT WITH FLUOROSCOPY performed by Cyrus Lenz MD at [address]    both   [address]    [facility], [facility]    KNEE ARTHROSCOPY      1982 left    ORIF HUMERUS DECOMPRESSION Left 2/25/2016    OPEN REDUCTION INTERNAL FIXATION LEFT PROXIMAL HUMERUS    OVARY REMOVAL Bilateral 1997    [address] DX/THER SBST INTRLMNR CRV/THRC W/IMG GDN N/A 11/5/2018    MIDLINE L4/L5 LUMBAR INTERLAMINAR EPIDURAL STEROID INJECTION WITH FLUOROSCOPY performed by Cyrus Lenz MD at [address]  10/30/15    UPPER GASTROINTESTINAL ENDOSCOPY  10/14/2016    with biopsy    VENTRAL HERNIA REPAIR N/A 10/29/2019    OPEN VENTRAL HERNIA REPAIR WITH  MESH performed by Meeta Curtis MD at [address]       Current Medications:    No current facility-administered medications on file prior to encounter.      Current Outpatient Medications on File Prior to Encounter   Medication Sig Dispense Refill    zinc sulfate (ZINCATE) 220 (50 Zn) MG capsule Take 50 mg by mouth daily      potassium chloride (KLOR-CON M) 20 MEQ extended release tablet Take 1 tablet by mouth daily 90 tablet 3    ferrous gluconate (FERGON) 225 (27 Fe) MG tablet Take 240 mg by mouth Take 2 tablets by mouth daily      amLODIPine (NORVASC) 5 MG tablet Take 1 tablet by mouth every evening (Patient taking differently: Take 2.5 mg by mouth 2 times daily Take 0.5 tablet by mouth every morning and every evening) 90 tablet 3    tiZANidine (ZANAFLEX) 4 MG tablet Take 4 mg by mouth every 6 hours as needed (ONLY TAKES AT HS)       aspirin 81 MG tablet Take 81 mg by mouth daily      furosemide (LASIX) 40 MG tablet Take 1 tablet by mouth daily 30 tablet 5    isosorbide mononitrate (ISMO;MONOKET) 10 MG tablet Take 5 mg by mouth 2 times daily      Ascorbic Acid (VITAMIN C) 1000 MG tablet Take 1,000 mg by mouth daily      Cyanocobalamin (VITAMIN B-12 PO) Take 3,000 mcg by mouth daily      estradiol (ESTRACE) 0.1 MG/GM vaginal cream Place 2 g vaginally See Admin Instructions Twice a week      Cranberry 450 MG CAPS Take by mouth daily       pioglitazone (ACTOS) 15 MG tablet Take 15 mg by mouth daily      vitamin D (CHOLECALCIFEROL) 1000 UNIT TABS tablet Take 1,000 Units by mouth daily       spironolactone (ALDACTONE) 25 MG tablet Take 25 mg by mouth daily In pm      dexlansoprazole (DEXILANT) 60 MG CPDR delayed release capsule Take 60 mg by mouth daily      gabapentin (NEURONTIN) 100 MG capsule Take 400 mg by mouth 3 times daily.  Sandra Parkinson TraZODone HCl (DESYREL PO) Take 50 mg by mouth nightly       losartan-hydrochlorothiazide (HYZAAR) 100-25 MG per tablet TK 1 T PO QAM  3    Calcium Citrate-Vitamin D (CALCIUM CITRATE + D PO) Take 2 tablets by mouth daily      SLOW-MAG 71.5-119 MG TBEC tablet Take 1 tablet by mouth daily 143 mg  5    fexofenadine (ALLEGRA) 180 MG tablet Take 180 mg by mouth daily      rosuvastatin (CRESTOR) 40 MG tablet Take 20 mg by mouth every evening       metFORMIN (GLUCOPHAGE) 500 MG tablet Take 500 mg by mouth 2 times daily (with meals)       ezetimibe (ZETIA) 10 MG tablet Take 5 mg by mouth nightly       escitalopram (LEXAPRO) 20 MG tablet Take 20 mg by mouth daily.  metoprolol tartrate (LOPRESSOR) 25 MG tablet Take 12.5 mg by mouth as needed (FOR A-FIB)       nitroGLYCERIN (NITROLINGUAL) 0.4 MG/SPRAY spray Place 1 spray under the tongue every 5 minutes as needed. As directed for chest pain          Allergies:  Albuterol, Heparin, Niacin, Avelox [moxifloxacin hcl in nacl], Moxifloxacin, Niaspan [niacin er], Proventil [albuterol sulfate], and Tagamet [cimetidine]    Social History:    Social History     Socioeconomic History    Marital status:      Spouse name: Not on file    Number of children: Not on file    Years of education: Not on file    Highest education level: Not on file   Occupational History    Not on file   Tobacco Use    Smoking status: Never Smoker    Smokeless tobacco: Never Used   Vaping Use    Vaping Use: Never used   Substance and Sexual Activity    Alcohol use: No     Alcohol/week: 0.0 standard drinks    Drug use: No    Sexual activity: Yes     Partners: Male   Other Topics Concern    Not on file   Social History Narrative    Not on file     Social Determinants of Health     Financial Resource Strain:     Difficulty of Paying Living Expenses: Not on file   Food Insecurity:     Worried About Running Out of Food in the Last Year: Not on file    Huang of Food in the Last Year: Not on file   Transportation Needs:     Lack of Transportation (Medical): Not on file    Lack of Transportation (Non-Medical):  Not on file   Physical Activity:     Days of Exercise per Week: Not on file    Minutes of Exercise per Session: Not on file   Stress:     Feeling of Stress : Not on file   Social Connections:  Frequency of Communication with Friends and Family: Not on file    Frequency of Social Gatherings with Friends and Family: Not on file    Attends Muslim Services: Not on file    Active Member of Clubs or Organizations: Not on file    Attends Club or Organization Meetings: Not on file    Marital Status: Not on file   Intimate Partner Violence:     Fear of Current or Ex-Partner: Not on file    Emotionally Abused: Not on file    Physically Abused: Not on file    Sexually Abused: Not on file   Housing Stability:     Unable to Pay for Housing in the Last Year: Not on file    Number of [place] in the Last Year: Not on file    Unstable Housing in the Last Year: Not on file       Family History:       Problem Relation Age of Onset    Cancer Mother         ovarian, colon    High Blood Pressure Mother     Heart Disease Father     High Blood Pressure Father     Stroke Sister         paralysed on right side B/C of stroke         REVIEW OF SYSTEMS:      10 pt ROS done, relevant features as in [place], rest negative       PHYSICAL EXAM:    Vitals:    /60   Pulse 67   Temp 99.1 °F (37.3 °C) (Oral)   Resp 20   Ht 4' 11.5\" (1.511 m)   Wt 209 lb 3.2 oz (94.9 kg)   SpO2 97%   BMI 41.55 kg/m²   I/O last 3 completed shifts: In: 480 [P.O.:480]  Out: 650 [Urine:650]  No intake/output data recorded. Physical Exam:  Gen:  alert, oriented x 3  PERRL , EOM +  Pallor +, No icterus  JVP not raised   CV: RRR no murmur or rub . Mid sternal scar of CABG   Lungs:B/ L air entry, Normal breath sounds   Abd: soft, bowel sounds + , No organomegaly , scar +   Ext: No edema, no cyanosis  Skin: Warm.   No rash  Neuro: nonfocal.      DATA:    CBC with Differential:    Lab Results   Component Value Date    WBC 3.6 12/28/2021    RBC 2.39 12/28/2021    HGB 7.0 12/28/2021    HCT 21.9 12/28/2021     12/28/2021    MCV 91.9 12/28/2021    MCH 29.5 12/28/2021    MCHC 32.1 12/28/2021    RDW 15.0 12/28/2021    NRBC CANCELED 09/23/2011    NRBC CANCELED 09/23/2011    SEGSPCT 46.5 12/28/2011    BANDSPCT 2 02/14/2020    BLASTSPCT CANCELED 09/23/2011    METASPCT 1 12/07/2016    LYMPHOPCT 15.0 12/28/2021    PROMYELOPCT CANCELED 09/23/2011    MONOPCT 3.5 12/28/2021    MYELOPCT CANCELED 09/23/2011    EOSPCT 6.3 12/28/2011    BASOPCT 0.4 12/28/2021    MONOSABS 0.1 12/28/2021    LYMPHSABS 0.5 12/28/2021    EOSABS 0.0 12/28/2021    BASOSABS 0.0 12/28/2021    DIFFTYPE Auto 12/28/2011     CMP:    Lab Results   Component Value Date     12/28/2021    K 5.9 12/28/2021    K 4.7 12/26/2021     12/28/2021    CO2 22 12/28/2021    BUN 58 12/28/2021    CREATININE 1.6 12/28/2021    GFRAA 39 12/28/2021    GFRAA >60 10/26/2012    AGRATIO 1.3 12/26/2021    LABGLOM 32 12/28/2021    LABGLOM 100 06/26/2012    GLUCOSE 189 12/28/2021    GLUCOSE 143 09/23/2011    PROT 6.5 12/26/2021    PROT 7.8 10/26/2012    LABALBU 3.7 12/26/2021    CALCIUM 8.6 12/28/2021    BILITOT <0.2 12/26/2021    ALKPHOS 61 12/26/2021    AST 24 12/26/2021    ALT 18 12/26/2021     Magnesium:    Lab Results   Component Value Date    MG 2.00 08/14/2020     Phosphorus:    Lab Results   Component Value Date    PHOS 4.1 08/14/2020     Troponin:    Lab Results   Component Value Date    TROPONINI 0.72 12/27/2021     U/A:    Lab Results   Component Value Date    COLORU YELLOW 08/09/2018    PROTEINU 100 08/09/2018    PHUR 7.0 08/09/2018    WBCUA 0 08/09/2018    RBCUA 1 08/09/2018    BACTERIA 4+ 06/20/2017    CLARITYU Clear 08/09/2018    SPECGRAV 1.021 08/09/2018    LEUKOCYTESUR Negative 08/09/2018    UROBILINOGEN 0.2 08/09/2018    BILIRUBINUR Negative 08/09/2018    BLOODU Negative 08/09/2018    GLUCOSEU Negative 08/09/2018           IMPRESSION/RECOMMENDATIONS:      1. MALCOLM: This is probably secondary to contrast nephropathy. Serum creatinine was normal when she had a CTA on 12/25. The other possibility is cardiorenal syndrome    2.  Hyperkalemia: Due  to renal failure plus she was on potassium spironolactone and ARB . Stop these medications and recheck potassium. Still high will use a potassium lowering agent like Lokelma    3. COVID-19    4. Coronary artery disease s/p CABG, has elevated troponin and is being evaluated by cardiology    5. Anemia secondary to possible GI bleed being seen by Dr. Beau Rodriguez    Thank you for allowing me to participate in the care of this patient. I will continue to follow along. Please call with questions.     Victor Manuel Zabala MD, MD  12/28/2021  [facility]

## 2021-12-28 NOTE — PROGRESS NOTES
[address] Daily Progress Note      Admit Date:  12/25/2021    Chief Complaint   Patient presents with    Fatigue     pt states weakness, sob, fever, chills, since last night, vaccinated, no flu shoot/booster, had exposure, pt 84% on RA, placed on 4 LPM via nc        Subjective:  Ms. Rhona Rueda patient is on vapotherm. States she feels \"terrible\" although not worse than yesterday. Was fully COVID-19 vaccinated. Patient sitting straight up in bed, requesting to get moved to chair. No chest pain. +Short of breath.     Objective:   /60   Pulse 67   Temp 99.1 °F (37.3 °C) (Oral)   Resp 20   Ht 4' 11.5\" (1.511 m)   Wt 209 lb 3.2 oz (94.9 kg)   SpO2 97%   BMI 41.55 kg/m²       Intake/Output Summary (Last 24 hours) at 12/28/2021 4321  Last data filed at 12/27/2021 1352  Gross per 24 hour   Intake 240 ml   Output 650 ml   Net -410 ml       TELEMETRY: Sinus     Physical Exam:  General:  Awake, alert, oriented x 3, mild distress on heated high flow NC  Skin:  Warm and dry  Neck:  JVD none  Chest:  Diffuse course breath sounds  Cardiovascular:  RRR S1S2, no S3, no murmur  Abdomen:  Soft, ND, NT, No HSM  Extremities:  No edema    Medications:    enoxaparin  40 mg SubCUTAneous BID    remdesivir IVPB  100 mg IntraVENous Q24H    dexamethasone (DECADRON) IVPB  20 mg IntraVENous Daily    albuterol sulfate HFA  2 puff Inhalation 4x daily    ipratropium  2 puff Inhalation 4x daily    escitalopram  20 mg Oral Daily    ezetimibe  5 mg Oral Nightly    metFORMIN  500 mg Oral BID WC    rosuvastatin  20 mg Oral QPM    gabapentin  400 mg Oral TID    spironolactone  25 mg Oral Daily    pioglitazone  15 mg Oral Daily    Vitamin D  1,000 Units Oral Daily    vitamin C  1,000 mg Oral Daily    estradiol  2 g Vaginal Once per day on Mon Thu    furosemide  40 mg Oral Daily    aspirin  81 mg Oral Daily    amLODIPine  2.5 mg Oral BID    ferrous gluconate  162 mg Oral Daily    potassium chloride  20 mEq Oral Daily    zinc sulfate  50 mg Oral Daily    sodium chloride flush  5-40 mL IntraVENous 2 times per day    cefTRIAXone (ROCEPHIN) IV  1,000 mg IntraVENous Q24H    And    azithromycin  500 mg Oral Q24H    influenza virus vaccine  0.5 mL IntraMUSCular Prior to discharge    cetirizine  10 mg Oral Daily    losartan  100 mg Oral Daily    And    hydroCHLOROthiazide  25 mg Oral Daily    pantoprazole  40 mg Oral QAM AC    isosorbide mononitrate  30 mg Oral Daily      sodium chloride 25 mL (12/27/21 4067)     guaiFENesin-dextromethorphan, albuterol sulfate HFA, perflutren lipid microspheres, ALPRAZolam, nitroGLYCERIN, tiZANidine, metoprolol tartrate, sodium chloride flush, sodium chloride, ondansetron **OR** ondansetron, magnesium hydroxide, acetaminophen **OR** acetaminophen    Lab Data:  CBC:   Recent Labs     12/26/21  0533 12/27/21  0702 12/28/21  0457   WBC 4.9 7.7 3.6*   HGB 7.9* 8.4* 7.0*   HCT 24.4* 26.3* 21.9*   MCV 92.6 92.8 91.9    246 207     BMP:   Recent Labs     12/26/21  0533 12/27/21  0702 12/28/21  0457   * 134* 133*   K 4.7 4.6 5.9*    98* 100   CO2 23 22 22   BUN 35* 46* 58*   CREATININE 1.0 1.6* 1.6*     LIVER PROFILE:   Recent Labs     12/25/21  1638 12/26/21  0533   AST 34 24   ALT 20 18   BILITOT 0.3 <0.2   ALKPHOS 72 61     PT/INR:   Recent Labs     12/25/21  1638   PROTIME 12.7   INR 1.12     APTT:   Recent Labs     12/25/21  1638   APTT 37.3     BNP:  No results for input(s): BNP in the last 72 hours. Assessment/Plan:  NSTEMI  COVID-19  CAD s/p CABG  Permanent atrial fib s/p ablation and PPM    *CAD/NSTEMI  Status     Hx CABG 1999, Elevated troponin but stable  LHC       1/17        Patent grafts  TTE        9/19        55%  Plan        Poor candidate for invasive with anemia, infection, worsening renal function                 Ischemic evaluation once infectious, hematologic and renal issues improve  .   *AFIB  Status     Hx ablation and PPM, stable  Plan AC as tolerated with anemia, especially with coexistent COVID    Other issues per primary medicine service      Harvey Hernandes MD, MD 12/28/2021 9:04 AM

## 2021-12-28 NOTE — PROGRESS NOTES
Physician Progress Note      Alex Frye  CSN #:                  [id number]  :                       1950  ADMIT DATE:       2021 3:52 PM  [address] [place] DATE:  RESPONDING  PROVIDER #:        Margarita Devi MD          QUERY TEXT:    Pt admitted with COVID-19 and noted to have other signs of sepsis, fever,   elevated LA and CRP. If possible, please document in progress notes and/or   discharge summary if you are evaluating and/or treating: The medical record reflects the following:  Risk Factors: Suddenly SOB with Fever  Clinical Indicators: Temp 102.9 on admit, Lactic Acid 2.2 and CRP 24.9 on   admit, WBC down to 3.6 on . Diagnosed with COVID 19, pneumonia and Acute   Resp Failure. Treatment: IV Fluids, IV Rocephin, IV Zithromax, IV Decadron, IV Remdesivir,   IV Actemra  Options provided:  -- Sepsis present on admission due to COVID-19 infection  -- Sepsis not present on admission due to COVID-19 infection  -- Sepsis present on admission due to COVID-19 pneumonia  -- Sepsis not present on admission, but developed during stay, due to COVID-19   pneumonia  -- Covid-19 infection without sepsis  -- Covid-19 pneumonia without sepsis  -- Other - I will add my own diagnosis  -- Disagree - Not applicable / Not valid  -- Disagree - Clinically unable to determine / Unknown  -- Refer to Clinical Documentation Reviewer    PROVIDER RESPONSE TEXT:    This patient has sepsis which was present on admission due to COVID-19   infection.     Query created by: Bonnie Hahn on 2021 12:22 PM      Electronically signed by:  Margarita Devi MD 2021 1:08 PM

## 2021-12-28 NOTE — PROGRESS NOTES
Progress Note - Dr. Dmitry Mullins - Internal Medicine  PCP: Flores Parker, [address] / [facility] [address] [address] Day: 3  Code Status: Full Code  Current Diet: ADULT DIET; Regular        CC: follow up on medical issues    Subjective:   Zafar Crouch is a 80 y.o. female. Pt seen and examined  Chart reviewed since last visit, labs and imaging below    Now on 3T  Code/rapid called last night  Appreciate the hospitalist's assistance  Pt now on heated 40L 88%      Trop elevated  Cards on board  No plans for immediate ischemic workup, though this will be indicated if/when pt is more stable      She denies chest pain, denies shortness of breath, denies nausea,  denies emesis. 10 system Review of Systems is reviewed with patient, and pertinent positives are noted in HPI above . Otherwise, Review of systems is negative. I have reviewed the patient's medical and social history in detail and updated the computerized patient record. To recap: She  has a past medical history of Anemia, Anesthesia, Anesthesia complication, Atrial fibrillation ([address] CAD (coronary artery disease), Chest pain, Chronic kidney disease, Depression, GERD (gastroesophageal reflux disease), Glaucoma, Hiatal hernia, Hyperlipidemia, Hypertension, Migraines, neuralgic, Morbid obesity ([address] Osteoarthritis, Sleep apnea, Type II or unspecified type diabetes mellitus without mention of complication, not stated as uncontrolled, Unspecified sleep apnea, Urinary incontinence, and UTI (urinary tract infection). . She  has a past surgical history that includes Coronary artery bypass graft (1999); Hammer toe surgery; Ovary removal (Bilateral, 1997); Esophagus dilation; Breast lumpectomy; Knee arthroscopy; Gastric Band (12/20/11); Colonoscopy; Upper gastrointestinal endoscopy; Upper gastrointestinal endoscopy (10/30/15);  Cosmetic surgery; joint replacement (1995); joint replacement (1995); orif humerus decompression (Left, 2/25/2016); Upper gastrointestinal endoscopy (10/14/2016); Bariatric Surgery (11/03/2016); ablation of dysrhythmic focus; Colonoscopy (10/08/2018); Colonoscopy (N/A, 10/8/2018); pr njx dx/ther sbst intrlmnr crv/thrc w/img gdn (N/A, 11/5/2018); Cardiac surgery (2/22/1999); Cardiac catheterization; Injection Procedure For Sacroiliac Joint (Left, 12/17/2018); Enteroscopy (N/A, 2/26/2019); ventral hernia repair (N/A, 10/29/2019); and Abdomen surgery (N/A, 9/1/2020). . She  reports that she has never smoked. She has never used smokeless tobacco. She reports that she does not drink alcohol and does not use drugs. .        Active Hospital Problems    Diagnosis Date Noted    COVID-19 [U07.1] 12/27/2021    NSTEMI (non-ST elevated myocardial infarction) ([facility] [address] [I21.4] 12/25/2021    Pneumonia [J18.9] 12/25/2021    Acute respiratory failure with hypoxia (HCC) [J96.01] 12/25/2021    HTN (hypertension) [I10] 12/25/2021       Current Facility-Administered Medications: enoxaparin (LOVENOX) injection 40 mg, 40 mg, SubCUTAneous, BID  [COMPLETED] remdesivir 200 mg in sodium chloride 0.9 % 250 mL IVPB, 200 mg, IntraVENous, Once **FOLLOWED BY** remdesivir 100 mg in sodium chloride 0.9 % 250 mL IVPB, 100 mg, IntraVENous, Q24H  guaiFENesin-dextromethorphan (ROBITUSSIN DM) 100-10 MG/5ML syrup 10 mL, 10 mL, Oral, Q4H PRN  dexamethasone (DECADRON) 20 mg in sodium chloride 0.9 % IVPB, 20 mg, IntraVENous, Daily  albuterol sulfate  (90 Base) MCG/ACT inhaler 2 puff, 2 puff, Inhalation, 4x daily  ipratropium (ATROVENT HFA) 17 MCG/ACT inhaler 2 puff, 2 puff, Inhalation, 4x daily  albuterol sulfate  (90 Base) MCG/ACT inhaler 2 puff, 2 puff, Inhalation, Q4H PRN  perflutren lipid microspheres (DEFINITY) injection 1.65 mg, 1.5 mL, IntraVENous, ONCE PRN  ALPRAZolam (XANAX) tablet 0.25 mg, 0.25 mg, Oral, BID PRN  escitalopram (LEXAPRO) tablet 20 mg, 20 mg, Oral, Daily  nitroGLYCERIN (NITROLINGUAL) 0.4 mg spray 1 spray, 1 spray, SubLINGual, Q5 Min PRN  ezetimibe (ZETIA) tablet 5 mg - PATIENT SUPPLIED, 5 mg, Oral, Nightly  metFORMIN (GLUCOPHAGE) tablet 500 mg, 500 mg, Oral, BID WC  rosuvastatin (CRESTOR) tablet 20 mg, 20 mg, Oral, QPM  gabapentin (NEURONTIN) capsule 400 mg, 400 mg, Oral, TID  spironolactone (ALDACTONE) tablet 25 mg, 25 mg, Oral, Daily  pioglitazone (ACTOS) tablet 15 mg, 15 mg, Oral, Daily  vitamin D (CHOLECALCIFEROL) tablet 1,000 Units, 1,000 Units, Oral, Daily  ascorbic acid (VITAMIN C) tablet 1,000 mg, 1,000 mg, Oral, Daily  estradiol (ESTRACE) vaginal cream 2 g, 2 g, Vaginal, Once per day on Mon Thu  furosemide (LASIX) tablet 40 mg, 40 mg, Oral, Daily  aspirin EC tablet 81 mg, 81 mg, Oral, Daily  tiZANidine (ZANAFLEX) tablet 4 mg, 4 mg, Oral, Nightly PRN  metoprolol tartrate (LOPRESSOR) tablet 12.5 mg, 12.5 mg, Oral, PRN  amLODIPine (NORVASC) tablet 2.5 mg, 2.5 mg, Oral, BID  ferrous gluconate 324 (37.5 Fe) MG tablet 162 mg, 162 mg, Oral, Daily  potassium chloride (KLOR-CON M) extended release tablet 20 mEq, 20 mEq, Oral, Daily  zinc sulfate (ZINCATE) capsule 50 mg, 50 mg, Oral, Daily  sodium chloride flush 0.9 % injection 5-40 mL, 5-40 mL, IntraVENous, 2 times per day  sodium chloride flush 0.9 % injection 10 mL, 10 mL, IntraVENous, PRN  0.9 % sodium chloride infusion, 25 mL, IntraVENous, PRN  ondansetron (ZOFRAN-ODT) disintegrating tablet 4 mg, 4 mg, Oral, Q8H PRN **OR** ondansetron (ZOFRAN) injection 4 mg, 4 mg, IntraVENous, Q6H PRN  magnesium hydroxide (MILK OF MAGNESIA) 400 MG/5ML suspension 30 mL, 30 mL, Oral, Daily PRN  acetaminophen (TYLENOL) tablet 650 mg, 650 mg, Oral, Q6H PRN **OR** acetaminophen (TYLENOL) suppository 650 mg, 650 mg, Rectal, Q6H PRN  cefTRIAXone (ROCEPHIN) 1000 mg in sterile water 10 mL IV syringe, 1,000 mg, IntraVENous, Q24H **AND** azithromycin (ZITHROMAX) tablet 500 mg, 500 mg, Oral, Q24H  influenza quadrivalent split vaccine (FLUZONE;FLUARIX;FLULAVAL;AFLURIA) injection 0.5 mL, 0.5 mL, IntraMUSCular, Prior to discharge  cetirizine (ZYRTEC) tablet 10 mg, 10 mg, Oral, Daily  losartan (COZAAR) tablet 100 mg, 100 mg, Oral, Daily **AND** hydroCHLOROthiazide (HYDRODIURIL) tablet 25 mg, 25 mg, Oral, Daily  pantoprazole (PROTONIX) tablet 40 mg, 40 mg, Oral, QAM AC  isosorbide mononitrate (IMDUR) extended release tablet 30 mg, 30 mg, Oral, Daily         Objective:  /60   Pulse 67   Temp 99.1 °F (37.3 °C) (Oral)   Resp 20   Ht 4' 11.5\" (1.511 m)   Wt 209 lb 3.2 oz (94.9 kg)   SpO2 97%   BMI 41.55 kg/m²      Patient Vitals for the past 24 hrs:   BP Temp Temp src Pulse Resp SpO2   12/28/21 0841 -- -- -- -- 20 97 %   12/28/21 0730 124/60 99.1 °F (37.3 °C) Oral 67 20 96 %   12/28/21 0604 -- -- -- -- 20 92 %   12/28/21 0544 -- -- -- -- 22 91 %   12/28/21 0410 133/81 97.9 °F (36.6 °C) Axillary 73 (!) 34 96 %   12/28/21 0151 -- -- -- -- (!) 34 97 %   12/27/21 2345 (!) 175/68 99.3 °F (37.4 °C) Oral 91 (!) 36 97 %   12/27/21 2315 (!) 172/62 -- -- -- -- (!) 79 %   12/27/21 2130 135/63 99.8 °F (37.7 °C) Oral 61 18 91 %   12/27/21 2012 -- -- -- -- 18 94 %   12/27/21 1734 117/66 100.4 °F (38 °C) Oral 60 18 93 %   12/27/21 1616 -- -- -- -- 18 96 %   12/27/21 1348 118/66 98.8 °F (37.1 °C) Oral 75 18 94 %   12/27/21 1221 -- -- -- -- 18 93 %   12/27/21 0907 -- -- -- -- -- (!) 88 %   12/27/21 0903 (!) 165/86 99.3 °F (37.4 °C) Axillary 86 20 (!) 82 %     Patient Vitals for the past 96 hrs (Last 3 readings):   Weight   12/25/21 2215 209 lb 3.2 oz (94.9 kg)   12/25/21 1603 211 lb (95.7 kg)           Intake/Output Summary (Last 24 hours) at 12/28/2021 0844  Last data filed at 12/27/2021 1352  Gross per 24 hour   Intake 240 ml   Output 650 ml   Net -410 ml         Physical Exam:   Vitals as above  General appearance: alert, appears stated age and cooperative    Head: Normocephalic, without obvious abnormality, atraumatic    Lungs: crackles bilat  Heart: regular rate and rhythm, S1, S2 normal, no murmur    Abdomen: soft, non-tender; bowel sounds normal; no masses, no organomegaly    Extremities: extremities normal, atraumatic, no cyanosis, no edema      Labs:  Lab Results   Component Value Date    WBC 3.6 (L) 12/28/2021    HGB 7.0 (L) 12/28/2021    HCT 21.9 (L) 12/28/2021     12/28/2021    CHOL 109 05/22/2020    TRIG 132 05/22/2020    HDL 36 (L) 05/22/2020    LDLDIRECT 90 01/18/2017    ALT 18 12/26/2021    AST 24 12/26/2021     (L) 12/28/2021    K 5.9 (H) 12/28/2021     12/28/2021    CREATININE 1.6 (H) 12/28/2021    BUN 58 (H) 12/28/2021    CO2 22 12/28/2021    TSH 1.95 05/22/2020    INR 1.12 12/25/2021    LABA1C 7.2 06/04/2020    LABMICR YES 08/09/2018     Lab Results   Component Value Date    CKTOTAL 157 01/17/2017    TROPONINI 0.72 ([facility]) 12/27/2021       Recent Imaging Results are Reviewed:  XR CHEST PORTABLE    Result Date: 12/26/2021  EXAMINATION: ONE XRAY VIEW OF THE CHEST 12/26/2021 1:17 pm COMPARISON: 12/25/2021 HISTORY: ORDERING SYSTEM PROVIDED HISTORY: pneumonia TECHNOLOGIST PROVIDED HISTORY: Reason for exam:->pneumonia Reason for Exam: pneumonia FINDINGS: Generalized cardiomegaly is noted. Dense alveolar consolidation in the right upper lobe along the minor fissure is unchanged. There is some hazy parenchymal lower lobe opacity bilaterally. There is a left dual lead pacemaker. Postop changes in the proximal left humerus. Persistent dense alveolar consolidation in the right upper lobe compatible with pneumonia. Cardiomegaly and mild pulmonary venous hypertension. XR CHEST PORTABLE    Result Date: 12/25/2021  EXAMINATION: ONE XRAY VIEW OF THE CHEST 12/25/2021 4:18 pm COMPARISON: 2019 comparison HISTORY: ORDERING SYSTEM PROVIDED HISTORY: SOB TECHNOLOGIST PROVIDED HISTORY: Reason for exam:->SOB FINDINGS: Mild cardiomegaly. Cardiac pacemaker leads in stable/satisfactory position with no evidence of pneumothorax. Large consolidation identified in the right mid lung.   Additional moderate diffuse infiltrates and/or congestion identified in the lungs. Significant osteopenic changes and degenerative changes noted in the bony structures. Findings suspicious for bilateral infiltrates in the lungs with large consolidation in the right mid lung. Underlying right lung mass difficult to exclude. CT of the chest may be obtained for further clarification. CT CHEST PULMONARY EMBOLISM W CONTRAST    Result Date: 12/25/2021  EXAMINATION: CTA OF THE CHEST 12/25/2021 5:46 pm TECHNIQUE: CTA of the chest was performed after the administration of intravenous contrast.  Multiplanar reformatted images are provided for review. MIP images are provided for review. Dose modulation, iterative reconstruction, and/or weight based adjustment of the mA/kV was utilized to reduce the radiation dose to as low as reasonably achievable. COMPARISON: 09/05/2018. Chest x-ray performed earlier today. HISTORY: ORDERING SYSTEM PROVIDED HISTORY: SOB/fever/abnormal chest xray TECHNOLOGIST PROVIDED HISTORY: Reason for exam:->SOB/fever/abnormal chest xray Decision Support Exception - unselect if not a suspected or confirmed emergency medical condition->Emergency Medical Condition (MA) Reason for Exam: SOB/fever/abnormal chest xray FINDINGS: Pulmonary Arteries: Pulmonary arteries are adequately opacified for evaluation. No evidence of intraluminal filling defect to suggest pulmonary embolism. Main pulmonary artery is normal in caliber. Mediastinum: The thoracic aorta is normal in caliber with calcified atherosclerotic plaque. Cardiomegaly with coronary vascular calcification and previous median sternotomy. No pericardial effusion. Pacer leads are in place. Multiple mediastinal nodes are not enlarged by size criteria. The esophagus is unremarkable. Lungs/pleura: Dense consolidation with air bronchograms in the posterior segment of the right upper lobe.   Patchy infiltrate is noted elsewhere within the right upper lobe as well as in the right middle lobe and superior segment of the right lower lobe. The left lung is clear. Dependent atelectasis in the lower lobes bilaterally. Trace right pleural effusion. The central airways are patent. Upper Abdomen: The visualized upper abdominal viscera are unremarkable. There is a stable 2.3 cm left adrenal myelolipoma. Soft Tissues/Bones: No acute bone or soft tissue abnormality. 1. No evidence of pulmonary embolic disease. 2. Consolidation in the right upper lobe consistent with pneumonia. The findings are more typical of bacterial etiology and are not consistent with COVID-19 pneumonia. Additional areas of patchy infiltrate in the right middle lobe and superior segment of the right lower lobe. Continued plain film follow-up recommended to ensure clearing. 3. Trace right pleural effusion. Mild bibasilar atelectasis. 4. Cardiomegaly. Coronary vascular calcification. RECOMMENDATIONS: Plain film follow-up recommended to ensure clearing. Repeat PA and lateral chest x-ray in 1-2 months following therapy would be reasonable. Lab Results   Component Value Date    GLUCOSE 189 12/28/2021    GLUCOSE 143 09/23/2011     Lab Results   Component Value Date    POCGLU 225 12/28/2021     /60   Pulse 67   Temp 99.1 °F (37.3 °C) (Oral)   Resp 20   Ht 4' 11.5\" (1.511 m)   Wt 209 lb 3.2 oz (94.9 kg)   SpO2 97%   BMI 41.55 kg/m²     Assessment and Plan:  Principal Problem:    Acute respiratory failure due to severe acute respiratory syndrome coronavirus 2 (SARS-CoV-2) infection ([facility] [address] -Established problem. Uncontrolled. Now on heated o2. Transferred to stepdown  Plan: cont o2 support, cont iv remdesivir. Got actemra 12/27. Pt is full code; if she decompensates, icu transfer and intubation would be required   NSTEMI (non-ST elevated myocardial infarction) ([facility] [address] -Established problem. Stable. Trop trending up  Plan: will trend.   Cards considering ischemic workup if/when pt stabilizes  Active Problems:    Pneumonia -Established problem. Stable. 2/2 covid  Plan: cont steroids, will see if she is able to get remdesivir    Acute respiratory failure with hypoxia ([address] -Established problem. Worse. Now on heated o2, 40L, 88%  Plan: as above    COVID-19 virus infection -Established problem. Stable. Plan: Actemra given x1 12/27. On Remdesivir, day 2    HTN (hypertension) -Established problem. Stable.   124/60  Plan: stay on same meds    Total critical care time caring for this patient with life threatening illness, including direct patient contact, management of life support systems, review of data including imaging and labs, discussions with other team members and physicians at least 33 minutes today          (Please note that portions of this note were completed with a voice recognition program.  Efforts were made to edit the dictations but occasionally words are mis-transcribed.)        Alexsandra Liao MD  12/28/2021

## 2021-12-29 PROBLEM — K92.1 MELENA: Status: ACTIVE | Noted: 2021-12-29

## 2021-12-29 LAB
ANION GAP SERPL CALCULATED.3IONS-SCNC: 14 MMOL/L (ref 3–16)
BASOPHILS ABSOLUTE: 0 K/UL (ref 0–0.2)
BASOPHILS RELATIVE PERCENT: 0.2 %
BLOOD CULTURE, ROUTINE: NORMAL
BUN BLDV-MCNC: 65 MG/DL (ref 7–20)
C-REACTIVE PROTEIN: 88.5 MG/L (ref 0–5.1)
CALCIUM SERPL-MCNC: 9.1 MG/DL (ref 8.3–10.6)
CHLORIDE BLD-SCNC: 99 MMOL/L (ref 99–110)
CO2: 20 MMOL/L (ref 21–32)
CREAT SERPL-MCNC: 1.4 MG/DL (ref 0.6–1.2)
CULTURE, BLOOD 2: NORMAL
EOSINOPHILS ABSOLUTE: 0 K/UL (ref 0–0.6)
EOSINOPHILS RELATIVE PERCENT: 0 %
ESTIMATED AVERAGE GLUCOSE: 131.2 MG/DL
FERRITIN: 609 NG/ML (ref 15–150)
FOLATE: 9.12 NG/ML (ref 4.78–24.2)
GFR AFRICAN AMERICAN: 45
GFR NON-AFRICAN AMERICAN: 37
GLUCOSE BLD-MCNC: 189 MG/DL (ref 70–99)
GLUCOSE BLD-MCNC: 191 MG/DL (ref 70–99)
GLUCOSE BLD-MCNC: 240 MG/DL (ref 70–99)
GLUCOSE BLD-MCNC: 283 MG/DL (ref 70–99)
GLUCOSE BLD-MCNC: 305 MG/DL (ref 70–99)
HBA1C MFR BLD: 6.2 %
HCT VFR BLD CALC: 24.5 % (ref 36–48)
HCT VFR BLD CALC: 25.7 % (ref 36–48)
HCT VFR BLD CALC: 26.6 % (ref 36–48)
HCT VFR BLD CALC: 26.7 % (ref 36–48)
HEMOGLOBIN: 7.9 G/DL (ref 12–16)
HEMOGLOBIN: 8.3 G/DL (ref 12–16)
HEMOGLOBIN: 8.7 G/DL (ref 12–16)
HEMOGLOBIN: 8.8 G/DL (ref 12–16)
IRON SATURATION: 17 % (ref 15–50)
IRON: 33 UG/DL (ref 37–145)
LYMPHOCYTES ABSOLUTE: 0.7 K/UL (ref 1–5.1)
LYMPHOCYTES RELATIVE PERCENT: 20.6 %
MCH RBC QN AUTO: 29.2 PG (ref 26–34)
MCHC RBC AUTO-ENTMCNC: 32.5 G/DL (ref 31–36)
MCV RBC AUTO: 89.8 FL (ref 80–100)
MONOCYTES ABSOLUTE: 0.4 K/UL (ref 0–1.3)
MONOCYTES RELATIVE PERCENT: 10.4 %
NEUTROPHILS ABSOLUTE: 2.5 K/UL (ref 1.7–7.7)
NEUTROPHILS RELATIVE PERCENT: 68.8 %
PDW BLD-RTO: 15.8 % (ref 12.4–15.4)
PERFORMED ON: ABNORMAL
PLATELET # BLD: 235 K/UL (ref 135–450)
PMV BLD AUTO: 8.8 FL (ref 5–10.5)
POTASSIUM SERPL-SCNC: 4.8 MMOL/L (ref 3.5–5.1)
RBC # BLD: 2.86 M/UL (ref 4–5.2)
SODIUM BLD-SCNC: 133 MMOL/L (ref 136–145)
TOTAL IRON BINDING CAPACITY: 195 UG/DL (ref 260–445)
TROPONIN: 0.31 NG/ML
VITAMIN B-12: 1541 PG/ML (ref 211–911)
WBC # BLD: 3.6 K/UL (ref 4–11)

## 2021-12-29 PROCEDURE — 36415 COLL VENOUS BLD VENIPUNCTURE: CPT

## 2021-12-29 PROCEDURE — 94761 N-INVAS EAR/PLS OXIMETRY MLT: CPT

## 2021-12-29 PROCEDURE — 86140 C-REACTIVE PROTEIN: CPT

## 2021-12-29 PROCEDURE — 2060000000 HC ICU INTERMEDIATE R&B

## 2021-12-29 PROCEDURE — 85025 COMPLETE CBC W/AUTO DIFF WBC: CPT

## 2021-12-29 PROCEDURE — 82746 ASSAY OF FOLIC ACID SERUM: CPT

## 2021-12-29 PROCEDURE — 84484 ASSAY OF TROPONIN QUANT: CPT

## 2021-12-29 PROCEDURE — 2500000003 HC RX 250 WO HCPCS: Performed by: INTERNAL MEDICINE

## 2021-12-29 PROCEDURE — 85014 HEMATOCRIT: CPT

## 2021-12-29 PROCEDURE — 94640 AIRWAY INHALATION TREATMENT: CPT

## 2021-12-29 PROCEDURE — 2580000003 HC RX 258: Performed by: INTERNAL MEDICINE

## 2021-12-29 PROCEDURE — 94660 CPAP INITIATION&MGMT: CPT

## 2021-12-29 PROCEDURE — 2700000000 HC OXYGEN THERAPY PER DAY

## 2021-12-29 PROCEDURE — 84155 ASSAY OF PROTEIN SERUM: CPT

## 2021-12-29 PROCEDURE — 51702 INSERT TEMP BLADDER CATH: CPT

## 2021-12-29 PROCEDURE — 85018 HEMOGLOBIN: CPT

## 2021-12-29 PROCEDURE — 6370000000 HC RX 637 (ALT 250 FOR IP): Performed by: INTERNAL MEDICINE

## 2021-12-29 PROCEDURE — 82728 ASSAY OF FERRITIN: CPT

## 2021-12-29 PROCEDURE — 83550 IRON BINDING TEST: CPT

## 2021-12-29 PROCEDURE — 84165 PROTEIN E-PHORESIS SERUM: CPT

## 2021-12-29 PROCEDURE — 82607 VITAMIN B-12: CPT

## 2021-12-29 PROCEDURE — 6360000002 HC RX W HCPCS: Performed by: INTERNAL MEDICINE

## 2021-12-29 PROCEDURE — C9113 INJ PANTOPRAZOLE SODIUM, VIA: HCPCS | Performed by: INTERNAL MEDICINE

## 2021-12-29 PROCEDURE — 80048 BASIC METABOLIC PNL TOTAL CA: CPT

## 2021-12-29 PROCEDURE — 83540 ASSAY OF IRON: CPT

## 2021-12-29 RX ORDER — SUMATRIPTAN 25 MG/1
100 TABLET, FILM COATED ORAL DAILY PRN
Status: DISCONTINUED | OUTPATIENT
Start: 2021-12-29 | End: 2022-01-02 | Stop reason: HOSPADM

## 2021-12-29 RX ADMIN — ISOSORBIDE MONONITRATE 30 MG: 30 TABLET, EXTENDED RELEASE ORAL at 08:36

## 2021-12-29 RX ADMIN — Medication 10 ML: at 22:42

## 2021-12-29 RX ADMIN — ROSUVASTATIN CALCIUM 20 MG: 20 TABLET, FILM COATED ORAL at 17:13

## 2021-12-29 RX ADMIN — Medication 50 MG: at 08:36

## 2021-12-29 RX ADMIN — SODIUM CHLORIDE 25 ML: 9 INJECTION, SOLUTION INTRAVENOUS at 14:00

## 2021-12-29 RX ADMIN — REMDESIVIR 100 MG: 100 INJECTION, POWDER, LYOPHILIZED, FOR SOLUTION INTRAVENOUS at 09:51

## 2021-12-29 RX ADMIN — ACETAMINOPHEN 650 MG: 325 TABLET ORAL at 09:43

## 2021-12-29 RX ADMIN — Medication 2 PUFF: at 16:35

## 2021-12-29 RX ADMIN — ALPRAZOLAM 0.25 MG: 0.5 TABLET ORAL at 22:41

## 2021-12-29 RX ADMIN — PANTOPRAZOLE SODIUM 40 MG: 40 INJECTION, POWDER, FOR SOLUTION INTRAVENOUS at 08:45

## 2021-12-29 RX ADMIN — ENOXAPARIN SODIUM 40 MG: 100 INJECTION SUBCUTANEOUS at 22:42

## 2021-12-29 RX ADMIN — SODIUM CHLORIDE 25 ML: 9 INJECTION, SOLUTION INTRAVENOUS at 09:50

## 2021-12-29 RX ADMIN — INSULIN LISPRO 3 UNITS: 100 INJECTION, SOLUTION INTRAVENOUS; SUBCUTANEOUS at 22:43

## 2021-12-29 RX ADMIN — Medication 2 PUFF: at 21:25

## 2021-12-29 RX ADMIN — ASPIRIN 81 MG: 81 TABLET, COATED ORAL at 08:36

## 2021-12-29 RX ADMIN — AMLODIPINE BESYLATE 2.5 MG: 5 TABLET ORAL at 08:36

## 2021-12-29 RX ADMIN — AMLODIPINE BESYLATE 2.5 MG: 5 TABLET ORAL at 22:41

## 2021-12-29 RX ADMIN — Medication 1000 UNITS: at 08:36

## 2021-12-29 RX ADMIN — ESCITALOPRAM OXALATE 20 MG: 10 TABLET ORAL at 08:36

## 2021-12-29 RX ADMIN — INSULIN LISPRO 2 UNITS: 100 INJECTION, SOLUTION INTRAVENOUS; SUBCUTANEOUS at 09:41

## 2021-12-29 RX ADMIN — DEXAMETHASONE SODIUM PHOSPHATE 20 MG: 4 INJECTION, SOLUTION INTRA-ARTICULAR; INTRALESIONAL; INTRAMUSCULAR; INTRAVENOUS; SOFT TISSUE at 14:00

## 2021-12-29 RX ADMIN — CETIRIZINE HYDROCHLORIDE 10 MG: 10 TABLET, FILM COATED ORAL at 08:36

## 2021-12-29 RX ADMIN — INSULIN LISPRO 4 UNITS: 100 INJECTION, SOLUTION INTRAVENOUS; SUBCUTANEOUS at 18:05

## 2021-12-29 RX ADMIN — PANTOPRAZOLE SODIUM 40 MG: 40 INJECTION, POWDER, FOR SOLUTION INTRAVENOUS at 22:42

## 2021-12-29 RX ADMIN — GABAPENTIN 400 MG: 400 CAPSULE ORAL at 22:41

## 2021-12-29 RX ADMIN — ENOXAPARIN SODIUM 40 MG: 100 INJECTION SUBCUTANEOUS at 08:36

## 2021-12-29 RX ADMIN — Medication 1000 MG: at 22:42

## 2021-12-29 RX ADMIN — PIOGLITAZONE 15 MG: 15 TABLET ORAL at 08:36

## 2021-12-29 RX ADMIN — Medication 2 PUFF: at 09:26

## 2021-12-29 RX ADMIN — Medication 162 MG: at 08:36

## 2021-12-29 RX ADMIN — SUMATRIPTAN SUCCINATE 100 MG: 25 TABLET, FILM COATED ORAL at 17:34

## 2021-12-29 RX ADMIN — GABAPENTIN 400 MG: 400 CAPSULE ORAL at 14:01

## 2021-12-29 RX ADMIN — Medication 10 ML: at 08:46

## 2021-12-29 RX ADMIN — GABAPENTIN 400 MG: 400 CAPSULE ORAL at 08:36

## 2021-12-29 RX ADMIN — FUROSEMIDE 40 MG: 40 TABLET ORAL at 08:36

## 2021-12-29 RX ADMIN — OXYCODONE HYDROCHLORIDE AND ACETAMINOPHEN 1000 MG: 500 TABLET ORAL at 08:36

## 2021-12-29 RX ADMIN — INSULIN LISPRO 8 UNITS: 100 INJECTION, SOLUTION INTRAVENOUS; SUBCUTANEOUS at 12:49

## 2021-12-29 RX ADMIN — HYDROCHLOROTHIAZIDE 25 MG: 25 TABLET ORAL at 08:35

## 2021-12-29 ASSESSMENT — PAIN SCALES - GENERAL
PAINLEVEL_OUTOF10: 3
PAINLEVEL_OUTOF10: 0
PAINLEVEL_OUTOF10: 0
PAINLEVEL_OUTOF10: 5
PAINLEVEL_OUTOF10: 0

## 2021-12-29 NOTE — PROGRESS NOTES
unless overtly bleeding and not responding to blood transfusions  hematology consult for h/o MDS    Discussed with Dr. Chiquis Rayo PA-C  [facility]    I have personally performed a face to face diagnostic evaluation on this patient. I have interviewed and examined the patient and I agree with the findings and recommended plan of care. In summary, my findings and plan are the following:     She is requiring a lot of supplemental oxygen. No BM today. After 1 U pRBC, Hb 7.1 -->8.3-->8.7. Continue to monitor H/H, transfuse to keep Hb >8 due to h/o CAD. No plans for EGD unless overtly bleeding and not responding to blood transfusions. Empiric PPI.      Mayuri Bhandari MD, MSc  [facility]  12/29/21

## 2021-12-29 NOTE — PROGRESS NOTES
Pt moved back to bed, oxygen saturation dropped to 83%- with pt on 15 liters high flow.  Respiratory paged to place pt on bipap

## 2021-12-29 NOTE — PROGRESS NOTES
Progress Note - Dr. Kenna Chairez - Internal Medicine  PCP: Luke Hussein Lincoln, [address]  / [facility] [id number] Nish Serrano 1 Day: 4  Code Status: Full Code  Current Diet: ADULT DIET; Regular        CC: follow up on medical issues    Subjective:   Cam Scott is a 80 y.o. female. Pt seen and examined  Chart reviewed since last visit, labs and imaging below    Remains  on 3T  Pt now on 15L high flow o2    Trop elevated; Cards on board  No plans for immediate ischemic workup, though this will be indicated if/when pt is more stable    Pt with melena noted, 12/28  GI consulted - placed on PPI. No emergent scope per Dr Rey Ill    She denies chest pain, denies shortness of breath, denies nausea,  denies emesis. 10 system Review of Systems is reviewed with patient, and pertinent positives are noted in HPI above . Otherwise, Review of systems is negative. I have reviewed the patient's medical and social history in detail and updated the computerized patient record. To recap: She  has a past medical history of Anemia, Anesthesia, Anesthesia complication, Atrial fibrillation ([address] CAD (coronary artery disease), Chest pain, Chronic kidney disease, Depression, GERD (gastroesophageal reflux disease), Glaucoma, Hiatal hernia, Hyperlipidemia, Hypertension, Migraines, neuralgic, Morbid obesity ([address] Osteoarthritis, Sleep apnea, Type II or unspecified type diabetes mellitus without mention of complication, not stated as uncontrolled, Unspecified sleep apnea, Urinary incontinence, and UTI (urinary tract infection). . She  has a past surgical history that includes Coronary artery bypass graft (1999); Hammer toe surgery; Ovary removal (Bilateral, 1997); Esophagus dilation; Breast lumpectomy; Knee arthroscopy; Gastric Band (12/20/11); Colonoscopy; Upper gastrointestinal endoscopy; Upper gastrointestinal endoscopy (10/30/15);  Cosmetic surgery; joint replacement (1995); joint replacement (1995); orif humerus decompression (Left, 2/25/2016); Upper gastrointestinal endoscopy (10/14/2016); Bariatric Surgery (11/03/2016); ablation of dysrhythmic focus; Colonoscopy (10/08/2018); Colonoscopy (N/A, 10/8/2018); pr njx dx/ther sbst intrlmnr crv/thrc w/img gdn (N/A, 11/5/2018); Cardiac surgery (2/22/1999); Cardiac catheterization; Injection Procedure For Sacroiliac Joint (Left, 12/17/2018); Enteroscopy (N/A, 2/26/2019); ventral hernia repair (N/A, 10/29/2019); and Abdomen surgery (N/A, 9/1/2020). . She  reports that she has never smoked. She has never used smokeless tobacco. She reports that she does not drink alcohol and does not use drugs. .        Active Hospital Problems    Diagnosis Date Noted    Melena [K92.1] 12/29/2021    Acute respiratory failure due to severe acute respiratory syndrome coronavirus 2 (SARS-CoV-2) infection (HCC) [U07.1, J96.00] 12/28/2021    MALCOLM (acute kidney injury) ([facility] [N17.9] 12/28/2021    COVID-19 [U07.1] 12/27/2021    [facility]STEM[facility] (non-ST elevated [facility]) [facility] [I21.4] 12/25/2021    Pneumonia [J18.9] 12/25/2021    Acute respiratory failure with hypoxia (HCC) [J96.01] 12/25/2021    HTN (hypertension) [I10] 12/25/2021    Anemia [D64.9] 10/07/2018       Current Facility-Administered Medications: insulin lispro (1 Unit Dial) 0-12 Units, 0-12 Units, SubCUTAneous, TID WC  insulin lispro (1 Unit Dial) 0-6 Units, 0-6 Units, SubCUTAneous, Nightly  glucose (GLUTOSE) 40 % oral gel 15 g, 15 g, Oral, PRN  dextrose 50 % IV solution, 12.5 g, IntraVENous, PRN  glucagon (rDNA) injection 1 mg, 1 mg, IntraMUSCular, PRN  dextrose 5 % solution, 100 mL/hr, IntraVENous, PRN  pantoprazole (PROTONIX) injection 40 mg, 40 mg, IntraVENous, BID  0.9 % sodium chloride infusion, , IntraVENous, PRN  ipratropium (ATROVENT HFA) 17 MCG/ACT inhaler 2 puff, 2 puff, Inhalation, TID  enoxaparin (LOVENOX) injection 40 mg, 40 mg, SubCUTAneous, BID  [COMPLETED] remdesivir 200 mg in sodium chloride 0.9 % 250 mL IVPB, 200 mg, IntraVENous, Once **FOLLOWED BY** remdesivir 100 mg in sodium chloride 0.9 % 250 mL IVPB, 100 mg, IntraVENous, Q24H  guaiFENesin-dextromethorphan (ROBITUSSIN DM) 100-10 MG/5ML syrup 10 mL, 10 mL, Oral, Q4H PRN  dexamethasone (DECADRON) 20 mg in sodium chloride 0.9 % IVPB, 20 mg, IntraVENous, Daily  albuterol sulfate  (90 Base) MCG/ACT inhaler 2 puff, 2 puff, Inhalation, Q4H PRN  perflutren lipid microspheres (DEFINITY) injection 1.65 mg, 1.5 mL, IntraVENous, ONCE PRN  ALPRAZolam (XANAX) tablet 0.25 mg, 0.25 mg, Oral, BID PRN  escitalopram (LEXAPRO) tablet 20 mg, 20 mg, Oral, Daily  nitroGLYCERIN (NITROLINGUAL) 0.4 mg spray 1 spray, 1 spray, SubLINGual, Q5 Min PRN  ezetimibe (ZETIA) tablet 5 mg - PATIENT SUPPLIED, 5 mg, Oral, Nightly  rosuvastatin (CRESTOR) tablet 20 mg, 20 mg, Oral, QPM  gabapentin (NEURONTIN) capsule 400 mg, 400 mg, Oral, TID  pioglitazone (ACTOS) tablet 15 mg, 15 mg, Oral, Daily  vitamin D (CHOLECALCIFEROL) tablet 1,000 Units, 1,000 Units, Oral, Daily  ascorbic acid (VITAMIN C) tablet 1,000 mg, 1,000 mg, Oral, Daily  estradiol (ESTRACE) vaginal cream 2 g, 2 g, Vaginal, Once per day on Mon Thu  furosemide (LASIX) tablet 40 mg, 40 mg, Oral, Daily  aspirin EC tablet 81 mg, 81 mg, Oral, Daily  tiZANidine (ZANAFLEX) tablet 4 mg, 4 mg, Oral, Nightly PRN  metoprolol tartrate (LOPRESSOR) tablet 12.5 mg, 12.5 mg, Oral, PRN  amLODIPine (NORVASC) tablet 2.5 mg, 2.5 mg, Oral, BID  ferrous gluconate 324 (37.5 Fe) MG tablet 162 mg, 162 mg, Oral, Daily  zinc sulfate (ZINCATE) capsule 50 mg, 50 mg, Oral, Daily  sodium chloride flush 0.9 % injection 5-40 mL, 5-40 mL, IntraVENous, 2 times per day  sodium chloride flush 0.9 % injection 10 mL, 10 mL, IntraVENous, PRN  0.9 % sodium chloride infusion, 25 mL, IntraVENous, PRN  ondansetron (ZOFRAN-ODT) disintegrating tablet 4 mg, 4 mg, Oral, Q8H PRN **OR** ondansetron (ZOFRAN) injection 4 mg, 4 mg, IntraVENous, Q6H PRN  magnesium hydroxide (MILK OF MAGNESIA) 400 MG/5ML suspension 30 mL, 30 mL, Oral, Daily PRN  acetaminophen (TYLENOL) tablet 650 mg, 650 mg, Oral, Q6H PRN **OR** acetaminophen (TYLENOL) suppository 650 mg, 650 mg, Rectal, Q6H PRN  cefTRIAXone (ROCEPHIN) 1000 mg in sterile water 10 mL IV syringe, 1,000 mg, IntraVENous, Q24H **AND** azithromycin (ZITHROMAX) tablet 500 mg, 500 mg, Oral, Q24H  influenza quadrivalent split vaccine (FLUZONE;FLUARIX;FLULAVAL;AFLURIA) injection 0.5 mL, 0.5 mL, IntraMUSCular, Prior to discharge  cetirizine (ZYRTEC) tablet 10 mg, 10 mg, Oral, Daily  [DISCONTINUED] losartan (COZAAR) tablet 100 mg, 100 mg, Oral, Daily **AND** hydroCHLOROthiazide (HYDRODIURIL) tablet 25 mg, 25 mg, Oral, Daily  isosorbide mononitrate (IMDUR) extended release tablet 30 mg, 30 mg, Oral, Daily         Objective:  /65   Pulse 69   Temp 97.8 °F (36.6 °C) (Oral)   Resp 20   Ht 4' 11.5\" (1.511 m)   Wt 209 lb 3.2 oz (94.9 kg)   SpO2 91%   BMI 41.55 kg/m²      Patient Vitals for the past 24 hrs:   BP Temp Temp src Pulse Resp SpO2   12/29/21 0730 131/65 97.8 °F (36.6 °C) Oral 69 20 91 %   12/29/21 0530 (!) 157/65 98 °F (36.7 °C) Oral 67 20 93 %   12/29/21 0234 -- -- -- -- 17 --   12/29/21 0215 -- -- -- -- -- (!) 83 %   12/29/21 0000 128/71 98.2 °F (36.8 °C) Oral 64 18 (!) 89 %   12/28/21 2128 136/64 98.1 °F (36.7 °C) Oral 71 18 94 %   12/28/21 2103 (!) 150/61 98.2 °F (36.8 °C) Oral 70 18 94 %   12/28/21 2000 (!) 135/56 98.3 °F (36.8 °C) Oral 71 -- 94 %   12/28/21 1605 -- -- -- -- 18 94 %   12/28/21 1500 126/63 98.1 °F (36.7 °C) Oral 68 18 95 %   12/28/21 1211 -- -- -- -- 18 94 %   12/28/21 1105 123/62 98 °F (36.7 °C) Oral 60 19 95 %     Patient Vitals for the past 96 hrs (Last 3 readings):   Weight   12/25/21 2215 209 lb 3.2 oz (94.9 kg)   12/25/21 1603 211 lb (95.7 kg)           Intake/Output Summary (Last 24 hours) at 12/29/2021 0841  Last data filed at 12/29/2021 0831  Gross per 24 hour   Intake 660 ml   Output 2450 ml   Net -1790 ml HISTORY: SOB TECHNOLOGIST PROVIDED HISTORY: Reason for exam:->SOB FINDINGS: Mild cardiomegaly. Cardiac pacemaker leads in stable/satisfactory position with no evidence of pneumothorax. Large consolidation identified in the right mid lung. Additional moderate diffuse infiltrates and/or congestion identified in the lungs. Significant osteopenic changes and degenerative changes noted in the bony structures. Findings suspicious for bilateral infiltrates in the lungs with large consolidation in the right mid lung. Underlying right lung mass difficult to exclude. CT of the chest may be obtained for further clarification. CT CHEST PULMONARY EMBOLISM W CONTRAST    Result Date: 12/25/2021  EXAMINATION: CTA OF THE CHEST 12/25/2021 5:46 pm TECHNIQUE: CTA of the chest was performed after the administration of intravenous contrast.  Multiplanar reformatted images are provided for review. MIP images are provided for review. Dose modulation, iterative reconstruction, and/or weight based adjustment of the mA/kV was utilized to reduce the radiation dose to as low as reasonably achievable. COMPARISON: 09/05/2018. Chest x-ray performed earlier today. HISTORY: ORDERING SYSTEM PROVIDED HISTORY: SOB/fever/abnormal chest xray TECHNOLOGIST PROVIDED HISTORY: Reason for exam:->SOB/fever/abnormal chest xray Decision Support Exception - unselect if not a suspected or confirmed emergency medical condition->Emergency Medical Condition (MA) Reason for Exam: SOB/fever/abnormal chest xray FINDINGS: Pulmonary Arteries: Pulmonary arteries are adequately opacified for evaluation. No evidence of intraluminal filling defect to suggest pulmonary embolism. Main pulmonary artery is normal in caliber. Mediastinum: The thoracic aorta is normal in caliber with calcified atherosclerotic plaque. Cardiomegaly with coronary vascular calcification and previous median sternotomy. No pericardial effusion. Pacer leads are in place.   Multiple mediastinal nodes are not enlarged by size criteria. The esophagus is unremarkable. Lungs/pleura: Dense consolidation with air bronchograms in the posterior segment of the right upper lobe. Patchy infiltrate is noted elsewhere within the right upper lobe as well as in the right middle lobe and superior segment of the right lower lobe. The left lung is clear. Dependent atelectasis in the lower lobes bilaterally. Trace right pleural effusion. The central airways are patent. Upper Abdomen: The visualized upper abdominal viscera are unremarkable. There is a stable 2.3 cm left adrenal myelolipoma. Soft Tissues/Bones: No acute bone or soft tissue abnormality. 1. No evidence of pulmonary embolic disease. 2. Consolidation in the right upper lobe consistent with pneumonia. The findings are more typical of bacterial etiology and are not consistent with COVID-19 pneumonia. Additional areas of patchy infiltrate in the right middle lobe and superior segment of the right lower lobe. Continued plain film follow-up recommended to ensure clearing. 3. Trace right pleural effusion. Mild bibasilar atelectasis. 4. Cardiomegaly. Coronary vascular calcification. RECOMMENDATIONS: Plain film follow-up recommended to ensure clearing. Repeat PA and lateral chest x-ray in 1-2 months following therapy would be reasonable. Lab Results   Component Value Date    GLUCOSE 189 12/29/2021    GLUCOSE 143 09/23/2011     Lab Results   Component Value Date    POCGLU 191 12/29/2021     /65   Pulse 69   Temp 97.8 °F (36.6 °C) (Oral)   Resp 20   Ht 4' 11.5\" (1.511 m)   Wt 209 lb 3.2 oz (94.9 kg)   SpO2 91%   BMI 41.55 kg/m²     Assessment and Plan:  Principal Problem:    Acute respiratory failure due to severe acute respiratory syndrome coronavirus 2 (SARS-CoV-2) infection ([facility] [address] -Established problem. Uncontrolled. On 15L high flow. Plan: cont o2 support, cont iv remdesivir. Got actemra 12/27.   Pt is full code; if she decompensates, icu transfer and intubation would be required   NSTEMI (non-ST elevated myocardial infarction) ([facility] -Established problem. Stable. Trop trending down, peak 0.72, now 0.31  Plan: will trend. Cards considering ischemic workup if/when pt stabilizes  Active Problems:    Pneumonia -Established problem. Stable. 2/2 covid  Plan: cont steroids, will see if she is able to get remdesivir    Acute respiratory failure with hypoxia ([facility] -Established problem. Now on 15L high flow  Plan: as above    COVID-19 virus infection -Established problem. Stable. Plan: Actemra given x1 12/27. On Remdesivir, day 3    HTN (hypertension) -Established problem. Stable. 131/65  Plan: stay on same meds   Melena - New Problem to me.     Plan - per GI - IV PPI BID, Transfuse pRBC to keep Hb>8, Monitor H/H Q8, Hold off EGD unless overtly bleeding and not responding to blood transfusions, Consult [facility] for h/o MDS    Total critical care time caring for this patient with life threatening illness, including direct patient contact, management of life support systems, review of data including imaging and labs, discussions with other team members and physicians at least 31 minutes today        (Please note that portions of this note were completed with a voice recognition program.  Efforts were made to edit the dictations but occasionally words are mis-transcribed.)        Jakub Jesus MD  12/29/2021

## 2021-12-29 NOTE — PROGRESS NOTES
Pt potassium was 5.9. now 4. 9. Neph consult. Urine studies sent. Dr. Merced Walker consulted per patient request for hx of bone marrow dysplasia. Iron studies ordered. Hemoglobin has dropped from 8.4 to 7.1. GI consulted for dark stools. Stool occult positive. Pt is awaiting 1 unit of RBC. Pt weaned from AirVo to high flow. SpO2 remains above 92%. Report given to [facility].

## 2021-12-29 NOTE — PLAN OF CARE
Problem: Falls - Risk of:  Goal: Will remain free from falls  Description: Will remain free from falls  Outcome: Ongoing  Note: All fall precautions in place, bed in lowest position, frequent rounding to assist with toileting. Pt absent of fall this shift.     Goal: Absence of physical injury  Description: Absence of physical injury  Outcome: Ongoing     Problem: Discharge Planning:  Goal: Discharged to appropriate level of care  Description: Discharged to appropriate level of care  Outcome: Ongoing     Problem: Cardiac Output - Decreased:  Goal: Hemodynamic stability will improve  Description: Hemodynamic stability will improve  Outcome: Ongoing     Problem: Serum Glucose Level - Abnormal:  Goal: Ability to maintain appropriate glucose levels will improve  Description: Ability to maintain appropriate glucose levels will improve  Outcome: Ongoing     Problem: Pain:  Goal: Pain level will decrease  Description: Pain level will decrease  Outcome: Ongoing  Goal: Control of acute pain  Description: Control of acute pain  Outcome: Ongoing  Note: Pt has not complained of pain at this time  Goal: Control of chronic pain  Description: Control of chronic pain  Outcome: Ongoing     Problem: Tissue Perfusion - Cardiopulmonary, Altered:  Goal: Absence of angina  Description: Absence of angina  Outcome: Ongoing  Goal: Circulatory function within specified parameters  Description: Circulatory function within specified parameters  Outcome: Ongoing  Goal: Hemodynamic stability will improve  Description: Hemodynamic stability will improve  Outcome: Ongoing     Problem: Tobacco Use:  Goal: Will participate in inpatient tobacco-use cessation counseling  Description: Will participate in inpatient tobacco-use cessation counseling  Outcome: Ongoing     Problem: Airway Clearance - Ineffective  Goal: Achieve or maintain patent airway  Outcome: Ongoing  Note: Pt has maintained oxygen saturation above 88% on 15 liters high flow, and bipap Problem: Gas Exchange - Impaired  Goal: Absence of hypoxia  Outcome: Ongoing  Goal: Promote optimal lung function  Outcome: Ongoing     Problem: Breathing Pattern - Ineffective  Goal: Ability to achieve and maintain a regular respiratory rate  Outcome: Ongoing     Problem:  Body Temperature -  Risk of, Imbalanced  Goal: Ability to maintain a body temperature within defined limits  Outcome: Ongoing  Goal: Will regain or maintain usual level of consciousness  Outcome: Ongoing  Goal: Complications related to the disease process, condition or treatment will be avoided or minimized  Outcome: Ongoing     Problem: Isolation Precautions - Risk of Spread of Infection  Goal: Prevent transmission of infection  Outcome: Ongoing     Problem: Nutrition Deficits  Goal: Optimize nutritional status  Outcome: Ongoing     Problem: Risk for Fluid Volume Deficit  Goal: Maintain normal heart rhythm  Outcome: Ongoing  Goal: Maintain absence of muscle cramping  Outcome: Ongoing  Goal: Maintain normal serum potassium, sodium, calcium, phosphorus, and pH  Outcome: Ongoing     Problem: Loneliness or Risk for Loneliness  Goal: Demonstrate positive use of time alone when socialization is not possible  Outcome: Ongoing     Problem: Fatigue  Goal: Verbalize increase energy and improved vitality  Outcome: Ongoing     Problem: Patient Education: Go to Patient Education Activity  Goal: Patient/Family Education  Outcome: Ongoing

## 2021-12-29 NOTE — PROGRESS NOTES
Pm assessment complete, medications given. Blood unit started, pt back in bed. No complaints of pain or other needs at this time. Call light within reach. Pt resting comfortably at this time. Will continue to monitor

## 2021-12-29 NOTE — PROGRESS NOTES
Pt was transitioned to bipap, called out shortly after for sob. Placed back on hi flow, used breathing exercises to relax and return to 91%. Pts lungs still clear, but has some congestion in chest. Deep cough revealed some bright red blood and plegm in tissue. Pt moved back to recliner.

## 2021-12-29 NOTE — PROGRESS NOTES
89.8 12/29/2021    MCH 29.2 12/29/2021    MCHC 32.5 12/29/2021    RDW 15.8 12/29/2021    NRBC CANCELED 09/23/2011    NRBC CANCELED 09/23/2011    SEGSPCT 46.5 12/28/2011    BANDSPCT 2 02/14/2020    BLASTSPCT CANCELED 09/23/2011    METASPCT 1 12/07/2016    LYMPHOPCT 20.6 12/29/2021    PROMYELOPCT CANCELED 09/23/2011    MONOPCT 10.4 12/29/2021    MYELOPCT CANCELED 09/23/2011    EOSPCT 6.3 12/28/2011    BASOPCT 0.2 12/29/2021    MONOSABS 0.4 12/29/2021    LYMPHSABS 0.7 12/29/2021    EOSABS 0.0 12/29/2021    BASOSABS 0.0 12/29/2021    DIFFTYPE Auto 12/28/2011     CMP:    Lab Results   Component Value Date     12/29/2021    K 4.8 12/29/2021    K 4.7 12/26/2021    CL 99 12/29/2021    CO2 20 12/29/2021    BUN 65 12/29/2021    CREATININE 1.4 12/29/2021    GFRAA 45 12/29/2021    GFRAA >60 10/26/2012    AGRATIO 1.3 12/26/2021    LABGLOM 37 12/29/2021    LABGLOM 100 06/26/2012    GLUCOSE 189 12/29/2021    GLUCOSE 143 09/23/2011    PROT 6.5 12/26/2021    PROT 7.8 10/26/2012    LABALBU 3.7 12/26/2021    CALCIUM 9.1 12/29/2021    BILITOT <0.2 12/26/2021    ALKPHOS 61 12/26/2021    AST 24 12/26/2021    ALT 18 12/26/2021     Magnesium:    Lab Results   Component Value Date    MG 2.00 08/14/2020     Phosphorus:    Lab Results   Component Value Date    PHOS 4.1 08/14/2020     Troponin:    Lab Results   Component Value Date    TROPONINI 0.31 12/29/2021     U/A:    Lab Results   Component Value Date    COLORU YELLOW 12/28/2021    PROTEINU 30 12/28/2021    PHUR 5.0 12/28/2021    WBCUA 3 12/28/2021    RBCUA 1 12/28/2021    BACTERIA 4+ 06/20/2017    CLARITYU Clear 12/28/2021    SPECGRAV 1.012 12/28/2021    LEUKOCYTESUR Negative 12/28/2021    UROBILINOGEN 0.2 12/28/2021    BILIRUBINUR Negative 12/28/2021    BLOODU Negative 12/28/2021    GLUCOSEU Negative 12/28/2021           IMPRESSION/RECOMMENDATIONS:      1. MALCOLM: This is probably secondary to contrast nephropathy. Serum creatinine was normal when she had a CTA on 12/25.   The other possibility is cardiorenal syndrome  Cr 1.4 ( was 1.6, )    2. Hyperkalemia: Due  to renal failure plus she was on potassium spironolactone and ARB . These  medications were stopped  K 4.8 , normal     3. COVID-19    4. Coronary artery disease s/p CABG, has elevated troponin and is being evaluated by cardiology    5. Anemia secondary to possible GI bleed being seen by Dr. Beau Rodriguez    Thank you for allowing me to participate in the care of this patient. I will continue to follow along. Please call with questions.     Victor Manuel Zabala MD, MD  12/29/2021  [facility]

## 2021-12-29 NOTE — PROGRESS NOTES
Did not reorder albuterol under the protocol as patient states she is allergic to albuterol and has been refusing to take it.

## 2021-12-29 NOTE — RT PROTOCOL NOTE
RT Inhaler-Nebulizer Bronchodilator Protocol Note    There is a bronchodilator order in the chart from a provider indicating to follow the RT Bronchodilator Protocol and there is an Initiate RT Inhaler-Nebulizer Bronchodilator Protocol order as well (see protocol at bottom of note). CXR Findings:  XR CHEST PORTABLE    Result Date: 12/28/2021  Mildly increased right-side predominant airspace opacities. The findings from the last RT Protocol Assessment were as follows:   History Pulmonary Disease: None or smoker <15 pack years  Respiratory Pattern: Dyspnea on exertion or RR 21-25 bpm  Breath Sounds: Slightly diminished and/or crackles  Cough: Strong, spontaneous, non-productive  Indication for Bronchodilator Therapy: Decreased or absent breath sounds  Bronchodilator Assessment Score: 4    Aerosolized bronchodilator medication orders have been revised according to the RT Inhaler-Nebulizer Bronchodilator Protocol below. Respiratory Therapist to perform RT Therapy Protocol Assessment initially then follow the protocol. Repeat RT Therapy Protocol Assessment PRN for score 0-3 or on second treatment, BID, and PRN for scores above 3. No Indications - adjust the frequency to every 6 hours PRN wheezing or bronchospasm, if no treatments needed after 48 hours then discontinue using Per Protocol order mode. If indication present, adjust the RT bronchodilator orders based on the Bronchodilator Assessment Score as indicated below. Use Inhaler orders unless patient has one or more of the following: on home nebulizer, not able to hold breath for 10 seconds, is not alert and oriented, cannot activate and use MDI correctly, or respiratory rate 25 breaths per minute or more, then use the equivalent nebulizer order(s) with same Frequency and PRN reasons based on the score. If a patient is on this medication at home then do not decrease Frequency below that used at home.     0-3 - enter or revise RT bronchodilator order(s) to equivalent RT Bronchodilator order with Frequency of every 4 hours PRN for wheezing or increased work of breathing using Per Protocol order mode. 4-6 - enter or revise RT Bronchodilator order(s) to two equivalent RT bronchodilator orders with one order with BID Frequency and one order with Frequency of every 4 hours PRN wheezing or increased work of breathing using Per Protocol order mode. 7-10 - enter or revise RT Bronchodilator order(s) to two equivalent RT bronchodilator orders with one order with TID Frequency and one order with Frequency of every 4 hours PRN wheezing or increased work of breathing using Per Protocol order mode. 11-13 - enter or revise RT Bronchodilator order(s) to one equivalent RT bronchodilator order with QID Frequency and an Albuterol order with Frequency of every 4 hours PRN wheezing or increased work of breathing using Per Protocol order mode. Greater than 13 - enter or revise RT Bronchodilator order(s) to one equivalent RT bronchodilator order with every 4 hours Frequency and an Albuterol order with Frequency of every 2 hours PRN wheezing or increased work of breathing using Per Protocol order mode. RT to enter RT Home Evaluation for COPD & MDI Assessment order using Per Protocol order mode.     Electronically signed by Lenon Canavan, RCP on 12/28/2021 at 9:18 PM

## 2021-12-29 NOTE — CARE COORDINATION
Discharge Planning Assessment    RN/SW discharge planner met with patient/ (and family member) to discuss reason for admission, current living situation, and potential needs at the time of discharge    Demographics/Insurance verified Yes    Current type of dwellin level home    Patient from ECF/SW confirmed with:   N/A    Living arrangements: With spouse    Level of function/Support:  independent    PCP:  Al Ken    DME:  Stair lift, cane    Active with any community resources/agencies/skilled home care:  NO    Medication compliance issues:  NO    Financial issues that could impact healthcare:   NO    Tentative discharge plan:  Home w/HHC    Discussed with patient and/or family that on the day of discharge home tentative time of discharge will be between 10 AM and noon. Transportation at the time of discharge:        Currently requiring 15 L HF O2, IV Rocephin, IV Decadron & IV Remdesivir    Case management will follow progress and assist with discharge planning as needed. I was asked to make an LTAC referral in case needed in the future - Referral to [facility].     Edward Lord MSN, BSN, RN  Case Management   [phone]

## 2021-12-29 NOTE — CONSULTS
[facility]   CONSULT NOTE    12/29/2021 8:30 AM    Patient Information: Andres Murray   Date of Admit:  12/25/2021  Primary Care Physician:  Morene Moritz, DO  Requesting Physician:  Jakub Jesus MD    Reason for consult:   Evaluation and recommendations for myelodysplastic syndrome    Chief complaint:    Chief Complaint   Patient presents with    Fatigue     pt states weakness, sob, fever, chills, since last night, vaccinated, no flu shoot/booster, had exposure, pt 84% on RA, placed on 4 LPM via nc       History of Present Illness:  Andres Murray is a 80 y.o. female on Jakub Jesus MD service who was admitted on 12/25/2021 for COVID-19 infection. She presented with fevers, chills, shortness of breath and hypoxia. She is requiring high flow oxygen. She was also found to have NSTEMI this admission. She was noted to have melena yesterday. She has chronic anemia which is worse this admission. She has a history of myelodysplastic syndrome and is followed by Dr. Joaquin Oro. She received 3 doses of IV iron in October - November. Past Medical History:     has a past medical history of Anemia, Anesthesia, Anesthesia complication, Atrial fibrillation ([address] CAD (coronary artery disease), Chest pain, Chronic kidney disease, Depression, GERD (gastroesophageal reflux disease), Glaucoma, Hiatal hernia, Hyperlipidemia, Hypertension, Migraines, neuralgic, Morbid obesity ([address] Osteoarthritis, Sleep apnea, Type II or unspecified type diabetes mellitus without mention of complication, not stated as uncontrolled, Unspecified sleep apnea, Urinary incontinence, and UTI (urinary tract infection).      Past Surgical History:    Past Surgical History:   Procedure Laterality Date    ABDOMEN SURGERY N/A 9/1/2020    EXPLORATION OF ABDOMINAL WOUND performed by Jese Seo MD at [facility] 2/22/1999    quadruple by-pass, [address] COLONOSCOPY  10/08/2018    1 polyp removed    COLONOSCOPY N/A 10/8/2018    COLONOSCOPY POLYPECTOMY SNARE/COLD BIOPSY performed by Frederic Piña MD at [facility]    COSMETIC SURGERY      face lift    ENTEROSCOPY N/A 2/26/2019    ENTEROSCOPY performed by Frederic Piña MD at [address] GASTRIC BAND  12/20/11    hiatal hernia repair    GASTRIC BAND REMOVAL  11/03/2016    laparoscopic     HAMMER TOE SURGERY      [facility] INJECTION PROCEDURE FOR SACROILIAC JOINT Left 12/17/2018    SACROILIAC JOINT INJECTION ON THE LEFT WITH FLUOROSCOPY performed by Trisha Bill MD at [address]    both   [address]    Left and Right knees, [facility]    KNEE ARTHROSCOPY      1982 left    ORIF HUMERUS DECOMPRESSION Left 2/25/2016    OPEN REDUCTION INTERNAL FIXATION LEFT PROXIMAL HUMERUS    OVARY REMOVAL Bilateral 1997    NC NJX DX/THER SBST INTRLMNR CRV/THRC W/IMG GDN N/A 11/5/2018    MIDLINE L4/L5 LUMBAR INTERLAMINAR EPIDURAL STEROID INJECTION WITH FLUOROSCOPY performed by Trisha Bill MD at [id number] [place] [address] ENDOSCOPY  10/30/15    UPPER GASTROINTESTINAL ENDOSCOPY  10/14/2016    with biopsy    VENTRAL HERNIA REPAIR N/A 10/29/2019    OPEN VENTRAL HERNIA REPAIR WITH  MESH performed by Atilio Meléndez MD at [address]        Current Medications:     insulin lispro  0-12 Units SubCUTAneous TID WC    insulin lispro  0-6 Units SubCUTAneous Nightly    pantoprazole  40 mg IntraVENous BID    ipratropium  2 puff Inhalation TID    enoxaparin  40 mg SubCUTAneous BID    remdesivir IVPB  100 mg IntraVENous Q24H    dexamethasone (DECADRON) IVPB  20 mg IntraVENous Daily    escitalopram  20 mg Oral Daily    ezetimibe  5 mg Oral Nightly    rosuvastatin  20 mg Oral QPM    gabapentin  400 mg Oral TID    pioglitazone  15 mg Oral Daily    Vitamin D  1,000 Units Oral Daily    vitamin C  1,000 mg Oral Daily    estradiol  2 g Vaginal Once per day on Mon Thu    furosemide  40 mg Oral Daily    aspirin  81 mg Oral Daily    amLODIPine  2.5 mg Oral BID    ferrous gluconate  162 mg Oral Daily    zinc sulfate  50 mg Oral Daily    sodium chloride flush  5-40 mL IntraVENous 2 times per day    cefTRIAXone (ROCEPHIN) IV  1,000 mg IntraVENous Q24H    And    azithromycin  500 mg Oral Q24H    influenza virus vaccine  0.5 mL IntraMUSCular Prior to discharge    cetirizine  10 mg Oral Daily    hydroCHLOROthiazide  25 mg Oral Daily    isosorbide mononitrate  30 mg Oral Daily       Allergies: Allergies   Allergen Reactions    Albuterol Other (See Comments)     Other reaction(s): Chest Pain  Crushing chest pain    Heparin Other (See Comments)     Caused bruises and hematomas immediately when drip started. MARKED BRUISING/HEMATOMAS    Niacin     Avelox [Moxifloxacin Hcl In Nacl] Rash    Moxifloxacin Rash    Niaspan [Niacin Er] Itching and Rash    Proventil [Albuterol Sulfate] Palpitations    Tagamet [Cimetidine] Rash        Social History:    reports that she has never smoked. She has never used smokeless tobacco. She reports that she does not drink alcohol and does not use drugs. Family History:     family history includes Cancer in her mother; Heart Disease in her father; High Blood Pressure in her father and mother; Stroke in her sister.      ROS:      Per HPI; otherwise 10 point ROS is negative    PHYSICAL EXAM:    Vitals:  Vitals:    12/29/21 0730   BP: 131/65   Pulse: 69   Resp: 20   Temp: 97.8 °F (36.6 °C)   SpO2: 91%        Intake/Output Summary (Last 24 hours) at 12/29/2021 0830  Last data filed at 12/29/2021 0539  Gross per 24 hour   Intake 660 ml   Output 2150 ml   Net -1490 ml      Wt Readings from Last 3 Encounters:   12/25/21 209 lb 3.2 oz (94.9 kg)   12/16/21 211 lb 3.2 oz (95.8 kg)   05/25/21 218 lb 12.8 oz (99.2 kg)        General appearance: Appears comfortable. Eyes: Sclera clear, pupils equal  ENT: Moist mucus membranes, no thrush  Neck: Trachea midline, symmetrical  Cardiovascular: Regular rhythm, normal S1, S2. No murmur, gallop, rub. No edema in  lower extremities  Respiratory: Clear to auscultation bilaterally. No wheeze. Good inspiratory effort  Gastrointestinal: Abdomen soft, not tender, not distended, normal bowel sounds  Musculoskeletal: No cyanosis in digits, warm extremities  Neurologic: Cranial nerves grossly intact, no motor or speech deficits. Psychiatric: Normal affect. Alert and oriented to time, place and person. Skin: Warm, dry, normal turgor, no rash    DATA:  CBC:   Lab Results   Component Value Date    WBC 3.6 12/29/2021    RBC 2.86 12/29/2021    HGB 8.3 12/29/2021    HCT 25.7 12/29/2021    MCV 89.8 12/29/2021    MCH 29.2 12/29/2021    MCHC 32.5 12/29/2021    RDW 15.8 12/29/2021     12/29/2021    MPV 8.8 12/29/2021     BMP:  Lab Results   Component Value Date     12/29/2021    K 4.8 12/29/2021    K 4.7 12/26/2021    CL 99 12/29/2021    CO2 20 12/29/2021    BUN 65 12/29/2021    CREATININE 1.4 12/29/2021    CALCIUM 9.1 12/29/2021    GFRAA 45 12/29/2021    GFRAA >60 10/26/2012    LABGLOM 37 12/29/2021    LABGLOM 100 06/26/2012    GLUCOSE 189 12/29/2021    GLUCOSE 143 09/23/2011     Magnesium:   Lab Results   Component Value Date    MG 2.00 08/14/2020    MG 2.10 04/30/2019    MG 1.90 01/31/2019     LIVER PROFILE: No results for input(s): AST, ALT, LIPASE, BILIDIR, BILITOT, ALKPHOS in the last 72 hours. Invalid input(s):   AMYLASE,  ALB  PT/INR:    Lab Results   Component Value Date    PROTIME 12.7 12/25/2021    PROTIME 12.8 04/30/2019    PROTIME 17.0 02/09/2019    INR 1.12 12/25/2021    INR 1.12 04/30/2019    INR 1.49 02/09/2019     IMAGING:    Narrative   EXAMINATION:   ONE XRAY VIEW OF THE CHEST       12/25/2021 4:18 pm     COMPARISON:   2019 comparison       HISTORY:   ORDERING SYSTEM PROVIDED HISTORY: SOB   TECHNOLOGIST PROVIDED HISTORY:   Reason for exam:->SOB       FINDINGS:   Mild cardiomegaly. Cardiac pacemaker leads in stable/satisfactory position   with no evidence of pneumothorax.       Large consolidation identified in the right mid lung.  Additional moderate   diffuse infiltrates and/or congestion identified in the lungs.       Significant osteopenic changes and degenerative changes noted in the bony   structures.           Impression   Findings suspicious for bilateral infiltrates in the lungs with large   consolidation in the right mid lung.  Underlying right lung mass difficult to   exclude.  CT of the chest may be obtained for further clarification.         Narrative   EXAMINATION:   CTA OF THE CHEST 12/25/2021 5:46 pm       TECHNIQUE:   CTA of the chest was performed after the administration of intravenous   contrast.  Multiplanar reformatted images are provided for review.  MIP   images are provided for review.  Dose modulation, iterative reconstruction,   and/or weight based adjustment of the mA/kV was utilized to reduce the   radiation dose to as low as reasonably achievable.       COMPARISON:   09/05/2018.  Chest x-ray performed earlier today.       HISTORY:   ORDERING SYSTEM PROVIDED HISTORY: SOB/fever/abnormal chest xray   TECHNOLOGIST PROVIDED HISTORY:   Reason for exam:->SOB/fever/abnormal chest xray   Decision Support Exception - unselect if not a suspected or confirmed   emergency medical condition->Emergency Medical Condition (MA)   Reason for Exam: SOB/fever/abnormal chest xray       FINDINGS:   Pulmonary Arteries: Pulmonary arteries are adequately opacified for   evaluation.  No evidence of intraluminal filling defect to suggest pulmonary   embolism.  Main pulmonary artery is normal in caliber.       Mediastinum: The thoracic aorta is normal in caliber with calcified   atherosclerotic plaque.  Cardiomegaly unchanged. [place] is some hazy   parenchymal lower lobe opacity bilaterally.  There is a left dual lead   pacemaker.  Postop changes in the proximal left humerus.           Impression   Persistent dense alveolar consolidation in the right upper lobe compatible   with pneumonia.  Cardiomegaly and mild pulmonary venous hypertension.           IMPRESSION/RECOMMENDATIONS:    Principal Problem:    NSTEMI (non-ST elevated myocardial infarction) ([facility]  Active Problems:    Anemia    Pneumonia    Acute respiratory failure with hypoxia (HCC)    HTN (hypertension)    COVID-19    Acute respiratory failure due to severe acute respiratory syndrome coronavirus 2 (SARS-CoV-2) infection (HCC)    MALCOLM (acute kidney injury) ([facility]  Resolved Problems:    * No resolved hospital problems. *       78 year old female with a history of a-fib, CAD, CKD, DMII, GERD, HTN, hyperlipidemia and RAHUL. She has:    1. Hypoxic respiratory failure secondary to pneumonia due to COVID-19 infection  -Receiving dexamethasone and remdesivir.  -Actemra given 12/27/21    2. NSTEMI  -Cardiology following  -Ischemic workup when patient stable. 3. Acute on CKD  -Nephrology following. 4. Anemia due to myelodysplastic syndrome  -Hgb decreased from baseline this admission  -Received IV 3 doses venofer Oct - Nov.  -Check iron studies, B12, folate and SPEP. -Evaluated by GI this admit, no plan for EGD at this time  -Transfuse if hgb less than 7 or per cardiology discretion      This plan was discussed with the patient and he/she verbalized understanding. Thank you for allowing us to participate in the care of this patient. Raphael Cherry, [address] [facility]  Oncology Hematology [facility].  [phone]    Patient was seen and examined. Agree with above. Continue current care. Monitor CBC and transfuse PRBCs if hemoglobin is less than 7.     Chantelle Keene MD

## 2021-12-30 LAB
ANION GAP SERPL CALCULATED.3IONS-SCNC: 12 MMOL/L (ref 3–16)
BASOPHILS ABSOLUTE: 0 K/UL (ref 0–0.2)
BASOPHILS RELATIVE PERCENT: 0.1 %
BUN BLDV-MCNC: 54 MG/DL (ref 7–20)
CALCIUM SERPL-MCNC: 9.2 MG/DL (ref 8.3–10.6)
CHLORIDE BLD-SCNC: 101 MMOL/L (ref 99–110)
CO2: 22 MMOL/L (ref 21–32)
CREAT SERPL-MCNC: 1 MG/DL (ref 0.6–1.2)
EOSINOPHILS ABSOLUTE: 0 K/UL (ref 0–0.6)
EOSINOPHILS RELATIVE PERCENT: 0 %
GFR AFRICAN AMERICAN: >60
GFR NON-AFRICAN AMERICAN: 55
GLUCOSE BLD-MCNC: 223 MG/DL (ref 70–99)
GLUCOSE BLD-MCNC: 245 MG/DL (ref 70–99)
GLUCOSE BLD-MCNC: 266 MG/DL (ref 70–99)
GLUCOSE BLD-MCNC: 391 MG/DL (ref 70–99)
GLUCOSE BLD-MCNC: 438 MG/DL (ref 70–99)
HCT VFR BLD CALC: 23.6 % (ref 36–48)
HCT VFR BLD CALC: 25.6 % (ref 36–48)
HCT VFR BLD CALC: 26.2 % (ref 36–48)
HEMOGLOBIN: 7.9 G/DL (ref 12–16)
HEMOGLOBIN: 8.5 G/DL (ref 12–16)
HEMOGLOBIN: 8.6 G/DL (ref 12–16)
LYMPHOCYTES ABSOLUTE: 0.5 K/UL (ref 1–5.1)
LYMPHOCYTES RELATIVE PERCENT: 14.4 %
MCH RBC QN AUTO: 29.5 PG (ref 26–34)
MCHC RBC AUTO-ENTMCNC: 33 G/DL (ref 31–36)
MCV RBC AUTO: 89.3 FL (ref 80–100)
MONOCYTES ABSOLUTE: 0.3 K/UL (ref 0–1.3)
MONOCYTES RELATIVE PERCENT: 10.8 %
NEUTROPHILS ABSOLUTE: 2.4 K/UL (ref 1.7–7.7)
NEUTROPHILS RELATIVE PERCENT: 74.7 %
PDW BLD-RTO: 15.9 % (ref 12.4–15.4)
PERFORMED ON: ABNORMAL
PLATELET # BLD: 250 K/UL (ref 135–450)
PMV BLD AUTO: 8.4 FL (ref 5–10.5)
POTASSIUM SERPL-SCNC: 5 MMOL/L (ref 3.5–5.1)
RBC # BLD: 2.87 M/UL (ref 4–5.2)
SODIUM BLD-SCNC: 135 MMOL/L (ref 136–145)
WBC # BLD: 3.2 K/UL (ref 4–11)

## 2021-12-30 PROCEDURE — 51702 INSERT TEMP BLADDER CATH: CPT

## 2021-12-30 PROCEDURE — C9113 INJ PANTOPRAZOLE SODIUM, VIA: HCPCS | Performed by: INTERNAL MEDICINE

## 2021-12-30 PROCEDURE — 80048 BASIC METABOLIC PNL TOTAL CA: CPT

## 2021-12-30 PROCEDURE — 99232 SBSQ HOSP IP/OBS MODERATE 35: CPT | Performed by: INTERNAL MEDICINE

## 2021-12-30 PROCEDURE — 6360000002 HC RX W HCPCS: Performed by: INTERNAL MEDICINE

## 2021-12-30 PROCEDURE — 2580000003 HC RX 258: Performed by: INTERNAL MEDICINE

## 2021-12-30 PROCEDURE — 2700000000 HC OXYGEN THERAPY PER DAY

## 2021-12-30 PROCEDURE — 94761 N-INVAS EAR/PLS OXIMETRY MLT: CPT

## 2021-12-30 PROCEDURE — 36415 COLL VENOUS BLD VENIPUNCTURE: CPT

## 2021-12-30 PROCEDURE — 85025 COMPLETE CBC W/AUTO DIFF WBC: CPT

## 2021-12-30 PROCEDURE — 94660 CPAP INITIATION&MGMT: CPT

## 2021-12-30 PROCEDURE — 2500000003 HC RX 250 WO HCPCS: Performed by: INTERNAL MEDICINE

## 2021-12-30 PROCEDURE — 85014 HEMATOCRIT: CPT

## 2021-12-30 PROCEDURE — 2060000000 HC ICU INTERMEDIATE R&B

## 2021-12-30 PROCEDURE — 6370000000 HC RX 637 (ALT 250 FOR IP): Performed by: INTERNAL MEDICINE

## 2021-12-30 PROCEDURE — 85018 HEMOGLOBIN: CPT

## 2021-12-30 PROCEDURE — 94640 AIRWAY INHALATION TREATMENT: CPT

## 2021-12-30 RX ADMIN — PANTOPRAZOLE SODIUM 40 MG: 40 INJECTION, POWDER, FOR SOLUTION INTRAVENOUS at 10:51

## 2021-12-30 RX ADMIN — GABAPENTIN 400 MG: 400 CAPSULE ORAL at 20:48

## 2021-12-30 RX ADMIN — Medication 162 MG: at 10:52

## 2021-12-30 RX ADMIN — INSULIN LISPRO 6 UNITS: 100 INJECTION, SOLUTION INTRAVENOUS; SUBCUTANEOUS at 20:21

## 2021-12-30 RX ADMIN — SODIUM CHLORIDE 25 ML: 9 INJECTION, SOLUTION INTRAVENOUS at 13:53

## 2021-12-30 RX ADMIN — CETIRIZINE HYDROCHLORIDE 10 MG: 10 TABLET, FILM COATED ORAL at 10:50

## 2021-12-30 RX ADMIN — Medication 2 PUFF: at 14:46

## 2021-12-30 RX ADMIN — Medication 1000 UNITS: at 10:50

## 2021-12-30 RX ADMIN — PANTOPRAZOLE SODIUM 40 MG: 40 INJECTION, POWDER, FOR SOLUTION INTRAVENOUS at 20:48

## 2021-12-30 RX ADMIN — GABAPENTIN 400 MG: 400 CAPSULE ORAL at 10:51

## 2021-12-30 RX ADMIN — INSULIN LISPRO 6 UNITS: 100 INJECTION, SOLUTION INTRAVENOUS; SUBCUTANEOUS at 13:54

## 2021-12-30 RX ADMIN — ISOSORBIDE MONONITRATE 30 MG: 30 TABLET, EXTENDED RELEASE ORAL at 10:50

## 2021-12-30 RX ADMIN — ASPIRIN 81 MG: 81 TABLET, COATED ORAL at 10:50

## 2021-12-30 RX ADMIN — ESCITALOPRAM OXALATE 20 MG: 10 TABLET ORAL at 10:50

## 2021-12-30 RX ADMIN — INSULIN LISPRO 4 UNITS: 100 INJECTION, SOLUTION INTRAVENOUS; SUBCUTANEOUS at 10:00

## 2021-12-30 RX ADMIN — AMLODIPINE BESYLATE 2.5 MG: 5 TABLET ORAL at 20:48

## 2021-12-30 RX ADMIN — Medication 10 ML: at 20:49

## 2021-12-30 RX ADMIN — REMDESIVIR 100 MG: 100 INJECTION, POWDER, LYOPHILIZED, FOR SOLUTION INTRAVENOUS at 11:13

## 2021-12-30 RX ADMIN — AMLODIPINE BESYLATE 2.5 MG: 5 TABLET ORAL at 10:50

## 2021-12-30 RX ADMIN — HYDROCHLOROTHIAZIDE 25 MG: 25 TABLET ORAL at 10:51

## 2021-12-30 RX ADMIN — INSULIN LISPRO 10 UNITS: 100 INJECTION, SOLUTION INTRAVENOUS; SUBCUTANEOUS at 18:12

## 2021-12-30 RX ADMIN — ENOXAPARIN SODIUM 40 MG: 100 INJECTION SUBCUTANEOUS at 10:51

## 2021-12-30 RX ADMIN — PIOGLITAZONE 15 MG: 15 TABLET ORAL at 10:51

## 2021-12-30 RX ADMIN — OXYCODONE HYDROCHLORIDE AND ACETAMINOPHEN 1000 MG: 500 TABLET ORAL at 10:51

## 2021-12-30 RX ADMIN — Medication 2 PUFF: at 22:13

## 2021-12-30 RX ADMIN — GABAPENTIN 400 MG: 400 CAPSULE ORAL at 13:54

## 2021-12-30 RX ADMIN — DEXAMETHASONE SODIUM PHOSPHATE 20 MG: 4 INJECTION, SOLUTION INTRA-ARTICULAR; INTRALESIONAL; INTRAMUSCULAR; INTRAVENOUS; SOFT TISSUE at 13:54

## 2021-12-30 RX ADMIN — Medication 2 PUFF: at 07:28

## 2021-12-30 RX ADMIN — FUROSEMIDE 40 MG: 40 TABLET ORAL at 10:50

## 2021-12-30 RX ADMIN — ENOXAPARIN SODIUM 40 MG: 100 INJECTION SUBCUTANEOUS at 20:49

## 2021-12-30 RX ADMIN — SODIUM CHLORIDE 25 ML: 9 INJECTION, SOLUTION INTRAVENOUS at 11:12

## 2021-12-30 RX ADMIN — Medication 1000 MG: at 20:48

## 2021-12-30 RX ADMIN — Medication 10 ML: at 10:52

## 2021-12-30 RX ADMIN — ROSUVASTATIN CALCIUM 20 MG: 20 TABLET, FILM COATED ORAL at 18:12

## 2021-12-30 RX ADMIN — Medication 50 MG: at 10:51

## 2021-12-30 ASSESSMENT — PAIN SCALES - GENERAL
PAINLEVEL_OUTOF10: 0

## 2021-12-30 NOTE — PLAN OF CARE
Problem: Falls - Risk of:  Goal: Will remain free from falls  Description: Will remain free from falls  Outcome: Ongoing  Goal: Absence of physical injury  Description: Absence of physical injury  Outcome: Ongoing     Problem: Discharge Planning:  Goal: Discharged to appropriate level of care  Description: Discharged to appropriate level of care  Outcome: Ongoing     Problem: Cardiac Output - Decreased:  Goal: Hemodynamic stability will improve  Description: Hemodynamic stability will improve  Outcome: Ongoing     Problem: Serum Glucose Level - Abnormal:  Goal: Ability to maintain appropriate glucose levels will improve  Description: Ability to maintain appropriate glucose levels will improve  Outcome: Ongoing     Problem: Pain:  Goal: Pain level will decrease  Description: Pain level will decrease  Outcome: Ongoing  Note: No chest pain reported this shift  Goal: Control of acute pain  Description: Control of acute pain  Outcome: Ongoing  Goal: Control of chronic pain  Description: Control of chronic pain  Outcome: Ongoing     Problem: Tissue Perfusion - Cardiopulmonary, Altered:  Goal: Absence of angina  Description: Absence of angina  Outcome: Ongoing  Goal: Circulatory function within specified parameters  Description: Circulatory function within specified parameters  Outcome: Ongoing  Goal: Hemodynamic stability will improve  Description: Hemodynamic stability will improve  Outcome: Ongoing     Problem: Tobacco Use:  Goal: Will participate in inpatient tobacco-use cessation counseling  Description: Will participate in inpatient tobacco-use cessation counseling  Outcome: Ongoing     Problem: Airway Clearance - Ineffective  Goal: Achieve or maintain patent airway  Outcome: Ongoing     Problem: Gas Exchange - Impaired  Goal: Absence of hypoxia  Outcome: Ongoing  Note: O2 sats stable this shift, weaned down to 11L so far  Goal: Promote optimal lung function  Outcome: Ongoing     Problem: Breathing Pattern - Ineffective  Goal: Ability to achieve and maintain a regular respiratory rate  Outcome: Ongoing     Problem:  Body Temperature -  Risk of, Imbalanced  Goal: Ability to maintain a body temperature within defined limits  Outcome: Ongoing  Note: No fever this shift  Goal: Will regain or maintain usual level of consciousness  Outcome: Ongoing  Goal: Complications related to the disease process, condition or treatment will be avoided or minimized  Outcome: Ongoing     Problem: Isolation Precautions - Risk of Spread of Infection  Goal: Prevent transmission of infection  Outcome: Ongoing     Problem: Nutrition Deficits  Goal: Optimize nutritional status  Outcome: Ongoing     Problem: Risk for Fluid Volume Deficit  Goal: Maintain normal heart rhythm  Outcome: Ongoing  Goal: Maintain absence of muscle cramping  Outcome: Ongoing  Goal: Maintain normal serum potassium, sodium, calcium, phosphorus, and pH  Outcome: Ongoing     Problem: Loneliness or Risk for Loneliness  Goal: Demonstrate positive use of time alone when socialization is not possible  Outcome: Ongoing     Problem: Fatigue  Goal: Verbalize increase energy and improved vitality  Outcome: Ongoing     Problem: Patient Education: Go to Patient Education Activity  Goal: Patient/Family Education  Outcome: Ongoing

## 2021-12-30 NOTE — PROGRESS NOTES
Nephrology Progress  Note  [phone]   [email or web address].        Reason for Consult:  MALCOLM , Hyperkalemia     The patient is a 80 y.o. female with significant past medical history of anemia, fibrillation, coronary artery disease, depression, hyperlipidemia, hypertension,  obesity, sleep apnea, diabetes mellitus type 2, who presented to the ER with sudden onset of shortness of breath accompanied by a nonproductive cough generalized weakness and fever  She tested positive for COVID-19  CTA was done on 12/25 using IV contrast and revealed a right lobar consolidation  Serum creatinine on 12/25 was 1.1 on 12/26 was 1, 1227 was 1.6 where it remains today  Calcium is elevated at 5.9  Is making urine there is no dysuria gross hematuria  She has never had a kidney stone though she says she has had acute kidney injury in the past from which she recovered completely  She  does not take NSAIDs  She says her stool is black, sees gastroenterologist Dr. [place]  He also sees a hematologist oncologist for bone marrow problem    Interval History:      Black stool : seen by GI , placed on PPI  Breathing is better   Creatinine has recovered , furosemide    PHYSICAL EXAM:    Vitals:    /69   Pulse 60   Temp 97.8 °F (36.6 °C) (Oral)   Resp 20   Ht 4' 11.5\" (1.511 m)   Wt 209 lb 3.2 oz (94.9 kg)   SpO2 93%   BMI 41.55 kg/m²   I/O last 3 completed shifts: In: 2364.5 [P.O.:1200; I.V.:506.7; IV Piggyback:657.8]  Out: 3350 [Urine:3350]  No intake/output data recorded. Physical Exam:  Gen:  alert, oriented x 3  PERRL , EOM +  Pallor +, No icterus  JVP not raised   CV: RRR no murmur or rub . Mid sternal scar of CABG   Lungs:B/ L air entry, Normal breath sounds   Abd: soft, bowel sounds + , No organomegaly , scar +   Ext: No edema, no cyanosis  Skin: Warm.   No rash  Neuro: nonfocal.      DATA:    CBC with Differential:    Lab Results   Component Value Date    WBC 3.2 12/30/2021    RBC 2.87 12/30/2021    HGB 8.5 12/30/2021    HCT 25.6 12/30/2021     12/30/2021    MCV 89.3 12/30/2021    MCH 29.5 12/30/2021    MCHC 33.0 12/30/2021    RDW 15.9 12/30/2021    NRBC CANCELED 09/23/2011    NRBC CANCELED 09/23/2011    SEGSPCT 46.5 12/28/2011    BANDSPCT 2 02/14/2020    BLASTSPCT CANCELED 09/23/2011    METASPCT 1 12/07/2016    LYMPHOPCT 14.4 12/30/2021    PROMYELOPCT CANCELED 09/23/2011    MONOPCT 10.8 12/30/2021    MYELOPCT CANCELED 09/23/2011    EOSPCT 6.3 12/28/2011    BASOPCT 0.1 12/30/2021    MONOSABS 0.3 12/30/2021    LYMPHSABS 0.5 12/30/2021    EOSABS 0.0 12/30/2021    BASOSABS 0.0 12/30/2021    DIFFTYPE Auto 12/28/2011     CMP:    Lab Results   Component Value Date     12/30/2021    K 5.0 12/30/2021    K 4.7 12/26/2021     12/30/2021    CO2 22 12/30/2021    BUN 54 12/30/2021    CREATININE 1.0 12/30/2021    GFRAA >60 12/30/2021    GFRAA >60 10/26/2012    AGRATIO 1.3 12/26/2021    LABGLOM 55 12/30/2021    LABGLOM 100 06/26/2012    GLUCOSE 245 12/30/2021    GLUCOSE 143 09/23/2011    PROT 6.3 12/29/2021    PROT 7.8 10/26/2012    LABALBU 3.7 12/26/2021    CALCIUM 9.2 12/30/2021    BILITOT <0.2 12/26/2021    ALKPHOS 61 12/26/2021    AST 24 12/26/2021    ALT 18 12/26/2021     Magnesium:    Lab Results   Component Value Date    MG 2.00 08/14/2020     Phosphorus:    Lab Results   Component Value Date    PHOS 4.1 08/14/2020     Troponin:    Lab Results   Component Value Date    TROPONINI 0.31 12/29/2021     U/A:    Lab Results   Component Value Date    COLORU YELLOW 12/28/2021    PROTEINU 30 12/28/2021    PHUR 5.0 12/28/2021    WBCUA 3 12/28/2021    RBCUA 1 12/28/2021    BACTERIA 4+ 06/20/2017    CLARITYU Clear 12/28/2021    SPECGRAV 1.012 12/28/2021    LEUKOCYTESUR Negative 12/28/2021    UROBILINOGEN 0.2 12/28/2021    BILIRUBINUR Negative 12/28/2021    BLOODU Negative 12/28/2021    GLUCOSEU Negative 12/28/2021           IMPRESSION/RECOMMENDATIONS:      1. MALCOLM: This is probably secondary to contrast nephropathy. Serum creatinine was normal when she had a CTA on 12/25. The other possibility is cardiorenal syndrome  Cr 1 ( was 1.4 ,  1.6, ),  monitor    2. Hyperkalemia: Due  to renal failure plus she was on potassium spironolactone and ARB . These  medications were stopped  K 5 , normal     3. COVID-19: On decadron , Remdesivir and anticoagulants     4. Coronary artery disease s/p CABG, has elevated troponin and is being evaluated by cardiology    5. Anemia secondary to possible GI bleed being seen by Dr. Britany Myrick    Thank you for allowing me to participate in the care of this patient. I will continue to follow along. Please call with questions.     Amber Swann MD, MD  12/30/2021  [facility]

## 2021-12-30 NOTE — PROGRESS NOTES
[facility]   Progress Note      12/30/2021 8:23 AM        Name: Vilma Poole . Admitted: 12/25/2021    SUBJECTIVE:  She remains in COVID isolation, CBC remains stable, afebrile, on high flow O2 13 L per NC.     Reviewed interval ancillary notes    Current Medications  SUMAtriptan (IMITREX) tablet 100 mg, Daily PRN  insulin lispro (1 Unit Dial) 0-12 Units, TID WC  insulin lispro (1 Unit Dial) 0-6 Units, Nightly  glucose (GLUTOSE) 40 % oral gel 15 g, PRN  dextrose 50 % IV solution, PRN  glucagon (rDNA) injection 1 mg, PRN  dextrose 5 % solution, PRN  pantoprazole (PROTONIX) injection 40 mg, BID  0.9 % sodium chloride infusion, PRN  ipratropium (ATROVENT HFA) 17 MCG/ACT inhaler 2 puff, TID  enoxaparin (LOVENOX) injection 40 mg, BID  remdesivir 100 mg in sodium chloride 0.9 % 250 mL IVPB, Q24H  guaiFENesin-dextromethorphan (ROBITUSSIN DM) 100-10 MG/5ML syrup 10 mL, Q4H PRN  dexamethasone (DECADRON) 20 mg in sodium chloride 0.9 % IVPB, Daily  albuterol sulfate  (90 Base) MCG/ACT inhaler 2 puff, Q4H PRN  perflutren lipid microspheres (DEFINITY) injection 1.65 mg, ONCE PRN  ALPRAZolam (XANAX) tablet 0.25 mg, BID PRN  escitalopram (LEXAPRO) tablet 20 mg, Daily  nitroGLYCERIN (NITROLINGUAL) 0.4 mg spray 1 spray, Q5 Min PRN  ezetimibe (ZETIA) tablet 5 mg - PATIENT SUPPLIED, Nightly  rosuvastatin (CRESTOR) tablet 20 mg, QPM  gabapentin (NEURONTIN) capsule 400 mg, TID  pioglitazone (ACTOS) tablet 15 mg, Daily  vitamin D (CHOLECALCIFEROL) tablet 1,000 Units, Daily  ascorbic acid (VITAMIN C) tablet 1,000 mg, Daily  estradiol (ESTRACE) vaginal cream 2 g, Once per day on Mon Thu  furosemide (LASIX) tablet 40 mg, Daily  aspirin EC tablet 81 mg, Daily  tiZANidine (ZANAFLEX) tablet 4 mg, Nightly PRN  metoprolol tartrate (LOPRESSOR) tablet 12.5 mg, PRN  amLODIPine (NORVASC) tablet 2.5 mg, BID  ferrous gluconate 324 (37.5 Fe) MG tablet 162 mg, Daily  zinc sulfate (ZINCATE) capsule 50 mg, Daily  sodium chloride flush 0.9 % injection 5-40 mL, 2 times per day  sodium chloride flush 0.9 % injection 10 mL, PRN  0.9 % sodium chloride infusion, PRN  ondansetron (ZOFRAN-ODT) disintegrating tablet 4 mg, Q8H PRN   Or  ondansetron (ZOFRAN) injection 4 mg, Q6H PRN  magnesium hydroxide (MILK OF MAGNESIA) 400 MG/5ML suspension 30 mL, Daily PRN  acetaminophen (TYLENOL) tablet 650 mg, Q6H PRN   Or  acetaminophen (TYLENOL) suppository 650 mg, Q6H PRN  cefTRIAXone (ROCEPHIN) 1000 mg in sterile water 10 mL IV syringe, Q24H  influenza quadrivalent split vaccine (FLUZONE;FLUARIX;FLULAVAL;AFLURIA) injection 0.5 mL, Prior to discharge  cetirizine (ZYRTEC) tablet 10 mg, Daily  hydroCHLOROthiazide (HYDRODIURIL) tablet 25 mg, Daily  isosorbide mononitrate (IMDUR) extended release tablet 30 mg, Daily        Objective:  /69   Pulse 60   Temp 97.8 °F (36.6 °C) (Oral)   Resp 20   Ht 4' 11.5\" (1.511 m)   Wt 209 lb 3.2 oz (94.9 kg)   SpO2 93%   BMI 41.55 kg/m²     Intake/Output Summary (Last 24 hours) at 12/30/2021 0823  Last data filed at 12/30/2021 0658  Gross per 24 hour   Intake 2364.52 ml   Output 3350 ml   Net -985.48 ml      Wt Readings from Last 3 Encounters:   12/25/21 209 lb 3.2 oz (94.9 kg)   12/16/21 211 lb 3.2 oz (95.8 kg)   05/25/21 218 lb 12.8 oz (99.2 kg)       Physical exam deferred d/t COVID isolation    Labs and Tests:  CBC:   Recent Labs     12/28/21  0457 12/28/21  1312 12/29/21  0538 12/29/21  0538 12/29/21  1247 12/29/21  2019 12/30/21  0456   WBC 3.6*  --  3.6*  --   --   --  3.2*   HGB 7.0*   < > 8.3*   < > 8.7* 8.8* 8.5*     --  235  --   --   --  250    < > = values in this interval not displayed.      BMP:    Recent Labs     12/28/21  1321 12/29/21  0538 12/30/21  0456   * 133* 135*   K 4.9 4.8 5.0   CL 98* 99 101   CO2 18* 20* 22   BUN 62* 65* 54*   CREATININE 1.6* 1.4* 1.0   GLUCOSE 203* 189* 245*     Hepatic: No results for input(s): AST, ALT, ALB, BILITOT, ALKPHOS in the last 72 hours. ASSESSMENT AND PLAN    Principal Problem:    NSTEMI (non-ST elevated myocardial infarction) ([facility] [address]  Active Problems:    Anemia    Pneumonia    Acute respiratory failure with hypoxia (HCC)    HTN (hypertension)    COVID-19    Acute respiratory failure due to severe acute respiratory syndrome coronavirus 2 (SARS-CoV-2) infection (HCC)    MALCOLM (acute kidney injury) ([facility] [address]    Melena  Resolved Problems:    * No resolved hospital problems. *      1. Hypoxic respiratory failure secondary to pneumonia due to COVID-19 infection  -Receiving dexamethasone and remdesivir.  -Actemra given 12/27/21     2. NSTEMI  -Cardiology following  -Ischemic workup when patient stable.     3. Acute on CKD  -Nephrology following.     4. Anemia due to myelodysplastic syndrome  -Hgb decreased from baseline this admission  -Received IV 3 doses venofer Oct - Nov.  - iron studies consistent with AOCD  - SPEP pending  -Evaluated by GI this admit, no plan for EGD at this time  -Transfuse if hgb less than 8     Mackenzie Mclean CNP  [facility]    Events noted. Hemoglobin is stable at 8.5. Continue current care. COVID-19 action management per primary team.  Transfuse PRBCs as needed.     Marco Walker MD

## 2021-12-30 NOTE — PLAN OF CARE
Problem: Falls - Risk of:  Goal: Will remain free from falls  Description: Will remain free from falls  Outcome: Ongoing  Note: All fall precautions in place, bed in lowest position, frequent rounding to assist with toileting. Pt absent of fall this shift.   Pt able to stand and walk to bed or from bed to chair with minimal decrease in oxygen saturation  Goal: Absence of physical injury  Description: Absence of physical injury  Outcome: Ongoing     Problem: Discharge Planning:  Goal: Discharged to appropriate level of care  Description: Discharged to appropriate level of care  Outcome: Ongoing     Problem: Cardiac Output - Decreased:  Goal: Hemodynamic stability will improve  Description: Hemodynamic stability will improve  Outcome: Ongoing     Problem: Serum Glucose Level - Abnormal:  Goal: Ability to maintain appropriate glucose levels will improve  Description: Ability to maintain appropriate glucose levels will improve  Outcome: Ongoing     Problem: Pain:  Goal: Pain level will decrease  Description: Pain level will decrease  Outcome: Ongoing  Goal: Control of acute pain  Description: Control of acute pain  Outcome: Ongoing  Note: Pt does not complain of any pain at this time  Goal: Control of chronic pain  Description: Control of chronic pain  Outcome: Ongoing     Problem: Tissue Perfusion - Cardiopulmonary, Altered:  Goal: Absence of angina  Description: Absence of angina  Outcome: Ongoing  Goal: Circulatory function within specified parameters  Description: Circulatory function within specified parameters  Outcome: Ongoing  Goal: Hemodynamic stability will improve  Description: Hemodynamic stability will improve  Outcome: Ongoing     Problem: Airway Clearance - Ineffective  Goal: Achieve or maintain patent airway  Outcome: Ongoing     Problem: Gas Exchange - Impaired  Goal: Absence of hypoxia  Outcome: Ongoing  Goal: Promote optimal lung function  Outcome: Ongoing     Problem: Breathing Pattern - Ineffective  Goal: Ability to achieve and maintain a regular respiratory rate  Outcome: Ongoing     Problem:  Body Temperature -  Risk of, Imbalanced  Goal: Ability to maintain a body temperature within defined limits  Outcome: Ongoing  Goal: Will regain or maintain usual level of consciousness  Outcome: Ongoing  Goal: Complications related to the disease process, condition or treatment will be avoided or minimized  Outcome: Ongoing     Problem: Isolation Precautions - Risk of Spread of Infection  Goal: Prevent transmission of infection  Outcome: Ongoing     Problem: Nutrition Deficits  Goal: Optimize nutritional status  Outcome: Ongoing     Problem: Risk for Fluid Volume Deficit  Goal: Maintain normal heart rhythm  Outcome: Ongoing  Goal: Maintain absence of muscle cramping  Outcome: Ongoing  Goal: Maintain normal serum potassium, sodium, calcium, phosphorus, and pH  Outcome: Ongoing     Problem: Loneliness or Risk for Loneliness  Goal: Demonstrate positive use of time alone when socialization is not possible  Outcome: Ongoing     Problem: Fatigue  Goal: Verbalize increase energy and improved vitality  Outcome: Ongoing     Problem: Patient Education: Go to Patient Education Activity  Goal: Patient/Family Education  Outcome: Ongoing

## 2021-12-30 NOTE — PROGRESS NOTES
[facility] Daily Progress Note      Admit Date:  12/25/2021    Chief Complaint   Patient presents with    Fatigue     pt states weakness, sob, fever, chills, since last night, vaccinated, no flu shoot/booster, had exposure, pt 84% on RA, placed on 4 LPM via nc        Subjective:  Ms. Natasha Lees feels much better but is still on high-flow O2 and still coughing quite a bit. No chest pain. No fever.     Objective:   /69   Pulse 60   Temp 97.8 °F (36.6 °C) (Oral)   Resp 20   Ht 4' 11.5\" (1.511 m)   Wt 209 lb 3.2 oz (94.9 kg)   SpO2 93%   BMI 41.55 kg/m²       Intake/Output Summary (Last 24 hours) at 12/30/2021 0835  Last data filed at 12/30/2021 0608  Gross per 24 hour   Intake 2364.52 ml   Output 3050 ml   Net -685.48 ml       Physical Exam:  General:  Awake, alert, oriented x 3, NAD  Skin:  Warm and dry  Neck:  JVD none  Chest:  rhonchi  Cardiovascular:  RRR S1S2, no S3, no murmur  Abdomen:  Soft, ND, NT, No HSM  Extremities:  1+ edema    Medications:    insulin lispro  0-12 Units SubCUTAneous TID WC    insulin lispro  0-6 Units SubCUTAneous Nightly    pantoprazole  40 mg IntraVENous BID    ipratropium  2 puff Inhalation TID    enoxaparin  40 mg SubCUTAneous BID    remdesivir IVPB  100 mg IntraVENous Q24H    dexamethasone (DECADRON) IVPB  20 mg IntraVENous Daily    escitalopram  20 mg Oral Daily    ezetimibe  5 mg Oral Nightly    rosuvastatin  20 mg Oral QPM    gabapentin  400 mg Oral TID    pioglitazone  15 mg Oral Daily    Vitamin D  1,000 Units Oral Daily    vitamin C  1,000 mg Oral Daily    estradiol  2 g Vaginal Once per day on Mon Thu    furosemide  40 mg Oral Daily    aspirin  81 mg Oral Daily    amLODIPine  2.5 mg Oral BID    ferrous gluconate  162 mg Oral Daily    zinc sulfate  50 mg Oral Daily    sodium chloride flush  5-40 mL IntraVENous 2 times per day    cefTRIAXone (ROCEPHIN) IV  1,000 mg IntraVENous Q24H    influenza virus vaccine  0.5 mL IntraMUSCular Prior to discharge    cetirizine  10 mg Oral Daily    hydroCHLOROthiazide  25 mg Oral Daily    isosorbide mononitrate  30 mg Oral Daily      dextrose      sodium chloride      sodium chloride 25 mL (12/29/21 1400)     SUMAtriptan, glucose, dextrose, glucagon (rDNA), dextrose, sodium chloride, guaiFENesin-dextromethorphan, albuterol sulfate HFA, perflutren lipid microspheres, ALPRAZolam, nitroGLYCERIN, tiZANidine, metoprolol tartrate, sodium chloride flush, sodium chloride, ondansetron **OR** ondansetron, magnesium hydroxide, acetaminophen **OR** acetaminophen    Lab Data:  CBC:   Recent Labs     12/28/21  0457 12/28/21  1312 12/29/21  0538 12/29/21  0538 12/29/21  1247 12/29/21  2019 12/30/21  0456   WBC 3.6*  --  3.6*  --   --   --  3.2*   HGB 7.0*   < > 8.3*   < > 8.7* 8.8* 8.5*   HCT 21.9*   < > 25.7*   < > 26.6* 26.7* 25.6*   MCV 91.9  --  89.8  --   --   --  89.3     --  235  --   --   --  250    < > = values in this interval not displayed. BMP:   Recent Labs     12/28/21  1321 12/29/21  0538 12/30/21  0456   * 133* 135*   K 4.9 4.8 5.0   CL 98* 99 101   CO2 18* 20* 22   BUN 62* 65* 54*   CREATININE 1.6* 1.4* 1.0     LIVER PROFILE: No results for input(s): AST, ALT, LIPASE, BILIDIR, BILITOT, ALKPHOS in the last 72 hours. Invalid input(s): AMYLASE,  ALB  PT/INR: No results for input(s): PROTIME, INR in the last 72 hours. APTT: No results for input(s): APTT in the last 72 hours. BNP:  No results for input(s): BNP in the last 72 hours. Assessment/Plan:  Principal Problem:    NSTEMI (non-ST elevated myocardial infarction) ([facility] [address]  Plan: Will need a cath when more recovered from COVID-19 pneumonia. Continue medical management for now    MALCOLM: has resolved    Anemia: improving    Currently on 10 LPM high-flow O2, once that can be weaned to < 4 L can consider doing a cath on her prior to discharge.         Ramandeep Long MD, MD 12/30/2021 8:35 AM

## 2021-12-30 NOTE — PROGRESS NOTES
Gastroenterology Progress Note    Say Rodriguez is a 80 y.o. female patient. 1. Pneumonia due to infectious organism, unspecified laterality, unspecified part of lung    2. Hypoxia    3. Elevated troponin    4. Anemia, unspecified type        SUBJECTIVE:  No BM yesterday or today. No abdominal pain. Feeling a little better. ROS:  Cardiovascular ROS: no chest pain or dyspnea on exertion  Gastrointestinal ROS: see hpi  Respiratory ROS: no cough, shortness of breath, or wheezing    Physical    VITALS:  /69   Pulse 60   Temp 97.8 °F (36.6 °C) (Oral)   Resp 20   Ht 4' 11.5\" (1.511 m)   Wt 209 lb 3.2 oz (94.9 kg)   SpO2 93%   BMI 41.55 kg/m²   TEMPERATURE:  Current - Temp: 97.8 °F (36.6 °C); Max - Temp  Av.8 °F (36.6 °C)  Min: 97.7 °F (36.5 °C)  Max: 98 °F (36.7 °C)    NAD  RRR, Nl s1s2  Lungs crackles  Abdomen soft, ND, NT, no HSM, Bowel sounds normal  AAOx3, No asterixis     Data    Data Review:    Recent Labs     21  0457 21  1312 21  0538 21  0538 21  1247 21  2019 21  0456   WBC 3.6*  --  3.6*  --   --   --  3.2*   HGB 7.0*   < > 8.3*   < > 8.7* 8.8* 8.5*   HCT 21.9*   < > 25.7*   < > 26.6* 26.7* 25.6*   MCV 91.9  --  89.8  --   --   --  89.3     --  235  --   --   --  250    < > = values in this interval not displayed. Recent Labs     21  1321 21  0538 21  0456   * 133* 135*   K 4.9 4.8 5.0   CL 98* 99 101   CO2 18* 20* 22   BUN 62* 65* 54*   CREATININE 1.6* 1.4* 1.0     No results for input(s): AST, ALT, ALB, BILIDIR, BILITOT, ALKPHOS in the last 72 hours. No results for input(s): LIPASE, AMYLASE in the last 72 hours. No results for input(s): PROTIME, INR in the last 72 hours. No results for input(s): PTT in the last 72 hours. ASSESSMENT     Melena and anemia - ? PUD. Denies NSAID use. hgb stable. Covid pneumonia - on 13 L O2.    CAD s/p remote CABG  MDS    PLAN    IV PPI BID   Transfuse

## 2021-12-30 NOTE — PROGRESS NOTES
Progress Note - Dr. Andres Edmonds - Internal Medicine  PCP: Cheryle Mains [place], [address]  / [facility] [address] Nish Serrano 1 Day: 5  Code Status: Full Code  Current Diet: ADULT DIET; Regular        CC: follow up on medical issues    Subjective:   Noé Riojas is a 80 y.o. female. Pt seen and examined  Chart reviewed since last visit, labs and imaging below    Remains  on 3T  Pt now on 13L high flow o2    Trop elevated; Cards on board  No plans for immediate ischemic workup, though this will be indicated if/when pt is more stable    Creat 1.0  D/w dr Marcio Jordan - no further renal recs at this time    Pt with melena noted, 12/28  GI consulted - placed on PPI. No emergent scope per Dr Kylee David  hgb stable 8.5    She denies chest pain, denies shortness of breath, denies nausea,  denies emesis. 10 system Review of Systems is reviewed with patient, and pertinent positives are noted in HPI above . Otherwise, Review of systems is negative. I have reviewed the patient's medical and social history in detail and updated the computerized patient record. To recap: She  has a past medical history of Anemia, Anesthesia, Anesthesia complication, Atrial fibrillation ([address] CAD (coronary artery disease), Chest pain, Chronic kidney disease, Depression, GERD (gastroesophageal reflux disease), Glaucoma, Hiatal hernia, Hyperlipidemia, Hypertension, Migraines, neuralgic, Morbid obesity ([address] Osteoarthritis, Sleep apnea, Type II or unspecified type diabetes mellitus without mention of complication, not stated as uncontrolled, Unspecified sleep apnea, Urinary incontinence, and UTI (urinary tract infection). . She  has a past surgical history that includes Coronary artery bypass graft (1999); Hammer toe surgery; Ovary removal (Bilateral, 1997); Esophagus dilation; Breast lumpectomy; Knee arthroscopy; Gastric Band (12/20/11); Colonoscopy; Upper gastrointestinal endoscopy;  Upper gastrointestinal endoscopy (10/30/15); Cosmetic surgery; joint replacement (1995); joint replacement (1995); orif humerus decompression (Left, 2/25/2016); Upper gastrointestinal endoscopy (10/14/2016); Bariatric Surgery (11/03/2016); ablation of dysrhythmic focus; Colonoscopy (10/08/2018); Colonoscopy (N/A, 10/8/2018); pr njx dx/ther sbst intrlmnr crv/thrc w/img gdn (N/A, 11/5/2018); Cardiac surgery (2/22/1999); Cardiac catheterization; Injection Procedure For Sacroiliac Joint (Left, 12/17/2018); Enteroscopy (N/A, 2/26/2019); ventral hernia repair (N/A, 10/29/2019); and Abdomen surgery (N/A, 9/1/2020). . She  reports that she has never smoked. She has never used smokeless tobacco. She reports that she does not drink alcohol and does not use drugs. .        Active Hospital Problems    Diagnosis Date Noted    Melena [K92.1] 12/29/2021    Acute respiratory failure due to severe acute respiratory syndrome coronavirus 2 (SARS-CoV-2) infection (HCC) [U07.1, J96.00] 12/28/2021    MALCOLM (acute kidney injury) ([facility] [N17.9] 12/28/2021    COVID-19 [U07.1] 12/27/2021    NSTEMI (non-ST elevated myocardial infarction) ([facility] [I21.4] 12/25/2021    Pneumonia [J18.9] 12/25/2021    Acute respiratory failure with hypoxia (HCC) [J96.01] 12/25/2021    HTN (hypertension) [I10] 12/25/2021    Anemia [D64.9] 10/07/2018       Current Facility-Administered Medications: SUMAtriptan (IMITREX) tablet 100 mg, 100 mg, Oral, Daily PRN  insulin lispro (1 Unit Dial) 0-12 Units, 0-12 Units, SubCUTAneous, TID WC  insulin lispro (1 Unit Dial) 0-6 Units, 0-6 Units, SubCUTAneous, Nightly  glucose (GLUTOSE) 40 % oral gel 15 g, 15 g, Oral, PRN  dextrose 50 % IV solution, 12.5 g, IntraVENous, PRN  glucagon (rDNA) injection 1 mg, 1 mg, IntraMUSCular, PRN  dextrose 5 % solution, 100 mL/hr, IntraVENous, PRN  pantoprazole (PROTONIX) injection 40 mg, 40 mg, IntraVENous, BID  0.9 % sodium chloride infusion, , IntraVENous, PRN  ipratropium (ATROVENT HFA) 17 MCG/ACT inhaler 2 puff, 2 puff, Inhalation, TID  enoxaparin (LOVENOX) injection 40 mg, 40 mg, SubCUTAneous, BID  [COMPLETED] remdesivir 200 mg in sodium chloride 0.9 % 250 mL IVPB, 200 mg, IntraVENous, Once **FOLLOWED BY** remdesivir 100 mg in sodium chloride 0.9 % 250 mL IVPB, 100 mg, IntraVENous, Q24H  guaiFENesin-dextromethorphan (ROBITUSSIN DM) 100-10 MG/5ML syrup 10 mL, 10 mL, Oral, Q4H PRN  dexamethasone (DECADRON) 20 mg in sodium chloride 0.9 % IVPB, 20 mg, IntraVENous, Daily  albuterol sulfate  (90 Base) MCG/ACT inhaler 2 puff, 2 puff, Inhalation, Q4H PRN  perflutren lipid microspheres (DEFINITY) injection 1.65 mg, 1.5 mL, IntraVENous, ONCE PRN  ALPRAZolam (XANAX) tablet 0.25 mg, 0.25 mg, Oral, BID PRN  escitalopram (LEXAPRO) tablet 20 mg, 20 mg, Oral, Daily  nitroGLYCERIN (NITROLINGUAL) 0.4 mg spray 1 spray, 1 spray, SubLINGual, Q5 Min PRN  ezetimibe (ZETIA) tablet 5 mg - PATIENT SUPPLIED, 5 mg, Oral, Nightly  rosuvastatin (CRESTOR) tablet 20 mg, 20 mg, Oral, QPM  gabapentin (NEURONTIN) capsule 400 mg, 400 mg, Oral, TID  pioglitazone (ACTOS) tablet 15 mg, 15 mg, Oral, Daily  vitamin D (CHOLECALCIFEROL) tablet 1,000 Units, 1,000 Units, Oral, Daily  ascorbic acid (VITAMIN C) tablet 1,000 mg, 1,000 mg, Oral, Daily  estradiol (ESTRACE) vaginal cream 2 g, 2 g, Vaginal, Once per day on Mon Thu  furosemide (LASIX) tablet 40 mg, 40 mg, Oral, Daily  aspirin EC tablet 81 mg, 81 mg, Oral, Daily  tiZANidine (ZANAFLEX) tablet 4 mg, 4 mg, Oral, Nightly PRN  metoprolol tartrate (LOPRESSOR) tablet 12.5 mg, 12.5 mg, Oral, PRN  amLODIPine (NORVASC) tablet 2.5 mg, 2.5 mg, Oral, BID  ferrous gluconate 324 (37.5 Fe) MG tablet 162 mg, 162 mg, Oral, Daily  zinc sulfate (ZINCATE) capsule 50 mg, 50 mg, Oral, Daily  sodium chloride flush 0.9 % injection 5-40 mL, 5-40 mL, IntraVENous, 2 times per day  sodium chloride flush 0.9 % injection 10 mL, 10 mL, IntraVENous, PRN  0.9 % sodium chloride infusion, 25 mL, IntraVENous, PRN  ondansetron (ZOFRAN-ODT) disintegrating tablet 4 mg, 4 mg, Oral, Q8H PRN **OR** ondansetron (ZOFRAN) injection 4 mg, 4 mg, IntraVENous, Q6H PRN  magnesium hydroxide (MILK OF MAGNESIA) 400 MG/5ML suspension 30 mL, 30 mL, Oral, Daily PRN  acetaminophen (TYLENOL) tablet 650 mg, 650 mg, Oral, Q6H PRN **OR** acetaminophen (TYLENOL) suppository 650 mg, 650 mg, Rectal, Q6H PRN  cefTRIAXone (ROCEPHIN) 1000 mg in sterile water 10 mL IV syringe, 1,000 mg, IntraVENous, Q24H **AND** [] azithromycin (ZITHROMAX) tablet 500 mg, 500 mg, Oral, Q24H  influenza quadrivalent split vaccine (FLUZONE;FLUARIX;FLULAVAL;AFLURIA) injection 0.5 mL, 0.5 mL, IntraMUSCular, Prior to discharge  cetirizine (ZYRTEC) tablet 10 mg, 10 mg, Oral, Daily  [DISCONTINUED] losartan (COZAAR) tablet 100 mg, 100 mg, Oral, Daily **AND** hydroCHLOROthiazide (HYDRODIURIL) tablet 25 mg, 25 mg, Oral, Daily  isosorbide mononitrate (IMDUR) extended release tablet 30 mg, 30 mg, Oral, Daily         Objective:  /69   Pulse 60   Temp 97.8 °F (36.6 °C) (Oral)   Resp 20   Ht 4' 11.5\" (1.511 m)   Wt 209 lb 3.2 oz (94.9 kg)   SpO2 93%   BMI 41.55 kg/m²      Patient Vitals for the past 24 hrs:   BP Temp Temp src Pulse Resp SpO2   21 0715 128/69 97.8 °F (36.6 °C) Oral 60 20 93 %   215 -- -- -- -- 18 93 %   21 0352 (!) 157/70 97.8 °F (36.6 °C) Oral 60 18 94 %   21 -- -- -- -- 20 --   21 -- -- -- -- 20 92 %   21 (!) 155/69 97.9 °F (36.6 °C) Oral 60 20 92 %   21 1656 (!) 171/62 97.7 °F (36.5 °C) Oral 72 20 92 %   21 1635 -- -- -- -- 20 92 %   21 1151 (!) 147/57 98 °F (36.7 °C) Oral 61 20 90 %     No data found.         Intake/Output Summary (Last 24 hours) at 2021 0955  Last data filed at 2021 0608  Gross per 24 hour   Intake 2124.52 ml   Output 3050 ml   Net -925.48 ml         Physical Exam:   Vitals as above  General appearance: alert, appears stated age and cooperative    Head: Normocephalic, without obvious abnormality, atraumatic    Lungs: crackles bilat  Heart: regular rate and rhythm, S1, S2 normal, no murmur    Abdomen: soft, non-tender; bowel sounds normal; no masses, no organomegaly    Extremities: extremities normal, atraumatic, no cyanosis, no edema      Labs:  Lab Results   Component Value Date    WBC 3.2 (L) 12/30/2021    HGB 8.5 (L) 12/30/2021    HCT 25.6 (L) 12/30/2021     12/30/2021    CHOL 109 05/22/2020    TRIG 132 05/22/2020    HDL 36 (L) 05/22/2020    LDLDIRECT 90 01/18/2017    ALT 18 12/26/2021    AST 24 12/26/2021     (L) 12/30/2021    K 5.0 12/30/2021     12/30/2021    CREATININE 1.0 12/30/2021    BUN 54 (H) 12/30/2021    CO2 22 12/30/2021    TSH 1.95 05/22/2020    INR 1.12 12/25/2021    LABA1C 6.2 12/28/2021    LABMICR YES 12/28/2021     Lab Results   Component Value Date    CKTOTAL 157 01/17/2017    TROPONINI 0.31 (H) 12/29/2021       Recent Imaging Results are Reviewed:  XR CHEST PORTABLE    Result Date: 12/26/2021  EXAMINATION: ONE XRAY VIEW OF THE CHEST 12/26/2021 1:17 pm COMPARISON: 12/25/2021 HISTORY: ORDERING SYSTEM PROVIDED HISTORY: pneumonia TECHNOLOGIST PROVIDED HISTORY: Reason for exam:->pneumonia Reason for Exam: pneumonia FINDINGS: Generalized cardiomegaly is noted. Dense alveolar consolidation in the right upper lobe along the minor fissure is unchanged. There is some hazy parenchymal lower lobe opacity bilaterally. There is a left dual lead pacemaker. Postop changes in the proximal left humerus. Persistent dense alveolar consolidation in the right upper lobe compatible with pneumonia. Cardiomegaly and mild pulmonary venous hypertension. XR CHEST PORTABLE    Result Date: 12/25/2021  EXAMINATION: ONE XRAY VIEW OF THE CHEST 12/25/2021 4:18 pm COMPARISON: 2019 comparison HISTORY: ORDERING SYSTEM PROVIDED HISTORY: SOB TECHNOLOGIST PROVIDED HISTORY: Reason for exam:->SOB FINDINGS: Mild cardiomegaly.  Cardiac pacemaker leads in stable/satisfactory position with no evidence of pneumothorax. Large consolidation identified in the right mid lung. Additional moderate diffuse infiltrates and/or congestion identified in the lungs. Significant osteopenic changes and degenerative changes noted in the bony structures. Findings suspicious for bilateral infiltrates in the lungs with large consolidation in the right mid lung. Underlying right lung mass difficult to exclude. CT of the chest may be obtained for further clarification. CT CHEST PULMONARY EMBOLISM W CONTRAST    Result Date: 12/25/2021  EXAMINATION: CTA OF THE CHEST 12/25/2021 5:46 pm TECHNIQUE: CTA of the chest was performed after the administration of intravenous contrast.  Multiplanar reformatted images are provided for review. MIP images are provided for review. Dose modulation, iterative reconstruction, and/or weight based adjustment of the mA/kV was utilized to reduce the radiation dose to as low as reasonably achievable. COMPARISON: 09/05/2018. Chest x-ray performed earlier today. HISTORY: ORDERING SYSTEM PROVIDED HISTORY: SOB/fever/abnormal chest xray TECHNOLOGIST PROVIDED HISTORY: Reason for exam:->SOB/fever/abnormal chest xray Decision Support Exception - unselect if not a suspected or confirmed emergency medical condition->Emergency Medical Condition (MA) Reason for Exam: SOB/fever/abnormal chest xray FINDINGS: Pulmonary Arteries: Pulmonary arteries are adequately opacified for evaluation. No evidence of intraluminal filling defect to suggest pulmonary embolism. Main pulmonary artery is normal in caliber. Mediastinum: The thoracic aorta is normal in caliber with calcified atherosclerotic plaque. Cardiomegaly with coronary vascular calcification and previous median sternotomy. No pericardial effusion. Pacer leads are in place. Multiple mediastinal nodes are not enlarged by size criteria. The esophagus is unremarkable.  Lungs/pleura: Dense consolidation with air bronchograms in the posterior segment of the right upper lobe. Patchy infiltrate is noted elsewhere within the right upper lobe as well as in the right middle lobe and superior segment of the right lower lobe. The left lung is clear. Dependent atelectasis in the lower lobes bilaterally. Trace right pleural effusion. The central airways are patent. Upper Abdomen: The visualized upper abdominal viscera are unremarkable. There is a stable 2.3 cm left adrenal myelolipoma. Soft Tissues/Bones: No acute bone or soft tissue abnormality. 1. No evidence of pulmonary embolic disease. 2. Consolidation in the right upper lobe consistent with pneumonia. The findings are more typical of bacterial etiology and are not consistent with COVID-19 pneumonia. Additional areas of patchy infiltrate in the right middle lobe and superior segment of the right lower lobe. Continued plain film follow-up recommended to ensure clearing. 3. Trace right pleural effusion. Mild bibasilar atelectasis. 4. Cardiomegaly. Coronary vascular calcification. RECOMMENDATIONS: Plain film follow-up recommended to ensure clearing. Repeat PA and lateral chest x-ray in 1-2 months following therapy would be reasonable. Lab Results   Component Value Date    GLUCOSE 245 12/30/2021    GLUCOSE 143 09/23/2011     Lab Results   Component Value Date    POCGLU 223 12/30/2021     /69   Pulse 60   Temp 97.8 °F (36.6 °C) (Oral)   Resp 20   Ht 4' 11.5\" (1.511 m)   Wt 209 lb 3.2 oz (94.9 kg)   SpO2 93%   BMI 41.55 kg/m²     Assessment and Plan:  Principal Problem:    Acute respiratory failure due to severe acute respiratory syndrome coronavirus 2 (SARS-CoV-2) infection ([facility] -Established problem. Uncontrolled. On 13L high flow. Plan: cont o2 support, cont iv remdesivir. Got actemra 12/27. Pt is full code; if she decompensates, icu transfer and intubation would be required   NSTEMI (non-ST elevated myocardial infarction) ([facility] -Established problem. Stable.   Trop

## 2021-12-31 LAB
ANION GAP SERPL CALCULATED.3IONS-SCNC: 11 MMOL/L (ref 3–16)
ANISOCYTOSIS: ABNORMAL
BANDED NEUTROPHILS RELATIVE PERCENT: 3 % (ref 0–7)
BASOPHILS ABSOLUTE: 0 K/UL (ref 0–0.2)
BASOPHILS RELATIVE PERCENT: 0 %
BUN BLDV-MCNC: 50 MG/DL (ref 7–20)
C-REACTIVE PROTEIN: 27.5 MG/L (ref 0–5.1)
CALCIUM SERPL-MCNC: 9.4 MG/DL (ref 8.3–10.6)
CHLORIDE BLD-SCNC: 100 MMOL/L (ref 99–110)
CO2: 24 MMOL/L (ref 21–32)
CREAT SERPL-MCNC: 0.9 MG/DL (ref 0.6–1.2)
EOSINOPHILS ABSOLUTE: 0 K/UL (ref 0–0.6)
EOSINOPHILS RELATIVE PERCENT: 0 %
GFR AFRICAN AMERICAN: >60
GFR NON-AFRICAN AMERICAN: >60
GLUCOSE BLD-MCNC: 214 MG/DL (ref 70–99)
GLUCOSE BLD-MCNC: 277 MG/DL (ref 70–99)
GLUCOSE BLD-MCNC: 362 MG/DL (ref 70–99)
GLUCOSE BLD-MCNC: 364 MG/DL (ref 70–99)
GLUCOSE BLD-MCNC: 404 MG/DL (ref 70–99)
HCT VFR BLD CALC: 24.2 % (ref 36–48)
HCT VFR BLD CALC: 24.4 % (ref 36–48)
HCT VFR BLD CALC: 24.5 % (ref 36–48)
HEMOGLOBIN: 8 G/DL (ref 12–16)
HEMOGLOBIN: 8 G/DL (ref 12–16)
HEMOGLOBIN: 8.1 G/DL (ref 12–16)
HYPOCHROMIA: ABNORMAL
LYMPHOCYTES ABSOLUTE: 0.5 K/UL (ref 1–5.1)
LYMPHOCYTES RELATIVE PERCENT: 14 %
MCH RBC QN AUTO: 29.4 PG (ref 26–34)
MCHC RBC AUTO-ENTMCNC: 33.2 G/DL (ref 31–36)
MCV RBC AUTO: 88.6 FL (ref 80–100)
METAMYELOCYTES RELATIVE PERCENT: 2 %
MONOCYTES ABSOLUTE: 0.2 K/UL (ref 0–1.3)
MONOCYTES RELATIVE PERCENT: 5 %
NEUTROPHILS ABSOLUTE: 3.2 K/UL (ref 1.7–7.7)
NEUTROPHILS RELATIVE PERCENT: 76 %
OVALOCYTES: ABNORMAL
PDW BLD-RTO: 15.3 % (ref 12.4–15.4)
PERFORMED ON: ABNORMAL
PLATELET # BLD: 270 K/UL (ref 135–450)
PLATELET SLIDE REVIEW: ADEQUATE
PMV BLD AUTO: 7.9 FL (ref 5–10.5)
POLYCHROMASIA: ABNORMAL
POTASSIUM SERPL-SCNC: 5 MMOL/L (ref 3.5–5.1)
RBC # BLD: 2.73 M/UL (ref 4–5.2)
SLIDE REVIEW: ABNORMAL
SODIUM BLD-SCNC: 135 MMOL/L (ref 136–145)
TROPONIN: 0.21 NG/ML
WBC # BLD: 3.9 K/UL (ref 4–11)

## 2021-12-31 PROCEDURE — 85014 HEMATOCRIT: CPT

## 2021-12-31 PROCEDURE — 6360000002 HC RX W HCPCS: Performed by: INTERNAL MEDICINE

## 2021-12-31 PROCEDURE — C9113 INJ PANTOPRAZOLE SODIUM, VIA: HCPCS | Performed by: INTERNAL MEDICINE

## 2021-12-31 PROCEDURE — 36415 COLL VENOUS BLD VENIPUNCTURE: CPT

## 2021-12-31 PROCEDURE — 2060000000 HC ICU INTERMEDIATE R&B

## 2021-12-31 PROCEDURE — 86140 C-REACTIVE PROTEIN: CPT

## 2021-12-31 PROCEDURE — 2500000003 HC RX 250 WO HCPCS: Performed by: INTERNAL MEDICINE

## 2021-12-31 PROCEDURE — 6370000000 HC RX 637 (ALT 250 FOR IP): Performed by: INTERNAL MEDICINE

## 2021-12-31 PROCEDURE — 80048 BASIC METABOLIC PNL TOTAL CA: CPT

## 2021-12-31 PROCEDURE — 2700000000 HC OXYGEN THERAPY PER DAY

## 2021-12-31 PROCEDURE — 94660 CPAP INITIATION&MGMT: CPT

## 2021-12-31 PROCEDURE — 94761 N-INVAS EAR/PLS OXIMETRY MLT: CPT

## 2021-12-31 PROCEDURE — 85025 COMPLETE CBC W/AUTO DIFF WBC: CPT

## 2021-12-31 PROCEDURE — 85018 HEMOGLOBIN: CPT

## 2021-12-31 PROCEDURE — 94640 AIRWAY INHALATION TREATMENT: CPT

## 2021-12-31 PROCEDURE — 84484 ASSAY OF TROPONIN QUANT: CPT

## 2021-12-31 PROCEDURE — 2580000003 HC RX 258: Performed by: INTERNAL MEDICINE

## 2021-12-31 RX ORDER — DEXAMETHASONE SODIUM PHOSPHATE 10 MG/ML
10 INJECTION, SOLUTION INTRAMUSCULAR; INTRAVENOUS DAILY
Status: DISCONTINUED | OUTPATIENT
Start: 2021-12-31 | End: 2022-01-02 | Stop reason: HOSPADM

## 2021-12-31 RX ADMIN — ISOSORBIDE MONONITRATE 30 MG: 30 TABLET, EXTENDED RELEASE ORAL at 13:08

## 2021-12-31 RX ADMIN — HYDROCHLOROTHIAZIDE 25 MG: 25 TABLET ORAL at 13:07

## 2021-12-31 RX ADMIN — OXYCODONE HYDROCHLORIDE AND ACETAMINOPHEN 1000 MG: 500 TABLET ORAL at 13:11

## 2021-12-31 RX ADMIN — Medication 10 ML: at 21:55

## 2021-12-31 RX ADMIN — FUROSEMIDE 40 MG: 40 TABLET ORAL at 13:08

## 2021-12-31 RX ADMIN — PIOGLITAZONE 15 MG: 15 TABLET ORAL at 14:05

## 2021-12-31 RX ADMIN — Medication 2 PUFF: at 12:53

## 2021-12-31 RX ADMIN — DEXAMETHASONE SODIUM PHOSPHATE 10 MG: 10 INJECTION, SOLUTION INTRAMUSCULAR; INTRAVENOUS at 13:10

## 2021-12-31 RX ADMIN — Medication 162 MG: at 14:08

## 2021-12-31 RX ADMIN — REMDESIVIR 100 MG: 100 INJECTION, POWDER, LYOPHILIZED, FOR SOLUTION INTRAVENOUS at 13:55

## 2021-12-31 RX ADMIN — AMLODIPINE BESYLATE 2.5 MG: 5 TABLET ORAL at 13:08

## 2021-12-31 RX ADMIN — INSULIN LISPRO 10 UNITS: 100 INJECTION, SOLUTION INTRAVENOUS; SUBCUTANEOUS at 17:13

## 2021-12-31 RX ADMIN — ENOXAPARIN SODIUM 40 MG: 100 INJECTION SUBCUTANEOUS at 13:09

## 2021-12-31 RX ADMIN — PANTOPRAZOLE SODIUM 40 MG: 40 INJECTION, POWDER, FOR SOLUTION INTRAVENOUS at 21:55

## 2021-12-31 RX ADMIN — ENOXAPARIN SODIUM 40 MG: 100 INJECTION SUBCUTANEOUS at 21:55

## 2021-12-31 RX ADMIN — AMLODIPINE BESYLATE 2.5 MG: 5 TABLET ORAL at 21:54

## 2021-12-31 RX ADMIN — CETIRIZINE HYDROCHLORIDE 10 MG: 10 TABLET, FILM COATED ORAL at 13:09

## 2021-12-31 RX ADMIN — Medication 2 PUFF: at 20:23

## 2021-12-31 RX ADMIN — Medication 10 ML: at 13:12

## 2021-12-31 RX ADMIN — GABAPENTIN 400 MG: 400 CAPSULE ORAL at 21:54

## 2021-12-31 RX ADMIN — ASPIRIN 81 MG: 81 TABLET, COATED ORAL at 13:08

## 2021-12-31 RX ADMIN — ROSUVASTATIN CALCIUM 20 MG: 20 TABLET, FILM COATED ORAL at 17:09

## 2021-12-31 RX ADMIN — Medication 2 PUFF: at 09:18

## 2021-12-31 RX ADMIN — INSULIN LISPRO 10 UNITS: 100 INJECTION, SOLUTION INTRAVENOUS; SUBCUTANEOUS at 13:43

## 2021-12-31 RX ADMIN — Medication 50 MG: at 13:12

## 2021-12-31 RX ADMIN — ESCITALOPRAM OXALATE 20 MG: 10 TABLET ORAL at 13:08

## 2021-12-31 RX ADMIN — INSULIN LISPRO 6 UNITS: 100 INJECTION, SOLUTION INTRAVENOUS; SUBCUTANEOUS at 21:58

## 2021-12-31 RX ADMIN — INSULIN GLARGINE 10 UNITS: 100 INJECTION, SOLUTION SUBCUTANEOUS at 13:43

## 2021-12-31 RX ADMIN — PANTOPRAZOLE SODIUM 40 MG: 40 INJECTION, POWDER, FOR SOLUTION INTRAVENOUS at 13:09

## 2021-12-31 RX ADMIN — Medication 1000 UNITS: at 13:08

## 2021-12-31 RX ADMIN — GABAPENTIN 400 MG: 400 CAPSULE ORAL at 13:08

## 2021-12-31 ASSESSMENT — PAIN SCALES - GENERAL
PAINLEVEL_OUTOF10: 0

## 2021-12-31 NOTE — PROGRESS NOTES
Pt had one black/tarry stool overnight. Unable to obtain sputum specimen overnight.  Dayshift RN made aware

## 2021-12-31 NOTE — PROGRESS NOTES
Gastroenterology Progress Note    Paul Delong is a 80 y.o. female patient. 1. Pneumonia due to infectious organism, unspecified laterality, unspecified part of lung    2. Hypoxia    3. Elevated troponin    4. Anemia, unspecified type        Admission Date: 2021  Hospital Day: Hospital Day: 7  Attending: Ruth Melgoza MD  Date of service: 21    SUBJECTIVE:    Patient had dark stools again   No abdominal pain   Oxygen requirements decreased to 7 liters    ROS:  Cardiovascular ROS: no chest pain or dyspnea on exertion  Gastrointestinal ROS: see above  Respiratory ROS: no cough, shortness of breath, or wheezing    Physical    VITALS:  BP (!) 145/66   Pulse 60   Temp 96.6 °F (35.9 °C) (Oral)   Resp 18   Ht 4' 11.5\" (1.511 m)   Wt 209 lb 3.2 oz (94.9 kg)   SpO2 95%   BMI 41.55 kg/m²   TEMPERATURE:  Current - Temp: 96.6 °F (35.9 °C); Max - Temp  Av.7 °F (36.5 °C)  Min: 96.6 °F (35.9 °C)  Max: 98.2 °F (36.8 °C)    NAD  RRR, Nl s1s2  Lungs CTA Bilaterally, normal effort  Abdomen soft, ND, NT, no HSM, Bowel sounds normal  AAOx3, No asterixis     Data      CBC:   Recent Labs     21  0538 21  1247 21  0456 21  0456 21  1350 21  2149 21  0523   WBC 3.6*  --  3.2*  --   --   --  3.9*   RBC 2.86*  --  2.87*  --   --   --  2.73*   HGB 8.3*   < > 8.5*   < > 8.6* 7.9* 8.0*   HCT 25.7*   < > 25.6*   < > 26.2* 23.6* 24.2*     --  250  --   --   --  270   MCV 89.8  --  89.3  --   --   --  88.6   MCH 29.2  --  29.5  --   --   --  29.4   MCHC 32.5  --  33.0  --   --   --  33.2   RDW 15.8*  --  15.9*  --   --   --  15.3    < > = values in this interval not displayed.         BMP:  Recent Labs     21  0538 21  0456 21  0523   * 135* 135*   K 4.8 5.0 5.0   CL 99 101 100   CO2 20* 22 24   BUN 65* 54* 50*   CREATININE 1.4* 1.0 0.9   CALCIUM 9.1 9.2 9.4   GLUCOSE 189* 245* 277*        Hepatic Function Panel:   No results for input(s): AST, ALT, BILIDIR, BILITOT, ALKPHOS in the last 72 hours. No results for input(s): LIPASE, AMYLASE in the last 72 hours. No results for input(s): PROTIME, INR in the last 72 hours. No results for input(s): PTT in the last 72 hours. No results for input(s): OCCULTBLD in the last 72 hours. Radiology Review:    XR CHEST PORTABLE   Final Result   Mildly increased right-side predominant airspace opacities. XR CHEST PORTABLE   Final Result   Persistent dense alveolar consolidation in the right upper lobe compatible   with pneumonia. Cardiomegaly and mild pulmonary venous hypertension. CT CHEST PULMONARY EMBOLISM W CONTRAST   Final Result   1. No evidence of pulmonary embolic disease. 2. Consolidation in the right upper lobe consistent with pneumonia. The   findings are more typical of bacterial etiology and are not consistent with   COVID-19 pneumonia. Additional areas of patchy infiltrate in the right   middle lobe and superior segment of the right lower lobe. Continued plain   film follow-up recommended to ensure clearing. 3. Trace right pleural effusion. Mild bibasilar atelectasis. 4. Cardiomegaly. Coronary vascular calcification. RECOMMENDATIONS:   Plain film follow-up recommended to ensure clearing. Repeat PA and lateral   chest x-ray in 1-2 months following therapy would be reasonable. XR CHEST PORTABLE   Final Result   Findings suspicious for bilateral infiltrates in the lungs with large   consolidation in the right mid lung. Underlying right lung mass difficult to   exclude. CT of the chest may be obtained for further clarification.                ASSESSMENT   Melena and anemia - ? PUD. Denies NSAID use. hgb stable but trending down at 8+ range   Covid pneumonia - on 13 L O2.    CAD s/p remote CABG  MDS     PLAN    IV PPI BID   Transfuse pRBC to keep Hb>8  Monitor H/H Q8  Hold off EGD unless overtly bleeding and not responding to blood transfusions (especially with her tenuous respiratory status)     Senthil Kapadia MD, MSc  [facility]  12/31/21

## 2021-12-31 NOTE — PROGRESS NOTES
Nutrition Assessment     Type and Reason for Visit: Initial (LOS)    Nutrition Recommendations/Plan:   No nutrition rec's @ this time. Nutrition Assessment:  Pt is at low risk for nutrition compromise AEB po intake % of meals on regular diet. Wt is stable. No nutrition concerns expressed @ this time. Malnutrition Assessment:  Malnutrition Status: No malnutrition    Nutrition Related Findings: LBM 12/30; +2 BUE edema; nonpitting edema BLE      Current Nutrition Therapies:    ADULT DIET;  Regular    Anthropometric Measures:  · Height: 4' 11.49\" (151.1 cm)  · Current Body Wt: 209 lb (94.8 kg)   · BMI: 41.5    Nutrition Diagnosis:   No nutrition diagnosis at this time     Nutrition Interventions:   Food and/or Nutrient Delivery:  Continue Current Diet  Nutrition Education/Counseling:  Education not indicated   Coordination of Nutrition Care:  Continue to monitor while inpatient    Goals:  po intake greater than 75% of meals       Nutrition Monitoring and Evaluation:   Behavioral-Environmental Outcomes:  None Identified   Food/Nutrient Intake Outcomes:  Food and Nutrient Intake  Physical Signs/Symptoms Outcomes:  None Identified     Discharge Planning:    No discharge needs at this time     Electronically signed by Tatiana Almazan RD, TIMOTEO on 12/31/21 at 9:52 AM EST    Contact: [phone]

## 2021-12-31 NOTE — PROGRESS NOTES
Nephrology Progress  Note  [phone]  [phone]   [email or web address].        Reason for Consult:  MALCOLM , Hyperkalemia     The patient is a 80 y.o. female with significant past medical history of anemia, fibrillation, coronary artery disease, depression, hyperlipidemia, hypertension,  obesity, sleep apnea, diabetes mellitus type 2, who presented to the ER with sudden onset of shortness of breath accompanied by a nonproductive cough generalized weakness and fever  She tested positive for COVID-19  CTA was done on 12/25 using IV contrast and revealed a right lobar consolidation  Serum creatinine on 12/25 was 1.1 on 12/26 was 1, 1227 was 1.6 where it remains today  Calcium is elevated at 5.9  Is making urine there is no dysuria gross hematuria  She has never had a kidney stone though she says she has had acute kidney injury in the past from which she recovered completely  She  does not take NSAIDs  She says her stool is black, sees gastroenterologist Dr. Mcclure Burton  He also sees a hematologist oncologist for bone marrow problem    Interval History:    Feels better  Black stool : seen by GI , placed on PPI  Breathing is better   Creatinine is at baseline     PHYSICAL EXAM:    Vitals:    /74   Pulse 60   Temp 97.5 °F (36.4 °C) (Oral)   Resp 18   Ht 4' 11.5\" (1.511 m)   Wt 209 lb 3.2 oz (94.9 kg)   SpO2 96%   BMI 41.55 kg/m²   I/O last 3 completed shifts: In: 10 [I.V.:10]  Out: 3450 [Urine:3450]  No intake/output data recorded. Physical Exam:  Gen:  alert, oriented x 3  PERRL , EOM +  Pallor +, No icterus  JVP not raised   CV: RRR no murmur or rub . Mid sternal scar of CABG   Lungs:B/ L air entry, Normal breath sounds   Abd: soft, bowel sounds + , No organomegaly , scar +   Ext: No edema, no cyanosis  Skin: Warm.   No rash  Neuro: nonfocal.      DATA:    CBC with Differential:    Lab Results   Component Value Date    WBC 3.9 12/31/2021    RBC 2.73 12/31/2021    HGB 8.0 12/31/2021    HCT 24.2 12/31/2021     12/31/2021    MCV 88.6 12/31/2021    MCH 29.4 12/31/2021    MCHC 33.2 12/31/2021    RDW 15.3 12/31/2021    NRBC CANCELED 09/23/2011    NRBC CANCELED 09/23/2011    SEGSPCT 46.5 12/28/2011    BANDSPCT 3 12/31/2021    BLASTSPCT CANCELED 09/23/2011    METASPCT 2 12/31/2021    LYMPHOPCT 14.0 12/31/2021    PROMYELOPCT CANCELED 09/23/2011    MONOPCT 5.0 12/31/2021    MYELOPCT CANCELED 09/23/2011    EOSPCT 6.3 12/28/2011    BASOPCT 0.0 12/31/2021    MONOSABS 0.2 12/31/2021    LYMPHSABS 0.5 12/31/2021    EOSABS 0.0 12/31/2021    BASOSABS 0.0 12/31/2021    DIFFTYPE Auto 12/28/2011     CMP:    Lab Results   Component Value Date     12/31/2021    K 5.0 12/31/2021    K 4.7 12/26/2021     12/31/2021    CO2 24 12/31/2021    BUN 50 12/31/2021    CREATININE 0.9 12/31/2021    GFRAA >60 12/31/2021    GFRAA >60 10/26/2012    AGRATIO 1.3 12/26/2021    LABGLOM >60 12/31/2021    LABGLOM 100 06/26/2012    GLUCOSE 277 12/31/2021    GLUCOSE 143 09/23/2011    PROT 6.3 12/29/2021    PROT 7.8 10/26/2012    LABALBU 3.7 12/26/2021    CALCIUM 9.4 12/31/2021    BILITOT <0.2 12/26/2021    ALKPHOS 61 12/26/2021    AST 24 12/26/2021    ALT 18 12/26/2021     Magnesium:    Lab Results   Component Value Date    MG 2.00 08/14/2020     Phosphorus:    Lab Results   Component Value Date    PHOS 4.1 08/14/2020     Troponin:    Lab Results   Component Value Date    TROPONINI 0.21 12/31/2021     U/A:    Lab Results   Component Value Date    COLORU YELLOW 12/28/2021    PROTEINU 30 12/28/2021    PHUR 5.0 12/28/2021    WBCUA 3 12/28/2021    RBCUA 1 12/28/2021    BACTERIA 4+ 06/20/2017    CLARITYU Clear 12/28/2021    SPECGRAV 1.012 12/28/2021    LEUKOCYTESUR Negative 12/28/2021    UROBILINOGEN 0.2 12/28/2021    BILIRUBINUR Negative 12/28/2021    BLOODU Negative 12/28/2021    GLUCOSEU Negative 12/28/2021           IMPRESSION/RECOMMENDATIONS:      1. MALCOLM: This is probably secondary to contrast nephropathy.     Serum creatinine was normal when she had a CTA on 12/25. The other possibility is cardiorenal syndrome  Cr 0.9 ( was 1, 1.4 ,  1.6, ), will sign off    2. Hyperkalemia: Due  to renal failure plus she was on potassium spironolactone and ARB . These  medications were stopped  K 5 , normal     3. COVID-19: On decadron , Remdesivir and anticoagulants     4. Coronary artery disease s/p CABG, has elevated troponin and is being evaluated by cardiology    5. Anemia secondary to possible GI bleed being seen by Dr. Latonia Coy    Thank you for allowing me to participate in the care of this patient. I will continue to follow along. Please call with questions.     Bronwyn Stanton MD, MD  12/31/2021  [facility]

## 2021-12-31 NOTE — PLAN OF CARE
Problem: Falls - Risk of:  Goal: Will remain free from falls  Description: Will remain free from falls  Outcome: Ongoing  Goal: Absence of physical injury  Description: Absence of physical injury  Outcome: Ongoing     Problem: Discharge Planning:  Goal: Discharged to appropriate level of care  Description: Discharged to appropriate level of care  Outcome: Ongoing     Problem: Cardiac Output - Decreased:  Goal: Hemodynamic stability will improve  Description: Hemodynamic stability will improve  Outcome: Ongoing     Problem: Serum Glucose Level - Abnormal:  Goal: Ability to maintain appropriate glucose levels will improve  Description: Ability to maintain appropriate glucose levels will improve  Outcome: Ongoing     Problem: Pain:  Goal: Pain level will decrease  Description: Pain level will decrease  Outcome: Ongoing  Goal: Control of acute pain  Description: Control of acute pain  Outcome: Ongoing  Goal: Control of chronic pain  Description: Control of chronic pain  Outcome: Ongoing     Problem: Tissue Perfusion - Cardiopulmonary, Altered:  Goal: Absence of angina  Description: Absence of angina  Outcome: Ongoing  Goal: Circulatory function within specified parameters  Description: Circulatory function within specified parameters  Outcome: Ongoing  Goal: Hemodynamic stability will improve  Description: Hemodynamic stability will improve  Outcome: Ongoing     Problem: Airway Clearance - Ineffective  Goal: Achieve or maintain patent airway  Outcome: Ongoing     Problem: Gas Exchange - Impaired  Goal: Absence of hypoxia  Outcome: Ongoing  Goal: Promote optimal lung function  Outcome: Ongoing     Problem: Breathing Pattern - Ineffective  Goal: Ability to achieve and maintain a regular respiratory rate  Outcome: Ongoing     Problem:  Body Temperature -  Risk of, Imbalanced  Goal: Ability to maintain a body temperature within defined limits  Outcome: Ongoing     Problem: Isolation Precautions - Risk of Spread of Infection  Goal: Prevent transmission of infection  Outcome: Ongoing     Problem: Nutrition Deficits  Goal: Optimize nutritional status  Outcome: Ongoing     Problem: Risk for Fluid Volume Deficit  Goal: Maintain normal heart rhythm  Outcome: Ongoing  Goal: Maintain absence of muscle cramping  Outcome: Ongoing  Goal: Maintain normal serum potassium, sodium, calcium, phosphorus, and pH  Outcome: Ongoing     Problem: Fatigue  Goal: Verbalize increase energy and improved vitality  Outcome: Ongoing

## 2022-01-01 PROBLEM — Z20.822 SUSPECTED COVID-19 VIRUS INFECTION: Status: RESOLVED | Noted: 2021-12-25 | Resolved: 2022-01-01

## 2022-01-01 PROBLEM — A41.9 SEPSIS DUE TO URINARY TRACT INFECTION (HCC): Status: RESOLVED | Noted: 2017-06-20 | Resolved: 2022-01-01

## 2022-01-01 PROBLEM — R00.1 BRADYCARDIA: Status: RESOLVED | Noted: 2019-04-30 | Resolved: 2022-01-01

## 2022-01-01 PROBLEM — N39.0 SEPSIS DUE TO URINARY TRACT INFECTION (HCC): Status: RESOLVED | Noted: 2017-06-20 | Resolved: 2022-01-01

## 2022-01-01 LAB
GLUCOSE BLD-MCNC: 209 MG/DL (ref 70–99)
GLUCOSE BLD-MCNC: 279 MG/DL (ref 70–99)
GLUCOSE BLD-MCNC: 316 MG/DL (ref 70–99)
GLUCOSE BLD-MCNC: 332 MG/DL (ref 70–99)
HCT VFR BLD CALC: 24.1 % (ref 36–48)
HCT VFR BLD CALC: 25.1 % (ref 36–48)
HEMOGLOBIN: 8 G/DL (ref 12–16)
HEMOGLOBIN: 8.4 G/DL (ref 12–16)
PERFORMED ON: ABNORMAL

## 2022-01-01 PROCEDURE — 6360000002 HC RX W HCPCS: Performed by: INTERNAL MEDICINE

## 2022-01-01 PROCEDURE — 2060000000 HC ICU INTERMEDIATE R&B

## 2022-01-01 PROCEDURE — 2580000003 HC RX 258: Performed by: INTERNAL MEDICINE

## 2022-01-01 PROCEDURE — 85014 HEMATOCRIT: CPT

## 2022-01-01 PROCEDURE — C9113 INJ PANTOPRAZOLE SODIUM, VIA: HCPCS | Performed by: INTERNAL MEDICINE

## 2022-01-01 PROCEDURE — 94761 N-INVAS EAR/PLS OXIMETRY MLT: CPT

## 2022-01-01 PROCEDURE — 6370000000 HC RX 637 (ALT 250 FOR IP): Performed by: INTERNAL MEDICINE

## 2022-01-01 PROCEDURE — 36415 COLL VENOUS BLD VENIPUNCTURE: CPT

## 2022-01-01 PROCEDURE — 2700000000 HC OXYGEN THERAPY PER DAY

## 2022-01-01 PROCEDURE — 85018 HEMOGLOBIN: CPT

## 2022-01-01 PROCEDURE — 94640 AIRWAY INHALATION TREATMENT: CPT

## 2022-01-01 RX ORDER — INSULIN LISPRO 100 [IU]/ML
0-12 INJECTION, SOLUTION INTRAVENOUS; SUBCUTANEOUS
Status: DISCONTINUED | OUTPATIENT
Start: 2022-01-01 | End: 2022-01-02 | Stop reason: HOSPADM

## 2022-01-01 RX ORDER — INSULIN LISPRO 100 [IU]/ML
0-6 INJECTION, SOLUTION INTRAVENOUS; SUBCUTANEOUS NIGHTLY
Status: DISCONTINUED | OUTPATIENT
Start: 2022-01-01 | End: 2022-01-02 | Stop reason: HOSPADM

## 2022-01-01 RX ADMIN — AMLODIPINE BESYLATE 2.5 MG: 5 TABLET ORAL at 20:44

## 2022-01-01 RX ADMIN — Medication 50 MG: at 11:20

## 2022-01-01 RX ADMIN — FUROSEMIDE 40 MG: 40 TABLET ORAL at 11:06

## 2022-01-01 RX ADMIN — INSULIN LISPRO 4 UNITS: 100 INJECTION, SOLUTION INTRAVENOUS; SUBCUTANEOUS at 20:47

## 2022-01-01 RX ADMIN — INSULIN LISPRO 4 UNITS: 100 INJECTION, SOLUTION INTRAVENOUS; SUBCUTANEOUS at 11:13

## 2022-01-01 RX ADMIN — AMLODIPINE BESYLATE 2.5 MG: 5 TABLET ORAL at 11:07

## 2022-01-01 RX ADMIN — Medication 1000 UNITS: at 11:06

## 2022-01-01 RX ADMIN — DEXAMETHASONE SODIUM PHOSPHATE 10 MG: 10 INJECTION, SOLUTION INTRAMUSCULAR; INTRAVENOUS at 11:19

## 2022-01-01 RX ADMIN — ESCITALOPRAM OXALATE 20 MG: 10 TABLET ORAL at 11:07

## 2022-01-01 RX ADMIN — PIOGLITAZONE 15 MG: 15 TABLET ORAL at 11:07

## 2022-01-01 RX ADMIN — Medication 2 PUFF: at 20:32

## 2022-01-01 RX ADMIN — INSULIN LISPRO 8 UNITS: 100 INJECTION, SOLUTION INTRAVENOUS; SUBCUTANEOUS at 19:00

## 2022-01-01 RX ADMIN — Medication 10 ML: at 11:07

## 2022-01-01 RX ADMIN — INSULIN LISPRO 6 UNITS: 100 INJECTION, SOLUTION INTRAVENOUS; SUBCUTANEOUS at 13:55

## 2022-01-01 RX ADMIN — PANTOPRAZOLE SODIUM 40 MG: 40 INJECTION, POWDER, FOR SOLUTION INTRAVENOUS at 20:44

## 2022-01-01 RX ADMIN — ENOXAPARIN SODIUM 40 MG: 100 INJECTION SUBCUTANEOUS at 20:43

## 2022-01-01 RX ADMIN — ISOSORBIDE MONONITRATE 30 MG: 30 TABLET, EXTENDED RELEASE ORAL at 11:06

## 2022-01-01 RX ADMIN — Medication 2 PUFF: at 09:38

## 2022-01-01 RX ADMIN — Medication 10 ML: at 20:44

## 2022-01-01 RX ADMIN — ENOXAPARIN SODIUM 40 MG: 100 INJECTION SUBCUTANEOUS at 11:05

## 2022-01-01 RX ADMIN — GABAPENTIN 400 MG: 400 CAPSULE ORAL at 16:00

## 2022-01-01 RX ADMIN — Medication 2 PUFF: at 13:34

## 2022-01-01 RX ADMIN — ASPIRIN 81 MG: 81 TABLET, COATED ORAL at 11:06

## 2022-01-01 RX ADMIN — GABAPENTIN 400 MG: 400 CAPSULE ORAL at 11:07

## 2022-01-01 RX ADMIN — PANTOPRAZOLE SODIUM 40 MG: 40 INJECTION, POWDER, FOR SOLUTION INTRAVENOUS at 11:06

## 2022-01-01 RX ADMIN — CETIRIZINE HYDROCHLORIDE 10 MG: 10 TABLET, FILM COATED ORAL at 11:06

## 2022-01-01 RX ADMIN — Medication 162 MG: at 11:06

## 2022-01-01 RX ADMIN — HYDROCHLOROTHIAZIDE 25 MG: 25 TABLET ORAL at 11:06

## 2022-01-01 RX ADMIN — OXYCODONE HYDROCHLORIDE AND ACETAMINOPHEN 1000 MG: 500 TABLET ORAL at 11:07

## 2022-01-01 ASSESSMENT — PAIN SCALES - GENERAL
PAINLEVEL_OUTOF10: 0

## 2022-01-01 NOTE — PLAN OF CARE
Problem:  Body Temperature -  Risk of, Imbalanced  Goal: Ability to maintain a body temperature within defined limits  Outcome: Ongoing     Problem: Isolation Precautions - Risk of Spread of Infection  Goal: Prevent transmission of infection  Outcome: Ongoing     Problem: Nutrition Deficits  Goal: Optimize nutritional status  Outcome: Ongoing     Problem: Risk for Fluid Volume Deficit  Goal: Maintain normal heart rhythm  Outcome: Ongoing  Goal: Maintain absence of muscle cramping  Outcome: Ongoing  Goal: Maintain normal serum potassium, sodium, calcium, phosphorus, and pH  Outcome: Ongoing     Problem: Loneliness or Risk for Loneliness  Goal: Demonstrate positive use of time alone when socialization is not possible  Outcome: Ongoing     Problem: Fatigue  Goal: Verbalize increase energy and improved vitality  Outcome: Ongoing     Problem: Patient Education: Go to Patient Education Activity  Goal: Patient/Family Education  Outcome: Ongoing

## 2022-01-01 NOTE — PROGRESS NOTES
Gastroenterology Progress Note    Cam Scott is a 80 y.o. female patient. 1. Pneumonia due to infectious organism, unspecified laterality, unspecified part of lung    2. Hypoxia    3. Elevated troponin    4. Anemia, unspecified type        Admission Date: 2021  Hospital Day: Hospital Day: 8  Attending: Alexsandra Liao MD  Date of service: 22    SUBJECTIVE:    Remains of 7 liters oxygen   Had another small black BM  No abdominal pain   Fair appetite    ROS:  Cardiovascular ROS: no chest pain or dyspnea on exertion  Gastrointestinal ROS: see above  Respiratory ROS: no cough, shortness of breath, or wheezing    Physical    VITALS:  BP (!) 159/66   Pulse 60   Temp 98 °F (36.7 °C) (Oral)   Resp 16   Ht 4' 11.49\" (1.511 m)   Wt 209 lb 3.2 oz (94.9 kg)   SpO2 96%   BMI 41.56 kg/m²   TEMPERATURE:  Current - Temp: 98 °F (36.7 °C); Max - Temp  Av.8 °F (36.6 °C)  Min: 97.5 °F (36.4 °C)  Max: 98 °F (36.7 °C)    NAD  RRR, Nl s1s2  Lungs CTA Bilaterally, normal effort  Abdomen soft, ND, NT, no HSM, Bowel sounds normal  AAOx3, No asterixis     Data      CBC:   Recent Labs     21  0456 21  1350 21  0523 21  0523 21  1425 21  2245 22  0548   WBC 3.2*  --  3.9*  --   --   --   --    RBC 2.87*  --  2.73*  --   --   --   --    HGB 8.5*   < > 8.0*   < > 8.0* 8.1* 8.0*   HCT 25.6*   < > 24.2*   < > 24.5* 24.4* 24.1*     --  270  --   --   --   --    MCV 89.3  --  88.6  --   --   --   --    MCH 29.5  --  29.4  --   --   --   --    MCHC 33.0  --  33.2  --   --   --   --    RDW 15.9*  --  15.3  --   --   --   --    BANDSPCT  --   --  3  --   --   --   --     < > = values in this interval not displayed.         BMP:  Recent Labs     21  0456 21  0523   * 135*   K 5.0 5.0    100   CO2 22 24   BUN 54* 50*   CREATININE 1.0 0.9   CALCIUM 9.2 9.4   GLUCOSE 245* 277*        Hepatic Function Panel:   No results for input(s): AST, ALT, BILIDIR, BILITOT, ALKPHOS in the last 72 hours. No results for input(s): LIPASE, AMYLASE in the last 72 hours. No results for input(s): PROTIME, INR in the last 72 hours. No results for input(s): PTT in the last 72 hours. No results for input(s): OCCULTBLD in the last 72 hours. Radiology Review:    XR CHEST PORTABLE   Final Result   Mildly increased right-side predominant airspace opacities. XR CHEST PORTABLE   Final Result   Persistent dense alveolar consolidation in the right upper lobe compatible   with pneumonia. Cardiomegaly and mild pulmonary venous hypertension. CT CHEST PULMONARY EMBOLISM W CONTRAST   Final Result   1. No evidence of pulmonary embolic disease. 2. Consolidation in the right upper lobe consistent with pneumonia. The   findings are more typical of bacterial etiology and are not consistent with   COVID-19 pneumonia. Additional areas of patchy infiltrate in the right   middle lobe and superior segment of the right lower lobe. Continued plain   film follow-up recommended to ensure clearing. 3. Trace right pleural effusion. Mild bibasilar atelectasis. 4. Cardiomegaly. Coronary vascular calcification. RECOMMENDATIONS:   Plain film follow-up recommended to ensure clearing. Repeat PA and lateral   chest x-ray in 1-2 months following therapy would be reasonable. XR CHEST PORTABLE   Final Result   Findings suspicious for bilateral infiltrates in the lungs with large   consolidation in the right mid lung. Underlying right lung mass difficult to   exclude. CT of the chest may be obtained for further clarification. ASSESSMENT   Melena and anemia - ? PUD. Denies NSAID use. hgb stable but trending down at 8+ range   Covid pneumonia - on 13 L O2.    CAD s/p remote CABG  MDS     PLAN    IV PPI BID   Transfuse pRBC to keep Hb>8  Since Hb stable in 8+ range, decrease H/H monitoring from Q8-->Q12   Hold off EGD unless overtly bleeding and not responding to blood transfusions (especially with her tenuous respiratory status)      Nato Cordoba MD, MSc  [facility]  01/01/22

## 2022-01-01 NOTE — PROGRESS NOTES
Inhalation, Q4H PRN  perflutren lipid microspheres (DEFINITY) injection 1.65 mg, 1.5 mL, IntraVENous, ONCE PRN  ALPRAZolam (XANAX) tablet 0.25 mg, 0.25 mg, Oral, BID PRN  escitalopram (LEXAPRO) tablet 20 mg, 20 mg, Oral, Daily  nitroGLYCERIN (NITROLINGUAL) 0.4 mg spray 1 spray, 1 spray, SubLINGual, Q5 Min PRN  ezetimibe (ZETIA) tablet 5 mg - PATIENT SUPPLIED, 5 mg, Oral, Nightly  rosuvastatin (CRESTOR) tablet 20 mg, 20 mg, Oral, QPM  gabapentin (NEURONTIN) capsule 400 mg, 400 mg, Oral, TID  pioglitazone (ACTOS) tablet 15 mg, 15 mg, Oral, Daily  vitamin D (CHOLECALCIFEROL) tablet 1,000 Units, 1,000 Units, Oral, Daily  ascorbic acid (VITAMIN C) tablet 1,000 mg, 1,000 mg, Oral, Daily  estradiol (ESTRACE) vaginal cream 2 g, 2 g, Vaginal, Once per day on Mon Thu  furosemide (LASIX) tablet 40 mg, 40 mg, Oral, Daily  aspirin EC tablet 81 mg, 81 mg, Oral, Daily  tiZANidine (ZANAFLEX) tablet 4 mg, 4 mg, Oral, Nightly PRN  metoprolol tartrate (LOPRESSOR) tablet 12.5 mg, 12.5 mg, Oral, PRN  amLODIPine (NORVASC) tablet 2.5 mg, 2.5 mg, Oral, BID  ferrous gluconate 324 (37.5 Fe) MG tablet 162 mg, 162 mg, Oral, Daily  zinc sulfate (ZINCATE) capsule 50 mg, 50 mg, Oral, Daily  sodium chloride flush 0.9 % injection 5-40 mL, 5-40 mL, IntraVENous, 2 times per day  sodium chloride flush 0.9 % injection 10 mL, 10 mL, IntraVENous, PRN  0.9 % sodium chloride infusion, 25 mL, IntraVENous, PRN  ondansetron (ZOFRAN-ODT) disintegrating tablet 4 mg, 4 mg, Oral, Q8H PRN **OR** ondansetron (ZOFRAN) injection 4 mg, 4 mg, IntraVENous, Q6H PRN  magnesium hydroxide (MILK OF MAGNESIA) 400 MG/5ML suspension 30 mL, 30 mL, Oral, Daily PRN  acetaminophen (TYLENOL) tablet 650 mg, 650 mg, Oral, Q6H PRN **OR** acetaminophen (TYLENOL) suppository 650 mg, 650 mg, Rectal, Q6H PRN  influenza quadrivalent split vaccine (FLUZONE;FLUARIX;FLULAVAL;AFLURIA) injection 0.5 mL, 0.5 mL, IntraMUSCular, Prior to discharge  cetirizine (ZYRTEC) tablet 10 mg, 10 mg, Oral, Daily  [DISCONTINUED] losartan (COZAAR) tablet 100 mg, 100 mg, Oral, Daily **AND** hydroCHLOROthiazide (HYDRODIURIL) tablet 25 mg, 25 mg, Oral, Daily  isosorbide mononitrate (IMDUR) extended release tablet 30 mg, 30 mg, Oral, Daily    Physical      Vitals: BP (!) 147/68   Pulse 60   Temp 97.7 °F (36.5 °C) (Oral)   Resp 18   Ht 4' 11.49\" (1.511 m)   Wt 209 lb 3.2 oz (94.9 kg)   SpO2 97%   BMI 41.56 kg/m²   Temp: Temp: 97.7 °F (36.5 °C)  Max: Temp  Av.9 °F (36.6 °C)  Min: 97.7 °F (36.5 °C)  Max: 98.4 °F (36.9 °C)  Respiration range:  Resp  Av.3  Min: 14  Max: 18  Pulse Range:  Pulse  Av  Min: 60  Max: 60  Blood pressure range:  Systolic (75BUE), SXJ:599 , Min:147 , HJH:367   , Diastolic (69PMV), VZ, Min:66, Max:91    SpO2  Av.2 %  Min: 93 %  Max: 99 %    Intake/Output Summary (Last 24 hours) at 2022 1320  Last data filed at 2021 1724  Gross per 24 hour   Intake --   Output 1600 ml   Net -1600 ml       Vent settings:  Pulse  Av.4  Min: 60  Max: 91  Resp  Av.9  Min: 13  Max: 36  SpO2  Av.8 %  Min: 79 %  Max: 99 %    CONSTITUTIONAL:  fatigued, alert, cooperative, mild distress, appears older than stated age and morbidly obese  EYES:  Unremarkable   NECK:  Mild JVD  and supple, symmetrical, trachea midline  BACK:  symmetric and no curvature  LUNGS:  tachypneic, Moderate respiratory distress, good air exchange, no retractions and crackles diffuse, rhonchi diffuse  CARDIOVASCULAR:  normal apical pulses, regular rate and rhythm, normal S1 and S2, no S3 and no S4  ABDOMEN:  Soft BS + non tender   MUSCULOSKELETAL:  Trace edema   NEUROLOGIC:  Grossly intact   SKIN:  Warm and dry  and no bruising or bleeding    Data      Recent Results (from the past 96 hour(s))   Basic metabolic panel    Collection Time: 21  1:21 PM   Result Value Ref Range    Sodium 130 (L) 136 - 145 mmol/L    Potassium 4.9 3.5 - 5.1 mmol/L    Chloride 98 (L) 99 - 110 mmol/L    CO2 18 (L) 21 - 32 mmol/L    Anion Gap 14 3 - 16    Glucose 203 (H) 70 - 99 mg/dL    BUN 62 (H) 7 - 20 mg/dL    CREATININE 1.6 (H) 0.6 - 1.2 mg/dL    GFR Non- 32 (A) >60    GFR  39 (A) >60    Calcium 8.6 8.3 - 10.6 mg/dL   Creatinine, urine, random    Collection Time: 12/28/21  2:00 PM   Result Value Ref Range    Creatinine, Ur 50.6 28.0 - 259.0 mg/dL   Blood occult stool #1    Collection Time: 12/28/21  2:00 PM   Result Value Ref Range    Occult Blood Diagnostic Result: POSITIVE  Normal range: Negative   (A)    Urinalysis    Collection Time: 12/28/21  2:00 PM   Result Value Ref Range    Color, UA YELLOW Straw/Yellow    Clarity, UA Clear Clear    Glucose, Ur Negative Negative mg/dL    Bilirubin Urine Negative Negative    Ketones, Urine Negative Negative mg/dL    Specific Gravity, UA 1.012 1.005 - 1.030    Blood, Urine Negative Negative    pH, UA 5.0 5.0 - 8.0    Protein, UA 30 (A) Negative mg/dL    Urobilinogen, Urine 0.2 <2.0 E.U./dL    Nitrite, Urine Negative Negative    Leukocyte Esterase, Urine Negative Negative    Microscopic Examination YES     Urine Type NotGiven    Sodium, urine, random    Collection Time: 12/28/21  2:00 PM   Result Value Ref Range    Sodium, Ur 57 Not Established mmol/L   Protein, urine, random    Collection Time: 12/28/21  2:00 PM   Result Value Ref Range    Protein, Ur 30.00 (H) <12 mg/dL   Microscopic Urinalysis    Collection Time: 12/28/21  2:00 PM   Result Value Ref Range    Hyaline Casts, UA 6 0 - 8 /LPF    WBC, UA 3 0 - 5 /HPF    RBC, UA 1 0 - 4 /HPF    Epithelial Cells, UA 2 0 - 5 /HPF   POCT Glucose    Collection Time: 12/28/21  4:39 PM   Result Value Ref Range    POC Glucose 177 (H) 70 - 99 mg/dl    Performed on ACCU-CHEK    SPECIMEN REJECTION    Collection Time: 12/28/21  7:23 PM   Result Value Ref Range    Rejected Test cxres     Reason for Rejection see below    POCT Glucose    Collection Time: 12/28/21  8:20 PM   Result Value Ref Range    POC Glucose 312 (H) 70 - 99 mg/dl    Performed on ACCU-CHEK    Hemoglobin and hematocrit, blood    Collection Time: 12/29/21  1:05 AM   Result Value Ref Range    Hemoglobin 7.9 (L) 12.0 - 16.0 g/dL    Hematocrit 24.5 (L) 36.0 - 48.0 %   Basic Metabolic Panel    Collection Time: 12/29/21  5:38 AM   Result Value Ref Range    Sodium 133 (L) 136 - 145 mmol/L    Potassium 4.8 3.5 - 5.1 mmol/L    Chloride 99 99 - 110 mmol/L    CO2 20 (L) 21 - 32 mmol/L    Anion Gap 14 3 - 16    Glucose 189 (H) 70 - 99 mg/dL    BUN 65 (H) 7 - 20 mg/dL    CREATININE 1.4 (H) 0.6 - 1.2 mg/dL    GFR Non- 37 (A) >60    GFR  45 (A) >60    Calcium 9.1 8.3 - 10.6 mg/dL   CBC Auto Differential    Collection Time: 12/29/21  5:38 AM   Result Value Ref Range    WBC 3.6 (L) 4.0 - 11.0 K/uL    RBC 2.86 (L) 4.00 - 5.20 M/uL    Hemoglobin 8.3 (L) 12.0 - 16.0 g/dL    Hematocrit 25.7 (L) 36.0 - 48.0 %    MCV 89.8 80.0 - 100.0 fL    MCH 29.2 26.0 - 34.0 pg    MCHC 32.5 31.0 - 36.0 g/dL    RDW 15.8 (H) 12.4 - 15.4 %    Platelets 766 744 - 645 K/uL    MPV 8.8 5.0 - 10.5 fL    Neutrophils % 68.8 %    Lymphocytes % 20.6 %    Monocytes % 10.4 %    Eosinophils % 0.0 %    Basophils % 0.2 %    Neutrophils Absolute 2.5 1.7 - 7.7 K/uL    Lymphocytes Absolute 0.7 (L) 1.0 - 5.1 K/uL    Monocytes Absolute 0.4 0.0 - 1.3 K/uL    Eosinophils Absolute 0.0 0.0 - 0.6 K/uL    Basophils Absolute 0.0 0.0 - 0.2 K/uL   Troponin    Collection Time: 12/29/21  5:38 AM   Result Value Ref Range    Troponin 0.31 (H) <0.01 ng/mL   C-Reactive Protein    Collection Time: 12/29/21  5:38 AM   Result Value Ref Range    CRP 88.5 (H) 0.0 - 5.1 mg/L   Iron and TIBC    Collection Time: 12/29/21  5:38 AM   Result Value Ref Range    Iron 33 (L) 37 - 145 ug/dL    TIBC 195 (L) 260 - 445 ug/dL    Iron Saturation 17 15 - 50 %   Ferritin    Collection Time: 12/29/21  5:38 AM   Result Value Ref Range    Ferritin 609.0 (H) 15.0 - 150.0 ng/mL   Vitamin B12 & folate    Collection Time: 12/29/21  5:38 AM   Result Value Ref Range    Vitamin B-12 1541 (H) 211 - 911 pg/mL    Folate 9.12 4.78 - 24.20 ng/mL   Electrophoresis Protein, Serum without Reflex to Immunofixation    Collection Time: 12/29/21  5:38 AM   Result Value Ref Range    Total Protein 6.3 (L) 6.4 - 8.2 g/dL   POCT Glucose    Collection Time: 12/29/21  7:59 AM   Result Value Ref Range    POC Glucose 191 (H) 70 - 99 mg/dl    Performed on ACCU-CHEK    POCT Glucose    Collection Time: 12/29/21 11:50 AM   Result Value Ref Range    POC Glucose 305 (H) 70 - 99 mg/dl    Performed on ACCU-CHEK    Hemoglobin and hematocrit, blood    Collection Time: 12/29/21 12:47 PM   Result Value Ref Range    Hemoglobin 8.7 (L) 12.0 - 16.0 g/dL    Hematocrit 26.6 (L) 36.0 - 48.0 %   POCT Glucose    Collection Time: 12/29/21  4:18 PM   Result Value Ref Range    POC Glucose 240 (H) 70 - 99 mg/dl    Performed on ACCU-CHEK    POCT Glucose    Collection Time: 12/29/21  8:06 PM   Result Value Ref Range    POC Glucose 283 (H) 70 - 99 mg/dl    Performed on ACCU-CHEK    Hemoglobin and hematocrit, blood    Collection Time: 12/29/21  8:19 PM   Result Value Ref Range    Hemoglobin 8.8 (L) 12.0 - 16.0 g/dL    Hematocrit 26.7 (L) 36.0 - 48.0 %   Basic Metabolic Panel    Collection Time: 12/30/21  4:56 AM   Result Value Ref Range    Sodium 135 (L) 136 - 145 mmol/L    Potassium 5.0 3.5 - 5.1 mmol/L    Chloride 101 99 - 110 mmol/L    CO2 22 21 - 32 mmol/L    Anion Gap 12 3 - 16    Glucose 245 (H) 70 - 99 mg/dL    BUN 54 (H) 7 - 20 mg/dL    CREATININE 1.0 0.6 - 1.2 mg/dL    GFR Non-African American 55 (A) >60    GFR African American >60 >60    Calcium 9.2 8.3 - 10.6 mg/dL   CBC Auto Differential    Collection Time: 12/30/21  4:56 AM   Result Value Ref Range    WBC 3.2 (L) 4.0 - 11.0 K/uL    RBC 2.87 (L) 4.00 - 5.20 M/uL    Hemoglobin 8.5 (L) 12.0 - 16.0 g/dL    Hematocrit 25.6 (L) 36.0 - 48.0 %    MCV 89.3 80.0 - 100.0 fL    MCH 29.5 26.0 - 34.0 pg    MCHC 33.0 31.0 - 36.0 g/dL    RDW 15.9 (H) 12.4 - 15.4 %    Platelets 451 818 - 389 K/uL    MPV 8.4 5.0 - 10.5 fL    Neutrophils % 74.7 %    Lymphocytes % 14.4 %    Monocytes % 10.8 %    Eosinophils % 0.0 %    Basophils % 0.1 %    Neutrophils Absolute 2.4 1.7 - 7.7 K/uL    Lymphocytes Absolute 0.5 (L) 1.0 - 5.1 K/uL    Monocytes Absolute 0.3 0.0 - 1.3 K/uL    Eosinophils Absolute 0.0 0.0 - 0.6 K/uL    Basophils Absolute 0.0 0.0 - 0.2 K/uL   POCT Glucose    Collection Time: 12/30/21  8:21 AM   Result Value Ref Range    POC Glucose 223 (H) 70 - 99 mg/dl    Performed on ACCU-CHEK    POCT Glucose    Collection Time: 12/30/21 12:08 PM   Result Value Ref Range    POC Glucose 266 (H) 70 - 99 mg/dl    Performed on ACCU-CHEK    Hemoglobin and hematocrit, blood    Collection Time: 12/30/21  1:50 PM   Result Value Ref Range    Hemoglobin 8.6 (L) 12.0 - 16.0 g/dL    Hematocrit 26.2 (L) 36.0 - 48.0 %   POCT Glucose    Collection Time: 12/30/21  5:18 PM   Result Value Ref Range    POC Glucose 391 (H) 70 - 99 mg/dl    Performed on ACCU-CHEK    POCT Glucose    Collection Time: 12/30/21  8:10 PM   Result Value Ref Range    POC Glucose 438 (H) 70 - 99 mg/dl    Performed on ACCU-CHEK    Hemoglobin and hematocrit, blood    Collection Time: 12/30/21  9:49 PM   Result Value Ref Range    Hemoglobin 7.9 (L) 12.0 - 16.0 g/dL    Hematocrit 23.6 (L) 36.0 - 48.0 %   CBC Auto Differential    Collection Time: 12/31/21  5:23 AM   Result Value Ref Range    WBC 3.9 (L) 4.0 - 11.0 K/uL    RBC 2.73 (L) 4.00 - 5.20 M/uL    Hemoglobin 8.0 (L) 12.0 - 16.0 g/dL    Hematocrit 24.2 (L) 36.0 - 48.0 %    MCV 88.6 80.0 - 100.0 fL    MCH 29.4 26.0 - 34.0 pg    MCHC 33.2 31.0 - 36.0 g/dL    RDW 15.3 12.4 - 15.4 %    Platelets 356 765 - 553 K/uL    MPV 7.9 5.0 - 10.5 fL    PLATELET SLIDE REVIEW Adequate     SLIDE REVIEW see below     Neutrophils % 76.0 %    Lymphocytes % 14.0 %    Monocytes % 5.0 %    Eosinophils % 0.0 %    Basophils % 0.0 %    Neutrophils Absolute 3.2 1.7 - 7.7 K/uL    Lymphocytes Absolute 0.5 (L) 1.0 - 5.1 K/uL    Monocytes Absolute 0.2 0.0 - 1.3 K/uL    Eosinophils Absolute 0.0 0.0 - 0.6 K/uL    Basophils Absolute 0.0 0.0 - 0.2 K/uL    Bands Relative 3 0 - 7 %    Metamyelocytes Relative 2 (A) %    Anisocytosis Occasional (A)     Polychromasia Occasional (A)     Hypochromia Occasional (A)     Ovalocytes Occasional (A)    Basic Metabolic Panel    Collection Time: 12/31/21  5:23 AM   Result Value Ref Range    Sodium 135 (L) 136 - 145 mmol/L    Potassium 5.0 3.5 - 5.1 mmol/L    Chloride 100 99 - 110 mmol/L    CO2 24 21 - 32 mmol/L    Anion Gap 11 3 - 16    Glucose 277 (H) 70 - 99 mg/dL    BUN 50 (H) 7 - 20 mg/dL    CREATININE 0.9 0.6 - 1.2 mg/dL    GFR Non-African American >60 >60    GFR African American >60 >60    Calcium 9.4 8.3 - 10.6 mg/dL   C-Reactive Protein    Collection Time: 12/31/21  5:23 AM   Result Value Ref Range    CRP 27.5 (H) 0.0 - 5.1 mg/L   Troponin    Collection Time: 12/31/21  5:23 AM   Result Value Ref Range    Troponin 0.21 (H) <0.01 ng/mL   POCT Glucose    Collection Time: 12/31/21  9:30 AM   Result Value Ref Range    POC Glucose 214 (H) 70 - 99 mg/dl    Performed on ACCU-CHEK    POCT Glucose    Collection Time: 12/31/21 12:16 PM   Result Value Ref Range    POC Glucose 364 (H) 70 - 99 mg/dl    Performed on ACCU-CHEK    Hemoglobin and hematocrit, blood    Collection Time: 12/31/21  2:25 PM   Result Value Ref Range    Hemoglobin 8.0 (L) 12.0 - 16.0 g/dL    Hematocrit 24.5 (L) 36.0 - 48.0 %   POCT Glucose    Collection Time: 12/31/21  5:05 PM   Result Value Ref Range    POC Glucose 362 (H) 70 - 99 mg/dl    Performed on ACCU-CHEK    POCT Glucose    Collection Time: 12/31/21  9:45 PM   Result Value Ref Range    POC Glucose 404 (H) 70 - 99 mg/dl    Performed on ACCU-CHEK    Hemoglobin and hematocrit, blood    Collection Time: 12/31/21 10:45 PM   Result Value Ref Range    Hemoglobin 8.1 (L) 12.0 - 16.0 g/dL    Hematocrit 24.4 (L) 36.0 - 48.0 %   Hemoglobin and hematocrit, blood Collection Time: 01/01/22  5:48 AM   Result Value Ref Range    Hemoglobin 8.0 (L) 12.0 - 16.0 g/dL    Hematocrit 24.1 (L) 36.0 - 48.0 %   POCT Glucose    Collection Time: 01/01/22  8:33 AM   Result Value Ref Range    POC Glucose 209 (H) 70 - 99 mg/dl    Performed on ACCU-CHEK    POCT Glucose    Collection Time: 01/01/22 11:40 AM   Result Value Ref Range    POC Glucose 279 (H) 70 - 99 mg/dl    Performed on ACCU-CHEK        ASSESSMENT AND PLAN     Principal Problem:    NSTEMI (non-ST elevated myocardial [facility]) [facility]  Active Problems:    Coronary artery disease due to lipid rich plaque    Essential hypertension    RAHUL (obstructive sleep apnea)    Morbid obesity (HCC)    Diabetes mellitus type 2 in obese (HCC)    Paroxysmal atrial fibrillation (HCC)    Coronary artery disease involving coronary bypass graft of native heart without angina pectoris    Anemia    Pacemaker    Pneumonia    Acute respiratory failure with hypoxia (HCC)    HTN (hypertension)    COVID-19    Acute respiratory failure due to severe acute respiratory syndrome coronavirus 2 (SARS-CoV-2) infection ([facility])    MALCOLM (acute kidney injury) ([facility]    Melena  Resolved Problems:    * No resolved hospital problems.  *     s/p IV remdesivir through today completed the course   Ct IV decadron    supplemental O2    optimize BP and diabetes control   DVT prophylaxis    Bronchodilator aerosols

## 2022-01-02 VITALS
HEIGHT: 59 IN | RESPIRATION RATE: 16 BRPM | TEMPERATURE: 98.1 F | DIASTOLIC BLOOD PRESSURE: 72 MMHG | WEIGHT: 209.2 LBS | SYSTOLIC BLOOD PRESSURE: 143 MMHG | OXYGEN SATURATION: 91 % | HEART RATE: 63 BPM | BODY MASS INDEX: 42.17 KG/M2

## 2022-01-02 LAB
GLUCOSE BLD-MCNC: 169 MG/DL (ref 70–99)
GLUCOSE BLD-MCNC: 266 MG/DL (ref 70–99)
GLUCOSE BLD-MCNC: 297 MG/DL (ref 70–99)
HCT VFR BLD CALC: 24.2 % (ref 36–48)
HEMOGLOBIN: 8.2 G/DL (ref 12–16)
PERFORMED ON: ABNORMAL

## 2022-01-02 PROCEDURE — 2700000000 HC OXYGEN THERAPY PER DAY

## 2022-01-02 PROCEDURE — C9113 INJ PANTOPRAZOLE SODIUM, VIA: HCPCS | Performed by: INTERNAL MEDICINE

## 2022-01-02 PROCEDURE — 85018 HEMOGLOBIN: CPT

## 2022-01-02 PROCEDURE — 2580000003 HC RX 258: Performed by: INTERNAL MEDICINE

## 2022-01-02 PROCEDURE — 6360000002 HC RX W HCPCS: Performed by: INTERNAL MEDICINE

## 2022-01-02 PROCEDURE — 94680 O2 UPTK RST&XERS DIR SIMPLE: CPT

## 2022-01-02 PROCEDURE — 94640 AIRWAY INHALATION TREATMENT: CPT

## 2022-01-02 PROCEDURE — 36415 COLL VENOUS BLD VENIPUNCTURE: CPT

## 2022-01-02 PROCEDURE — 94761 N-INVAS EAR/PLS OXIMETRY MLT: CPT

## 2022-01-02 PROCEDURE — 6370000000 HC RX 637 (ALT 250 FOR IP): Performed by: INTERNAL MEDICINE

## 2022-01-02 PROCEDURE — 94660 CPAP INITIATION&MGMT: CPT

## 2022-01-02 PROCEDURE — 85014 HEMATOCRIT: CPT

## 2022-01-02 RX ORDER — ALPRAZOLAM 0.25 MG/1
0.25 TABLET ORAL 2 TIMES DAILY PRN
Qty: 14 TABLET | Refills: 0 | Status: SHIPPED | OUTPATIENT
Start: 2022-01-02 | End: 2022-01-09

## 2022-01-02 RX ORDER — NICOTINE POLACRILEX 4 MG
15 LOZENGE BUCCAL PRN
Qty: 45 G | Refills: 1 | Status: SHIPPED | OUTPATIENT
Start: 2022-01-02 | End: 2022-01-20

## 2022-01-02 RX ORDER — ALBUTEROL SULFATE 90 UG/1
2 AEROSOL, METERED RESPIRATORY (INHALATION) EVERY 4 HOURS PRN
Qty: 18 G | Refills: 3 | Status: SHIPPED | OUTPATIENT
Start: 2022-01-02 | End: 2022-01-20 | Stop reason: ALTCHOICE

## 2022-01-02 RX ORDER — INSULIN LISPRO 100 [IU]/ML
0-12 INJECTION, SOLUTION INTRAVENOUS; SUBCUTANEOUS
Qty: 1 PEN | Refills: 0 | Status: SHIPPED | OUTPATIENT
Start: 2022-01-02 | End: 2022-01-20 | Stop reason: ALTCHOICE

## 2022-01-02 RX ORDER — ONDANSETRON 4 MG/1
4 TABLET, ORALLY DISINTEGRATING ORAL EVERY 8 HOURS PRN
Qty: 20 TABLET | Refills: 0 | Status: SHIPPED | OUTPATIENT
Start: 2022-01-02 | End: 2022-01-20 | Stop reason: ALTCHOICE

## 2022-01-02 RX ORDER — SUMATRIPTAN 100 MG/1
100 TABLET, FILM COATED ORAL DAILY PRN
Qty: 30 TABLET | Refills: 3 | Status: SHIPPED | OUTPATIENT
Start: 2022-01-02

## 2022-01-02 RX ORDER — TIZANIDINE 4 MG/1
4 TABLET ORAL NIGHTLY PRN
Qty: 20 TABLET | Refills: 0 | Status: SHIPPED | OUTPATIENT
Start: 2022-01-02

## 2022-01-02 RX ORDER — DEXAMETHASONE 4 MG/1
4 TABLET ORAL 2 TIMES DAILY WITH MEALS
Qty: 10 TABLET | Refills: 0 | Status: SHIPPED | OUTPATIENT
Start: 2022-01-02 | End: 2022-01-07

## 2022-01-02 RX ORDER — GUAIFENESIN/DEXTROMETHORPHAN 100-10MG/5
10 SYRUP ORAL EVERY 4 HOURS PRN
Qty: 120 ML | Refills: 2 | Status: SHIPPED | OUTPATIENT
Start: 2022-01-02 | End: 2022-01-12

## 2022-01-02 RX ORDER — CETIRIZINE HYDROCHLORIDE 10 MG/1
10 TABLET ORAL DAILY
COMMUNITY
Start: 2022-01-03 | End: 2022-01-20 | Stop reason: ALTCHOICE

## 2022-01-02 RX ADMIN — INSULIN LISPRO 6 UNITS: 100 INJECTION, SOLUTION INTRAVENOUS; SUBCUTANEOUS at 18:20

## 2022-01-02 RX ADMIN — FUROSEMIDE 40 MG: 40 TABLET ORAL at 08:29

## 2022-01-02 RX ADMIN — ENOXAPARIN SODIUM 40 MG: 100 INJECTION SUBCUTANEOUS at 08:29

## 2022-01-02 RX ADMIN — DEXAMETHASONE SODIUM PHOSPHATE 10 MG: 10 INJECTION, SOLUTION INTRAMUSCULAR; INTRAVENOUS at 08:29

## 2022-01-02 RX ADMIN — INSULIN LISPRO 6 UNITS: 100 INJECTION, SOLUTION INTRAVENOUS; SUBCUTANEOUS at 11:51

## 2022-01-02 RX ADMIN — ISOSORBIDE MONONITRATE 30 MG: 30 TABLET, EXTENDED RELEASE ORAL at 08:30

## 2022-01-02 RX ADMIN — PANTOPRAZOLE SODIUM 40 MG: 40 INJECTION, POWDER, FOR SOLUTION INTRAVENOUS at 08:29

## 2022-01-02 RX ADMIN — Medication 10 ML: at 08:31

## 2022-01-02 RX ADMIN — HYDROCHLOROTHIAZIDE 25 MG: 25 TABLET ORAL at 08:30

## 2022-01-02 RX ADMIN — INSULIN LISPRO 2 UNITS: 100 INJECTION, SOLUTION INTRAVENOUS; SUBCUTANEOUS at 08:35

## 2022-01-02 RX ADMIN — Medication 2 PUFF: at 15:09

## 2022-01-02 RX ADMIN — Medication 162 MG: at 08:29

## 2022-01-02 RX ADMIN — Medication 1000 UNITS: at 08:29

## 2022-01-02 RX ADMIN — PIOGLITAZONE 15 MG: 15 TABLET ORAL at 08:30

## 2022-01-02 RX ADMIN — ROSUVASTATIN CALCIUM 20 MG: 20 TABLET, FILM COATED ORAL at 18:07

## 2022-01-02 RX ADMIN — ESCITALOPRAM OXALATE 20 MG: 10 TABLET ORAL at 08:29

## 2022-01-02 RX ADMIN — GABAPENTIN 400 MG: 400 CAPSULE ORAL at 13:50

## 2022-01-02 RX ADMIN — CETIRIZINE HYDROCHLORIDE 10 MG: 10 TABLET, FILM COATED ORAL at 08:30

## 2022-01-02 RX ADMIN — GABAPENTIN 400 MG: 400 CAPSULE ORAL at 08:28

## 2022-01-02 RX ADMIN — OXYCODONE HYDROCHLORIDE AND ACETAMINOPHEN 1000 MG: 500 TABLET ORAL at 08:29

## 2022-01-02 RX ADMIN — AMLODIPINE BESYLATE 2.5 MG: 5 TABLET ORAL at 08:28

## 2022-01-02 RX ADMIN — Medication 2 PUFF: at 08:50

## 2022-01-02 RX ADMIN — ASPIRIN 81 MG: 81 TABLET, COATED ORAL at 08:30

## 2022-01-02 RX ADMIN — Medication 50 MG: at 08:30

## 2022-01-02 ASSESSMENT — PAIN DESCRIPTION - LOCATION: LOCATION: BUTTOCKS

## 2022-01-02 ASSESSMENT — PAIN DESCRIPTION - PAIN TYPE: TYPE: ACUTE PAIN

## 2022-01-02 ASSESSMENT — PAIN SCALES - GENERAL
PAINLEVEL_OUTOF10: 0
PAINLEVEL_OUTOF10: 0
PAINLEVEL_OUTOF10: 10
PAINLEVEL_OUTOF10: 0

## 2022-01-02 NOTE — DISCHARGE SUMMARY
Pt discharged home with all belongings, transported to [facility] via wheelchair, pt  drove her home. Prescriptions sent to [facility] in [place].

## 2022-01-02 NOTE — PROGRESS NOTES
Gastroenterology Progress Note    Grace Martinez is a 80 y.o. female patient. 1. Pneumonia due to infectious organism, unspecified laterality, unspecified part of lung    2. Hypoxia    3. Elevated troponin    4. Anemia, unspecified type        Admission Date: 2021  Hospital Day: Hospital Day: 9  Attending: Erik Smith MD  Date of service: 22    SUBJECTIVE:    Decreasing oxygen requirements, now at 4 liters per NC  Had small dark BM last night   Mild abdominal cramps    ROS:  Cardiovascular ROS: no chest pain or dyspnea on exertion  Gastrointestinal ROS: see above  Respiratory ROS: mild SOB    Physical    VITALS:  BP (!) 170/71   Pulse 60   Temp 98 °F (36.7 °C) (Oral)   Resp 16   Ht 4' 11.49\" (1.511 m)   Wt 209 lb 3.2 oz (94.9 kg)   SpO2 93%   BMI 41.56 kg/m²   TEMPERATURE:  Current - Temp: 98 °F (36.7 °C); Max - Temp  Av.1 °F (36.7 °C)  Min: 97.7 °F (36.5 °C)  Max: 98.4 °F (36.9 °C)    NAD  RRR, Nl s1s2  Lungs CTA Bilaterally, normal effort  Abdomen soft, ND, NT, no HSM, Bowel sounds normal  AAOx3, No asterixis     Data      CBC:   Recent Labs     21  0523 21  1425 21  2245 22  0548 22  1754   WBC 3.9*  --   --   --   --    RBC 2.73*  --   --   --   --    HGB 8.0*   < > 8.1* 8.0* 8.4*   HCT 24.2*   < > 24.4* 24.1* 25.1*     --   --   --   --    MCV 88.6  --   --   --   --    MCH 29.4  --   --   --   --    MCHC 33.2  --   --   --   --    RDW 15.3  --   --   --   --    BANDSPCT 3  --   --   --   --     < > = values in this interval not displayed. BMP:  Recent Labs     21  0523   *   K 5.0      CO2 24   BUN 50*   CREATININE 0.9   CALCIUM 9.4   GLUCOSE 277*        Hepatic Function Panel:   No results for input(s): AST, ALT, BILIDIR, BILITOT, ALKPHOS in the last 72 hours. No results for input(s): LIPASE, AMYLASE in the last 72 hours. No results for input(s): PROTIME, INR in the last 72 hours.   No results for input(s): PTT in the last 72 hours. No results for input(s): OCCULTBLD in the last 72 hours. Radiology Review:    XR CHEST PORTABLE   Final Result   Mildly increased right-side predominant airspace opacities. XR CHEST PORTABLE   Final Result   Persistent dense alveolar consolidation in the right upper lobe compatible   with pneumonia. Cardiomegaly and mild pulmonary venous hypertension. CT CHEST PULMONARY EMBOLISM W CONTRAST   Final Result   1. No evidence of pulmonary embolic disease. 2. Consolidation in the right upper lobe consistent with pneumonia. The   findings are more typical of bacterial etiology and are not consistent with   COVID-19 pneumonia. Additional areas of patchy infiltrate in the right   middle lobe and superior segment of the right lower lobe. Continued plain   film follow-up recommended to ensure clearing. 3. Trace right pleural effusion. Mild bibasilar atelectasis. 4. Cardiomegaly. Coronary vascular calcification. RECOMMENDATIONS:   Plain film follow-up recommended to ensure clearing. Repeat PA and lateral   chest x-ray in 1-2 months following therapy would be reasonable. XR CHEST PORTABLE   Final Result   Findings suspicious for bilateral infiltrates in the lungs with large   consolidation in the right mid lung. Underlying right lung mass difficult to   exclude. CT of the chest may be obtained for further clarification.                    ASSESSMENT   Melena and anemia - ? PUD. Denies NSAID use. hgb stable but trending down at 8+ range   Covid pneumonia - on 13 L O2.    CAD s/p remote CABG  MDS     PLAN    IV PPI BID   Transfuse pRBC to keep Hb>8  H/H Q12  Hold off EGD unless overtly bleeding and not responding to blood transfusions (especially with her tenuous respiratory status)     Humberto Montero MD, MSc  Pushpa Miller  01/02/22

## 2022-01-02 NOTE — PROGRESS NOTES
Department of Internal Medicine  General Internal Medicine   Progress Note      SUBJECTIVE:dyspnea improving O2 requirement ion decline     History obtained from chart review and the patient  General ROS: positive for  - fatigue and malaise  negative for - chills, fever or night sweats  Psychological ROS: negative  Ophthalmic ROS: negative  Respiratory ROS: positive for - cough, shortness of breath and tachypnea  negative for - hemoptysis or wheezing  Cardiovascular ROS: positive for - dyspnea on exertion  negative for - chest pain  Gastrointestinal ROS: no abdominal pain, change in bowel habits, or black or bloody stools  Genito-Urinary ROS: no dysuria, trouble voiding, or hematuria  Musculoskeletal ROS: chronic pain   Neurological ROS: no TIA or stroke symptoms  Dermatological ROS: negative    OBJECTIVE      Medications      Current Facility-Administered Medications: insulin glargine (LANTUS;BASAGLAR) injection pen 15 Units, 15 Units, SubCUTAneous, Daily  insulin lispro (1 Unit Dial) 0-12 Units, 0-12 Units, SubCUTAneous, TID WC  insulin lispro (1 Unit Dial) 0-6 Units, 0-6 Units, SubCUTAneous, Nightly  dexamethasone (PF) (DECADRON) injection 10 mg, 10 mg, IntraVENous, Daily  SUMAtriptan (IMITREX) tablet 100 mg, 100 mg, Oral, Daily PRN  glucose (GLUTOSE) 40 % oral gel 15 g, 15 g, Oral, PRN  dextrose 50 % IV solution, 12.5 g, IntraVENous, PRN  glucagon (rDNA) injection 1 mg, 1 mg, IntraMUSCular, PRN  dextrose 5 % solution, 100 mL/hr, IntraVENous, PRN  pantoprazole (PROTONIX) injection 40 mg, 40 mg, IntraVENous, BID  0.9 % sodium chloride infusion, , IntraVENous, PRN  ipratropium (ATROVENT HFA) 17 MCG/ACT inhaler 2 puff, 2 puff, Inhalation, TID  enoxaparin (LOVENOX) injection 40 mg, 40 mg, SubCUTAneous, BID  guaiFENesin-dextromethorphan (ROBITUSSIN DM) 100-10 MG/5ML syrup 10 mL, 10 mL, Oral, Q4H PRN  albuterol sulfate  (90 Base) MCG/ACT inhaler 2 puff, 2 puff, Inhalation, Q4H PRN  perflutren lipid microspheres (DEFINITY) injection 1.65 mg, 1.5 mL, IntraVENous, ONCE PRN  ALPRAZolam (XANAX) tablet 0.25 mg, 0.25 mg, Oral, BID PRN  escitalopram (LEXAPRO) tablet 20 mg, 20 mg, Oral, Daily  nitroGLYCERIN (NITROLINGUAL) 0.4 mg spray 1 spray, 1 spray, SubLINGual, Q5 Min PRN  ezetimibe (ZETIA) tablet 5 mg - PATIENT SUPPLIED, 5 mg, Oral, Nightly  rosuvastatin (CRESTOR) tablet 20 mg, 20 mg, Oral, QPM  gabapentin (NEURONTIN) capsule 400 mg, 400 mg, Oral, TID  pioglitazone (ACTOS) tablet 15 mg, 15 mg, Oral, Daily  vitamin D (CHOLECALCIFEROL) tablet 1,000 Units, 1,000 Units, Oral, Daily  ascorbic acid (VITAMIN C) tablet 1,000 mg, 1,000 mg, Oral, Daily  estradiol (ESTRACE) vaginal cream 2 g, 2 g, Vaginal, Once per day on Mon Thu  furosemide (LASIX) tablet 40 mg, 40 mg, Oral, Daily  aspirin EC tablet 81 mg, 81 mg, Oral, Daily  tiZANidine (ZANAFLEX) tablet 4 mg, 4 mg, Oral, Nightly PRN  metoprolol tartrate (LOPRESSOR) tablet 12.5 mg, 12.5 mg, Oral, PRN  amLODIPine (NORVASC) tablet 2.5 mg, 2.5 mg, Oral, BID  ferrous gluconate 324 (37.5 Fe) MG tablet 162 mg, 162 mg, Oral, Daily  zinc sulfate (ZINCATE) capsule 50 mg, 50 mg, Oral, Daily  sodium chloride flush 0.9 % injection 5-40 mL, 5-40 mL, IntraVENous, 2 times per day  sodium chloride flush 0.9 % injection 10 mL, 10 mL, IntraVENous, PRN  0.9 % sodium chloride infusion, 25 mL, IntraVENous, PRN  ondansetron (ZOFRAN-ODT) disintegrating tablet 4 mg, 4 mg, Oral, Q8H PRN **OR** ondansetron (ZOFRAN) injection 4 mg, 4 mg, IntraVENous, Q6H PRN  magnesium hydroxide (MILK OF MAGNESIA) 400 MG/5ML suspension 30 mL, 30 mL, Oral, Daily PRN  acetaminophen (TYLENOL) tablet 650 mg, 650 mg, Oral, Q6H PRN **OR** acetaminophen (TYLENOL) suppository 650 mg, 650 mg, Rectal, Q6H PRN  influenza quadrivalent split vaccine (FLUZONE;FLUARIX;FLULAVAL;AFLURIA) injection 0.5 mL, 0.5 mL, IntraMUSCular, Prior to discharge  cetirizine (ZYRTEC) tablet 10 mg, 10 mg, Oral, Daily  [DISCONTINUED] losartan (COZAAR) tablet 100 mg, 100 mg, Oral, Daily **AND** hydroCHLOROthiazide (HYDRODIURIL) tablet 25 mg, 25 mg, Oral, Daily  isosorbide mononitrate (IMDUR) extended release tablet 30 mg, 30 mg, Oral, Daily    Physical      Vitals: BP (!) 145/71   Pulse 60   Temp 98.2 °F (36.8 °C) (Oral)   Resp 18   Ht 4' 11.49\" (1.511 m)   Wt 209 lb 3.2 oz (94.9 kg)   SpO2 95%   BMI 41.56 kg/m²   Temp: Temp: 98.2 °F (36.8 °C)  Max: Temp  Av.1 °F (36.7 °C)  Min: 97.9 °F (36.6 °C)  Max: 98.2 °F (36.8 °C)  Respiration range:  Resp  Av.6  Min: 14  Max: 18  Pulse Range:  Pulse  Av.7  Min: 60  Max: 65  Blood pressure range:  Systolic (77ASI), GNF:611 , Min:145 , CARLOS:405   , Diastolic (80MHW), SCF:38, Min:71, Max:86    SpO2  Av.8 %  Min: 93 %  Max: 100 %    Intake/Output Summary (Last 24 hours) at 2022 1406  Last data filed at 2022 1259  Gross per 24 hour   Intake 640 ml   Output 1550 ml   Net -910 ml       Vent settings:  Pulse  Av.7  Min: 60  Max: 91  Resp  Av.6  Min: 13  Max: 36  SpO2  Av.1 %  Min: 79 %  Max: 100 %    CONSTITUTIONAL:  fatigued, alert, cooperative, mild distress, appears older than stated age and morbidly obese  EYES:  Unremarkable   NECK:  Mild JVD  and supple, symmetrical, trachea midline  BACK:  symmetric and no curvature  LUNGS:  tachypneic, Moderate respiratory distress, good air exchange, no retractions and crackles diffuse, rhonchi diffuse  CARDIOVASCULAR:  normal apical pulses, regular rate and rhythm, normal S1 and S2, no S3 and no S4  ABDOMEN:  Soft BS + non tender   MUSCULOSKELETAL:  Trace edema   NEUROLOGIC:  Grossly intact   SKIN:  Warm and dry  and no bruising or bleeding    Data      Recent Results (from the past 96 hour(s))   POCT Glucose    Collection Time: 21  4:18 PM   Result Value Ref Range    POC Glucose 240 (H) 70 - 99 mg/dl    Performed on ACCU-CHEK    POCT Glucose    Collection Time: 21  8:06 PM   Result Value Ref Range    POC Glucose 283 (H) 70 - 99 mg/dl Performed on ACCU-CHEK    Hemoglobin and hematocrit, blood    Collection Time: 12/29/21  8:19 PM   Result Value Ref Range    Hemoglobin 8.8 (L) 12.0 - 16.0 g/dL    Hematocrit 26.7 (L) 36.0 - 48.0 %   Basic Metabolic Panel    Collection Time: 12/30/21  4:56 AM   Result Value Ref Range    Sodium 135 (L) 136 - 145 mmol/L    Potassium 5.0 3.5 - 5.1 mmol/L    Chloride 101 99 - 110 mmol/L    CO2 22 21 - 32 mmol/L    Anion Gap 12 3 - 16    Glucose 245 (H) 70 - 99 mg/dL    BUN 54 (H) 7 - 20 mg/dL    CREATININE 1.0 0.6 - 1.2 mg/dL    GFR Non-African American 55 (A) >60    GFR African American >60 >60    Calcium 9.2 8.3 - 10.6 mg/dL   CBC Auto Differential    Collection Time: 12/30/21  4:56 AM   Result Value Ref Range    WBC 3.2 (L) 4.0 - 11.0 K/uL    RBC 2.87 (L) 4.00 - 5.20 M/uL    Hemoglobin 8.5 (L) 12.0 - 16.0 g/dL    Hematocrit 25.6 (L) 36.0 - 48.0 %    MCV 89.3 80.0 - 100.0 fL    MCH 29.5 26.0 - 34.0 pg    MCHC 33.0 31.0 - 36.0 g/dL    RDW 15.9 (H) 12.4 - 15.4 %    Platelets 385 842 - 129 K/uL    MPV 8.4 5.0 - 10.5 fL    Neutrophils % 74.7 %    Lymphocytes % 14.4 %    Monocytes % 10.8 %    Eosinophils % 0.0 %    Basophils % 0.1 %    Neutrophils Absolute 2.4 1.7 - 7.7 K/uL    Lymphocytes Absolute 0.5 (L) 1.0 - 5.1 K/uL    Monocytes Absolute 0.3 0.0 - 1.3 K/uL    Eosinophils Absolute 0.0 0.0 - 0.6 K/uL    Basophils Absolute 0.0 0.0 - 0.2 K/uL   POCT Glucose    Collection Time: 12/30/21  8:21 AM   Result Value Ref Range    POC Glucose 223 (H) 70 - 99 mg/dl    Performed on ACCU-CHEK    POCT Glucose    Collection Time: 12/30/21 12:08 PM   Result Value Ref Range    POC Glucose 266 (H) 70 - 99 mg/dl    Performed on ACCU-CHEK    Hemoglobin and hematocrit, blood    Collection Time: 12/30/21  1:50 PM   Result Value Ref Range    Hemoglobin 8.6 (L) 12.0 - 16.0 g/dL    Hematocrit 26.2 (L) 36.0 - 48.0 %   POCT Glucose    Collection Time: 12/30/21  5:18 PM   Result Value Ref Range    POC Glucose 391 (H) 70 - 99 mg/dl    Performed on ng/mL   POCT Glucose    Collection Time: 12/31/21  9:30 AM   Result Value Ref Range    POC Glucose 214 (H) 70 - 99 mg/dl    Performed on ACCU-CHEK    POCT Glucose    Collection Time: 12/31/21 12:16 PM   Result Value Ref Range    POC Glucose 364 (H) 70 - 99 mg/dl    Performed on ACCU-CHEK    Hemoglobin and hematocrit, blood    Collection Time: 12/31/21  2:25 PM   Result Value Ref Range    Hemoglobin 8.0 (L) 12.0 - 16.0 g/dL    Hematocrit 24.5 (L) 36.0 - 48.0 %   POCT Glucose    Collection Time: 12/31/21  5:05 PM   Result Value Ref Range    POC Glucose 362 (H) 70 - 99 mg/dl    Performed on ACCU-CHEK    POCT Glucose    Collection Time: 12/31/21  9:45 PM   Result Value Ref Range    POC Glucose 404 (H) 70 - 99 mg/dl    Performed on ACCU-CHEK    Hemoglobin and hematocrit, blood    Collection Time: 12/31/21 10:45 PM   Result Value Ref Range    Hemoglobin 8.1 (L) 12.0 - 16.0 g/dL    Hematocrit 24.4 (L) 36.0 - 48.0 %   Hemoglobin and hematocrit, blood    Collection Time: 01/01/22  5:48 AM   Result Value Ref Range    Hemoglobin 8.0 (L) 12.0 - 16.0 g/dL    Hematocrit 24.1 (L) 36.0 - 48.0 %   POCT Glucose    Collection Time: 01/01/22  8:33 AM   Result Value Ref Range    POC Glucose 209 (H) 70 - 99 mg/dl    Performed on ACCU-CHEK    POCT Glucose    Collection Time: 01/01/22 11:40 AM   Result Value Ref Range    POC Glucose 279 (H) 70 - 99 mg/dl    Performed on ACCU-CHEK    POCT Glucose    Collection Time: 01/01/22  4:17 PM   Result Value Ref Range    POC Glucose 316 (H) 70 - 99 mg/dl    Performed on ACCU-CHEK    Hemoglobin and hematocrit, blood    Collection Time: 01/01/22  5:54 PM   Result Value Ref Range    Hemoglobin 8.4 (L) 12.0 - 16.0 g/dL    Hematocrit 25.1 (L) 36.0 - 48.0 %   POCT Glucose    Collection Time: 01/01/22  8:36 PM   Result Value Ref Range    POC Glucose 332 (H) 70 - 99 mg/dl    Performed on ACCU-CHEK    Hemoglobin and hematocrit, blood    Collection Time: 01/02/22  6:05 AM   Result Value Ref Range    Hemoglobin 8.2 (L) 12.0 - 16.0 g/dL    Hematocrit 24.2 (L) 36.0 - 48.0 %   POCT Glucose    Collection Time: 01/02/22  8:27 AM   Result Value Ref Range    POC Glucose 169 (H) 70 - 99 mg/dl    Performed on ACCU-CHEK    POCT Glucose    Collection Time: 01/02/22 11:38 AM   Result Value Ref Range    POC Glucose 297 (H) 70 - 99 mg/dl    Performed on ACCU-CHEK        ASSESSMENT AND PLAN     Principal Problem:    NSTEMI (non-ST elevated myocardial infarction) ([facility]  Active Problems:    Coronary artery disease due to lipid rich plaque    Essential hypertension    RAHUL (obstructive sleep apnea)    Morbid obesity (HCC)    Diabetes mellitus type 2 in obese (HCC)    Paroxysmal atrial fibrillation (HCC)    Coronary artery disease involving coronary bypass graft of native heart without angina pectoris    Anemia    Pacemaker    Pneumonia    Acute respiratory failure with hypoxia (HCC)    HTN (hypertension)    COVID-19    Acute respiratory failure due to severe acute respiratory syndrome coronavirus 2 (SARS-CoV-2) infection (HCC)    MALCOLM (acute kidney injury) ([facility]    Melena  Resolved Problems:    * No resolved hospital problems.  *    Ct weaning O2 if we can go below 3 L can dismiss with HHC and Home O2    Will switch to PO steroids

## 2022-01-02 NOTE — PROGRESS NOTES
01/02/22 0430   RT Protocol   History Pulmonary Disease 0   Respiratory pattern 2   Breath sounds 2   Cough 0   Indications for Bronchodilator Therapy Decreased or absent breath sounds   Bronchodilator Assessment Score 4

## 2022-01-02 NOTE — RT PROTOCOL NOTE
wheezing or increased work of breathing using Per Protocol order mode. 4-6 - enter or revise RT Bronchodilator order(s) to two equivalent RT bronchodilator orders with one order with BID Frequency and one order with Frequency of every 4 hours PRN wheezing or increased work of breathing using Per Protocol order mode. 7-10 - enter or revise RT Bronchodilator order(s) to two equivalent RT bronchodilator orders with one order with TID Frequency and one order with Frequency of every 4 hours PRN wheezing or increased work of breathing using Per Protocol order mode. 11-13 - enter or revise RT Bronchodilator order(s) to one equivalent RT bronchodilator order with QID Frequency and an Albuterol order with Frequency of every 4 hours PRN wheezing or increased work of breathing using Per Protocol order mode. Greater than 13 - enter or revise RT Bronchodilator order(s) to one equivalent RT bronchodilator order with every 4 hours Frequency and an Albuterol order with Frequency of every 2 hours PRN wheezing or increased work of breathing using Per Protocol order mode. RT to enter RT Home Evaluation for COPD & MDI Assessment order using Per Protocol order mode.     Electronically signed by Gildardo Schmidt RCP on 1/2/2022 at 4:31 AM

## 2022-01-02 NOTE — DISCHARGE INSTR - COC
Continuity of Care Form    Patient Name: Joseph Lopez   :  1950  MRN:  [id number]    Admit date:  2021  Discharge date:  2022    Code Status Order: Full Code   Advance Directives:      Admitting Physician:  Kiki Porter MD  PCP: Loyd Tijerina DO    Discharging Nurse: [facility] Unit/Room#: 8ZL-3226/2319-49  Discharging Unit Phone Number: ***    Emergency Contact:   Extended Emergency Contact Information  Primary Emergency Contact: Yo Villa  Address: [address] [facility] Phone: [phone]  Mobile Phone: [phone]  Relation: Spouse  Secondary Emergency Contact: Ros[facility] [address] [facility] Phone: [phone]  Mobile Phone: [phone]  Relation: Child    Past Surgical History:  Past Surgical History:   Procedure Laterality Date    ABDOMEN SURGERY N/A 2020    EXPLORATION OF ABDOMINAL WOUND performed by Claudia Oliveira MD at [address] [facility]  1999    quadruple by-pass, [facility]    COLONOSCOPY      COLONOSCOPY  10/08/2018    1 polyp removed    COLONOSCOPY N/A 10/8/2018    COLONOSCOPY POLYPECTOMY SNARE/COLD BIOPSY performed by Lore Colón MD at [facility] SURGERY      face lift    ENTEROSCOPY N/A 2019    ENTEROSCOPY performed by oLre Colón MD at [address] [facility] [place], operated by [facility]  11    hiatal hernia repair    GASTRIC BAND REMOVAL  2016    laparoscopic     HAMMER TOE SURGERY      [facility], [facility]. INJECTION PROCEDURE FOR SACROILIAC JOINT Left 2018    SACROILIAC JOINT INJECTION ON THE LEFT WITH FLUOROSCOPY performed by Karan Dolan MD at [facility]    Left and Right knees, [address] ARTHROSCOPY      1982 left    ORIF HUMERUS DECOMPRESSION Left 2/25/2016    OPEN REDUCTION INTERNAL FIXATION LEFT PROXIMAL HUMERUS    OVARY REMOVAL Bilateral 1997    ME NJX DX/THER SBST INTRLMNR CRV/THRC W/IMG GDN N/A 11/5/2018    MIDLINE L4/L5 LUMBAR INTERLAMINAR EPIDURAL STEROID INJECTION WITH FLUOROSCOPY performed by Joe Giron MD at [address] ENDOSCOPY  10/30/15    UPPER GASTROINTESTINAL ENDOSCOPY  10/14/2016    with biopsy    VENTRAL HERNIA REPAIR N/A 10/29/2019    OPEN VENTRAL HERNIA REPAIR WITH  MESH performed by Maureen Hussein MD at [address] [facility] History:   Immunization History   Administered Date(s) Administered    Influenza Vaccine, unspecified formulation 11/17/2016    Influenza Virus Vaccine 10/17/2013    Influenza Whole 09/30/2015    Pneumococcal Conjugate 13-valent ([id number]) 11/24/2015    Pneumococcal Conjugate 7-valent (Alexus Crosser) 11/06/2007       Active Problems:  Patient Active Problem List   Diagnosis Code    Coronary artery disease due to lipid rich plaque I25.10, I25.83    Essential hypertension I10    RAHUL (obstructive sleep apnea) G47.33    Chronic kidney disease N18.9    Morbid obesity (HCC) E66.01    Diabetes mellitus type 2 in obese (HCC) E11.69, E66.9    Paroxysmal atrial fibrillation ([facility]) I48.0    Coronary artery disease involving coronary bypass graft of native heart without angina pectoris I25.810    Anemia D64.9    Pacemaker Z95.0    NSTEMI (non-ST elevated myocardial infarction) ([facility] I21.4    Pneumonia J18.9    Acute respiratory failure with hypoxia ([facility]) J96.01    HTN (hypertension) I10    COVID-19 U07.1    Acute respiratory failure due to severe acute respiratory syndrome coronavirus 2 (SARS-CoV-2) infection ([facility]) U07.1, J96.00    MALCOLM (acute kidney injury) ([facility] N17.9    Melena K92.1       Isolation/Infection:   Isolation            Droplet Plus          Patient Infection Status       Infection Onset Added Last Indicated Last Indicated By Review Planned Expiration Resolved Resolved By    COVID-19 12/25/21 12/26/21 12/25/21 COVID-19 01/02/22 01/08/22      Resolved    COVID-19 (Rule Out) 12/25/21 12/25/21 12/25/21 COVID-19 (Ordered)   12/26/21 Rule-Out Test Resulted            Nurse Assessment:  Last Vital Signs: BP (!) 145/71   Pulse 60   Temp 98.2 °F (36.8 °C) (Oral)   Resp 18   Ht 4' 11.49\" (1.511 m)   Wt 209 lb 3.2 oz (94.9 kg)   SpO2 95%   BMI 41.56 kg/m²     Last documented pain score (0-10 scale): Pain Level: 10  Last Weight:   Wt Readings from Last 1 Encounters:   12/25/21 209 lb 3.2 oz (94.9 kg)     Mental Status:  {IP PT MENTAL STATUS:20030}    IV Access:  { SUNNY IV ACCESS:327605796}    Nursing Mobility/ADLs:  Walking   {CHP DME YVPP:163244724}  Transfer  {CHP DME ERHN:511307313}  Bathing  {CHP DME ILFO:722816489}  Dressing  {CHP DME KYLP:180858541}  Toileting  {CHP DME BEIS:292849464}  Feeding  {CHP DME WPME:276541055}  Med Admin  {P DME KOKH:940652586}  Med Delivery   { SUNNY MED Delivery:513908149}    Wound Care Documentation and Therapy:        Elimination:  Continence: Bowel: {YES / MM:72729}  Bladder: {YES / HN:59190}  Urinary Catheter: {Urinary Catheter:821590592}   Colostomy/Ileostomy/Ileal Conduit: {YES / PX:89382}       Date of Last BM: ***    Intake/Output Summary (Last 24 hours) at 1/2/2022 1402  Last data filed at 1/2/2022 1259  Gross per 24 hour   Intake 640 ml   Output 1550 ml   Net -910 ml     I/O last 3 completed shifts:   In: 20 [I.V.:20]  Out: 1600 [Urine:1600]    Safety Concerns:     [address] SUNNY Safety Concerns:054922332}    Impairments/Disabilities:      [address] Mac SUNNY Impairments/Disabilities:342728801}    Nutrition Therapy:  Current Nutrition Therapy:   [address] CORREA Diet List:363018227}    Routes of Feeding: {CHP DME Other Feedings:664102029}  Liquids: {Slp liquid thickness:39553}  Daily Fluid Restriction: {CHP DME Yes amt example:019435408}  Last Modified Barium Swallow with

## 2022-01-02 NOTE — PLAN OF CARE
Problem: Falls - Risk of:  Goal: Will remain free from falls  Description: Will remain free from falls  1/2/2022 1003 by Harvey Dailey RN  Outcome: Ongoing     Problem: Discharge Planning:  Goal: Discharged to appropriate level of care  Description: Discharged to appropriate level of care  1/2/2022 1003 by Harvey Dailey RN  Outcome: Ongoing     Problem: Cardiac Output - Decreased:  Goal: Hemodynamic stability will improve  Description: Hemodynamic stability will improve  Outcome: Ongoing     Problem: Serum Glucose Level - Abnormal:  Goal: Ability to maintain appropriate glucose levels will improve  Description: Ability to maintain appropriate glucose levels will improve  1/2/2022 1003 by Harvey Dailey RN  Outcome: Ongoing     Problem: Pain:  Goal: Pain level will decrease  Description: Pain level will decrease  1/2/2022 1003 by Harvey Dailey RN  Outcome: Ongoing     Problem: Tissue Perfusion - Cardiopulmonary, Altered:  Goal: Absence of angina  Description: Absence of angina  Outcome: Ongoing     Problem: Tobacco Use:  Goal: Will participate in inpatient tobacco-use cessation counseling  Description: Will participate in inpatient tobacco-use cessation counseling  Outcome: Ongoing     Problem: Airway Clearance - Ineffective  Goal: Achieve or maintain patent airway  1/2/2022 1003 by Harvey Dailey RN  Outcome: Ongoing     Problem: Gas Exchange - Impaired  Goal: Absence of hypoxia  1/2/2022 1003 by Harvey Dailey RN  Outcome: Ongoing     Problem: Breathing Pattern - Ineffective  Goal: Ability to achieve and maintain a regular respiratory rate  Outcome: Ongoing     Problem:  Body Temperature -  Risk of, Imbalanced  Goal: Ability to maintain a body temperature within defined limits  1/2/2022 1003 by Harvey Dailey RN  Outcome: Ongoing     Problem: Isolation Precautions - Risk of Spread of Infection  Goal: Prevent transmission of infection  1/2/2022 1003 by Harvey Dailey RN  Outcome: Ongoing     Problem: Nutrition Deficits  Goal: Optimize nutritional status  1/2/2022 1003 by Yaquelin Redding RN  Outcome: Ongoing     Problem: Risk for Fluid Volume Deficit  Goal: Maintain normal heart rhythm  1/2/2022 1003 by Yaquelin Redding RN  Outcome: Ongoing     Problem: Loneliness or Risk for Loneliness  Goal: Demonstrate positive use of time alone when socialization is not possible  Outcome: Ongoing     Problem: Fatigue  Goal: Verbalize increase energy and improved vitality  1/2/2022 1003 by Yaquelin Redding RN  Outcome: Ongoing     Problem: Patient Education: Go to Patient Education Activity  Goal: Patient/Family Education  1/2/2022 1003 by Yaquelin Redding RN  Outcome: Ongoing

## 2022-01-02 NOTE — PROGRESS NOTES
Assessment complete, BP elevated. AM meds given. Pt has some lower back pain from the bed. No SOB or distress noted. Lung sounds clear. Currently up in chair to eat. O2 weaned to 3L, O2 stable. Call light in reach.

## 2022-01-02 NOTE — PROGRESS NOTES
01/02/22 1517   Resting (Room Air)   SpO2 92   During Walk (Room Air)   SpO2 83   Walk/Assistance Device Ambulation   Rate of Dyspnea 0   During Walk (On O2)   SpO2 86   O2 Device Nasal cannula   O2 Flow Rate (l/min) 1 l/min   Need Additional O2 Flow Rate Rows Yes   O2 Flow Rate (l/min) 2 l/min   O2 Saturation 89   Walk/Assistance Device Ambulation   After Walk   Does the Patient Qualify for Home O2 Yes   Liter Flow at Rest 0   Liter Flow on Exertion 2   Does the Patient Need Portable Oxygen Tanks Yes

## 2022-01-02 NOTE — PROGRESS NOTES
Spoke with Pt's  and son, pt will go home today and follow up with Dr Que Maxwell as an outpatient next week as well as PCP. Rigo cathelisa removed, pt instructed to call when she needs to use the restroom.

## 2022-01-02 NOTE — PLAN OF CARE
Problem: Falls - Risk of:  Goal: Will remain free from falls  Description: Will remain free from falls  Outcome: Ongoing  Bed alarm engaged, locked, lowest position. [place] light in reach. Pt calls out appropriately with needs. Goal: Absence of physical injury  Description: Absence of physical injury  Outcome: Ongoing     Problem: Discharge Planning:  Goal: Discharged to appropriate level of care  Description: Discharged to appropriate level of care  Outcome: Ongoing     Problem: Serum Glucose Level - Abnormal:  Goal: Ability to maintain appropriate glucose levels will improve  Description: Ability to maintain appropriate glucose levels will improve  Outcome: Ongoing     Problem: Pain:  Goal: Pain level will decrease  Description: Pain level will decrease  Outcome: Ongoing  No c/o pain this shift  Goal: Control of acute pain  Description: Control of acute pain  Outcome: Ongoing     Problem: Tissue Perfusion - Cardiopulmonary, Altered:  Goal: Hemodynamic stability will improve  Description: Hemodynamic stability will improve  Outcome: Ongoing     Problem: Airway Clearance - Ineffective  Goal: Achieve or maintain patent airway  Outcome: Ongoing     Problem: Gas Exchange - Impaired  Goal: Absence of hypoxia  Outcome: Ongoing  Sa02 levels stable >92% on 4L high flow now. Goal: Promote optimal lung function  Outcome: Ongoing     Problem:  Body Temperature -  Risk of, Imbalanced  Goal: Ability to maintain a body temperature within defined limits  Outcome: Ongoing     Problem: Isolation Precautions - Risk of Spread of Infection  Goal: Prevent transmission of infection  Outcome: Ongoing     Problem: Nutrition Deficits  Goal: Optimize nutritional status  Outcome: Ongoing     Problem: Risk for Fluid Volume Deficit  Goal: Maintain normal heart rhythm  Outcome: Ongoing  Goal: Maintain normal serum potassium, sodium, calcium, phosphorus, and pH  Outcome: Ongoing     Problem: Fatigue  Goal: Verbalize increase energy and improved vitality  Outcome: Ongoing     Problem: Patient Education: Go to Patient Education Activity  Goal: Patient/Family Education  Outcome: Ongoing

## 2022-01-02 NOTE — PLAN OF CARE
Problem: Falls - Risk of:  Goal: Will remain free from falls  Description: Will remain free from falls  1/2/2022 1413 by Kd Mroel RN  Outcome: Completed     Problem: Falls - Risk of:  Goal: Absence of physical injury  Description: Absence of physical injury  1/2/2022 1413 by Kd Morel RN  Outcome: Completed     Problem: Discharge Planning:  Goal: Discharged to appropriate level of care  Description: Discharged to appropriate level of care  1/2/2022 1413 by Kd Moerl RN  Outcome: Completed     Problem: Cardiac Output - Decreased:  Goal: Hemodynamic stability will improve  Description: Hemodynamic stability will improve  1/2/2022 1413 by Kd Morel RN  Outcome: Completed     Problem: Serum Glucose Level - Abnormal:  Goal: Ability to maintain appropriate glucose levels will improve  Description: Ability to maintain appropriate glucose levels will improve  1/2/2022 1413 by Kd Morel RN  Outcome: Completed     Problem: Pain:  Goal: Pain level will decrease  Description: Pain level will decrease  1/2/2022 1413 by Kd Morel RN  Outcome: Completed     Problem: Tissue Perfusion - Cardiopulmonary, Altered:  Goal: Absence of angina  Description: Absence of angina  1/2/2022 1413 by Kd Morel RN  Outcome: Completed

## 2022-01-03 ENCOUNTER — CARE COORDINATION (OUTPATIENT)
Dept: CASE MANAGEMENT | Age: 72
End: 2022-01-03

## 2022-01-03 LAB
ALBUMIN SERPL-MCNC: 2.9 G/DL (ref 3.1–4.9)
ALPHA-1-GLOBULIN: 0.4 G/DL (ref 0.2–0.4)
ALPHA-2-GLOBULIN: 1.2 G/DL (ref 0.4–1.1)
BETA GLOBULIN: 0.9 G/DL (ref 0.9–1.6)
GAMMA GLOBULIN: 0.9 G/DL (ref 0.6–1.8)
SPE/IFE INTERPRETATION: NORMAL
TOTAL PROTEIN: 6.3 G/DL (ref 6.4–8.2)

## 2022-01-03 NOTE — CARE COORDINATION
[facility] Transitions Initial Follow Up Call    Call within 2 business days of discharge: Yes    Patient: Luke Chang Patient : 1950   MRN: [id number]  Reason for Admission:   Discharge Date: 22 RARS: Readmission Risk Score: 16.4 ( )      Last Discharge [facility]       Complaint Diagnosis Description Type Department Provider    21 Fatigue Pneumonia due to infectious organism, unspecified laterality, unspecified part of lung . .. ED to Hosp-Admission (Discharged) (ADMITTED) [facility] 3T Carl Burrows MD           Initial 24 hr call attempted, could not leave contact info, recording states mailbox has not elida set up as yet.       Follow Up  Future Appointments   Date Time Provider Elsa Sheth   3/22/2022  4:00 PM SCHEDULE, [place] REMOTE TRANSMISSION FF Cardio MMA   2022  2:00 PM SCHEDULE, [place] DEVICE CHECK FF Cardio MMA   2022  2:00 PM BILL Gutierrez - CNP FF Cardio MMA   2022  4:00 PM SCHEDULE, [place] REMOTE TRANSMISSION FF Cardio MMA   2022  4:00 PM SCHEDULE, [place] REMOTE TRANSMISSION FF Cardio MMA   12/15/2022  3:15 PM Kevin Burrows MD FF Cardio MMA   2022  4:00 PM SCHEDULE, [place] REMOTE TRANSMISSION FF Cardio MMA       Burak Lopez RN

## 2022-01-03 NOTE — PROGRESS NOTES
Call returned to pt  Edilberto Hernandez at 2155 by this RN. He states Rxs were picked up from [facility] in [place] and that there was not an atrovent inhaler included in the medications that were picked up. Per discharge paperwork it appears atrovent inhaler rx should have been sent over. He was unsure if the pharmacy actually received the order or if they were unable to fill it for some other reason. He agreed to contact the pharmacy first and then call 3T back if the rx needs to be resent by discharging MD. Mary Diana denies further question/needs at this time.

## 2022-01-04 ENCOUNTER — CARE COORDINATION (OUTPATIENT)
Dept: CASE MANAGEMENT | Age: 72
End: 2022-01-04

## 2022-01-05 PROBLEM — J96.00 ACUTE RESPIRATORY FAILURE DUE TO SEVERE ACUTE RESPIRATORY SYNDROME CORONAVIRUS 2 (SARS-COV-2) INFECTION (HCC): Status: RESOLVED | Noted: 2021-12-28 | Resolved: 2022-01-05

## 2022-01-05 PROBLEM — J96.01 ACUTE RESPIRATORY FAILURE WITH HYPOXIA (HCC): Status: RESOLVED | Noted: 2021-12-25 | Resolved: 2022-01-05

## 2022-01-05 PROBLEM — U07.1 ACUTE RESPIRATORY FAILURE DUE TO SEVERE ACUTE RESPIRATORY SYNDROME CORONAVIRUS 2 (SARS-COV-2) INFECTION (HCC): Status: RESOLVED | Noted: 2021-12-28 | Resolved: 2022-01-05

## 2022-01-05 PROBLEM — I10 HTN (HYPERTENSION): Status: RESOLVED | Noted: 2021-12-25 | Resolved: 2022-01-05

## 2022-01-05 NOTE — PROGRESS NOTES
[facility]   Electrophysiology  Bruno Mari, APRN-CNP  Attending EP: Dr. Tim Huertas    Date: 1/20/2022  I had the privilege of visiting Joseph Lopez in the office. Chief Complaint:   Chief Complaint   Patient presents with    Follow-up    Atrial Fibrillation     History of Present Illness: History obtained from patient and medical record. Joseph Lopez is 80 y.o. female with a past medical history of HTN, HLD, DM, obesity, RAHUL, CKD, CAD s/ CABG (1999), and atrial fibrillation. Hx of EPS PVI AF ablation (8/16/2018). History of MGUS and receiving Iron transfusion. Hx of SSS s/p PPM (5/1/2019). -Interval history: Today, Joseph Lopez is being seen for routine EP follow up. Her  is present for the visit. She is doing much better and is back to her baseline. She was extremely ill in December with covid pneumonia, but has dramatically improved and is now off oxygen. Per GI notes, there is concern for PUD. No EGD was done during hospital stay due to the severity of her illness. She is curious if she should have this done. She states she can tell certain foods cause her distress after eating them. Her device interrogation shows normal function. No AF. She occasionally feels a brief flutter/recing feeling in her chest, but it will only last for a few seconds. She is worried about her BP being elevated. She states she was just restarted on her losartan-HCTZ, which has helped, but her BP remains high. She is compliant with her Bi-pap at night. Denies having chest pain, palpitations, shortness of breath, orthopnea/PND, cough, or dizziness at the time of this visit. With regard to medication therapy the patient has been compliant with prescribed regimen. They have tolerated therapy to date. Allergies:   Allergies   Allergen Reactions    Albuterol Other (See Comments)     Other reaction(s): Chest Pain  Crushing chest pain    Heparin Other (See Comments)     Caused bruises and hematomas immediately when drip started. MARKED BRUISING/HEMATOMAS    Niacin     Avelox [Moxifloxacin Hcl In Nacl] Rash    Moxifloxacin Rash    Niaspan [Niacin Er] Itching and Rash    Proventil [Albuterol Sulfate] Palpitations    Tagamet [Cimetidine] Rash     Home Medications:  Prior to Visit Medications    Medication Sig Taking?  Authorizing Provider   fexofenadine (ALLEGRA) 180 MG tablet Take 180 mg by mouth daily Yes Historical Provider, MD   Epoetin Leonard-epbx (RETACRIT IJ) Inject as directed Every friday Yes Historical Provider, MD   losartan-hydroCHLOROthiazide (HYZAAR) 100-25 MG per tablet Take 1 tablet by mouth daily Yes Historical Provider, MD   ipratropium (ATROVENT HFA) 17 MCG/ACT inhaler Inhale 2 puffs into the lungs three times daily Yes Anjelica Hale MD   SUMAtriptan (IMITREX) 100 MG tablet Take 1 tablet by mouth daily as needed for Migraine Yes Anjelica Hale MD   tiZANidine (ZANAFLEX) 4 MG tablet Take 1 tablet by mouth nightly as needed (leg spasms) Yes Anjelica Hale MD   zinc sulfate (ZINCATE) 220 (50 Zn) MG capsule Take 50 mg by mouth daily Yes Historical Provider, MD   potassium chloride (KLOR-CON M) 20 MEQ extended release tablet Take 1 tablet by mouth daily Yes Tri Caldera MD   amLODIPine (NORVASC) 5 MG tablet Take 1 tablet by mouth every evening  Patient taking differently: Take 2.5 mg by mouth 2 times daily Take 0.5 tablet by mouth every morning and every evening Yes Reji Campo MD   metoprolol tartrate (LOPRESSOR) 25 MG tablet Take 12.5 mg by mouth as needed (FOR A-FIB)  Yes Historical Provider, MD   aspirin 81 MG tablet Take 81 mg by mouth daily Yes Historical Provider, MD   furosemide (LASIX) 40 MG tablet Take 1 tablet by mouth daily Yes Reji Campo MD   isosorbide mononitrate (ISMO;MONOKET) 10 MG tablet Take 5 mg by mouth 2 times daily Yes Historical Provider, MD   Ascorbic Acid (VITAMIN C) 1000 MG tablet Take 1,000 mg by mouth daily Yes Historical Provider, MD up from anesthesia-ativan helps (xxyusy02/29/19: ativans works within seconds of administration)    Atrial fibrillation ([facility] [address] 10/01/2017    A. fib with SVR    CAD (coronary artery disease)     Chest pain 10/01/2017    Chronic kidney disease     ? ??    Depression     GERD (gastroesophageal reflux disease)     Glaucoma     Hiatal hernia     Hyperlipidemia     Hypertension     Migraines, neuralgic     Morbid obesity (HCC)     Osteoarthritis     Sleep apnea     uses CPAP    Type II or unspecified type diabetes mellitus without mention of complication, not stated as uncontrolled     Unspecified sleep apnea     uses cpap    Urinary incontinence     UTI (urinary tract infection)      Past Surgical History:    has a past surgical history that includes Coronary artery bypass graft (1999); Hammer toe surgery; Ovary removal (Bilateral, 1997); Esophagus dilation; Breast lumpectomy; Knee arthroscopy; Gastric Band (12/20/11); Colonoscopy; Upper gastrointestinal endoscopy; Upper gastrointestinal endoscopy (10/30/15); Cosmetic surgery; joint replacement (1995); joint replacement (1995); orif humerus decompression (Left, 2/25/2016); Upper gastrointestinal endoscopy (10/14/2016); Bariatric Surgery (11/03/2016); ablation of dysrhythmic focus; Colonoscopy (10/08/2018); Colonoscopy (N/A, 10/8/2018); pr njx dx/ther sbst intrlmnr crv/thrc w/img gdn (N/A, 11/5/2018); Cardiac surgery (2/22/1999); Cardiac catheterization; Injection Procedure For Sacroiliac Joint (Left, 12/17/2018); Enteroscopy (N/A, 2/26/2019); ventral hernia repair (N/A, 10/29/2019); and Abdomen surgery (N/A, 9/1/2020). Social History:  Reviewed. reports that she has never smoked. She has never used smokeless tobacco. She reports that she does not drink alcohol and does not use drugs. Family History:  Reviewed.  family history includes Cancer in her mother; Heart Disease in her father; High Blood Pressure in her father and mother; Stroke in her sister. Review of Systems:  · Constitutional: Negative for fever, night sweats, chills, weight changes, or weakness  · Skin: Negative for rash, dry skin, pruritus, bruising, bleeding, blood clots, or changes in skin pigment  · HEENT: Negative for vision changes, ringing in the ears, sore throat, dysphagia, or swollen lymph nodes  · Respiratory: Reviewed in HPI  · Cardiovascular: Reviewed in HPI  · Gastrointestinal: Negative for abdominal pain, N/V/D, constipation, or black/tarry stools  · Genito-Urinary: Negative for dysuria, incontinence, urgency, or hematuria  · Musculoskeletal: Negative for joint swelling, muscle pain, or injuries  · Neurological/Psych: Negative for confusion, seizures, dizziness, headaches, balance issues or TIA-like symptoms. No anxiety, depression, or insomnia    Physical Examination:  Vitals:    01/20/22 1405   BP: (!) 150/74   Pulse: 60   SpO2: 98%      Wt Readings from Last 3 Encounters:   01/20/22 204 lb (92.5 kg)   12/25/21 209 lb 3.2 oz (94.9 kg)   12/16/21 211 lb 3.2 oz (95.8 kg)     Constitutional: Cooperative and in no apparent distress, and appears well nourished  Skin: Warm and pink; no pallor, cyanosis, bruising, or clubbing  HEENT: Symmetric and normocephalic. PERRL, EOM intact. Conjunctiva pink with clear sclera. Mucus membranes pink and moist. Teeth intact. Thyroid smooth without nodules or goiter  Respiratory: Respirations symmetric and unlabored. Lungs clear to auscultation bilaterally, no wheezing, rhonchi, or crackles  Cardiovascular:  Regular rate and rhythm. S1/S2 present without murmurs, rubs, or gallops. Peripheral pulses 2+, capillary refill < 3 seconds. No elevation of JVP. No peripheral edema  Gastrointestinal: Abdomen soft and round. Bowel sounds normoactive in all quadrants without tenderness or masses. Musculoskeletal: Bilateral upper and lower extremity strength 5/5 with full ROM. Neurological/Psych: Awake and orientated to person, place and time.  Calm affect, appropriate mood. Pertinent labs, diagnostic, device, and imaging results reviewed as a part of this visit    LABS    CBC:   Lab Results   Component Value Date    WBC 3.9 (L) 2021    HGB 8.2 (L) 2022    HCT 24.2 (L) 2022    MCV 88.6 2021     2021     BMP:   Lab Results   Component Value Date    CREATININE 0.9 2021    BUN 50 (H) 2021     (L) 2021    K 5.0 2021     2021    CO2 24 2021     CrCl cannot be calculated (Unknown ideal weight.). Thyroid:   Lab Results   Component Value Date    TSH 1.95 2020    C8RNAXA 5.7 2011     Lipid Panel:   Lab Results   Component Value Date    CHOL 109 2020    HDL 36 2020    TRIG 132 2020     LFTs:  Lab Results   Component Value Date    ALT 18 2021    AST 24 2021    ALKPHOS 61 2021    BILITOT <0.2 2021     Coags:   Lab Results   Component Value Date    PROTIME 12.7 2021    INR 1.12 2021    APTT 37.3 2021       EC2022  - A-paced. Rate 60, QRS 90, QTc 396    Echo:    -Normal global systolic function with an ejection fraction estimated at 65%. -No regional wall motion abnormalities noted.   -Aortic valve appears sclerotic but opens adequately. There is mild aortic   insufficiency.   -Thickened mitral valve without evidence of stenosis. There is a moderate   sized mobile echodensity of unknown significant noted on posterior lateral   annulus of mitral valve which may represent increased calcification but was   not as prominent on the previous echo of 2019. No significant   regurgitation noted. -There is mild tricuspid regurgitation with a RVSP estimation of 34 mmHg. -Indeterminate diastolic function. Avg. E/e'=29.9   -Pacemaker / ICD lead is visualized in the right heart. Recommend ANGELITO after pt recovers from this COVID illness to check the MV   annulus. Cannot exclude a vegetation.     GXT: 9/15     No EKG evidence for ischemia with lexiscan       Preserved LV systolic function       Myocardial perfusion imaging with small to moderate area of decreased    perfusion in the anteroapical wall with stress with redistribution at rest    consistent with ischemia. Cath: 2015  Patent grafts    Assessment:    1. Sick Sinus Syndrome  - S/p Dual chamber Medtronic PPM (5/1/19, [place])  - I reviewed device interrogation today. Device functioning normally.   ~ A-paced 73.5% V-paced <0.1%  ~ 10.9 years left on battery life expectancy  - Discussed with patient  - Follow up with device clinic as scheduled    2. Paroxysmal Atrial Fibrillation  - Hx of PVI ablation (8/16/18)    - Currently in NSR  - On metoprolol PRN ? Will start metoprolol 12.5 mg BID for her palpitations as well as BP control    - [id number] score:high; [id number] Vasc score and anticoagulation discussed. High risk for stroke and thromboembolism. Anticoagulation is recommended. Risk of bleeding was discussed. ~ Off AC due to MGUS and anemia. If recurrent AF, will need to consider resuming AC vs MEENU closure with Watchman as she is a poor candidate for long term anti-coagulation    - Afib risk factors including age, HTN, obesity, inactivity and RAHUL were discussed with patient. Risk factor modification recommended     3. CAD  - Hx of CABG  - Stable  - No complaints of angina  - Continue ASA, ACEI, and statin   ~ Start BB for PAF/HTN    - Given significant NSTEMI during recent hospitalization. Deferred at time due to severe illness/co-morbidities. Will wait on scheduling coronary angiogram until cleared by GI following her EGD. She will call to set this up with Dr. Cynthia Sandra once ok with GI as she will need DAPT if intervention needed with cath    4.  HTN  - Slightly uncontrolled: Goal <130/80  - Continue current medications   ~ Start BB  - Encouraged to monitor and log BP readings at home, then bring log to next visit  - Discussed importance of low sodium diet, weight control and exercise    5. RAHUL  - Stable: Uses Bi-PAP  - Encourage to use Bi-PAP to prevent long term effects of untreated RAHUL    6. Anemia, MGUS   - Chronic   - On PO iron     - Concern for PUD in hospital per GI noted; recommend follow up with Dr. Latonia Coy for EGD prior to angiogram as she will need    7. Obesity  - Body mass index is 41.2 kg/m². - Complicating assessment and treatment. Placing patient at risk for multiple co-morbidities as well as early death and contributing to the patient's presentation  - Discussed weight loss and patient was encouraged to reduce calorie intake and increase exercise    Plan:  1. Follow up with Dr. Latonia Coy for EGD  2. Once your EGD is completed, let us know and we will schedule you for the angiogram with Dr. Moriah Wills  3. Start metoprolol 12.5 mg twice per day to help with palpitation    F/U: Follow-up with EP in 6 months  -Follow up with device clinic as scheduled  -[facility] at [phone] with any questions    Diet & Exercise:   The patient is counseled to follow a low salt diet to assure blood pressure remains controlled for cardiovascular risk factor modification   The patient is counseled to avoid excess caffeine, and energy drinks as this may exacerbated ectopy and arrhythmia   The patient is counseled to lose weight to control cardiovascular risk factors   Exercise program discussed: To improve overall cardiovascular health, the patient is instructed to increase cardiovascular related activities with a goal of 150 min/week of moderate level activity or 10,000 steps per day. Encouraged to perform as much activity as tolerated    I have addressed the patient's cardiac risk factors and adjusted pharmacologic treatment as needed. In addition, I have reinforced the need for patient directed risk factor modification. I independently reviewed the device check interrogation and ECG    All questions and concerns were addressed with the patient.

## 2022-01-20 ENCOUNTER — NURSE ONLY (OUTPATIENT)
Dept: CARDIOLOGY CLINIC | Age: 72
End: 2022-01-20
Payer: MEDICARE

## 2022-01-20 ENCOUNTER — OFFICE VISIT (OUTPATIENT)
Dept: CARDIOLOGY CLINIC | Age: 72
End: 2022-01-20
Payer: MEDICARE

## 2022-01-20 VITALS
HEIGHT: 59 IN | HEART RATE: 60 BPM | WEIGHT: 204 LBS | BODY MASS INDEX: 41.12 KG/M2 | SYSTOLIC BLOOD PRESSURE: 150 MMHG | DIASTOLIC BLOOD PRESSURE: 74 MMHG | OXYGEN SATURATION: 98 %

## 2022-01-20 DIAGNOSIS — I48.0 PAROXYSMAL ATRIAL FIBRILLATION (HCC): ICD-10-CM

## 2022-01-20 DIAGNOSIS — I25.83 CORONARY ARTERY DISEASE DUE TO LIPID RICH PLAQUE: ICD-10-CM

## 2022-01-20 DIAGNOSIS — Z95.0 PACEMAKER: ICD-10-CM

## 2022-01-20 DIAGNOSIS — I49.5 SSS (SICK SINUS SYNDROME) (HCC): ICD-10-CM

## 2022-01-20 DIAGNOSIS — G47.33 OSA (OBSTRUCTIVE SLEEP APNEA): ICD-10-CM

## 2022-01-20 DIAGNOSIS — E66.01 CLASS 3 SEVERE OBESITY DUE TO EXCESS CALORIES WITH SERIOUS COMORBIDITY AND BODY MASS INDEX (BMI) OF 40.0 TO 44.9 IN ADULT (HCC): ICD-10-CM

## 2022-01-20 DIAGNOSIS — I10 ESSENTIAL HYPERTENSION: ICD-10-CM

## 2022-01-20 DIAGNOSIS — I25.119 CORONARY ARTERY DISEASE INVOLVING NATIVE CORONARY ARTERY OF NATIVE HEART WITH ANGINA PECTORIS (HCC): ICD-10-CM

## 2022-01-20 DIAGNOSIS — I25.10 CORONARY ARTERY DISEASE DUE TO LIPID RICH PLAQUE: ICD-10-CM

## 2022-01-20 DIAGNOSIS — D50.9 IRON DEFICIENCY ANEMIA, UNSPECIFIED IRON DEFICIENCY ANEMIA TYPE: ICD-10-CM

## 2022-01-20 DIAGNOSIS — I48.0 PAROXYSMAL ATRIAL FIBRILLATION (HCC): Primary | ICD-10-CM

## 2022-01-20 PROCEDURE — G8417 CALC BMI ABV UP PARAM F/U: HCPCS | Performed by: NURSE PRACTITIONER

## 2022-01-20 PROCEDURE — 1123F ACP DISCUSS/DSCN MKR DOCD: CPT | Performed by: NURSE PRACTITIONER

## 2022-01-20 PROCEDURE — 1090F PRES/ABSN URINE INCON ASSESS: CPT | Performed by: NURSE PRACTITIONER

## 2022-01-20 PROCEDURE — 1111F DSCHRG MED/CURRENT MED MERGE: CPT | Performed by: NURSE PRACTITIONER

## 2022-01-20 PROCEDURE — G8427 DOCREV CUR MEDS BY ELIG CLIN: HCPCS | Performed by: NURSE PRACTITIONER

## 2022-01-20 PROCEDURE — 1036F TOBACCO NON-USER: CPT | Performed by: NURSE PRACTITIONER

## 2022-01-20 PROCEDURE — 99214 OFFICE O/P EST MOD 30 MIN: CPT | Performed by: NURSE PRACTITIONER

## 2022-01-20 PROCEDURE — 4040F PNEUMOC VAC/ADMIN/RCVD: CPT | Performed by: NURSE PRACTITIONER

## 2022-01-20 PROCEDURE — 3017F COLORECTAL CA SCREEN DOC REV: CPT | Performed by: NURSE PRACTITIONER

## 2022-01-20 PROCEDURE — G8399 PT W/DXA RESULTS DOCUMENT: HCPCS | Performed by: NURSE PRACTITIONER

## 2022-01-20 PROCEDURE — G8484 FLU IMMUNIZE NO ADMIN: HCPCS | Performed by: NURSE PRACTITIONER

## 2022-01-20 RX ORDER — FEXOFENADINE HCL 180 MG/1
180 TABLET ORAL DAILY
COMMUNITY

## 2022-01-20 RX ORDER — LOSARTAN POTASSIUM AND HYDROCHLOROTHIAZIDE 25; 100 MG/1; MG/1
1 TABLET ORAL DAILY
Status: ON HOLD | COMMUNITY
End: 2022-05-05 | Stop reason: HOSPADM

## 2022-01-20 NOTE — PROGRESS NOTES
Patient comes in for programming evaluation for her pacemaker. All sensing and pacing parameters are within normal range. Battery life 10.9 years  AP 73.5%.  <0.1%. 1 NSVT episode noted on 8/14/2021 lasting 1 second. Patient remains on metoprolol. No changes need to be made at this time. Please see interrogation for more detail. Patient will see NPSR today and follow up in 3 months in office or remotely.

## 2022-01-20 NOTE — PATIENT INSTRUCTIONS
1. Follow up with Dr. Christina Frye for EGD  2. Once your EGD is completed, let us know and we will schedule you for the angiogram with Dr. Yokasta Hicks  3.  Start metoprolol 12.5 mg twice per day

## 2022-01-25 PROCEDURE — 93280 PM DEVICE PROGR EVAL DUAL: CPT | Performed by: INTERNAL MEDICINE

## 2022-01-26 NOTE — TELEPHONE ENCOUNTER
Medication Refill    Medication needing refilled:  Metroprofol tartrat     Dosage of the medication:  25 ml    How are you taking this medication (QD, BID, TID, QID, PRN):    30 or 90 day supply called in:  90    When will you run out of your medication:  Already out    Which Pharmacy are we sending the medication to?:  [place]  Fax: [phone]    Pharmacy needs patients last 4 of SS and date of birth to refill.     9611   51

## 2022-01-26 NOTE — TELEPHONE ENCOUNTER
Last OV: 22  Last labs: 21 bmp  Appt scheduled : 22 NPAL  Last refill:22  Rx pending    Pharmacy needs patients last 4 of SS and date of birth to refill.     9611   51

## 2022-02-01 ENCOUNTER — TELEPHONE (OUTPATIENT)
Dept: CARDIOLOGY CLINIC | Age: 72
End: 2022-02-01

## 2022-02-01 NOTE — TELEPHONE ENCOUNTER
Pt calling to let Dr. Ofelia Cerrato know that Dr. Lilli Swanson has decided against doing the EGD. She is ready to schedule the angiogram.    Plan: 1/20/22 SR, CNP  1. Follow up with Dr. Lilli Swanson for EGD  2. Once your EGD is completed, let us know and we will schedule you for the angiogram with Dr. Ofelia Cerrato  3.  Start metoprolol 12.5 mg twice per day to help with palpitation

## 2022-02-01 NOTE — TELEPHONE ENCOUNTER
DCE does not want to do a cath for this patient at this time.  Please have patient schedule NP appt in 2-4 weeks to see how she is doing and assess whether or not she needs a cath

## 2022-02-09 NOTE — PROGRESS NOTES
Patient seen , discharge dictated scripts given , arrangements made , SUNNY completed .  Discussed with nursing staff  And   If applicable ,  Discussed with  Patient's family , all questions answered and concerns addressed  When applicable

## 2022-02-10 NOTE — DISCHARGE SUMMARY
[address]                     [facility], [address] [place] [address] Drive                               DISCHARGE SUMMARY    PATIENT NAME: Dimitris Abdi                   :        1950  MED REC NO:   [id number]                          ROOM:       [id number]  ACCOUNT NO:   [de-identified]                           ADMIT DATE: 2021  PROVIDER:     Javier Nagel MD                  DISCHARGE DATE:  2022    FINAL DIAGNOSES:  1.  Non-STEMI. 2.  Pneumonia. 3.  Acute respiratory failure with hypoxia. 4.  Suspected COVID-19 virus infection. 5.  Hypertension. DISCHARGE MEDICATIONS:  1. Xanax 0.25 mg twice a day p.r.n.  2.  Atrovent two puffs three times a day p.r.n.  3.  Robitussin DM 10 mL four times a day p.r.n.  4.  Imitrex 100 mg daily as needed. 5.  Zanaflex 4 mg nightly as needed. 6.  Decadron 4 mg two times a day for five days. 7.  Albuterol HFA two puffs every four hours p.r.n.  8.  Glutose gel p.r.n. for low blood sugar. 9.  Glargine 15 units nightly. 10.  Insulin lispro 0 to 12 units three times a day. 11.  Zofran orally disintegrating tablet 4 mg every eight hours. 12.  Cetirizine 10 mg nightly. 13.  Zinc sulfate 50 mg daily. 14.  Potassium chloride 20 mEq daily. 15.  Norvasc 5 mg daily. 16.  Ascorbic acid 1000 mg daily. 17.  Isosorbide mononitrate 10 mg once a day. 18.  Cranberry 450 mg daily. 19.  Actos 15 mg daily. 20.  Aldactone 25 mg daily. 21.  Dexilant 60 mg daily. 22.  Gabapentin 400 mg p.o. t.i.d.  23.  Trazodone 50 mg nightly. 24.  Slow-Mag one tablet daily. 25.  Rosuvastatin 20 mg once a day. 26.  Metformin 500 mg twice a day. 27.  Zetia 10 mg once a day. HOSPITAL COURSE:  This 71-year-old vaccinated white American lady came  to the emergency room with history of increasing shortness of breath,  84% on room air, was put on 4 L. The patient was evaluated. No history  of COPD.   CT scan showed no evidence of thromboembolism. The patient  diagnosed with non-ST elevation MI with suspected COVID-19 infection. General condition was stabilized. She was COVID positive detected, was  treated appropriately with IV Decadron, IV hydration, DVT prophylaxis,  and was kept here for five days. Renal function was monitored. Blood  sugars were monitored and given sliding scale insulin. The patient was  given PT and OT evaluation. Home oxygen evaluation was also assessed. Finally on 01/02/2022, the patient was ready to discharge. The patient  was given home oxygen with home health care order. An outpatient  followup with her primary care physician. The patient was discharged in  stable condition.         Mallory Wagoner MD    D: 02/09/2022 7:03:13       T: 02/10/2022 5:06:49     [email or web address]  Job#: [id number]     Doc#: [id number]    CC:

## 2022-03-02 ENCOUNTER — HOSPITAL ENCOUNTER (OUTPATIENT)
Age: 72
Discharge: HOME | End: 2022-03-02
Payer: MEDICARE

## 2022-03-02 ENCOUNTER — OFFICE VISIT (OUTPATIENT)
Dept: CARDIOLOGY CLINIC | Age: 72
End: 2022-03-02
Payer: MEDICARE

## 2022-03-02 VITALS
BODY MASS INDEX: 40.34 KG/M2 | WEIGHT: 205.5 LBS | HEIGHT: 60 IN | OXYGEN SATURATION: 95 % | DIASTOLIC BLOOD PRESSURE: 60 MMHG | SYSTOLIC BLOOD PRESSURE: 132 MMHG | HEART RATE: 60 BPM

## 2022-03-02 DIAGNOSIS — I25.10 CORONARY ARTERY DISEASE DUE TO LIPID RICH PLAQUE: Primary | ICD-10-CM

## 2022-03-02 DIAGNOSIS — I25.83 CORONARY ARTERY DISEASE DUE TO LIPID RICH PLAQUE: Primary | ICD-10-CM

## 2022-03-02 DIAGNOSIS — E78.2 MIXED HYPERLIPIDEMIA: ICD-10-CM

## 2022-03-02 DIAGNOSIS — E87.70 HYPERVOLEMIA, UNSPECIFIED HYPERVOLEMIA TYPE: ICD-10-CM

## 2022-03-02 DIAGNOSIS — I10 PRIMARY HYPERTENSION: ICD-10-CM

## 2022-03-02 DIAGNOSIS — R06.02 SOB (SHORTNESS OF BREATH): ICD-10-CM

## 2022-03-02 LAB
A/G RATIO: 1.9 (ref 1.1–2.2)
ALBUMIN SERPL-MCNC: 4.3 G/DL (ref 3.4–5)
ALP BLD-CCNC: 63 U/L (ref 40–129)
ALT SERPL-CCNC: 13 U/L (ref 10–40)
ANION GAP SERPL CALCULATED.3IONS-SCNC: 13 MMOL/L (ref 3–16)
AST SERPL-CCNC: 15 U/L (ref 15–37)
BILIRUB SERPL-MCNC: <0.2 MG/DL (ref 0–1)
BUN BLDV-MCNC: 20 MG/DL (ref 7–20)
CALCIUM SERPL-MCNC: 9.6 MG/DL (ref 8.3–10.6)
CHLORIDE BLD-SCNC: 100 MMOL/L (ref 99–110)
CO2: 23 MMOL/L (ref 21–32)
CREAT SERPL-MCNC: 0.8 MG/DL (ref 0.6–1.2)
GFR AFRICAN AMERICAN: >60
GFR NON-AFRICAN AMERICAN: >60
GLUCOSE BLD-MCNC: 109 MG/DL (ref 70–99)
MAGNESIUM: 2 MG/DL (ref 1.8–2.4)
POTASSIUM SERPL-SCNC: 5.3 MMOL/L (ref 3.5–5.1)
PRO-BNP: 343 PG/ML (ref 0–124)
SODIUM BLD-SCNC: 136 MMOL/L (ref 136–145)
TOTAL PROTEIN: 6.6 G/DL (ref 6.4–8.2)

## 2022-03-02 PROCEDURE — G8417 CALC BMI ABV UP PARAM F/U: HCPCS | Performed by: NURSE PRACTITIONER

## 2022-03-02 PROCEDURE — G8427 DOCREV CUR MEDS BY ELIG CLIN: HCPCS | Performed by: NURSE PRACTITIONER

## 2022-03-02 PROCEDURE — 1036F TOBACCO NON-USER: CPT | Performed by: NURSE PRACTITIONER

## 2022-03-02 PROCEDURE — 1090F PRES/ABSN URINE INCON ASSESS: CPT | Performed by: NURSE PRACTITIONER

## 2022-03-02 PROCEDURE — G8484 FLU IMMUNIZE NO ADMIN: HCPCS | Performed by: NURSE PRACTITIONER

## 2022-03-02 PROCEDURE — 80053 COMPREHEN METABOLIC PANEL: CPT

## 2022-03-02 PROCEDURE — 83880 ASSAY OF NATRIURETIC PEPTIDE: CPT

## 2022-03-02 PROCEDURE — G8399 PT W/DXA RESULTS DOCUMENT: HCPCS | Performed by: NURSE PRACTITIONER

## 2022-03-02 PROCEDURE — 99214 OFFICE O/P EST MOD 30 MIN: CPT | Performed by: NURSE PRACTITIONER

## 2022-03-02 PROCEDURE — 36415 COLL VENOUS BLD VENIPUNCTURE: CPT

## 2022-03-02 PROCEDURE — 83735 ASSAY OF MAGNESIUM: CPT

## 2022-03-02 PROCEDURE — 1123F ACP DISCUSS/DSCN MKR DOCD: CPT | Performed by: NURSE PRACTITIONER

## 2022-03-02 PROCEDURE — 3017F COLORECTAL CA SCREEN DOC REV: CPT | Performed by: NURSE PRACTITIONER

## 2022-03-02 PROCEDURE — 4040F PNEUMOC VAC/ADMIN/RCVD: CPT | Performed by: NURSE PRACTITIONER

## 2022-03-02 NOTE — PROGRESS NOTES
[address]     Outpatient Follow Up Note    Tye Lees is 80 y.o. female who presents today with a history of atrial fibrillation ( follows with Dr. Belgica Woodward), HTN, HLD, DM, obesity, RAHUL, CKD, CAD s/p CABG 1999. Ablation in 8/2018 and  PPM in 5/2019 for sick sinus syndrome. History of MGUS and is receiving iron transfusion. Interval history: Ms. Shagufta Rosa was admitted to the hospital in December [address]21 for complaints of weakness, shortness of breath, fever, chills. Tested positive for COVID, required high-flow O2)she was vaccinated with no booster. Found to be have had a STEMI. Medical management was advised to her illness. CHIEF COMPLAINT / HPI:  Follow Up secondary to coronary artery disease  (CABG in 1999), STEMI in 12/21. Subjective:     States she feels much better after having COVID in December. Still feeling a little tired. Complaints of constant pressure/knot to  upper abdomen/esphogus going up to her rib cage. She is to have an EGD. Appointment on the March 9th to see Dr. Bauman  d/t tarry stool in the hospital. Complaints of constant heaviness to mid sternal chest heaviness only able to eat jello, soup, soft foods for 5 days. Nothing precipitates it or makes it worse. Left side of chest with \"vibration\" to left side of chest. Nothing brings it on. Nothing reliefs it lasting a month. Resolved now. She denies significant chest pain. There is no SOB/AMAYA since recovered from [facility]. Her SaPO2 has ran > 97% RA. She did not need to use oxygen. The patient denies orthopnea/PND. The patient does not have swelling. The patients weight is 205 lbs. Her home weight is 207 lbs. No recent weight loss or weight gain. Thought she was holding water, but now it is gone. The patient is not experiencing palpitations or dizziness. These symptoms are improving since the last OV. With regard to medication therapy the patient has been compliant with prescribed regimen.  They have tolerated therapy to date. Past Medical History:   Diagnosis Date    Anemia     Anesthesia     trouble after egd 10/2016. Anxiety and severe tremors after AF ablation 8/2018-responded well to Ativan    Anesthesia complication     flailing, yelling when waking up from anesthesia-ativan helps (upywrk10/29/19: ativans works within seconds of administration)    Atrial fibrillation ([facility] [address] 10/01/2017    A. fib with SVR    CAD (coronary artery disease)     Chest pain 10/01/2017    Chronic kidney disease     ? ??    Depression     GERD (gastroesophageal reflux disease)     Glaucoma     Hiatal hernia     Hyperlipidemia     Hypertension     Migraines, neuralgic     Morbid obesity ([facility] [address]     Osteoarthritis     Sleep apnea     uses CPAP    Type II or unspecified type diabetes mellitus without mention of complication, not stated as uncontrolled     Unspecified sleep apnea     uses cpap    Urinary incontinence     UTI (urinary tract infection)      Social History:    Social History     Tobacco Use   Smoking Status Never Smoker   Smokeless Tobacco Never Used     Current Medications:  Current Outpatient Medications   Medication Sig Dispense Refill    metoprolol tartrate (LOPRESSOR) 25 MG tablet Take 0.5 tablets by mouth 2 times daily 30 tablet 5    fexofenadine (ALLEGRA) 180 MG tablet Take 180 mg by mouth daily      Epoetin Leonard-epbx (RETACRIT IJ) Inject as directed Every friday      losartan-hydroCHLOROthiazide (HYZAAR) 100-25 MG per tablet Take 1 tablet by mouth daily      ipratropium (ATROVENT HFA) 17 MCG/ACT inhaler Inhale 2 puffs into the lungs three times daily 1 each 3    SUMAtriptan (IMITREX) 100 MG tablet Take 1 tablet by mouth daily as needed for Migraine 30 tablet 3    tiZANidine (ZANAFLEX) 4 MG tablet Take 1 tablet by mouth nightly as needed (leg spasms) 20 tablet 0    zinc sulfate (ZINCATE) 220 (50 Zn) MG capsule Take 50 mg by mouth daily      potassium chloride (KLOR-CON M) 20 MEQ extended release tablet Take 1 tablet by mouth daily 90 tablet 3    amLODIPine (NORVASC) 5 MG tablet Take 1 tablet by mouth every evening (Patient taking differently: Take 2.5 mg by mouth 2 times daily Take 0.5 tablet by mouth every morning and every evening) 90 tablet 3    aspirin 81 MG tablet Take 81 mg by mouth daily      furosemide (LASIX) 40 MG tablet Take 1 tablet by mouth daily 30 tablet 5    isosorbide mononitrate (ISMO;MONOKET) 10 MG tablet Take 5 mg by mouth 2 times daily      Ascorbic Acid (VITAMIN C) 1000 MG tablet Take 1,000 mg by mouth daily      Cyanocobalamin (VITAMIN B-12 PO) Take 3,000 mcg by mouth daily      estradiol (ESTRACE) 0.1 MG/GM vaginal cream Place 2 g vaginally See Admin Instructions Twice a week      Cranberry 450 MG CAPS Take by mouth daily       pioglitazone (ACTOS) 15 MG tablet Take 15 mg by mouth daily      vitamin D (CHOLECALCIFEROL) 1000 UNIT TABS tablet Take 1,000 Units by mouth daily       spironolactone (ALDACTONE) 25 MG tablet Take 25 mg by mouth daily In pm      dexlansoprazole (DEXILANT) 60 MG CPDR delayed release capsule Take 60 mg by mouth daily      gabapentin (NEURONTIN) 100 MG capsule Take 400 mg by mouth 3 times daily. Makeda Shone TraZODone HCl (DESYREL PO) Take 50 mg by mouth nightly       Calcium Citrate-Vitamin D (CALCIUM CITRATE + D PO) Take 2 tablets by mouth daily      SLOW-MAG 71.5-119 MG TBEC tablet Take 1 tablet by mouth daily 143 mg  5    rosuvastatin (CRESTOR) 40 MG tablet Take 20 mg by mouth every evening       metFORMIN (GLUCOPHAGE) 500 MG tablet Take 500 mg by mouth 2 times daily (with meals)       ezetimibe (ZETIA) 10 MG tablet Take 5 mg by mouth nightly       escitalopram (LEXAPRO) 20 MG tablet Take 20 mg by mouth daily.  nitroGLYCERIN (NITROLINGUAL) 0.4 MG/SPRAY spray Place 1 spray under the tongue every 5 minutes as needed. As directed for chest pain        No current facility-administered medications for this visit.      REVIEW OF SYSTEMS: CONSTITUTIONAL: No major weight gain or loss, fatigue, weakness, night sweats or fever. HEENT: No new vision difficulties or ringing in the ears. RESPIRATORY: No new SOB, PND, orthopnea or cough. CARDIOVASCULAR: See HPI  GI: No nausea, vomiting, diarrhea, constipation, abdominal pain or changes in bowel habits. : No urinary frequency, urgency, incontinence hematuria or dysuria. SKIN: No cyanosis or skin lesions. MUSCULOSKELETAL: No new muscle or joint pain. NEUROLOGICAL: No syncope or TIA-like symptoms. PSYCHIATRIC: No anxiety, pain, insomnia or depression    Objective:   PHYSICAL EXAM:        Vitals:    03/02/22 1347 03/02/22 1631   BP: 112/60 132/60   Site: Right Upper Arm    Position: Sitting    Cuff Size: Large Adult    Pulse: 60    SpO2: 95%    Weight: 205 lb 8 oz (93.2 kg)    Height: 4' 11.5\" (1.511 m)        VITALS:  /60 (Site: Right Upper Arm, Position: Sitting, Cuff Size: Large Adult)   Pulse 60   Ht 4' 11.5\" (1.511 m)   Wt 205 lb 8 oz (93.2 kg)   SpO2 95%   BMI 40.81 kg/m²   CONSTITUTIONAL: Cooperative, no apparent distress, and appears well nourished / obese  NEUROLOGIC:  Awake and orientated to person, place and time. PSYCH: Calm affect. SKIN: Warm and dry. HEENT: Sclera non-icteric, normocephalic, neck supple, no elevation of JVP, normal carotid pulses with no bruits and thyroid normal size. LUNGS:  No increased work of breathing and clear to auscultation, no crackles or wheezing  CARDIOVASCULAR:  Regular rate and rhythm with no murmurs, gallops, rubs, or abnormal heart sounds, normal PMI. The apical impulses not displaced  JVP less than 8 cm H2O  Heart tones are crisp and normal  Cervical veins are not engorged  The carotid upstroke is normal in amplitude and contour without delay or bruit  JVP is not elevated  ABDOMEN:  Normal bowel sounds, distended and non-tender to palpation  EXT: No-trace yohana LE edema, no calf tenderness. Pulses are present bilaterally.     DATA:    Lab Results   Component Value Date    ALT 18 12/26/2021    AST 24 12/26/2021    ALKPHOS 61 12/26/2021    BILITOT <0.2 12/26/2021     Lab Results   Component Value Date    CREATININE 0.9 12/31/2021    BUN 50 (H) 12/31/2021     (L) 12/31/2021    K 5.0 12/31/2021     12/31/2021    CO2 24 12/31/2021     Lab Results   Component Value Date    TSH 1.95 05/22/2020    Y9EDJLB 5.7 04/26/2011     Lab Results   Component Value Date    WBC 3.9 (L) 12/31/2021    HGB 8.2 (L) 01/02/2022    HCT 24.2 (L) 01/02/2022    MCV 88.6 12/31/2021     12/31/2021     Reported last Hgb 11.7 on 2/25/22 : done at Dr. Lyla Ward office    No components found for: CHLPL  Lab Results   Component Value Date    TRIG 132 05/22/2020    TRIG 269 (H) 02/14/2020    TRIG 265 (H) 01/24/2017     Lab Results   Component Value Date    HDL 36 (L) 05/22/2020    HDL 37 (L) 02/14/2020    HDL 40 06/18/2019     Lab Results   Component Value Date    LDLCALC 47 05/22/2020    LDLCALC 51 02/14/2020    [facility] 43 06/18/2019     Lab Results   Component Value Date    LABVLDL 26 05/22/2020    LABVLDL 54 02/14/2020    LABVLDL 35 06/18/2019     Radiology Review:  Pertinent images / reports were reviewed as a part of this visit and reveals the following:    Last Echo: 12/27/21  Summary   -COVID+   -Normal global systolic function with an ejection fraction estimated at 65%. -No regional wall motion abnormalities noted.   -Aortic valve appears sclerotic but opens adequately. There is mild aortic   insufficiency.   -Thickened mitral valve without evidence of stenosis. There is a moderate   sized mobile echodensity of unknown significant noted on posterior lateral   annulus of mitral valve which may represent increased calcification but was   not as prominent on the previous echo of 9/2019. No significant   regurgitation noted. -There is mild tricuspid regurgitation with a RVSP estimation of 34 mmHg. -Indeterminate diastolic function.  Avg. E/e'=29.9   -Pacemaker / ICD lead is visualized in the right heart. Recommend ANGELITO after pt recovers from this COVID illness to check the MV   annulus. Cannot exclude a vegetation. Last Stress Test: 9/2/15     No EKG evidence for ischemia with lexiscan       Preserved LV systolic function       Myocardial perfusion imaging with small to moderate area of decreased    perfusion in the anteroapical wall with stress with redistribution at rest    consistent with ischemia. Last Angiogram:  [facility] 9/15  1/17 55%  55% Patent grafts Binta Stuart)  Patent grafts Daydayolee Sade)         Assessment:      Diagnosis Orders   1. Coronary artery disease due to lipid rich plaque   ~atypical chest discomfort of constant duration  ~hx of elevated troponin with last hospitalization Dec '21; ischemic work up planned after recovered from acute illness  ~reported last Hgb 11.7% on 2/25/22   ~scheduled to see GI 3/9 for atypical discomfort of ~ 5 day duration   ~EGD no longer planned as of Feb '22  ~EF normal on echo ; ANGELITO recommended to reassess mobile echodensity of unknown significant Dec '21  ~hx CABG '99 ; bypass conduits patent by cath(s) '15 & '17 Stress Myoview                2. SOB (shortness of breath)   ~improved compared to time with COVID  ~lungs clear ; trace edema ; wt up 3# with subsequent loss  Brain Natriuretic Peptide   3. Hypertension, primary   ~controlled     4. Mixed hyperlipidemia   ~not recently done; HDL near goal from May '20  ~jeremy / dnaiel        I had the opportunity to review the clinical symptoms and presentation of Haleigh Magdaleno. Plan:     Keep appointment with gastroenterologist March 9 th.    Repeat lab work : CMP/Mg+/BNP    Follow up in back in four weeks. ~Stress test nuclear Myoview for elevated troponins    Overall the patient is stable from CV standpoint    I have addresed the patient's cardiac risk factors and adjusted pharmacologic treatment as needed.  In addition, I have reinforced the need for patient directed risk factor modification. Further evaluation will be based upon the patient's clinical course and testing results. All questions and concerns were addressed to the patient/family. Alternatives to my treatment were discussed. The patient is not currently smoking. The risks related to smoking were reviewed with the patient. Recommend maintaining a smoke-free lifestyle. Products available for smoking cessation were discussed in detail. Patient is on a beta-blocker  Patient is on an ace-i/ARB  Patient is on a statin    Antiplatelet therapy has been recommended / prescribed for this patient. Education conducted on adverse reactions including bleeding was discussed. Angiotension inhibitor/angiotension receptor blocker has  been prescribed / recommended for congestive heart failure. Daily weight, low sodium diet were discussed. Patient instructed to call the office with a weight gain: > 3 # over night or 5# in one week; swelling, SOB/orthopnea/PND    The patient verbalizes understanding not to stop medications without discussing with us. Discussed exercise: 30-60 minutes 7 days/week  Discussed Low saturated fat/TRENA diet. Thank you for allowing to us to participate in the care of Haleigh Magdaleno.     BILL Cheung  Seen/eval with Prince Savage RN, [address] [place] [address]    Documentation of today's visit sent to PCP

## 2022-03-02 NOTE — PATIENT INSTRUCTIONS
Stress test nuclear Myoview    Repeat lab work    Keep appointment with gastroenterologist March 9 th. Follow up in back in four weeks.

## 2022-03-09 ENCOUNTER — TELEPHONE (OUTPATIENT)
Dept: CARDIOLOGY CLINIC | Age: 72
End: 2022-03-09

## 2022-03-09 DIAGNOSIS — E87.5 SERUM POTASSIUM ELEVATED: Primary | ICD-10-CM

## 2022-03-15 ENCOUNTER — HOSPITAL ENCOUNTER (OUTPATIENT)
Age: 72
Discharge: HOME | End: 2022-03-15
Payer: MEDICARE

## 2022-03-15 PROCEDURE — U0005 INFEC AGEN DETEC AMPLI PROBE: HCPCS

## 2022-03-15 PROCEDURE — U0003 INFECTIOUS AGENT DETECTION BY NUCLEIC ACID (DNA OR RNA); SEVERE ACUTE RESPIRATORY SYNDROME CORONAVIRUS 2 (SARS-COV-2) (CORONAVIRUS DISEASE [COVID-19]), AMPLIFIED PROBE TECHNIQUE, MAKING USE OF HIGH THROUGHPUT TECHNOLOGIES AS DESCRIBED BY CMS-2020-01-R: HCPCS

## 2022-03-16 ENCOUNTER — ANESTHESIA EVENT (OUTPATIENT)
Dept: ENDOSCOPY | Age: 72
End: 2022-03-16
Payer: MEDICARE

## 2022-03-16 LAB — SARS-COV-2: NOT DETECTED

## 2022-03-16 NOTE — PROGRESS NOTES
notified of pt's cardiac issues and complications of flailing, yelling, anxiety, tremors upon waking up from anesthesia. Pt tested COVID + 12/25/21 and was hospitalized here. She wast told to test again yesterday and is still positive but asymptomatic. EGD is within 90 days of initial covid test. Dr. Dolores Anaya would like pt.moved to hospital side.

## 2022-03-16 NOTE — PROGRESS NOTES
Patient __X_ reached   _____not reached-preop instructions left on voice mail_____________      DATE__3/22/22  ______ TIME__1400______ARRIVAL__1230 FEC_______      Nothing to eat or drink after midnight the night before,except for what the prep instructions call for. If you do not have the instructions or do not understand them please contact your doctors office. Follow any instructions your doctors office has given you including what medications to take the AM of your procedure and which ones to hold. You may use your inhalers. If you take a long acting insulin the bhavik prior please cut the dose in half and take no diabetic medications that AM.Follow specific doctors office instructions regarding blood thinners and if they want you to hold and for how long. If you are on a blood thinner and have no instructions please contact the office and ask. Dress comfortably,bring your insurance card,picture ID,and a complete list of medications, including supplements. You must have a responsible adult to stay with you during the procedure,drive you home and stay with you. [facility] phone number [phone] for any questions. OTHER INTRUCTIONS(if applicable)______take amlodipine, inhaler, isosorbide, hyzaar, metoprolol am of procedure___________________________________________________          COVID TESTING          __X_ Covid test to be done 3-5 days prior to scheduled surgery -patient aware they are REQUIRED to bring a copy of the negative result DOS-if they receive a positive result to notify their surgeon. If known- indicate where patient is getting covid test done_______covid + 12/25/21 epic and 3/15/22 epic--pt is  asymptomatic now_________________________________________________________________    ___ Rapid - DOS    ___ Other _____________________________________      [facility] POLICY(subject to change)    There is a one visitor policy at [facility] for all surgeries and endoscopies. Whether the visitor can stay or will be asked to wait in the car will depend on the current policy and if social distancing can be maintained. The policy is subject to change at any time. Please make sure the visitor has a cell phone that is on,charged and able to accept calls, as this may be the way that the staff communicates with them. Pain management is NO VISITOR policyThe patients ride is expected to remain in the car with a cell phone for communication. If the ride is leaving the hospital grounds please make sure they are back in time for pickup. Have the patient inform the staff on arrival what their rides plans are while the patient is in the facility. At the MAIN there is one visitor allowed. Please note that the visitor policy is subject to change.

## 2022-03-22 ENCOUNTER — ANESTHESIA (OUTPATIENT)
Dept: ENDOSCOPY | Age: 72
End: 2022-03-22
Payer: MEDICARE

## 2022-03-22 ENCOUNTER — HOSPITAL ENCOUNTER (OUTPATIENT)
Age: 72
Setting detail: OUTPATIENT SURGERY
Discharge: HOME | End: 2022-03-22
Attending: INTERNAL MEDICINE | Admitting: INTERNAL MEDICINE
Payer: MEDICARE

## 2022-03-22 ENCOUNTER — NURSE ONLY (OUTPATIENT)
Dept: CARDIOLOGY CLINIC | Age: 72
End: 2022-03-22
Payer: MEDICARE

## 2022-03-22 VITALS
OXYGEN SATURATION: 92 % | WEIGHT: 206 LBS | HEART RATE: 60 BPM | BODY MASS INDEX: 40.44 KG/M2 | RESPIRATION RATE: 20 BRPM | HEIGHT: 60 IN | TEMPERATURE: 97.1 F | SYSTOLIC BLOOD PRESSURE: 132 MMHG | DIASTOLIC BLOOD PRESSURE: 54 MMHG

## 2022-03-22 VITALS — OXYGEN SATURATION: 96 % | DIASTOLIC BLOOD PRESSURE: 65 MMHG | SYSTOLIC BLOOD PRESSURE: 146 MMHG

## 2022-03-22 DIAGNOSIS — I48.0 PAROXYSMAL ATRIAL FIBRILLATION (HCC): ICD-10-CM

## 2022-03-22 DIAGNOSIS — Z95.0 PACEMAKER: ICD-10-CM

## 2022-03-22 LAB
GLUCOSE BLD-MCNC: 115 MG/DL (ref 70–99)
GLUCOSE BLD-MCNC: 90 MG/DL (ref 70–99)
PERFORMED ON: ABNORMAL
PERFORMED ON: NORMAL
POTASSIUM SERPL-SCNC: 4.5 MMOL/L (ref 3.5–5.1)

## 2022-03-22 PROCEDURE — 3609012400 HC EGD TRANSORAL BIOPSY SINGLE/MULTIPLE: Performed by: INTERNAL MEDICINE

## 2022-03-22 PROCEDURE — 36415 COLL VENOUS BLD VENIPUNCTURE: CPT

## 2022-03-22 PROCEDURE — 3700000000 HC ANESTHESIA ATTENDED CARE: Performed by: INTERNAL MEDICINE

## 2022-03-22 PROCEDURE — 3700000001 HC ADD 15 MINUTES (ANESTHESIA): Performed by: INTERNAL MEDICINE

## 2022-03-22 PROCEDURE — 3609017700 HC EGD DILATION GASTRIC/DUODENAL STRICTURE: Performed by: INTERNAL MEDICINE

## 2022-03-22 PROCEDURE — 7100000001 HC PACU RECOVERY - ADDTL 15 MIN: Performed by: INTERNAL MEDICINE

## 2022-03-22 PROCEDURE — 6360000002 HC RX W HCPCS: Performed by: NURSE ANESTHETIST, CERTIFIED REGISTERED

## 2022-03-22 PROCEDURE — 2500000003 HC RX 250 WO HCPCS: Performed by: NURSE ANESTHETIST, CERTIFIED REGISTERED

## 2022-03-22 PROCEDURE — C1726 CATH, BAL DIL, NON-VASCULAR: HCPCS | Performed by: INTERNAL MEDICINE

## 2022-03-22 PROCEDURE — 84132 ASSAY OF SERUM POTASSIUM: CPT

## 2022-03-22 PROCEDURE — 2709999900 HC NON-CHARGEABLE SUPPLY: Performed by: INTERNAL MEDICINE

## 2022-03-22 PROCEDURE — 7100000010 HC PHASE II RECOVERY - FIRST 15 MIN: Performed by: INTERNAL MEDICINE

## 2022-03-22 PROCEDURE — 88305 TISSUE EXAM BY PATHOLOGIST: CPT

## 2022-03-22 PROCEDURE — 7100000011 HC PHASE II RECOVERY - ADDTL 15 MIN: Performed by: INTERNAL MEDICINE

## 2022-03-22 PROCEDURE — 2580000003 HC RX 258: Performed by: ANESTHESIOLOGY

## 2022-03-22 PROCEDURE — 7100000000 HC PACU RECOVERY - FIRST 15 MIN: Performed by: INTERNAL MEDICINE

## 2022-03-22 PROCEDURE — 6360000002 HC RX W HCPCS: Performed by: ANESTHESIOLOGY

## 2022-03-22 RX ORDER — KETAMINE HCL IN NACL, ISO-OSM 100MG/10ML
SYRINGE (ML) INJECTION PRN
Status: DISCONTINUED | OUTPATIENT
Start: 2022-03-22 | End: 2022-03-22 | Stop reason: SDUPTHER

## 2022-03-22 RX ORDER — PROPOFOL 10 MG/ML
INJECTION, EMULSION INTRAVENOUS PRN
Status: DISCONTINUED | OUTPATIENT
Start: 2022-03-22 | End: 2022-03-22 | Stop reason: SDUPTHER

## 2022-03-22 RX ORDER — MIDAZOLAM HYDROCHLORIDE 2 MG/2ML
1 INJECTION, SOLUTION INTRAMUSCULAR; INTRAVENOUS ONCE
Status: COMPLETED | OUTPATIENT
Start: 2022-03-22 | End: 2022-03-22

## 2022-03-22 RX ORDER — SODIUM CHLORIDE 9 MG/ML
INJECTION, SOLUTION INTRAVENOUS CONTINUOUS
Status: DISCONTINUED | OUTPATIENT
Start: 2022-03-22 | End: 2022-03-22 | Stop reason: HOSPADM

## 2022-03-22 RX ORDER — LIDOCAINE HYDROCHLORIDE 20 MG/ML
INJECTION, SOLUTION EPIDURAL; INFILTRATION; INTRACAUDAL; PERINEURAL PRN
Status: DISCONTINUED | OUTPATIENT
Start: 2022-03-22 | End: 2022-03-22 | Stop reason: SDUPTHER

## 2022-03-22 RX ADMIN — Medication 20 MG: at 15:28

## 2022-03-22 RX ADMIN — PROPOFOL 50 MG: 10 INJECTION, EMULSION INTRAVENOUS at 15:27

## 2022-03-22 RX ADMIN — SODIUM CHLORIDE: 9 INJECTION, SOLUTION INTRAVENOUS at 13:28

## 2022-03-22 RX ADMIN — Medication 10 MG: at 15:27

## 2022-03-22 RX ADMIN — LIDOCAINE HYDROCHLORIDE 100 MG: 20 INJECTION, SOLUTION EPIDURAL; INFILTRATION; INTRACAUDAL; PERINEURAL at 15:27

## 2022-03-22 RX ADMIN — PROPOFOL 100 MCG/KG/MIN: 10 INJECTION, EMULSION INTRAVENOUS at 15:28

## 2022-03-22 RX ADMIN — MIDAZOLAM 1 MG: 1 INJECTION INTRAMUSCULAR; INTRAVENOUS at 14:12

## 2022-03-22 ASSESSMENT — PULMONARY FUNCTION TESTS
PIF_VALUE: 0
PIF_VALUE: 1
PIF_VALUE: 0

## 2022-03-22 ASSESSMENT — PAIN - FUNCTIONAL ASSESSMENT: PAIN_FUNCTIONAL_ASSESSMENT: 0-10

## 2022-03-22 NOTE — PROGRESS NOTES
Pt awake and alert at this time. Pt on RA, and VSS. Pt denies pain and nausea, tolerating PO. Skin warm. Pt meets criteria to be discharged from Phase 1.

## 2022-03-22 NOTE — ANESTHESIA POSTPROCEDURE EVALUATION
Department of Anesthesiology  Postprocedure Note    Patient: Zafar Crouch  MRN: [id number]  YOB: 1950  Date of evaluation: 3/22/2022  Time:  3:54 PM     Procedure Summary     Date: 03/22/22 Room / Location: [address] [place] [address]    Anesthesia Start: 4880 Anesthesia Stop: 9846    Procedures:       EGD BIOPSY (N/A Abdomen)      EGD DILATION BALLOON (N/A Abdomen) Diagnosis: (DYSPHAGIA R13.10)    Surgeons: Senthil Kapadia MD Responsible Provider: Farrah Figueroa MD    Anesthesia Type: MAC ASA Status: 3          Anesthesia Type: MAC    Rhonda Phase I: Rhonda Score: 10    Rhonda Phase II:      Last vitals: Reviewed and per EMR flowsheets. Anesthesia Post Evaluation    Patient location during evaluation: PACU  Level of consciousness: awake  Airway patency: patent  Complications: no  Cardiovascular status: hemodynamically stable  Respiratory status: acceptable  There was medical reason for not using a multimodal analgesia pain management approach.

## 2022-03-22 NOTE — PROGRESS NOTES
Pt arrived from ENDO to PACU bay 8. Report received from ENDO staff. Pt not arousable to voice. Pt on RA, Sinus paced, and VSS. Will continue to monitor.

## 2022-03-22 NOTE — PROGRESS NOTES
Discharge instructions review with patient and Cherry Loredo (). All home medications have been reviewed, pt v/u. Discharge instructions signed. Pt discharged via wheelchair. Pt discharged with  all belongings.  taking stable pt home.

## 2022-03-22 NOTE — H&P
Pre-operative History and Physical    Patient: Molly Hodgkin  : 1950  Acct#:     History Obtained From:  patient    HISTORY OF PRESENT ILLNESS:    The patient is a 80 y.o. female with significant past medical history of epigastric pain and dysphagia who presents with for EGD     Past Medical History:        Diagnosis Date    Anemia     Anesthesia     trouble after egd 10/2016. Anxiety and severe tremors after AF ablation 2018-responded well to Ativan    Anesthesia complication     flailing, yelling when waking up from anesthesia-ativan helps (accxyl21/29/19: ativans works within seconds of administration)    Atrial fibrillation ([facility] 10/01/2017    A. fib with SVR    CAD (coronary artery disease)     Chest pain 10/01/2017    Chronic kidney disease     ? ??    Depression     GERD (gastroesophageal reflux disease)     Glaucoma     Hiatal hernia     Hyperlipidemia     Hypertension     Migraines, neuralgic     Morbid obesity ([facility]     Osteoarthritis     Sleep apnea     uses bi-pap    Type II or unspecified type diabetes mellitus without mention of complication, not stated as uncontrolled     Unspecified sleep apnea     uses cpap    Urinary incontinence     UTI (urinary tract infection)      Past Surgical History:        Procedure Laterality Date    ABDOMEN SURGERY N/A 2020    EXPLORATION OF ABDOMINAL WOUND performed by Kelley Gar MD at [facility]  1999    quadruple by-pass, [address] COLONOSCOPY  10/08/2018    1 polyp removed    COLONOSCOPY N/A 10/8/2018    COLONOSCOPY POLYPECTOMY SNARE/COLD BIOPSY performed by Raya Page MD at [facility]    COSMETIC SURGERY      face lift    ENTEROSCOPY N/A 2019    ENTEROSCOPY performed by Raya Page MD at [address] [facility] ESOPHAGEAL DILATATION      GASTRIC BAND  12/20/11    hiatal hernia repair    GASTRIC BAND REMOVAL  11/03/2016    laparoscopic     HAMMER TOE SURGERY      [facility]EDURE FOR SACROILIAC JOINT Left 12/17/2018    SACROILIAC JOINT INJECTION ON THE LEFT WITH FLUOROSCOPY performed by Enma Washington MD at [address]    both   [address]    Left and Right knees, [facility]    KNEE ARTHROSCOPY      1982 left    ORIF HUMERUS DECOMPRESSION Left 2/25/2016    OPEN REDUCTION INTERNAL FIXATION LEFT PROXIMAL HUMERUS    OVARY REMOVAL Bilateral 1997    PACEMAKER PLACEMENT  05/2019    IN NJX DX/THER SBST INTRLMNR CRV/THRC W/IMG GDN N/A 11/5/2018    MIDLINE L4/L5 LUMBAR INTERLAMINAR EPIDURAL STEROID INJECTION WITH FLUOROSCOPY performed by Enma Washington MD at [facility] ENDOSCOPY  10/30/15    UPPER GASTROINTESTINAL ENDOSCOPY  10/14/2016    with biopsy    VENTRAL HERNIA REPAIR N/A 10/29/2019    OPEN VENTRAL HERNIA REPAIR WITH  MESH performed by Virginie Berkowitz MD at [address]     Medications Prior to Admission:   No current facility-administered medications on file prior to encounter.      Current Outpatient Medications on File Prior to Encounter   Medication Sig Dispense Refill    metoprolol tartrate (LOPRESSOR) 25 MG tablet Take 0.5 tablets by mouth 2 times daily 30 tablet 5    fexofenadine (ALLEGRA) 180 MG tablet Take 180 mg by mouth daily      Epoetin Leonard-epbx (RETACRIT IJ) Inject as directed Every friday      losartan-hydroCHLOROthiazide (HYZAAR) 100-25 MG per tablet Take 1 tablet by mouth daily      ipratropium (ATROVENT HFA) 17 MCG/ACT inhaler Inhale 2 puffs into the lungs three times daily (Patient taking differently: Inhale 2 puffs into the lungs 3 times daily as needed ) 1 each 3    SUMAtriptan (IMITREX) 100 MG tablet Take 1 tablet by mouth daily as needed for Migraine 30 tablet 3    tiZANidine (ZANAFLEX) 4 MG tablet Take 1 tablet by mouth nightly as needed (leg spasms) 20 tablet 0    zinc sulfate (ZINCATE) 220 (50 Zn) MG capsule Take 50 mg by mouth daily      potassium chloride (KLOR-CON M) 20 MEQ extended release tablet Take 1 tablet by mouth daily (Patient taking differently: Take 10 mEq by mouth daily ) 90 tablet 3    amLODIPine (NORVASC) 5 MG tablet Take 1 tablet by mouth every evening (Patient taking differently: Take 2.5 mg by mouth 2 times daily ) 90 tablet 3    aspirin 81 MG tablet Take 81 mg by mouth daily      furosemide (LASIX) 40 MG tablet Take 1 tablet by mouth daily 30 tablet 5    isosorbide mononitrate (ISMO;MONOKET) 10 MG tablet Take 5 mg by mouth 2 times daily      Ascorbic Acid (VITAMIN C) 1000 MG tablet Take 1,000 mg by mouth daily      Cyanocobalamin (VITAMIN B-12 PO) Take 3,000 mcg by mouth daily      estradiol (ESTRACE) 0.1 MG/GM vaginal cream Place 2 g vaginally See Admin Instructions Twice a week      Cranberry 450 MG CAPS Take by mouth daily       pioglitazone (ACTOS) 15 MG tablet Take 15 mg by mouth daily      vitamin D (CHOLECALCIFEROL) 1000 UNIT TABS tablet Take 1,000 Units by mouth daily       spironolactone (ALDACTONE) 25 MG tablet Take 25 mg by mouth every morning (before breakfast)       dexlansoprazole (DEXILANT) 60 MG CPDR delayed release capsule Take 60 mg by mouth daily      gabapentin (NEURONTIN) 100 MG capsule Take 400 mg by mouth 3 times daily.  Theador Cristobal TraZODone HCl (DESYREL PO) Take 50 mg by mouth nightly       Calcium Citrate-Vitamin D (CALCIUM CITRATE + D PO) Take 1 tablet by mouth daily       SLOW-MAG 71.5-119 MG TBEC tablet Take 1 tablet by mouth daily 143 mg  5    rosuvastatin (CRESTOR) 40 MG tablet Take 20 mg by mouth every evening       metFORMIN (GLUCOPHAGE) 500 MG tablet Take 500 mg by mouth 2 times daily (with meals)       ezetimibe (ZETIA) 10 MG tablet Take 5 mg by mouth nightly       escitalopram (LEXAPRO) 20 MG tablet Take 20 mg by mouth daily.  nitroGLYCERIN (NITROLINGUAL) 0.4 MG/SPRAY spray Place 1 spray under the tongue every 5 minutes as needed. As directed for chest pain        Allergies:  Albuterol, Heparin, Niacin, Avelox [moxifloxacin hcl in nacl], Moxifloxacin, Niaspan [niacin er], Proventil [albuterol sulfate], and Tagamet [cimetidine]    History of allergic reaction to anesthesia:  No    Social History:   Unremarkable  Family History:   U/R    PHYSICAL EXAM:      BP (!) 115/56   Pulse 60   Temp 97 °F (36.1 °C) (Temporal)   Resp 20   Ht 4' 11.5\" (1.511 m)   Wt 206 lb (93.4 kg)   SpO2 95%   BMI 40.91 kg/m²  I        Heart:  Normal apical impulse, regular rate and rhythm, normal S1 and S2, no S3 or S4, and no murmur noted    Lungs:  No increased work of breathing, good air exchange, clear to auscultation bilaterally, no crackles or wheezing    Abdomen:  No scars, normal bowel sounds, soft, non-distended, non-tender, no masses palpated, no hepatosplenomegally      ASA Grade:  ASA 2 - Patient with mild systemic disease with no functional limitations    Mallampati Class:  Class I: Soft palate, uvula, fauces, pillars visible  __________  Class II: Soft palate, uvula, fauces visible  __________   Class III: Soft palate, base of uvula visible  ____X______  Class IV: Hard palate only visible   __________      ASSESSMENT AND PLAN:    1. Patient is a 80 y.o. female here for EGD with deep sedation  2. Procedure options, risks and benefits reviewed with patient. Patient expresses understanding.       Abiodun Franco MD  [facility]  03/22/22

## 2022-03-22 NOTE — PROGRESS NOTES
Pt sat up in bed from sedation and started violently shaking and spitting. Pt was swinging arms as nurses tried to help her at bedside. Pt kept safe in cart. Pt started to settle down with comforting words and therapeutic touch. Pt trailed back to sleep. Dr Uri Garcia notified.  Will continue to monitor

## 2022-03-22 NOTE — ANESTHESIA PRE PROCEDURE
Department of Anesthesiology  Preprocedure Note       Name:  Glenna Reina   Age:  80 y.o.  :  1950                                          MRN:  [id number]         Date:  3/22/2022      Surgeon: Baljeet Heath):  Derek Mcconnell MD    Procedure: Procedure(s):  EGD DIAGNOSTIC ONLY    Medications prior to admission:   Prior to Admission medications    Medication Sig Start Date End Date Taking?  Authorizing Provider   metoprolol tartrate (LOPRESSOR) 25 MG tablet Take 0.5 tablets by mouth 2 times daily 22   Sander Galvan, APRN - CNP   fexofenadine (ALLEGRA) 180 MG tablet Take 180 mg by mouth daily    Historical Provider, MD   Epoetin Leonard-epbx (RETACRIT IJ) Inject as directed Every friday    Historical Provider, MD   losartan-hydroCHLOROthiazide (HYZAAR) 100-25 MG per tablet Take 1 tablet by mouth daily    Historical Provider, MD   ipratropium (ATROVENT HFA) 17 MCG/ACT inhaler Inhale 2 puffs into the lungs three times daily  Patient taking differently: Inhale 2 puffs into the lungs 3 times daily as needed  22   Seferino Montoya MD   SUMAtriptan (IMITREX) 100 MG tablet Take 1 tablet by mouth daily as needed for Migraine 22   Seferino Montoya MD   tiZANidine (ZANAFLEX) 4 MG tablet Take 1 tablet by mouth nightly as needed (leg spasms) 22   Seferino Montoya MD   zinc sulfate (ZINCATE) 220 (50 Zn) MG capsule Take 50 mg by mouth daily    Historical Provider, MD   potassium chloride (KLOR-CON M) 20 MEQ extended release tablet Take 1 tablet by mouth daily  Patient taking differently: Take 10 mEq by mouth daily  20   Consuelo Rich MD   amLODIPine (NORVASC) 5 MG tablet Take 1 tablet by mouth every evening  Patient taking differently: Take 2.5 mg by mouth 2 times daily  19   Cami Thomas MD   aspirin 81 MG tablet Take 81 mg by mouth daily    Historical Provider, MD   furosemide (LASIX) 40 MG tablet Take 1 tablet by mouth daily 18   Cami Thomas MD   isosorbide mononitrate (ISMO;MONOKET) 10 MG tablet Take 5 mg by mouth 2 times daily    Historical Provider, MD   Ascorbic Acid (VITAMIN C) 1000 MG tablet Take 1,000 mg by mouth daily    Historical Provider, MD   Cyanocobalamin (VITAMIN B-12 PO) Take 3,000 mcg by mouth daily    Historical Provider, MD   estradiol (ESTRACE) 0.1 MG/GM vaginal cream Place 2 g vaginally See Admin Instructions Twice a week    Historical Provider, MD   Cranberry 450 MG CAPS Take by mouth daily     Historical Provider, MD   pioglitazone (ACTOS) 15 MG tablet Take 15 mg by mouth daily    Historical Provider, MD   vitamin D (CHOLECALCIFEROL) 1000 UNIT TABS tablet Take 1,000 Units by mouth daily     Historical Provider, MD   spironolactone (ALDACTONE) 25 MG tablet Take 25 mg by mouth every morning (before breakfast)     Historical Provider, MD   dexlansoprazole (DEXILANT) 60 MG CPDR delayed release capsule Take 60 mg by mouth daily    Historical Provider, MD   gabapentin (NEURONTIN) 100 MG capsule Take 400 mg by mouth 3 times daily. Pooja Maid Historical Provider, MD   TraZODone HCl (DESYREL PO) Take 50 mg by mouth nightly     Historical Provider, MD   Calcium Citrate-Vitamin D (CALCIUM CITRATE + D PO) Take 1 tablet by mouth daily     Historical Provider, MD   SLOW-MAG 71.5-119 MG TBEC tablet Take 1 tablet by mouth daily 143 mg 11/3/15   Historical Provider, MD   rosuvastatin (CRESTOR) 40 MG tablet Take 20 mg by mouth every evening     Historical Provider, MD   metFORMIN (GLUCOPHAGE) 500 MG tablet Take 500 mg by mouth 2 times daily (with meals)     Historical Provider, MD   ezetimibe (ZETIA) 10 MG tablet Take 5 mg by mouth nightly     Historical Provider, MD   escitalopram (LEXAPRO) 20 MG tablet Take 20 mg by mouth daily. Historical Provider, MD   nitroGLYCERIN (NITROLINGUAL) 0.4 MG/SPRAY spray Place 1 spray under the tongue every 5 minutes as needed. As directed for chest pain     Historical Provider, MD       Current medications:    No current outpatient medications on file.      No current facility-administered medications for this visit. Allergies: Allergies   Allergen Reactions    Albuterol Other (See Comments)     Other reaction(s): Chest Pain  Crushing chest pain    Heparin Other (See Comments)     Caused bruises and hematomas immediately when drip started. MARKED BRUISING/HEMATOMAS    Niacin     Avelox [Moxifloxacin Hcl In Nacl] Rash    Moxifloxacin Rash    Niaspan [Niacin Er] Itching and Rash    Proventil [Albuterol Sulfate] Palpitations    Tagamet [Cimetidine] Rash       Problem List:    Patient Active Problem List   Diagnosis Code    Coronary artery disease involving native coronary artery of native heart with angina pectoris ([facility] I25.119    Essential hypertension I10    RAHUL (obstructive sleep apnea) G47.33    Chronic kidney disease N18.9    Class 3 severe obesity due to excess calories with serious comorbidity and body mass index (BMI) of 40.0 to 44.9 in adult ([facility]) E66.01, Z68.41    Diabetes mellitus type 2 in obese ([facility]) E11.69, E66.9    Paroxysmal atrial fibrillation ([facility]) I48.0    Coronary artery disease involving coronary bypass graft of native heart without angina pectoris I25.810    Anemia D64.9    Pacemaker Z95.0    NSTEMI (non-ST elevated myocardial infarction) ([facility]) I21.4    Pneumonia J18.9    COVID-19 U07.1    MALCOLM (acute kidney injury) ([facility] N17.9    Melena K92.1       Past Medical History:        Diagnosis Date    Anemia     Anesthesia     trouble after egd 10/2016. Anxiety and severe tremors after AF ablation 8/2018-responded well to Ativan    Anesthesia complication     flailing, yelling when waking up from anesthesia-ativan helps (gcacta84/29/19: ativans works within seconds of administration)    [facility] 10[facility]2017    A. fib with SVR    CAD (coronary artery disease)     Chest pain 10/01/2017    Chronic kidney disease     ? ??    Depression     GERD (gastroesophageal reflux disease)     Glaucoma     Hiatal hernia     Hyperlipidemia     Hypertension     Migraines, neuralgic     Morbid obesity ([facility]     Osteoarthritis     Sleep apnea     uses bi-pap    Type II or unspecified type diabetes mellitus without mention of complication, not stated as uncontrolled     Unspecified sleep apnea     uses cpap    Urinary incontinence     UTI (urinary tract infection)        Past Surgical History:        Procedure Laterality Date    ABDOMEN SURGERY N/A 9/1/2020    EXPLORATION OF ABDOMINAL WOUND performed by Maureen Hussein MD at [facility]  2/22/1999    quadruple by-pass, [address] COLONOSCOPY  10/08/2018    1 polyp removed    COLONOSCOPY N/A 10/8/2018    COLONOSCOPY POLYPECTOMY SNARE/COLD BIOPSY performed by Airam Smith MD at [address] [place] [address]    COSMETIC SURGERY      face lift    ENTEROSCOPY N/A 2/26/2019    ENTEROSCOPY performed by Airam Smith MD at [place] [address]  12/20/11    hiatal hernia repair    GASTRIC BAND REMOVAL  11/03/2016    laparoscopic     HAMMER TOE SURGERY      [facility] INJECTION PROCEDURE FOR SACROILIAC JOINT Left 12/17/2018    SACROILIAC JOINT INJECTION ON THE LEFT WITH FLUOROSCOPY performed by Joe Giron MD at [address] [place] [address] Ave    both   [address]    Left and Right knees, [facility]    KNEE ARTHROSCOPY      1982 left    ORIF HUMERUS DECOMPRESSION Left 2/25/2016    OPEN REDUCTION INTERNAL FIXATION LEFT PROXIMAL HUMERUS    OVARY REMOVAL Bilateral 1997    PACEMAKER PLACEMENT  05/2019    NC NJX DX/THER SBST INTRLMNR CRV/THRC W/IMG GDN N/A 11/5/2018    MIDLINE L4/L5 LUMBAR INTERLAMINAR EPIDURAL STEROID INJECTION WITH FLUOROSCOPY performed by Joe Giron MD at [address] [place] [address] GASTROINTESTINAL ENDOSCOPY  10/30/15    UPPER GASTROINTESTINAL ENDOSCOPY  10/14/2016    with biopsy    VENTRAL HERNIA REPAIR N/A 10/29/2019    OPEN VENTRAL HERNIA REPAIR WITH  MESH performed by Atilio Meléndez MD at [place] [address] History:    Social History     Tobacco Use    Smoking status: Never Smoker    Smokeless tobacco: Never Used   Substance Use Topics    Alcohol use: No     Alcohol/week: 0.0 standard drinks                                Counseling given: Not Answered      Vital Signs (Current): There were no vitals filed for this visit. BP Readings from Last 3 Encounters:   03/22/22 (!) 115/56   03/02/22 132/60   01/20/22 (!) 150/74       NPO Status:                                                                                 BMI:   Wt Readings from Last 3 Encounters:   03/22/22 206 lb (93.4 kg)   03/02/22 205 lb 8 oz (93.2 kg)   01/20/22 204 lb (92.5 kg)     There is no height or weight on file to calculate BMI.    CBC:   Lab Results   Component Value Date    WBC 3.9 12/31/2021    RBC 2.73 12/31/2021    HGB 8.2 01/02/2022    HCT 24.2 01/02/2022    MCV 88.6 12/31/2021    RDW 15.3 12/31/2021     12/31/2021       CMP:   Lab Results   Component Value Date     03/02/2022    K 5.3 03/02/2022    K 4.7 12/26/2021     03/02/2022    CO2 23 03/02/2022    BUN 20 03/02/2022    CREATININE 0.8 03/02/2022    GFRAA >60 03/02/2022    GFRAA >60 10/26/2012    AGRATIO 1.9 03/02/2022    LABGLOM >60 03/02/2022    LABGLOM 100 06/26/2012    GLUCOSE 109 03/02/2022    GLUCOSE 143 09/23/2011    PROT 6.6 03/02/2022    PROT 7.8 10/26/2012    CALCIUM 9.6 03/02/2022    BILITOT <0.2 03/02/2022    ALKPHOS 63 03/02/2022    AST 15 03/02/2022    ALT 13 03/02/2022       POC Tests: No results for input(s): POCGLU, POCNA, POCK, POCCL, POCBUN, POCHEMO, POCHCT in the last 72 hours.     Coags:   Lab Results   Component Value Date    PROTIME 12.7 12/25/2021    INR 1.12 12/25/2021    APTT 37.3 12/25/2021       HCG (If Applicable): No results found for: PREGTESTUR, PREGSERUM, HCG, HCGQUANT     ABGs: No results found for: PHART, PO2ART, PHU8TJY, JOK0WHS, BEART, L8EZTMSS     Type & Screen (If Applicable):  No results found for: LABABO, LABRH    Drug/Infectious Status (If Applicable):  No results found for: HIV, HEPCAB    COVID-19 Screening (If Applicable):   Lab Results   Component Value Date    COVID19 Not Detected 03/15/2022    COVID19 NOT DETECTED 08/27/2020         Anesthesia Evaluation  Patient summary reviewed history of anesthetic complications (wakes up wild, does well with ativan post op): Airway: Mallampati: II  TM distance: >3 FB   Neck ROM: full  Mouth opening: > = 3 FB Dental: normal exam         Pulmonary: breath sounds clear to auscultation  (+) sleep apnea: on CPAP,                             Cardiovascular:    (+) hypertension:, pacemaker: pacemaker, CAD:, CABG/stent (CABG 1999):, dysrhythmias (with RVR in the past, sinus node dysfunction): atrial fibrillation,         Rhythm: regular  Rate: normal                    Neuro/Psych:   (+) headaches:, psychiatric history:   (-) seizures, TIA and CVA            ROS comment: glaucoma GI/Hepatic/Renal:   (+) hiatal hernia, GERD: well controlled,           Endo/Other:    (+) Diabetes, : arthritis: OA., .                 Abdominal:   (+) obese,           Vascular: Other Findings:               Anesthesia Plan      MAC     ASA 3     (Risks/benefits of MAC anesthesia explained to patient as well as possibility of awareness at times during procedure. Pt understands. Pt counseled on the possibility of conversion to GA and or GETA in the event of medical emergency or medical necessity. Pt verbalizes understanding and agrees to continue  )  Induction: intravenous. MIPS: Postoperative opioids intended, Prophylactic antiemetics administered and Postoperative trial extubation.   Anesthetic plan and risks discussed with patient. Use of blood products discussed with patient whom consented to blood products. Plan discussed with CRNA.                   Eda Jacobson MD   3/22/2022

## 2022-03-22 NOTE — BRIEF OP NOTE
Brief Postoperative Note      Patient: Molly Hodgkin  YOB: 1950  MRN: [id number]    Date of Procedure: 3/22/2022    Pre-Op Diagnosis: DYSPHAGIA R13.10        Procedure(s):  EGD BIOPSY  EGD DILATION BALLOON    Surgeon(s):  Raya Page MD    Anesthesia: Monitor Anesthesia Care    Estimated Blood Loss (mL): Minimal    Complications: None    Specimens:   ID Type Source Tests Collected by Time Destination   A : gastric bx, r/o H.pylori Gastric Gastric SURGICAL PATHOLOGY Raya Page MD 3/22/2022 1530    B : distal esophageal bx, r/o eosinophilic esophagitis  Tissue Esophagus SURGICAL PATHOLOGY Raya Page MD 3/22/2022 1532    C : proximal esophageal bx, r/o eosinophilic esophagitis  Tissue Esophagus SURGICAL PATHOLOGY Raya Page MD 3/22/2022 1534        Findings:   Normal esophageal mucosa, biopsied. Empirically balloon dilated up to 20 mm. Patchy antral gastritis, biopsied. Plans:  Await biopsies. Continue Dexilant. Follow clinical response to endoscopic dilation.      Electronically signed by Raya Page MD on 3/22/2022 at 3:42 PM

## 2022-03-22 NOTE — PROGRESS NOTES
Reports anxiety and requests meds. Dr. Bear [place] notified and versed 1mg IVP given as ordered. Continuous pulse ox in place. Sats 94-95% on 2L/NC. Will monitor closely.

## 2022-03-24 PROCEDURE — 93294 REM INTERROG EVL PM/LDLS PM: CPT | Performed by: INTERNAL MEDICINE

## 2022-03-24 PROCEDURE — 93296 REM INTERROG EVL PM/IDS: CPT | Performed by: INTERNAL MEDICINE

## 2022-03-31 ENCOUNTER — OFFICE VISIT (OUTPATIENT)
Dept: CARDIOLOGY CLINIC | Age: 72
End: 2022-03-31
Payer: MEDICARE

## 2022-03-31 VITALS
HEIGHT: 60 IN | BODY MASS INDEX: 40.99 KG/M2 | SYSTOLIC BLOOD PRESSURE: 110 MMHG | OXYGEN SATURATION: 96 % | HEART RATE: 60 BPM | DIASTOLIC BLOOD PRESSURE: 50 MMHG | WEIGHT: 208.8 LBS

## 2022-03-31 DIAGNOSIS — I25.119 CORONARY ARTERY DISEASE INVOLVING NATIVE CORONARY ARTERY OF NATIVE HEART WITH ANGINA PECTORIS (HCC): Primary | ICD-10-CM

## 2022-03-31 DIAGNOSIS — I48.0 PAROXYSMAL ATRIAL FIBRILLATION (HCC): ICD-10-CM

## 2022-03-31 DIAGNOSIS — I10 ESSENTIAL HYPERTENSION: ICD-10-CM

## 2022-03-31 PROCEDURE — G8427 DOCREV CUR MEDS BY ELIG CLIN: HCPCS | Performed by: NURSE PRACTITIONER

## 2022-03-31 PROCEDURE — G8399 PT W/DXA RESULTS DOCUMENT: HCPCS | Performed by: NURSE PRACTITIONER

## 2022-03-31 PROCEDURE — 1123F ACP DISCUSS/DSCN MKR DOCD: CPT | Performed by: NURSE PRACTITIONER

## 2022-03-31 PROCEDURE — G8417 CALC BMI ABV UP PARAM F/U: HCPCS | Performed by: NURSE PRACTITIONER

## 2022-03-31 PROCEDURE — 3017F COLORECTAL CA SCREEN DOC REV: CPT | Performed by: NURSE PRACTITIONER

## 2022-03-31 PROCEDURE — 99214 OFFICE O/P EST MOD 30 MIN: CPT | Performed by: NURSE PRACTITIONER

## 2022-03-31 PROCEDURE — 1090F PRES/ABSN URINE INCON ASSESS: CPT | Performed by: NURSE PRACTITIONER

## 2022-03-31 PROCEDURE — 4040F PNEUMOC VAC/ADMIN/RCVD: CPT | Performed by: NURSE PRACTITIONER

## 2022-03-31 PROCEDURE — 1036F TOBACCO NON-USER: CPT | Performed by: NURSE PRACTITIONER

## 2022-03-31 PROCEDURE — G8484 FLU IMMUNIZE NO ADMIN: HCPCS | Performed by: NURSE PRACTITIONER

## 2022-03-31 ASSESSMENT — ENCOUNTER SYMPTOMS
SHORTNESS OF BREATH: 1
GASTROINTESTINAL NEGATIVE: 1

## 2022-03-31 NOTE — PATIENT INSTRUCTIONS
Instructions:   1. Medications: continue current medications  2. Schedule stress test  3.  Follow up: with salma after the stress test, i'll ask her about echo

## 2022-03-31 NOTE — Clinical Note
Hi, saw her today in place of you gone and she has not had ST yet - told her to schedule. She said you mentioned echo as well but no order in there. Also mention of ANGELITO during [address].  K

## 2022-03-31 NOTE — PROGRESS NOTES
[facility]      Primary Care Doctor:  Medardo Diaz DO  Primary Cardiologist: Mady Vargas    Chief Complaint:  Chest apin    History of Present Illness:  Zafar Crouch is a 80 y.o. female with PMH AF, HTN, HLD, DM, RAHUL, CKD, CAD CABG 1999, ablation, PPM for SSS, JES  who presents today for an interval exam.  She was last seen 4 weeks ago for CP and ST ordered- not done. Today she c/o continue chest pain/pressure increased with inspiration. Associated sx include dyspnea, fatigue, edema  she denies orthopnea, PND, exertional chest pressure/discomfort, early saiety, syncope. Home WTs: 580-841, 205 today, home -145/60s    EF: 65%  Cardiac Imaging:  Last Echo: 12/27/21  Summary   -COVID+   -Normal global systolic function with an ejection fraction estimated at 65%.  -No regional wall motion abnormalities noted.   -Aortic valve appears sclerotic but opens adequately. There is mild aortic   insufficiency.   -Thickened mitral valve without evidence of stenosis. There is a moderate   sized mobile echodensity of unknown significant noted on posterior lateral   annulus of mitral valve which may represent increased calcification but was   not as prominent on the previous echo of 9/2019. No significant   regurgitation noted.   -There is mild tricuspid regurgitation with a RVSP estimation of 34 mmHg.   -Indeterminate diastolic function.  Avg. E/e'=29.9   -Pacemaker / ICD lead is visualized in the right heart  Stress Test: 9/2/15      No EKG evidence for ischemia with lexiscan       Preserved LV systolic function       Myocardial perfusion imaging with small to moderate area of decreased    perfusion in the anteroapical wall with stress with redistribution at rest    consistent with ischemia.        [facility] 9/15  1/17 55%  55% Patent grafts [place])  Patent grafts [place] [place])        Device: Medtr[employer] PPM 2019     Activity: improving since COVID late Dec last year  Can you walk 1-2 blocks or do a moderate amount of house/yard work? No      RAHUL Yes Treated: Yes  Referral:  No    Pt Education: The patient has received education on the following topics: dietary guidelines, heart medications, the importance of physical activity, symptom management and reduction of cardiac risk factors. Past Medical History:   has a past medical history of Anemia, Anesthesia, Anesthesia complication, Atrial fibrillation ([address] CAD (coronary artery disease), Chest pain, Chronic kidney disease, Depression, GERD (gastroesophageal reflux disease), Glaucoma, Hiatal hernia, Hyperlipidemia, Hypertension, Migraines, neuralgic, Morbid obesity ([address] Osteoarthritis, Sleep apnea, Type II or unspecified type diabetes mellitus without mention of complication, not stated as uncontrolled, Unspecified sleep apnea, Urinary incontinence, and UTI (urinary tract infection). SurgicalHistory:   has a past surgical history that includes Coronary artery bypass graft (1999); Hammer toe surgery; Ovary removal (Bilateral, 1997); Esophagus dilation; Breast lumpectomy; Knee arthroscopy; Gastric Band (12/20/11); Colonoscopy; Upper gastrointestinal endoscopy; Upper gastrointestinal endoscopy (10/30/15); Cosmetic surgery; joint replacement (1995); joint replacement (1995); orif humerus decompression (Left, 2/25/2016); Upper gastrointestinal endoscopy (10/14/2016); Bariatric Surgery (11/03/2016); ablation of dysrhythmic focus; Colonoscopy (10/08/2018); Colonoscopy (N/A, 10/8/2018); pr njx dx/ther sbst intrlmnr crv/thrc w/img gdn (N/A, 11/5/2018); Cardiac surgery (2/22/1999); Cardiac catheterization; Injection Procedure For Sacroiliac Joint (Left, 12/17/2018); Enteroscopy (N/A, 2/26/2019); ventral hernia repair (N/A, 10/29/2019); Abdomen surgery (N/A, 9/1/2020); pacemaker placement (05/2019); Upper gastrointestinal endoscopy (N/A, 3/22/2022); and Upper gastrointestinal endoscopy (N/A, 3/22/2022). Social History:   reports that she has never smoked.  She has never used smokeless tobacco. She reports that she does not drink alcohol and does not use drugs. Family History:   Family History   Problem Relation Age of Onset   [place]ascorro Cancer Mother         ovarian, colon    High Blood Pressure Mother     Heart Disease Father     High Blood Pressure Father     Stroke Sister         paralysed on right side B/C of stroke       Home Medications:  Prior to Admission medications    Medication Sig Start Date End Date Taking?  Authorizing Provider   metoprolol tartrate (LOPRESSOR) 25 MG tablet Take 0.5 tablets by mouth 2 times daily 1/26/22   Marden People, APRN - CNP   fexofenadine (ALLEGRA) 180 MG tablet Take 180 mg by mouth daily    Historical Provider, MD   Epoetin Leonard-epbx (RETACRIT IJ) Inject as directed Every friday    Historical Provider, MD   losartan-hydroCHLOROthiazide (HYZAAR) 100-25 MG per tablet Take 1 tablet by mouth daily    Historical Provider, MD   ipratropium (ATROVENT HFA) 17 MCG/ACT inhaler Inhale 2 puffs into the lungs three times daily  Patient taking differently: Inhale 2 puffs into the lungs 3 times daily as needed  1/2/22   Jason Fernández MD   SUMAtriptan (IMITREX) 100 MG tablet Take 1 tablet by mouth daily as needed for Migraine 1/2/22   Jason Fernández MD   tiZANidine (ZANAFLEX) 4 MG tablet Take 1 tablet by mouth nightly as needed (leg spasms) 1/2/22   Jason Fernández MD   zinc sulfate (ZINCATE) 220 (50 Zn) MG capsule Take 50 mg by mouth daily    Historical Provider, MD   potassium chloride (KLOR-CON M) 20 MEQ extended release tablet Take 1 tablet by mouth daily  Patient taking differently: Take 10 mEq by mouth daily  2/26/20   Paris Lozada MD   amLODIPine (NORVASC) 5 MG tablet Take 1 tablet by mouth every evening  Patient taking differently: Take 2.5 mg by mouth 2 times daily  5/28/19   Ramona Vasquez MD   aspirin 81 MG tablet Take 81 mg by mouth daily    Historical Provider, MD   furosemide (LASIX) 40 MG tablet Take 1 tablet by mouth daily 9/20/18 Aquilino Sanchez MD   isosorbide mononitrate (ISMO;MONOKET) 10 MG tablet Take 5 mg by mouth 2 times daily    Historical Provider, MD   Ascorbic Acid (VITAMIN C) 1000 MG tablet Take 1,000 mg by mouth daily    Historical Provider, MD   Cyanocobalamin (VITAMIN B-12 PO) Take 3,000 mcg by mouth daily    Historical Provider, MD   estradiol (ESTRACE) 0.1 MG/GM vaginal cream Place 2 g vaginally See Admin Instructions Twice a week    Historical Provider, MD   Cranberry 450 MG CAPS Take by mouth daily     Historical Provider, MD   pioglitazone (ACTOS) 15 MG tablet Take 15 mg by mouth daily    Historical Provider, MD   vitamin D (CHOLECALCIFEROL) 1000 UNIT TABS tablet Take 1,000 Units by mouth daily     Historical Provider, MD   spironolactone (ALDACTONE) 25 MG tablet Take 25 mg by mouth every morning (before breakfast)     Historical Provider, MD   dexlansoprazole (DEXILANT) 60 MG CPDR delayed release capsule Take 60 mg by mouth daily    Historical Provider, MD   gabapentin (NEURONTIN) 100 MG capsule Take 400 mg by mouth 3 times daily. Hammad Ferreira Historical Provider, MD   TraZODone HCl (DESYREL PO) Take 50 mg by mouth nightly     Historical Provider, MD   Calcium Citrate-Vitamin D (CALCIUM CITRATE + D PO) Take 1 tablet by mouth daily     Historical Provider, MD   SLOW-MAG 71.5-119 MG TBEC tablet Take 1 tablet by mouth daily 143 mg 11/3/15   Historical Provider, MD   rosuvastatin (CRESTOR) 40 MG tablet Take 20 mg by mouth every evening     Historical Provider, MD   metFORMIN (GLUCOPHAGE) 500 MG tablet Take 500 mg by mouth 2 times daily (with meals)     Historical Provider, MD   ezetimibe (ZETIA) 10 MG tablet Take 5 mg by mouth nightly     Historical Provider, MD   escitalopram (LEXAPRO) 20 MG tablet Take 20 mg by mouth daily. Historical Provider, MD   nitroGLYCERIN (NITROLINGUAL) 0.4 MG/SPRAY spray Place 1 spray under the tongue every 5 minutes as needed.  As directed for chest pain     Historical Provider, MD Allergies:  Albuterol, Heparin, Niacin, Avelox [moxifloxacin hcl in nacl], Moxifloxacin, Niaspan [niacin er], Proventil [albuterol sulfate], and Tagamet [cimetidine]     ROS:   Review of Systems   Constitutional: Positive for fatigue. Respiratory: Positive for shortness of breath. Cardiovascular: Positive for leg swelling. Gastrointestinal: Negative. Genitourinary: Negative. Musculoskeletal: Negative. Skin: Negative. Neurological: Positive for dizziness. Hematological: Negative. Psychiatric/Behavioral: Negative. Physical Examination:    Vitals:    03/31/22 1135   BP: (!) 110/50   Site: Left Upper Arm   Position: Sitting   Cuff Size: Large Adult   Pulse: 60   SpO2: 96%   Weight: 208 lb 12.8 oz (94.7 kg)   Height: 4' 11.5\" (1.511 m)           Physical Exam  Vitals reviewed. Constitutional:       Appearance: Normal appearance. She is normal weight. HENT:      Head: Normocephalic and atraumatic. Eyes:      Extraocular Movements: Extraocular movements intact. Pupils: Pupils are equal, round, and reactive to light. Cardiovascular:      Rate and Rhythm: Normal rate and regular rhythm. Pulses: Normal pulses. Heart sounds: Murmur heard. Pulmonary:      Effort: Pulmonary effort is normal.      Breath sounds: Normal breath sounds. Abdominal:      Palpations: Abdomen is soft. Musculoskeletal:         General: Normal range of motion. Cervical back: Normal range of motion and neck supple. Right lower leg: Edema present. Left lower leg: Edema present. Skin:     General: Skin is warm and dry. Neurological:      General: No focal deficit present. Mental Status: She is alert and oriented to person, place, and time. Mental status is at baseline. Psychiatric:         Mood and Affect: Mood normal.         Behavior: Behavior normal.         Thought Content:  Thought content normal.         Judgment: Judgment normal.         Lab Data:    CBC: Lab Results   Component Value Date    WBC 3.9 12/31/2021    WBC 3.2 12/30/2021    WBC 3.6 12/29/2021    RBC 2.73 12/31/2021    RBC 2.87 12/30/2021    RBC 2.86 12/29/2021    HGB 8.2 01/02/2022    HGB 8.4 01/01/2022    HGB 8.0 01/01/2022    HCT 24.2 01/02/2022    HCT 25.1 01/01/2022    HCT 24.1 01/01/2022    MCV 88.6 12/31/2021    MCV 89.3 12/30/2021    MCV 89.8 12/29/2021    RDW 15.3 12/31/2021    RDW 15.9 12/30/2021    RDW 15.8 12/29/2021     12/31/2021     12/30/2021     12/29/2021     BMP:  Lab Results   Component Value Date     03/02/2022     12/31/2021     12/30/2021    K 4.5 03/22/2022    K 5.3 03/02/2022    K 5.0 12/31/2021    K 4.7 12/26/2021    K 4.9 12/25/2021    K 4.7 08/15/2018     03/02/2022     12/31/2021     12/30/2021    CO2 23 03/02/2022    CO2 24 12/31/2021    CO2 22 12/30/2021    PHOS 4.1 08/14/2020    PHOS 3.0 04/26/2011    BUN 20 03/02/2022    BUN 50 12/31/2021    BUN 54 12/30/2021    CREATININE 0.8 03/02/2022    CREATININE 0.9 12/31/2021    CREATININE 1.0 12/30/2021     BNP:   Lab Results   Component Value Date    PROBNP 343 03/02/2022    PROBNP 219 05/14/2019    PROBNP 1,283 04/30/2019          Assessment/Plan:    1. Coronary artery disease involving native coronary artery of native heart with angina pectoris ([address] - schedule ST   2. Essential hypertension - controlled   3. SOB - stable         Instructions:   1. Medications: continue current medications  2. Schedule stress test  3.  Follow up: with salma after the stress test, i'll ask her about echo          I appreciate the opportunity of cooperating in the care of this individual.    BILL Pinto - CNP, CNP, 3/31/2022, 9:19 AM

## 2022-04-07 ENCOUNTER — HOSPITAL ENCOUNTER (OUTPATIENT)
Dept: NON INVASIVE DIAGNOSTICS | Age: 72
Discharge: HOME | End: 2022-04-07
Payer: MEDICARE

## 2022-04-07 LAB
LV EF: 76 %
LVEF MODALITY: NORMAL

## 2022-04-07 PROCEDURE — 3430000000 HC RX DIAGNOSTIC RADIOPHARMACEUTICAL: Performed by: NURSE PRACTITIONER

## 2022-04-07 PROCEDURE — A9502 TC99M TETROFOSMIN: HCPCS | Performed by: NURSE PRACTITIONER

## 2022-04-07 PROCEDURE — 78452 HT MUSCLE IMAGE SPECT MULT: CPT | Performed by: INTERNAL MEDICINE

## 2022-04-07 PROCEDURE — 93017 CV STRESS TEST TRACING ONLY: CPT | Performed by: INTERNAL MEDICINE

## 2022-04-07 RX ADMIN — TETROFOSMIN 10 MILLICURIE: 1.38 INJECTION, POWDER, LYOPHILIZED, FOR SOLUTION INTRAVENOUS at 08:46

## 2022-04-07 RX ADMIN — TETROFOSMIN 30 MILLICURIE: 1.38 INJECTION, POWDER, LYOPHILIZED, FOR SOLUTION INTRAVENOUS at 10:16

## 2022-04-07 NOTE — PROGRESS NOTES
Instructed on Lexiscan Stress Test Procedure including possible side effects/ adverse reactions. Patient verbalizes  understanding and denies having any questions . See [facility]           Aminophylline given per lexiscan protocol in stress lab for c/o persistant nausea.

## 2022-04-13 ENCOUNTER — OFFICE VISIT (OUTPATIENT)
Dept: CARDIOLOGY CLINIC | Age: 72
End: 2022-04-13
Payer: MEDICARE

## 2022-04-13 VITALS
WEIGHT: 208 LBS | SYSTOLIC BLOOD PRESSURE: 114 MMHG | HEART RATE: 60 BPM | HEIGHT: 60 IN | DIASTOLIC BLOOD PRESSURE: 50 MMHG | OXYGEN SATURATION: 94 % | BODY MASS INDEX: 40.84 KG/M2

## 2022-04-13 DIAGNOSIS — I25.119 CORONARY ARTERY DISEASE INVOLVING NATIVE CORONARY ARTERY OF NATIVE HEART WITH ANGINA PECTORIS (HCC): Primary | ICD-10-CM

## 2022-04-13 DIAGNOSIS — I10 PRIMARY HYPERTENSION: ICD-10-CM

## 2022-04-13 DIAGNOSIS — I34.81 MITRAL VALVE ANNULAR CALCIFICATION: ICD-10-CM

## 2022-04-13 DIAGNOSIS — R93.1 ABNORMAL FINDINGS ON DIAGNOSTIC IMAGING OF HEART AND CORONARY CIRCULATION: ICD-10-CM

## 2022-04-13 PROCEDURE — 4040F PNEUMOC VAC/ADMIN/RCVD: CPT | Performed by: NURSE PRACTITIONER

## 2022-04-13 PROCEDURE — G8399 PT W/DXA RESULTS DOCUMENT: HCPCS | Performed by: NURSE PRACTITIONER

## 2022-04-13 PROCEDURE — 1036F TOBACCO NON-USER: CPT | Performed by: NURSE PRACTITIONER

## 2022-04-13 PROCEDURE — 3017F COLORECTAL CA SCREEN DOC REV: CPT | Performed by: NURSE PRACTITIONER

## 2022-04-13 PROCEDURE — 1123F ACP DISCUSS/DSCN MKR DOCD: CPT | Performed by: NURSE PRACTITIONER

## 2022-04-13 PROCEDURE — 1090F PRES/ABSN URINE INCON ASSESS: CPT | Performed by: NURSE PRACTITIONER

## 2022-04-13 PROCEDURE — 99214 OFFICE O/P EST MOD 30 MIN: CPT | Performed by: NURSE PRACTITIONER

## 2022-04-13 PROCEDURE — G8427 DOCREV CUR MEDS BY ELIG CLIN: HCPCS | Performed by: NURSE PRACTITIONER

## 2022-04-13 PROCEDURE — G8417 CALC BMI ABV UP PARAM F/U: HCPCS | Performed by: NURSE PRACTITIONER

## 2022-04-13 RX ORDER — ISOSORBIDE MONONITRATE 10 MG/1
10 TABLET ORAL 2 TIMES DAILY
Qty: 60 TABLET | Refills: 5 | Status: ON HOLD | OUTPATIENT
Start: 2022-04-13 | End: 2022-05-05 | Stop reason: HOSPADM

## 2022-04-13 NOTE — PROGRESS NOTES
[address]     Outpatient Follow Up Note    Melina Dodd is 80 y.o. female who presents today with a history of atrial fibrillation ( follows with Dr. Layton Arriaga), HTN, HLD, DM, obesity, RAHUL, CKD, CAD s/p CABG 1999. Ablation in 8/2018 and  PPM in 5/2019 for sick sinus syndrome. History of MGUS and is receiving iron transfusion. Interval history: Ms. Didier Bernabe was admitted to the hospital in December 2021 for complaints of weakness, shortness of breath, fever, chills. Tested positive for COVID, required high-flow O2)she was vaccinated with no booster. Found to be have had a STEMI. Medical management was advised to her illness. CHIEF COMPLAINT / HPI:  Follow Up secondary to coronary artery disease  (CABG in 1999), STEMI in 12/21. Subjective:     She's had some chest discomfort to the left of her breast bone. It feels like something is laying there. She can be just sitting still and can have pain. She has no associated symptoms nor did she prior to her bypass. It last all day long at times. She hasn't need to take NTG. Her discomfort is no better no worse. It feels similar as to her angina  She continues to feel a knot type discomfort in her epigastric region. She c/o SOB walking moderate distances. She sleeps with CPAP.; denies PND, orthopnea. Her SaPO2 99% RA. She hasn't noticed any swelling. Her wt is up a little at 208# today. She has no palpitations / dizziness. These symptoms are essentially unchanged since the last OV. With regard to medication therapy the patient has been compliant with prescribed regimen. They have tolerated therapy to date. Past Medical History:   Diagnosis Date    Anemia     Anesthesia     trouble after egd 10/2016.   Anxiety and severe tremors after AF ablation 8/2018-responded well to Ativan    Anesthesia complication     flailing, yelling when waking up from anesthesia-ativan helps ([id number]/29/19: ativans works within seconds of administration)   Subha Atrial fibrillation ([facility] 10/01/2017    A. fib with SVR    CAD (coronary artery disease)     Chest pain 10/01/2017    Chronic kidney disease     ? ??    Depression     GERD (gastroesophageal reflux disease)     Glaucoma     Hiatal hernia     Hyperlipidemia     Hypertension     Migraines, neuralgic     Morbid obesity ([facility]     Osteoarthritis     Sleep apnea     uses bi-pap    Type II or unspecified type diabetes mellitus without mention of complication, not stated as uncontrolled     Unspecified sleep apnea     uses cpap    Urinary incontinence     UTI (urinary tract infection)      Social History:    Social History     Tobacco Use   Smoking Status Never Smoker   Smokeless Tobacco Never Used     Current Medications:  Current Outpatient Medications   Medication Sig Dispense Refill    metoprolol tartrate (LOPRESSOR) 25 MG tablet Take 0.5 tablets by mouth 2 times daily 30 tablet 5    fexofenadine (ALLEGRA) 180 MG tablet Take 180 mg by mouth daily      Epoetin Leonard-epbx (RETACRIT IJ) Inject as directed Every friday      losartan-hydroCHLOROthiazide (HYZAAR) 100-25 MG per tablet Take 1 tablet by mouth daily      ipratropium (ATROVENT HFA) 17 MCG/ACT inhaler Inhale 2 puffs into the lungs three times daily 1 each 3    SUMAtriptan (IMITREX) 100 MG tablet Take 1 tablet by mouth daily as needed for Migraine 30 tablet 3    tiZANidine (ZANAFLEX) 4 MG tablet Take 1 tablet by mouth nightly as needed (leg spasms) 20 tablet 0    zinc sulfate (ZINCATE) 220 (50 Zn) MG capsule Take 50 mg by mouth 4 times daily       potassium chloride (KLOR-CON M) 20 MEQ extended release tablet Take 1 tablet by mouth daily (Patient taking differently: Take 10 mEq by mouth daily ) 90 tablet 3    amLODIPine (NORVASC) 5 MG tablet Take 1 tablet by mouth every evening (Patient taking differently: Take 2.5 mg by mouth 2 times daily ) 90 tablet 3    aspirin 81 MG tablet Take 81 mg by mouth daily      furosemide (LASIX) 40 MG tablet Take 1 tablet by mouth daily 30 tablet 5    isosorbide mononitrate (ISMO;MONOKET) 10 MG tablet Take 5 mg by mouth 2 times daily      Ascorbic Acid (VITAMIN C) 1000 MG tablet Take 1,000 mg by mouth daily      Cyanocobalamin (VITAMIN B-12 PO) Take 3,000 mcg by mouth daily      estradiol (ESTRACE) 0.1 MG/GM vaginal cream Place 2 g vaginally See Admin Instructions Twice a week      Cranberry 450 MG CAPS Take by mouth daily       pioglitazone (ACTOS) 15 MG tablet Take 15 mg by mouth daily      vitamin D (CHOLECALCIFEROL) 1000 UNIT TABS tablet Take 1,000 Units by mouth daily       spironolactone (ALDACTONE) 25 MG tablet Take 25 mg by mouth every morning (before breakfast)       dexlansoprazole (DEXILANT) 60 MG CPDR delayed release capsule Take 60 mg by mouth daily      gabapentin (NEURONTIN) 100 MG capsule Take 400 mg by mouth 3 times daily. [place] TraZODone HCl (DESYREL PO) Take 50 mg by mouth nightly       Calcium Citrate-Vitamin D (CALCIUM CITRATE + D PO) Take 1 tablet by mouth daily       SLOW-MAG 71.5-119 MG TBEC tablet Take 1 tablet by mouth daily 143 mg  5    rosuvastatin (CRESTOR) 40 MG tablet Take 20 mg by mouth every evening       metFORMIN (GLUCOPHAGE) 500 MG tablet Take 500 mg by mouth 2 times daily (with meals)       ezetimibe (ZETIA) 10 MG tablet Take 5 mg by mouth nightly       escitalopram (LEXAPRO) 20 MG tablet Take 20 mg by mouth daily.  nitroGLYCERIN (NITROLINGUAL) 0.4 MG/SPRAY spray Place 1 spray under the tongue every 5 minutes as needed. As directed for chest pain        No current facility-administered medications for this visit. REVIEW OF SYSTEMS:    CONSTITUTIONAL: No major weight gain or loss, fatigue, weakness, night sweats or fever. HEENT: No new vision difficulties or ringing in the ears. RESPIRATORY: No new SOB, PND, orthopnea or cough. CARDIOVASCULAR: See HPI  GI: No nausea, vomiting, diarrhea, constipation, abdominal pain or changes in bowel habits.   : No urinary frequency, urgency, incontinence hematuria or dysuria. SKIN: No cyanosis or skin lesions. MUSCULOSKELETAL: No new muscle or joint pain. NEUROLOGICAL: No syncope or TIA-like symptoms. PSYCHIATRIC: No anxiety, pain, insomnia or depression    Objective:   PHYSICAL EXAM:        Vitals:    04/13/22 1503 04/13/22 1531   BP: (!) 102/50 (!) 114/50   Site: Right Upper Arm    Position: Sitting    Cuff Size: Large Adult    Pulse: 60    SpO2: 94%    Weight: 208 lb (94.3 kg)    Height: 4' 11.5\" (1.511 m)        VITALS:  BP (!) 102/50 (Site: Right Upper Arm, Position: Sitting, Cuff Size: Large Adult)   Pulse 60   Ht 4' 11.5\" (1.511 m)   Wt 208 lb (94.3 kg)   SpO2 94%   BMI 41.31 kg/m²   CONSTITUTIONAL: Cooperative, no apparent distress, and appears well nourished / obese  NEUROLOGIC:  Awake and orientated to person, place and time. PSYCH: Calm affect. SKIN: Warm and dry. HEENT: Sclera non-icteric, normocephalic, neck supple, no elevation of JVP, normal carotid pulses with no bruits and thyroid normal size. LUNGS:  No increased work of breathing and clear to auscultation, no crackles or wheezing  CARDIOVASCULAR:  Regular rate 64 and rhythm with no murmurs, gallops, rubs, or abnormal heart sounds, normal PMI. The apical impulses not displaced  JVP less than 8 cm H2O  Heart tones are crisp and normal  Cervical veins are not engorged  The carotid upstroke is normal in amplitude and contour without delay or bruit  JVP is not elevated  ABDOMEN:  Normal bowel sounds, distended and non-tender to palpation  EXT: trace yohana LE edema, no calf tenderness. Pulses are present bilaterally.     DATA:    Lab Results   Component Value Date    ALT 13 03/02/2022    AST 15 03/02/2022    ALKPHOS 63 03/02/2022    BILITOT <0.2 03/02/2022     Lab Results   Component Value Date    CREATININE 0.8 03/02/2022    BUN 20 03/02/2022     03/02/2022    K 4.5 03/22/2022     03/02/2022    CO2 23 03/02/2022     Lab Results   Component Value Date    TSH 1.95 05/22/2020    [id number] 5.7 04/26/2011     Lab Results   Component Value Date    WBC 3.9 (L) 12/31/2021    HGB 8.2 (L) 01/02/2022    HCT 24.2 (L) 01/02/2022    MCV 88.6 12/31/2021     12/31/2021     Reported last Hgb 11.7 on 2/25/22 : done at Dr. Manuel Cazares office    No components found for: CHLPL  Lab Results   Component Value Date    TRIG 132 05/22/2020    TRIG 269 (H) 02/14/2020    TRIG 265 (H) 01/24/2017     Lab Results   Component Value Date    HDL 36 (L) 05/22/2020    HDL 37 (L) 02/14/2020    HDL 40 06/18/2019     Lab Results   Component Value Date    LDLCALC 47 05/22/2020    LDLCALC 51 02/14/2020    [facility] 43 06/18/2019     Lab Results   Component Value Date    LABVLDL 26 05/22/2020    LABVLDL 54 02/14/2020    LABVLDL 35 06/18/2019     Radiology Review:  Pertinent images / reports were reviewed as a part of this visit and reveals the following:    NM: April '22:  Summary    The overall quality of the study is good. There is subdiaphragmatic    attenuation.        Left ventricular cavity size is normal. Right ventricle is normal in size.        SPECT images demonstrate homogeneous tracer distribution throughout the    myocardium. There is normal isotope uptake at stress and rest. There is no    evidence of myocardial ischemia or scar. Gated spect reveals normal    myocardial thickening and wall motion.        Sum stress score of 4. No visual TID. Calculated TID of 0.93.        Sensitivity of testing reduced on anti-anginals.        Myocardial perfusion is normal. Low risk scan. Last Echo: 12/27/21  Summary   -COVID+   -Normal global systolic function with an ejection fraction estimated at 65%. -No regional wall motion abnormalities noted.   -Aortic valve appears sclerotic but opens adequately. There is mild aortic   insufficiency.   -Thickened mitral valve without evidence of stenosis.  There is a moderate   sized mobile echodensity of unknown significant noted on posterior lateral   annulus of mitral valve which may represent increased calcification but was   not as prominent on the previous echo of 9/2019. No significant   regurgitation noted. -There is mild tricuspid regurgitation with a RVSP estimation of 34 mmHg. -Indeterminate diastolic function. Avg. E/e'=29.9   -Pacemaker / ICD lead is visualized in the right heart. Recommend ANGELITO after pt recovers from this COVID illness to check the MV   annulus. Cannot exclude a vegetation. Last Stress Test: 9/2/15     No EKG evidence for ischemia with lexiscan       Preserved LV systolic function       Myocardial perfusion imaging with small to moderate area of decreased    perfusion in the anteroapical wall with stress with redistribution at rest    consistent with ischemia. Last Angiogram:  [facility] 9/15  1/17 55%  55% Patent grafts [place])  Patent grafts Jay [place])         Assessment:      Diagnosis Orders   1. Coronary artery disease due to lipid rich plaque   ~c/o chest heaviness of angina equivalent  ~hx of elevated troponin with last hospitalization Dec '21  ~no ischemia noted with NM April but reduced sensitivity d/t anti-anginals  ~EF normal on echo ; ANGELITO recommended to reassess mobile echodensity of unknown significant Dec '21  ~hx CABG '99 ; bypass conduits patent by cath(s) '15 & '17  ~ASA / statin / nitrate low dose / BB                 2. SOB (shortness of breath)   ~unchanged   ~neg to exam for crackles; BNP not significantly elevated      3. Hypertension, primary   ~controlled     4. Mixed hyperlipidemia   ~ HDL near goal from May '20  ~jeremy / daniel        I had the opportunity to review the clinical symptoms and presentation of Haleigh Magdaleno. Plan:     1. Increase isosorbide to 10 mg bid  2. Cardiac cath / possible intervention    ANGELITO to reassess echodensity on mitral vavle  3.  F/U after test    Overall the patient is stable from CV standpoint    I have addresed the patient's cardiac risk factors and adjusted pharmacologic treatment as needed. In addition, I have reinforced the need for patient directed risk factor modification. Further evaluation will be based upon the patient's clinical course and testing results. All questions and concerns were addressed to the patient/family. Alternatives to my treatment were discussed. The patient is not currently smoking. The risks related to smoking were reviewed with the patient. Recommend maintaining a smoke-free lifestyle. Products available for smoking cessation were discussed in detail. Patient is on a beta-blocker  Patient is on an ace-i/ARB  Patient is on a statin    Antiplatelet therapy has been recommended / prescribed for this patient. Education conducted on adverse reactions including bleeding was discussed. Angiotension inhibitor/angiotension receptor blocker has  been prescribed / recommended for congestive heart failure. Daily weight, low sodium diet were discussed. Patient instructed to call the office with a weight gain: > 3 # over night or 5# in one week; swelling, SOB/orthopnea/PND    The patient verbalizes understanding not to stop medications without discussing with us. Discussed exercise: 30-60 minutes 7 days/week  Discussed Low saturated fat/TRENA diet. Thank you for allowing to us to participate in the care of Haleigh Magdaleno.     BILL Gustafson      Documentation of today's visit sent to PCP

## 2022-04-25 ENCOUNTER — HOSPITAL ENCOUNTER (OUTPATIENT)
Dept: CARDIAC CATH/INVASIVE PROCEDURES | Age: 72
Discharge: HOME | End: 2022-04-25
Attending: INTERNAL MEDICINE | Admitting: INTERNAL MEDICINE
Payer: MEDICARE

## 2022-04-25 VITALS
TEMPERATURE: 98 F | WEIGHT: 208 LBS | OXYGEN SATURATION: 97 % | DIASTOLIC BLOOD PRESSURE: 62 MMHG | HEIGHT: 60 IN | SYSTOLIC BLOOD PRESSURE: 143 MMHG | BODY MASS INDEX: 40.84 KG/M2 | HEART RATE: 60 BPM

## 2022-04-25 DIAGNOSIS — I25.119 CORONARY ARTERY DISEASE INVOLVING NATIVE CORONARY ARTERY OF NATIVE HEART WITH ANGINA PECTORIS (HCC): ICD-10-CM

## 2022-04-25 LAB
ANION GAP SERPL CALCULATED.3IONS-SCNC: 12 MMOL/L (ref 3–16)
BUN BLDV-MCNC: 52 MG/DL (ref 7–20)
CALCIUM SERPL-MCNC: 10.2 MG/DL (ref 8.3–10.6)
CHLORIDE BLD-SCNC: 103 MMOL/L (ref 99–110)
CO2: 22 MMOL/L (ref 21–32)
CREAT SERPL-MCNC: 1.1 MG/DL (ref 0.6–1.2)
EKG ATRIAL RATE: 61 BPM
EKG DIAGNOSIS: NORMAL
EKG Q-T INTERVAL: 426 MS
EKG QRS DURATION: 88 MS
EKG QTC CALCULATION (BAZETT): 426 MS
EKG R AXIS: 31 DEGREES
EKG T AXIS: 61 DEGREES
EKG VENTRICULAR RATE: 60 BPM
GFR AFRICAN AMERICAN: 59
GFR NON-AFRICAN AMERICAN: 49
GLUCOSE BLD-MCNC: 136 MG/DL (ref 70–99)
HCT VFR BLD CALC: 32.6 % (ref 36–48)
HEMOGLOBIN: 10.2 G/DL (ref 12–16)
LV EF: 55 %
LVEF MODALITY: NORMAL
MCH RBC QN AUTO: 25 PG (ref 26–34)
MCHC RBC AUTO-ENTMCNC: 31.2 G/DL (ref 31–36)
MCV RBC AUTO: 80.1 FL (ref 80–100)
PDW BLD-RTO: 18.9 % (ref 12.4–15.4)
PLATELET # BLD: 264 K/UL (ref 135–450)
PMV BLD AUTO: 8.1 FL (ref 5–10.5)
POTASSIUM SERPL-SCNC: 5.1 MMOL/L (ref 3.5–5.1)
RBC # BLD: 4.07 M/UL (ref 4–5.2)
SODIUM BLD-SCNC: 137 MMOL/L (ref 136–145)
WBC # BLD: 6.8 K/UL (ref 4–11)

## 2022-04-25 PROCEDURE — 93010 ELECTROCARDIOGRAM REPORT: CPT | Performed by: INTERNAL MEDICINE

## 2022-04-25 PROCEDURE — 93312 ECHO TRANSESOPHAGEAL: CPT

## 2022-04-25 PROCEDURE — 93459 L HRT ART/GRFT ANGIO: CPT | Performed by: INTERNAL MEDICINE

## 2022-04-25 PROCEDURE — C1769 GUIDE WIRE: HCPCS

## 2022-04-25 PROCEDURE — 2709999900 HC NON-CHARGEABLE SUPPLY

## 2022-04-25 PROCEDURE — 99152 MOD SED SAME PHYS/QHP 5/>YRS: CPT

## 2022-04-25 PROCEDURE — 2500000003 HC RX 250 WO HCPCS

## 2022-04-25 PROCEDURE — 99153 MOD SED SAME PHYS/QHP EA: CPT

## 2022-04-25 PROCEDURE — 80048 BASIC METABOLIC PNL TOTAL CA: CPT

## 2022-04-25 PROCEDURE — 6360000004 HC RX CONTRAST MEDICATION: Performed by: INTERNAL MEDICINE

## 2022-04-25 PROCEDURE — 85027 COMPLETE CBC AUTOMATED: CPT

## 2022-04-25 PROCEDURE — 2580000003 HC RX 258

## 2022-04-25 PROCEDURE — 6360000002 HC RX W HCPCS

## 2022-04-25 PROCEDURE — 93005 ELECTROCARDIOGRAM TRACING: CPT | Performed by: INTERNAL MEDICINE

## 2022-04-25 PROCEDURE — 36415 COLL VENOUS BLD VENIPUNCTURE: CPT

## 2022-04-25 PROCEDURE — C1894 INTRO/SHEATH, NON-LASER: HCPCS

## 2022-04-25 PROCEDURE — 93459 L HRT ART/GRFT ANGIO: CPT

## 2022-04-25 PROCEDURE — 93320 DOPPLER ECHO COMPLETE: CPT

## 2022-04-25 RX ORDER — SODIUM CHLORIDE 0.9 % (FLUSH) 0.9 %
5-40 SYRINGE (ML) INJECTION PRN
Status: DISCONTINUED | OUTPATIENT
Start: 2022-04-25 | End: 2022-04-25 | Stop reason: HOSPADM

## 2022-04-25 RX ORDER — CLOPIDOGREL BISULFATE 75 MG/1
75 TABLET ORAL DAILY
Qty: 30 TABLET | Refills: 5 | Status: SHIPPED | OUTPATIENT
Start: 2022-04-25 | End: 2022-07-20 | Stop reason: SDUPTHER

## 2022-04-25 RX ADMIN — IOPAMIDOL 71 ML: 755 INJECTION, SOLUTION INTRAVENOUS at 11:17

## 2022-04-25 NOTE — PRE SEDATION
Brief Pre-Op Note/Sedation Assessment      Brooke Blount  1950  [id number]  8:08 AM    Planned Procedure: Cardiac Catheterization Procedure  Post Procedure Plan: Return to same level of care  Consent: I have discussed with the patient and/or the patient representative the indication, alternatives, and the possible risks and/or complications of the planned procedure and the anesthesia methods. The patient and/or patient representative appear to understand and agree to proceed. Chief Complaint:   NSTEMI  Dyspnea on Exertion  Dyspnea    Indications for Cath Procedure:   Presentation:  Worsening Angina and Suspected CAD   Anginal Classification within 2 weeks:  CCS III - Symptoms with everyday living activities, i.e., moderate limitation   Angina Symptoms Assessment:  Typical Chest Pain   Heart Failure Class within last 2 weeks:  Yes:  Heart Failure Type: Diastolic Severity:  Class II - Symptoms of HF on ordinary exertion   Cardiovascular Instability:  Yes    Prior Ischemic Workup/Eval:   Pre-Procedural Medications: Yes: ACE/ARB/ARNI, Aspirin, Beta Blockers, Ca Channel Blockers and Long Acting Nitrates (Any)   Stress Test Completed? No    Does Patient need surgery?  Cath Valve Surgery:  No    Pre-Procedure Medical History:   Vital Signs: There were no vitals taken for this visit.  Allergies:  Reviewed in EMR   Medications:  Reviewed in EMR   Past Medical History:  Reviewed in EMR   Surgical History:  Reviewed in EMR    Pre-Sedation:   Pre-Sedation Documentation and Exam = I have personally completed a history, physical exam & review of systems for this patient (see notes).    Prior History of Anesthesia Complications = none   Modified Mallampati = II (soft palate, uvula, fauces visible)   ASA Classification = Class 2 - A normal healthy patient with mild systemic disease    Rhonda Scale:   Activity:  2 - Able to move 4 extremities voluntarily on command   Respiration:  2 - Able to breathe deeply and cough freely   Circulation:  2 - BP+/- 20mmHg of normal   Consciousness:  2 - Fully awake   Oxygen Saturation (color):  2 - Able to maintain oxygen saturation >92% on room air    Sedation/Anesthesia Plan:  Guard the patient's safety and welfare. Minimize physical discomfort and pain. Minimize negative psychological responses to treatment by providing sedation and analgesia and maximize the potential amnesia. Patient to meet pre-procedure discharge plan.     Medication Planned:  Midazolam intravenously and fentanyl intravenously    Patient is an appropriate candidate for plan of sedation:   Yes      Electronically signed by Bridget Crespo RN on 4/25/2022 at 8:08 AM

## 2022-04-25 NOTE — OP NOTE
[address]  Procedure Note    Procedure: [facility] with grafts  Indication: CP    Procedure Details:  Consent Access Bleed R Sedat Start Stop Versed Fentanyl Contrast Fluoro EBL Comp Spec   Yes RCFA low MCSFC 1007 1111 2 75 71 5.4 <20 None None   *US Note: US guidance used to determine artery patency, size (>2mm), anatomic variations and ideal puncture location. Real-time ultrasound utilized concurrent with vascular needle entry into the artery. Image recorded. *Sedation Note: MCSFC = minimal conscious sedation for comfort. Versed and fentanyl given at my instruction and patient continuously assessed.      Findings:  Artery Findings/Result   LM 80%   LAD 99% ostial, 100% mid   Cx 70% mid OM1, 70% distal OM1   RI N/A   RCA Small, nondominant, 90% prox, multiple mid 70% lesions (unchanged)   R-OM patent   L-D-LAD Patent (*10mmHg pullback across proximal Left subclavian)   LVEDP 13   LVG NA     Intervention:   None    Post Cath Dx:   CAD as above  Progressive and obstructive LM and OM disease  Plan for staged PCI given need for risk discussion and given BUN/Cr high

## 2022-05-02 ENCOUNTER — HOSPITAL ENCOUNTER (INPATIENT)
Dept: CARDIAC CATH/INVASIVE PROCEDURES | Age: 72
LOS: 4 days | Discharge: HOME HEALTH | DRG: 378 | End: 2022-05-06
Attending: INTERNAL MEDICINE | Admitting: INTERNAL MEDICINE
Payer: MEDICARE

## 2022-05-02 DIAGNOSIS — I25.10 CAD, MULTIPLE VESSEL: Primary | ICD-10-CM

## 2022-05-02 LAB
ANION GAP SERPL CALCULATED.3IONS-SCNC: 15 MMOL/L (ref 3–16)
BACTERIA: ABNORMAL /HPF
BILIRUBIN URINE: NEGATIVE
BLOOD, URINE: NEGATIVE
BUN BLDV-MCNC: 42 MG/DL (ref 7–20)
CALCIUM SERPL-MCNC: 9.8 MG/DL (ref 8.3–10.6)
CHLORIDE BLD-SCNC: 105 MMOL/L (ref 99–110)
CLARITY: CLEAR
CO2: 19 MMOL/L (ref 21–32)
COLOR: YELLOW
CREAT SERPL-MCNC: 1 MG/DL (ref 0.6–1.2)
EKG ATRIAL RATE: 60 BPM
EKG DIAGNOSIS: NORMAL
EKG P AXIS: 73 DEGREES
EKG P-R INTERVAL: 202 MS
EKG Q-T INTERVAL: 412 MS
EKG QRS DURATION: 92 MS
EKG QTC CALCULATION (BAZETT): 412 MS
EKG R AXIS: 45 DEGREES
EKG T AXIS: 70 DEGREES
EKG VENTRICULAR RATE: 60 BPM
EPITHELIAL CELLS, UA: 8 /HPF (ref 0–5)
GFR AFRICAN AMERICAN: >60
GFR NON-AFRICAN AMERICAN: 55
GLUCOSE BLD-MCNC: 107 MG/DL (ref 70–99)
GLUCOSE BLD-MCNC: 146 MG/DL (ref 70–99)
GLUCOSE BLD-MCNC: 94 MG/DL (ref 70–99)
GLUCOSE URINE: NEGATIVE MG/DL
HCT VFR BLD CALC: 25.9 % (ref 36–48)
HCT VFR BLD CALC: 29 % (ref 36–48)
HEMOGLOBIN: 8.3 G/DL (ref 12–16)
HEMOGLOBIN: 8.9 G/DL (ref 12–16)
HYALINE CASTS: 0 /LPF (ref 0–8)
KETONES, URINE: NEGATIVE MG/DL
LEUKOCYTE ESTERASE, URINE: ABNORMAL
MCH RBC QN AUTO: 25.3 PG (ref 26–34)
MCHC RBC AUTO-ENTMCNC: 32.1 G/DL (ref 31–36)
MCV RBC AUTO: 79.1 FL (ref 80–100)
MICROSCOPIC EXAMINATION: YES
NITRITE, URINE: NEGATIVE
PDW BLD-RTO: 19.6 % (ref 12.4–15.4)
PERFORMED ON: ABNORMAL
PERFORMED ON: NORMAL
PH UA: 5 (ref 5–8)
PLATELET # BLD: 289 K/UL (ref 135–450)
PMV BLD AUTO: 8.2 FL (ref 5–10.5)
POTASSIUM SERPL-SCNC: 4.4 MMOL/L (ref 3.5–5.1)
PROTEIN UA: ABNORMAL MG/DL
RBC # BLD: 3.28 M/UL (ref 4–5.2)
RBC UA: 0 /HPF (ref 0–4)
SODIUM BLD-SCNC: 139 MMOL/L (ref 136–145)
SPECIFIC GRAVITY UA: 1.01 (ref 1–1.03)
URINE TYPE: ABNORMAL
UROBILINOGEN, URINE: 0.2 E.U./DL
WBC # BLD: 6.9 K/UL (ref 4–11)
WBC UA: 31 /HPF (ref 0–5)

## 2022-05-02 PROCEDURE — 2580000003 HC RX 258: Performed by: INTERNAL MEDICINE

## 2022-05-02 PROCEDURE — 80048 BASIC METABOLIC PNL TOTAL CA: CPT

## 2022-05-02 PROCEDURE — 93005 ELECTROCARDIOGRAM TRACING: CPT | Performed by: INTERNAL MEDICINE

## 2022-05-02 PROCEDURE — 81001 URINALYSIS AUTO W/SCOPE: CPT

## 2022-05-02 PROCEDURE — C9113 INJ PANTOPRAZOLE SODIUM, VIA: HCPCS | Performed by: INTERNAL MEDICINE

## 2022-05-02 PROCEDURE — 99212 OFFICE O/P EST SF 10 MIN: CPT

## 2022-05-02 PROCEDURE — 6360000002 HC RX W HCPCS: Performed by: INTERNAL MEDICINE

## 2022-05-02 PROCEDURE — 83036 HEMOGLOBIN GLYCOSYLATED A1C: CPT

## 2022-05-02 PROCEDURE — 6370000000 HC RX 637 (ALT 250 FOR IP): Performed by: INTERNAL MEDICINE

## 2022-05-02 PROCEDURE — 6370000000 HC RX 637 (ALT 250 FOR IP): Performed by: NURSE PRACTITIONER

## 2022-05-02 PROCEDURE — 85027 COMPLETE CBC AUTOMATED: CPT

## 2022-05-02 PROCEDURE — 1200000000 HC SEMI PRIVATE

## 2022-05-02 PROCEDURE — 36415 COLL VENOUS BLD VENIPUNCTURE: CPT

## 2022-05-02 PROCEDURE — 99223 1ST HOSP IP/OBS HIGH 75: CPT | Performed by: INTERNAL MEDICINE

## 2022-05-02 PROCEDURE — 93010 ELECTROCARDIOGRAM REPORT: CPT | Performed by: INTERNAL MEDICINE

## 2022-05-02 PROCEDURE — 85018 HEMOGLOBIN: CPT

## 2022-05-02 PROCEDURE — 85014 HEMATOCRIT: CPT

## 2022-05-02 RX ORDER — ISOSORBIDE MONONITRATE 30 MG/1
30 TABLET, EXTENDED RELEASE ORAL DAILY
Status: DISCONTINUED | OUTPATIENT
Start: 2022-05-02 | End: 2022-05-05

## 2022-05-02 RX ORDER — ONDANSETRON 2 MG/ML
4 INJECTION INTRAMUSCULAR; INTRAVENOUS EVERY 6 HOURS PRN
Status: DISCONTINUED | OUTPATIENT
Start: 2022-05-02 | End: 2022-05-06 | Stop reason: HOSPADM

## 2022-05-02 RX ORDER — ESCITALOPRAM OXALATE 10 MG/1
20 TABLET ORAL DAILY
Status: DISCONTINUED | OUTPATIENT
Start: 2022-05-02 | End: 2022-05-06 | Stop reason: HOSPADM

## 2022-05-02 RX ORDER — SODIUM CHLORIDE 0.9 % (FLUSH) 0.9 %
5-40 SYRINGE (ML) INJECTION PRN
Status: DISCONTINUED | OUTPATIENT
Start: 2022-05-02 | End: 2022-05-06 | Stop reason: HOSPADM

## 2022-05-02 RX ORDER — ROSUVASTATIN CALCIUM 20 MG/1
20 TABLET, COATED ORAL EVERY EVENING
Status: DISCONTINUED | OUTPATIENT
Start: 2022-05-02 | End: 2022-05-06 | Stop reason: HOSPADM

## 2022-05-02 RX ORDER — VITAMIN B COMPLEX
1000 TABLET ORAL DAILY
Status: DISCONTINUED | OUTPATIENT
Start: 2022-05-02 | End: 2022-05-06 | Stop reason: HOSPADM

## 2022-05-02 RX ORDER — NICOTINE POLACRILEX 4 MG
15 LOZENGE BUCCAL PRN
Status: DISCONTINUED | OUTPATIENT
Start: 2022-05-02 | End: 2022-05-06 | Stop reason: HOSPADM

## 2022-05-02 RX ORDER — CLOPIDOGREL BISULFATE 75 MG/1
75 TABLET ORAL DAILY
Status: DISCONTINUED | OUTPATIENT
Start: 2022-05-02 | End: 2022-05-06 | Stop reason: HOSPADM

## 2022-05-02 RX ORDER — LANOLIN ALCOHOL/MO/W.PET/CERES
3000 CREAM (GRAM) TOPICAL DAILY
Status: DISCONTINUED | OUTPATIENT
Start: 2022-05-02 | End: 2022-05-06 | Stop reason: HOSPADM

## 2022-05-02 RX ORDER — DEXTROSE MONOHYDRATE 100 MG/ML
12.5 INJECTION, SOLUTION INTRAVENOUS PRN
Status: DISCONTINUED | OUTPATIENT
Start: 2022-05-02 | End: 2022-05-06 | Stop reason: HOSPADM

## 2022-05-02 RX ORDER — EZETIMIBE 10 MG/1
5 TABLET ORAL NIGHTLY
Status: DISCONTINUED | OUTPATIENT
Start: 2022-05-02 | End: 2022-05-02 | Stop reason: RX

## 2022-05-02 RX ORDER — SPIRONOLACTONE 25 MG/1
25 TABLET ORAL
Status: DISCONTINUED | OUTPATIENT
Start: 2022-05-03 | End: 2022-05-02

## 2022-05-02 RX ORDER — PANTOPRAZOLE SODIUM 40 MG/10ML
40 INJECTION, POWDER, LYOPHILIZED, FOR SOLUTION INTRAVENOUS 2 TIMES DAILY
Status: DISCONTINUED | OUTPATIENT
Start: 2022-05-02 | End: 2022-05-06 | Stop reason: HOSPADM

## 2022-05-02 RX ORDER — ASCORBIC ACID 500 MG
1000 TABLET ORAL DAILY
Status: DISCONTINUED | OUTPATIENT
Start: 2022-05-02 | End: 2022-05-06 | Stop reason: HOSPADM

## 2022-05-02 RX ORDER — AMLODIPINE BESYLATE 5 MG/1
2.5 TABLET ORAL 2 TIMES DAILY
Status: DISCONTINUED | OUTPATIENT
Start: 2022-05-02 | End: 2022-05-06 | Stop reason: HOSPADM

## 2022-05-02 RX ORDER — DEXTROSE MONOHYDRATE 50 MG/ML
100 INJECTION, SOLUTION INTRAVENOUS PRN
Status: DISCONTINUED | OUTPATIENT
Start: 2022-05-02 | End: 2022-05-06 | Stop reason: HOSPADM

## 2022-05-02 RX ORDER — ACETAMINOPHEN 650 MG/1
650 SUPPOSITORY RECTAL EVERY 6 HOURS PRN
Status: DISCONTINUED | OUTPATIENT
Start: 2022-05-02 | End: 2022-05-06 | Stop reason: HOSPADM

## 2022-05-02 RX ORDER — GABAPENTIN 400 MG/1
400 CAPSULE ORAL 3 TIMES DAILY
Status: DISCONTINUED | OUTPATIENT
Start: 2022-05-02 | End: 2022-05-06 | Stop reason: HOSPADM

## 2022-05-02 RX ORDER — ACETAMINOPHEN 325 MG/1
650 TABLET ORAL EVERY 6 HOURS PRN
Status: DISCONTINUED | OUTPATIENT
Start: 2022-05-02 | End: 2022-05-06 | Stop reason: HOSPADM

## 2022-05-02 RX ORDER — PEG-3350, SODIUM SULFATE, SODIUM CHLORIDE, POTASSIUM CHLORIDE, SODIUM ASCORBATE AND ASCORBIC ACID 7.5-2.691G
100 KIT ORAL ONCE
Status: COMPLETED | OUTPATIENT
Start: 2022-05-02 | End: 2022-05-02

## 2022-05-02 RX ORDER — FUROSEMIDE 40 MG/1
40 TABLET ORAL DAILY
Status: DISCONTINUED | OUTPATIENT
Start: 2022-05-02 | End: 2022-05-02

## 2022-05-02 RX ORDER — POLYETHYLENE GLYCOL 3350 17 G/17G
17 POWDER, FOR SOLUTION ORAL DAILY PRN
Status: DISCONTINUED | OUTPATIENT
Start: 2022-05-02 | End: 2022-05-06 | Stop reason: HOSPADM

## 2022-05-02 RX ORDER — SUMATRIPTAN 25 MG/1
100 TABLET, FILM COATED ORAL DAILY PRN
Status: DISCONTINUED | OUTPATIENT
Start: 2022-05-02 | End: 2022-05-06 | Stop reason: HOSPADM

## 2022-05-02 RX ORDER — LOSARTAN POTASSIUM AND HYDROCHLOROTHIAZIDE 25; 100 MG/1; MG/1
1 TABLET ORAL DAILY
Status: DISCONTINUED | OUTPATIENT
Start: 2022-05-02 | End: 2022-05-02

## 2022-05-02 RX ORDER — SODIUM CHLORIDE 9 MG/ML
INJECTION, SOLUTION INTRAVENOUS PRN
Status: DISCONTINUED | OUTPATIENT
Start: 2022-05-02 | End: 2022-05-06 | Stop reason: HOSPADM

## 2022-05-02 RX ORDER — SODIUM CHLORIDE 0.9 % (FLUSH) 0.9 %
5-40 SYRINGE (ML) INJECTION EVERY 12 HOURS SCHEDULED
Status: DISCONTINUED | OUTPATIENT
Start: 2022-05-02 | End: 2022-05-06 | Stop reason: HOSPADM

## 2022-05-02 RX ORDER — ASPIRIN 81 MG/1
81 TABLET ORAL DAILY
Status: DISCONTINUED | OUTPATIENT
Start: 2022-05-02 | End: 2022-05-06 | Stop reason: HOSPADM

## 2022-05-02 RX ORDER — ZINC SULFATE 50(220)MG
50 CAPSULE ORAL 4 TIMES DAILY
Status: DISCONTINUED | OUTPATIENT
Start: 2022-05-02 | End: 2022-05-06 | Stop reason: HOSPADM

## 2022-05-02 RX ORDER — NITROGLYCERIN 0.4 MG/1
0.4 TABLET SUBLINGUAL EVERY 5 MIN PRN
Status: DISCONTINUED | OUTPATIENT
Start: 2022-05-02 | End: 2022-05-06 | Stop reason: HOSPADM

## 2022-05-02 RX ORDER — TIZANIDINE 4 MG/1
4 TABLET ORAL NIGHTLY PRN
Status: DISCONTINUED | OUTPATIENT
Start: 2022-05-02 | End: 2022-05-06 | Stop reason: HOSPADM

## 2022-05-02 RX ORDER — ONDANSETRON 4 MG/1
4 TABLET, ORALLY DISINTEGRATING ORAL EVERY 8 HOURS PRN
Status: DISCONTINUED | OUTPATIENT
Start: 2022-05-02 | End: 2022-05-06 | Stop reason: HOSPADM

## 2022-05-02 RX ORDER — INSULIN LISPRO 100 [IU]/ML
0-6 INJECTION, SOLUTION INTRAVENOUS; SUBCUTANEOUS NIGHTLY
Status: DISCONTINUED | OUTPATIENT
Start: 2022-05-02 | End: 2022-05-06 | Stop reason: HOSPADM

## 2022-05-02 RX ORDER — INSULIN LISPRO 100 [IU]/ML
0-12 INJECTION, SOLUTION INTRAVENOUS; SUBCUTANEOUS
Status: DISCONTINUED | OUTPATIENT
Start: 2022-05-02 | End: 2022-05-06 | Stop reason: HOSPADM

## 2022-05-02 RX ORDER — PIOGLITAZONEHYDROCHLORIDE 15 MG/1
15 TABLET ORAL DAILY
Status: DISCONTINUED | OUTPATIENT
Start: 2022-05-02 | End: 2022-05-06 | Stop reason: HOSPADM

## 2022-05-02 RX ORDER — CETIRIZINE HYDROCHLORIDE 10 MG/1
10 TABLET ORAL DAILY
Status: DISCONTINUED | OUTPATIENT
Start: 2022-05-02 | End: 2022-05-06 | Stop reason: HOSPADM

## 2022-05-02 RX ADMIN — Medication 10 ML: at 21:49

## 2022-05-02 RX ADMIN — GABAPENTIN 400 MG: 400 CAPSULE ORAL at 20:33

## 2022-05-02 RX ADMIN — AMLODIPINE BESYLATE 2.5 MG: 5 TABLET ORAL at 20:33

## 2022-05-02 RX ADMIN — AMLODIPINE BESYLATE 2.5 MG: 5 TABLET ORAL at 13:54

## 2022-05-02 RX ADMIN — CETIRIZINE HYDROCHLORIDE 10 MG: 10 TABLET, FILM COATED ORAL at 13:54

## 2022-05-02 RX ADMIN — METOPROLOL TARTRATE 12.5 MG: 25 TABLET, FILM COATED ORAL at 13:53

## 2022-05-02 RX ADMIN — POLYETHYLENE GLYCOL 3350, SODIUM SULFATE, SODIUM CHLORIDE, POTASSIUM CHLORIDE, ASCORBIC ACID, SODIUM ASCORBATE 100 G: KIT at 16:36

## 2022-05-02 RX ADMIN — OXYCODONE HYDROCHLORIDE AND ACETAMINOPHEN 1000 MG: 500 TABLET ORAL at 13:56

## 2022-05-02 RX ADMIN — CYANOCOBALAMIN TAB 1000 MCG 3000 MCG: 1000 TAB at 16:35

## 2022-05-02 RX ADMIN — METOPROLOL TARTRATE 12.5 MG: 25 TABLET, FILM COATED ORAL at 20:32

## 2022-05-02 RX ADMIN — ZINC SULFATE 220 MG (50 MG) CAPSULE 50 MG: CAPSULE at 16:37

## 2022-05-02 RX ADMIN — ESCITALOPRAM OXALATE 20 MG: 10 TABLET ORAL at 13:53

## 2022-05-02 RX ADMIN — ZINC SULFATE 220 MG (50 MG) CAPSULE 50 MG: CAPSULE at 21:13

## 2022-05-02 RX ADMIN — ROSUVASTATIN CALCIUM 20 MG: 20 TABLET, FILM COATED ORAL at 16:35

## 2022-05-02 RX ADMIN — PANTOPRAZOLE SODIUM 40 MG: 40 INJECTION, POWDER, FOR SOLUTION INTRAVENOUS at 20:33

## 2022-05-02 RX ADMIN — POTASSIUM BICARBONATE 10 MEQ: 782 TABLET, EFFERVESCENT ORAL at 13:55

## 2022-05-02 RX ADMIN — PIOGLITAZONE 15 MG: 15 TABLET ORAL at 13:54

## 2022-05-02 RX ADMIN — POLYETHYLENE GLYCOL 3350, SODIUM SULFATE, SODIUM CHLORIDE, POTASSIUM CHLORIDE, ASCORBIC ACID, SODIUM ASCORBATE 100 G: KIT at 20:33

## 2022-05-02 RX ADMIN — NITROGLYCERIN 0.4 MG: 0.4 TABLET, ORALLY DISINTEGRATING SUBLINGUAL at 13:02

## 2022-05-02 RX ADMIN — Medication 1000 UNITS: at 13:53

## 2022-05-02 RX ADMIN — GABAPENTIN 400 MG: 400 CAPSULE ORAL at 13:56

## 2022-05-02 ASSESSMENT — PAIN SCALES - GENERAL
PAINLEVEL_OUTOF10: 0
PAINLEVEL_OUTOF10: 4

## 2022-05-02 ASSESSMENT — PAIN DESCRIPTION - LOCATION: LOCATION: CHEST

## 2022-05-02 NOTE — PROGRESS NOTES
05/02/22 1315   RT Protocol   History Pulmonary Disease 0   Respiratory pattern 0   Breath sounds 2   Cough 0   Bronchodilator Assessment Score 2

## 2022-05-02 NOTE — ACP (ADVANCE CARE PLANNING)
Advanced Care Planning Note. Purpose of Encounter: Advanced care planning in light of CAD  Parties In Attendance: Patient,   Decisional Capacity: Yes  Subjective: Patient with CP, AMAYA and melena  Objective: Cr 1.0  Goals of Care Determination: Patient wants full support (CPR, vent, surgery, HD, trach, PEG)  Plan:  GI, Onc and Cardio consults. PT/OT eval  Code Status: Full code   Time spent on Advanced care Plannin minutes  Advanced Care Planning Documents: Completed advanced directives on chart,  is the POA.     Sandie Hendricks MD  2022 11:09 AM

## 2022-05-02 NOTE — CONSULTS
Gastroenterology Consult Note        Patient: Naomie Rios  : 1950  Acct#:      Date:  2022    Subjective:       History of Present Illness  Patient is a 80 y.o.  female admitted with Atherosclerotic heart disease of native coronary artery with unspecified angina pectoris [I25.119]  CAD, multiple vessel [I25.10] who is seen in consult for Anemia. h/o CAD, DM 2, CKD 3, pAfib, morbid obesity, RAHUL, HTN who came to outpatient cath lab for CAD intervention with Dr Ollie Robbins. Patient is getting outpatient IV iron by Dr Crista Ordaz. Admitted with CP and AMAYA, she had routine labs done per her cardiologist in preparation for cardiac cath.  hgb was noted to be low at 8.3. And also c/o melena. She is on IV but not PO iron. She started plavix last week per her cardiologist.     Currently sitting up in chair, has mild LLQ abdominal cramping. No nausea or vomiting. Past Medical History:   Diagnosis Date    Anemia     Anesthesia     trouble after egd 10/2016. Anxiety and severe tremors after AF ablation 2018-responded well to Ativan    Anesthesia complication     flailing, yelling when waking up from anesthesia-ativan helps ([id number]/19: ativans works within seconds of administration)    Atrial fibrillation ([address] 10/01/2017    A. fib with SVR    CAD (coronary artery disease)     Chest pain 10/01/2017    Chronic kidney disease     ? ??    Depression     GERD (gastroesophageal reflux disease)     Glaucoma     Hiatal hernia     Hyperlipidemia     Hypertension     Migraines, neuralgic     Morbid obesity (HCC)     Osteoarthritis     Sleep apnea     uses bi-pap    Type II or unspecified type diabetes mellitus without mention of complication, not stated as uncontrolled     Unspecified sleep apnea     uses cpap    Urinary incontinence     UTI (urinary tract infection)       Past Surgical History:   Procedure Laterality Date    ABDOMEN SURGERY N/A 2020    EXPLORATION OF ABDOMINAL WOUND performed by Dori Steele MD at [id number] [facility] SURGERY  2/22/1999    quadruple by-pass, [address] COLONOSCOPY  10/08/2018    1 polyp removed    COLONOSCOPY N/A 10/8/2018    COLONOSCOPY POLYPECTOMY SNARE/COLD BIOPSY performed by Alejandrina Casanova MD at [facility]    COSMETIC SURGERY      face lift    ENTEROSCOPY N/A 2/26/2019    ENTEROSCOPY performed by Alejandrina Casanova MD at [address] GASTRIC BAND  12/20/11    hiatal hernia repair    GASTRIC BAND REMOVAL  11/03/2016    laparoscopic     HAMMER TOE SURGERY      [facility] INJECTION PROCEDURE FOR SACROILIAC JOINT Left 12/17/2018    SACROILIAC JOINT INJECTION ON THE LEFT WITH FLUOROSCOPY performed by Rustam Franco MD at [address]    both   [address]    Left and Right knees, [facility]    KNEE ARTHROSCOPY      1982 left    ORIF HUMERUS DECOMPRESSION Left 2/25/2016    OPEN REDUCTION INTERNAL FIXATION LEFT PROXIMAL HUMERUS    OVARY REMOVAL Bilateral 1997    PACEMAKER PLACEMENT  05/2019    RI NJX DX/THER SBST INTRLMNR CRV/THRC W/IMG GDN N/A 11/5/2018    MIDLINE L4/L5 LUMBAR INTERLAMINAR EPIDURAL STEROID INJECTION WITH FLUOROSCOPY performed by Rustam Franco MD at [id number] [facility] ENDOSCOPY  10/30/15    UPPER GASTROINTESTINAL ENDOSCOPY  10/14/2016    with biopsy    UPPER GASTROINTESTINAL ENDOSCOPY N/A 3/22/2022    EGD BIOPSY performed by Alejandrina Casanova MD at [facility] N/A 3/22/2022    EGD DILATION BALLOON performed by Alejandrina Casanova MD at [place] [address] N/A 10/29/2019    OPEN VENTRAL HERNIA REPAIR WITH  MESH performed by Dori Steele MD at [facility] Endoscopic History  EGD 3/22/22  Findings:   Normal esophageal mucosa, biopsied. Empirically balloon dilated up to 20 mm. Patchy antral gastritis, biopsied.      Plans:  Await biopsies. Continue Dexilant. Follow clinical response to endoscopic dilation. Capsule endoscopy 3/2019 normal     Colonoscopy 10/2018  Diminutive ascending colon polyp, removed with cold biopsy forceps  Path - TA  Repeat colonoscopy in 5 years. Admission Meds  No current facility-administered medications on file prior to encounter.      Current Outpatient Medications on File Prior to Encounter   Medication Sig Dispense Refill    clopidogrel (PLAVIX) 75 MG tablet Take 1 tablet by mouth daily 30 tablet 5    isosorbide mononitrate (ISMO;MONOKET) 10 MG tablet Take 1 tablet by mouth 2 times daily 60 tablet 5    metoprolol tartrate (LOPRESSOR) 25 MG tablet Take 0.5 tablets by mouth 2 times daily 30 tablet 5    fexofenadine (ALLEGRA) 180 MG tablet Take 180 mg by mouth daily      Epoetin Leonard-epbx (RETACRIT IJ) Inject as directed Every friday      losartan-hydroCHLOROthiazide (HYZAAR) 100-25 MG per tablet Take 1 tablet by mouth daily      ipratropium (ATROVENT HFA) 17 MCG/ACT inhaler Inhale 2 puffs into the lungs three times daily 1 each 3    SUMAtriptan (IMITREX) 100 MG tablet Take 1 tablet by mouth daily as needed for Migraine 30 tablet 3    tiZANidine (ZANAFLEX) 4 MG tablet Take 1 tablet by mouth nightly as needed (leg spasms) 20 tablet 0    zinc sulfate (ZINCATE) 220 (50 Zn) MG capsule Take 50 mg by mouth 4 times daily       potassium chloride (KLOR-CON M) 20 MEQ extended release tablet Take 1 tablet by mouth daily (Patient taking differently: Take 10 mEq by mouth daily ) 90 tablet 3    amLODIPine (NORVASC) 5 MG tablet Take 1 tablet by mouth every evening (Patient taking differently: Take 2.5 mg by mouth 2 times daily ) 90 tablet 3    aspirin 81 MG tablet Take 81 mg by mouth daily      furosemide (LASIX) 40 MG tablet Take 1 tablet by mouth daily 30 tablet 5    Ascorbic Acid (VITAMIN C) 1000 MG tablet Take 1,000 mg by mouth daily      Cyanocobalamin (VITAMIN B-12 PO) Take 3,000 mcg by mouth daily      estradiol (ESTRACE) 0.1 MG/GM vaginal cream Place 2 g vaginally See Admin Instructions Twice a week      Cranberry 450 MG CAPS Take by mouth daily       pioglitazone (ACTOS) 15 MG tablet Take 15 mg by mouth daily      vitamin D (CHOLECALCIFEROL) 1000 UNIT TABS tablet Take 1,000 Units by mouth daily       spironolactone (ALDACTONE) 25 MG tablet Take 25 mg by mouth every morning (before breakfast)       dexlansoprazole (DEXILANT) 60 MG CPDR delayed release capsule Take 60 mg by mouth daily      gabapentin (NEURONTIN) 100 MG capsule Take 400 mg by mouth 3 times daily. [place] [facility] TraZODone HCl (DESYREL PO) Take 50 mg by mouth nightly       Calcium Citrate-Vitamin D (CALCIUM CITRATE + D PO) Take 1 tablet by mouth daily       SLOW-MAG 71.5-119 MG TBEC tablet Take 1 tablet by mouth daily 143 mg  5    rosuvastatin (CRESTOR) 40 MG tablet Take 20 mg by mouth every evening       metFORMIN (GLUCOPHAGE) 500 MG tablet Take 500 mg by mouth 2 times daily (with meals)       ezetimibe (ZETIA) 10 MG tablet Take 5 mg by mouth nightly       escitalopram (LEXAPRO) 20 MG tablet Take 20 mg by mouth daily.  nitroGLYCERIN (NITROLINGUAL) 0.4 MG/SPRAY spray Place 1 spray under the tongue every 5 minutes as needed. As directed for chest pain          Allergies  Allergies   Allergen Reactions    Albuterol Other (See Comments)     Other reaction(s): Chest Pain  Crushing chest pain    Heparin Other (See Comments)     Caused bruises and hematomas immediately when drip started.   MARKED BRUISING/HEMATOMAS    Niacin     Avelox [Moxifloxacin Hcl In Nacl] Rash    Moxifloxacin Rash    Niaspan [Niacin Er] Itching and Rash    Proventil [Albuterol Sulfate] Palpitations    Tagamet [Cimetidine] Rash      Social   Social History     Tobacco Use    Smoking status: Never Smoker    Smokeless tobacco: Never Used   Substance Use Topics    Alcohol use: No     Alcohol/week: 0.0 standard drinks        Family History   Problem Relation Age of Onset    Cancer Mother         ovarian, colon    High Blood Pressure Mother     Heart Disease Father     High Blood Pressure Father     Stroke Sister         paralysed on right side B/C of stroke        Review of Systems  Constitutional: negative for fevers, chills, sweats    Ears, nose, mouth, throat, and face: negative for nasal congestion and sore throat   Respiratory: negative for cough and shortness of breath   Cardiovascular: negative for chest pain and dyspnea   Gastrointestinal: see hpi   Genitourinary:negative for dysuria and frequency   Integument/breast: negative for pruritus and rash   Hematologic/lymphatic: negative for bleeding and easy bruising   Musculoskeletal:negative for arthralgias and myalgias   Neurological: negative for dizziness and weakness         Physical Exam  Blood pressure (!) 121/53, pulse 60, resp. rate 16, height 5' (1.524 m), weight 208 lb (94.3 kg), SpO2 98 %, not currently breastfeeding. General appearance: alert, cooperative, no distress, appears stated age  Eyes: Anicteric  Head: Normocephalic, without obvious abnormality  Lungs: clear to auscultation bilaterally, Normal Effort  Heart: regular rate and rhythm, normal S1 and S2, no murmurs or rubs  Abdomen: soft, non-tender. Bowel sounds normal. No masses,  no organomegaly. Extremities: atraumatic, no cyanosis or edema  Skin: warm and dry, no jaundice  Neuro: Grossly intact, A&OX3  Musculoskeletal: 5/5  strength BUE      Data Review:    Recent Labs     05/02/22  0912   WBC 6.9   HGB 8.3*   HCT 25.9*   MCV 79.1*        Recent Labs     05/02/22  0912      K 4.4      CO2 19*   BUN 42*   CREATININE 1.0     No results for input(s): AST, ALT, ALB, BILIDIR, BILITOT, ALKPHOS in the last 72 hours.   No results for input(s): LIPASE, AMYLASE in the last 72 hours. No results for input(s): PROTIME, INR in the last 72 hours. No results for input(s): PTT in the last 72 hours. No results for input(s): OCCULTBLD in the last 72 hours. Imaging Studies:                               Assessment:     Principal Problem:    Coronary artery disease involving coronary bypass graft of native heart without angina pectoris  Active Problems:    CAD, multiple vessel    Coronary artery disease involving native coronary artery of native heart with angina pectoris (HCC)    Essential hypertension    RAHUL (obstructive sleep apnea)    Diabetes mellitus type 2 in obese (HCC)    Paroxysmal atrial fibrillation ([facility]    Anemia    Pacemaker    Melena  Resolved Problems:    * No resolved hospital problems. *    Anemia: With dark stools. Recent egd as above. hgb 8.3. Mild LLQ pain, no nausea or vomiting. CAD: s/p LHC 22. S/P CABG. On ASA and Plavix (started last week). Plan for cardiac cath in the future. H/O MALCOLM: baseline creat 0.8,   MDS:     Recommendations:   - IV PPI BID  - Serial H&H  - Transfuse pRBC to keep hgb >8  - NPO after MN  - Plan Push Enteroscopy and colonoscopy tomorrow with Dr. Anne Nelson 22    Discussed with Dr. Nithya El, [address] [place] [address]    I have personally performed a face to face diagnostic evaluation on this patient. I have interviewed and examined the patient and I agree with the findings and recommended plan of care. In summary, my findings and plan are the followin-year-old  female known to GI service for chronic iron deficiency anemia from obscure GI bleed. Patient had multiple endoscopies as well as capsule endoscopy in the past.  She is known to have coronary artery disease on baby aspirin. She also has diabetes, CKD, paroxysmal A. fib, obstructive sleep apnea and hypertension. Recent left heart cath showed obstructive CAD and was started on Plavix. About a week ago, patient noted darker stools.   She was supposed to have a left heart cath with stent placement today but her hemoglobin was noted to have dropped by 2 g. Vital signs are stable. Abdomen is soft nontender no rebound  Impression and plan: Acute on chronic anemia with melena. Patient likely has recurrent GI bleed. We will start her evaluation with a single balloon enteroscopy and a colonoscopy tomorrow. If negative, will plan for outpatient capsule endoscopy. In the meantime, monitor H&H, transfuse to keep hemoglobin above 8. IV PPI BID.      Christina Teixeira MD, MSc  [facility]  05/02/22

## 2022-05-02 NOTE — CARE COORDINATION
Discharge Planning Assessment  RN discharge planner met with patient/ (and family member) to discuss reason for admission, current living situation, and potential needs at the time of discharge    Demographics/Insurance verified Yes    Current type of dwellin level home with ramp acces front and rear    Patient from ECF/SW confirmed with: n/a    Living arrangements:with spouse    Level of function/Support: independent    PCP: MICHELLE Ledbetter    Last Visit to PCP: 3/14/2022    DME: chair lift, cane    Active with any community resources/agencies/skilled home care:no skilled or non skilled services    Medication compliance issues: uses [facility] [address] for short term prescriptions and mail order through [employer] for long term medications    Financial issues that could impact healthcare:not noted      Tentative discharge plan: home    Discussed and provided facilities of choice if transition to a skilled nursing facility is required at the time of discharge      Discussed with patient and/or family that on the day of discharge home tentative time of discharge will be between 10 AM and noon.     Transportation at the time of discharge:     Elisha Aranda, BSN, CCM, RN  [facility]  [phone]

## 2022-05-02 NOTE — RT PROTOCOL NOTE
RT Inhaler-Nebulizer Bronchodilator Protocol Note    There is a bronchodilator order in the chart from a provider indicating to follow the RT Bronchodilator Protocol and there is an Initiate RT Inhaler-Nebulizer Bronchodilator Protocol order as well (see protocol at bottom of note). CXR Findings:  No results found. The findings from the last RT Protocol Assessment were as follows:   History Pulmonary Disease: None or smoker <15 pack years  Respiratory Pattern: Regular pattern and RR 12-20 bpm  Breath Sounds: Slightly diminished and/or crackles  Cough: Strong, spontaneous, non-productive  Indication for Bronchodilator Therapy:    Bronchodilator Assessment Score: 2    Aerosolized bronchodilator medication orders have been revised according to the RT Inhaler-Nebulizer Bronchodilator Protocol below. Respiratory Therapist to perform RT Therapy Protocol Assessment initially then follow the protocol. Repeat RT Therapy Protocol Assessment PRN for score 0-3 or on second treatment, BID, and PRN for scores above 3. No Indications - adjust the frequency to every 6 hours PRN wheezing or bronchospasm, if no treatments needed after 48 hours then discontinue using Per Protocol order mode. If indication present, adjust the RT bronchodilator orders based on the Bronchodilator Assessment Score as indicated below. Use Inhaler orders unless patient has one or more of the following: on home nebulizer, not able to hold breath for 10 seconds, is not alert and oriented, cannot activate and use MDI correctly, or respiratory rate 25 breaths per minute or more, then use the equivalent nebulizer order(s) with same Frequency and PRN reasons based on the score. If a patient is on this medication at home then do not decrease Frequency below that used at home.     0-3 - enter or revise RT bronchodilator order(s) to equivalent RT Bronchodilator order with Frequency of every 4 hours PRN for wheezing or increased work of breathing using Per Protocol order mode. 4-6 - enter or revise RT Bronchodilator order(s) to two equivalent RT bronchodilator orders with one order with BID Frequency and one order with Frequency of every 4 hours PRN wheezing or increased work of breathing using Per Protocol order mode. 7-10 - enter or revise RT Bronchodilator order(s) to two equivalent RT bronchodilator orders with one order with TID Frequency and one order with Frequency of every 4 hours PRN wheezing or increased work of breathing using Per Protocol order mode. 11-13 - enter or revise RT Bronchodilator order(s) to one equivalent RT bronchodilator order with QID Frequency and an Albuterol order with Frequency of every 4 hours PRN wheezing or increased work of breathing using Per Protocol order mode. Greater than 13 - enter or revise RT Bronchodilator order(s) to one equivalent RT bronchodilator order with every 4 hours Frequency and an Albuterol order with Frequency of every 2 hours PRN wheezing or increased work of breathing using Per Protocol order mode. RT to enter RT Home Evaluation for COPD & MDI Assessment order using Per Protocol order mode.     Electronically signed by Shital Mcclellan on 5/2/2022 at 1:15 PM

## 2022-05-02 NOTE — H&P
HOSPITALISTS HISTORY AND PHYSICAL    5/2/2022 11:00 AM    Patient Information:  Jerrod Talley is a 80 y.o. female [id number]  PCP:  Sushil Eugene DO (Tel: [phone] )    Chief complaint:  Chest pain    History of Present Illness:  Charisse Jones is a 80 y.o. female with h/o CAD, DM 2, CKD 3, pAfib, morbid obesity, RAHUL, HTN who came to outpatient cath lab for CAD intervention with Dr Lori Mina. Patient is getting outpatient IV iron by Dr Lina Ugarte and has had melena. Had recent EGD by Dr Chely Smith in March. Has CP with AMAYA. Describes pressure going into neck and jaw. No fevers, chills or NS. Frequently constipated. Has not had recent colonoscopy. No hematochezia. No peripheral edema or orthopnea. Hg around 8 and BUN in 50s. Concern for possible bleeding. Otherwise complete ROS is negative unless listed above. REVIEW OF SYSTEMS:   Pertinent positives as noted in HPI. All other systems were reviewed and are negative. Past Medical History:   has a past medical history of Anemia, Anesthesia, Anesthesia complication, Atrial fibrillation ([address] CAD (coronary artery disease), Chest pain, Chronic kidney disease, Depression, GERD (gastroesophageal reflux disease), Glaucoma, Hiatal hernia, Hyperlipidemia, Hypertension, Migraines, neuralgic, Morbid obesity ([address] Osteoarthritis, Sleep apnea, Type II or unspecified type diabetes mellitus without mention of complication, not stated as uncontrolled, Unspecified sleep apnea, Urinary incontinence, and UTI (urinary tract infection). Past Surgical History:   has a past surgical history that includes Coronary artery bypass graft (1999); Hammer toe surgery; Ovary removal (Bilateral, 1997); Esophagus dilation; Breast lumpectomy; Knee arthroscopy; Gastric Band (12/20/11); Colonoscopy; Upper gastrointestinal endoscopy;  Upper gastrointestinal endoscopy (10/30/15); Cosmetic surgery; joint replacement (1995); joint replacement (1995); orif humerus decompression (Left, 2/25/2016); Upper gastrointestinal endoscopy (10/14/2016); Bariatric Surgery (11/03/2016); ablation of dysrhythmic focus; Colonoscopy (10/08/2018); Colonoscopy (N/A, 10/8/2018); pr njx dx/ther sbst intrlmnr crv/thrc w/img gdn (N/A, 11/5/2018); Cardiac surgery (2/22/1999); Cardiac catheterization; Injection Procedure For Sacroiliac Joint (Left, 12/17/2018); Enteroscopy (N/A, 2/26/2019); ventral hernia repair (N/A, 10/29/2019); Abdomen surgery (N/A, 9/1/2020); pacemaker placement (05/2019); Upper gastrointestinal endoscopy (N/A, 3/22/2022); and Upper gastrointestinal endoscopy (N/A, 3/22/2022). Medications:  No current facility-administered medications on file prior to encounter.      Current Outpatient Medications on File Prior to Encounter   Medication Sig Dispense Refill    clopidogrel (PLAVIX) 75 MG tablet Take 1 tablet by mouth daily 30 tablet 5    isosorbide mononitrate (ISMO;MONOKET) 10 MG tablet Take 1 tablet by mouth 2 times daily 60 tablet 5    metoprolol tartrate (LOPRESSOR) 25 MG tablet Take 0.5 tablets by mouth 2 times daily 30 tablet 5    fexofenadine (ALLEGRA) 180 MG tablet Take 180 mg by mouth daily      Epoetin Leonard-epbx (RETACRIT IJ) Inject as directed Every friday      losartan-hydroCHLOROthiazide (HYZAAR) 100-25 MG per tablet Take 1 tablet by mouth daily      ipratropium (ATROVENT HFA) 17 MCG/ACT inhaler Inhale 2 puffs into the lungs three times daily 1 each 3    SUMAtriptan (IMITREX) 100 MG tablet Take 1 tablet by mouth daily as needed for Migraine 30 tablet 3    tiZANidine (ZANAFLEX) 4 MG tablet Take 1 tablet by mouth nightly as needed (leg spasms) 20 tablet 0    zinc sulfate (ZINCATE) 220 (50 Zn) MG capsule Take 50 mg by mouth 4 times daily       potassium chloride (KLOR-CON M) 20 MEQ extended release tablet Take 1 tablet by mouth daily (Patient taking differently: Take 10 mEq by mouth daily ) 90 tablet 3    amLODIPine (NORVASC) 5 MG tablet Take 1 tablet by mouth every evening (Patient taking differently: Take 2.5 mg by mouth 2 times daily ) 90 tablet 3    aspirin 81 MG tablet Take 81 mg by mouth daily      furosemide (LASIX) 40 MG tablet Take 1 tablet by mouth daily 30 tablet 5    Ascorbic Acid (VITAMIN C) 1000 MG tablet Take 1,000 mg by mouth daily      Cyanocobalamin (VITAMIN B-12 PO) Take 3,000 mcg by mouth daily      estradiol (ESTRACE) 0.1 MG/GM vaginal cream Place 2 g vaginally See Admin Instructions Twice a week      Cranberry 450 MG CAPS Take by mouth daily       pioglitazone (ACTOS) 15 MG tablet Take 15 mg by mouth daily      vitamin D (CHOLECALCIFEROL) 1000 UNIT TABS tablet Take 1,000 Units by mouth daily       spironolactone (ALDACTONE) 25 MG tablet Take 25 mg by mouth every morning (before breakfast)       dexlansoprazole (DEXILANT) 60 MG CPDR delayed release capsule Take 60 mg by mouth daily      gabapentin (NEURONTIN) 100 MG capsule Take 400 mg by mouth 3 times daily. Jordyn Santo TraZODone HCl (DESYREL PO) Take 50 mg by mouth nightly       Calcium Citrate-Vitamin D (CALCIUM CITRATE + D PO) Take 1 tablet by mouth daily       SLOW-MAG 71.5-119 MG TBEC tablet Take 1 tablet by mouth daily 143 mg  5    rosuvastatin (CRESTOR) 40 MG tablet Take 20 mg by mouth every evening       metFORMIN (GLUCOPHAGE) 500 MG tablet Take 500 mg by mouth 2 times daily (with meals)       ezetimibe (ZETIA) 10 MG tablet Take 5 mg by mouth nightly       escitalopram (LEXAPRO) 20 MG tablet Take 20 mg by mouth daily.  nitroGLYCERIN (NITROLINGUAL) 0.4 MG/SPRAY spray Place 1 spray under the tongue every 5 minutes as needed. As directed for chest pain          Allergies:   Allergies   Allergen Reactions    Albuterol Other (See Comments)     Other reaction(s): Chest Pain  Crushing chest pain    Heparin Other (See Comments)     Caused bruises and hematomas immediately when drip started. MARKED BRUISING/HEMATOMAS    Niacin     Avelox [Moxifloxacin Hcl In Nacl] Rash    Moxifloxacin Rash    Niaspan [Niacin Er] Itching and Rash    Proventil [Albuterol Sulfate] Palpitations    Tagamet [Cimetidine] Rash        Social History:  Patient Lives with    reports that she has never smoked. She has never used smokeless tobacco. She reports that she does not drink alcohol and does not use drugs. Family History:  family history includes Cancer in her mother; Heart Disease in her father; High Blood Pressure in her father and mother; Stroke in her sister. Physical Exam:  BP (!) 133/47   Pulse 60   Resp 16   Ht 5' (1.524 m)   Wt 208 lb (94.3 kg)   SpO2 98%   BMI 40.62 kg/m²     General appearance:  Appears comfortable. Well nourished  Eyes: Sclera clear, pupils equal  ENT: Moist mucus membranes, no thrush. Trachea midline. Cardiovascular: Regular rhythm, normal S1, S2. No murmur, gallop, rub. No edema in lower extremities. L PPM  Respiratory: Clear to auscultation bilaterally, no wheeze, good inspiratory effort  Gastrointestinal: Abdomen soft, non-tender, not distended, normal bowel sounds  Musculoskeletal: No cyanosis in digits, neck supple  Neurology: Grossly intact. Alert and oriented in time, place and person. No speech or motor deficits  Psychiatry: Appropriate affect.  Not agitated  Skin: Warm, dry, normal turgor, no rash  Brisk capillary refill, peripheral pulses palpable   Labs:  CBC:   Lab Results   Component Value Date    WBC 6.9 05/02/2022    RBC 3.28 05/02/2022    HGB 8.3 05/02/2022    HCT 25.9 05/02/2022    MCV 79.1 05/02/2022    MCH 25.3 05/02/2022    MCHC 32.1 05/02/2022    RDW 19.6 05/02/2022     05/02/2022    MPV 8.2 05/02/2022     BMP:    Lab Results   Component Value Date     05/02/2022    K 4.4 05/02/2022    K 4.7 12/26/2021     05/02/2022    CO2 19 05/02/2022    BUN 42 05/02/2022    CREATININE 1.0 05/02/2022    CALCIUM 9.8 05/02/2022 GFRAA >60 05/02/2022    GFRAA >60 10/26/2012    LABGLOM 55 05/02/2022    LABGLOM 100 06/26/2012    GLUCOSE 146 05/02/2022    GLUCOSE 143 09/23/2011     No orders to display       Problem List  Active Problems:    Coronary artery disease involving native coronary artery of native heart with angina pectoris ([facility] [address]    Essential hypertension    RAHUL (obstructive sleep apnea)    Diabetes mellitus type 2 in obese (HCC)    Paroxysmal atrial fibrillation ([facility] [address]    Coronary artery disease involving coronary bypass graft of native heart without angina pectoris    Anemia    Pacemaker    Melena  Resolved Problems:    * No resolved hospital problems. *        Assessment/Plan:   1. Serial H/H  2. GI consult for possible colonoscopy  3. Hematology consult for anemia  4. Continue ASA, Plavix, Lipitor, Toprol  5. Renal consult for CKD  6. CLD for possible colonoscopy  7. PT/OT eval      DVT prophylaxis SCD  Code status Full code  Diet CLD  IV access Peripheral   Sierra Catheter No    Admit as inpatient. I anticipate hospitalization spanning more than two midnights for investigation and treatment of the above medically necessary diagnoses. Discussed with patient, Dr Amber Colon (Cardio) and Dr Geoffrey Abda (Renal). D/W nursing. D/W  at bedside. Will work to exclude active GI bleed before proceeding with stent placement.         Bonita Workman MD    5/2/2022 11:00 AM

## 2022-05-02 NOTE — CONSULTS
[facility]  Consultation Note    Reason for Consult:  H/O MALCOLM, planning for possible cardiac cath  Requesting Physician:  Dr. Emilee Geronimo:  Chest pain and SOB    History obtained from records and patient. HISTORY OF PRESENT ILLNESS:                Chanelle Holt  is 80 y.o. y.o. female with significant past medical history of MALCOLM, crea up to 1.6, baseline crea 0.8 to 1, Anemia, Atrial Fibrillation, HLD, Obesity, HTN, Sleep Apnea, DM who presents with chest pain and SOB on exertion. However, on presentation to ED, Hgb noted to be 8.3. She said she has dark stools. She follows with Dr. Ervin Perez and also has been receiving IV Fe. Hgb from 4/25/22 was 10.2. DBP lower in the 50's. Crea at 1. On Losartan-HCTZ, Lasix and SPLT as outpt. Had EGD with Dr. Lm Mcclain previously. No vomiting nor diarrhea. No regular NSAID use. Chest pain has resolved at this time. Past Medical History:     has a past medical history of Anemia, Anesthesia, Anesthesia complication, Atrial fibrillation ([address] CAD (coronary artery disease), Chest pain, Chronic kidney disease, Depression, GERD (gastroesophageal reflux disease), Glaucoma, Hiatal hernia, Hyperlipidemia, Hypertension, Migraines, neuralgic, Morbid obesity ([address] Osteoarthritis, Sleep apnea, Type II or unspecified type diabetes mellitus without mention of complication, not stated as uncontrolled, Unspecified sleep apnea, Urinary incontinence, and UTI (urinary tract infection). Past Surgical History:     has a past surgical history that includes Coronary artery bypass graft (1999); Hammer toe surgery; Ovary removal (Bilateral, 1997); Esophagus dilation; Breast lumpectomy; Knee arthroscopy; Gastric Band (12/20/11); Colonoscopy; Upper gastrointestinal endoscopy; Upper gastrointestinal endoscopy (10/30/15); Cosmetic surgery; joint replacement (1995); joint replacement (1995); orif humerus decompression (Left, 2/25/2016);  Upper gastrointestinal endoscopy (10/14/2016); Bariatric Surgery (11/03/2016); ablation of dysrhythmic focus; Colonoscopy (10/08/2018); Colonoscopy (N/A, 10/8/2018); pr njx dx/ther sbst intrlmnr crv/thrc w/img gdn (N/A, 11/5/2018); Cardiac surgery (2/22/1999); Cardiac catheterization; Injection Procedure For Sacroiliac Joint (Left, 12/17/2018); Enteroscopy (N/A, 2/26/2019); ventral hernia repair (N/A, 10/29/2019); Abdomen surgery (N/A, 9/1/2020); pacemaker placement (05/2019); Upper gastrointestinal endoscopy (N/A, 3/22/2022); and Upper gastrointestinal endoscopy (N/A, 3/22/2022).    Current Medications:    Current Facility-Administered Medications: sodium chloride flush 0.9 % injection 5-40 mL, 5-40 mL, IntraVENous, PRN  amLODIPine (NORVASC) tablet 2.5 mg, 2.5 mg, Oral, BID  ascorbic acid (VITAMIN C) tablet 1,000 mg, 1,000 mg, Oral, Daily  aspirin EC tablet 81 mg, 81 mg, Oral, Daily  clopidogrel (PLAVIX) tablet 75 mg, 75 mg, Oral, Daily  vitamin B-12 (CYANOCOBALAMIN) tablet 3,000 mcg, 3,000 mcg, Oral, Daily  escitalopram (LEXAPRO) tablet 20 mg, 20 mg, Oral, Daily  ezetimibe (ZETIA) tablet 5 mg, 5 mg, Oral, Nightly  cetirizine (ZYRTEC) tablet 10 mg, 10 mg, Oral, Daily  furosemide (LASIX) tablet 40 mg, 40 mg, Oral, Daily  gabapentin (NEURONTIN) capsule 400 mg, 400 mg, Oral, TID  ipratropium (ATROVENT HFA) 17 MCG/ACT inhaler 2 puff, 2 puff, Inhalation, TID  isosorbide mononitrate (IMDUR) extended release tablet 30 mg, 30 mg, Oral, Daily  nitroGLYCERIN (NITROSTAT) SL tablet 0.4 mg, 0.4 mg, SubLINGual, Q5 Min PRN  metoprolol tartrate (LOPRESSOR) tablet 12.5 mg, 12.5 mg, Oral, BID  pioglitazone (ACTOS) tablet 15 mg, 15 mg, Oral, Daily  potassium chloride (KLOR-CON M) extended release tablet 10 mEq, 10 mEq, Oral, Daily  rosuvastatin (CRESTOR) tablet 20 mg, 20 mg, Oral, QPM  magnesium cl-calcium carbonate (SLOW-MAG) tablet 1 tablet, 1 tablet, Oral, Daily  SUMAtriptan (IMITREX) tablet 100 mg, 100 mg, Oral, Daily PRN  tiZANidine (ZANAFLEX) tablet 4 mg, 4 mg, Oral, Nightly PRN  vitamin D (CHOLECALCIFEROL) tablet 1,000 Units, 1,000 Units, Oral, Daily  zinc sulfate (ZINCATE) capsule 50 mg, 50 mg, Oral, 4x Daily  sodium chloride flush 0.9 % injection 5-40 mL, 5-40 mL, IntraVENous, 2 times per day  sodium chloride flush 0.9 % injection 5-40 mL, 5-40 mL, IntraVENous, PRN  0.9 % sodium chloride infusion, , IntraVENous, PRN  ondansetron (ZOFRAN-ODT) disintegrating tablet 4 mg, 4 mg, Oral, Q8H PRN **OR** ondansetron (ZOFRAN) injection 4 mg, 4 mg, IntraVENous, Q6H PRN  polyethylene glycol (GLYCOLAX) packet 17 g, 17 g, Oral, Daily PRN  acetaminophen (TYLENOL) tablet 650 mg, 650 mg, Oral, Q6H PRN **OR** acetaminophen (TYLENOL) suppository 650 mg, 650 mg, Rectal, Q6H PRN  glucose (GLUTOSE) 40 % oral gel 15 g, 15 g, Oral, PRN  dextrose 50 % IV solution, 12.5 g, IntraVENous, PRN  glucagon (rDNA) injection 1 mg, 1 mg, IntraMUSCular, PRN  dextrose 5 % solution, 100 mL/hr, IntraVENous, PRN  insulin lispro (HUMALOG) injection vial 0-12 Units, 0-12 Units, SubCUTAneous, TID WC  insulin lispro (HUMALOG) injection vial 0-6 Units, 0-6 Units, SubCUTAneous, Nightly  metFORMIN (GLUCOPHAGE) tablet 500 mg, 500 mg, Oral, BID WC  Allergies:    Albuterol, Heparin, Niacin, Avelox [moxifloxacin hcl in nacl], Moxifloxacin, Niaspan [niacin er], Proventil [albuterol sulfate], and Tagamet [cimetidine]   Social History:     reports that she has never smoked. She has never used smokeless tobacco. She reports that she does not drink alcohol and does not use drugs. Family History:   family history includes Cancer in her mother; Heart Disease in her father; High Blood Pressure in her father and mother; Stroke in her sister. REVIEW OF SYSTEMS:    System review was done , pertinent positives are mentioned in the HPI, otherwise all the other system review are negative. Neg for fatigue  Chest pain resolved. No palpitations  Has SOB.   No coughing nor hemoptysis  No abdominal pain, nausea, vomiting nor diarrhea. Black stools. No fever/chills  H/O Leg swelling  No change in urine output. No gross hematura. PHYSICAL EXAM:    Vitals:  BP (!) 133/47   Pulse 60   Resp 16   Ht 5' (1.524 m)   Wt 208 lb (94.3 kg)   SpO2 98%   BMI 40.62 kg/m²   CONSTITUTIONAL:  awake, alert, cooperative, no apparent distress, and appears stated age  EYES:  Lids and lashes normal, pupils equal, round NECK:  Supple, symmetrical, trachea midline, no adenopathy, thyroid symmetric, not enlarged and no tenderness, skin normal  LUNGS:  No increased work of breathing, good air exchange, clear to auscultation bilaterally, no crackles or wheezing  CARDIOVASCULAR:  Normal apical impulse, regular rate and rhythm, normal S1 and S2, no S3 or S4, and no murmur noted  ABDOMEN:  No scars, normal bowel sounds, soft, non-distended, non-tender, no masses palpated, no hepatosplenomegaly. Obese. MUSCULOSKELETAL:  There is no redness, warmth, or swelling of the joints. NEUROLOGIC:  Awake, alert, oriented to name, place and time. SKIN:  no bruising or bleeding    DATA:    CBC:   Lab Results   Component Value Date    WBC 6.9 05/02/2022    RBC 3.28 05/02/2022    HGB 8.3 05/02/2022    HCT 25.9 05/02/2022    MCV 79.1 05/02/2022    MCH 25.3 05/02/2022    MCHC 32.1 05/02/2022    RDW 19.6 05/02/2022     05/02/2022    MPV 8.2 05/02/2022     BMP:   Lab Results   Component Value Date     05/02/2022    K 4.4 05/02/2022    K 4.7 12/26/2021     05/02/2022    CO2 19 05/02/2022    BUN 42 05/02/2022    CREATININE 1.0 05/02/2022    CALCIUM 9.8 05/02/2022    GFRAA >60 05/02/2022    GFRAA >60 10/26/2012    LABGLOM 55 05/02/2022    LABGLOM 100 06/26/2012    GLUCOSE 146 05/02/2022    GLUCOSE 143 09/23/2011   Magnesium:    Lab Results   Component Value Date    MG 2.00 03/02/2022   Phosphorus:    Lab Results   Component Value Date    PHOS 4.1 08/14/2020     UA pending    Stress test 4/7/22 negative.   Normal EF    IMPRESSION/RECOMMENDATIONS:    1.  H/O MALCOLM- baseline crea 0.8 to 1. Follow UA. With bp lower limits, will hold off on ARB+HCTZ, Lasix and SPLT for now. If cardiac cath pursued, would recommend NSS 12 hours prior and 12 hours after procedure. At this time, plan is to evaluate Anemia and possible GI w/u first.  Hgb has significantly decreased with black stools. 2.  Low serum HCO3-check VBG. Follow. Hold Metformin for now. 3.  Relative Hypotension-as per #1. Follow weights. 4.  Anemia- GI w/u. Follows with Dr. Marcial Mcmahon too.   5.  H/O DM  6. H/O Edema  7. H/O A. Fib  8. H/O Obesity  9. Chest Pain- Cardiology consulted. Await plans. Thank you for the consult. We will follow this patient along the hospitalization.     Ryan Rodriguez MD

## 2022-05-02 NOTE — CONSULTS
[address]  H+P  Consult  OP Visit  FU Visit   CC HPI   cp Patient presents for staged PCI of Cx and LM today however noted to have Hgb drop of 2g in 1week. BUN remains high concerning for GIB. Admit to hospital and GI evaluation. HISTORY/ALLERGY/ROS   MEDHx  has a past medical history of Anemia, Anesthesia, Anesthesia complication, Atrial fibrillation ([address] CAD (coronary artery disease), Chest pain, Chronic kidney disease, Depression, GERD (gastroesophageal reflux disease), Glaucoma, Hiatal hernia, Hyperlipidemia, Hypertension, Migraines, neuralgic, Morbid obesity ([address] Osteoarthritis, Sleep apnea, Type II or unspecified type diabetes mellitus without mention of complication, not stated as uncontrolled, Unspecified sleep apnea, Urinary incontinence, and UTI (urinary tract infection). SURGHx  has a past surgical history that includes Coronary artery bypass graft (1999); Hammer toe surgery; Ovary removal (Bilateral, 1997); Esophagus dilation; Breast lumpectomy; Knee arthroscopy; Gastric Band (12/20/11); Colonoscopy; Upper gastrointestinal endoscopy; Upper gastrointestinal endoscopy (10/30/15); Cosmetic surgery; joint replacement (1995); joint replacement (1995); orif humerus decompression (Left, 2/25/2016); Upper gastrointestinal endoscopy (10/14/2016); Bariatric Surgery (11/03/2016); ablation of dysrhythmic focus; Colonoscopy (10/08/2018); Colonoscopy (N/A, 10/8/2018); pr njx dx/ther sbst intrlmnr crv/thrc w/img gdn (N/A, 11/5/2018); Cardiac surgery (2/22/1999); Cardiac catheterization; Injection Procedure For Sacroiliac Joint (Left, 12/17/2018); Enteroscopy (N/A, 2/26/2019); ventral hernia repair (N/A, 10/29/2019); Abdomen surgery (N/A, 9/1/2020); pacemaker placement (05/2019); Upper gastrointestinal endoscopy (N/A, 3/22/2022); and Upper gastrointestinal endoscopy (N/A, 3/22/2022). SOCHx  reports that she has never smoked.  She has never used smokeless tobacco. She reports that she does not drink alcohol and does not use drugs. FAMHx family history includes Cancer in her mother; Heart Disease in her father; High Blood Pressure in her father and mother; Stroke in her sister.    ALLERG Albuterol, Heparin, Niacin, Avelox [moxifloxacin hcl in nacl], Moxifloxacin, Niaspan [niacin er], Proventil [albuterol sulfate], and Tagamet [cimetidine]   ROS Full ROS obtained and negative except as mentioned in HPI   MEDICATIONS   Current Facility-Administered Medications   Medication Dose Route Frequency Provider Last Rate Last Admin    sodium chloride flush 0.9 % injection 5-40 mL  5-40 mL IntraVENous PRN Aravind Santana MD        [facility]) tablet 2.5 mg  2.5 mg Oral BID David Jimenez MD   2.5 mg at 05/02/22 1354    ascorbic acid (VITAMIN C) tablet 1,000 mg  1,000 mg Oral Daily David Jimenez MD   1,000 mg at 05/02/22 1356    aspirin EC tablet 81 mg  81 mg Oral Daily David Jimenez MD        clopidogrel (PLAVIX) tablet 75 mg  75 mg Oral Daily Daivd Jimenez MD        vitamin B-12 (CYANOCOBALAMIN) tablet 3,000 mcg  3,000 mcg Oral Daily David Jimenez MD        escitalopram (LEXAPRO) tablet 20 mg  20 mg Oral Daily David Jimenez MD   20 mg at 05/02/22 1353    cetirizine (ZYRTEC) tablet 10 mg  10 mg Oral Daily David Jimenez MD   10 mg at 05/02/22 1354    gabapentin (NEURONTIN) capsule 400 mg  400 mg Oral TID David Jimenez MD   400 mg at 05/02/22 1356    isosorbide mononitrate (IMDUR) extended release tablet 30 mg  30 mg Oral Daily David Jimenez MD        nitroGLYCERIN (NITROSTAT) SL tablet 0.4 mg  0.4 mg SubLINGual Q5 Min PRN David Jimenez MD   0.4 mg at 05/02/22 1302    metoprolol tartrate (LOPRESSOR) tablet 12.5 mg  12.5 mg Oral BID David Jimenez MD   12.5 mg at 05/02/22 1353    pioglitazone (ACTOS) tablet 15 mg  15 mg Oral Daily Davdi Jimenez MD   15 mg at 05/02/22 1354    potassium bicarb-citric acid (EFFER-K) effervescent tablet 10 mEq  10 mEq Oral Daily David Jimenez MD   10 mEq at 05/02/22 1545    rosuvastatin (CRESTOR) tablet 20 mg  20 mg Oral QPM Delbert Lebron MD        SUMAtriptan (IMITREX) tablet 100 mg  100 mg Oral Daily PRN Delbert Lebron MD        tiZANidine (ZANAFLEX) tablet 4 mg  4 mg Oral Nightly PRN Delbert Lebron MD        vitamin D (CHOLECALCIFEROL) tablet 1,000 Units  1,000 Units Oral Daily Delbert Lebron MD   1,000 Units at 05/02/22 1353    zinc sulfate (ZINCATE) capsule 50 mg  50 mg Oral 4x Daily Delbert Lebron MD        sodium chloride flush 0.9 % injection 5-40 mL  5-40 mL IntraVENous 2 times per day Delbert Lebron MD        sodium chloride flush 0.9 % injection 5-40 mL  5-40 mL IntraVENous PRN Delbert Lebron MD        0.9 % sodium chloride infusion   IntraVENous PRN Delbert Lebron MD        ondansetron (ZOFRAN-ODT) disintegrating tablet 4 mg  4 mg Oral Q8H PRN Delbert Lebron MD        Or    ondansetron (ZOFRAN) injection 4 mg  4 mg IntraVENous Q6H PRN Delbert Lebron MD        polyethylene glycol (GLYCOLAX) packet 17 g  17 g Oral Daily PRN Delbert Lebron MD        acetaminophen (TYLENOL) tablet 650 mg  650 mg Oral Q6H PRN Delbert Lebron MD        Or   Bertell Halim acetaminophen (TYLENOL) suppository 650 mg  650 mg Rectal Q6H PRN Delbert Lebron MD        glucose (GLUTOSE) 40 % oral gel 15 g  15 g Oral PRN Delbert Lebron MD        dextrose 10 % infusion  12.5 g IntraVENous PRN Delbert Lebron MD        glucagon (rDNA) injection 1 mg  1 mg IntraMUSCular PRN Delbert Lebron MD        dextrose 5 % solution  100 mL/hr IntraVENous PRN Delbert Lebron MD        insulin lispro (HUMALOG) injection vial 0-12 Units  0-12 Units SubCUTAneous TID WC Delbert Lebron MD        insulin lispro (HUMALOG) injection vial 0-6 Units  0-6 Units SubCUTAneous Nightly Delbert Lebron MD        ipratropium (ATROVENT HFA) 17 MCG/ACT inhaler 2 puff  2 puff Inhalation Q4H PRN Delbert Lebron MD        PEG-KCl-NaCl-NaSulf-Na Asc-C (MOVIPREP) solution 100 g  100 g Oral Once BILL Jeronimo CNP        PEG-KCl-NaCl-NaSulf-Na Asc-C (MOVIPREP) solution 100 g  100 g Oral Once Ingrid Fisher, APRN - CNP          PHYSICAL EXAM   Vitals Vitals:    05/02/22 1330   BP: (!) 146/47   Pulse: 60   Resp:    Temp:    SpO2:       Gen Alert, coop, no distress Heart  Rrr, 1/6   Head NC, AT, no abnorm Abd  Soft, NT, +BS, no mass, no OM   Eyes PER, conj/corn clear Ext  Ext nl, AT, no C/C/E   Nose Nares nl, no drain, NT Pulse 2+ and symmetric   Throat Lips, mucosa, tongue nl Skin Col/text/turg nl, no vis rash/les   Neck S/S, TM, NT, no bruit/JVD Psych Nl mood and affect   Lung CTA-B, unlabored, no DTP Lymph   No cervical or axillary LA   Ch wall NT, no deform Neuro  Nl gross M/S exam      ASSESSMENT AND PLAN   *CAD  Status Hx CABG 1999, Elevated troponin but stable  LHC 1/17 Patent grafts  TTE 9/19 55%  Plan LHC after evaluate for GIB, 2g hgb loss in 1 week   Moderate risk for endoscopy   Will not be able to stop plavix for at least 6 months after LM stent  *AFIB  Status Hx ablation and PPM, stable  Plan Hold AC with blood loss and anemia  *Anemia  Status Decreased 2g 1 week  Plan GI consult

## 2022-05-02 NOTE — PROGRESS NOTES
Patient ambulated to bathroom, complaining of 4/10 chest pain. One sublingual nitro tablet given. 1302.  /41    1303 - Pain 3/10  1304 - Pain 0/10  /35

## 2022-05-03 ENCOUNTER — ANESTHESIA EVENT (OUTPATIENT)
Dept: ENDOSCOPY | Age: 72
DRG: 378 | End: 2022-05-03
Payer: MEDICARE

## 2022-05-03 ENCOUNTER — ANESTHESIA (OUTPATIENT)
Dept: ENDOSCOPY | Age: 72
DRG: 378 | End: 2022-05-03
Payer: MEDICARE

## 2022-05-03 VITALS
DIASTOLIC BLOOD PRESSURE: 56 MMHG | OXYGEN SATURATION: 100 % | SYSTOLIC BLOOD PRESSURE: 116 MMHG | RESPIRATION RATE: 2 BRPM

## 2022-05-03 LAB
ABO/RH: NORMAL
ANION GAP SERPL CALCULATED.3IONS-SCNC: 11 MMOL/L (ref 3–16)
ANTIBODY SCREEN: NORMAL
APTT: 38.8 SEC (ref 26.2–38.6)
BASE EXCESS VENOUS: -2.3 MMOL/L (ref -3–3)
BASOPHILS ABSOLUTE: 0 K/UL (ref 0–0.2)
BASOPHILS RELATIVE PERCENT: 0.7 %
BLOOD BANK DISPENSE STATUS: NORMAL
BLOOD BANK PRODUCT CODE: NORMAL
BPU ID: NORMAL
BUN BLDV-MCNC: 25 MG/DL (ref 7–20)
CALCIUM SERPL-MCNC: 9.5 MG/DL (ref 8.3–10.6)
CARBOXYHEMOGLOBIN: 6.1 % (ref 0–1.5)
CHLORIDE BLD-SCNC: 109 MMOL/L (ref 99–110)
CO2: 20 MMOL/L (ref 21–32)
CREAT SERPL-MCNC: 0.7 MG/DL (ref 0.6–1.2)
DESCRIPTION BLOOD BANK: NORMAL
EOSINOPHILS ABSOLUTE: 0.2 K/UL (ref 0–0.6)
EOSINOPHILS RELATIVE PERCENT: 3.2 %
ESTIMATED AVERAGE GLUCOSE: 154.2 MG/DL
GFR AFRICAN AMERICAN: >60
GFR NON-AFRICAN AMERICAN: >60
GLUCOSE BLD-MCNC: 113 MG/DL (ref 70–99)
GLUCOSE BLD-MCNC: 130 MG/DL (ref 70–99)
GLUCOSE BLD-MCNC: 132 MG/DL (ref 70–99)
GLUCOSE BLD-MCNC: 179 MG/DL (ref 70–99)
GLUCOSE BLD-MCNC: 226 MG/DL (ref 70–99)
HBA1C MFR BLD: 7 %
HCO3 VENOUS: 20 MMOL/L (ref 23–29)
HCT VFR BLD CALC: 23.4 % (ref 36–48)
HCT VFR BLD CALC: 26.7 % (ref 36–48)
HCT VFR BLD CALC: 26.9 % (ref 36–48)
HEMOGLOBIN: 7.5 G/DL (ref 12–16)
HEMOGLOBIN: 8.5 G/DL (ref 12–16)
HEMOGLOBIN: 8.6 G/DL (ref 12–16)
INR BLD: 1.09 (ref 0.88–1.12)
LYMPHOCYTES ABSOLUTE: 1.9 K/UL (ref 1–5.1)
LYMPHOCYTES RELATIVE PERCENT: 30.1 %
MCH RBC QN AUTO: 25.5 PG (ref 26–34)
MCHC RBC AUTO-ENTMCNC: 31.8 G/DL (ref 31–36)
MCV RBC AUTO: 80.2 FL (ref 80–100)
METHEMOGLOBIN VENOUS: 0.4 %
MONOCYTES ABSOLUTE: 0.9 K/UL (ref 0–1.3)
MONOCYTES RELATIVE PERCENT: 13.2 %
NEUTROPHILS ABSOLUTE: 3.4 K/UL (ref 1.7–7.7)
NEUTROPHILS RELATIVE PERCENT: 52.8 %
O2 CONTENT, VEN: 10 VOL %
O2 SAT, VEN: 100 %
O2 THERAPY: ABNORMAL
PCO2, VEN: 24.3 MMHG (ref 40–50)
PDW BLD-RTO: 19.8 % (ref 12.4–15.4)
PERFORMED ON: ABNORMAL
PH VENOUS: 7.52 (ref 7.35–7.45)
PLATELET # BLD: 272 K/UL (ref 135–450)
PMV BLD AUTO: 8.2 FL (ref 5–10.5)
PO2, VEN: 157 MMHG (ref 25–40)
POTASSIUM REFLEX MAGNESIUM: 4.7 MMOL/L (ref 3.5–5.1)
PROTHROMBIN TIME: 12.4 SEC (ref 9.9–12.7)
RBC # BLD: 2.92 M/UL (ref 4–5.2)
SODIUM BLD-SCNC: 140 MMOL/L (ref 136–145)
TCO2 CALC VENOUS: 47 MMOL/L
TROPONIN: <0.01 NG/ML
WBC # BLD: 6.4 K/UL (ref 4–11)

## 2022-05-03 PROCEDURE — 2720000010 HC SURG SUPPLY STERILE: Performed by: INTERNAL MEDICINE

## 2022-05-03 PROCEDURE — 85018 HEMOGLOBIN: CPT

## 2022-05-03 PROCEDURE — 97116 GAIT TRAINING THERAPY: CPT

## 2022-05-03 PROCEDURE — 97530 THERAPEUTIC ACTIVITIES: CPT

## 2022-05-03 PROCEDURE — 6370000000 HC RX 637 (ALT 250 FOR IP): Performed by: INTERNAL MEDICINE

## 2022-05-03 PROCEDURE — 86923 COMPATIBILITY TEST ELECTRIC: CPT

## 2022-05-03 PROCEDURE — 80048 BASIC METABOLIC PNL TOTAL CA: CPT

## 2022-05-03 PROCEDURE — 2709999900 HC NON-CHARGEABLE SUPPLY: Performed by: INTERNAL MEDICINE

## 2022-05-03 PROCEDURE — 2580000003 HC RX 258: Performed by: PHYSICIAN ASSISTANT

## 2022-05-03 PROCEDURE — 97165 OT EVAL LOW COMPLEX 30 MIN: CPT

## 2022-05-03 PROCEDURE — P9016 RBC LEUKOCYTES REDUCED: HCPCS

## 2022-05-03 PROCEDURE — 82803 BLOOD GASES ANY COMBINATION: CPT

## 2022-05-03 PROCEDURE — 3609010200 HC COLONOSCOPY ABLATION TUMOR POLYP/OTHER LES: Performed by: INTERNAL MEDICINE

## 2022-05-03 PROCEDURE — 85014 HEMATOCRIT: CPT

## 2022-05-03 PROCEDURE — 85025 COMPLETE CBC W/AUTO DIFF WBC: CPT

## 2022-05-03 PROCEDURE — 1200000000 HC SEMI PRIVATE

## 2022-05-03 PROCEDURE — 6360000002 HC RX W HCPCS: Performed by: INTERNAL MEDICINE

## 2022-05-03 PROCEDURE — 36415 COLL VENOUS BLD VENIPUNCTURE: CPT

## 2022-05-03 PROCEDURE — 85610 PROTHROMBIN TIME: CPT

## 2022-05-03 PROCEDURE — 86900 BLOOD TYPING SEROLOGIC ABO: CPT

## 2022-05-03 PROCEDURE — 7100000000 HC PACU RECOVERY - FIRST 15 MIN: Performed by: INTERNAL MEDICINE

## 2022-05-03 PROCEDURE — 2580000003 HC RX 258: Performed by: INTERNAL MEDICINE

## 2022-05-03 PROCEDURE — C9113 INJ PANTOPRAZOLE SODIUM, VIA: HCPCS | Performed by: INTERNAL MEDICINE

## 2022-05-03 PROCEDURE — 6360000002 HC RX W HCPCS: Performed by: NURSE ANESTHETIST, CERTIFIED REGISTERED

## 2022-05-03 PROCEDURE — 3700000001 HC ADD 15 MINUTES (ANESTHESIA): Performed by: INTERNAL MEDICINE

## 2022-05-03 PROCEDURE — 85730 THROMBOPLASTIN TIME PARTIAL: CPT

## 2022-05-03 PROCEDURE — 86850 RBC ANTIBODY SCREEN: CPT

## 2022-05-03 PROCEDURE — 99233 SBSQ HOSP IP/OBS HIGH 50: CPT | Performed by: INTERNAL MEDICINE

## 2022-05-03 PROCEDURE — 84484 ASSAY OF TROPONIN QUANT: CPT

## 2022-05-03 PROCEDURE — 97535 SELF CARE MNGMENT TRAINING: CPT

## 2022-05-03 PROCEDURE — 3700000000 HC ANESTHESIA ATTENDED CARE: Performed by: INTERNAL MEDICINE

## 2022-05-03 PROCEDURE — 97161 PT EVAL LOW COMPLEX 20 MIN: CPT

## 2022-05-03 PROCEDURE — 2500000003 HC RX 250 WO HCPCS: Performed by: NURSE ANESTHETIST, CERTIFIED REGISTERED

## 2022-05-03 PROCEDURE — 6360000002 HC RX W HCPCS: Performed by: PHYSICIAN ASSISTANT

## 2022-05-03 PROCEDURE — 3609155800 HC ENTEROSCOPY WITH INTERVENTION: Performed by: INTERNAL MEDICINE

## 2022-05-03 PROCEDURE — 86901 BLOOD TYPING SEROLOGIC RH(D): CPT

## 2022-05-03 PROCEDURE — 7100000001 HC PACU RECOVERY - ADDTL 15 MIN: Performed by: INTERNAL MEDICINE

## 2022-05-03 PROCEDURE — 36430 TRANSFUSION BLD/BLD COMPNT: CPT

## 2022-05-03 PROCEDURE — 0W3P8ZZ CONTROL BLEEDING IN GASTROINTESTINAL TRACT, VIA NATURAL OR ARTIFICIAL OPENING ENDOSCOPIC: ICD-10-PCS | Performed by: INTERNAL MEDICINE

## 2022-05-03 PROCEDURE — 2500000003 HC RX 250 WO HCPCS: Performed by: INTERNAL MEDICINE

## 2022-05-03 RX ORDER — LIDOCAINE HYDROCHLORIDE 20 MG/ML
INJECTION, SOLUTION INFILTRATION; PERINEURAL PRN
Status: DISCONTINUED | OUTPATIENT
Start: 2022-05-03 | End: 2022-05-03

## 2022-05-03 RX ORDER — ONDANSETRON 2 MG/ML
INJECTION INTRAMUSCULAR; INTRAVENOUS PRN
Status: DISCONTINUED | OUTPATIENT
Start: 2022-05-03 | End: 2022-05-03 | Stop reason: SDUPTHER

## 2022-05-03 RX ORDER — DEXAMETHASONE SODIUM PHOSPHATE 4 MG/ML
INJECTION, SOLUTION INTRA-ARTICULAR; INTRALESIONAL; INTRAMUSCULAR; INTRAVENOUS; SOFT TISSUE PRN
Status: DISCONTINUED | OUTPATIENT
Start: 2022-05-03 | End: 2022-05-03 | Stop reason: SDUPTHER

## 2022-05-03 RX ORDER — PROPOFOL 10 MG/ML
INJECTION, EMULSION INTRAVENOUS PRN
Status: DISCONTINUED | OUTPATIENT
Start: 2022-05-03 | End: 2022-05-03 | Stop reason: SDUPTHER

## 2022-05-03 RX ORDER — LIDOCAINE HYDROCHLORIDE 20 MG/ML
INJECTION, SOLUTION INFILTRATION; PERINEURAL PRN
Status: DISCONTINUED | OUTPATIENT
Start: 2022-05-03 | End: 2022-05-03 | Stop reason: SDUPTHER

## 2022-05-03 RX ORDER — SUCCINYLCHOLINE CHLORIDE 20 MG/ML
INJECTION INTRAMUSCULAR; INTRAVENOUS PRN
Status: DISCONTINUED | OUTPATIENT
Start: 2022-05-03 | End: 2022-05-03 | Stop reason: SDUPTHER

## 2022-05-03 RX ORDER — SODIUM CHLORIDE 9 MG/ML
INJECTION, SOLUTION INTRAVENOUS PRN
Status: COMPLETED | OUTPATIENT
Start: 2022-05-03 | End: 2022-05-03

## 2022-05-03 RX ADMIN — Medication 10 ML: at 21:00

## 2022-05-03 RX ADMIN — METOPROLOL TARTRATE 12.5 MG: 25 TABLET, FILM COATED ORAL at 20:04

## 2022-05-03 RX ADMIN — Medication 10 ML: at 08:34

## 2022-05-03 RX ADMIN — ESCITALOPRAM OXALATE 20 MG: 10 TABLET ORAL at 08:30

## 2022-05-03 RX ADMIN — INSULIN LISPRO 2 UNITS: 100 INJECTION, SOLUTION INTRAVENOUS; SUBCUTANEOUS at 20:28

## 2022-05-03 RX ADMIN — ONDANSETRON 4 MG: 2 INJECTION INTRAMUSCULAR; INTRAVENOUS at 10:56

## 2022-05-03 RX ADMIN — GABAPENTIN 400 MG: 400 CAPSULE ORAL at 20:04

## 2022-05-03 RX ADMIN — SUCCINYLCHOLINE CHLORIDE 120 MG: 20 INJECTION, SOLUTION INTRAMUSCULAR; INTRAVENOUS at 10:35

## 2022-05-03 RX ADMIN — INSULIN LISPRO 2 UNITS: 100 INJECTION, SOLUTION INTRAVENOUS; SUBCUTANEOUS at 17:03

## 2022-05-03 RX ADMIN — GABAPENTIN 400 MG: 400 CAPSULE ORAL at 08:31

## 2022-05-03 RX ADMIN — GABAPENTIN 400 MG: 400 CAPSULE ORAL at 15:40

## 2022-05-03 RX ADMIN — PIOGLITAZONE 15 MG: 15 TABLET ORAL at 08:31

## 2022-05-03 RX ADMIN — METOPROLOL TARTRATE 12.5 MG: 25 TABLET, FILM COATED ORAL at 08:31

## 2022-05-03 RX ADMIN — PANTOPRAZOLE SODIUM 40 MG: 40 INJECTION, POWDER, FOR SOLUTION INTRAVENOUS at 08:32

## 2022-05-03 RX ADMIN — ROSUVASTATIN CALCIUM 20 MG: 20 TABLET, FILM COATED ORAL at 17:03

## 2022-05-03 RX ADMIN — AMLODIPINE BESYLATE 2.5 MG: 5 TABLET ORAL at 08:31

## 2022-05-03 RX ADMIN — PROPOFOL 180 MG: 10 INJECTION, EMULSION INTRAVENOUS at 10:35

## 2022-05-03 RX ADMIN — ZINC SULFATE 220 MG (50 MG) CAPSULE 50 MG: CAPSULE at 20:28

## 2022-05-03 RX ADMIN — IRON SUCROSE 200 MG: 20 INJECTION, SOLUTION INTRAVENOUS at 15:43

## 2022-05-03 RX ADMIN — AMLODIPINE BESYLATE 2.5 MG: 5 TABLET ORAL at 20:04

## 2022-05-03 RX ADMIN — ASPIRIN 81 MG: 81 TABLET, COATED ORAL at 15:40

## 2022-05-03 RX ADMIN — PANTOPRAZOLE SODIUM 40 MG: 40 INJECTION, POWDER, FOR SOLUTION INTRAVENOUS at 20:05

## 2022-05-03 RX ADMIN — SODIUM CHLORIDE: 9 INJECTION, SOLUTION INTRAVENOUS at 10:35

## 2022-05-03 RX ADMIN — LIDOCAINE HYDROCHLORIDE 100 MG: 20 INJECTION, SOLUTION INFILTRATION; PERINEURAL at 10:34

## 2022-05-03 RX ADMIN — CETIRIZINE HYDROCHLORIDE 10 MG: 10 TABLET, FILM COATED ORAL at 08:30

## 2022-05-03 RX ADMIN — ISOSORBIDE MONONITRATE 30 MG: 30 TABLET, EXTENDED RELEASE ORAL at 08:30

## 2022-05-03 RX ADMIN — DEXAMETHASONE SODIUM PHOSPHATE 8 MG: 4 INJECTION, SOLUTION INTRAMUSCULAR; INTRAVENOUS at 10:56

## 2022-05-03 RX ADMIN — ZINC SULFATE 220 MG (50 MG) CAPSULE 50 MG: CAPSULE at 17:03

## 2022-05-03 ASSESSMENT — PULMONARY FUNCTION TESTS
PIF_VALUE: 24
PIF_VALUE: 16
PIF_VALUE: 17
PIF_VALUE: 16
PIF_VALUE: 18
PIF_VALUE: 16
PIF_VALUE: 16
PIF_VALUE: 1
PIF_VALUE: 16
PIF_VALUE: 1
PIF_VALUE: 16
PIF_VALUE: 17
PIF_VALUE: 16
PIF_VALUE: 17
PIF_VALUE: 36
PIF_VALUE: 16
PIF_VALUE: 16
PIF_VALUE: 15
PIF_VALUE: 16
PIF_VALUE: 18
PIF_VALUE: 16
PIF_VALUE: 15
PIF_VALUE: 25
PIF_VALUE: 1
PIF_VALUE: 15
PIF_VALUE: 6
PIF_VALUE: 1
PIF_VALUE: 15
PIF_VALUE: 20
PIF_VALUE: 16
PIF_VALUE: 15
PIF_VALUE: 42
PIF_VALUE: 16
PIF_VALUE: 17
PIF_VALUE: 16
PIF_VALUE: 16
PIF_VALUE: 15
PIF_VALUE: 16
PIF_VALUE: 16
PIF_VALUE: 15
PIF_VALUE: 22
PIF_VALUE: 5
PIF_VALUE: 16
PIF_VALUE: 25
PIF_VALUE: 17
PIF_VALUE: 16
PIF_VALUE: 16
PIF_VALUE: 15
PIF_VALUE: 17
PIF_VALUE: 16
PIF_VALUE: 22
PIF_VALUE: 16
PIF_VALUE: 24
PIF_VALUE: 19
PIF_VALUE: 14
PIF_VALUE: 16
PIF_VALUE: 35
PIF_VALUE: 37
PIF_VALUE: 1
PIF_VALUE: 16
PIF_VALUE: 5
PIF_VALUE: 19
PIF_VALUE: 16
PIF_VALUE: 19
PIF_VALUE: 16
PIF_VALUE: 5
PIF_VALUE: 16
PIF_VALUE: 5
PIF_VALUE: 5
PIF_VALUE: 26
PIF_VALUE: 36
PIF_VALUE: 19

## 2022-05-03 ASSESSMENT — ENCOUNTER SYMPTOMS
NAUSEA: 0
PHOTOPHOBIA: 0
EYE DISCHARGE: 0
CHEST TIGHTNESS: 0
ABDOMINAL PAIN: 0
FACIAL SWELLING: 0
CONSTIPATION: 0
VOMITING: 0
BLOOD IN STOOL: 0
EYE REDNESS: 0
DIARRHEA: 0
SHORTNESS OF BREATH: 0
ABDOMINAL DISTENTION: 0
COUGH: 0

## 2022-05-03 ASSESSMENT — PAIN SCALES - GENERAL
PAINLEVEL_OUTOF10: 0

## 2022-05-03 NOTE — PROGRESS NOTES
I have discussed with the patient the rationale for blood component transfusion; its benefits in treating or preventing fatigue, organ damage, or death; and its risk which includes mild transfusion reactions, rare risk of blood borne infection, or more serious but rare reactions. I have discussed the alternatives to transfusion, including the risk and consequences of not receiving transfusion. The patient had an opportunity to ask questions and had agreed to proceed with transfusion of blood components.     Discussed with Dr. Nick Zaman PA-C  [facility]

## 2022-05-03 NOTE — PROGRESS NOTES
[address] Department   Phone: [phone]    Physical Therapy    [x] Initial Evaluation            [] Daily Treatment Note         [] Discharge Summary      Patient: Gato Vo   : 1950   MRN: [id number]   Date of Service:  5/3/2022  Admitting Diagnosis: Coronary artery disease involving coronary bypass graft of native heart without angina pectoris  Current Admission Summary: Gato Vo  is 80 y.o. y.o. female with significant past medical history of MALCOLM, crea up to 1.6, baseline crea 0.8 to 1, Anemia, Atrial Fibrillation, HLD, Obesity, HTN, Sleep Apnea, DM who presents with chest pain and SOB on exertion. However, on presentation to ED, Hgb noted to be 8.3. She said she has dark stools. She follows with Dr. Rosie Pittman and also has been receiving IV Fe. Hgb from 22 was 10.2. DBP lower in the 50's. Crea at 1. On Losartan-HCTZ, Lasix and SPLT as outpt. Had EGD with Dr. Sindy Mejía previously. No vomiting nor diarrhea. No regular NSAID use. Chest pain has resolved at this time. Past Medical History:  has a past medical history of Anemia, Anesthesia, Anesthesia complication, Atrial fibrillation ([address] CAD (coronary artery disease), Chest pain, Chronic kidney disease, Depression, GERD (gastroesophageal reflux disease), Glaucoma, Hiatal hernia, Hyperlipidemia, Hypertension, Migraines, neuralgic, Morbid obesity ([address] Osteoarthritis, Sleep apnea, Type II or unspecified type diabetes mellitus without mention of complication, not stated as uncontrolled, Unspecified sleep apnea, Urinary incontinence, and UTI (urinary tract infection). Past Surgical History:  has a past surgical history that includes Coronary artery bypass graft (); Hammer toe surgery; Ovary removal (Bilateral, ); Esophagus dilation; Breast lumpectomy; Knee arthroscopy; Gastric Band (11); Colonoscopy; Upper gastrointestinal endoscopy;  Upper gastrointestinal endoscopy (10/30/15); Cosmetic surgery; joint replacement (1995); joint replacement (1995); orif humerus decompression (Left, 2/25/2016); Upper gastrointestinal endoscopy (10/14/2016); Bariatric Surgery (11/03/2016); ablation of dysrhythmic focus; Colonoscopy (10/08/2018); Colonoscopy (N/A, 10/8/2018); pr njx dx/ther sbst intrlmnr crv/thrc w/img gdn (N/A, 11/5/2018); Cardiac surgery (2/22/1999); Cardiac catheterization; Injection Procedure For Sacroiliac Joint (Left, 12/17/2018); Enteroscopy (N/A, 2/26/2019); ventral hernia repair (N/A, 10/29/2019); Abdomen surgery (N/A, 9/1/2020); pacemaker placement (05/2019); Upper gastrointestinal endoscopy (N/A, 3/22/2022); Upper gastrointestinal endoscopy (N/A, 3/22/2022); Upper gastrointestinal endoscopy (N/A, 5/3/2022); and Colonoscopy (N/A, 5/3/2022). Discharge Recommendations: Brooke Blount scored a 18/24 on the AM-PAC short mobility form. Current research shows that an AM-PAC score of 18 or greater is typically associated with a discharge to the patient's home setting. Based on the patient's AM-PAC score and their current functional mobility deficits, it is recommended that the patient have 2-3 sessions per week of Physical Therapy at d/c to increase the patient's independence. At this time, this patient demonstrates the endurance and safety to discharge home with HHPT and a follow up treatment frequency of 2-3x/wk. Please see assessment section for further patient specific details. If patient discharges prior to next session this note will serve as a discharge summary. Please see below for the latest assessment towards goals.    HOME HEALTH CARE: LEVEL 3 SAFETY     - Initial home health evaluation to occur within 24-48 hours, in patient home   - Therapy evaluations in home within 24-48 hours of discharge; including DME and home safety   - Frontload therapy 5 days, then 3x a week   - Therapy to evaluate if patient has [address] needs for personal care   -  evaluation within 24-48 hours, includes evaluation of resources and insurance to determine [place], [place], LTC, and Medicaid options     DME Required For Discharge: rolling walker  Precautions/Restrictions: no restrictions    Pre-Admission Information   Lives With: spouse    Type of Home: house  Home Layout: two level   Stair lift  Home Access: ramped entry  Bathroom Layout: walk in shower  Toilet Height: standard height, elevated height One bathroom has elevated toilet, other has standard toilet  Bathroom Equipment: grab bars in shower, shower chair, hand held shower head  Home Equipment: quad cane small base  Transfer Assistance: Independent without use of device  Ambulation Assistance:modified independent with use of cane  ADL Assistance: independent with all ADL's  IADL Assistance: independent with homemaking tasks  Active :        [x] Yes  [] No  Hand Dominance: [] Left  [x] Right  Current Employment: retired. Occupation:   Hobbies: [address]: 1    Examination   Vision:   Vision Gross Assessment: Impaired and Vision Corrective Device: wears contacts  Hearing:   WFL  Posture:   Good  Sensation:   reports numbness and tingling in (B) LE Neuropathy in B LE. Proprioception:    WFL  Tone:   Normotonic  ROM:   (B) LE ROM WFL  Strength:   (L) Hip: 5        (R) Hip: 5  (L) Knee: 5     (R) Knee: 5  (L) Ankle: 5     (R) Ankle: 5  Decision Making: low complexity  Clinical Presentation: stable      Subjective  General: Pt supine in bed. Pt agreeable to PT/OT. Pain: 0/10  Pain Interventions: not applicable        Functional Mobility  Bed Mobility  Supine to Sit: contact guard assistance  Comments: pt sitting up in recliner at end of session  Transfers  Sit to stand transfer: supervision  Stand to sit transfer: supervision  Comments: from bed and toilet  Ambulation  Surface:level surface  Assistive Device: small based quad cane  Assistance: .   Comment SBA for first 70'; CG for second 79'  Distance: 79' + 70'  Gait Mechanics: With fatigue, decreased activation of glut med and increased reliance on ligaments  Comments:  Pt recognized impact of fatigue and self correct to decrease speed and step length  Stair Mobility  Stair mobility not completed on this date. Comments:  Wheelchair Mobility:  No w/c mobility completed on this date. Comments:  Balance  Static Sitting Balance: fair (+): maintains balance at SBA/supervision without use of UE support  Dynamic Sitting Balance: fair (+): maintains balance at SBA/supervision without use of UE support  Static Standing Balance: fair (-): maintains balance at supervision with use of UE support  Dynamic Standing Balance: fair (-): maintains balance at SBA with use of UE support  Comments:    Other Therapeutic Interventions    Functional Outcomes  AM-PAC Inpatient Mobility Raw Score : 18              Cognition  WFL  Orientation:    alert and oriented x 4  Command Following:   [facility]    Education  Barriers To Learning: none  Patient Education: patient educated on goals, PT role and benefits, plan of care, general safety, functional mobility training, proper use of assistive device/equipment, energy conservation, family education, discharge recommendations  Learning Assessment:  patient verbalizes and demonstrates understanding    Assessment  Activity Tolerance: Pt activity tolerance limited by fatigue and decreased endurance. Pt began to fatigue and became slightly SOB after about 79' of gait activity which impacted ambulation. Impairments Requiring Therapeutic Intervention: decreased strength, decreased endurance, decreased sensation, decreased balance  Prognosis: good  Clinical Assessment: Pt functional mobility limited by fatigue and decreased endurance. Pt PLOF was independent with ambulation, transferring and household and self-care tasks. Pt frequently uses a cane when ambulating to support balance.  Pt would benefit from PT to improve functional endurance and balance in order to increase functional mobility. Safety Interventions: patient left in bed, chair alarm in place, call light within reach, gait belt and family/caregiver present    Plan  Frequency: 3-5 x/per week  Current Treatment Recommendations: strengthening, ROM, balance training, functional mobility training, gait training, endurance training and safety education    Goals  Patient Goals: To go home   Short Term Goals:  Time Frame: By discharge  Patient will complete bed mobility at modified independent   Patient will complete transfers at [facility]   Patient will ambulate 50 ft with use of rolling walker at modified independent    Therapy Session Time      Individual Group Co-treatment   Time In     1349   Time Out     1438   Minutes     49     Timed Code Treatment Minutes:  34 Minutes  Total Treatment Minutes:  49 minutes    SARANYA Brian  Electronically Signed By:   PT observed and directed the above evaluation/treatment.   [address], DPT [id number]

## 2022-05-03 NOTE — PROGRESS NOTES
Shift assessment complete. See flow sheet. Pain evaluation complete. No complaints of pain at this time. Discussed POC for this shift. Scheduled for endoscopy this morning with Dr. Rinku Moura. Per endo, around 0930. 1 unit PRBC ordered per GI. Type and screen sent to lab. Patient encouraged to use call light with any needs. Patient states understanding, call light in reach, chair  alarm on. Will continue to monitor.

## 2022-05-03 NOTE — PROGRESS NOTES
Teaching / education initiated regarding perioperative experience, expectations, and pain management during stay. Patient verbalized understanding. Patient  in waiting room.

## 2022-05-03 NOTE — CONSULTS
[facility]   CONSULT NOTE    5/3/2022 3:22 PM    Patient Information: Michel Cunningham   Date of Admit:  5/2/2022  Primary Care Physician:  Deyvi Curtis DO  Requesting Physician:  Arvind Cespedes MD    Reason for consult:   Evaluation and recommendations for anemia. Chief complaint:  No chief complaint on file. History of Present Illness:  Michel Cunningham is a 80 y.o. female on Arvind Cespedes MD service who was admitted on 5/2/2022 for acute on chronic blood loss anemia. She presented to the outpatient cath lab for CAD intervention with Dr. Clayton Mckinney on 5/2/2022, but was found to be more anemic with Hgb of 8.3. GI was consulted and she underwent an enteroscopy and colonoscopy today with intervention. 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 S/p APC. She is a patient of Dr. Kleber Centeno with MDS and iron deficiency anemia. Bone marrow evaluation on 6/25/2018 showed changes suggestive of myelodysplastic syndrome. She receives weekly Retacrit injections. Most recent iron labs in our office from 4/8/2022 revealed iron 7, TIBC 399, and ferritin 10.4. She is s/p Venofer 300mg infusions on 4/22/22 and 4/29/22. She is scheduled for her next outpatient venofer dose on Friday (5/6/22). Hgb today is 8.5, s/p one unit pRBCs transfusion. She denies any chest pain or shortness of breath. She does report that she was having black and tarry stools the past few days, but associated that with the iron infusions.         Past Medical History:     has a past medical history of Anemia, Anesthesia, Anesthesia complication, Atrial fibrillation ([address] CAD (coronary artery disease), Chest pain, Chronic kidney disease, Depression, GERD (gastroesophageal reflux disease), Glaucoma, Hiatal hernia, Hyperlipidemia, Hypertension, Migraines, neuralgic, Morbid obesity ([address] Osteoarthritis, Sleep apnea, Type II or unspecified type diabetes mellitus without mention of complication, not stated as uncontrolled, Unspecified sleep apnea, Urinary incontinence, and UTI (urinary tract infection).      Past Surgical History:    Past Surgical History:   Procedure Laterality Date    ABDOMEN SURGERY N/A 9/1/2020    EXPLORATION OF ABDOMINAL WOUND performed by Andrea Carranza MD at [address] [facility] SURGERY  2/22/1999    quadruple by-pass, [address] COLONOSCOPY  10/08/2018    1 polyp removed    COLONOSCOPY N/A 10/8/2018    COLONOSCOPY POLYPECTOMY SNARE/COLD BIOPSY performed by Nora Sheldon MD at [facility] N/A 5/3/2022    COLONOSCOPY  ABLATION performed by Nora Sheldon MD at [facility]    COSMETIC SURGERY      face lift    ENTEROSCOPY N/A 2/26/2019    ENTEROSCOPY performed by Nora Sheldon MD at [address] GASTRIC BAND  12/20/11    hiatal hernia repair    GASTRIC BAND REMOVAL  11/03/2016    laparoscopic     HAMMER TOE SURGERY      [facility] INJECTION PROCEDURE FOR SACROILIAC JOINT Left 12/17/2018    SACROILIAC JOINT INJECTION ON THE LEFT WITH FLUOROSCOPY performed by Manuel Julio MD at [address]    both   [address]    Left and Right knees, [facility]    KNEE ARTHROSCOPY      1982 left    ORIF HUMERUS DECOMPRESSION Left 2/25/2016    OPEN REDUCTION INTERNAL FIXATION LEFT PROXIMAL HUMERUS    OVARY REMOVAL Bilateral 1997    PACEMAKER PLACEMENT  05/2019    ID NJX DX/THER SBST INTRLMNR CRV/THRC W/IMG GDN N/A 11/5/2018    MIDLINE L4/L5 LUMBAR INTERLAMINAR EPIDURAL STEROID INJECTION WITH FLUOROSCOPY performed by Manuel Julio MD at [address] [facility] ENDOSCOPY  10/30/15    UPPER GASTROINTESTINAL ENDOSCOPY  10/14/2016    with biopsy    UPPER GASTROINTESTINAL ENDOSCOPY N/A 3/22/2022    EGD BIOPSY performed by Hawk Draper MD at [facility] N/A 3/22/2022    EGD DILATION BALLOON performed by Hawk Draper MD at [facility] N/A 5/3/2022    ENTEROSCOPY WITH INTERVENTION performed by Hawk Draper MD at [facility] N/A 10/29/2019    OPEN VENTRAL HERNIA REPAIR WITH  MESH performed by Deidra Jhaveri MD at [address] [place] [address]        Current Medications:     iron sucrose  200 mg IntraVENous Q24H    amLODIPine  2.5 mg Oral BID    vitamin C  1,000 mg Oral Daily    aspirin  81 mg Oral Daily    [Held by provider] clopidogrel  75 mg Oral Daily    vitamin B-12  3,000 mcg Oral Daily    escitalopram  20 mg Oral Daily    cetirizine  10 mg Oral Daily    gabapentin  400 mg Oral TID    isosorbide mononitrate  30 mg Oral Daily    metoprolol tartrate  12.5 mg Oral BID    pioglitazone  15 mg Oral Daily    potassium bicarb-citric acid  10 mEq Oral Daily    rosuvastatin  20 mg Oral QPM    Vitamin D  1,000 Units Oral Daily    zinc sulfate  50 mg Oral 4x Daily    sodium chloride flush  5-40 mL IntraVENous 2 times per day    insulin lispro  0-12 Units SubCUTAneous TID WC    insulin lispro  0-6 Units SubCUTAneous Nightly    pantoprazole  40 mg IntraVENous BID       Allergies: Allergies   Allergen Reactions    Albuterol Other (See Comments)     Other reaction(s): Chest Pain  Crushing chest pain    Heparin Other (See Comments)     Caused bruises and hematomas immediately when drip started. MARKED BRUISING/HEMATOMAS    Niacin     Avelox [Moxifloxacin Hcl In Nacl] Rash    Moxifloxacin Rash    Niaspan [Niacin Er] Itching and Rash    Proventil [Albuterol Sulfate] Palpitations    Tagamet [Cimetidine] Rash        Social History:    reports that she has never smoked.  She has never used smokeless tobacco. She reports that she does not drink alcohol and does not use drugs. Family History:     family history includes Cancer in her mother; Heart Disease in her father; High Blood Pressure in her father and mother; Stroke in her sister. ROS:      Per HPI; otherwise 10 point ROS is negative    PHYSICAL EXAM:    Vitals:  Vitals:    05/03/22 1400   BP: (!) 130/40   Pulse: 60   Resp:    Temp:    SpO2:         Intake/Output Summary (Last 24 hours) at 5/3/2022 1522  Last data filed at 5/3/2022 1250  Gross per 24 hour   Intake 982 ml   Output --   Net 982 ml      Wt Readings from Last 3 Encounters:   05/03/22 202 lb 9.6 oz (91.9 kg)   04/25/22 208 lb (94.3 kg)   04/13/22 208 lb (94.3 kg)        General appearance: Appears comfortable. Well nourished  Eyes: Sclera clear, pupils equal  ENT: Moist mucus membranes, no thrush  Neck: Trachea midline, symmetrical  Cardiovascular: Regular rhythm, normal S1, S2. No murmur, gallop, rub. Respiratory: Clear to auscultation bilaterally. No wheeze. Gastrointestinal: Abdomen soft, not tender, not distended, normal bowel sounds  Musculoskeletal: No cyanosis in digits, warm extremities  Neurologic: Cranial nerves grossly intact, no motor or speech deficits. Psychiatric: Normal affect. Alert and oriented to time, place and person.   Skin: Warm, dry, normal turgor, no rash    DATA:  CBC:   Lab Results   Component Value Date    WBC 6.4 05/03/2022    RBC 2.92 05/03/2022    HGB 8.5 05/03/2022    HCT 26.9 05/03/2022    MCV 80.2 05/03/2022    MCH 25.5 05/03/2022    MCHC 31.8 05/03/2022    RDW 19.8 05/03/2022     05/03/2022    MPV 8.2 05/03/2022     BMP:  Lab Results   Component Value Date     05/03/2022    K 4.7 05/03/2022     05/03/2022    CO2 20 05/03/2022    BUN 25 05/03/2022    CREATININE 0.7 05/03/2022    CALCIUM 9.5 05/03/2022    GFRAA >60 05/03/2022    GFRAA >60 10/26/2012    LABGLOM >60 05/03/2022    LABGLOM 100 06/26/2012    GLUCOSE 113 05/03/2022    GLUCOSE 143 09/23/2011     Magnesium:   Lab Results   Component Value Date    MG 2.00 03/02/2022    MG 2.00 08/14/2020    MG 2.10 04/30/2019     LIVER PROFILE: No results for input(s): AST, ALT, LIPASE, BILIDIR, BILITOT, ALKPHOS in the last 72 hours. Invalid input(s): AMYLASE,  ALB  PT/INR:    Lab Results   Component Value Date    PROTIME 12.4 05/03/2022    PROTIME 12.7 12/25/2021    PROTIME 12.8 04/30/2019    INR 1.09 05/03/2022    INR 1.12 12/25/2021    INR 1.12 04/30/2019       IMPRESSION/RECOMMENDATIONS:    Principal Problem:    Coronary artery disease involving coronary bypass graft of native heart without angina pectoris  Active Problems:    CAD, multiple vessel    Coronary artery disease involving native coronary artery of native heart with angina pectoris (HCC)    Essential hypertension    RAHUL (obstructive sleep apnea)    Diabetes mellitus type 2 in obese (HCC)    Paroxysmal atrial fibrillation ([address]    Anemia    Pacemaker    Melena  Resolved Problems:    * No resolved hospital problems. *       62-year-old female with a history of CAD s/p CABG, CKD, HTN, DM, and afib who has:    1. Anemia secondary to acute blood loss, MDS, and iron deficiency  - Bone marrow evaluation on 6/25/2018 showed changes suggestive of myelodysplastic syndrome. Receives weekly Retacrit injections in office.  - Most recent iron labs from 4/8/2022 consistent with iron deficiency anemia  - s/p Venofer 300mg infusions on 4/22/222 and 4/29/22 in office, next dose due 5/6/22  - GI following  - s/p enteroscopy and colonoscopy today with intervention. S/p APV to AVMs. - Hgb 8.5 today, s/p 1 unit pRBCs transfusion  - transfuse for a goal HGB above 8 given CAD  - Venofer already ordered per GI    2. CAD  - Cardiology following  - plan for intervention once anemia improves      This plan was discussed with the patient and he/she verbalized understanding. Thank you for allowing us to participate in the care of this patient.      BILL Langston CNP  [facility]  [phone]        Known patient with low grade MDS and iron deficiency admitted for cardiac intervention found to have worsening anemia. Acute on chronic 2/2 to blood loss. Agree with venofer infusions. Will consider dose of retacrit.  Transfuse for goal HGB above 8    Sahara Barrios MD  [facility]

## 2022-05-03 NOTE — PROGRESS NOTES
Patient returned to room from endoscopy, bedside report received from endo RN. Patient drowsy but alert and answering questions appropriately. BP stable, SpO2 in low 80's on room air. Placed patient on home CPAP, O2 up to 87%. Placed patient on 4L nasal cannula with saturation 95%. Resting comfortably at this time. Post transfusion H/H drawn and sent to lab. Will continue with current plan of care.

## 2022-05-03 NOTE — PROGRESS NOTES
New IV started. Patient understands that she will be receiving the first unit of PRBC in endo preop. Pt agrees to blood transfusion and consent was already signed prior to arrival to the preop endo area.

## 2022-05-03 NOTE — PROGRESS NOTES
Pt transferred to room 2911 at this time. A&O with no signs of distress. Tele in place, with visual on monitor, HR of 60s and paced. Report given to [facility]. V/u and denies questions or further needs at this time.

## 2022-05-03 NOTE — PROGRESS NOTES
Reassessment complete. See flow sheet. Worked with therapy this afternoon, up to chair and is much more alert. Family at bedside and updated. IV iron infusing. Will continue with current plan of care.

## 2022-05-03 NOTE — PROGRESS NOTES
Hospitalist Progress Note      PCP: Sushil Eugene DO    Date of Admission: 5/2/2022    Chief Complaint:  Chest pain    Hospital Course:  80 y.o. female with h/o CAD, DM 2, CKD 3, pAfib, morbid obesity, RAHUL, HTN who came to outpatient cath lab for CAD intervention with Dr Lori Mina. Patient is getting outpatient IV iron by Dr Lina Ugarte and has had melena. Had recent EGD by Dr Chely Smith in March. Admitted as inpatient CAD and acute on chronic blood loss anemia with azotemia. Followed by GI, Cardio and Renal.    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 Mid-jejunum marked with spot dye. Two shallow healing ulcers  (this could potentially have bled in the past).      Colonoscopy  No active lower GI bleed. Possible pinpoint cecal AVM with minimal oozing, s/p APC. Sigmoid diverticulosis. Hemorrhoids. Liquid stool in colon mix of black and dark green     Plans  Monitor H/H, transfuse to keep Hb>8. Oral PPI BID for 6 weeks and then daily thereafter. No recall colonoscopy due to advanced age and comorbidities. Transfused 1 U PRBC for ABLA.     Subjective: Patient seen after enteroscopy and colonoscopy. No CP, SOB, HA or abdominal pain. No HA or fevers.        Medications:  Reviewed    Infusion Medications    sodium chloride      dextrose      dextrose       Scheduled Medications    amLODIPine  2.5 mg Oral BID    vitamin C  1,000 mg Oral Daily    aspirin  81 mg Oral Daily    [Held by provider] clopidogrel  75 mg Oral Daily    vitamin B-12  3,000 mcg Oral Daily    escitalopram  20 mg Oral Daily    cetirizine  10 mg Oral Daily    gabapentin  400 mg Oral TID    isosorbide mononitrate  30 mg Oral Daily    metoprolol tartrate  12.5 mg Oral BID    pioglitazone  15 mg Oral Daily    potassium bicarb-citric acid  10 mEq Oral Daily    rosuvastatin  20 mg Oral QPM    Vitamin D  1,000 Units Oral Daily  zinc sulfate  50 mg Oral 4x Daily    sodium chloride flush  5-40 mL IntraVENous 2 times per day    insulin lispro  0-12 Units SubCUTAneous TID WC    insulin lispro  0-6 Units SubCUTAneous Nightly    pantoprazole  40 mg IntraVENous BID     PRN Meds: sodium chloride flush, nitroGLYCERIN, SUMAtriptan, tiZANidine, sodium chloride flush, sodium chloride, ondansetron **OR** ondansetron, polyethylene glycol, acetaminophen **OR** acetaminophen, glucose, dextrose, glucagon (rDNA), dextrose, ipratropium      Intake/Output Summary (Last 24 hours) at 5/3/2022 1411  Last data filed at 5/3/2022 1250  Gross per 24 hour   Intake 982 ml   Output 400 ml   Net 582 ml       Physical Exam Performed:    BP (!) 133/40   Pulse 60   Temp 96.8 °F (36 °C) (Temporal)   Resp 14   Ht 5' (1.524 m)   Wt 202 lb 9.6 oz (91.9 kg)   SpO2 94%   BMI 39.57 kg/m²     General appearance:  Appears comfortable. Well nourished  Eyes: Sclera clear, pupils equal  ENT: Moist mucus membranes, no thrush. Trachea midline. Cardiovascular: Regular rhythm, normal S1, S2. No murmur, gallop, rub. No edema in lower extremities. L PPM  Respiratory: Clear to auscultation bilaterally, no wheeze, good inspiratory effort  Gastrointestinal: Abdomen soft, non-tender, not distended, normal bowel sounds  Musculoskeletal: No cyanosis in digits, neck supple  Neurology: Grossly intact. Alert and oriented in time, place and person. No speech or motor deficits  Psychiatry: Appropriate affect. Not agitated  Skin: Warm, dry, normal turgor, no rash  Brisk capillary refill, peripheral pulses palpable       Labs:   Recent Labs     05/02/22  0912 05/02/22  0912 05/02/22  1800 05/03/22  0430 05/03/22  1240   WBC 6.9  --   --  6.4  --    HGB 8.3*   < > 8.9* 7.5* 8.5*   HCT 25.9*   < > 29.0* 23.4* 26.9*     --   --  272  --     < > = values in this interval not displayed.      Recent Labs     05/02/22  0912 05/03/22  0430    140   K 4.4 4.7    109   CO2 19* 20*   BUN 42* 25*   CREATININE 1.0 0.7   CALCIUM 9.8 9.5     No results for input(s): AST, ALT, BILIDIR, BILITOT, ALKPHOS in the last 72 hours. Recent Labs     05/03/22 0430   INR 1.09     Recent Labs     05/03/22 0430   TROPONINI <0.01       Urinalysis:      Lab Results   Component Value Date    NITRU Negative 05/02/2022    WBCUA 31 05/02/2022    BACTERIA 4+ 05/02/2022    RBCUA 0 05/02/2022    BLOODU Negative 05/02/2022    SPECGRAV 1.010 05/02/2022    GLUCOSEU Negative 05/02/2022       Radiology:  No orders to display           Assessment/Plan:    Active Hospital Problems    Diagnosis     CAD, multiple vessel [I25.10]      Priority: Medium    Melena [K92.1]     Pacemaker [Z95.0]     Anemia [D64.9]     Coronary artery disease involving coronary bypass graft of native heart without angina pectoris [I25.810]     Paroxysmal atrial fibrillation (HCC) [I48.0]     Diabetes mellitus type 2 in obese (HCC) [E11.69, E66.9]     RAHUL (obstructive sleep apnea) [G47.33]     Coronary artery disease involving native coronary artery of native heart with angina pectoris ([facility] [I25.119]     Essential hypertension [I10]      1. Serial H/H; transfuse if Hg < 8  2. GI consult appreciated  3. Hematology consult for anemia  4. Continue ASA, Lipitor, Toprol  5. Renal consult for CKD appreciated  6. CLD for possible colonoscopy  7. PT/OT eval  8. Cardiology consult appreciated  9. Will do CT Enterography on 5/4; NPO p MN  10. Consider [facility] with intervention if azotemia and anemia improving/stable    DVT Prophylaxis: SCD  Diet: ADULT DIET; Clear Liquid  Code Status: Full Code    PT/OT Eval Status: Requested    Dispo - Home    Discussed with patient, Dr Vaughn Betts (Cardio), Dr Salvatore Adams (Renal), Dr Riri Luna (GI), nursing and CM. This is a very complicated situation with covert bleeding and symptomatic CAD.     Carlyn Ponce MD

## 2022-05-03 NOTE — ACP (ADVANCE CARE PLANNING)
Advanced Care Planning Note. Purpose of Encounter: Advanced care planning in light of CAD  Parties In Attendance: Patient  Decisional Capacity: Yes  Subjective: Patient denies CP and SOB  Objective: Cr 0.7  Goals of Care Determination: Patient wants full support (CPR, vent, surgery, HD, trach, PEG)  Plan:  GI, Onc, Renal and Cardio consults. PT/OT eval.  CT Enterography. Code Status: Full code   Time spent on Advanced care Plannin minutes  Advanced Care Planning Documents: Completed advanced directives on chart,  is the POA.     Delbert Lebron MD  5/3/2022 2:16 PM

## 2022-05-03 NOTE — PROGRESS NOTES
[facility] CVU Note    Patient Active Problem List   Diagnosis    Coronary artery disease involving native coronary artery of native heart with angina pectoris ([address]    Essential hypertension    RAHUL (obstructive sleep apnea)    Chronic kidney disease    Class 3 severe obesity due to excess calories with serious comorbidity and body mass index (BMI) of 40.0 to 44.9 in adult [facility])    Diabetes mellitus type 2 in obese (HCC)    Paroxysmal atrial fibrillation ([facility]    Coronary artery disease involving coronary bypass graft of native heart without angina pectoris    Anemia    Pacemaker    NSTEMI (non-ST elevated myocardial infarction) ([address]    Pneumonia    COVID-19    MALCOLM (acute kidney injury) ([facility]    Melena    CAD, multiple vessel       Past Medical History:   has a past medical history of Anemia, Anesthesia, Anesthesia complication, Atrial fibrillation ([address] CAD (coronary artery disease), Chest pain, Chronic kidney disease, Depression, GERD (gastroesophageal reflux disease), Glaucoma, Hiatal hernia, Hyperlipidemia, Hypertension, Migraines, neuralgic, Morbid obesity ([address] Osteoarthritis, Sleep apnea, Type II or unspecified type diabetes mellitus without mention of complication, not stated as uncontrolled, Unspecified sleep apnea, Urinary incontinence, and UTI (urinary tract infection). Past Social History:   reports that she has never smoked. She has never used smokeless tobacco. She reports that she does not drink alcohol and does not use drugs. Subjective: For EGD and colonoscopy today. Also for prbc transfusion. Review of Systems   Constitutional: Negative for activity change, appetite change, chills, fatigue, fever and unexpected weight change. HENT: Negative for congestion and facial swelling. Eyes: Negative for photophobia, discharge and redness. Respiratory: Negative for cough, chest tightness and shortness of breath. Cardiovascular: Negative for chest pain, palpitations and leg swelling. Gastrointestinal: Negative for abdominal distention, abdominal pain, blood in stool, constipation, diarrhea, nausea and vomiting. Endocrine: Negative for cold intolerance, heat intolerance and polyuria. Genitourinary: Negative for decreased urine volume, difficulty urinating, flank pain and hematuria. Musculoskeletal: Negative for joint swelling and neck pain. Neurological: Negative for dizziness, seizures, syncope, speech difficulty, light-headedness and headaches. Hematological: Does not bruise/bleed easily. Psychiatric/Behavioral: Negative for agitation, confusion and hallucinations. Objective:      BP (!) 129/39   Pulse 60   Temp 97.6 °F (36.4 °C) (Temporal)   Resp 16   Ht 5' (1.524 m)   Wt 202 lb 9.6 oz (91.9 kg)   SpO2 97%   BMI 39.57 kg/m²     Wt Readings from Last 3 Encounters:   05/03/22 202 lb 9.6 oz (91.9 kg)   04/25/22 208 lb (94.3 kg)   04/13/22 208 lb (94.3 kg)       BP Readings from Last 3 Encounters:   05/03/22 (!) 129/39   04/25/22 (!) 143/62   04/13/22 (!) 114/50     Chest- clear  Heart-regular  Abd-soft  Ext- trace edema    Labs  Hemoglobin   Date Value Ref Range Status   05/03/2022 7.5 (L) 12.0 - 16.0 g/dL Final     Hematocrit   Date Value Ref Range Status   05/03/2022 23.4 (L) 36.0 - 48.0 % Final     WBC   Date Value Ref Range Status   05/03/2022 6.4 4.0 - 11.0 K/uL Final     Platelets   Date Value Ref Range Status   05/03/2022 272 135 - 450 K/uL Final     Lab Results   Component Value Date    CREATININE 0.7 05/03/2022    BUN 25 (H) 05/03/2022     05/03/2022    K 4.7 05/03/2022     05/03/2022    CO2 20 (L) 05/03/2022     UA mod LE and 31 wbc with trace protein. Assessment/Plan:  1. H/O MALCOLM- baseline crea 0.8 to 1. With bp lower limits, will continue holding off on ARB+HCTZ, Lasix and SPLT for now. Crea at baseline.   Holding off on cardiac stenting while awaiting GI w/u.   2. Resp. Alkalosis-serum HCO3 better. 3.  Relative Hypotension-as per #1. Follow weights, lower. 4.  Anemia- GI w/u. Follows with Dr. Ethel Robledo too. Prbc transfusion today. 5.  H/O DM  6. H/O Edema  7. H/O A. Fib  8. H/O Obesity  9. Chest Pain- Cardiology notes reviewed. Updated patient.      Jess Hernández MD

## 2022-05-03 NOTE — PROGRESS NOTES
Pt stable and able to be transferred from PACU to room 2911. A&O , VSS, with no complaints at this time. CVU called and notified that pt is being transferred out of PACU and to room.

## 2022-05-03 NOTE — BRIEF OP NOTE
Brief Postoperative Note      Patient: Carlin Altamirano  YOB: 1950  MRN: [id number]    Date of Procedure: 5/3/2022    Pre-Op Diagnosis: Anemia/AVM        Procedure(s):  ENTEROSCOPY WITH INTERVENTION  COLONOSCOPY CONTROL HEMORRHAGE    Surgeon(s):  Sayra Rubi MD    Anesthesia: General    Estimated Blood Loss (mL): Minimal    Complications: None    Findings:   SBE  Two non-bleeding AVMs in the mid-jejunum, s/p APC. Mid-jejunum marked with spot dye. Two shallow healing ulcers  (this could potentially have bled in the past). Colonoscopy  No active lower GI bleed. Possible pinpoint cecal AVM with minimal oozing, s/p APC. Sigmoid diverticulosis. Hemorrhoids. Liquid stool in colon mix of black and dark green    Plans  Monitor H/H, transfuse to keep Hb>8. Oral PPI BID for 6 weeks and then daily thereafter. No recall colonoscopy due to advanced age and comorbidities.     Electronically signed by Sayra Rubi MD on 5/3/2022 at 11:51 AM

## 2022-05-03 NOTE — ANESTHESIA POSTPROCEDURE EVALUATION
Department of Anesthesiology  Postprocedure Note    Patient: Mikell Kanner  MRN: [id number]  YOB: 1950  Date of evaluation: 5/3/2022  Time:  12:06 PM     Procedure Summary     Date: 05/03/22 Room / Location: [address] [place] [address]    Anesthesia Start: 5730 Anesthesia Stop: 5914    Procedures:       ENTEROSCOPY WITH INTERVENTION (N/A )      COLONOSCOPY CONTROL HEMORRHAGE (N/A ) Diagnosis: (Anemia/AVM)    Surgeons: Christina Teixeira MD Responsible Provider: Seun Pittman MD    Anesthesia Type: general ASA Status: 4          Anesthesia Type: general    Rhonda Phase I: Rhonda Score: 7    Rhonda Phase II:      Last vitals: Reviewed and per EMR flowsheets. Anesthesia Post Evaluation    Patient location during evaluation: PACU  Level of consciousness: awake  Airway patency: patent  Complications: no  Cardiovascular status: hemodynamically stable  Respiratory status: acceptable  There was medical reason for not using a multimodal analgesia pain management approach.

## 2022-05-03 NOTE — PROGRESS NOTES
[facility]  Daily Progress Note    Admit: 5/2/2022      CC: CAD, AF, Anemia  Subj: Today, no cp this morning. Single episode cp yesterday trying to go to bathroom. CP substernal, pressure, associated with sob.       Obj:  BP (!) 130/36   Pulse 60   Temp 96.5 °F (35.8 °C) (Temporal)   Resp 16   Ht 5' (1.524 m)   Wt 202 lb 9.6 oz (91.9 kg)   SpO2 98%   BMI 39.57 kg/m²     Intake/Output Summary (Last 24 hours) at 5/3/2022 2373  Last data filed at 5/2/2022 1501  Gross per 24 hour   Intake --   Output 400 ml   Net -400 ml     Gen Alert, coop, no distress Heart Rrr, no mrg   Head NC, AT, no abnorm Abd Soft, NT, ND, +BS, no mass, no OM   Eyes PERRLA, conj/corn clear Ext Ext nl, AT, no c/c/e   Nose Nares nl, no drain, NT Pulse 2+ symm ra, dp, pt   Throat Lips, mucosa, tongue nl Skin Color/tect/turg nl, no rash/lesion   Neck S/S, TM, NT, no bruit/JVD Psych Nl mood and affect   Lung cta bilat Lymph No cervical or ax LA   Ch Wall NT, no deform Neuro Nl gross M/S exam     Medications:    amLODIPine  2.5 mg Oral BID    vitamin C  1,000 mg Oral Daily    aspirin  81 mg Oral Daily    clopidogrel  75 mg Oral Daily    vitamin B-12  3,000 mcg Oral Daily    escitalopram  20 mg Oral Daily    cetirizine  10 mg Oral Daily    gabapentin  400 mg Oral TID    isosorbide mononitrate  30 mg Oral Daily    metoprolol tartrate  12.5 mg Oral BID    pioglitazone  15 mg Oral Daily    potassium bicarb-citric acid  10 mEq Oral Daily    rosuvastatin  20 mg Oral QPM    Vitamin D  1,000 Units Oral Daily    zinc sulfate  50 mg Oral 4x Daily    sodium chloride flush  5-40 mL IntraVENous 2 times per day    insulin lispro  0-12 Units SubCUTAneous TID WC    insulin lispro  0-6 Units SubCUTAneous Nightly    pantoprazole  40 mg IntraVENous BID     Lab Data: Relevant and available CV data reviewed    Plan  *CAD  Status Hx CABG 1999, Elevated troponin but stable  LHC 1/17 Patent grafts  TTE 9/19 55%  Plan Plan for coronary PCI originally however progressive anemia problematic   Recommend GIB workup prior to LM stenting, will not be able to d/c DAPT for at least 6 months after stent  *AFIB  Status Hx ablation and PPM, stable  Plan Hold AC with blood loss and anemia  *Anemia  Status Progressive anemia, now 7.5  Plan GI evaluating. May hold plavix if necessary for GIB, do not stop ASA unless absolutely necessary    Time:  More than 35 minutes spent reviewing patient chart (including but not limited to notes, labs, imaging and other testing), interviewing patient and/or family members, performing a physical exam, documentation of my findings above and subsequent follow-up of ordered testing. More than 50% of that time spent face to face with patient discussing clinical condition and counseling regarding treatment plan. Addendum  D/w Dr. Ace Colin. Endo with AVMs s/p APC, 2 ulcers, hemorrhoids, cecal AVM s/p APC. Given concern for additional small bowel pathology and need for capsule endoscopy as well as marked anemia of yet determined stability, will hold off PCI for now given negative troponin and no EKG evidence for ischemia or injury. Anemia likely exacerbating underlying CAD. Premature discontinuation of DAPT after LM stenting into Cx would be associated with high risk of mortality.

## 2022-05-03 NOTE — PROGRESS NOTES
Pt arrived from endo to PACU bay 1. Reported received from endo staff. Pt not arousable to voice. Pt on 6L simple mask, paced - SR, and VSS. Will continue to monitor.

## 2022-05-03 NOTE — ANESTHESIA PRE PROCEDURE
Department of Anesthesiology  Preprocedure Note       Name:  John Falcon   Age:  80 y.o.  :  1950                                          MRN:  [id number]         Date:  5/3/2022      Surgeon: Milagros Colindres):  Barbara Sánchez MD    Procedure: Procedure(s):  ENTEROSCOPY PUSH DIAGNOSTIC  COLONOSCOPY DIAGNOSTIC    Medications prior to admission:   Prior to Admission medications    Medication Sig Start Date End Date Taking?  Authorizing Provider   clopidogrel (PLAVIX) 75 MG tablet Take 1 tablet by mouth daily 22   Erik Crawley MD   isosorbide mononitrate (ISMO;MONOKET) 10 MG tablet Take 1 tablet by mouth 2 times daily 22   Nandini Hilario APRN - CNP   metoprolol tartrate (LOPRESSOR) 25 MG tablet Take 0.5 tablets by mouth 2 times daily 22   BILL Bhatia - CNP   fexofenadine (ALLEGRA) 180 MG tablet Take 180 mg by mouth daily    Historical Provider, MD   Epoetin Leonard-epbx (RETACRIT IJ) Inject as directed Every friday    Historical Provider, MD   losartan-hydroCHLOROthiazide (HYZAAR) 100-25 MG per tablet Take 1 tablet by mouth daily    Historical Provider, MD   ipratropium (ATROVENT HFA) 17 MCG/ACT inhaler Inhale 2 puffs into the lungs three times daily 22   Ivon Lozada MD   SUMAtriptan (IMITREX) 100 MG tablet Take 1 tablet by mouth daily as needed for Migraine 22   Ivon Lozada MD   tiZANidine (ZANAFLEX) 4 MG tablet Take 1 tablet by mouth nightly as needed (leg spasms) 22   Ivon Lozada MD   zinc sulfate (ZINCATE) 220 (50 Zn) MG capsule Take 50 mg by mouth 4 times daily     Historical Provider, MD   potassium chloride (KLOR-CON M) 20 MEQ extended release tablet Take 1 tablet by mouth daily  Patient taking differently: Take 10 mEq by mouth daily  20   Erik Crawley MD   amLODIPine (NORVASC) 5 MG tablet Take 1 tablet by mouth every evening  Patient taking differently: Take 2.5 mg by mouth 2 times daily  19   Maria L Boyle MD   aspirin 81 MG tablet Take 81 mg by mouth daily    Historical Provider, MD   furosemide (LASIX) 40 MG tablet Take 1 tablet by mouth daily 9/20/18   [place] Neumann MD   Ascorbic Acid (VITAMIN C) 1000 MG tablet Take 1,000 mg by mouth daily    Historical Provider, MD   Cyanocobalamin (VITAMIN B-12 PO) Take 3,000 mcg by mouth daily    Historical Provider, MD   estradiol (ESTRACE) 0.1 MG/GM vaginal cream Place 2 g vaginally See Admin Instructions Twice a week    Historical Provider, MD   Cranberry 450 MG CAPS Take by mouth daily     Historical Provider, MD   pioglitazone (ACTOS) 15 MG tablet Take 15 mg by mouth daily    Historical Provider, MD   vitamin D (CHOLECALCIFEROL) 1000 UNIT TABS tablet Take 1,000 Units by mouth daily     Historical Provider, MD   spironolactone (ALDACTONE) 25 MG tablet Take 25 mg by mouth every morning (before breakfast)     Historical Provider, MD   dexlansoprazole (DEXILANT) 60 MG CPDR delayed release capsule Take 60 mg by mouth daily    Historical Provider, MD   gabapentin (NEURONTIN) 100 MG capsule Take 400 mg by mouth 3 times daily. Keely Whitfield Historical Provider, MD   TraZODone HCl (DESYREL PO) Take 50 mg by mouth nightly     Historical Provider, MD   Calcium Citrate-Vitamin D (CALCIUM CITRATE + D PO) Take 1 tablet by mouth daily     Historical Provider, MD   SLOW-MAG 71.5-119 MG TBEC tablet Take 1 tablet by mouth daily 143 mg 11/3/15   Historical Provider, MD   rosuvastatin (CRESTOR) 40 MG tablet Take 20 mg by mouth every evening     Historical Provider, MD   metFORMIN (GLUCOPHAGE) 500 MG tablet Take 500 mg by mouth 2 times daily (with meals)     Historical Provider, MD   ezetimibe (ZETIA) 10 MG tablet Take 5 mg by mouth nightly     Historical Provider, MD   escitalopram (LEXAPRO) 20 MG tablet Take 20 mg by mouth daily. Historical Provider, MD   nitroGLYCERIN (NITROLINGUAL) 0.4 MG/SPRAY spray Place 1 spray under the tongue every 5 minutes as needed.  As directed for chest pain     Historical Provider, MD Current medications:    No current facility-administered medications for this visit. No current outpatient medications on file.      Facility-Administered Medications Ordered in Other Visits   Medication Dose Route Frequency Provider Last Rate Last Admin    0.9 % sodium chloride infusion   IntraVENous PRN Dioni Lewis PA-C        sodium chloride flush 0.9 % injection 5-40 mL  5-40 mL IntraVENous PRN Bruce Arias MD        amLODIPine [facility]) tablet 2.5 mg  2.5 mg Oral BID Angel Boudreaux MD   2.5 mg at 05/03/22 0831    ascorbic acid (VITAMIN C) tablet 1,000 mg  1,000 mg Oral Daily Angel Boudreaux MD   1,000 mg at 05/02/22 1356    aspirin EC tablet 81 mg  81 mg Oral Daily Angel Boudreaux MD        clopidogrel (PLAVIX) tablet 75 mg  75 mg Oral Daily Angel Boudreaux MD        vitamin B-12 (CYANOCOBALAMIN) tablet 3,000 mcg  3,000 mcg Oral Daily Angel Boudreaux MD   3,000 mcg at 05/02/22 1635    escitalopram (LEXAPRO) tablet 20 mg  20 mg Oral Daily nAgel Boudreaux MD   20 mg at 05/03/22 0830    cetirizine (ZYRTEC) tablet 10 mg  10 mg Oral Daily Angel Boudreaux MD   10 mg at 05/03/22 0830    gabapentin (NEURONTIN) capsule 400 mg  400 mg Oral TID Angel Boudreaux MD   400 mg at 05/03/22 0831    isosorbide mononitrate (IMDUR) extended release tablet 30 mg  30 mg Oral Daily Angel Boudreaux MD   30 mg at 05/03/22 0830    nitroGLYCERIN (NITROSTAT) SL tablet 0.4 mg  0.4 mg SubLINGual Q5 Min PRN Angel Boudreaux MD   0.4 mg at 05/02/22 1302    metoprolol tartrate (LOPRESSOR) tablet 12.5 mg  12.5 mg Oral BID Angel Boudreaux MD   12.5 mg at 05/03/22 0831    pioglitazone (ACTOS) tablet 15 mg  15 mg Oral Daily Angel Boudreaux MD   15 mg at 05/03/22 0831    potassium bicarb-citric acid (EFFER-K) effervescent tablet 10 mEq  10 mEq Oral Daily Angel Boudreaux MD   10 mEq at 05/02/22 1355    rosuvastatin (CRESTOR) tablet 20 mg  20 mg Oral QPM Angel Boudreaux MD   20 mg at 05/02/22 1635    SUMAtriptan (IMITREX) tablet 100 mg  100 mg Oral Daily PRN Salomón Zimmerman MD        tiZANidine (ZANAFLEX) tablet 4 mg  4 mg Oral Nightly PRN Salomón Zimmerman MD        vitamin D (CHOLECALCIFEROL) tablet 1,000 Units  1,000 Units Oral Daily Salomón Zimmerman MD   1,000 Units at 05/02/22 1353    zinc sulfate (ZINCATE) capsule 50 mg  50 mg Oral 4x Daily Salomón Zimmerman MD   50 mg at 05/02/22 2113    sodium chloride flush 0.9 % injection 5-40 mL  5-40 mL IntraVENous 2 times per day Salomón Zimmerman MD   10 mL at 05/03/22 0834    sodium chloride flush 0.9 % injection 5-40 mL  5-40 mL IntraVENous PRN Salomón Zimmerman MD        0.9 % sodium chloride infusion   IntraVENous PRN Salomón Zimmerman MD        ondansetron (ZOFRAN-ODT) disintegrating tablet 4 mg  4 mg Oral Q8H PRN Salomón Zimmerman MD        Or    ondansetron (ZOFRAN) injection 4 mg  4 mg IntraVENous Q6H PRN Salomón Zimmerman MD        polyethylene glycol (GLYCOLAX) packet 17 g  17 g Oral Daily PRN Salomón Zimmerman MD        acetaminophen (TYLENOL) tablet 650 mg  650 mg Oral Q6H PRN Salomón Zimmerman MD        Or   [place] acetaminophen (TYLENOL) suppository 650 mg  650 mg Rectal Q6H PRN Salomón Zimmerman MD        glucose (GLUTOSE) 40 % oral gel 15 g  15 g Oral PRN Salomón Zimmerman MD        dextrose 10 % infusion  12.5 g IntraVENous PRN Salomón Zimmerman MD        glucagon (rDNA) injection 1 mg  1 mg IntraMUSCular PRN Salomón Zimmerman MD        dextrose 5 % solution  100 mL/hr IntraVENous PRN Salomón Zimmerman MD        insulin lispro (HUMALOG) injection vial 0-12 Units  0-12 Units SubCUTAneous TID WC Salomón Zimmerman MD        insulin lispro (HUMALOG) injection vial 0-6 Units  0-6 Units SubCUTAneous Nightly Salomón Zimmerman MD        ipratropium (ATROVENT HFA) 17 MCG/ACT inhaler 2 puff  2 puff Inhalation Q4H PRN Salomón Zimmerman MD        pantoprazole (PROTONIX) injection 40 mg  40 mg IntraVENous BID Jl Copeland MD   40 mg at 05/03/22 5448       Allergies:     Allergies   Allergen Reactions    Albuterol Other (See Comments)     Other reaction(s): Chest Pain  Crushing chest pain  Heparin Other (See Comments)     Caused bruises and hematomas immediately when drip started. MARKED BRUISING/HEMATOMAS    Niacin     Avelox [Moxifloxacin Hcl In Nacl] Rash    Moxifloxacin Rash    Niaspan [Niacin Er] Itching and Rash    Proventil [Albuterol Sulfate] Palpitations    Tagamet [Cimetidine] Rash       Problem List:    Patient Active Problem List   Diagnosis Code    Coronary artery disease involving native coronary artery of native heart with angina pectoris ([facility] I25.119    Essential hypertension I10    RAHUL (obstructive sleep apnea) G47.33    Chronic kidney disease N18.9    Class 3 severe obesity due to excess calories with serious comorbidity and body mass index (BMI) of 40.0 to 44.9 in adult ([facility]) E66.01, Z68.41    Diabetes mellitus type 2 in obese ([facility]) E11.69, E66.9    Paroxysmal atrial fibrillation ([facility]) I48.0    Coronary artery disease involving coronary bypass graft of native heart without angina pectoris I25.810    Anemia D64.9    Pacemaker Z95.0    NSTEMI (non-ST elevated myocardial infarction) ([facility]) I21.4    Pneumonia J18.9    COVID-19 U07.1    MALCOLM (acute kidney injury) ([facility]) N17.9    Melena K92.1    CAD, multiple vessel I25.10       Past Medical History:        Diagnosis Date    Anemia     Anesthesia     trouble after egd 10/2016. Anxiety and severe tremors after AF ablation 8/2018-responded well to Ativan    Anesthesia complication     flailing, yelling when waking up from anesthesia-ativan helps ([id number]/19: ativans works within seconds of administration)    Atrial fibrillation ([facility] 10/01/2017    A. fib with SVR    CAD (coronary artery disease)     Chest pain 10/01/2017    Chronic kidney disease     ? ??    Depression     GERD (gastroesophageal reflux disease)     Glaucoma     Hiatal hernia     Hyperlipidemia     Hypertension     Migraines, neuralgic     Morbid obesity ([facility])     Osteoarthritis     Sleep apnea     uses bi-pap    Type II or unspecified type diabetes mellitus without mention of complication, not stated as uncontrolled     Unspecified sleep apnea     uses cpap    Urinary incontinence     UTI (urinary tract infection)        Past Surgical History:        Procedure Laterality Date    ABDOMEN SURGERY N/A 9/1/2020    EXPLORATION OF ABDOMINAL WOUND performed by China Peace MD at [facility]  2/22/1999    quadruple by-pass, [address] COLONOSCOPY  10/08/2018    1 polyp removed    COLONOSCOPY N/A 10/8/2018    COLONOSCOPY POLYPECTOMY SNARE/COLD BIOPSY performed by Raymond Mckinney MD at [facility]    COSMETIC SURGERY      face lift    ENTEROSCOPY N/A 2/26/2019    ENTEROSCOPY performed by Raymond Mckinney MD at [facility]  12/20/11    hiatal hernia repair    GASTRIC BAND REMOVAL  11/03/2016    laparoscopic     HAMMER TOE SURGERY      [facility]. INJECTION PROCEDURE FOR SACROILIAC JOINT Left 12/17/2018    SACROILIAC JOINT INJECTION ON THE LEFT WITH FLUOROSCOPY performed by Jaison Wright MD at [address]    both   [address]    Left and Right knees, [facility]    KNEE ARTHROSCOPY      1982 left    ORIF HUMERUS DECOMPRESSION Left 2/25/2016    OPEN REDUCTION INTERNAL FIXATION LEFT PROXIMAL HUMERUS    OVARY REMOVAL Bilateral 1997    PACEMAKER PLACEMENT  05/2019    AK NJX DX/THER SBST INTRLMNR CRV/THRC W/IMG GDN N/A 11/5/2018    MIDLINE L4/L5 LUMBAR INTERLAMINAR EPIDURAL STEROID INJECTION WITH FLUOROSCOPY performed by Jaison Wright MD at [address] [employer]  10/30/15    UPPER GASTROINTESTINAL ENDOSCOPY  10/14/2016    with biopsy    UPPER GASTROINTESTINAL ENDOSCOPY N/A 3/22/2022    EGD BIOPSY performed by Raoul Roldan MD at [address] [place] [address] N/A 3/22/2022    EGD DILATION BALLOON performed by Raoul Roldan MD at [place] [address] N/A 10/29/2019    OPEN VENTRAL HERNIA REPAIR WITH  MESH performed by Peter Richards MD at [address] [place] [address] History:    Social History     Tobacco Use    Smoking status: Never Smoker    Smokeless tobacco: Never Used   Substance Use Topics    Alcohol use: No     Alcohol/week: 0.0 standard drinks                                Counseling given: Not Answered      Vital Signs (Current): There were no vitals filed for this visit.                                            BP Readings from Last 3 Encounters:   05/03/22 (!) 123/58   04/25/22 (!) 143/62   04/13/22 (!) 114/50       NPO Status:                                                                                 BMI:   Wt Readings from Last 3 Encounters:   05/03/22 202 lb 9.6 oz (91.9 kg)   04/25/22 208 lb (94.3 kg)   04/13/22 208 lb (94.3 kg)     There is no height or weight on file to calculate BMI.    CBC:   Lab Results   Component Value Date    WBC 6.4 05/03/2022    RBC 2.92 05/03/2022    HGB 7.5 05/03/2022    HCT 23.4 05/03/2022    MCV 80.2 05/03/2022    RDW 19.8 05/03/2022     05/03/2022       CMP:   Lab Results   Component Value Date     05/03/2022    K 4.7 05/03/2022     05/03/2022    CO2 20 05/03/2022    BUN 25 05/03/2022    CREATININE 0.7 05/03/2022    GFRAA >60 05/03/2022    GFRAA >60 10/26/2012    AGRATIO 1.9 03/02/2022    LABGLOM >60 05/03/2022    LABGLOM 100 06/26/2012    GLUCOSE 113 05/03/2022    GLUCOSE 143 09/23/2011    PROT 6.6 03/02/2022    PROT 7.8 10/26/2012    CALCIUM 9.5 05/03/2022    BILITOT <0.2 03/02/2022    ALKPHOS 63 03/02/2022    AST 15 03/02/2022    ALT 13 03/02/2022       POC Tests:   Recent Labs     05/03/22  0808   POCGLU 130*       Coags:   Lab Results   Component Value Date    PROTIME 12.4 05/03/2022 INR 1.09 05/03/2022    APTT 38.8 05/03/2022       HCG (If Applicable): No results found for: PREGTESTUR, PREGSERUM, HCG, HCGQUANT     ABGs: No results found for: PHART, PO2ART, KBI3WCS, JLH9MSI, BEART, S7QUQRHX     Type & Screen (If Applicable):  No results found for: LABABO, LABRH    Drug/Infectious Status (If Applicable):  No results found for: HIV, HEPCAB    COVID-19 Screening (If Applicable):   Lab Results   Component Value Date    COVID19 Not Detected 03/15/2022    COVID19 NOT DETECTED 08/27/2020         Anesthesia Evaluation  Patient summary reviewed history of anesthetic complications (wakes up wild, does well with ativan post op): Airway: Mallampati: II  TM distance: >3 FB   Neck ROM: full  Mouth opening: > = 3 FB Dental: normal exam         Pulmonary: breath sounds clear to auscultation  (+) sleep apnea: on CPAP,                             Cardiovascular:    (+) hypertension:, pacemaker: pacemaker, past MI:, CAD:, CABG/stent (CABG 1999):, dysrhythmias (with RVR in the past, sinus node dysfunction): atrial fibrillation,         Rhythm: regular  Rate: normal  Echocardiogram reviewed    Cleared by cardiology              Neuro/Psych:   (+) headaches:, psychiatric history:   (-) seizures, TIA and CVA            ROS comment: glaucoma GI/Hepatic/Renal:   (+) hiatal hernia, GERD: well controlled, renal disease: CRI, bowel prep,           Endo/Other:    (+) DiabetesType II DM, , blood dyscrasia: anemia, arthritis: OA., .                 Abdominal:   (+) obese,           Vascular: Other Findings:               Anesthesia Plan      general     ASA 4     (Hgb 7.5, will start prbcs)  Induction: intravenous. MIPS: Postoperative opioids intended, Prophylactic antiemetics administered and Postoperative trial extubation. Anesthetic plan and risks discussed with patient. Use of blood products discussed with patient whom consented to blood products. Plan discussed with XANDER.                 Rubi Henriquez Mert Barrios MD   5/3/2022

## 2022-05-03 NOTE — PROGRESS NOTES
[facility] [address]   Phone: [phone]    Occupational Therapy    [x] Initial Evaluation            [] Daily Treatment Note         [] Discharge Summary      Patient: Mony Reese   : 1950   MRN: [id number]   Date of Service:  5/3/2022    Admitting Diagnosis:  Coronary artery disease involving coronary bypass graft of native heart without angina pectoris  Current Admission Summary: Mony Reese is a 80 y.o. female with h/o CAD, DM 2, CKD 3, pAfib, morbid obesity, RAHUL, HTN who came to outpatient cath lab for CAD intervention with Dr Chay Valdez. Patient is getting outpatient IV iron by Dr Breanna Sheth and has had melena. Had recent EGD by Dr David Alanis in March. Has CP with AMAYA. Describes pressure going into neck and jaw. No fevers, chills or NS. Frequently constipated. Has not had recent colonoscopy. No hematochezia. No peripheral edema or orthopnea. Hg around 8 and BUN in 50s. Concern for possible bleeding. Otherwise complete ROS is negative unless listed above. Past Medical History:  has a past medical history of Anemia, Anesthesia, Anesthesia complication, Atrial fibrillation ([address] CAD (coronary artery disease), Chest pain, Chronic kidney disease, Depression, GERD (gastroesophageal reflux disease), Glaucoma, Hiatal hernia, Hyperlipidemia, Hypertension, Migraines, neuralgic, Morbid obesity ([address] Osteoarthritis, Sleep apnea, Type II or unspecified type diabetes mellitus without mention of complication, not stated as uncontrolled, Unspecified sleep apnea, Urinary incontinence, and UTI (urinary tract infection). Past Surgical History:  has a past surgical history that includes Coronary artery bypass graft (); Hammer toe surgery; Ovary removal (Bilateral, ); Esophagus dilation; Breast lumpectomy; Knee arthroscopy; Gastric Band (11); Colonoscopy; Upper gastrointestinal endoscopy; Upper gastrointestinal endoscopy (10/30/15);  Cosmetic surgery; joint replacement (1995); joint replacement (1995); orif humerus decompression (Left, 2/25/2016); Upper gastrointestinal endoscopy (10/14/2016); Bariatric Surgery (11/03/2016); ablation of dysrhythmic focus; Colonoscopy (10/08/2018); Colonoscopy (N/A, 10/8/2018); pr njx dx/ther sbst intrlmnr crv/thrc w/img gdn (N/A, 11/5/2018); Cardiac surgery (2/22/1999); Cardiac catheterization; Injection Procedure For Sacroiliac Joint (Left, 12/17/2018); Enteroscopy (N/A, 2/26/2019); ventral hernia repair (N/A, 10/29/2019); Abdomen surgery (N/A, 9/1/2020); pacemaker placement (05/2019); Upper gastrointestinal endoscopy (N/A, 3/22/2022); Upper gastrointestinal endoscopy (N/A, 3/22/2022); Upper gastrointestinal endoscopy (N/A, 5/3/2022); and Colonoscopy (N/A, 5/3/2022). Discharge Recommendations: Caroline Cazares scored a 18/24 on the AM-PAC ADL Inpatient form. Current research shows that an AM-PAC score of 18 or greater is typically associated with a discharge to the patient's home setting. Based on the patient's AM-PAC score, and their current ADL deficits, it is recommended that the patient have 2-3 sessions per week of Occupational Therapy at d/c to increase the patient's independence. At this time, this patient demonstrates the endurance and safety to discharge home with home based OT (home vs OP services) and a follow up treatment frequency of 2-3x/wk. Please see assessment section for further patient specific details. If patient discharges prior to next session this note will serve as a discharge summary. Please see below for the latest assessment towards goals.        DME Required For Discharge: rolling walker, rollator (4 wheel walker), tub transfer bench, grab bars - toilet, grab bars - shower    Precautions/Restrictions: medium fall risk    Pre-Admission Information   Lives With: spouse                  Type of Home: house  Home Layout: two level   Stair lift  Home Access: ramped entry  Bathroom Layout: walk in shower  Toilet Height: standard height, elevated height One bathroom has elevated toilet, other has standard toilet  Bathroom Equipment: grab bars in shower, shower chair, hand held shower head  Home Equipment: quad cane small base  Transfer Assistance: Independent without use of device  Ambulation Assistance:modified independent with use of cane  ADL Assistance: independent with all ADL's  IADL Assistance: independent with homemaking tasks  Active :        [x]? Yes                 []? No  Hand Dominance: []? Left                 [x]? Right  Current Employment: retired. Occupation:   Hobbies: Riding motorcycle  Recent Falls: 1    Examination   Vision:   Vision Corrective Device: wears contacts  Hearing:   WFL  Posture:   good  Sensation:   reports numbness and tingling in (B) LE  Proprioception:    WFL  Tone:   Normotonic  Coordination Testing:   WFL    ROM:   (B) UE ROM WFL  Strength:   (L) Shoulder: +4     (R) Shoulder: +4    Decision Making: low complexity  Clinical Presentation: stable      Subjective  General: Pt presenting supine in bed. Daughter and spouse present. She is pleasant and agreeable to PT/OT evaluations. Pain: 0/10  Pain Interventions: not applicable        Activities of Daily Living  Basic Activities of Daily Living  Grooming: stand by assistance . Comment: washed hands and brushed hair in stance at sink  Upper Extremity Dressing: . Comment: gown change completed in stance--Min A for Telemetry configuring. Equipment: none  Dressing Comments: Max A to julee/doff new socks due to AROM limitations. May benefit from LB adaptive equipment. Toileting: stand by assistance. Equipment: none  Toileting Comments: SBA for LB clothing management and voiding bladder at standard commode. Mod A to thread new breifs--SBA for pulling knees to hips and adjustement. set up for all pericare. Instrumental Activities of Daily Living  No IADL completed on this date.     Functional Mobility  Bed Mobility  Supine to Sit: contact guard assistance, . Comment Pt in recliner at departure. Comments:  Transfers  Sit to stand transfer:supervision  Stand to sit transfer: supervision  Toilet transfer: supervision. Mobility technique: ambulating. Equipment utilized: standard toilet, grab bars  Comments:  Functional Mobility:  Sitting Balance: Independent. Duration: ~2-3 min. Activity: sitting EOB. Standing Balance: supervision. Duration: ~~4-5 min. Activity: ambulation, transfers, ADL completion. Functional Mobility: single point cane. stand by assistance, contact guard assistance. Activity: to/from bathroom, Pt ambulated to/from bathroom x12ft x2 + 70ft in unit hallway SBA with single point cane; progressed to CGA for second 70ft secondary to fatigue. Please see PT notes for further details regarding gait quality. Other Therapeutic Interventions    Functional Outcomes  AM-PAC Inpatient Daily Activity Raw Score: 18    Cognition  WFL  Orientation:    alert and oriented x 4  Command Following:   [facility]     Education  Barriers To Learning: none  Patient Education: patient educated on goals, OT role and benefits, plan of care, ADL adaptive strategies, family education, discharge recommendations, reinforcement of avoiding process/fast food. Learning Assessment:  patient verbalizes understanding, would benefit from continued reinforcement    Assessment  Activity Tolerance: Pt tolerated treatment well. Initially saturating 92% on 4L O2 supine in bed. Progressed down to 3L and saturating ~95% during all ADLs. Desaturating to 89% after functional mobility--pt left in recliner on RA and recovers to WNL within 1-2 min. Nurse notified and aware.    Impairments Requiring Therapeutic Intervention: decreased functional mobility, decreased ADL status, decreased strength, decreased endurance, decreased balance  Prognosis: excellent  Clinical Assessment: Pt presenting below her functional baseline with the above deficits associated with CAD with bypass graft. She is weak and deconditioned; however anticipate safe and successful return to [facility] with medical management. Continued OT indicated in order to maximize safety and independence with all occupational pursuits.    Safety Interventions: patient left in chair, chair alarm in place, call light within reach, gait belt, patient at risk for falls and nurse notified    Plan  Frequency: 3-5 x/per week  Current Treatment Recommendations: strengthening, ROM, balance training, functional mobility training, transfer training, gait training, endurance training, patient/caregiver education, ADL/self-care training, home management training, home exercise program and safety education    Goals  Patient Goals: Return to home   Short Term Goals:  Time Frame: Discharge  Patient will complete upper body ADL at modified independent   Patient will complete lower body ADL at modified independent   Patient will complete toileting at modified independent   Patient will complete grooming at modified independent   Patient will complete functional transfers at modified independent   Patient will increase [facility] ADL score = to or > than 20/24    Therapy Session Time     Individual Group Co-treatment   Time In    0149   Time Out    0238   Minutes    49        Timed Code Treatment Minutes:  34 Minutes    Total Treatment Minutes:  49 minutes    Electronically Signed By: KENYA Pickens OTR/L  [id number]

## 2022-05-04 ENCOUNTER — APPOINTMENT (OUTPATIENT)
Dept: CT IMAGING | Age: 72
DRG: 378 | End: 2022-05-04
Attending: INTERNAL MEDICINE
Payer: MEDICARE

## 2022-05-04 LAB
ANION GAP SERPL CALCULATED.3IONS-SCNC: 11 MMOL/L (ref 3–16)
BASOPHILS ABSOLUTE: 0 K/UL (ref 0–0.2)
BASOPHILS RELATIVE PERCENT: 0.3 %
BUN BLDV-MCNC: 21 MG/DL (ref 7–20)
CALCIUM SERPL-MCNC: 9.2 MG/DL (ref 8.3–10.6)
CHLORIDE BLD-SCNC: 106 MMOL/L (ref 99–110)
CO2: 19 MMOL/L (ref 21–32)
CREAT SERPL-MCNC: 0.9 MG/DL (ref 0.6–1.2)
EOSINOPHILS ABSOLUTE: 0 K/UL (ref 0–0.6)
EOSINOPHILS RELATIVE PERCENT: 0 %
GFR AFRICAN AMERICAN: >60
GFR NON-AFRICAN AMERICAN: >60
GLUCOSE BLD-MCNC: 118 MG/DL (ref 70–99)
GLUCOSE BLD-MCNC: 126 MG/DL (ref 70–99)
GLUCOSE BLD-MCNC: 142 MG/DL (ref 70–99)
GLUCOSE BLD-MCNC: 150 MG/DL (ref 70–99)
GLUCOSE BLD-MCNC: 153 MG/DL (ref 70–99)
HCT VFR BLD CALC: 25.3 % (ref 36–48)
HCT VFR BLD CALC: 25.7 % (ref 36–48)
HCT VFR BLD CALC: 26.7 % (ref 36–48)
HEMOGLOBIN: 8 G/DL (ref 12–16)
HEMOGLOBIN: 8.3 G/DL (ref 12–16)
HEMOGLOBIN: 8.4 G/DL (ref 12–16)
LYMPHOCYTES ABSOLUTE: 1.2 K/UL (ref 1–5.1)
LYMPHOCYTES RELATIVE PERCENT: 17.3 %
MCH RBC QN AUTO: 26.4 PG (ref 26–34)
MCHC RBC AUTO-ENTMCNC: 32.3 G/DL (ref 31–36)
MCV RBC AUTO: 81.6 FL (ref 80–100)
MONOCYTES ABSOLUTE: 0.6 K/UL (ref 0–1.3)
MONOCYTES RELATIVE PERCENT: 9.1 %
NEUTROPHILS ABSOLUTE: 5.1 K/UL (ref 1.7–7.7)
NEUTROPHILS RELATIVE PERCENT: 73.3 %
PDW BLD-RTO: 19.6 % (ref 12.4–15.4)
PERFORMED ON: ABNORMAL
PLATELET # BLD: 260 K/UL (ref 135–450)
PMV BLD AUTO: 7.9 FL (ref 5–10.5)
POTASSIUM REFLEX MAGNESIUM: 4.5 MMOL/L (ref 3.5–5.1)
RBC # BLD: 3.15 M/UL (ref 4–5.2)
REASON FOR REJECTION: NORMAL
REJECTED TEST: NORMAL
SODIUM BLD-SCNC: 136 MMOL/L (ref 136–145)
WBC # BLD: 6.9 K/UL (ref 4–11)

## 2022-05-04 PROCEDURE — 2580000003 HC RX 258: Performed by: INTERNAL MEDICINE

## 2022-05-04 PROCEDURE — 2580000003 HC RX 258: Performed by: PHYSICIAN ASSISTANT

## 2022-05-04 PROCEDURE — 36415 COLL VENOUS BLD VENIPUNCTURE: CPT

## 2022-05-04 PROCEDURE — 6360000002 HC RX W HCPCS: Performed by: INTERNAL MEDICINE

## 2022-05-04 PROCEDURE — 74177 CT ABD & PELVIS W/CONTRAST: CPT

## 2022-05-04 PROCEDURE — 6370000000 HC RX 637 (ALT 250 FOR IP): Performed by: INTERNAL MEDICINE

## 2022-05-04 PROCEDURE — 6360000002 HC RX W HCPCS: Performed by: PHYSICIAN ASSISTANT

## 2022-05-04 PROCEDURE — 85018 HEMOGLOBIN: CPT

## 2022-05-04 PROCEDURE — 80048 BASIC METABOLIC PNL TOTAL CA: CPT

## 2022-05-04 PROCEDURE — C9113 INJ PANTOPRAZOLE SODIUM, VIA: HCPCS | Performed by: INTERNAL MEDICINE

## 2022-05-04 PROCEDURE — 99233 SBSQ HOSP IP/OBS HIGH 50: CPT | Performed by: INTERNAL MEDICINE

## 2022-05-04 PROCEDURE — 6360000004 HC RX CONTRAST MEDICATION: Performed by: INTERNAL MEDICINE

## 2022-05-04 PROCEDURE — 85025 COMPLETE CBC W/AUTO DIFF WBC: CPT

## 2022-05-04 PROCEDURE — 1200000000 HC SEMI PRIVATE

## 2022-05-04 PROCEDURE — 85014 HEMATOCRIT: CPT

## 2022-05-04 RX ORDER — SODIUM CHLORIDE 9 MG/ML
INJECTION, SOLUTION INTRAVENOUS CONTINUOUS
Status: ACTIVE | OUTPATIENT
Start: 2022-05-04 | End: 2022-05-05

## 2022-05-04 RX ADMIN — SODIUM CHLORIDE: 9 INJECTION, SOLUTION INTRAVENOUS at 13:41

## 2022-05-04 RX ADMIN — Medication 10 ML: at 08:23

## 2022-05-04 RX ADMIN — IRON SUCROSE 200 MG: 20 INJECTION, SOLUTION INTRAVENOUS at 15:52

## 2022-05-04 RX ADMIN — ZINC SULFATE 220 MG (50 MG) CAPSULE 50 MG: CAPSULE at 08:23

## 2022-05-04 RX ADMIN — CYANOCOBALAMIN TAB 1000 MCG 3000 MCG: 1000 TAB at 08:20

## 2022-05-04 RX ADMIN — GABAPENTIN 400 MG: 400 CAPSULE ORAL at 20:15

## 2022-05-04 RX ADMIN — OXYCODONE HYDROCHLORIDE AND ACETAMINOPHEN 1000 MG: 500 TABLET ORAL at 08:20

## 2022-05-04 RX ADMIN — ZINC SULFATE 220 MG (50 MG) CAPSULE 50 MG: CAPSULE at 17:00

## 2022-05-04 RX ADMIN — ROSUVASTATIN CALCIUM 20 MG: 20 TABLET, FILM COATED ORAL at 17:00

## 2022-05-04 RX ADMIN — ESCITALOPRAM OXALATE 20 MG: 10 TABLET ORAL at 08:20

## 2022-05-04 RX ADMIN — ZINC SULFATE 220 MG (50 MG) CAPSULE 50 MG: CAPSULE at 21:29

## 2022-05-04 RX ADMIN — ZINC SULFATE 220 MG (50 MG) CAPSULE 50 MG: CAPSULE at 13:36

## 2022-05-04 RX ADMIN — ASPIRIN 81 MG: 81 TABLET, COATED ORAL at 08:20

## 2022-05-04 RX ADMIN — GABAPENTIN 400 MG: 400 CAPSULE ORAL at 08:20

## 2022-05-04 RX ADMIN — Medication 1000 UNITS: at 08:20

## 2022-05-04 RX ADMIN — PIOGLITAZONE 15 MG: 15 TABLET ORAL at 08:20

## 2022-05-04 RX ADMIN — PANTOPRAZOLE SODIUM 40 MG: 40 INJECTION, POWDER, FOR SOLUTION INTRAVENOUS at 08:23

## 2022-05-04 RX ADMIN — GABAPENTIN 400 MG: 400 CAPSULE ORAL at 13:35

## 2022-05-04 RX ADMIN — AMLODIPINE BESYLATE 2.5 MG: 5 TABLET ORAL at 20:15

## 2022-05-04 RX ADMIN — Medication 10 ML: at 20:15

## 2022-05-04 RX ADMIN — IOPAMIDOL 75 ML: 755 INJECTION, SOLUTION INTRAVENOUS at 10:11

## 2022-05-04 RX ADMIN — METOPROLOL TARTRATE 12.5 MG: 25 TABLET, FILM COATED ORAL at 08:21

## 2022-05-04 RX ADMIN — INSULIN LISPRO 2 UNITS: 100 INJECTION, SOLUTION INTRAVENOUS; SUBCUTANEOUS at 16:21

## 2022-05-04 RX ADMIN — AMLODIPINE BESYLATE 2.5 MG: 5 TABLET ORAL at 08:19

## 2022-05-04 RX ADMIN — INSULIN LISPRO 1 UNITS: 100 INJECTION, SOLUTION INTRAVENOUS; SUBCUTANEOUS at 20:22

## 2022-05-04 RX ADMIN — CLOPIDOGREL BISULFATE 75 MG: 75 TABLET ORAL at 13:35

## 2022-05-04 RX ADMIN — INSULIN LISPRO 2 UNITS: 100 INJECTION, SOLUTION INTRAVENOUS; SUBCUTANEOUS at 08:30

## 2022-05-04 RX ADMIN — ISOSORBIDE MONONITRATE 30 MG: 30 TABLET, EXTENDED RELEASE ORAL at 08:20

## 2022-05-04 RX ADMIN — CETIRIZINE HYDROCHLORIDE 10 MG: 10 TABLET, FILM COATED ORAL at 08:22

## 2022-05-04 RX ADMIN — PANTOPRAZOLE SODIUM 40 MG: 40 INJECTION, POWDER, FOR SOLUTION INTRAVENOUS at 20:14

## 2022-05-04 RX ADMIN — METOPROLOL TARTRATE 12.5 MG: 25 TABLET, FILM COATED ORAL at 20:15

## 2022-05-04 ASSESSMENT — ENCOUNTER SYMPTOMS
PHOTOPHOBIA: 0
DIARRHEA: 0
NAUSEA: 0
EYE REDNESS: 0
BLOOD IN STOOL: 0
EYE DISCHARGE: 0
ABDOMINAL PAIN: 0
CHEST TIGHTNESS: 0
VOMITING: 0
COUGH: 0
CONSTIPATION: 0
SHORTNESS OF BREATH: 0
FACIAL SWELLING: 0
ABDOMINAL DISTENTION: 0

## 2022-05-04 ASSESSMENT — PAIN SCALES - GENERAL
PAINLEVEL_OUTOF10: 0

## 2022-05-04 NOTE — PROGRESS NOTES
Hospitalist Progress Note      PCP: Whitney Joyner DO    Date of Admission: 5/2/2022    Chief Complaint:  Chest pain    Hospital Course:  80 y.o. female with h/o CAD, DM 2, CKD 3, pAfib, morbid obesity, RAHUL, HTN who came to outpatient cath lab for CAD intervention with Dr Ollie Robbins. Patient is getting outpatient IV iron by Dr Crista Ordaz and has had melena. Had recent EGD by Dr Matt Mackenzie in March. Admitted as inpatient CAD and acute on chronic blood loss anemia with azotemia. Followed by GI, Cardio and Renal.    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 Mid-jejunum marked with spot dye. Two shallow healing ulcers  (this could potentially have bled in the past).      Colonoscopy  No active lower GI bleed. Possible pinpoint cecal AVM with minimal oozing, s/p APC. Sigmoid diverticulosis. Hemorrhoids. Liquid stool in colon mix of black and dark green     Plans  Monitor H/H, transfuse to keep Hb>8. Oral PPI BID for 6 weeks and then daily thereafter. No recall colonoscopy due to advanced age and comorbidities. Transfused 1 U PRBC for ABLA. CT Enterography:    Mild wall thickening of the descending and rectosigmoid colon either due to   the partially contracted state of the colon or wall thickening from   underlying colitis.  Scattered colonic diverticula are seen.  No pericolonic   fat stranding.       Mild bladder wall thickening.  Likely due to the partially contracted state   of the bladder in the absence of clinical or laboratory signs of cystitis       Atherosclerosis         Resumed on diet and Plavix.     Subjective: Patient with no complaints. No CP, SOB, HA or abdominal pain. No HA or fevers.        Medications:  Reviewed    Infusion Medications    sodium chloride      dextrose      dextrose       Scheduled Medications    iron sucrose  200 mg IntraVENous Q24H    amLODIPine  2.5 mg Oral BID  vitamin C  1,000 mg Oral Daily    aspirin  81 mg Oral Daily    clopidogrel  75 mg Oral Daily    vitamin B-12  3,000 mcg Oral Daily    escitalopram  20 mg Oral Daily    cetirizine  10 mg Oral Daily    gabapentin  400 mg Oral TID    isosorbide mononitrate  30 mg Oral Daily    metoprolol tartrate  12.5 mg Oral BID    pioglitazone  15 mg Oral Daily    potassium bicarb-citric acid  10 mEq Oral Daily    rosuvastatin  20 mg Oral QPM    Vitamin D  1,000 Units Oral Daily    zinc sulfate  50 mg Oral 4x Daily    sodium chloride flush  5-40 mL IntraVENous 2 times per day    insulin lispro  0-12 Units SubCUTAneous TID WC    insulin lispro  0-6 Units SubCUTAneous Nightly    pantoprazole  40 mg IntraVENous BID     PRN Meds: sodium chloride flush, nitroGLYCERIN, SUMAtriptan, tiZANidine, sodium chloride flush, sodium chloride, ondansetron **OR** ondansetron, polyethylene glycol, acetaminophen **OR** acetaminophen, glucose, dextrose, glucagon (rDNA), dextrose, ipratropium      Intake/Output Summary (Last 24 hours) at 5/4/2022 1120  Last data filed at 5/4/2022 3790  Gross per 24 hour   Intake 2762 ml   Output --   Net 2762 ml       Physical Exam Performed:    BP (!) 130/46   Pulse 60   Temp 97 °F (36.1 °C) (Temporal)   Resp 16   Ht 5' (1.524 m)   Wt 203 lb 6.4 oz (92.3 kg)   SpO2 95%   BMI 39.72 kg/m²     General appearance:  Appears comfortable. Well nourished  Eyes: Sclera clear, pupils equal  ENT: Moist mucus membranes, no thrush. Trachea midline. Cardiovascular: Regular rhythm, normal S1, S2. No murmur, gallop, rub. No edema in lower extremities. L PPM  Respiratory: Clear to auscultation bilaterally, no wheeze, good inspiratory effort  Gastrointestinal: Abdomen soft, non-tender, not distended, normal bowel sounds  Musculoskeletal: No cyanosis in digits, neck supple  Neurology: Grossly intact. Alert and oriented in time, place and person. No speech or motor deficits  Psychiatry: Appropriate affect.  Not agitated  Skin: Warm, dry, normal turgor, no rash  Brisk capillary refill, peripheral pulses palpable       Labs:   Recent Labs     05/02/22  0912 05/02/22  1800 05/03/22  0430 05/03/22  0430 05/03/22  1240 05/03/22 2015 05/04/22 0435   WBC 6.9  --  6.4  --   --   --  6.9   HGB 8.3*   < > 7.5*   < > 8.5* 8.6* 8.3*   HCT 25.9*   < > 23.4*   < > 26.9* 26.7* 25.7*     --  272  --   --   --  260    < > = values in this interval not displayed. Recent Labs     05/02/22  0912 05/03/22 0430 05/04/22 0435    140 136   K 4.4 4.7 4.5    109 106   CO2 19* 20* 19*   BUN 42* 25* 21*   CREATININE 1.0 0.7 0.9   CALCIUM 9.8 9.5 9.2     No results for input(s): AST, ALT, BILIDIR, BILITOT, ALKPHOS in the last 72 hours. Recent Labs     05/03/22 0430   INR 1.09     Recent Labs     05/03/22 0430   TROPONINI <0.01       Urinalysis:      Lab Results   Component Value Date    NITRU Negative 05/02/2022    WBCUA 31 05/02/2022    BACTERIA 4+ 05/02/2022    RBCUA 0 05/02/2022    BLOODU Negative 05/02/2022    SPECGRAV 1.010 05/02/2022    GLUCOSEU Negative 05/02/2022       Radiology:  CT ENTEROGRAPHY W CONTRAST   Final Result   Mild wall thickening of the descending and rectosigmoid colon either due to   the partially contracted state of the colon or wall thickening from   underlying colitis. Scattered colonic diverticula are seen. No pericolonic   fat stranding. Mild bladder wall thickening.   Likely due to the partially contracted state   of the bladder in the absence of clinical or laboratory signs of cystitis      Atherosclerosis                 Assessment/Plan:    Active Hospital Problems    Diagnosis     CAD, multiple vessel [I25.10]      Priority: Medium    Melena [K92.1]     Pacemaker [Z95.0]     Anemia [D64.9]     Coronary artery disease involving coronary bypass graft of native heart without angina pectoris [I25.810]     Paroxysmal atrial fibrillation ([facility] [I48.0]     Diabetes mellitus type 2 in obese (HCC) [E11.69, E66.9]     RAHUL (obstructive sleep apnea) [G47.33]     Coronary artery disease involving native coronary artery of native heart with angina pectoris ([facility] [address] [I25.119]     Essential hypertension [I10]      1. Serial H/H; transfuse if Hg < 8  2. GI consult appreciated  3. Hematology consult for anemia  4. Continue ASA, Lipitor, Toprol  5. Renal consult for CKD appreciated  6. CT Enterograpy today  7. PT/OT eval  8. Cardiology consult appreciated  9. Resume Plavix  10. Resume diet    DVT Prophylaxis: SCD  Diet: ADULT DIET; Regular  Code Status: Full Code    PT/OT Eval Status: Following    Dispo - Home    Discussed with patient, Dr Ignacia Eldridge (Cardio), nursing and CM. Resume diet and Plavix. LHC in 1-2 weeks as OP if Hg stable. Home tomorrow if stable.     Sreekanth Cormier MD

## 2022-05-04 NOTE — PROGRESS NOTES
.              Gastroenterology Progress Note      Mony Reese is a 80 y.o. female patient. No diagnosis found. SUBJECTIVE:  Had BMs today after drinking PO contrast and stool was black. ROS:  Cardiovascular ROS: no chest pain or dyspnea on exertion  Gastrointestinal ROS: see hpi  Respiratory ROS: no cough, shortness of breath, or wheezing    Physical    VITALS:  BP (!) 125/45   Pulse 60   Temp 97.3 °F (36.3 °C) (Temporal)   Resp 18   Ht 5' (1.524 m)   Wt 203 lb 6.4 oz (92.3 kg)   SpO2 97%   BMI 39.72 kg/m²   TEMPERATURE:  Current - Temp: 97.3 °F (36.3 °C); Max - Temp  Av.4 °F (36.3 °C)  Min: 97 °F (36.1 °C)  Max: 97.8 °F (36.6 °C)    NAD  RRR, Nl s1s2  Lungs CTA Bilaterally, normal effort  Abdomen soft, ND, NT, no HSM, Bowel sounds normal  AAOx3, No asterixis     Data    Data Review:    Recent Labs     22  1800 22  1240 22  043   WBC 6.9  --  6.4  --   --   --  6.9   HGB 8.3*   < > 7.5*   < > 8.5* 8.6* 8.3*   HCT 25.9*   < > 23.4*   < > 26.9* 26.7* 25.7*   MCV 79.1*  --  80.2  --   --   --  81.6     --  272  --   --   --  260    < > = values in this interval not displayed. Recent Labs     22  04322  043    140 136   K 4.4 4.7 4.5    109 106   CO2 19* 20* 19*   BUN 42* 25* 21*   CREATININE 1.0 0.7 0.9     No results for input(s): AST, ALT, ALB, BILIDIR, BILITOT, ALKPHOS in the last 72 hours. No results for input(s): LIPASE, AMYLASE in the last 72 hours. Recent Labs     22  0430   PROTIME 12.4   INR 1.09     No results for input(s): PTT in the last 72 hours. Radiology Review:  CT enterography 22  Impression   Mild wall thickening of the descending and rectosigmoid colon either due to   the partially contracted state of the colon or wall thickening from   underlying colitis.  Scattered colonic diverticula are seen.  No pericolonic   fat stranding.     Mild bladder wall thickening.  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 Colonoscopy with possible pinpoint AVM with minimal oozing treated with AC. BUN decreasing. CT enterography unrevealing. Anemia - due to GI blood loss, iron deficiency and MDS. Heme following. hgb 8.3. CAD - to have future PCI per cardiology. On asa and plavix. PLAN    - Protonix 40 mg PO BID x6 weeks and daily thereafter  - monitor hgb  - outpatient capsule endoscopy     Discussed with Dr. Juvenal Dancer, PA-C  [facility]    I have personally performed a face to face diagnostic evaluation on this patient. I have interviewed and examined the patient and I agree with the findings and recommended plan of care. In summary, my findings and plan are the following:     Reportedly had dark/black stools this AM but none since. CTE negative for SB lesions. Hb stable in the 8+ range. Cardiology plans noted (PCI after discharge after GIB stable and tolerated plavix). She is on currently on aspirin alone. If Hb remains stable and no further GI bleed, we can resume plavix tomorrow. We will also do a capsule endoscopy on discharge. It does not help that patient also has underlying MDS and this can cause fluctuations in her H/H. Due to small antral ulcers, PPI BID X 8 weeks and daily thereafter. Discussed GI plans with patient.        Bárbara Scott MD, MSc  [facility]  05/04/22

## 2022-05-04 NOTE — PROGRESS NOTES
[facility] CVU Note    Patient Active Problem List   Diagnosis    Coronary artery disease involving native coronary artery of native heart with angina pectoris ([address]    Essential hypertension    RAHUL (obstructive sleep apnea)    Chronic kidney disease    Class 3 severe obesity due to excess calories with serious comorbidity and body mass index (BMI) of 40.0 to 44.9 in adult [facility])    Diabetes mellitus type 2 in obese (HCC)    Paroxysmal atrial fibrillation ([facility]    Coronary artery disease involving coronary bypass graft of native heart without angina pectoris    Anemia    Pacemaker    NSTEMI (non-ST elevated myocardial infarction) ([facility]    Pneumonia    COVID-19    MALCOLM (acute kidney injury) ([facility]    Melena    CAD, multiple vessel       Past Medical History:   has a past medical history of Anemia, Anesthesia, Anesthesia complication, Atrial fibrillation ([address] CAD (coronary artery disease), Chest pain, Chronic kidney disease, Depression, GERD (gastroesophageal reflux disease), Glaucoma, Hiatal hernia, Hyperlipidemia, Hypertension, Migraines, neuralgic, Morbid obesity ([address] Osteoarthritis, Sleep apnea, Type II or unspecified type diabetes mellitus without mention of complication, not stated as uncontrolled, Unspecified sleep apnea, Urinary incontinence, and UTI (urinary tract infection). Past Social History:   reports that she has never smoked. She has never used smokeless tobacco. She reports that she does not drink alcohol and does not use drugs. Subjective:  No complaints. For CT enterography. No SOB. No change in leg swelling. S/P EGD and colonoscopy yesterday, no active bleeding noted. Review of Systems   Constitutional: Negative for activity change, appetite change, chills, fatigue, fever and unexpected weight change. HENT: Negative for congestion and facial swelling. Eyes: Negative for photophobia, discharge and redness. Respiratory: Negative for cough, chest tightness and shortness of breath. Cardiovascular: Negative for chest pain, palpitations and leg swelling. Gastrointestinal: Negative for abdominal distention, abdominal pain, blood in stool, constipation, diarrhea, nausea and vomiting. Endocrine: Negative for cold intolerance, heat intolerance and polyuria. Genitourinary: Negative for decreased urine volume, difficulty urinating, flank pain and hematuria. Musculoskeletal: Negative for joint swelling and neck pain. Neurological: Negative for dizziness, seizures, syncope, speech difficulty, light-headedness and headaches. Hematological: Does not bruise/bleed easily. Psychiatric/Behavioral: Negative for agitation, confusion and hallucinations. Objective:  +2.4L    BP (!) 125/45   Pulse 60   Temp 97.3 °F (36.3 °C) (Temporal)   Resp 18   Ht 5' (1.524 m)   Wt 203 lb 6.4 oz (92.3 kg)   SpO2 97%   BMI 39.72 kg/m²     Wt Readings from Last 3 Encounters:   05/04/22 203 lb 6.4 oz (92.3 kg)   04/25/22 208 lb (94.3 kg)   04/13/22 208 lb (94.3 kg)       BP Readings from Last 3 Encounters:   05/04/22 (!) 125/45   05/03/22 (!) 116/56   04/25/22 (!) 143/62     Chest- clear  Heart-regular  Abd-soft  Ext- trace edema    Labs  Hemoglobin   Date Value Ref Range Status   05/04/2022 8.3 (L) 12.0 - 16.0 g/dL Final     Hematocrit   Date Value Ref Range Status   05/04/2022 25.7 (L) 36.0 - 48.0 % Final     WBC   Date Value Ref Range Status   05/04/2022 6.9 4.0 - 11.0 K/uL Final     Platelets   Date Value Ref Range Status   05/04/2022 260 135 - 450 K/uL Final     Lab Results   Component Value Date    CREATININE 0.9 05/04/2022    BUN 21 (H) 05/04/2022     05/04/2022    K 4.5 05/04/2022     05/04/2022    CO2 19 (L) 05/04/2022     UA mod LE and 31 wbc with trace protein. Assessment/Plan:  1. H/O MALCOLM- baseline crea 0.8 to 1. With bp lower limits, will continue holding off on ARB+HCTZ, Lasix and SPLT for now. Crea at baseline. Holding off on cardiac stenting while awaiting GI w/u. Restart low dose ARB upon D/C.  2.  Resp. Alkalosis-serum HCO3 better. 3.  Relative Hypotension-as per #1. Follow weights, stable. 4.  Anemia- GI w/u. Follows with Dr. Wallace Doing too. For CT enterography. NSS today. 5.  H/O DM  6. H/O Edema-stable. 7.  H/O A. Fib  8. H/O Obesity  9. Chest Pain- Cardiology notes reviewed. Planning for stenting later on if Hgb stable with restarting Plavix. 10.  Stable renal status. Updated patient.      Erlin Tomas MD

## 2022-05-04 NOTE — DISCHARGE INSTR - COC
Continuity of Care Form    Patient Name: Mony Reese   :  1950  MRN:  [id number]    Admit date:  2022  Discharge date: 22    Code Status Order: Full Code   Advance Directives:   Advance Care Flowsheet Documentation       Date/Time Healthcare Directive Type of Healthcare Directive Copy in [address] Agent's Name Healthcare Agent's Phone Number    22 1007 -- Durable power of  for health care -- -- -- --            Admitting Physician:  Sheng Fritz MD  PCP: Jonathan Arevalo DO    Discharging Nurse: [facility] Unit/Room#: CVU-2911/2911-01  Discharging Unit Phone Number: [phone]    Emergency Contact:   Extended Emergency Contact Information  Primary Emergency Contact: Yo Villa  Address: [address] [place] [address] [address] Phone: [phone]  Mobile Phone: [phone]  Relation: Spouse  Secondary Emergency Contact: [facility] [address] Phone: [phone]  Mobile Phone: [phone]  Relation: Child    Past Surgical History:  Past Surgical History:   Procedure Laterality Date    ABDOMEN SURGERY N/A 2020    EXPLORATION OF ABDOMINAL WOUND performed by Eliza Porter MD at [address] [place] [address]  1999    quadruple by-pass, [facility]    COLONOSCOPY      COLONOSCOPY  10/08/2018    1 polyp removed    COLONOSCOPY N/A 10/8/2018    COLONOSCOPY POLYPECTOMY SNARE/COLD BIOPSY performed by Casandra Lucas MD at [facility] N/A 5/3/2022    COLONOSCOPY  ABLATION performed by Casandra Lucas MD at [facility]    COSMETIC SURGERY      face lift    ENTEROSCOPY N/A 2019    ENTEROSCOPY performed by Casandra Lucas MD at [address] [place] [address]  11 hiatal hernia repair    GASTRIC BAND REMOVAL  11/03/2016    laparoscopic     HAMMER TOE SURGERY      [facility] INJECTION PROCEDURE FOR SACROILIAC JOINT Left 12/17/2018    SACROILIAC JOINT INJECTION ON THE LEFT WITH FLUOROSCOPY performed by Rustam Franco MD at [address] [place] [address]    Left and Right knees, [facility]    KNEE ARTHROSCOPY      1982 left    ORIF HUMERUS DECOMPRESSION Left 2/25/2016    OPEN REDUCTION INTERNAL FIXATION LEFT PROXIMAL HUMERUS    OVARY REMOVAL Bilateral 1997    PACEMAKER PLACEMENT  05/2019    [place] NJX DX/THER SBST INTRLMNR CRV/THRC W/IMG GDN N/A 11/5/2018    MIDLINE L4/L5 LUMBAR INTERLAMINAR EPIDURAL STEROID INJECTION WITH FLUOROSCOPY performed by Rustam Franco MD at [address]  10/30/15    UPPER GASTROINTESTINAL ENDOSCOPY  10/14/2016    with biopsy    UPPER GASTROINTESTINAL ENDOSCOPY N/A 3/22/2022    EGD BIOPSY performed by Alejandrina Casanova MD at [address] 3/22/2022    EGD DILATION BALLOON performed by Alejandrina Casanova MD at [address] N/A 5/3/2022    ENTEROSCOPY WITH INTERVENTION performed by Alejandrina Casanova MD at [place] [address] N/A 10/29/2019    OPEN VENTRAL HERNIA REPAIR WITH  MESH performed by Dori Steele MD at [facility]       Immunization History:   Immunization History   Administered Date(s) Administered    Influenza Vaccine, unspecified formulation 11/17/2016    Influenza Virus Vaccine 10/17/2013    Influenza Whole 09/30/2015    Pneumococcal Conjugate 13-valent ([id number]) 11/24/2015    Pneumococcal Conjugate 7-valent (Kathleene Macho) 11/06/2007       Active Problems:  Patient Active Problem List   Diagnosis Code    Coronary artery disease involving native coronary artery of native heart with angina pectoris ([facility] I25.119    Essential hypertension I10    RAHUL (obstructive sleep apnea) G47.33    Chronic kidney disease N18.9    Class 3 severe obesity due to excess calories with serious comorbidity and body mass index (BMI) of 40.0 to 44.9 in adult ([facility]) E66.01, Z68.41    Diabetes mellitus type 2 in obese ([facility]) E11.69, E66.9    Paroxysmal atrial fibrillation ([facility]) I48.0    Coronary artery disease involving coronary bypass graft of native heart without angina pectoris I25.810    Anemia D64.9    Pacemaker Z95.0    NSTEMI (non-ST elevated myocardial infarction) ([facility] I21.4    Pneumonia J18.9    COVID-19 U07.1    MALCOLM (acute kidney injury) ([facility] N17.9    Melena K92.1    CAD, multiple vessel I25.10       Isolation/Infection:   Isolation            No Isolation          Patient Infection Status       Infection Onset Added Last Indicated Last Indicated By Review Planned Expiration Resolved Resolved By    None active    Resolved    COVID-19 (Rule Out) 03/15/22 03/15/22 03/15/22 COVID-19 (Ordered)   22 Rule-Out Test Resulted    COVID-19 21 COVID-19   22     COVID-19 (Rule Out) 21 COVID-19 (Ordered)   21 Rule-Out Test Resulted            Nurse Assessment:  Last Vital Signs: BP (!) 130/46   Pulse 60   Temp 97 °F (36.1 °C) (Temporal)   Resp 16   Ht 5' (1.524 m)   Wt 203 lb 6.4 oz (92.3 kg)   SpO2 95%   BMI 39.72 kg/m²     Last documented pain score (0-10 scale): Pain Level: 0  Last Weight:   Wt Readings from Last 1 Encounters:   22 203 lb 6.4 oz (92.3 kg)     Mental Status:  oriented and alert    IV Access:  - None    Nursing Mobility/ADLs:  Walking   Independent  Transfer  Independent  Bathing  Independent  Dressing  Independent  Toileting  Independent  Feeding  Independent  Med Admin  Independent  Med Delivery   whole    Wound Care Documentation and Therapy:        Elimination:  Continence:    Bowel: Yes  Bladder: Yes  Urinary Catheter: None   Colostomy/Ileostomy/Ileal Conduit: No       Date of Last BM: 5/4    Intake/Output Summary (Last 24 hours) at 5/4/2022 1123  Last data filed at [address]  Gross per 24 hour   Intake 2762 ml   Output --   Net 2762 ml     I/O last 3 completed shifts: In: 5149 [P.O.:1680; I.V.:720; Blood:262; IV Piggyback:110]  Out: -     Safety Concerns: At Risk for Falls    Impairments/Disabilities:      None    Nutrition Therapy:  Current Nutrition Therapy:   - Oral Diet:  General, Carb Control 4 carbs/meal (1800kcals/day), and Low Sodium (3-4gm)    Routes of Feeding: Oral  Liquids: Thin Liquids  Daily Fluid Restriction: no  Last Modified Barium Swallow with Video (Video Swallowing Test): not done    Treatments at the Time of Hospital Discharge:   Respiratory Treatments: none  Oxygen Therapy:  is not on home oxygen therapy. Ventilator:    - CPAP   only when sleeping    Rehab Therapies: Physical Therapy and Occupational Therapy  Weight Bearing Status/Restrictions: No weight bearing restrictions  Other Medical Equipment (for information only, NOT a DME order):  cane and walker  Other Treatments:     Patient's personal belongings (please select all that are sent with patient):  Glasses    RN SIGNATURE:  Electronically signed by Jeanette Kent RN on 5/6/22 at 9:23 AM EDT    CASE MANAGEMENT/SOCIAL WORK SECTION    Inpatient Status Date: ***    Readmission Risk Assessment Score:  Readmission Risk              Risk of Unplanned Readmission:  21           Discharging to Facility/ Agency   Name:   Address:  Phone:  Fax:    Dialysis Facility (if applicable)   Name:  Address:  Dialysis Schedule:  Phone:  Fax:    / signature: {Esignature:[id number]}    PHYSICIAN SECTION    Prognosis: Fair    Condition at Discharge: Stable    Rehab Potential (if transferring to Rehab):  Fair    Recommended Labs or Other Treatments After Discharge:     Physician Certification: I certify the above information and transfer of [facility]  is necessary for the continuing treatment of the diagnosis listed and that she requires Home Care for less 30 days.      Update Admission H&P: No change in H&P    PHYSICIAN SIGNATURE:  Electronically signed by Sandie Hendricks MD on 5/4/22 at 11:23 AM EDT

## 2022-05-04 NOTE — PROGRESS NOTES
Reassessment complete. See flow sheet. No acute changes at this time. Diet resumed per hospitalist and lunch tray ordered. Plavix restarted.  at bedside and updated. Will continue with current plan of care.

## 2022-05-04 NOTE — PROGRESS NOTES
[facility] HEART [place]  Daily Progress Note    Admit: 5/2/2022      CC: CAD, AF, Anemia  Subj: Doing well. No cp overnight.       Obj:  BP (!) 130/46   Pulse 60   Temp 97 °F (36.1 °C) (Temporal)   Resp 16   Ht 5' (1.524 m)   Wt 203 lb 6.4 oz (92.3 kg)   SpO2 95%   BMI 39.72 kg/m²       Intake/Output Summary (Last 24 hours) at 5/4/2022 3525  Last data filed at 5/4/2022 0805  Gross per 24 hour   Intake 2772 ml   Output --   Net 2772 ml     Gen Alert, coop, no distress Heart Rrr, no mrg   Head NC, AT, no abnorm Abd Soft, NT, ND, +BS, no mass, no OM   Eyes PERRLA, conj/corn clear Ext Ext nl, AT, no c/c/e   Nose Nares nl, no drain, NT Pulse 2+ symm ra, dp, pt   Throat Lips, mucosa, tongue nl Skin Color/tect/turg nl, no rash/lesion   Neck S/S, TM, NT, no bruit/JVD Psych Nl mood and affect   Lung cta bilat Lymph No cervical or ax LA   Ch Wall NT, no deform Neuro Nl gross M/S exam     Medications:    iron sucrose  200 mg IntraVENous Q24H    amLODIPine  2.5 mg Oral BID    vitamin C  1,000 mg Oral Daily    aspirin  81 mg Oral Daily    [Held by provider] clopidogrel  75 mg Oral Daily    vitamin B-12  3,000 mcg Oral Daily    escitalopram  20 mg Oral Daily    cetirizine  10 mg Oral Daily    gabapentin  400 mg Oral TID    isosorbide mononitrate  30 mg Oral Daily    metoprolol tartrate  12.5 mg Oral BID    pioglitazone  15 mg Oral Daily    potassium bicarb-citric acid  10 mEq Oral Daily    rosuvastatin  20 mg Oral QPM    Vitamin D  1,000 Units Oral Daily    zinc sulfate  50 mg Oral 4x Daily    sodium chloride flush  5-40 mL IntraVENous 2 times per day    insulin lispro  0-12 Units SubCUTAneous TID WC    insulin lispro  0-6 Units SubCUTAneous Nightly    pantoprazole  40 mg IntraVENous BID     Lab Data: Relevant and available CV data reviewed    Plan  *CAD  Status Hx CABG 1999, Elevated troponin but stable  LHC 1/17 Patent grafts  TTE 9/19 55%  Plan Will not be able to d/c DAPT for at least 6 months after stenting   PCI in 1-2 weeks after discharge after establish GIB stable and tolerates plavix  *AFIB  Status Hx ablation and PPM, stable  Plan Hold AC with blood loss and anemia  *Anemia  Status Progressive anemia, now 7.5  Plan S/p AVM APC, gastric ulcers and hemorrhoids additionally noted   Restart plavix when safe from GI standpoint     Time:  More than 35 minutes spent reviewing patient chart (including but not limited to notes, labs, imaging and other testing), interviewing patient and/or family members, performing a physical exam, documentation of my findings above and subsequent follow-up of ordered testing. More than 50% of that time spent face to face with patient discussing clinical condition and counseling regarding treatment plan.

## 2022-05-04 NOTE — PROGRESS NOTES
Physical Therapy  Tae Miramontes  PT treatement attempt AM and PM today, pt BALDOMERO for testing both times. Will try back at later date as pt able to participate.  Caroline Martinez [id number]

## 2022-05-04 NOTE — PROGRESS NOTES
Shift assessment complete. See flow sheet. Pain evaluation complete. No complaints of pain at this time. Discussed POC for this shift. NPO since midnight for CT enterography today. Coordinating with CT on timing and PO contrast needed. Patient encouraged to use call light with any needs. Patient states understanding, call light in reach, will continue with current plan of care.

## 2022-05-05 LAB
ANION GAP SERPL CALCULATED.3IONS-SCNC: 13 MMOL/L (ref 3–16)
BASOPHILS ABSOLUTE: 0.1 K/UL (ref 0–0.2)
BASOPHILS RELATIVE PERCENT: 0.6 %
BUN BLDV-MCNC: 21 MG/DL (ref 7–20)
CALCIUM SERPL-MCNC: 9 MG/DL (ref 8.3–10.6)
CHLORIDE BLD-SCNC: 107 MMOL/L (ref 99–110)
CO2: 19 MMOL/L (ref 21–32)
CREAT SERPL-MCNC: 0.9 MG/DL (ref 0.6–1.2)
EOSINOPHILS ABSOLUTE: 0.1 K/UL (ref 0–0.6)
EOSINOPHILS RELATIVE PERCENT: 1 %
GFR AFRICAN AMERICAN: >60
GFR NON-AFRICAN AMERICAN: >60
GLUCOSE BLD-MCNC: 119 MG/DL (ref 70–99)
GLUCOSE BLD-MCNC: 120 MG/DL (ref 70–99)
GLUCOSE BLD-MCNC: 130 MG/DL (ref 70–99)
GLUCOSE BLD-MCNC: 133 MG/DL (ref 70–99)
GLUCOSE BLD-MCNC: 174 MG/DL (ref 70–99)
HCT VFR BLD CALC: 26.2 % (ref 36–48)
HEMOGLOBIN: 8.3 G/DL (ref 12–16)
LYMPHOCYTES ABSOLUTE: 2.3 K/UL (ref 1–5.1)
LYMPHOCYTES RELATIVE PERCENT: 23.7 %
MCH RBC QN AUTO: 26.2 PG (ref 26–34)
MCHC RBC AUTO-ENTMCNC: 31.7 G/DL (ref 31–36)
MCV RBC AUTO: 82.7 FL (ref 80–100)
MONOCYTES ABSOLUTE: 1.2 K/UL (ref 0–1.3)
MONOCYTES RELATIVE PERCENT: 12.4 %
NEUTROPHILS ABSOLUTE: 6 K/UL (ref 1.7–7.7)
NEUTROPHILS RELATIVE PERCENT: 62.3 %
PDW BLD-RTO: 19.3 % (ref 12.4–15.4)
PERFORMED ON: ABNORMAL
PLATELET # BLD: 270 K/UL (ref 135–450)
PMV BLD AUTO: 8 FL (ref 5–10.5)
POTASSIUM REFLEX MAGNESIUM: 4.2 MMOL/L (ref 3.5–5.1)
RBC # BLD: 3.16 M/UL (ref 4–5.2)
SODIUM BLD-SCNC: 139 MMOL/L (ref 136–145)
WBC # BLD: 9.6 K/UL (ref 4–11)

## 2022-05-05 PROCEDURE — 97116 GAIT TRAINING THERAPY: CPT

## 2022-05-05 PROCEDURE — 97110 THERAPEUTIC EXERCISES: CPT

## 2022-05-05 PROCEDURE — 6370000000 HC RX 637 (ALT 250 FOR IP): Performed by: INTERNAL MEDICINE

## 2022-05-05 PROCEDURE — 99233 SBSQ HOSP IP/OBS HIGH 50: CPT | Performed by: INTERNAL MEDICINE

## 2022-05-05 PROCEDURE — 85025 COMPLETE CBC W/AUTO DIFF WBC: CPT

## 2022-05-05 PROCEDURE — 1200000000 HC SEMI PRIVATE

## 2022-05-05 PROCEDURE — 94760 N-INVAS EAR/PLS OXIMETRY 1: CPT

## 2022-05-05 PROCEDURE — 97530 THERAPEUTIC ACTIVITIES: CPT

## 2022-05-05 PROCEDURE — 2580000003 HC RX 258: Performed by: PHYSICIAN ASSISTANT

## 2022-05-05 PROCEDURE — 6360000002 HC RX W HCPCS: Performed by: PHYSICIAN ASSISTANT

## 2022-05-05 PROCEDURE — 80048 BASIC METABOLIC PNL TOTAL CA: CPT

## 2022-05-05 PROCEDURE — 6360000002 HC RX W HCPCS: Performed by: INTERNAL MEDICINE

## 2022-05-05 PROCEDURE — 6360000002 HC RX W HCPCS: Performed by: NURSE PRACTITIONER

## 2022-05-05 PROCEDURE — C9113 INJ PANTOPRAZOLE SODIUM, VIA: HCPCS | Performed by: INTERNAL MEDICINE

## 2022-05-05 PROCEDURE — 2580000003 HC RX 258: Performed by: INTERNAL MEDICINE

## 2022-05-05 RX ORDER — ISOSORBIDE MONONITRATE 60 MG/1
60 TABLET, EXTENDED RELEASE ORAL DAILY
Status: DISCONTINUED | OUTPATIENT
Start: 2022-05-06 | End: 2022-05-06 | Stop reason: HOSPADM

## 2022-05-05 RX ORDER — PANTOPRAZOLE SODIUM 40 MG/1
40 TABLET, DELAYED RELEASE ORAL
Qty: 60 TABLET | Refills: 0 | Status: SHIPPED | OUTPATIENT
Start: 2022-05-05

## 2022-05-05 RX ORDER — LOSARTAN POTASSIUM 25 MG/1
25 TABLET ORAL DAILY
Qty: 30 TABLET | Refills: 0 | Status: SHIPPED | OUTPATIENT
Start: 2022-05-06

## 2022-05-05 RX ORDER — ISOSORBIDE MONONITRATE 60 MG/1
60 TABLET, EXTENDED RELEASE ORAL DAILY
Qty: 30 TABLET | Refills: 0 | Status: SHIPPED | OUTPATIENT
Start: 2022-05-06 | End: 2022-07-20 | Stop reason: SDUPTHER

## 2022-05-05 RX ORDER — ISOSORBIDE MONONITRATE 30 MG/1
30 TABLET, EXTENDED RELEASE ORAL ONCE
Status: COMPLETED | OUTPATIENT
Start: 2022-05-05 | End: 2022-05-05

## 2022-05-05 RX ORDER — LOSARTAN POTASSIUM 25 MG/1
25 TABLET ORAL DAILY
Status: DISCONTINUED | OUTPATIENT
Start: 2022-05-05 | End: 2022-05-06 | Stop reason: HOSPADM

## 2022-05-05 RX ORDER — AMLODIPINE BESYLATE 2.5 MG/1
2.5 TABLET ORAL 2 TIMES DAILY
Qty: 30 TABLET | Refills: 0 | Status: SHIPPED | OUTPATIENT
Start: 2022-05-05

## 2022-05-05 RX ORDER — FUROSEMIDE 40 MG/1
40 TABLET ORAL DAILY
Status: DISCONTINUED | OUTPATIENT
Start: 2022-05-05 | End: 2022-05-06 | Stop reason: HOSPADM

## 2022-05-05 RX ADMIN — Medication 10 ML: at 07:55

## 2022-05-05 RX ADMIN — EPOETIN ALFA-EPBX 40000 UNITS: 40000 INJECTION, SOLUTION INTRAVENOUS; SUBCUTANEOUS at 11:39

## 2022-05-05 RX ADMIN — GABAPENTIN 400 MG: 400 CAPSULE ORAL at 20:37

## 2022-05-05 RX ADMIN — AMLODIPINE BESYLATE 2.5 MG: 5 TABLET ORAL at 07:54

## 2022-05-05 RX ADMIN — ZINC SULFATE 220 MG (50 MG) CAPSULE 50 MG: CAPSULE at 20:45

## 2022-05-05 RX ADMIN — ISOSORBIDE MONONITRATE 30 MG: 30 TABLET, EXTENDED RELEASE ORAL at 11:39

## 2022-05-05 RX ADMIN — GABAPENTIN 400 MG: 400 CAPSULE ORAL at 07:54

## 2022-05-05 RX ADMIN — ROSUVASTATIN CALCIUM 20 MG: 20 TABLET, FILM COATED ORAL at 17:33

## 2022-05-05 RX ADMIN — Medication 1000 UNITS: at 07:54

## 2022-05-05 RX ADMIN — AMLODIPINE BESYLATE 2.5 MG: 5 TABLET ORAL at 20:38

## 2022-05-05 RX ADMIN — METOPROLOL TARTRATE 12.5 MG: 25 TABLET, FILM COATED ORAL at 07:55

## 2022-05-05 RX ADMIN — ISOSORBIDE MONONITRATE 30 MG: 30 TABLET, EXTENDED RELEASE ORAL at 07:54

## 2022-05-05 RX ADMIN — METOPROLOL TARTRATE 12.5 MG: 25 TABLET, FILM COATED ORAL at 20:36

## 2022-05-05 RX ADMIN — ESCITALOPRAM OXALATE 20 MG: 10 TABLET ORAL at 07:54

## 2022-05-05 RX ADMIN — ZINC SULFATE 220 MG (50 MG) CAPSULE 50 MG: CAPSULE at 13:40

## 2022-05-05 RX ADMIN — ZINC SULFATE 220 MG (50 MG) CAPSULE 50 MG: CAPSULE at 07:54

## 2022-05-05 RX ADMIN — PIOGLITAZONE 15 MG: 15 TABLET ORAL at 07:59

## 2022-05-05 RX ADMIN — CLOPIDOGREL BISULFATE 75 MG: 75 TABLET ORAL at 07:54

## 2022-05-05 RX ADMIN — NITROGLYCERIN 0.4 MG: 0.4 TABLET, ORALLY DISINTEGRATING SUBLINGUAL at 04:35

## 2022-05-05 RX ADMIN — PANTOPRAZOLE SODIUM 40 MG: 40 INJECTION, POWDER, FOR SOLUTION INTRAVENOUS at 20:36

## 2022-05-05 RX ADMIN — FUROSEMIDE 40 MG: 40 TABLET ORAL at 11:15

## 2022-05-05 RX ADMIN — LOSARTAN POTASSIUM 25 MG: 25 TABLET, FILM COATED ORAL at 11:15

## 2022-05-05 RX ADMIN — GABAPENTIN 400 MG: 400 CAPSULE ORAL at 13:39

## 2022-05-05 RX ADMIN — ASPIRIN 81 MG: 81 TABLET, COATED ORAL at 07:54

## 2022-05-05 RX ADMIN — Medication 10 ML: at 20:39

## 2022-05-05 RX ADMIN — ZINC SULFATE 220 MG (50 MG) CAPSULE 50 MG: CAPSULE at 17:33

## 2022-05-05 RX ADMIN — IRON SUCROSE 200 MG: 20 INJECTION, SOLUTION INTRAVENOUS at 13:42

## 2022-05-05 RX ADMIN — PANTOPRAZOLE SODIUM 40 MG: 40 INJECTION, POWDER, FOR SOLUTION INTRAVENOUS at 07:55

## 2022-05-05 RX ADMIN — CYANOCOBALAMIN TAB 1000 MCG 3000 MCG: 1000 TAB at 07:54

## 2022-05-05 RX ADMIN — OXYCODONE HYDROCHLORIDE AND ACETAMINOPHEN 1000 MG: 500 TABLET ORAL at 07:54

## 2022-05-05 RX ADMIN — CETIRIZINE HYDROCHLORIDE 10 MG: 10 TABLET, FILM COATED ORAL at 07:54

## 2022-05-05 RX ADMIN — INSULIN LISPRO 1 UNITS: 100 INJECTION, SOLUTION INTRAVENOUS; SUBCUTANEOUS at 20:46

## 2022-05-05 ASSESSMENT — ENCOUNTER SYMPTOMS
VOMITING: 0
PHOTOPHOBIA: 0
FACIAL SWELLING: 0
SHORTNESS OF BREATH: 0
ABDOMINAL PAIN: 0
NAUSEA: 0
COUGH: 0
CHEST TIGHTNESS: 0
BLOOD IN STOOL: 0
EYE DISCHARGE: 0
DIARRHEA: 0
ABDOMINAL DISTENTION: 0
EYE REDNESS: 0
CONSTIPATION: 0

## 2022-05-05 ASSESSMENT — PAIN SCALES - GENERAL
PAINLEVEL_OUTOF10: 0
PAINLEVEL_OUTOF10: 0
PAINLEVEL_OUTOF10: 8
PAINLEVEL_OUTOF10: 0

## 2022-05-05 NOTE — PROGRESS NOTES
Shift assessment complete. See flow sheet. Pain evaluation complete. No complaints of pain at this time. Discussed POC for this shift. Had one episode of chest pain this morning relieved by nitro. No further pain.  Onesimo Bob at bedside and updated as well. Patient encouraged to use call light with any needs. Patient states understanding, call light in reach. Will continue with current plan of care.

## 2022-05-05 NOTE — PLAN OF CARE
Problem: Chronic Conditions and Co-morbidities  Goal: Patient's chronic conditions and co-morbidity symptoms are monitored and maintained or improved  Outcome: Progressing  Flowsheets (Taken 5/5/2022 0745 by Chitra Padilla RN)  Care Plan - Patient's Chronic Conditions and Co-Morbidity Symptoms are Monitored and Maintained or Improved: Monitor and assess patient's chronic conditions and comorbid symptoms for stability, deterioration, or improvement     Problem: Discharge Planning  Goal: Discharge to home or other facility with appropriate resources  Outcome: Progressing  Flowsheets (Taken 5/5/2022 0745 by Chitra Padilla RN)  Discharge to home or other facility with appropriate resources: Refer to discharge planning if patient needs post-hospital services based on physician order or complex needs related to functional status, cognitive ability or social support system     Problem: Safety - Adult  Goal: Free from fall injury  Outcome: Progressing     Problem: ABCDS Injury Assessment  Goal: Absence of physical injury  Outcome: Progressing     Problem: Pain  Goal: Verbalizes/displays adequate comfort level or baseline comfort level  Outcome: Progressing  Flowsheets (Taken 5/5/2022 0745 by Chitra Padilla RN)  Verbalizes/displays adequate comfort level or baseline comfort level: Encourage patient to monitor pain and request assistance

## 2022-05-05 NOTE — PROGRESS NOTES
Hospitalist Progress Note      PCP: Kristin Anderson DO    Date of Admission: 5/2/2022    Chief Complaint:  Chest pain    Hospital Course:  80 y.o. female with h/o CAD, DM 2, CKD 3, pAfib, morbid obesity, RAHUL, HTN who came to outpatient cath lab for CAD intervention with Dr Vaughn Betts. Patient is getting outpatient IV iron by Dr Ezequiel Ibarra and has had melena. Had recent EGD by Dr Riri Luna in March. Admitted as inpatient CAD and acute on chronic blood loss anemia with azotemia. Followed by GI, Cardio and Renal.    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 Mid-jejunum marked with spot dye. Two shallow healing ulcers  (this could potentially have bled in the past).      Colonoscopy  No active lower GI bleed. Possible pinpoint cecal AVM with minimal oozing, s/p APC. Sigmoid diverticulosis. Hemorrhoids. Liquid stool in colon mix of black and dark green     Plans  Monitor H/H, transfuse to keep Hb>8. Oral PPI BID for 6 weeks and then daily thereafter. No recall colonoscopy due to advanced age and comorbidities. Transfused 1 U PRBC for ABLA. CT Enterography:    Mild wall thickening of the descending and rectosigmoid colon either due to   the partially contracted state of the colon or wall thickening from   underlying colitis.  Scattered colonic diverticula are seen.  No pericolonic   fat stranding.       Mild bladder wall thickening.  Likely due to the partially contracted state   of the bladder in the absence of clinical or laboratory signs of cystitis       Atherosclerosis         Resumed on diet and Plavix. Imdur increased on 5/5 for CP.     Subjective: Patient with CP today. No SOB, HA, abdominal pain or fevers.        Medications:  Reviewed    Infusion Medications    sodium chloride      dextrose      dextrose       Scheduled Medications    epoetin kimberly-epbx  40,000 Units SubCUTAneous Q14 Days  furosemide  40 mg Oral Daily    losartan  25 mg Oral Daily    [START ON 5/6/2022] isosorbide mononitrate  60 mg Oral Daily    amLODIPine  2.5 mg Oral BID    vitamin C  1,000 mg Oral Daily    aspirin  81 mg Oral Daily    clopidogrel  75 mg Oral Daily    vitamin B-12  3,000 mcg Oral Daily    escitalopram  20 mg Oral Daily    cetirizine  10 mg Oral Daily    gabapentin  400 mg Oral TID    metoprolol tartrate  12.5 mg Oral BID    pioglitazone  15 mg Oral Daily    rosuvastatin  20 mg Oral QPM    Vitamin D  1,000 Units Oral Daily    zinc sulfate  50 mg Oral 4x Daily    sodium chloride flush  5-40 mL IntraVENous 2 times per day    insulin lispro  0-12 Units SubCUTAneous TID WC    insulin lispro  0-6 Units SubCUTAneous Nightly    pantoprazole  40 mg IntraVENous BID     PRN Meds: sodium chloride flush, nitroGLYCERIN, SUMAtriptan, tiZANidine, sodium chloride flush, sodium chloride, ondansetron **OR** ondansetron, polyethylene glycol, acetaminophen **OR** acetaminophen, glucose, dextrose, glucagon (rDNA), dextrose, ipratropium      Intake/Output Summary (Last 24 hours) at 5/5/2022 1451  Last data filed at 5/5/2022 1135  Gross per 24 hour   Intake 1036.24 ml   Output --   Net 1036.24 ml       Physical Exam Performed:    BP (!) 146/45   Pulse 60   Temp 97.2 °F (36.2 °C) (Temporal)   Resp 16   Ht 5' (1.524 m)   Wt 207 lb 14.3 oz (94.3 kg)   SpO2 96%   BMI 40.60 kg/m²     General appearance:  Appears comfortable. Well nourished  Eyes: Sclera clear, pupils equal  ENT: Moist mucus membranes, no thrush. Trachea midline. Cardiovascular: Regular rhythm, normal S1, S2. No murmur, gallop, rub. No edema in lower extremities. L PPM  Respiratory: Clear to auscultation bilaterally, no wheeze, good inspiratory effort  Gastrointestinal: Abdomen soft, non-tender, not distended, normal bowel sounds  Musculoskeletal: No cyanosis in digits, neck supple  Neurology: Grossly intact.  Alert and oriented in time, place and person. No speech or motor deficits  Psychiatry: Appropriate affect. Not agitated  Skin: Warm, dry, normal turgor, no rash  Brisk capillary refill, peripheral pulses palpable       Labs:   Recent Labs     05/03/22  0430 05/03/22  1240 05/04/22  0435 05/04/22  0435 05/04/22  1420 05/04/22  2130 05/05/22  0445   WBC 6.4  --  6.9  --   --   --  9.6   HGB 7.5*   < > 8.3*   < > 8.4* 8.0* 8.3*   HCT 23.4*   < > 25.7*   < > 26.7* 25.3* 26.2*     --  260  --   --   --  270    < > = values in this interval not displayed. Recent Labs     05/03/22  0430 05/04/22  0435 05/05/22  0445    136 139   K 4.7 4.5 4.2    106 107   CO2 20* 19* 19*   BUN 25* 21* 21*   CREATININE 0.7 0.9 0.9   CALCIUM 9.5 9.2 9.0     No results for input(s): AST, ALT, BILIDIR, BILITOT, ALKPHOS in the last 72 hours. Recent Labs     05/03/22 0430   INR 1.09     Recent Labs     05/03/22 0430   TROPONINI <0.01       Urinalysis:      Lab Results   Component Value Date    NITRU Negative 05/02/2022    WBCUA 31 05/02/2022    BACTERIA 4+ 05/02/2022    RBCUA 0 05/02/2022    BLOODU Negative 05/02/2022    SPECGRAV 1.010 05/02/2022    GLUCOSEU Negative 05/02/2022       Radiology:  CT ENTEROGRAPHY W CONTRAST   Final Result   Mild wall thickening of the descending and rectosigmoid colon either due to   the partially contracted state of the colon or wall thickening from   underlying colitis. Scattered colonic diverticula are seen. No pericolonic   fat stranding. Mild bladder wall thickening.   Likely due to the partially contracted state   of the bladder in the absence of clinical or laboratory signs of cystitis      Atherosclerosis                 Assessment/Plan:    Active Hospital Problems    Diagnosis     CAD, multiple vessel [I25.10]      Priority: Medium    Melena [K92.1]     Pacemaker [Z95.0]     Anemia [D64.9]     Coronary artery disease involving coronary bypass graft of native heart without angina pectoris [I25.810]     Paroxysmal atrial fibrillation (HCC) [I48.0]     Diabetes mellitus type 2 in obese ([facility] [E11.69, E66.9]     RAHUL (obstructive sleep apnea) [G47.33]     Coronary artery disease involving native coronary artery of native heart with angina pectoris ([facility] [I25.119]     Essential hypertension [I10]      1. Monitor CP today  2. GI consult appreciated  3. Hematology consult for anemia  4. Continue ASA, Lipitor, Toprol  5. Renal consult for CKD appreciated  6. Increased Imdur  7. PT/OT eval following  8. Cardiology consult appreciated  9. Resume Plavix  10. Resume diet    DVT Prophylaxis: SCD  Diet: ADULT DIET; Regular  Code Status: Full Code    PT/OT Eval Status: Following    Dispo - Home with home PT/OT/VNS/HHA/SW    Discussed with patient, Dr Rhona Childs (Renal), nursing and CM. Monitor chest pain and home as soon as ok with all.     David Jimenez MD

## 2022-05-05 NOTE — PROGRESS NOTES
[facility] Department   Phone: [phone]    Physical Therapy    [] Initial Evaluation            [x] Daily Treatment Note         [] Discharge Summary      Patient: Christine Goodman   : 1950   MRN: [id number]   Date of Service:  2022  Admitting Diagnosis: Coronary artery disease involving coronary bypass graft of native heart without angina pectoris  Current Admission Summary: Christine Goodman  is 80 y.o. y.o. female with significant past medical history of MALCOLM, crea up to 1.6, baseline crea 0.8 to 1, Anemia, Atrial Fibrillation, HLD, Obesity, HTN, Sleep Apnea, DM who presents with chest pain and SOB on exertion. However, on presentation to ED, Hgb noted to be 8.3. She said she has dark stools. She follows with Dr. Kennedy King and also has been receiving IV Fe. Hgb from 22 was 10.2. DBP lower in the 50's. Crea at 1. On Losartan-HCTZ, Lasix and SPLT as outpt. Had EGD with Dr. Mario Apley previously. No vomiting nor diarrhea. No regular NSAID use. Chest pain has resolved at this time. Past Medical History:  has a past medical history of Anemia, Anesthesia, Anesthesia complication, Atrial fibrillation ([address] CAD (coronary artery disease), Chest pain, Chronic kidney disease, Depression, GERD (gastroesophageal reflux disease), Glaucoma, Hiatal hernia, Hyperlipidemia, Hypertension, Migraines, neuralgic, Morbid obesity ([address] Osteoarthritis, Sleep apnea, Type II or unspecified type diabetes mellitus without mention of complication, not stated as uncontrolled, Unspecified sleep apnea, Urinary incontinence, and UTI (urinary tract infection). Past Surgical History:  has a past surgical history that includes Coronary artery bypass graft (); Hammer toe surgery; Ovary removal (Bilateral, ); Esophagus dilation; Breast lumpectomy; Knee arthroscopy; Gastric Band (11); Colonoscopy; Upper gastrointestinal endoscopy;  Upper gastrointestinal endoscopy (10/30/15); Cosmetic surgery; joint replacement (1995); joint replacement (1995); orif humerus decompression (Left, 2/25/2016); Upper gastrointestinal endoscopy (10/14/2016); Bariatric Surgery (11/03/2016); ablation of dysrhythmic focus; Colonoscopy (10/08/2018); Colonoscopy (N/A, 10/8/2018); pr njx dx/ther sbst intrlmnr crv/thrc w/img gdn (N/A, 11/5/2018); Cardiac surgery (2/22/1999); Cardiac catheterization; Injection Procedure For Sacroiliac Joint (Left, 12/17/2018); Enteroscopy (N/A, 2/26/2019); ventral hernia repair (N/A, 10/29/2019); Abdomen surgery (N/A, 9/1/2020); pacemaker placement (05/2019); Upper gastrointestinal endoscopy (N/A, 3/22/2022); Upper gastrointestinal endoscopy (N/A, 3/22/2022); Upper gastrointestinal endoscopy (N/A, 5/3/2022); and Colonoscopy (N/A, 5/3/2022). Discharge Recommendations: Charisse Jones scored a 20/24 on the AM-PAC short mobility form. Current research shows that an AM-PAC score of 18 or greater is typically associated with a discharge to the patient's home setting. Based on the patient's AM-PAC score and their current functional mobility deficits, it is recommended that the patient have 2-3 sessions per week of Physical Therapy at d/c to increase the patient's independence. At this time, this patient demonstrates the endurance and safety to discharge home with HHPT and a follow up treatment frequency of 2-3x/wk. Please see assessment section for further patient specific details. If patient discharges prior to next session this note will serve as a discharge summary. Please see below for the latest assessment towards goals.    HOME HEALTH CARE: LEVEL 1 STANDARD    - Initial home health evaluation to occur within 24-48 hours, in patient home   - Therapy to evaluate with goal of regaining prior level of functioning   - Therapy to evaluate if patient has [address] needs for personal care    DME Required For Discharge: rollator (4 wheel walker) - needs bench seat for energy conservation. Pt 5' tall. Precautions/Restrictions: no restrictions    Pre-Admission Information   Lives With: spouse    Type of Home: house  Home Layout: two level   Stair lift  Home Access: ramped entry  [address]: walk in shower  Toilet Height: standard height, elevated height One bathroom has elevated toilet, other has standard toilet  Bathroom Equipment: grab bars in shower, shower chair, hand held shower head  Home Equipment: quad cane small base  Transfer Assistance: Independent without use of device  Ambulation Assistance:modified independent with use of cane  ADL Assistance: independent with all ADL's  IADL Assistance: independent with homemaking tasks  Active :        [x] Yes  [] No  Hand Dominance: [] Left  [x] Right  Current Employment: retired. Occupation:   Hobbies: Riding motorcycle  Recent Falls: 1          Subjective  General: Pt seated up in chair upon arrival. Pt agreeable to PT/OT. Denies pain at rest.  Pain: 0/10  Pain Interventions: not applicable        Functional Mobility  Bed Mobility  Bed mobility not completed on this date. Comments: pt sitting up in recliner at beginning of session and remained seated in chair at end of session. Transfers  Sit to stand transfer: supervision  Stand to sit transfer: supervision  Comments: from chair 2 times, 4ww bench seat 2 times. VCs for hand placement. Instruction and VCs for brake locking on 4ww. Ambulation  Surface:level surface  Assistive Device: single point cane  Assistance: stand by assistance  Distance: 70 ft  Gait Mechanics: Increased lat sway,  fatigued  Comments:  Pt amb in hallway and becoming SOB and needing standing rest break. Ambulation Trial 2  Surface:level surface  Assistive Device: rollator (2PY)  Assistance: supervision  Distance: Pt amb 90 ft., then 30 ft.   Gait Mechanics: SOB, improved posture, decreased lat sway with bilat UE support.    Comments:  Pt continued amb in hallway and needing seated rest before continuing to room. Pt given instruction on energy conservation and use of 4ww brake locking. Pt 93% O2 sats on RA at rest and remained 91% or higher during activity. Stair Mobility  Stair mobility not completed on this date. Comments:  Wheelchair Mobility:  No w/c mobility completed on this date. Comments:  Balance  Static Sitting Balance: good: independent with functional balance in unsupported position  Dynamic Sitting Balance: good: independent with functional balance in unsupported position  Static Standing Balance: fair (+): maintains balance at SBA/supervision without use of UE support  Dynamic Standing Balance: fair (+): maintains balance at SBA/supervision without use of UE support  Comments: with 4ww    Other Therapeutic Interventions  Pt performed seated there ex including: HR/TR x 10 bilat, LAQs x 10 bilat, seated marching x 10 bilat. Discussed 4ww recommendation and AD use at home and community. Pt verbalized good understanding. Functional Outcomes  AM-PAC Inpatient Mobility Raw Score : 20              Cognition  WFL  Orientation:    alert and oriented x 4  Command Following:   [facility]    Education  Barriers To Learning: none  Patient Education: patient educated on goals, PT role and benefits, plan of care, HEP, general safety, functional mobility training, proper use of assistive device/equipment, energy conservation, family education, discharge recommendations  Learning Assessment:  patient verbalizes and demonstrates understanding    Assessment  Activity Tolerance: Pt activity tolerance limited by fatigue and decreased endurance. Pt began to fatigue and became slightly SOB after about 79' of gait activity which impacted ambulation. Impairments Requiring Therapeutic Intervention: decreased strength, decreased endurance, decreased sensation, decreased balance  Prognosis: good  Clinical Assessment: Pt Supervision transfers, SBA gait with cane and supervision with 4ww. Pt able to amb much further distance and improved balance with 2 had support and bench seat use of 4ww. Pt functional mobility limited by fatigue and decreased endurance. Pt PLOF was independent with ambulation, transferring and household and self-care tasks. Pt frequently uses a cane when ambulating to support balance. Pt would benefit from PT to improve functional endurance and balance in order to increase functional mobility. Safety Interventions: patient left in chair, call light within reach, gait belt and nurse notified    Plan  Frequency: 3-5 x/per week  Current Treatment Recommendations: strengthening, ROM, balance training, functional mobility training, gait training, endurance training and safety education    Goals  Patient Goals:  To go home   Short Term Goals:  Time Frame: By discharge ( no goals met in full this date)  Patient will complete bed mobility at modified independent   Patient will complete transfers at [facility]   Patient will ambulate 50 ft with use of rolling walker at modified independent    Therapy Session Time      Individual Group Co-treatment   Time In 1436       Time Out 1514       Minutes 38         Timed Code Treatment Minutes:  38 Minutes  Total Treatment Minutes:  38 minutes    Electronically Signed By: Bud Quintanilla [id number]

## 2022-05-05 NOTE — PROGRESS NOTES
[address]   Electrophysiology  [facility], APRN-CNP  Attending EP: Dr. John Adan   Date: 5/18/2022  I had the privilege of visiting Sonam Stapleton in the office. Chief Complaint:    Chief Complaint   Patient presents with    1 Year Follow Up    Device Check    Atrial Fibrillation    Fatigue     History of Present Illness: History obtained from patient and medical record. Sonam Stapleton is 80 y.o. female with a past medical history of HTN, HLD, DM, obesity, RAHUL, CKD, CAD s/ CABG 1999, and atrial fibrillation. Follows with Dr. Vlad Metzger for CAD last [facility] 2017 showed patent grafts. S/p EPS and afib ablation 8/16/2018. EGD 9/21/18  In the gastric antrum, linear streaks of erythematous mucosa with erosions seen suggestive of GAVE, biopsies not obtained. Rec'd iron infusions. History of MGUS and receiving Iron transfusion. Developed SSS s/p PPM (5/1/2019). Interval history: Today, Sonam Stapleton is being seen for PAF, SSS, and CAD. She has been taking her Plavix and ASA regularly. Her blood counts have improved and Dr. Vlad Metzger has reviewed them. She need PCI for LM disease. She denies any symptoms of CP, SOB. She was symptomatic with AF in the past.  There is no AF on her device interrogation. She remains off AC due to no recurrence and high risk for bleeding with chronic anemia and hx of GIB. Denies palpitations, dizziness, orthopnea or swelling. No syncope. Denies having chest pain, palpitations, shortness of breath, orthopnea or dizziness at the time of this visit. With regard to medication therapy the patient has been compliant with prescribed regimen. She has tolerated therapy to date. Assessment:  Paroxysmal Atrial Fibrillation  - ECG today shows AP  - No recurrence on device interrogation  - On Lopressor 12.5 mg as needed  - No episodes on device  - [id number] score: 5 (age, gender, HTN, DM, CAD); [id number] Vasc score and anticoagulation discussed.  High risk for stroke and thromboembolism.   ~ She has been off AC due to GI bleeding and acute anemia  ~ MEENU closure has been discussed in the past, however she had not had any recurrent AF, will consider if recurrent  - Afib risk factors including age, HTN, obesity, inactivity and RAHUL were discussed with patient. Risk factor modification recommended   ~ TSH 1.95 (5/22/2020)     - Treatment options including cardioversion, rate control strategy, antiarrhythmics, anticoagulation and possible ablation were discussed with patient. Risks, benefits and alternative of each treatment options were explained. All questions answered    ~ No recurrence on device, can consider ablation is recurrent  SSS/Implantable device   - S/p dual chamber PPM 5/1/2019   - The CIED was interrogated and I reviewed all data    - Device interrogation today shows STEFANIE 10 yrs, AT/AF 0, AP 95%,  <0.1%  HTN-goal <130/80   - Controlled   - Continue current medications   - Encouraged patient to check BP at home, log and bring to office visits  - Discussed lifestyle modifications, weight loss, low sodium diet  CAD   - Remote CABG 1999   - S/p [facility] 4/25/2022 showed progressive and obstructive LM and OM   - [facility] 1/2017 showed patent grafts   - No reports of angina   - Plans for angiogram with PCI in 1-2 weeks   Plan  - Needs [facility], however had anemia and it was deferred. Appears stable. She will proceed with staged PCI, discussed with [place] and aNsir Smith will be calling to schedule patient  - She will call if she has palpitations  - No medication changes     F/U: Follow-up with EP in 3 months   -Follow up with device clinic as scheduled  -Call Marcel Baker at [phone] with any questions    Allergies: Allergies   Allergen Reactions    Albuterol Other (See Comments)     Other reaction(s): Chest Pain  Crushing chest pain    Heparin Other (See Comments)     Caused bruises and hematomas immediately when drip started.   MARKED BRUISING/HEMATOMAS    Niacin     Avelox [Moxifloxacin Hcl In Nacl] Rash    Moxifloxacin Rash    Niaspan [Niacin Er] Itching and Rash    Proventil [Albuterol Sulfate] Palpitations    Tagamet [Cimetidine] Rash     Home Medications:  Prior to Visit Medications    Medication Sig Taking?  Authorizing Provider   clopidogrel (PLAVIX) 75 MG tablet Take 1 tablet by mouth daily  Connie Hernandez MD   isosorbide mononitrate (ISMO;MONOKET) 10 MG tablet Take 1 tablet by mouth 2 times daily  BILL Frazier - CNP   metoprolol tartrate (LOPRESSOR) 25 MG tablet Take 0.5 tablets by mouth 2 times daily  BILL Arceo - CNP   fexofenadine (ALLEGRA) 180 MG tablet Take 180 mg by mouth daily  Historical Provider, MD   Epoetialina Leonard-epbx (RETACRIT IJ) Inject as directed Every friday  Historical Provider, MD   losartan-hydroCHLOROthiazide (HYZAAR) 100-25 MG per tablet Take 1 tablet by mouth daily  Historical Provider, MD   ipratropium (ATROVENT HFA) 17 MCG/ACT inhaler Inhale 2 puffs into the lungs three times daily  Yusra Ramírez MD   SUMAtriptan (IMITREX) 100 MG tablet Take 1 tablet by mouth daily as needed for Migraine  Yusra Ramírez MD   tiZANidine (ZANAFLEX) 4 MG tablet Take 1 tablet by mouth nightly as needed (leg spasms)  Yusra Ramírez MD   zinc sulfate (ZINCATE) 220 (50 Zn) MG capsule Take 50 mg by mouth 4 times daily   Historical Provider, MD   potassium chloride (KLOR-CON M) 20 MEQ extended release tablet Take 1 tablet by mouth daily  Patient taking differently: Take 10 mEq by mouth daily   Connie Hernandez MD   amLODIPine (NORVASC) 5 MG tablet Take 1 tablet by mouth every evening  Patient taking differently: Take 2.5 mg by mouth 2 times daily   Mary Grace Alvarado MD   aspirin 81 MG tablet Take 81 mg by mouth daily  Historical Provider, MD   furosemide (LASIX) 40 MG tablet Take 1 tablet by mouth daily  Mary Grace Alvarado MD   Ascorbic Acid (VITAMIN C) 1000 MG tablet Take 1,000 mg by mouth daily  Historical Provider, MD Cyanocobalamin (VITAMIN B-12 PO) Take 3,000 mcg by mouth daily  Historical Provider, MD   estradiol (ESTRACE) 0.1 MG/GM vaginal cream Place 2 g vaginally See Admin Instructions Twice a week  Historical Provider, MD   Cranberry 450 MG CAPS Take by mouth daily   Historical Provider, MD   pioglitazone (ACTOS) 15 MG tablet Take 15 mg by mouth daily  Historical Provider, MD   vitamin D (CHOLECALCIFEROL) 1000 UNIT TABS tablet Take 1,000 Units by mouth daily   Historical Provider, MD   spironolactone (ALDACTONE) 25 MG tablet Take 25 mg by mouth every morning (before breakfast)   Historical Provider, MD   dexlansoprazole (DEXILANT) 60 MG CPDR delayed release capsule Take 60 mg by mouth daily  Historical Provider, MD   gabapentin (NEURONTIN) 100 MG capsule Take 400 mg by mouth 3 times daily. Efrain Roberts Historical Provider, MD   TraZODone HCl (DESYREL PO) Take 50 mg by mouth nightly   Historical Provider, MD   Calcium Citrate-Vitamin D (CALCIUM CITRATE + D PO) Take 1 tablet by mouth daily   Historical Provider, MD   SLOW-MAG 71.5-119 MG TBEC tablet Take 1 tablet by mouth daily 143 mg  Historical Provider, MD   rosuvastatin (CRESTOR) 40 MG tablet Take 20 mg by mouth every evening   Historical Provider, MD   metFORMIN (GLUCOPHAGE) 500 MG tablet Take 500 mg by mouth 2 times daily (with meals)   Historical Provider, MD   ezetimibe (ZETIA) 10 MG tablet Take 5 mg by mouth nightly   Historical Provider, MD   escitalopram (LEXAPRO) 20 MG tablet Take 20 mg by mouth daily. Historical Provider, MD   nitroGLYCERIN (NITROLINGUAL) 0.4 MG/SPRAY spray Place 1 spray under the tongue every 5 minutes as needed. As directed for chest pain   Historical Provider, MD      Past Medical History:  Past Medical History:   Diagnosis Date    Anemia     Anesthesia     trouble after egd 10/2016.   Anxiety and severe tremors after AF ablation 8/2018-responded well to Ativan    Anesthesia complication     flailing, yelling when waking up from anesthesia-ativan helps (obestb36/29/19: Caryl Julian works within seconds of administration)    Atrial fibrillation ([address] 10/01/2017    A. fib with SVR    CAD (coronary artery disease)     Chest pain 10/01/2017    Chronic kidney disease     ? ??    Depression     GERD (gastroesophageal reflux disease)     Glaucoma     Hiatal hernia     Hyperlipidemia     Hypertension     Migraines, neuralgic     Morbid obesity (HCC)     Osteoarthritis     Sleep apnea     uses bi-pap    Type II or unspecified type diabetes mellitus without mention of complication, not stated as uncontrolled     Unspecified sleep apnea     uses cpap    Urinary incontinence     UTI (urinary tract infection)      Past Surgical History:    has a past surgical history that includes Coronary artery bypass graft (1999); Hammer toe surgery; Ovary removal (Bilateral, 1997); Esophagus dilation; Breast lumpectomy; Knee arthroscopy; Gastric Band (12/20/11); Colonoscopy; Upper gastrointestinal endoscopy; Upper gastrointestinal endoscopy (10/30/15); Cosmetic surgery; joint replacement (1995); joint replacement (1995); orif humerus decompression (Left, 2/25/2016); Upper gastrointestinal endoscopy (10/14/2016); Bariatric Surgery (11/03/2016); ablation of dysrhythmic focus; Colonoscopy (10/08/2018); Colonoscopy (N/A, 10/8/2018); pr njx dx/ther sbst intrlmnr crv/thrc w/img gdn (N/A, 11/5/2018); Cardiac surgery (2/22/1999); Cardiac catheterization; Injection Procedure For Sacroiliac Joint (Left, 12/17/2018); Enteroscopy (N/A, 2/26/2019); ventral hernia repair (N/A, 10/29/2019); Abdomen surgery (N/A, 9/1/2020); pacemaker placement (05/2019); Upper gastrointestinal endoscopy (N/A, 3/22/2022); Upper gastrointestinal endoscopy (N/A, 3/22/2022); Upper gastrointestinal endoscopy (N/A, 5/3/2022); and Colonoscopy (N/A, 5/3/2022). Social History:  Reviewed. reports that she has never smoked.  She has never used smokeless tobacco. She reports that she does not drink alcohol and does not use drugs. Family History:  Reviewed. family history includes Cancer in her mother; Heart Disease in her father; High Blood Pressure in her father and mother; Stroke in her sister. Denies family history of sudden cardiac death, arrhythmia, premature CAD    Review of System:  · Constitutional: No weight changes or weakness  · HEENT: No visual changes. No mouth sores or sore throat. · Cardiovascular: denies chest pain, denies dyspnea on exertion, denies palpitations or denies loss of consciousness. No cough, hemoptysis, denies pleuritic pain, or phlebitis. denies dizziness. · Respiratory: denies cough or wheezing. · Gastrointestinal: Negative, No blood in stools. · Genitourinary: No hematuria. · Neurological: No focal weakness  · Psychiatric: No confusion, anxiety, or depression. · Hem/Lymph: Denies abnormal bruising or bleeding. Physical Examination:  Vitals:    05/18/22 1408   BP: (!) 128/50      Wt Readings from Last 3 Encounters:   05/18/22 204 lb (92.5 kg)   05/10/22 205 lb (93 kg)   05/06/22 208 lb 8.9 oz (94.6 kg)      Constitutional: Cooperative and in no apparent distress, and appears well nourished + obeisty   Skin: Warm and pink; no cyanosis or bruising   HEENT: Symmetric and normocephalic. Conjunctiva pink with clear sclera. Mucus membranes pink and moist. No visible masses/goiter   Respiratory: Respirations symmetric and unlabored. Lungs clear to auscultation bilaterally, no wheezing, rhonchi, or crackles.  Cardiovascular:  regular rate and rhythm. S1 & S2 present, negative for murmur. negative elevation of JVP. No peripheral edema.  Musculoskeletal:  No focal weakness. + wheel chair   Neurological/Psych: Awake and orientated to person, place and time. Calm affect, appropriate mood.      Pertinent labs, diagnostic, device, and imaging results reviewed as a part of this visit    LABS    CBC:   Lab Results   Component Value Date    WBC 9.6 05/05/2022    HGB 8.3 (L) 2022    HCT 26.2 (L) 2022    MCV 82.7 2022     2022     BMP:   Lab Results   Component Value Date    CREATININE 0.9 2022    BUN 21 (H) 2022     2022    K 4.2 2022     2022    CO2 19 (L) 2022     Estimated Creatinine Clearance: 60 mL/min (based on SCr of 0.9 mg/dL). Lab Results   Component Value Date     2011       Thyroid:   Lab Results   Component Value Date    TSH 1.95 2020    [id number] 5.7 2011     Lipid Panel:   Lab Results   Component Value Date    CHOL 109 2020    HDL 36 2020    TRIG 132 2020     LFTs:  Lab Results   Component Value Date    ALT 13 2022    AST 15 2022    ALKPHOS 63 2022    BILITOT <0.2 2022     Coags:   Lab Results   Component Value Date    PROTIME 12.4 2022    INR 1.09 2022    APTT 38.8 (H) 2022     EC2022 AP HR 60, , QRS 94, QTc 424    Echo: 2019  Summary   -Technically marginal study.   -Normal left ventricle size, wall thickness and systolic function with an   estimated ejection fraction of 55%. - No regional wall motion abnormalities are seen. E/e\"= 7.29 .   -The right ventricle is normal in size and function.   -Catheter is visualized in the right atrium. TAPSE = 2.35 cm. -IVC size is normal (<2.1cm) and collapses > 50% with respiration consistent   with normal RA pressure (3mmHg). -The aortic valve appears sclerotic but opens well. Mild aortic   regurgitation.   -Mitral annular calcification. No significant mitral regurgitation.   GXT: 2015  Summary         No EKG evidence for ischemia with lexiscan         Preserved LV systolic function      Cath: 2022  Findings:  Artery Findings/Result   LM 80%   LAD 99% ostial, 100% mid   Cx 70% mid OM1, 70% distal OM1   RI N/A   RCA Small, nondominant, 90% prox, multiple mid 70% lesions (unchanged)   R-OM patent   L-D-LAD Patent (*10mmHg pullback across proximal Left subclavian)   LVEDP 13   LVG NA      Intervention:         None     Post Cath Dx:       CAD as above  Progressive and obstructive LM and OM disease  Plan for staged PCI given need for risk discussion and given BUN/Cr high    Diet & Exercise:   The patient is counseled to follow a low salt diet to assure blood pressure remains controlled for cardiovascular risk factor modification   The patient is counseled to avoid excess caffeine, and energy drinks as this may exacerbated ectopy and arrhythmia   The patient is counseled to lose weight to control cardiovascular risk factors   Exercise program discussed: To improve overall cardiovascular health, the patient is instructed to increase cardiovascular related activities with a goal of 150 min/week of moderate level activity or 10,000 steps per day. Encouraged to perform as much activity as tolerated      I have addressed the patient's cardiac risk factors and adjusted pharmacologic treatment as needed. In addition, I have reinforced the need for patient directed risk factor modification. I independently reviewed the device check interrogation and ECG    All questions and concerns were addressed with the patient. Alternatives to treatment were discussed. Thank you for allowing to us to participate in the care of Haleigh Magdaleno.     Arnulfo Gross, BILL-CNP  [facility]   Office: [phone]

## 2022-05-05 NOTE — PROGRESS NOTES
[facility] CVU Note    Patient Active Problem List   Diagnosis    Coronary artery disease involving native coronary artery of native heart with angina pectoris ([address]    Essential hypertension    RAHUL (obstructive sleep apnea)    Chronic kidney disease    Class 3 severe obesity due to excess calories with serious comorbidity and body mass index (BMI) of 40.0 to 44.9 in adult [facility])    Diabetes mellitus type 2 in obese (HCC)    Paroxysmal atrial fibrillation ([address]    Coronary artery disease involving coronary bypass graft of native heart without angina pectoris    Anemia    Pacemaker    NSTEMI (non-ST elevated myocardial infarction) ([address]    Pneumonia    COVID-19    [facility]M (acute [facility] injury) [facility]    Melena    CAD, multiple vessel       Past Medical History:   has a past medical history of Anemia, Anesthesia, Anesthesia complication, Atrial fibrillation ([address] CAD (coronary artery disease), Chest pain, Chronic kidney disease, Depression, GERD (gastroesophageal reflux disease), Glaucoma, Hiatal hernia, Hyperlipidemia, Hypertension, Migraines, neuralgic, Morbid obesity ([address] Osteoarthritis, Sleep apnea, Type II or unspecified type diabetes mellitus without mention of complication, not stated as uncontrolled, Unspecified sleep apnea, Urinary incontinence, and UTI (urinary tract infection). Past Social History:   reports that she has never smoked. She has never used smokeless tobacco. She reports that she does not drink alcohol and does not use drugs. Subjective:  No complaints. CT enterography yesterday. No SOB. More leg swelling. BP higher. Review of Systems   Constitutional: Negative for activity change, appetite change, chills, fatigue, fever and unexpected weight change. HENT: Negative for congestion and facial swelling. Eyes: Negative for photophobia, discharge and redness.    Respiratory: Negative for cough, chest tightness and shortness of breath. Cardiovascular: Negative for chest pain, palpitations and leg swelling. Gastrointestinal: Negative for abdominal distention, abdominal pain, blood in stool, constipation, diarrhea, nausea and vomiting. Endocrine: Negative for cold intolerance, heat intolerance and polyuria. Genitourinary: Negative for decreased urine volume, difficulty urinating, flank pain and hematuria. Musculoskeletal: Negative for joint swelling and neck pain. Neurological: Negative for dizziness, seizures, syncope, speech difficulty, light-headedness and headaches. Hematological: Does not bruise/bleed easily. Psychiatric/Behavioral: Negative for agitation, confusion and hallucinations. Objective:  +2.4L    BP (!) 153/48   Pulse 60   Temp 97.4 °F (36.3 °C) (Temporal)   Resp 16   Ht 5' (1.524 m)   Wt 207 lb 14.3 oz (94.3 kg)   SpO2 95%   BMI 40.60 kg/m²     Wt Readings from Last 3 Encounters:   05/05/22 207 lb 14.3 oz (94.3 kg)   04/25/22 208 lb (94.3 kg)   04/13/22 208 lb (94.3 kg)       BP Readings from Last 3 Encounters:   05/05/22 (!) 153/48   05/03/22 (!) 116/56   04/25/22 (!) 143/62     Chest- clear  Heart-regular  Abd-soft  Ext- 1+ edema    Labs  Hemoglobin   Date Value Ref Range Status   05/05/2022 8.3 (L) 12.0 - 16.0 g/dL Final     Hematocrit   Date Value Ref Range Status   05/05/2022 26.2 (L) 36.0 - 48.0 % Final     WBC   Date Value Ref Range Status   05/05/2022 9.6 4.0 - 11.0 K/uL Final     Platelets   Date Value Ref Range Status   05/05/2022 270 135 - 450 K/uL Final     Lab Results   Component Value Date    CREATININE 0.9 05/05/2022    BUN 21 (H) 05/05/2022     05/05/2022    K 4.2 05/05/2022     05/05/2022    CO2 19 (L) 05/05/2022     UA mod LE and 31 wbc with trace protein. Assessment/Plan:  1. H/O MALCOLM- baseline crea 0.8 to 1. Crea at baseline. Holding off on cardiac stenting while awaiting GI w/u. Restart Losartan 25mg daily. Restart Lasix 40mg daily.   Hold off on SPLT and HCTZ for now. Crea stable s/p contrast yesterday. 2.  Resp. Alkalosis-serum HCO3 better. 3.  Relative Hypotension-better   4. Anemia- GI w/u. Follows with Dr. Gant Night too. H/O MDS. 5.  H/O DM  6. H/O Edema-more today. As per #1.     7.  H/O A. Fib  8. H/O Obesity  9. Chest Pain- Cardiology notes reviewed. Planning for stenting later on if Hgb stable with restarting Plavix. 10.  Stable renal status. Okay for discharge. F/U with me in 2 weeks. Updated patient and Medicine.      Melissa Escobedo MD

## 2022-05-05 NOTE — PROGRESS NOTES
Reassessment complete. See flow sheet. No acute changes at this time. No further episodes of chest pain. Lasix + losartan resumed per nephrology and imdur dose increased per cardiology. Per cardiology, will monitor one more day and possibly discharge home tomorrow. Patient,  and son at bedside and all updated. Will continue with current plan of care.

## 2022-05-05 NOTE — PROGRESS NOTES
Paramjit Daily Progress Note      Admit Date:  5/2/2022    ASSESSMENT AND PLAN:  1. Stable angina with need for new PCI; hemodynamically stable; intermittent chest pain that correlates with HTN and responds to nitro; unable to do PCI at this time due to workup of recent GI bleed    2. CAD s/p CABG 1999, grafts patent  3. Minimal troponin elevation  4. Acute blood loss anemia from GI bleed from unknown source    Plan  1. Increase imdur from 30 to 60 mg po daily  2. Continue plavix to monitor for new GI bleeding  3. Monitor closely in CVICU  4. GI evaluation ongoing  5. Dr. Jon Polanco to see patient tomorrow, timing of PCI per Dr. Jon Polanco  6. Continue to hold full anticoagulation for AF given recent GIB  7. Transfuse to keep Hb > 8 given active ischemia    Subjective:  Ms. Senia Stout has had two more episodes of chest pain in the last 24 hours, both while hypertensive, and both responded to nitro. No shortness of breath. No bowel movement. No further blood in stool or melena.     Objective:   BP (!) 172/36   Pulse 60   Temp 96.5 °F (35.8 °C) (Temporal)   Resp 18   Ht 5' (1.524 m)   Wt 207 lb 14.3 oz (94.3 kg)   SpO2 99%   BMI 40.60 kg/m²       Intake/Output Summary (Last 24 hours) at 5/5/2022 0726  Last data filed at 5/4/2022 1701  Gross per 24 hour   Intake 1326.24 ml   Output --   Net 1326.24 ml       TELEMETRY: Paced      Physical Exam:  General:  Awake, alert, oriented x 3, NAD  Skin:  Warm and dry  Neck:  JVD  Chest:  normal air entry  Cardiovascular:  RRR S1S2, no S3, 2/6 systolic at 2nd right intercostal space  Abdomen:  Soft, ND, NT, No HSM  Extremities:  No edema    Medications:    iron sucrose  200 mg IntraVENous Q24H    amLODIPine  2.5 mg Oral BID    vitamin C  1,000 mg Oral Daily    aspirin  81 mg Oral Daily    clopidogrel  75 mg Oral Daily    vitamin B-12  3,000 mcg Oral Daily    escitalopram  20 mg Oral Daily    cetirizine  10 mg Oral Daily    gabapentin  400 mg Oral TID   [facility] isosorbide mononitrate  30 mg Oral Daily    metoprolol tartrate  12.5 mg Oral BID    pioglitazone  15 mg Oral Daily    rosuvastatin  20 mg Oral QPM    Vitamin D  1,000 Units Oral Daily    zinc sulfate  50 mg Oral 4x Daily    sodium chloride flush  5-40 mL IntraVENous 2 times per day    insulin lispro  0-12 Units SubCUTAneous TID WC    insulin lispro  0-6 Units SubCUTAneous Nightly    pantoprazole  40 mg IntraVENous BID      sodium chloride      dextrose      dextrose       sodium chloride flush, nitroGLYCERIN, SUMAtriptan, tiZANidine, sodium chloride flush, sodium chloride, ondansetron **OR** ondansetron, polyethylene glycol, acetaminophen **OR** acetaminophen, glucose, dextrose, glucagon (rDNA), dextrose, ipratropium    Lab Data:  CBC:   Recent Labs     05/03/22  0430 05/03/22  1240 05/04/22  0435 05/04/22  0435 05/04/22  1420 05/04/22  2130 05/05/22  0445   WBC 6.4  --  6.9  --   --   --  9.6   HGB 7.5*   < > 8.3*   < > 8.4* 8.0* 8.3*   HCT 23.4*   < > 25.7*   < > 26.7* 25.3* 26.2*   MCV 80.2  --  81.6  --   --   --  82.7     --  260  --   --   --  270    < > = values in this interval not displayed. BMP:   Recent Labs     05/03/22  0430 05/04/22  0435 05/05/22  0445    136 139   K 4.7 4.5 4.2    106 107   CO2 20* 19* 19*   BUN 25* 21* 21*   CREATININE 0.7 0.9 0.9     LIVER PROFILE: No results for input(s): AST, ALT, LIPASE, BILIDIR, BILITOT, ALKPHOS in the last 72 hours. Invalid input(s):   AMYLASE,  ALB  PT/INR:   Recent Labs     05/03/22 0430   PROTIME 12.4   INR 1.09     APTT:   Recent Labs     05/03/22 0430   APTT 38.8*     BNP:    Lab Results   Component Value Date    PROBNP 343 03/02/2022    PROBNP 219 05/14/2019    PROBNP 1,283 04/30/2019       TROP:   Troponin   Date/Time Value Ref Range Status   05/03/2022 04:30 AM <0.01 <0.01 ng/mL Final     Comment:     Methodology by Troponin T   12/31/2021 05:23 AM 0.21 (H) <0.01 ng/mL Final     Comment:     Methodology by Troponin T   12/29/2021 05:38 AM 0.31 (H) <0.01 ng/mL Final     Comment:     Methodology by Troponin T         LIPIDS:   Lab Results   Component Value Date    CHOL 109 05/22/2020    TRIG 132 05/22/2020    HDL 36 05/22/2020    LDLCALC 47 05/22/2020    LABVLDL 26 05/22/2020         IMAGING:     CT enterography - reviewed    Mild wall thickening of the descending and rectosigmoid colon either due to   the partially contracted state of the colon or wall thickening from   underlying colitis.  Scattered colonic diverticula are seen.  No pericolonic   fat stranding.       Mild bladder wall thickening.  Likely due to the partially contracted state   of the bladder in the absence of clinical or laboratory signs of cystitis       Atherosclerosis           Julianne Meyers MD, MD 5/5/2022 7:26 AM

## 2022-05-05 NOTE — CARE COORDINATION
Patient discharged 5/5/2022 to home with [facility]  Phone: [phone]  Fax: [phone]. Order/ AVS faxed and agency notifed. No other CM needs at this time.     All discharge needs met per case management    TAVON LopezN, CCM, RN  [facility]  [phone]

## 2022-05-05 NOTE — PROGRESS NOTES
.              Gastroenterology Progress Note      Lidia Fragachester is a 80 y.o. female patient. No diagnosis found. SUBJECTIVE:  No BMs. Had chest pain early this morning while getting dressed and it resolved with nitro. ROS:  Cardiovascular ROS: no dyspnea on exertion  Gastrointestinal ROS: no abdominal pain, N/V  Respiratory ROS: no cough, shortness of breath, or wheezing    Physical    VITALS:  BP (!) 153/48   Pulse 60   Temp 97.4 °F (36.3 °C) (Temporal)   Resp 16   Ht 5' (1.524 m)   Wt 207 lb 14.3 oz (94.3 kg)   SpO2 95%   BMI 40.60 kg/m²   TEMPERATURE:  Current - Temp: 97.4 °F (36.3 °C); Max - Temp  Av °F (36.1 °C)  Min: 96.5 °F (35.8 °C)  Max: 97.4 °F (36.3 °C)    NAD  RRR, Nl s1s2  Lungs CTA Bilaterally, normal effort  Abdomen soft, ND, NT, no HSM, Bowel sounds normal  AAOx3, No asterixis     Data    Data Review:    Recent Labs     22  0430 22  1240 22  0435 22  0435 22  1420 22  2130 22  0445   WBC 6.4  --  6.9  --   --   --  9.6   HGB 7.5*   < > 8.3*   < > 8.4* 8.0* 8.3*   HCT 23.4*   < > 25.7*   < > 26.7* 25.3* 26.2*   MCV 80.2  --  81.6  --   --   --  82.7     --  260  --   --   --  270    < > = values in this interval not displayed. Recent Labs     22  0430 22  04322  0445    136 139   K 4.7 4.5 4.2    106 107   CO2 20* 19* 19*   BUN 25* 21* 21*   CREATININE 0.7 0.9 0.9     No results for input(s): AST, ALT, ALB, BILIDIR, BILITOT, ALKPHOS in the last 72 hours. No results for input(s): LIPASE, AMYLASE in the last 72 hours. Recent Labs     22  0430   PROTIME 12.4   INR 1.09     No results for input(s): PTT in the last 72 hours.     Radiology Review:  CT enterography 22  Impression   Mild wall thickening of the descending and rectosigmoid colon either due to   the partially contracted state of the colon or wall thickening from   underlying colitis.  Scattered colonic diverticula are seen. Brooke Leal pericolonic   fat stranding.       Mild bladder wall thickening.  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 Colonoscopy with possible pinpoint AVM with minimal oozing treated with AC. BUN decreasing. CT enterography unrevealing. No further bleeding. Anemia - due to GI blood loss, iron deficiency and MDS. Heme following. hgb stable at 8.3. CAD - to have future PCI per cardiology. On asa and plavix. PLAN    - Protonix 40 mg PO BID x8 weeks and daily thereafter  - monitor hgb  - outpatient capsule endoscopy   - ok for d/c from GI standpoint with outpatient follow up    Discussed with Dr. Emil Cree, PA-C Ramonia Severance    I have personally performed a face to face diagnostic evaluation on this patient. I have interviewed and examined the patient and I agree with the findings and recommended plan of care. In summary, my findings and plan are the following: Had minor CP this AM that responded with  Nitro. No BM since yesterday. Feels overall weak. Plavix restarted 5/4. Hb stable at 8+ range. Continue PPI BID X 6 weeks and then daily thereafter. Will plan for OP capsule endoscopy to see if there are active SB AVMs that need SB enteroscopy. Cardiology plans to follow her H/H and see how she does on ASA/plavix before doing a PCI in 1-2 weeks. Discussed plans with patient,  and her son. May discharge from GI standpoint.     Petrona Teresa MD, MSc  [facility]  05/05/22

## 2022-05-05 NOTE — PROGRESS NOTES
[facility]   Progress Note      5/5/2022 9:03 AM        Name: Gato Vo . Admitted: 5/2/2022    SUBJECTIVE:  She is doing [id number] [address], she feels weak, no SOB.     Reviewed interval ancillary notes    Current Medications  iron sucrose (VENOFER) 200 mg in sodium chloride 0.9 % 100 mL IVPB, Q24H  sodium chloride flush 0.9 % injection 5-40 mL, PRN  amLODIPine (NORVASC) tablet 2.5 mg, BID  ascorbic acid (VITAMIN C) tablet 1,000 mg, Daily  aspirin EC tablet 81 mg, Daily  clopidogrel (PLAVIX) tablet 75 mg, Daily  vitamin B-12 (CYANOCOBALAMIN) tablet 3,000 mcg, Daily  escitalopram (LEXAPRO) tablet 20 mg, Daily  cetirizine (ZYRTEC) tablet 10 mg, Daily  gabapentin (NEURONTIN) capsule 400 mg, TID  isosorbide mononitrate (IMDUR) extended release tablet 30 mg, Daily  nitroGLYCERIN (NITROSTAT) SL tablet 0.4 mg, Q5 Min PRN  metoprolol tartrate (LOPRESSOR) tablet 12.5 mg, BID  pioglitazone (ACTOS) tablet 15 mg, Daily  rosuvastatin (CRESTOR) tablet 20 mg, QPM  SUMAtriptan (IMITREX) tablet 100 mg, Daily PRN  tiZANidine (ZANAFLEX) tablet 4 mg, Nightly PRN  vitamin D (CHOLECALCIFEROL) tablet 1,000 Units, Daily  zinc sulfate (ZINCATE) capsule 50 mg, 4x Daily  sodium chloride flush 0.9 % injection 5-40 mL, 2 times per day  sodium chloride flush 0.9 % injection 5-40 mL, PRN  0.9 % sodium chloride infusion, PRN  ondansetron (ZOFRAN-ODT) disintegrating tablet 4 mg, Q8H PRN   Or  ondansetron (ZOFRAN) injection 4 mg, Q6H PRN  polyethylene glycol (GLYCOLAX) packet 17 g, Daily PRN  acetaminophen (TYLENOL) tablet 650 mg, Q6H PRN   Or  acetaminophen (TYLENOL) suppository 650 mg, Q6H PRN  glucose (GLUTOSE) 40 % oral gel 15 g, PRN  dextrose 10 % infusion, PRN  glucagon (rDNA) injection 1 mg, PRN  dextrose 5 % solution, PRN  insulin lispro (HUMALOG) injection vial 0-12 Units, TID WC  insulin lispro (HUMALOG) injection vial 0-6 Units, Nightly  ipratropium (ATROVENT HFA) 17 MCG/ACT inhaler 2 puff, Q4H PRN  pantoprazole (PROTONIX) injection 40 mg, BID        Objective:  BP (!) 153/48   Pulse 60   Temp 97.4 °F (36.3 °C) (Temporal)   Resp 16   Ht 5' (1.524 m)   Wt 207 lb 14.3 oz (94.3 kg)   SpO2 95%   BMI 40.60 kg/m²     Intake/Output Summary (Last 24 hours) at 5/5/2022 0903  Last data filed at 5/5/2022 0810  Gross per 24 hour   Intake 1816.24 ml   Output --   Net 1816.24 ml      Wt Readings from Last 3 Encounters:   05/05/22 207 lb 14.3 oz (94.3 kg)   04/25/22 208 lb (94.3 kg)   04/13/22 208 lb (94.3 kg)       General appearance:  Appears comfortable  Eyes: Sclera clear. Pupils equal.  ENT: Moist oral mucosa. Trachea midline, no adenopathy. Cardiovascular: Regular rhythm, normal S1, S2. No murmur. No edema in lower extremities  Respiratory: Not using accessory muscles. Good inspiratory effort. Clear to auscultation bilaterally, no wheeze or crackles. GI: Abdomen soft, no tenderness, not distended  Musculoskeletal: No cyanosis in digits, neck supple  Neurology: CN 2-12 grossly intact. No speech or motor deficits  Psych: Normal affect. Alert and oriented in time, place and person  Skin: Warm, dry, normal turgor    Labs and Tests:  CBC:   Recent Labs     05/03/22  0430 05/03/22  1240 05/04/22  0435 05/04/22  0435 05/04/22  1420 05/04/22  2130 05/05/22  0445   WBC 6.4  --  6.9  --   --   --  9.6   HGB 7.5*   < > 8.3*   < > 8.4* 8.0* 8.3*     --  260  --   --   --  270    < > = values in this interval not displayed. BMP:    Recent Labs     05/03/22  0430 05/04/22  0435 05/05/22  0445    136 139   K 4.7 4.5 4.2    106 107   CO2 20* 19* 19*   BUN 25* 21* 21*   CREATININE 0.7 0.9 0.9   GLUCOSE 113* 126* 120*     Hepatic: No results for input(s): AST, ALT, ALB, BILITOT, ALKPHOS in the last 72 hours.     ASSESSMENT AND PLAN    Principal Problem:    Coronary artery disease involving coronary bypass graft of native heart without angina pectoris  Active Problems:    CAD, multiple vessel    Coronary artery disease involving native coronary artery of native heart with angina pectoris (HCC)    Essential hypertension    RAHUL (obstructive sleep apnea)    Diabetes mellitus type 2 in obese (HCC)    Paroxysmal atrial fibrillation (HCC)    Anemia    Pacemaker    Melena  Resolved Problems:    * No resolved hospital problems. *      1. Anemia secondary to acute blood loss, MDS, and iron deficiency  - Bone marrow evaluation on 6/25/2018 showed changes suggestive of myelodysplastic syndrome. Receives Retacrit injections in office every 2 weeks. - Most recent iron labs from 4/8/2022 consistent with iron deficiency anemia  - s/p Venofer 300mg infusions on 4/22/222 and 4/29/22 in office, next dose due 5/6/22  - GI following  - s/p enteroscopy and colonoscopy today with intervention. S/p APC to AVMs. - Hgb 8.3 today  -  s/p 1 unit pRBCs transfusion 5/3/22  - transfuse for a goal HGB above 8 given CAD  - Venofer already ordered per GI, day 3/3 today  - she is due for Retacrit injection, will order for today.     2. CAD  - Cardiology following  - plan for intervention once anemia improves      OK for discharge from hematology perspective. She will f/u with Dr. Nick Cruz in 1-2 weeks.        Marco A Rees, [address] [facility]  [facility]

## 2022-05-05 NOTE — CARE COORDINATION
Patient requested a referral to [facility], liaison called with referral. CM will follow to verify if accepted.     TAVON HillN, CCM, RN  [facility]  [phone]

## 2022-05-06 VITALS
BODY MASS INDEX: 40.95 KG/M2 | TEMPERATURE: 98.7 F | WEIGHT: 208.56 LBS | SYSTOLIC BLOOD PRESSURE: 138 MMHG | DIASTOLIC BLOOD PRESSURE: 54 MMHG | HEART RATE: 61 BPM | HEIGHT: 60 IN | OXYGEN SATURATION: 97 % | RESPIRATION RATE: 16 BRPM

## 2022-05-06 LAB
ANION GAP SERPL CALCULATED.3IONS-SCNC: 12 MMOL/L (ref 3–16)
BASOPHILS ABSOLUTE: 0.1 K/UL (ref 0–0.2)
BASOPHILS RELATIVE PERCENT: 0.6 %
BUN BLDV-MCNC: 25 MG/DL (ref 7–20)
CALCIUM SERPL-MCNC: 9.2 MG/DL (ref 8.3–10.6)
CHLORIDE BLD-SCNC: 106 MMOL/L (ref 99–110)
CO2: 20 MMOL/L (ref 21–32)
CREAT SERPL-MCNC: 0.8 MG/DL (ref 0.6–1.2)
EOSINOPHILS ABSOLUTE: 0.2 K/UL (ref 0–0.6)
EOSINOPHILS RELATIVE PERCENT: 1.9 %
GFR AFRICAN AMERICAN: >60
GFR NON-AFRICAN AMERICAN: >60
GLUCOSE BLD-MCNC: 124 MG/DL (ref 70–99)
GLUCOSE BLD-MCNC: 138 MG/DL (ref 70–99)
HCT VFR BLD CALC: 25.5 % (ref 36–48)
HEMOGLOBIN: 8.1 G/DL (ref 12–16)
LYMPHOCYTES ABSOLUTE: 2.6 K/UL (ref 1–5.1)
LYMPHOCYTES RELATIVE PERCENT: 26.2 %
MCH RBC QN AUTO: 26 PG (ref 26–34)
MCHC RBC AUTO-ENTMCNC: 31.9 G/DL (ref 31–36)
MCV RBC AUTO: 81.5 FL (ref 80–100)
MONOCYTES ABSOLUTE: 1.5 K/UL (ref 0–1.3)
MONOCYTES RELATIVE PERCENT: 14.6 %
NEUTROPHILS ABSOLUTE: 5.7 K/UL (ref 1.7–7.7)
NEUTROPHILS RELATIVE PERCENT: 56.7 %
PDW BLD-RTO: 20.7 % (ref 12.4–15.4)
PERFORMED ON: ABNORMAL
PLATELET # BLD: 280 K/UL (ref 135–450)
PMV BLD AUTO: 7.5 FL (ref 5–10.5)
POTASSIUM REFLEX MAGNESIUM: 4 MMOL/L (ref 3.5–5.1)
RBC # BLD: 3.13 M/UL (ref 4–5.2)
SODIUM BLD-SCNC: 138 MMOL/L (ref 136–145)
WBC # BLD: 10 K/UL (ref 4–11)

## 2022-05-06 PROCEDURE — 6370000000 HC RX 637 (ALT 250 FOR IP): Performed by: INTERNAL MEDICINE

## 2022-05-06 PROCEDURE — 80048 BASIC METABOLIC PNL TOTAL CA: CPT

## 2022-05-06 PROCEDURE — 99233 SBSQ HOSP IP/OBS HIGH 50: CPT | Performed by: INTERNAL MEDICINE

## 2022-05-06 PROCEDURE — 2580000003 HC RX 258: Performed by: INTERNAL MEDICINE

## 2022-05-06 PROCEDURE — C9113 INJ PANTOPRAZOLE SODIUM, VIA: HCPCS | Performed by: INTERNAL MEDICINE

## 2022-05-06 PROCEDURE — 6360000002 HC RX W HCPCS: Performed by: INTERNAL MEDICINE

## 2022-05-06 PROCEDURE — 85025 COMPLETE CBC W/AUTO DIFF WBC: CPT

## 2022-05-06 RX ADMIN — ESCITALOPRAM OXALATE 20 MG: 10 TABLET ORAL at 08:58

## 2022-05-06 RX ADMIN — CETIRIZINE HYDROCHLORIDE 10 MG: 10 TABLET, FILM COATED ORAL at 08:59

## 2022-05-06 RX ADMIN — ASPIRIN 81 MG: 81 TABLET, COATED ORAL at 08:59

## 2022-05-06 RX ADMIN — LOSARTAN POTASSIUM 25 MG: 25 TABLET, FILM COATED ORAL at 08:58

## 2022-05-06 RX ADMIN — OXYCODONE HYDROCHLORIDE AND ACETAMINOPHEN 1000 MG: 500 TABLET ORAL at 08:58

## 2022-05-06 RX ADMIN — ISOSORBIDE MONONITRATE 60 MG: 60 TABLET, EXTENDED RELEASE ORAL at 08:58

## 2022-05-06 RX ADMIN — PANTOPRAZOLE SODIUM 40 MG: 40 INJECTION, POWDER, FOR SOLUTION INTRAVENOUS at 08:59

## 2022-05-06 RX ADMIN — GABAPENTIN 400 MG: 400 CAPSULE ORAL at 08:59

## 2022-05-06 RX ADMIN — FUROSEMIDE 40 MG: 40 TABLET ORAL at 08:59

## 2022-05-06 RX ADMIN — AMLODIPINE BESYLATE 2.5 MG: 5 TABLET ORAL at 08:59

## 2022-05-06 RX ADMIN — Medication 10 ML: at 08:59

## 2022-05-06 RX ADMIN — METOPROLOL TARTRATE 12.5 MG: 25 TABLET, FILM COATED ORAL at 08:58

## 2022-05-06 RX ADMIN — PIOGLITAZONE 15 MG: 15 TABLET ORAL at 08:58

## 2022-05-06 RX ADMIN — CLOPIDOGREL BISULFATE 75 MG: 75 TABLET ORAL at 08:59

## 2022-05-06 RX ADMIN — Medication 1000 UNITS: at 08:58

## 2022-05-06 RX ADMIN — CYANOCOBALAMIN TAB 1000 MCG 3000 MCG: 1000 TAB at 08:59

## 2022-05-06 ASSESSMENT — ENCOUNTER SYMPTOMS
CONSTIPATION: 0
VOMITING: 0
ABDOMINAL DISTENTION: 0
SHORTNESS OF BREATH: 0
EYE REDNESS: 0
BLOOD IN STOOL: 0
FACIAL SWELLING: 0
CHEST TIGHTNESS: 0
COUGH: 0
EYE DISCHARGE: 0
NAUSEA: 0
ABDOMINAL PAIN: 0
DIARRHEA: 0
PHOTOPHOBIA: 0

## 2022-05-06 ASSESSMENT — PAIN SCALES - GENERAL
PAINLEVEL_OUTOF10: 0

## 2022-05-06 NOTE — DISCHARGE SUMMARY
Hospital Medicine Discharge Summary    Patient: Roz Birmingham     Gender: female  : 1950   Age: 80 y.o. MRN: [id number]    Admitting Physician: Arvind Cespedes MD  Discharge Physician: Arvind Cespedes MD     Code Status: Full Code     Admit Date: 2022   Discharge Date:   22    Disposition:  Home    Discharge Diagnoses: Active Hospital Problems    Diagnosis Date Noted    CAD, multiple vessel [I25.10] 2022     Priority: Medium    Melena [K92.1] 2021    Pacemaker [Z95.0] 2019    Anemia [D64.9] 10/07/2018    Coronary artery disease involving coronary bypass graft of native heart without angina pectoris [I25.810]     Paroxysmal atrial fibrillation ([facility] [I48.0] 08/15/2018    Diabetes mellitus type 2 in obese ([facility] [address] [E11.69, E66.9] 2014    RAHUL (obstructive sleep apnea) [G47.33] 2011    Coronary artery disease involving native coronary artery of native heart with angina pectoris [facility]) [I25.119] 2011    Essential hypertension [I10] 2011       Follow-up appointments:  one week    Outpatient to do list: F/U with PCP, Cardio, GI and Renal    Condition at Discharge:  Stable    Hospital Course:   80 y.o. female with h/o CAD, DM 2, CKD 3, pAfib, morbid obesity, RAHUL, HTN who came to outpatient cath lab for CAD intervention with Dr Mariano Avers is getting outpatient IV iron by Dr Julia Aguirre and has had melena.  Had recent EGD by Dr Sierra Varela in March. Admitted as inpatient CAD and acute on chronic blood loss anemia with azotemia. Followed by GI, Cardio and Renal.    On 5/3/22, underwent ENTEROSCOPY WITH INTERVENTION & COLONOSCOPY CONTROL HEMORRHAGE  Findings:   SBE  Two non-bleeding AVMs in the mid-jejunum, s/p APC. Mid-jejunum marked with spot dye. Two shallow healing ulcers  (this could potentially have bled in the past).      Colonoscopy  No active lower GI bleed. Possible pinpoint cecal AVM with minimal oozing, s/p APC. Sigmoid diverticulosis. Hemorrhoids. Liquid stool in colon mix of black and dark green     Plans  Monitor H/H, transfuse to keep Hb>8. Oral PPI BID for 6 weeks and then daily thereafter.  No recall colonoscopy due to advanced age and comorbidities. Transfused 1 U PRBC for ABLA. CT Enterography:     Mild wall thickening of the descending and rectosigmoid colon either due to   the partially contracted state of the colon or wall thickening from   underlying colitis.  Scattered colonic diverticula are seen.  No pericolonic   fat stranding.       Mild bladder wall thickening.  Likely due to the partially contracted state   of the bladder in the absence of clinical or laboratory signs of cystitis       Atherosclerosis          Resumed on diet and Plavix. Imdur increased on 5/5 for CP.     F/U with PCP, GI for capsule endoscopy, Cardio and Renal.    Discharge Medications:   Current Discharge Medication List      START taking these medications    Details   isosorbide mononitrate (IMDUR) 60 MG extended release tablet Take 1 tablet by mouth daily  Qty: 30 tablet, Refills: 0      losartan (COZAAR) 25 MG tablet Take 1 tablet by mouth daily  Qty: 30 tablet, Refills: 0      pantoprazole (PROTONIX) 40 MG tablet Take 1 tablet by mouth 2 times daily (before meals)  Qty: 60 tablet, Refills: 0           Current Discharge Medication List      CONTINUE these medications which have CHANGED    Details   amLODIPine (NORVASC) 2.5 MG tablet Take 1 tablet by mouth 2 times daily  Qty: 30 tablet, Refills: 0           Current Discharge Medication List      CONTINUE these medications which have NOT CHANGED    Details   clopidogrel (PLAVIX) 75 MG tablet Take 1 tablet by mouth daily  Qty: 30 tablet, Refills: 5      metoprolol tartrate (LOPRESSOR) 25 MG tablet Take 0.5 tablets by mouth 2 times daily  Qty: 30 tablet, Refills: 5      fexofenadine (ALLEGRA) 180 MG tablet Take 180 mg by mouth daily      Epoetin Leonard-epbx (RETACRIT IJ) Inject as directed Every friday ipratropium (ATROVENT HFA) 17 MCG/ACT inhaler Inhale 2 puffs into the lungs three times daily  Qty: 1 each, Refills: 3      SUMAtriptan (IMITREX) 100 MG tablet Take 1 tablet by mouth daily as needed for Migraine  Qty: 30 tablet, Refills: 3      tiZANidine (ZANAFLEX) 4 MG tablet Take 1 tablet by mouth nightly as needed (leg spasms)  Qty: 20 tablet, Refills: 0      zinc sulfate (ZINCATE) 220 (50 Zn) MG capsule Take 50 mg by mouth 4 times daily       potassium chloride (KLOR-CON M) 20 MEQ extended release tablet Take 1 tablet by mouth daily  Qty: 90 tablet, Refills: 3      aspirin 81 MG tablet Take 81 mg by mouth daily      furosemide (LASIX) 40 MG tablet Take 1 tablet by mouth daily  Qty: 30 tablet, Refills: 5      Ascorbic Acid (VITAMIN C) 1000 MG tablet Take 1,000 mg by mouth daily      Cyanocobalamin (VITAMIN B-12 PO) Take 3,000 mcg by mouth daily      estradiol (ESTRACE) 0.1 MG/GM vaginal cream Place 2 g vaginally See Admin Instructions Twice a week      Cranberry 450 MG CAPS Take by mouth daily       pioglitazone (ACTOS) 15 MG tablet Take 15 mg by mouth daily      vitamin D (CHOLECALCIFEROL) 1000 UNIT TABS tablet Take 1,000 Units by mouth daily       gabapentin (NEURONTIN) 100 MG capsule Take 400 mg by mouth 3 times daily. .      TraZODone HCl (DESYREL PO) Take 50 mg by mouth nightly       Calcium Citrate-Vitamin D (CALCIUM CITRATE + D PO) Take 1 tablet by mouth daily       SLOW-MAG 71.5-119 MG TBEC tablet Take 1 tablet by mouth daily 143 mg  Refills: 5      rosuvastatin (CRESTOR) 40 MG tablet Take 20 mg by mouth every evening       ezetimibe (ZETIA) 10 MG tablet Take 5 mg by mouth nightly     Associated Diagnoses: Status post gastric banding; Weight gain; Morbid obesity ([address] Hypertension; Hyperlipidemia; Dysphagia      escitalopram (LEXAPRO) 20 MG tablet Take 20 mg by mouth daily. nitroGLYCERIN (NITROLINGUAL) 0.4 MG/SPRAY spray Place 1 spray under the tongue every 5 minutes as needed.  As directed for chest pain            Current Discharge Medication List      STOP taking these medications       isosorbide mononitrate (ISMO;MONOKET) 10 MG tablet Comments:   Reason for Stopping:         losartan-hydroCHLOROthiazide (HYZAAR) 100-25 MG per tablet Comments:   Reason for Stopping:         spironolactone (ALDACTONE) 25 MG tablet Comments:   Reason for Stopping:         dexlansoprazole (DEXILANT) 60 MG CPDR delayed release capsule Comments:   Reason for Stopping:         metFORMIN (GLUCOPHAGE) 500 MG tablet Comments:   Reason for Stopping:               Discharge Exam:    BP (!) 138/54   Pulse 61   Temp 98.7 °F (37.1 °C) (Temporal)   Resp 16   Ht 5' (1.524 m)   Wt 208 lb 8.9 oz (94.6 kg)   SpO2 97%   BMI 40.73 kg/m²   General appearance:  Appears comfortable. Well nourished  Eyes: Sclera clear, pupils equal  ENT: Moist mucus membranes, no thrush. Trachea midline. Cardiovascular: Regular rhythm, normal S1, S2. No murmur, gallop, rub. No edema in lower extremities.  L PPM  Respiratory: Clear to auscultation bilaterally, no wheeze, good inspiratory effort  Gastrointestinal: Abdomen soft, non-tender, not distended, normal bowel sounds  Musculoskeletal: No cyanosis in digits, neck supple  Neurology: Grossly intact. Alert and oriented in time, place and person. No speech or motor deficits  Psychiatry: Appropriate affect. Not agitated  Skin: Warm, dry, normal turgor, no rash  Brisk capillary refill, peripheral pulses palpable     Labs:  For convenience and continuity at follow-up the following most recent labs are provided:    Lab Results   Component Value Date    WBC 10.0 05/06/2022    HGB 8.1 05/06/2022    HCT 25.5 05/06/2022    MCV 81.5 05/06/2022     05/06/2022     05/06/2022    K 4.0 05/06/2022     05/06/2022    CO2 20 05/06/2022    BUN 25 05/06/2022    CREATININE 0.8 05/06/2022    CALCIUM 9.2 05/06/2022    PHOS 4.1 08/14/2020     04/26/2011    ALKPHOS 63 03/02/2022    ALT 13 03/02/2022 AST 15 03/02/2022    BILITOT <0.2 03/02/2022    BILIDIR <0.2 08/14/2020    LABALBU 4.3 03/02/2022    LDLCALC 47 05/22/2020    TRIG 132 05/22/2020     Lab Results   Component Value Date    INR 1.09 05/03/2022    INR 1.12 12/25/2021    INR 1.12 04/30/2019       Radiology:  Echocardiogram transesophageal    Result Date: 4/25/2022  Transesophageal Echocardiography Report (ANGELITO)  Demographics   Patient Name      [facility]   Date of Study     04/25/2022          Gender             Female   Patient Number    [id number]          Date of Birth      1950   Visit Number      [id number]           Age                80 year(s)   Accession Number  [id number]          Room Number        [facility]   Corporate ID      [id number]            Sonographer   Ordering          Dinorah Munguia MD  Interpreting       Dinorah Munguia MD  Physician                             Physician  Procedure Type of Study   ANGELITO procedure:ECHOCARDIOGRAM TRANSESOPHAGEAL. Procedure Date Date: 04/25/2022 Start: 10:10 AM Study Location: [facility] - Echo Lab  Conclusions   Summary  *Left ventricle - normal size and function with EF of 55%  *Aortic valve - sclerotic, mild regurgitation  *Mitral valve - mildly thickened and calcified, mild regurgitation  *Right heart pacer. Signature   ------------------------------------------------------------------  Electronically signed by Dinorah Munguia MD (Interpreting  physician) on 04/25/2022 at 03:02 PM  ------------------------------------------------------------------   Findings   Left Ventricle  Ejection fraction is visually estimated to be 55%. Mitral Valve  Mildly thickened and calcified. Mild regurgitation. Aortic Valve  Mildly sclerotic. Tricuspid. Mild central regurgitation. Right Ventricle  Pacer noted   Tricuspid Valve  Tricuspid valve is structurally normal.  Mild tricuspid regurgitation. Right Atrium  Pacer noted   Pulmonic Valve  Not well seen.    Pericardial Effusion  No significant effusion      CT ENTEROGRAPHY W CONTRAST    Result Date: 5/4/2022  EXAMINATION: CT ENTEROGRAPHY 5/4/2022 10:08 am TECHNIQUE: CT of the abdomen and pelvis was performed after the administration of intravenous contrast and negative oral contrast. Dose modulation, iterative reconstruction, and/or weight based adjustment of the mA/kV was utilized to reduce the radiation dose to as low as reasonably achievable. COMPARISON: February 2019 HISTORY: ORDERING SYSTEM PROVIDED HISTORY: Acute on chronic blood loss anemia TECHNOLOGIST PROVIDED HISTORY: Reason for exam:->Acute on chronic blood loss anemia Reason for Exam: Acute on chronic blood loss anemia FINDINGS: Lower Chest: Streak artifact is seen from pacer. No pericardial effusion is seen. Small hiatal hernia seen. There is nonspecific thickening at the GE junction. Bandlike opacity seen in the lingula. Bandlike opacity seen in lung bases. There is mild bronchiectasis seen lower lobe airways. Organs: Calcified granuloma seen within the spleen. No perisplenic fluid Right adrenal gland is normal.  Fatty nodule left adrenal gland measures 1.8 cm in short axis, similar to prior, when measured in a similar fashion. No perisplenic fluid seen No intrahepatic ductal dilatation. No perihepatic fluid. No inflammatory stranding surrounding the gallbladder. No peripancreatic fluid. No peripancreatic inflammatory change No hydronephrosis on the left. A cyst projecting anteriorly off the left kidney is smaller, measuring 2.7 cm medial-lateral, previously 4.8 cm. There is a subtle rim calcification. No hydronephrosis on the right GI/Bowel: No significant small bowel distention noted. No bowel obstruction noted. Scattered colonic diverticula are seen. There is mild wall thickening of the descending and rectosigmoid colon. This portion of colon is incompletely distended No focal filling defects are seen within the bowel.  Appendix is normal in caliber Pelvis: Bladder is contracted. There is bladder wall thickening. Gas is seen within the bladder. Peritoneum/Retroperitoneum: Atherosclerotic changes seen in the iliacs. Atherosclerotic change seen in aorta. Atherosclerotic change seen in the celiac, SMA, renal arteries. No aortic aneurysm. No retroperitoneal adenopathy. Bones/Soft Tissues: Postsurgical changes seen in the anterior abdominal wall. Spurring is seen in the spine. Spurring is seen in the hips. Mild wall thickening of the descending and rectosigmoid colon either due to the partially contracted state of the colon or wall thickening from underlying colitis. Scattered colonic diverticula are seen. No pericolonic fat stranding. Mild bladder wall thickening. Likely due to the partially contracted state of the bladder in the absence of clinical or laboratory signs of cystitis Atherosclerosis     NM Cardiac Stress Test Nuclear Imaging    Result Date: 4/7/2022  Cardiac Perfusion Imaging  Demographics   Patient Name      [address] [place] [address]   Date of Study     04/07/2022         Gender              Female   Patient Number    [id number]         Date of Birth       1950   Visit Number      [id number]          Age                 80 year(s)   Accession Number  [id number]         Room Number         op   Corporate ID      [id number]           NM Technician       MARCIAL Del Angel   Nurse             Abdi Meléndez RN   Interpreting        Lisa Mensah DO                                       Physician   Ordering          Donelda Gottron  Physician         JAMES Reyna   The procedure was explained in detail to the patient. Risks,  complications and alternative treatments were reviewed. Written consent  was obtained. Procedure Procedure Type:   Nuclear Stress Test:NM MYOCARDIAL SPECT REST EXERCISE OR RX,  Pharmacological   Study location: [facility] - Nuclear Medicine   Indications: Chest pain. Hospital Status: Outpatient.   Height: 59 inches Weight: 208 pounds  Risk Factors   The patient risk factors include:prior CABG on 02/01/1999;obesity, physical  activity, treated and controlled hypercholesterolemia, treated and  controlled hypertension, family history of premature CAD, orally-treated  diabetes mellitus and dyslipidemia. Conclusions   Summary  The overall quality of the study is good. There is subdiaphragmatic  attenuation. Left ventricular cavity size is normal. Right ventricle is normal in size. SPECT images demonstrate homogeneous tracer distribution throughout the  myocardium. There is normal isotope uptake at stress and rest. There is no  evidence of myocardial ischemia or scar. Gated spect reveals normal  myocardial thickening and wall motion. Sum stress score of 4. No visual TID. Calculated TID of 0.93. Sensitivity of testing reduced on anti-anginals. Myocardial perfusion is normal. Low risk scan. Stress Protocols   Resting ECG  Sinus rhythm. Nonspecific st-t wave changes. Resting HR:60 bpm     Resting BP:150/61 mmHg  Stress Protocol:Pharmacologic - Lexiscan's  Peak HR:60 bpm                             HR/BP product:8520  Peak BP:142/57 mmHg  Predicted HR: 150 bpm  % of predicted HR: 40  Test duration: 4 min  Reason for termination:Completed   ECG Findings  SInus rhythm. Arrhythmias  No significant rhythm abnormality. Symptoms  Developed symptoms likely related to [address] [place] [address]. There was stress induced chest pressure 7/10, shortness of breath, and abd  cramps. Symptoms resolved with aminophylline. Complications  Procedure complication was none. Stress Interpretation  No ischemic EKG changes. Less than 0.5 mm upsloping ST deviation with lexiscan. Procedure Medications   - Lexiscan I.V. 0.4 mg.10 sec   - Aminophylline I.V. 100 mg.   Imaging Protocols   - One Day   Rest                          Stress   Isotope:Myoview/Tetrofosmin   Isotope: Myoview/Tetrofosmin  Isotope dose:9.76 mCi         Isotope dose:30.5 mCi Administration Route:I.V. Administration Route:I.V.  Date:04/07/2022 08:43         Date:04/07/2022 10:15                                 Technique:      Gated  Imaging Results    Stress ejection    Ejection fraction:76 %    EDV :113 ml    ESV :27 ml    Stroke volume :86 ml    LV mass :141 gr  Medical History   Additional Medical History   Past Medical History:  has a past medical history of Anemia, Anesthesia, Anesthesia  complication, Atrial fibrillation ([address] CAD (coronary artery  disease), Chest pain, Chronic kidney disease, Depression, GERD  (gastroesophageal reflux disease), Glaucoma, Hiatal hernia,  Hyperlipidemia, Hypertension, Migraines, neuralgic, Morbid  obesity ([address] Osteoarthritis, Sleep apnea, Type II or  unspecified type diabetes mellitus without mention of  complication, not stated as uncontrolled, Unspecified sleep  apnea, Urinary incontinence, and UTI (urinary tract infection). SurgicalHistory:  has a past surgical history that includes Coronary artery bypass  graft (1999); Hammer toe surgery; Ovary removal (Bilateral,  1997); Esophagus dilation; Breast lumpectomy; Knee arthroscopy;  Gastric Band (12/20/11); Colonoscopy; Upper gastrointestinal  endoscopy; Upper gastrointestinal endoscopy (10/30/15); Cosmetic  surgery; joint replacement (1995); joint replacement (1995);  orif humerus decompression (Left, 2/25/2016); Upper  gastrointestinal endoscopy (10/14/2016); Bariatric Surgery  (11/03/2016); ablation of dysrhythmic focus; Colonoscopy  (10/08/2018); Colonoscopy (N/A, 10/8/2018); pr njx dx/ther sbst  intrlmnr crv/thrc w/img gdn (N/A, 11/5/2018); Cardiac surgery  (2/22/1999); Cardiac catheterization; Injection Procedure For  Sacroiliac Joint (Left, 12/17/2018); Enteroscopy (N/A,  2/26/2019); ventral hernia repair (N/A, 10/29/2019); Abdomen  surgery (N/A, 9/1/2020); pacemaker placement (05/2019);  Upper  gastrointestinal endoscopy (N/A, 3/22/2022); and Upper  gastrointestinal endoscopy (N/A, 3/22/2022).    Medications  metoprolol tartrate (LOPRESSOR) 25 MG tablet Take 0.5 tablets by  mouth 2 times daily 1/26/22 BILL Petty - CNP  fexofenadine (ALLEGRA) 180 MG tablet Take 180 mg by mouth daily  Historical Provider, MD  Epoetialina Leonard-epbx (RETACRIT IJ) Inject as directed Every friday  losartan-hydroCHLOROthiazide (HYZAAR) 100-25 MG per tablet Take  1 tablet by mouth daily  ipratropium (ATROVENT HFA) 17 MCG/ACT inhaler Inhale 2 puffs  into the lungs three times daily  Patient taking differently: Inhale 2 puffs into the lungs 3  times daily as needed 1/2/22 Polo Bland MD  SUMAtriptan (IMITREX) 100 MG tablet Take 1 tablet by mouth daily  as needed for Migraine 1/2/22 Polo Bland MD  tiZANidine (ZANAFLEX) 4 MG tablet Take 1 tablet by mouth nightly  as needed (leg spasms) 1/2/22 Polo Bland MD  zinc sulfate (ZINCATE) 220 (50 Zn) MG capsule Take 50 mg by  mouth daily  potassium chloride (KLOR-CON M) 20 MEQ extended release tablet  Take 1 tablet by mouth daily  Patient taking differently: Take 10 mEq by mouth daily 2/26/20  Lajuan Favre, MD  amLODIPine (NORVASC) 5 MG tablet Take 1 tablet by mouth every  evening  Patient taking differently: Take 2.5 mg by mouth 2 times daily  5/28/19 Junior Parmar MD  aspirin 81 MG tablet Take 81 mg by mouth daily Historical  Provider, MD  furosemide (LASIX) 40 MG tablet Take 1 tablet by mouth daily  9/20/18  isosorbide mononitrate (ISMO;MONOKET) 10 MG tablet Take 5 mg by  mouth 2 times daily  Ascorbic Acid (VITAMIN C) 1000 MG tablet Take 1,000 mg by mouth  daily  Cyanocobalamin (VITAMIN B-12 PO) Take 3,000 mcg by mouth daily  estradiol (ESTRACE) 0.1 MG/GM vaginal cream Place 2 g vaginally  See Twice a week  Cranberry 450 MG CAPS Take by mouth daily Historical Provider,  MD  pioglitazone (ACTOS) 15 MG tablet Take 15 mg by mouth daily  vitamin D (CHOLECALCIFEROL) 1000 UNIT TABS tablet Take 1,000  Units by mouth daily Historical Provider, MD spironolactone (ALDACTONE) 25 MG tablet Take 25 mg by mouth  every morning (before breakfast) Historical Pro   Signatures   ------------------------------------------------------------------  Electronically signed by Sharath Garrett DO (Interpreting  physician) on 04/07/2022 at 12:36  ------------------------------------------------------------------      The patient was seen and examined on day of discharge and this discharge summary is in conjunction with any daily progress note from day of discharge. Time Spent on discharge is 45 minutes  in the examination, evaluation, counseling and review of medications and discharge plan. Note that more than 30 minutes was spent in preparing discharge papers, discussing discharge with patient, medication review, etc.       Signed:    Micaela Figueroa MD   5/6/2022      Thank you Georges Kent DO for the opportunity to be involved in this patient's care.  If you have any questions or concerns please feel free to contact me at [address] [place] [address]

## 2022-05-06 NOTE — PROGRESS NOTES
[facility] HEART [place]  Daily Progress Note    Admit: 5/2/2022      CC: CAD, AF, Anemia  Subj: Doing well. No cp overnight.       Obj:  BP (!) 138/54   Pulse 69   Temp 98.7 °F (37.1 °C) (Temporal)   Resp 16   Ht 5' (1.524 m)   Wt 208 lb 8.9 oz (94.6 kg)   SpO2 97%   BMI 40.73 kg/m²       Intake/Output Summary (Last 24 hours) at 5/6/2022 0739  Last data filed at 5/6/2022 9347  Gross per 24 hour   Intake 1220 ml   Output --   Net 1220 ml     Gen Alert, coop, no distress Heart Rrr, no mrg   Head NC, AT, no abnorm Abd Soft, NT, ND, +BS, no mass, no OM   Eyes PERRLA, conj/corn clear Ext Ext nl, AT, no c/c/e   Nose Nares nl, no drain, NT Pulse 2+ symm ra, dp, pt   Throat Lips, mucosa, tongue nl Skin Color/tect/turg nl, no rash/lesion   Neck S/S, TM, NT, no bruit/JVD Psych Nl mood and affect   Lung cta bilat Lymph No cervical or ax LA   Ch Wall NT, no deform Neuro Nl gross M/S exam     Medications:    epoetin kimberly-epbx  40,000 Units SubCUTAneous Q14 Days    furosemide  40 mg Oral Daily    losartan  25 mg Oral Daily    isosorbide mononitrate  60 mg Oral Daily    amLODIPine  2.5 mg Oral BID    vitamin C  1,000 mg Oral Daily    aspirin  81 mg Oral Daily    clopidogrel  75 mg Oral Daily    vitamin B-12  3,000 mcg Oral Daily    escitalopram  20 mg Oral Daily    cetirizine  10 mg Oral Daily    gabapentin  400 mg Oral TID    metoprolol tartrate  12.5 mg Oral BID    pioglitazone  15 mg Oral Daily    rosuvastatin  20 mg Oral QPM    Vitamin D  1,000 Units Oral Daily    zinc sulfate  50 mg Oral 4x Daily    sodium chloride flush  5-40 mL IntraVENous 2 times per day    insulin lispro  0-12 Units SubCUTAneous TID WC    insulin lispro  0-6 Units SubCUTAneous Nightly    pantoprazole  40 mg IntraVENous BID     Lab Data: Relevant and available CV data reviewed    Plan  *CAD  Status Hx CABG 1999, Elevated troponin but stable  LHC 1/17 Patent grafts  TTE 9/19 55%  Plan Will not be able to d/c DAPT for at least 6 months after stenting   PCI in 2 weeks after discharge after establish GIB stable and tolerates plavix  *AFIB  Status Hx ablation and PPM, stable  Plan Hold AC with blood loss and anemia  *Anemia  Status Secondary to abs, mds, iron deficiency  Plan S/p AVM APC, gastric ulcers and hemorrhoids additionally noted   Assess tolerance to plavix over next few weeks     Time:  More than 35 minutes spent reviewing patient chart (including but not limited to notes, labs, imaging and other testing), interviewing patient and/or family members, performing a physical exam, documentation of my findings above and subsequent follow-up of ordered testing. More than 50% of that time spent face to face with patient discussing clinical condition and counseling regarding treatment plan.

## 2022-05-06 NOTE — CARE COORDINATION
[facility] received a referral to this patient for a rollator. Rollator has been delivered to the patients room. Thank you for the referral.  Electronically signed by Oscar Bernabe on 5/6/2022 at 11:56 AM  Cell # [phone]    NOTE: After 5:00 pm, Weekends, Holidays: Call [facility] [employer] at [phone] to coordinate delivery of home medical equipment.

## 2022-05-06 NOTE — CARE COORDINATION
Patient discharged 5/6/2022 to home with [facility]  Phone: [phone]  Fax: [phone] . Order/ AVS faxed and agency notifed. No other CM needs at this time.     All discharge needs met per case management    TAVON PadillaN, CCM, RN  [facility]  [phone]

## 2022-05-06 NOTE — PROGRESS NOTES
CLINICAL PHARMACY NOTE: MEDS TO BEDS    Total # of Prescriptions Filled: 4   The following medications were delivered to the patient:  · Amlodipine 2.5 mg  · Losartan 25 mg  · Isosorbide ER 60   · Protonix 40 mg    Additional Documentation:    Delivered to Patient spouse=Signed  Ok to be delivered per Dmitry Gee CPhT

## 2022-05-06 NOTE — PROGRESS NOTES
Noted discharge orders. AVS + SUNNY completed. Awaiting DME order for rolling walker.  at bedside to transport home.

## 2022-05-06 NOTE — PROGRESS NOTES
[facility] CVU Note    Patient Active Problem List   Diagnosis    Coronary artery disease involving native coronary artery of native heart with angina pectoris ([address]    Essential hypertension    RAHUL (obstructive sleep apnea)    Chronic kidney disease    Class 3 severe obesity due to excess calories with serious comorbidity and body mass index (BMI) of 40.0 to 44.9 in adult [facility])    Diabetes mellitus type 2 in obese (HCC)    Paroxysmal atrial fibrillation ([address]    Coronary artery disease involving coronary bypass graft of native heart without angina pectoris    Anemia    Pacemaker    NSTEMI (non-ST elevated myocardial infarction) ([address]    Pneumonia    COVID-19    MALCOLM (acute kidney injury) ([facility] [address]    Melena    CAD, multiple vessel       Past Medical History:   has a past medical history of Anemia, Anesthesia, Anesthesia complication, Atrial fibrillation ([address] CAD (coronary artery disease), Chest pain, Chronic kidney disease, Depression, GERD (gastroesophageal reflux disease), Glaucoma, Hiatal hernia, Hyperlipidemia, Hypertension, Migraines, neuralgic, Morbid obesity ([address] Osteoarthritis, Sleep apnea, Type II or unspecified type diabetes mellitus without mention of complication, not stated as uncontrolled, Unspecified sleep apnea, Urinary incontinence, and UTI (urinary tract infection). Past Social History:   reports that she has never smoked. She has never used smokeless tobacco. She reports that she does not drink alcohol and does not use drugs. Subjective:  No complaints. No SOB. Review of Systems   Constitutional: Negative for activity change, appetite change, chills, fatigue, fever and unexpected weight change. HENT: Negative for congestion and facial swelling. Eyes: Negative for photophobia, discharge and redness. Respiratory: Negative for cough, chest tightness and shortness of breath.     Cardiovascular: Negative for chest pain, palpitations and leg swelling. Gastrointestinal: Negative for abdominal distention, abdominal pain, blood in stool, constipation, diarrhea, nausea and vomiting. Endocrine: Negative for cold intolerance, heat intolerance and polyuria. Genitourinary: Negative for decreased urine volume, difficulty urinating, flank pain and hematuria. Musculoskeletal: Negative for joint swelling and neck pain. Neurological: Negative for dizziness, seizures, syncope, speech difficulty, light-headedness and headaches. Hematological: Does not bruise/bleed easily. Psychiatric/Behavioral: Negative for agitation, confusion and hallucinations. Objective:  Urine output not recorded    BP (!) 138/54   Pulse 61   Temp 98.7 °F (37.1 °C) (Temporal)   Resp 16   Ht 5' (1.524 m)   Wt 208 lb 8.9 oz (94.6 kg)   SpO2 97%   BMI 40.73 kg/m²     Wt Readings from Last 3 Encounters:   05/06/22 208 lb 8.9 oz (94.6 kg)   04/25/22 208 lb (94.3 kg)   04/13/22 208 lb (94.3 kg)       BP Readings from Last 3 Encounters:   05/06/22 (!) 138/54   05/03/22 (!) 116/56   04/25/22 (!) 143/62     Chest- clear  Heart-regular  Abd-soft  Ext- 1+ edema    Labs  Hemoglobin   Date Value Ref Range Status   05/06/2022 8.1 (L) 12.0 - 16.0 g/dL Final     Hematocrit   Date Value Ref Range Status   05/06/2022 25.5 (L) 36.0 - 48.0 % Final     WBC   Date Value Ref Range Status   05/06/2022 10.0 4.0 - 11.0 K/uL Final     Platelets   Date Value Ref Range Status   05/06/2022 280 135 - 450 K/uL Final     Lab Results   Component Value Date    CREATININE 0.8 05/06/2022    BUN 25 (H) 05/06/2022     05/06/2022    K 4.0 05/06/2022     05/06/2022    CO2 20 (L) 05/06/2022     UA mod LE and 31 wbc with trace protein. Assessment/Plan:  1. H/O MALCOLM- baseline crea 0.8 to 1. Crea at baseline. Holding off on cardiac stenting while awaiting GI w/u. Restarted Losartan 25mg daily and Lasix 40mg daily. Hold off on SPLT and HCTZ for now.   Crea stable s/p contrast 5/4/22. 2.  Resp. Alkalosis-serum HCO3 better. 3.  Relative Hypotension-better   4. Anemia- GI w/u. Follows with Dr. Castillo Dumont too. H/O MDS. Hgb has been stable. Back on Plavix. 5.  H/O DM  6. H/O Edema-more today. As per #1.     7.  H/O A. Fib  8. H/O Obesity  9. Chest Pain- Cardiology notes reviewed. Planning for stenting later on if Hgb stable with restarting Plavix. 10.  Stable renal status. Okay for discharge. F/U with me in 2 weeks.      Updated patient     Boyd Barron MD

## 2022-05-10 ENCOUNTER — TELEPHONE (OUTPATIENT)
Dept: CARDIOLOGY CLINIC | Age: 72
End: 2022-05-10

## 2022-05-10 ENCOUNTER — HOSPITAL ENCOUNTER (EMERGENCY)
Age: 72
Discharge: HOME | End: 2022-05-10
Payer: MEDICARE

## 2022-05-10 VITALS
TEMPERATURE: 98.3 F | HEART RATE: 59 BPM | BODY MASS INDEX: 40.25 KG/M2 | OXYGEN SATURATION: 98 % | HEIGHT: 60 IN | WEIGHT: 205 LBS | SYSTOLIC BLOOD PRESSURE: 145 MMHG | DIASTOLIC BLOOD PRESSURE: 52 MMHG | RESPIRATION RATE: 18 BRPM

## 2022-05-10 DIAGNOSIS — D64.9 ANEMIA, UNSPECIFIED TYPE: ICD-10-CM

## 2022-05-10 DIAGNOSIS — I82.612 SUPERFICIAL VENOUS THROMBOSIS OF LEFT UPPER EXTREMITY: Primary | ICD-10-CM

## 2022-05-10 LAB
A/G RATIO: 1.4 (ref 1.1–2.2)
ALBUMIN SERPL-MCNC: 3.9 G/DL (ref 3.4–5)
ALP BLD-CCNC: 73 U/L (ref 40–129)
ALT SERPL-CCNC: 14 U/L (ref 10–40)
ANION GAP SERPL CALCULATED.3IONS-SCNC: 14 MMOL/L (ref 3–16)
APTT: 39 SEC (ref 26.2–38.6)
AST SERPL-CCNC: 13 U/L (ref 15–37)
BASOPHILS ABSOLUTE: 0 K/UL (ref 0–0.2)
BASOPHILS RELATIVE PERCENT: 0.6 %
BILIRUB SERPL-MCNC: 0.3 MG/DL (ref 0–1)
BUN BLDV-MCNC: 17 MG/DL (ref 7–20)
CALCIUM SERPL-MCNC: 9.6 MG/DL (ref 8.3–10.6)
CHLORIDE BLD-SCNC: 103 MMOL/L (ref 99–110)
CO2: 20 MMOL/L (ref 21–32)
CREAT SERPL-MCNC: 0.8 MG/DL (ref 0.6–1.2)
EOSINOPHILS ABSOLUTE: 0.2 K/UL (ref 0–0.6)
EOSINOPHILS RELATIVE PERCENT: 3.2 %
GFR AFRICAN AMERICAN: >60
GFR NON-AFRICAN AMERICAN: >60
GLUCOSE BLD-MCNC: 126 MG/DL (ref 70–99)
HCT VFR BLD CALC: 25.4 % (ref 36–48)
HEMOGLOBIN: 7.9 G/DL (ref 12–16)
INR BLD: 1.12 (ref 0.88–1.12)
LYMPHOCYTES ABSOLUTE: 1.4 K/UL (ref 1–5.1)
LYMPHOCYTES RELATIVE PERCENT: 25.5 %
MCH RBC QN AUTO: 26.2 PG (ref 26–34)
MCHC RBC AUTO-ENTMCNC: 31.3 G/DL (ref 31–36)
MCV RBC AUTO: 83.7 FL (ref 80–100)
MONOCYTES ABSOLUTE: 0.9 K/UL (ref 0–1.3)
MONOCYTES RELATIVE PERCENT: 15.6 %
NEUTROPHILS ABSOLUTE: 3.1 K/UL (ref 1.7–7.7)
NEUTROPHILS RELATIVE PERCENT: 55.1 %
PDW BLD-RTO: 22.9 % (ref 12.4–15.4)
PLATELET # BLD: 259 K/UL (ref 135–450)
PMV BLD AUTO: 7.7 FL (ref 5–10.5)
POTASSIUM REFLEX MAGNESIUM: 4.2 MMOL/L (ref 3.5–5.1)
PROTHROMBIN TIME: 12.7 SEC (ref 9.9–12.7)
RBC # BLD: 3.03 M/UL (ref 4–5.2)
SODIUM BLD-SCNC: 137 MMOL/L (ref 136–145)
TOTAL PROTEIN: 6.7 G/DL (ref 6.4–8.2)
WBC # BLD: 5.6 K/UL (ref 4–11)

## 2022-05-10 PROCEDURE — 85025 COMPLETE CBC W/AUTO DIFF WBC: CPT

## 2022-05-10 PROCEDURE — 99284 EMERGENCY DEPT VISIT MOD MDM: CPT

## 2022-05-10 PROCEDURE — 85730 THROMBOPLASTIN TIME PARTIAL: CPT

## 2022-05-10 PROCEDURE — 93005 ELECTROCARDIOGRAM TRACING: CPT | Performed by: PHYSICIAN ASSISTANT

## 2022-05-10 PROCEDURE — 93971 EXTREMITY STUDY: CPT

## 2022-05-10 PROCEDURE — 80053 COMPREHEN METABOLIC PANEL: CPT

## 2022-05-10 PROCEDURE — 85610 PROTHROMBIN TIME: CPT

## 2022-05-10 RX ORDER — CEPHALEXIN 500 MG/1
500 CAPSULE ORAL 2 TIMES DAILY
Qty: 14 CAPSULE | Refills: 0 | Status: SHIPPED | OUTPATIENT
Start: 2022-05-10 | End: 2022-05-17

## 2022-05-10 ASSESSMENT — PAIN DESCRIPTION - PAIN TYPE: TYPE: ACUTE PAIN

## 2022-05-10 ASSESSMENT — PAIN DESCRIPTION - LOCATION: LOCATION: ARM

## 2022-05-10 ASSESSMENT — PAIN DESCRIPTION - ORIENTATION: ORIENTATION: LEFT

## 2022-05-10 ASSESSMENT — PAIN SCALES - GENERAL: PAINLEVEL_OUTOF10: 4

## 2022-05-10 ASSESSMENT — PAIN - FUNCTIONAL ASSESSMENT: PAIN_FUNCTIONAL_ASSESSMENT: 0-10

## 2022-05-10 NOTE — TELEPHONE ENCOUNTER
Patient came to the office asking to have her lower left arm looked at. Per patient's report, an IV was placed in 5/2 and was used until she was discharged on 5/6. She is complaining of pain in a 2 inch wide spot on lower left arm. Site is warm to touch and hard. Reviewed with DCE and he would like patient to go to the ER for ultrasound of arm. Patient was agreeable.

## 2022-05-10 NOTE — ED PROVIDER NOTES
[facility]        Pt Name: Shilpa Valencia  MRN: [id number]  [place] 1950  Date of evaluation: 5/10/2022  Provider: ALBARO Arshad  PCP: Dedrick Ledbetter DO  Note Started: 10:11 AM EDT       OK. I have evaluated this patient. My supervising physician was available for consultation. CHIEF COMPLAINT       Chief Complaint   Patient presents with    Arm Pain     pt had IV in left forearm last week while admitted and has had pain/redness/swelling since. was at cardiology this morning and toold to get it checked out. concerned about a blood clot. HISTORY OF PRESENT ILLNESS   (Location, Timing/Onset, Context/Setting, Quality, Duration, Modifying Factors, Severity, Associated Signs and Symptoms)  Note limiting factors. Chief Complaint: Left arm pain    Shilpa Valencia is a 80 y.o. female who presents left arm pain and redness for the past 3 days. Patient states that she was admitted to the hospital on May 2, 2022 and IV was placed in this area. She was in the hospital until about May 6 and released. Patient states that shortly after this the redness pain and swelling developed over the IV site. Patient denies any chest pain, shortness of breath, difficulty breathing. Patient states that she has noticed the redness getting larger over the area where the IV was placed. Patient denies any numbness tingling or loss of sensation into the hand or arm or wrist.  Patient is being followed by gastroenterology and cardiology for anemia and coronary artery disease. Patient states that they were going to do catheterization but was unable to do that because there was an active bleeding going on and she just had a small bowel follow-through testing today which she does not know the results of. Patient states that she takes Plavix and baby aspirin daily.   Patient denies any history of blood clots in the past.    Nursing Notes were all reviewed and agreed with or any disagreements were addressed in the HPI. REVIEW OF SYSTEMS    (2-9 systems for level 4, 10 or more for level 5)     Review of Systems    Positives and Pertinent negatives as per HPI. Except as noted above in the ROS, all other systems were reviewed and negative. PAST MEDICAL HISTORY     Past Medical History:   Diagnosis Date    Anemia     Anesthesia     trouble after egd 10/2016. Anxiety and severe tremors after AF ablation 8/2018-responded well to Ativan    Anesthesia complication     flailing, yelling when waking up from anesthesia-ativan helps (wonlpc34/29/19: ativans works within seconds of administration)    Atrial fibrillation ([facility] 10/01/2017    A. fib with SVR    CAD (coronary artery disease)     Chest pain 10/01/2017    Chronic kidney disease     ? ??    Depression     GERD (gastroesophageal reflux disease)     Glaucoma     Hiatal hernia     Hyperlipidemia     Hypertension     Migraines, neuralgic     Morbid obesity ([facility]     Osteoarthritis     Sleep apnea     uses bi-pap    Type II or unspecified type diabetes mellitus without mention of complication, not stated as uncontrolled     Unspecified sleep apnea     uses cpap    Urinary incontinence     UTI (urinary tract infection)          SURGICAL HISTORY     Past Surgical History:   Procedure Laterality Date    ABDOMEN SURGERY N/A 9/1/2020    EXPLORATION OF ABDOMINAL WOUND performed by Eliza Porter MD at [facility]  2/22/1999    quadruple by-pass, [address] COLONOSCOPY  10/08/2018    1 polyp removed    COLONOSCOPY N/A 10/8/2018    COLONOSCOPY POLYPECTOMY SNARE/COLD BIOPSY performed by Casandra Lucas MD at [address] [facility] N/A 5/3/2022    COLONOSCOPY  ABLATION performed by Casandra Lucas MD at [address] [place] [address] BYPASS GRAFT  1999    quad    COSMETIC SURGERY      face lift    ENTEROSCOPY N/A 2/26/2019    ENTEROSCOPY performed by Daly Levine MD at [address] GASTRIC BAND  12/20/11    hiatal hernia repair    GASTRIC BAND REMOVAL  11/03/2016    laparoscopic     HAMMER TOE SURGERY      [facility] INJE[facility]EDURE FOR SACROILIAC JOINT Left 12/17/2018    SACROILIAC JOINT INJECTION ON THE LEFT WITH FLUOROSCOPY performed by Pilar Blanchard MD at [address]    both   [address]    Left and Right knees, [facility]    KNEE ARTHROSCOPY      1982 left    ORIF HUMERUS DECOMPRESSION Left 2/25/2016    OPEN REDUCTION INTERNAL FIXATION LEFT PROXIMAL HUMERUS    OVARY REMOVAL Bilateral 1997    PACEMAKER PLACEMENT  05/2019    SC NJX DX/THER SBST INTRLMNR CRV/THRC W/IMG GDN N/A 11/5/2018    MIDLINE L4/L5 LUMBAR INTERLAMINAR EPIDURAL STEROID INJECTION WITH FLUOROSCOPY performed by Pilar Blanchard MD at [address] [address] ENDOSCOPY  10/30/15    UPPER GASTROINTESTINAL ENDOSCOPY  10/14/2016    with biopsy    UPPER GASTROINTESTINAL ENDOSCOPY N/A 3/22/2022    EGD BIOPSY performed by Daly Levine MD at [address] [facility] N/A 3/22/2022    EGD DILATION BALLOON performed by Daly Levine MD at [address] N/A 5/3/2022    ENTEROSCOPY WITH INTERVENTION performed by Daly Levine MD at [place] [address] N/A 10/29/2019    OPEN VENTRAL HERNIA REPAIR WITH  MESH performed by Jose Holden MD at [address] [place] [address]       Discharge Medication List as of 5/10/2022  2:05 PM      CONTINUE these medications which have NOT CHANGED    Details   isosorbide mononitrate (IMDUR) 60 MG extended release tablet Take 1 tablet by mouth daily, Disp-30 tablet, R-0Normal      losartan (COZAAR) 25 MG tablet Take 1 tablet by mouth daily, Disp-30 tablet, R-0Normal      amLODIPine (NORVASC) 2.5 MG tablet Take 1 tablet by mouth 2 times daily, Disp-30 tablet, R-0Normal      pantoprazole (PROTONIX) 40 MG tablet Take 1 tablet by mouth 2 times daily (before meals), Disp-60 tablet, R-0Normal      clopidogrel (PLAVIX) 75 MG tablet Take 1 tablet by mouth daily, Disp-30 tablet, R-5Normal      metoprolol tartrate (LOPRESSOR) 25 MG tablet Take 0.5 tablets by mouth 2 times daily, Disp-30 tablet, R-5Normal      fexofenadine (ALLEGRA) 180 MG tablet Take 180 mg by mouth dailyHistorical Med      Epoetin Leonard-epbx (RETACRIT IJ) Inject as directed Every fridayHistorical Med      ipratropium (ATROVENT HFA) 17 MCG/ACT inhaler Inhale 2 puffs into the lungs three times daily, Disp-1 each, R-3Normal      SUMAtriptan (IMITREX) 100 MG tablet Take 1 tablet by mouth daily as needed for Migraine, Disp-30 tablet, R-3Normal      tiZANidine (ZANAFLEX) 4 MG tablet Take 1 tablet by mouth nightly as needed (leg spasms), Disp-20 tablet, R-0Normal      zinc sulfate (ZINCATE) 220 (50 Zn) MG capsule Take 50 mg by mouth 4 times daily Historical Med      potassium chloride (KLOR-CON M) 20 MEQ extended release tablet Take 1 tablet by mouth daily, Disp-90 tablet, R-3Normal      aspirin 81 MG tablet Take 81 mg by mouth dailyHistorical Med      furosemide (LASIX) 40 MG tablet Take 1 tablet by mouth daily, Disp-30 tablet, R-5Normal      Ascorbic Acid (VITAMIN C) 1000 MG tablet Take 1,000 mg by mouth dailyHistorical Med      Cyanocobalamin (VITAMIN B-12 PO) Take 3,000 mcg by mouth dailyHistorical Med      estradiol (ESTRACE) 0.1 MG/GM vaginal cream Place 2 g vaginally See Admin Instructions Twice a weekHistorical Med      Cranberry 450 MG CAPS Take by mouth daily Historical Med      pioglitazone (ACTOS) 15 MG tablet Take 15 mg by mouth dailyHistorical Med      vitamin D (CHOLECALCIFEROL) 1000 UNIT TABS tablet Take 1,000 Units by mouth daily Historical Med      gabapentin (NEURONTIN) 100 MG capsule Take 400 mg by mouth 3 times daily. [place] St[place] Historical Med      TraZODone HCl (DESYREL PO) Take 50 mg by mouth nightly Historical Med      Calcium Citrate-Vitamin D (CALCIUM CITRATE + D PO) Take 1 tablet by mouth daily Historical Med      SLOW-MAG 71.5-119 MG TBEC tablet Take 1 tablet by mouth daily 143 mg, R-5Historical Med      rosuvastatin (CRESTOR) 40 MG tablet Take 20 mg by mouth every evening       ezetimibe (ZETIA) 10 MG tablet Take 5 mg by mouth nightly Historical Med      escitalopram (LEXAPRO) 20 MG tablet Take 20 mg by mouth daily. nitroGLYCERIN (NITROLINGUAL) 0.4 MG/SPRAY spray Place 1 spray under the tongue every 5 minutes as needed. As directed for chest pain                ALLERGIES     Albuterol, Heparin, Niacin, Avelox [moxifloxacin hcl in nacl], Moxifloxacin, Niaspan [niacin er], Proventil [albuterol sulfate], and Tagamet [cimetidine]    FAMILYHISTORY       Family History   Problem Relation Age of Onset    Cancer Mother         ovarian, colon    High Blood Pressure Mother     Heart Disease Father     High Blood Pressure Father     Stroke Sister         paralysed on right side B/C of stroke          SOCIAL HISTORY       Social History     Tobacco Use    Smoking status: Never Smoker    Smokeless tobacco: Never Used   Vaping Use    Vaping Use: Never used   Substance Use Topics    Alcohol use: No     Alcohol/week: 0.0 standard drinks    Drug use: No       SCREENINGS    Lucy Coma Scale  Eye Opening: Spontaneous  Best Verbal Response: Oriented  Best Motor Response: Obeys commands  Lucy Coma Scale Score: 15        PHYSICAL EXAM    (up to 7 for level 4, 8 or more for level 5)     ED Triage Vitals [05/10/22 1008]   BP Temp Temp Source Pulse Resp SpO2 Height Weight   (!) 145/52 98.3 °F (36.8 °C) Oral 59 18 98 % 4' 11.5\" (1.511 m) 205 lb (93 kg)       Physical Exam  Constitutional:       General: She is not in acute distress. Appearance: Normal appearance.  She is obese. She is not ill-appearing. Eyes:      General: No scleral icterus. Right eye: No discharge. Left eye: No discharge. Cardiovascular:      Rate and Rhythm: Normal rate and regular rhythm. Heart sounds: Normal heart sounds. No murmur heard. No friction rub. No gallop. Pulmonary:      Effort: Pulmonary effort is normal. No respiratory distress. Breath sounds: Normal breath sounds. Skin:     General: Skin is warm. Capillary Refill: Capillary refill takes less than 2 seconds. Findings: Erythema present. Comments: Area of redness noted over the venipuncture sites. This area is also slightly edematous and tender to palpation. Please see picture in chart for visual aid. Neurological:      Mental Status: She is alert. DIAGNOSTIC RESULTS   LABS:    Labs Reviewed   CBC WITH AUTO DIFFERENTIAL - Abnormal; Notable for the following components:       Result Value    RBC 3.03 (*)     Hemoglobin 7.9 (*)     Hematocrit 25.4 (*)     RDW 22.9 (*)     All other components within normal limits   COMPREHENSIVE METABOLIC PANEL W/ REFLEX TO MG FOR LOW K - Abnormal; Notable for the following components:    CO2 20 (*)     Glucose 126 (*)     AST 13 (*)     All other components within normal limits   APTT - Abnormal; Notable for the following components:    aPTT 39.0 (*)     All other components within normal limits   PROTIME-INR       When ordered only abnormal lab results are displayed. All other labs were within normal range or not returned as of this dictation. EKG: When ordered, EKG's are interpreted by the Emergency Department Physician in the absence of a cardiologist.  Please see their note for interpretation of EKG.     RADIOLOGY:   Non-plain film images such as CT, Ultrasound and MRI are read by the radiologist. Plain radiographic images are visualized and preliminarily interpreted by the ED Provider with the below findings:        Interpretation per the Radiologist below, if available at the time of this note:    VL Extremity Venous Left           CT ENTEROGRAPHY W CONTRAST    Result Date: 5/4/2022  EXAMINATION: CT ENTEROGRAPHY 5/4/2022 10:08 am TECHNIQUE: CT of the abdomen and pelvis was performed after the administration of intravenous contrast and negative oral contrast. Dose modulation, iterative reconstruction, and/or weight based adjustment of the mA/kV was utilized to reduce the radiation dose to as low as reasonably achievable. COMPARISON: February 2019 HISTORY: ORDERING SYSTEM PROVIDED HISTORY: Acute on chronic blood loss anemia TECHNOLOGIST PROVIDED HISTORY: Reason for exam:->Acute on chronic blood loss anemia Reason for Exam: Acute on chronic blood loss anemia FINDINGS: Lower Chest: Streak artifact is seen from pacer. No pericardial effusion is seen. Small hiatal hernia seen. There is nonspecific thickening at the GE junction. Bandlike opacity seen in the lingula. Bandlike opacity seen in lung bases. There is mild bronchiectasis seen lower lobe airways. Organs: Calcified granuloma seen within the spleen. No perisplenic fluid Right adrenal gland is normal.  Fatty nodule left adrenal gland measures 1.8 cm in short axis, similar to prior, when measured in a similar fashion. No perisplenic fluid seen No intrahepatic ductal dilatation. No perihepatic fluid. No inflammatory stranding surrounding the gallbladder. No peripancreatic fluid. No peripancreatic inflammatory change No hydronephrosis on the left. A cyst projecting anteriorly off the left kidney is smaller, measuring 2.7 cm medial-lateral, previously 4.8 cm. There is a subtle rim calcification. No hydronephrosis on the right GI/Bowel: No significant small bowel distention noted. No bowel obstruction noted. Scattered colonic diverticula are seen. There is mild wall thickening of the descending and rectosigmoid colon.   This portion of colon is incompletely distended No focal filling defects are seen within the bowel. Appendix is normal in caliber Pelvis: Bladder is contracted. There is bladder wall thickening. Gas is seen within the bladder. Peritoneum/Retroperitoneum: Atherosclerotic changes seen in the iliacs. Atherosclerotic change seen in aorta. Atherosclerotic change seen in the celiac, SMA, renal arteries. No aortic aneurysm. No retroperitoneal adenopathy. Bones/Soft Tissues: Postsurgical changes seen in the anterior abdominal wall. Spurring is seen in the spine. Spurring is seen in the hips. Mild wall thickening of the descending and rectosigmoid colon either due to the partially contracted state of the colon or wall thickening from underlying colitis. Scattered colonic diverticula are seen. No pericolonic fat stranding. Mild bladder wall thickening. Likely due to the partially contracted state of the bladder in the absence of clinical or laboratory signs of cystitis Atherosclerosis           PROCEDURES   Unless otherwise noted below, none     Procedures    CRITICAL CARE TIME       CONSULTS:  None      EMERGENCY DEPARTMENT COURSE and DIFFERENTIAL DIAGNOSIS/MDM:   Vitals:    Vitals:    05/10/22 1008   BP: (!) 145/52   Pulse: 59   Resp: 18   Temp: 98.3 °F (36.8 °C)   TempSrc: Oral   SpO2: 98%   Weight: 205 lb (93 kg)   Height: 4' 11.5\" (1.511 m)       Patient was given the following medications:  Medications - No data to display  ED Course as of 05/10/22 1537   Tue May 10, 2022   1333 Spoke with vascular they stated that the superficial venous thrombosis is often tributary of the cephalic vein. [PE]      ED Course User Index  [PE] ALBARO Cobos      Is this patient to be included in the SEP-1 Core Measure? No    36-year-old female presents with left forearm redness and swelling for the past 3 days. Patient states that she had an IV placed in this arm and was current concerned that she may have a blood clot.   Here in the emergency department a Doppler ultrasound that showed a superficial venous thrombosis. Patient is also had CBC which did show anemia. Patient is following with Dr. Tevin Woodruff from gastroenterology and is currently being evaluated for GI bleed. Patient states he went to her GI doctor today and they started endoscopy capsule. Patient does not have any dizziness lightheadedness or shortness of breath at this time. Patient is currently on baby aspirin daily as well as Plavix. Patient be discharged with prescription for antibiotics due to the superficial thrombosis as well as instructed to continue her aspirin and Plavix and to apply warm compresses over the area. Patient was also instructed to follow-up with her GI doctor or primary care doctor for her anemia as they are doing a work-up to determine the source of this anemia. Patient's  was present at time of discharge and with the patient and  understand and agree to this plan we went over all the results. Patient's questions were answered and she agrees to this plan. FINAL IMPRESSION      1. Superficial venous thrombosis of left upper extremity    2.  Anemia, unspecified type          DISPOSITION/PLAN   DISPOSITION Decision To Discharge 05/10/2022 01:50:13 PM      PATIENT REFERRED TO:  Sarah Castelan[address] [address]  [phone]    Schedule an appointment as soon as possible for a visit in 2 days  [facility] Emergency Department  [address] [place] [address]  [phone]    As needed, If symptoms worsen      DISCHARGE MEDICATIONS:  Discharge Medication List as of 5/10/2022  2:05 PM      START taking these medications    Details   cephALEXin (KEFLEX) 500 MG capsule Take 1 capsule by mouth 2 times daily for 7 days, Disp-14 capsule, R-0Normal             DISCONTINUED MEDICATIONS:  Discharge Medication List as of 5/10/2022  2:05 PM                 (Please note that portions of this note were completed with a voice recognition program. Efforts were made to edit the dictations but occasionally words are mis-transcribed.)    ALBARO Francois (electronically signed)            ALBARO Francois  05/10/22 [phone]

## 2022-05-11 LAB
EKG ATRIAL RATE: 68 BPM
EKG DIAGNOSIS: NORMAL
EKG Q-T INTERVAL: 424 MS
EKG QRS DURATION: 94 MS
EKG QTC CALCULATION (BAZETT): 426 MS
EKG R AXIS: 29 DEGREES
EKG T AXIS: 76 DEGREES
EKG VENTRICULAR RATE: 61 BPM

## 2022-05-11 PROCEDURE — 93010 ELECTROCARDIOGRAM REPORT: CPT | Performed by: INTERNAL MEDICINE

## 2022-05-12 ENCOUNTER — TELEPHONE (OUTPATIENT)
Dept: CARDIOLOGY CLINIC | Age: 72
End: 2022-05-12

## 2022-05-12 NOTE — TELEPHONE ENCOUNTER
Pt states she went to refill metoprolol and they said medication has been discontinued.  Please call to advise

## 2022-05-12 NOTE — PROGRESS NOTES
I spoke with pt and told her script was sent. She asked where and I told her. She said it was sent to wrong pharmacy. Resent script.

## 2022-05-16 ENCOUNTER — HOSPITAL ENCOUNTER (OUTPATIENT)
Age: 72
Discharge: HOME | End: 2022-05-16
Payer: MEDICARE

## 2022-05-16 ENCOUNTER — TELEPHONE (OUTPATIENT)
Dept: CARDIOLOGY CLINIC | Age: 72
End: 2022-05-16

## 2022-05-16 LAB
BASOPHILS ABSOLUTE: 0 K/UL (ref 0–0.2)
BASOPHILS RELATIVE PERCENT: 0.5 %
EOSINOPHILS ABSOLUTE: 0.1 K/UL (ref 0–0.6)
EOSINOPHILS RELATIVE PERCENT: 2 %
FERRITIN: 410.1 NG/ML (ref 15–150)
HCT VFR BLD CALC: 26.7 % (ref 36–48)
HEMOGLOBIN: 8.5 G/DL (ref 12–16)
IRON SATURATION: 23 % (ref 15–50)
IRON: 70 UG/DL (ref 37–145)
LYMPHOCYTES ABSOLUTE: 1.5 K/UL (ref 1–5.1)
LYMPHOCYTES RELATIVE PERCENT: 28.6 %
MCH RBC QN AUTO: 27.1 PG (ref 26–34)
MCHC RBC AUTO-ENTMCNC: 31.7 G/DL (ref 31–36)
MCV RBC AUTO: 85.6 FL (ref 80–100)
MONOCYTES ABSOLUTE: 0.8 K/UL (ref 0–1.3)
MONOCYTES RELATIVE PERCENT: 14.4 %
NEUTROPHILS ABSOLUTE: 2.9 K/UL (ref 1.7–7.7)
NEUTROPHILS RELATIVE PERCENT: 54.5 %
PDW BLD-RTO: 24.1 % (ref 12.4–15.4)
PLATELET # BLD: 220 K/UL (ref 135–450)
PMV BLD AUTO: 7.6 FL (ref 5–10.5)
RBC # BLD: 3.13 M/UL (ref 4–5.2)
TOTAL IRON BINDING CAPACITY: 301 UG/DL (ref 260–445)
WBC # BLD: 5.4 K/UL (ref 4–11)

## 2022-05-16 PROCEDURE — 82728 ASSAY OF FERRITIN: CPT

## 2022-05-16 PROCEDURE — 83550 IRON BINDING TEST: CPT

## 2022-05-16 PROCEDURE — 85025 COMPLETE CBC W/AUTO DIFF WBC: CPT

## 2022-05-16 PROCEDURE — 36415 COLL VENOUS BLD VENIPUNCTURE: CPT

## 2022-05-16 PROCEDURE — 83540 ASSAY OF IRON: CPT

## 2022-05-16 NOTE — TELEPHONE ENCOUNTER
Pt calling schedule the cardiac cath.  All tests have been completed, and has been given the ok to proceed, per Dr. Wilber Rodriguez.

## 2022-05-16 NOTE — TELEPHONE ENCOUNTER
Per DCE, he would like patient to have repeat cbc and follow up with dmitriy on 5/18 to assess readiness for [facility]. I relayed this to patient and she said Dr. Sumaya Bravo office ordered a cbc today. It has not resulted yet. I told patient I would have DCE review cbc when it is resulted to determine readiness for [facility]. Patient expressed understanding.

## 2022-05-18 ENCOUNTER — OFFICE VISIT (OUTPATIENT)
Dept: CARDIOLOGY CLINIC | Age: 72
End: 2022-05-18
Payer: MEDICARE

## 2022-05-18 ENCOUNTER — NURSE ONLY (OUTPATIENT)
Dept: CARDIOLOGY CLINIC | Age: 72
End: 2022-05-18
Payer: MEDICARE

## 2022-05-18 VITALS
HEIGHT: 60 IN | SYSTOLIC BLOOD PRESSURE: 128 MMHG | BODY MASS INDEX: 40.05 KG/M2 | DIASTOLIC BLOOD PRESSURE: 50 MMHG | WEIGHT: 204 LBS

## 2022-05-18 DIAGNOSIS — Z95.0 PACEMAKER: ICD-10-CM

## 2022-05-18 DIAGNOSIS — I10 ESSENTIAL HYPERTENSION: ICD-10-CM

## 2022-05-18 DIAGNOSIS — I48.0 PAROXYSMAL ATRIAL FIBRILLATION (HCC): ICD-10-CM

## 2022-05-18 DIAGNOSIS — I25.10 CORONARY ARTERY DISEASE DUE TO LIPID RICH PLAQUE: ICD-10-CM

## 2022-05-18 DIAGNOSIS — I49.5 SSS (SICK SINUS SYNDROME) (HCC): ICD-10-CM

## 2022-05-18 DIAGNOSIS — I48.0 PAROXYSMAL ATRIAL FIBRILLATION (HCC): Primary | ICD-10-CM

## 2022-05-18 DIAGNOSIS — I25.83 CORONARY ARTERY DISEASE DUE TO LIPID RICH PLAQUE: ICD-10-CM

## 2022-05-18 PROCEDURE — 1036F TOBACCO NON-USER: CPT | Performed by: NURSE PRACTITIONER

## 2022-05-18 PROCEDURE — 1111F DSCHRG MED/CURRENT MED MERGE: CPT | Performed by: NURSE PRACTITIONER

## 2022-05-18 PROCEDURE — 99214 OFFICE O/P EST MOD 30 MIN: CPT | Performed by: NURSE PRACTITIONER

## 2022-05-18 PROCEDURE — G8427 DOCREV CUR MEDS BY ELIG CLIN: HCPCS | Performed by: NURSE PRACTITIONER

## 2022-05-18 PROCEDURE — 3017F COLORECTAL CA SCREEN DOC REV: CPT | Performed by: NURSE PRACTITIONER

## 2022-05-18 PROCEDURE — 93280 PM DEVICE PROGR EVAL DUAL: CPT | Performed by: INTERNAL MEDICINE

## 2022-05-18 PROCEDURE — 1123F ACP DISCUSS/DSCN MKR DOCD: CPT | Performed by: NURSE PRACTITIONER

## 2022-05-18 PROCEDURE — 1090F PRES/ABSN URINE INCON ASSESS: CPT | Performed by: NURSE PRACTITIONER

## 2022-05-18 PROCEDURE — G8417 CALC BMI ABV UP PARAM F/U: HCPCS | Performed by: NURSE PRACTITIONER

## 2022-05-18 PROCEDURE — 4040F PNEUMOC VAC/ADMIN/RCVD: CPT | Performed by: NURSE PRACTITIONER

## 2022-05-18 PROCEDURE — G8399 PT W/DXA RESULTS DOCUMENT: HCPCS | Performed by: NURSE PRACTITIONER

## 2022-05-18 NOTE — PATIENT INSTRUCTIONS
- No changes to medication  - Continue Plavix and aspirin  - Call office if you develop palpitations, heart racing  - Check your blood pressure at home, if your top number blood pressure is >140/90 or <95/50 please call the office  - Our office will call you to schedule you left heart cath with Dr. Ignacia Eldridge  - Emergency room if you develop chest pain or shortness of breath  - Follow up in 4 months with Dr. Derek Ulloa

## 2022-05-19 NOTE — PROGRESS NOTES
Patient comes in for programming evaluation for her pacemaker. All sensing and pacing parameters are within normal range. Battery life 10.5 years  AP 95.4%.  <0.1%. No episodes noted. Patient remains on metoprolol. No changes need to be made at this time. Please see interrogation for more detail. Patient will see Shannon Olivares today and follow up in 3 months in office or remotely.

## 2022-05-23 ENCOUNTER — HOSPITAL ENCOUNTER (OUTPATIENT)
Age: 72
Discharge: HOME | End: 2022-05-23
Payer: MEDICARE

## 2022-05-23 LAB
ANION GAP SERPL CALCULATED.3IONS-SCNC: 16 MMOL/L (ref 3–16)
BASOPHILS ABSOLUTE: 0 K/UL (ref 0–0.2)
BASOPHILS RELATIVE PERCENT: 0.3 %
BUN BLDV-MCNC: 42 MG/DL (ref 7–20)
CALCIUM SERPL-MCNC: 9.5 MG/DL (ref 8.3–10.6)
CHLORIDE BLD-SCNC: 105 MMOL/L (ref 99–110)
CO2: 21 MMOL/L (ref 21–32)
CREAT SERPL-MCNC: 1.1 MG/DL (ref 0.6–1.2)
EOSINOPHILS ABSOLUTE: 0 K/UL (ref 0–0.6)
EOSINOPHILS RELATIVE PERCENT: 0.7 %
GFR AFRICAN AMERICAN: 59
GFR NON-AFRICAN AMERICAN: 49
GLUCOSE BLD-MCNC: 146 MG/DL (ref 70–99)
HCT VFR BLD CALC: 21.6 % (ref 36–48)
HEMOGLOBIN: 6.9 G/DL (ref 12–16)
LYMPHOCYTES ABSOLUTE: 1.5 K/UL (ref 1–5.1)
LYMPHOCYTES RELATIVE PERCENT: 24.3 %
MCH RBC QN AUTO: 28.3 PG (ref 26–34)
MCHC RBC AUTO-ENTMCNC: 32.1 G/DL (ref 31–36)
MCV RBC AUTO: 88.3 FL (ref 80–100)
MONOCYTES ABSOLUTE: 0.9 K/UL (ref 0–1.3)
MONOCYTES RELATIVE PERCENT: 14.7 %
NEUTROPHILS ABSOLUTE: 3.6 K/UL (ref 1.7–7.7)
NEUTROPHILS RELATIVE PERCENT: 60 %
PDW BLD-RTO: 26.1 % (ref 12.4–15.4)
PLATELET # BLD: 242 K/UL (ref 135–450)
PMV BLD AUTO: 8 FL (ref 5–10.5)
POTASSIUM SERPL-SCNC: 5.1 MMOL/L (ref 3.5–5.1)
RBC # BLD: 2.45 M/UL (ref 4–5.2)
SODIUM BLD-SCNC: 142 MMOL/L (ref 136–145)
WBC # BLD: 6 K/UL (ref 4–11)

## 2022-05-23 PROCEDURE — 85025 COMPLETE CBC W/AUTO DIFF WBC: CPT

## 2022-05-23 PROCEDURE — 36415 COLL VENOUS BLD VENIPUNCTURE: CPT

## 2022-05-23 PROCEDURE — 80048 BASIC METABOLIC PNL TOTAL CA: CPT

## 2022-05-25 ENCOUNTER — TELEPHONE (OUTPATIENT)
Dept: CARDIOLOGY CLINIC | Age: 72
End: 2022-05-25

## 2022-05-25 ENCOUNTER — HOSPITAL ENCOUNTER (OUTPATIENT)
Dept: ONCOLOGY | Age: 72
Setting detail: INFUSION SERIES
Discharge: HOME | End: 2022-05-25
Payer: MEDICARE

## 2022-05-25 VITALS
HEART RATE: 58 BPM | OXYGEN SATURATION: 94 % | DIASTOLIC BLOOD PRESSURE: 62 MMHG | RESPIRATION RATE: 16 BRPM | TEMPERATURE: 97.6 F | SYSTOLIC BLOOD PRESSURE: 119 MMHG

## 2022-05-25 LAB
ABO/RH: NORMAL
ANTIBODY SCREEN: NORMAL
BLOOD BANK DISPENSE STATUS: NORMAL
BLOOD BANK PRODUCT CODE: NORMAL
BPU ID: NORMAL
DESCRIPTION BLOOD BANK: NORMAL

## 2022-05-25 PROCEDURE — 86900 BLOOD TYPING SEROLOGIC ABO: CPT

## 2022-05-25 PROCEDURE — 86901 BLOOD TYPING SEROLOGIC RH(D): CPT

## 2022-05-25 PROCEDURE — 86850 RBC ANTIBODY SCREEN: CPT

## 2022-05-25 PROCEDURE — 86923 COMPATIBILITY TEST ELECTRIC: CPT

## 2022-05-25 PROCEDURE — 99211 OFF/OP EST MAY X REQ PHY/QHP: CPT

## 2022-05-25 PROCEDURE — 36430 TRANSFUSION BLD/BLD COMPNT: CPT

## 2022-05-25 PROCEDURE — P9016 RBC LEUKOCYTES REDUCED: HCPCS

## 2022-05-25 RX ORDER — SODIUM CHLORIDE 0.9 % (FLUSH) 0.9 %
5-40 SYRINGE (ML) INJECTION ONCE
Status: DISCONTINUED | OUTPATIENT
Start: 2022-05-25 | End: 2022-05-26 | Stop reason: HOSPADM

## 2022-05-25 RX ORDER — SODIUM CHLORIDE 9 MG/ML
INJECTION, SOLUTION INTRAVENOUS PRN
Status: DISCONTINUED | OUTPATIENT
Start: 2022-05-25 | End: 2022-05-26 | Stop reason: HOSPADM

## 2022-05-25 NOTE — TELEPHONE ENCOUNTER
Calling to speak with the doctor. This pt has an active bleed, and he needs to discuss the scheduled LHC.

## 2022-05-25 NOTE — PROGRESS NOTES
PRBC transfusion complete. VSS. Pt denies complaints. IV removed. Pt dischaged home.  To f/u with Renee Shook NP.

## 2022-05-25 NOTE — PROGRESS NOTES
Pt ambulatory with [facility] to [facility] for 1 unit PRBC transfusion. VSS. Pt reports dizziness, SOB secondary to anemia. IV access obtained. Type and crossmatch specimen collected. Transfusion initiated [id number]. VSS. Blood transfusing without difficulty. No s/sx of reaction.

## 2022-05-25 NOTE — TELEPHONE ENCOUNTER
Pt called stating her GI Dr wants her to stop th Plavix and get the Sandostatin once a month, pt wants to know if this ok with DCE?       Pls nick thapa thank you

## 2022-05-26 NOTE — TELEPHONE ENCOUNTER
Spoke with patient about cancelling lhc and she expressed understanding. She will call back if symptoms worsen or when she know a plan for her anemia. Answered all of patients questions.

## 2022-06-01 ENCOUNTER — HOSPITAL ENCOUNTER (OUTPATIENT)
Dept: CARDIAC CATH/INVASIVE PROCEDURES | Age: 72
Discharge: HOME | End: 2022-06-01

## 2022-06-02 ENCOUNTER — TELEPHONE (OUTPATIENT)
Dept: CARDIOLOGY CLINIC | Age: 72
End: 2022-06-02

## 2022-06-02 NOTE — TELEPHONE ENCOUNTER
Pt called stating Bryce [address] stated there was an issue with the RX for the rx is 25 mg dose states 12.5 mg  with directions . 05 mg.   metoprolol tartrate (LOPRESSOR) 25 mg tablet  Dose 25 mg  90 tablet  Take 0.5mg by mouth BID     [facility] [address] [facility] - P [phone] - F [phone]   [address] [facility] [address], [address] [place] [address]   Phone:  [phone]  Fax:  [phone]

## 2022-06-17 ENCOUNTER — HOSPITAL ENCOUNTER (OUTPATIENT)
Age: 72
Discharge: HOME | End: 2022-06-17
Payer: MEDICARE

## 2022-06-17 LAB
BASOPHILS ABSOLUTE: 0 K/UL (ref 0–0.2)
BASOPHILS RELATIVE PERCENT: 0.4 %
EOSINOPHILS ABSOLUTE: 0.1 K/UL (ref 0–0.6)
EOSINOPHILS RELATIVE PERCENT: 1.1 %
HCT VFR BLD CALC: 28.6 % (ref 36–48)
HEMOGLOBIN: 9.2 G/DL (ref 12–16)
LYMPHOCYTES ABSOLUTE: 2.2 K/UL (ref 1–5.1)
LYMPHOCYTES RELATIVE PERCENT: 36.3 %
MCH RBC QN AUTO: 28.2 PG (ref 26–34)
MCHC RBC AUTO-ENTMCNC: 32.1 G/DL (ref 31–36)
MCV RBC AUTO: 87.9 FL (ref 80–100)
MONOCYTES ABSOLUTE: 0.7 K/UL (ref 0–1.3)
MONOCYTES RELATIVE PERCENT: 11.9 %
NEUTROPHILS ABSOLUTE: 3 K/UL (ref 1.7–7.7)
NEUTROPHILS RELATIVE PERCENT: 50.3 %
PDW BLD-RTO: 19.1 % (ref 12.4–15.4)
PLATELET # BLD: 218 K/UL (ref 135–450)
PMV BLD AUTO: 8 FL (ref 5–10.5)
RBC # BLD: 3.26 M/UL (ref 4–5.2)
WBC # BLD: 5.9 K/UL (ref 4–11)

## 2022-06-17 PROCEDURE — 85025 COMPLETE CBC W/AUTO DIFF WBC: CPT

## 2022-06-17 PROCEDURE — 36415 COLL VENOUS BLD VENIPUNCTURE: CPT

## 2022-06-21 ENCOUNTER — NURSE ONLY (OUTPATIENT)
Dept: CARDIOLOGY CLINIC | Age: 72
End: 2022-06-21
Payer: MEDICARE

## 2022-06-21 DIAGNOSIS — Z95.0 PACEMAKER: ICD-10-CM

## 2022-06-21 DIAGNOSIS — I48.0 PAROXYSMAL ATRIAL FIBRILLATION (HCC): ICD-10-CM

## 2022-06-21 PROCEDURE — 93294 REM INTERROG EVL PM/LDLS PM: CPT | Performed by: INTERNAL MEDICINE

## 2022-06-21 PROCEDURE — 93296 REM INTERROG EVL PM/IDS: CPT | Performed by: INTERNAL MEDICINE

## 2022-06-21 NOTE — PROGRESS NOTES
We received remote transmission from patient's monitor at home. Transmission shows normal sensing and pacing function. EP physician will review. See interrogation under cardiology tab in the [address] [facility] [address] field for more details. End of 91-day monitoring period 6-21-22.

## 2022-06-22 PROBLEM — N17.9 AKI (ACUTE KIDNEY INJURY) (HCC): Status: RESOLVED | Noted: 2021-12-28 | Resolved: 2022-06-22

## 2022-06-22 NOTE — PROGRESS NOTES
[facility]   Electrophysiology  [facility] Fly, APRN-CNP  Attending EP: Dr. Perez Bears    Date: 7/20/2022  I had the privilege of visiting Negra Dominguez in the office. Chief Complaint:   Chief Complaint   Patient presents with    Follow-up    Atrial Fibrillation     Pt reports afib episode a few week ago. Shortness of Breath     No change. Medication Refill     Plavix     History of Present Illness: History obtained from patient and medical record. Negra Dominguez is 71 y.o. female with a past medical history of HTN, HLD, DM, obesity, RAHUL, CKD, CAD s/ CABG (1999), and atrial fibrillation. Hx of EPS PVI AF ablation (8/16/2018). History of MGUS and receiving Iron transfusion. Hx of SSS s/p PPM (5/1/2019). -Interval history: Today, Negra Dominguez is being seen for routine follow up. Her  is present for the visit. They were never able to locate the source of her GIB. She is now on sandostatin injections. Her last hemoglobin was 9.6 at Dr. Chiquis Monteiro office. She has been tolerating ASA/plavix. She is wondering when she will be able to have her angigoram with Dr. Rosie Bailon. She occasionally feels her heart flutter and race. It generally only occurs once a month and lasts only a few seconds at a time. Her device interrogation shows stable function. No recent atrial arrhythmias. Denies having chest pain, palpitations, shortness of breath, orthopnea/PND, cough, or dizziness at the time of this visit. With regard to medication therapy the patient has been compliant with prescribed regimen. They have tolerated therapy to date. Allergies: Allergies   Allergen Reactions    Albuterol Other (See Comments)     Other reaction(s): Chest Pain  Crushing chest pain    Heparin Other (See Comments)     Caused bruises and hematomas immediately when drip started.   MARKED BRUISING/HEMATOMAS    Niacin     Avelox [Moxifloxacin Hcl In Nacl] Rash    Moxifloxacin Rash    Niaspan [Niacin Er] Itching and Rash    Proventil [Albuterol Sulfate] Palpitations    Tagamet [Cimetidine] Rash     Home Medications:  Prior to Visit Medications    Medication Sig Taking? Authorizing Provider   SANDOSTATIN LAR DEPOT 20 MG injection INJECT 1 KIT IN THE MUSCLE MONTHLY Yes Historical Provider, MD   metoprolol tartrate (LOPRESSOR) 25 MG tablet Take 0.5 tablets by mouth 2 times daily Yes BILL Alas CNP   isosorbide mononitrate (IMDUR) 60 MG extended release tablet Take 1 tablet by mouth daily Yes Ashley Blackwell MD   losartan (COZAAR) 25 MG tablet Take 1 tablet by mouth daily Yes Ashley Blackwell MD   amLODIPine (NORVASC) 2.5 MG tablet Take 1 tablet by mouth 2 times daily Yes Ashley Blackwell MD   pantoprazole (PROTONIX) 40 MG tablet Take 1 tablet by mouth 2 times daily (before meals) Yes Ashley Blackwell MD   clopidogrel (PLAVIX) 75 MG tablet Take 1 tablet by mouth daily Yes Clayton Sarabia MD   fexofenadine (ALLEGRA) 180 MG tablet Take 180 mg by mouth daily Yes Historical Provider, MD   SUMAtriptan (IMITREX) 100 MG tablet Take 1 tablet by mouth daily as needed for Migraine Yes Jossue Ramesh MD   tiZANidine (ZANAFLEX) 4 MG tablet Take 1 tablet by mouth nightly as needed (leg spasms) Yes Jossue Ramesh MD   zinc sulfate (ZINCATE) 220 (50 Zn) MG capsule Take 50 mg by mouth 4 times daily  Yes Historical Provider, MD   potassium chloride (KLOR-CON M) 20 MEQ extended release tablet Take 1 tablet by mouth daily  Patient taking differently: Take 10 mEq by mouth in the morning.  Yes Clayton Sarabia MD   aspirin 81 MG tablet Take 81 mg by mouth daily Yes Historical Provider, MD   furosemide (LASIX) 40 MG tablet Take 1 tablet by mouth daily Yes Wilton Azar MD   Ascorbic Acid (VITAMIN C) 1000 MG tablet Take 1,000 mg by mouth daily Yes Historical Provider, MD   Cyanocobalamin (VITAMIN B-12 PO) Take 3,000 mcg by mouth daily Yes Historical Provider, MD   estradiol (ESTRACE) 0.1 MG/GM vaginal cream Place 2 g vaginally See Admin Instructions Twice a week Yes Historical Provider, MD   Cranberry 450 MG CAPS Take by mouth daily  Yes Historical Provider, MD   pioglitazone (ACTOS) 15 MG tablet Take 15 mg by mouth daily Yes Historical Provider, MD   vitamin D (CHOLECALCIFEROL) 1000 UNIT TABS tablet Take 1,000 Units by mouth daily  Yes Historical Provider, MD   gabapentin (NEURONTIN) 100 MG capsule Take 400 mg by mouth 3 times daily. . Yes Historical Provider, MD   TraZODone HCl (DESYREL PO) Take 50 mg by mouth nightly  Yes Historical Provider, MD   Calcium Citrate-Vitamin D (CALCIUM CITRATE + D PO) Take 1 tablet by mouth daily  Yes Historical Provider, MD   SLOW-MAG 71.5-119 MG TBEC tablet Take 1 tablet by mouth daily 143 mg Yes Historical Provider, MD   rosuvastatin (CRESTOR) 40 MG tablet Take 20 mg by mouth every evening  Yes Historical Provider, MD   ezetimibe (ZETIA) 10 MG tablet Take 5 mg by mouth nightly  Yes Historical Provider, MD   escitalopram (LEXAPRO) 20 MG tablet Take 20 mg by mouth daily. Yes Historical Provider, MD   nitroGLYCERIN (NITROLINGUAL) 0.4 MG/SPRAY spray Place 1 spray under the tongue every 5 minutes as needed. As directed for chest pain  Yes Historical Provider, MD   Epoetin Leonard-epbx (RETACRIT IJ) Inject as directed Every friday  Historical Provider, MD      Past Medical History:  Past Medical History:   Diagnosis Date    Anemia     Anesthesia     trouble after egd 10/2016.   Anxiety and severe tremors after AF ablation 8/2018-responded well to Ativan    Anesthesia complication     flailing, yelling when waking up from anesthesia-ativan helps (eciqsq72/29/19: ativans works within seconds of administration)    Atrial fibrillation ([address] 10/01/2017    A. fib with SVR    CAD (coronary artery disease)     Chest pain 10/01/2017    Chronic kidney disease     ???    Depression     GERD (gastroesophageal reflux disease)     Glaucoma     Hiatal hernia     Hyperlipidemia     Hypertension     Migraines, neuralgic     Morbid [facility]     Osteoarthritis     Sleep apnea     uses bi-pap    Type II or unspecified type diabetes mellitus without mention of complication, not stated as uncontrolled     Unspecified sleep apnea     uses cpap    Urinary incontinence     UTI (urinary tract infection)      Past Surgical History:    has a past surgical history that includes Coronary artery bypass graft (1999); Hammer toe surgery; Ovary removal (Bilateral, 1997); Esophagus dilation; Breast lumpectomy; Knee arthroscopy; Gastric Band (12/20/11); Colonoscopy; Upper gastrointestinal endoscopy; Upper gastrointestinal endoscopy (10/30/15); Cosmetic surgery; joint replacement (1995); joint replacement (1995); orif humerus decompression (Left, 2/25/2016); Upper gastrointestinal endoscopy (10/14/2016); Bariatric Surgery (11/03/2016); ablation of dysrhythmic focus; Colonoscopy (10/08/2018); Colonoscopy (N/A, 10/8/2018); pr njx dx/ther sbst intrlmnr crv/thrc w/img gdn (N/A, 11/5/2018); Cardiac surgery (2/22/1999); Cardiac catheterization; Injection Procedure For Sacroiliac Joint (Left, 12/17/2018); Enteroscopy (N/A, 2/26/2019); ventral hernia repair (N/A, 10/29/2019); Abdomen surgery (N/A, 9/1/2020); pacemaker placement (05/2019); Upper gastrointestinal endoscopy (N/A, 3/22/2022); Upper gastrointestinal endoscopy (N/A, 3/22/2022); Upper gastrointestinal endoscopy (N/A, 5/3/2022); and Colonoscopy (N/A, 5/3/2022). Social History:  Reviewed. reports that she has never smoked. She has never used smokeless tobacco. She reports that she does not drink alcohol and does not use drugs. Family History:  Reviewed. family history includes Cancer in her mother; Heart Disease in her father; High Blood Pressure in her father and mother; Stroke in her sister.      Review of Systems:  Constitutional: Negative for fever, night sweats, chills, weight changes, or weakness  Skin: Negative for rash, dry skin, pruritus, bruising, bleeding, blood clots, or changes in skin pigment  HEENT: Negative for vision changes, ringing in the ears, sore throat, dysphagia, or swollen lymph nodes  Respiratory: Reviewed in HPI  Cardiovascular: Reviewed in HPI  Gastrointestinal: Negative for abdominal pain, N/V/D, constipation, or black/tarry stools  Genito-Urinary: Negative for dysuria, incontinence, urgency, or hematuria  Musculoskeletal: Negative for joint swelling, muscle pain, or injuries  Neurological/Psych: Negative for confusion, seizures, dizziness, headaches, balance issues or TIA-like symptoms. No anxiety, depression, or insomnia    Physical Examination:  Vitals:    07/20/22 1511   BP: (!) 130/58   Pulse: 60   SpO2: 96%      Wt Readings from Last 3 Encounters:   07/20/22 205 lb (93 kg)   05/18/22 204 lb (92.5 kg)   05/10/22 205 lb (93 kg)     Constitutional: Cooperative and in no apparent distress, and appears well nourished  Skin: Warm and pink; no pallor, cyanosis, bruising, or clubbing  HEENT: Symmetric and normocephalic. PERRL, EOM intact. Conjunctiva pink with clear sclera. Mucus membranes pink and moist. Teeth intact. Thyroid smooth without nodules or goiter  Respiratory: Respirations symmetric and unlabored. Lungs clear to auscultation bilaterally, no wheezing, rhonchi, or crackles  Cardiovascular:  Regular rate and rhythm. S1/S2 present without murmurs, rubs, or gallops. Peripheral pulses 2+, capillary refill < 3 seconds. No elevation of JVP. No peripheral edema  Gastrointestinal: Abdomen soft and round. Bowel sounds normoactive in all quadrants without tenderness or masses. Musculoskeletal: Bilateral upper and lower extremity strength 5/5 with full ROM. Uses walker  Neurological/Psych: Awake and orientated to person, place and time. Calm affect, appropriate mood.      Pertinent labs, diagnostic, device, and imaging results reviewed as a part of this visit    LABS    CBC:   Lab Results   Component Value Date    WBC 4.9 07/01/2022    HGB 9.2 (L) 07/01/2022    HCT 28.3 (L) 2022    MCV 85.0 2022     2022     BMP:   Lab Results   Component Value Date    CREATININE 1.1 2022    BUN 42 (H) 2022     2022    K 5.1 2022     2022    CO2 21 2022     Estimated Creatinine Clearance: 50 mL/min (based on SCr of 1.1 mg/dL). Thyroid:   Lab Results   Component Value Date    TSH 1.95 2020    [id number] 5.7 2011     Lipid Panel:   Lab Results   Component Value Date/Time    CHOL 109 2020 02:02 PM    HDL 36 2020 02:02 PM    TRIG 132 2020 02:02 PM     LFTs:  Lab Results   Component Value Date    ALT 14 05/10/2022    AST 13 (L) 05/10/2022    ALKPHOS 73 05/10/2022    BILITOT 0.3 05/10/2022     Coags:   Lab Results   Component Value Date    PROTIME 12.7 05/10/2022    INR 1.12 05/10/2022    APTT 39.0 (H) 05/10/2022       EC2022  - NSR with 1st degree. Rate 60, QRS 95, QTc 420    ANGELITO:    *Left ventricle - normal size and function with EF of 55%   *Aortic valve - sclerotic, mild regurgitation   *Mitral valve - mildly thickened and calcified, mild regurgitation   *Right heart pacer. Echo:    -Normal global systolic function with an ejection fraction estimated at 65%. -No regional wall motion abnormalities noted.   -Aortic valve appears sclerotic but opens adequately. There is mild aortic   insufficiency.   -Thickened mitral valve without evidence of stenosis. There is a moderate   sized mobile echodensity of unknown significant noted on posterior lateral   annulus of mitral valve which may represent increased calcification but was   not as prominent on the previous echo of 2019. No significant   regurgitation noted. -There is mild tricuspid regurgitation with a RVSP estimation of 34 mmHg. -Indeterminate diastolic function. Avg. E/e'=29.9   -Pacemaker / ICD lead is visualized in the right heart. Recommend ANGELITO after pt recovers from this COVID illness to check the MV   annulus. Cannot exclude a vegetation. GXT: 4/22   The overall quality of the study is good. There is subdiaphragmatic    attenuation. Left ventricular cavity size is normal. Right ventricle is normal in size. SPECT images demonstrate homogeneous tracer distribution throughout the    myocardium. There is normal isotope uptake at stress and rest. There is no    evidence of myocardial ischemia or scar. Gated spect reveals normal    myocardial thickening and wall motion. Sum stress score of 4. No visual TID. Calculated TID of 0.93. Sensitivity of testing reduced on anti-anginals. Myocardial perfusion is normal. Low risk scan. Cath: 4/22  Findings:  Artery Findings/Result  LM 80%  LAD 99% ostial, 100% mid  Cx 70% mid OM1, 70% distal OM1  RI N/A  RCA Small, nondominant, 90% prox, multiple mid 70% lesions (unchanged)  R-OM patent  L-D-LAD Patent (*10mmHg pullback across proximal Left subclavian)  LVEDP 13  LVG NA     Intervention:         None     Post Cath Dx:       CAD as above  Progressive and obstructive LM and OM disease  Plan for staged PCI given need for risk discussion and given BUN/Cr high    Assessment:    1. Sick Sinus Syndrome  - S/p Dual chamber Medtronic PPM (5/1/19, Cyrus Jimenez)  - I reviewed device interrogation today. Device functioning normally.   ~ A-paced 96.5% V-paced <0.1%  ~  10 years left on battery life expectancy  - Discussed with patient  - Follow up with device clinic as scheduled    2. Paroxysmal Atrial Fibrillation  - Hx of PVI ablation (8/16/18)    - Currently in NSR  - On metoprolol 12.5 mg BID    - [id number] score:high; [id number] Vasc score and anticoagulation discussed. High risk for stroke and thromboembolism. Anticoagulation is recommended. Risk of bleeding was discussed. ~ Off AC due to MGUS and anemia.  If recurrent AF, will need to consider resuming AC vs MEENU closure with Watchman as she is a poor candidate for long term anti-coagulation    - Afib risk cardiovascular health, the patient is instructed to increase cardiovascular related activities with a goal of 150 min/week of moderate level activity or 10,000 steps per day. Encouraged to perform as much activity as tolerated    I have addressed the patient's cardiac risk factors and adjusted pharmacologic treatment as needed. In addition, I have reinforced the need for patient directed risk factor modification. I independently reviewed the device check interrogation and ECG    All questions and concerns were addressed with the patient. Alternatives to treatment were discussed. Thank you for allowing to us to participate in the care of Marina Rivera    Time  30 minutes spent preparing to see patient including reviewing patient history/prior tests/prior consults, performing a medical exam, counseling and educating patient/family/caregiver, ordering medications/tests/procedures, referring and communicating with PCPs and other pertinent consultants, documenting information in the EMR, independently interpreting results and communicating to family and coordination of patient care.     BILL An-JAMES  [address]   Office: [phone]

## 2022-07-01 ENCOUNTER — HOSPITAL ENCOUNTER (OUTPATIENT)
Age: 72
Discharge: HOME | End: 2022-07-01
Payer: MEDICARE

## 2022-07-01 LAB
BASOPHILS ABSOLUTE: 0 K/UL (ref 0–0.2)
BASOPHILS RELATIVE PERCENT: 0.7 %
EOSINOPHILS ABSOLUTE: 0.1 K/UL (ref 0–0.6)
EOSINOPHILS RELATIVE PERCENT: 1.8 %
HCT VFR BLD CALC: 28.3 % (ref 36–48)
HEMOGLOBIN: 9.2 G/DL (ref 12–16)
LYMPHOCYTES ABSOLUTE: 1.6 K/UL (ref 1–5.1)
LYMPHOCYTES RELATIVE PERCENT: 32.4 %
MCH RBC QN AUTO: 27.5 PG (ref 26–34)
MCHC RBC AUTO-ENTMCNC: 32.3 G/DL (ref 31–36)
MCV RBC AUTO: 85 FL (ref 80–100)
MONOCYTES ABSOLUTE: 0.6 K/UL (ref 0–1.3)
MONOCYTES RELATIVE PERCENT: 12.5 %
NEUTROPHILS ABSOLUTE: 2.6 K/UL (ref 1.7–7.7)
NEUTROPHILS RELATIVE PERCENT: 52.6 %
PDW BLD-RTO: 18 % (ref 12.4–15.4)
PLATELET # BLD: 196 K/UL (ref 135–450)
PMV BLD AUTO: 8.3 FL (ref 5–10.5)
RBC # BLD: 3.34 M/UL (ref 4–5.2)
WBC # BLD: 4.9 K/UL (ref 4–11)

## 2022-07-01 PROCEDURE — 36415 COLL VENOUS BLD VENIPUNCTURE: CPT

## 2022-07-01 PROCEDURE — 85025 COMPLETE CBC W/AUTO DIFF WBC: CPT

## 2022-07-19 ENCOUNTER — TELEPHONE (OUTPATIENT)
Dept: CARDIOLOGY CLINIC | Age: 72
End: 2022-07-19

## 2022-07-19 NOTE — TELEPHONE ENCOUNTER
Called to speak with Yasir Delarosa. She stated that DCE had advised to hold Plavix previously d/t GI bleeds. She called today to let him know that she has re started the Plavix for 2 weeks with no bleeding and a hgb of 9.6 which is stable. She would like for DCE to review and let her know if she can get scheduled for her stents. She reports she has not had any major episodes of chest pain or shortness of breath and she has a home nurse that sees her. She is aware that DCE will review when he returns next week and someone will call her. She will call the office if symptoms worsen (or to ER). Will have DCE review next week when he returns and see what he recommends.

## 2022-07-19 NOTE — TELEPHONE ENCOUNTER
Pt called stating that she's been on Plavix for 2 weeks, she had her blood work done her hemoglobin is 9.6. Per Pt if needed DCE can consult with Dr. Cuba Eugene [phone] regarding the stent.   Please advise

## 2022-07-20 ENCOUNTER — NURSE ONLY (OUTPATIENT)
Dept: CARDIOLOGY CLINIC | Age: 72
End: 2022-07-20
Payer: MEDICARE

## 2022-07-20 ENCOUNTER — OFFICE VISIT (OUTPATIENT)
Dept: CARDIOLOGY CLINIC | Age: 72
End: 2022-07-20
Payer: MEDICARE

## 2022-07-20 VITALS
HEIGHT: 60 IN | DIASTOLIC BLOOD PRESSURE: 58 MMHG | WEIGHT: 205 LBS | BODY MASS INDEX: 40.25 KG/M2 | HEART RATE: 60 BPM | SYSTOLIC BLOOD PRESSURE: 130 MMHG | OXYGEN SATURATION: 96 %

## 2022-07-20 DIAGNOSIS — I10 ESSENTIAL HYPERTENSION: ICD-10-CM

## 2022-07-20 DIAGNOSIS — I25.10 CAD, MULTIPLE VESSEL: ICD-10-CM

## 2022-07-20 DIAGNOSIS — I48.0 PAROXYSMAL ATRIAL FIBRILLATION (HCC): Primary | ICD-10-CM

## 2022-07-20 DIAGNOSIS — I49.5 SSS (SICK SINUS SYNDROME) (HCC): ICD-10-CM

## 2022-07-20 DIAGNOSIS — G47.33 OSA (OBSTRUCTIVE SLEEP APNEA): ICD-10-CM

## 2022-07-20 DIAGNOSIS — Z95.0 PACEMAKER: ICD-10-CM

## 2022-07-20 DIAGNOSIS — I25.119 CORONARY ARTERY DISEASE INVOLVING NATIVE CORONARY ARTERY OF NATIVE HEART WITH ANGINA PECTORIS (HCC): ICD-10-CM

## 2022-07-20 PROCEDURE — G8417 CALC BMI ABV UP PARAM F/U: HCPCS | Performed by: NURSE PRACTITIONER

## 2022-07-20 PROCEDURE — 3017F COLORECTAL CA SCREEN DOC REV: CPT | Performed by: NURSE PRACTITIONER

## 2022-07-20 PROCEDURE — 93280 PM DEVICE PROGR EVAL DUAL: CPT | Performed by: INTERNAL MEDICINE

## 2022-07-20 PROCEDURE — 1036F TOBACCO NON-USER: CPT | Performed by: NURSE PRACTITIONER

## 2022-07-20 PROCEDURE — G8427 DOCREV CUR MEDS BY ELIG CLIN: HCPCS | Performed by: NURSE PRACTITIONER

## 2022-07-20 PROCEDURE — 1090F PRES/ABSN URINE INCON ASSESS: CPT | Performed by: NURSE PRACTITIONER

## 2022-07-20 PROCEDURE — G8399 PT W/DXA RESULTS DOCUMENT: HCPCS | Performed by: NURSE PRACTITIONER

## 2022-07-20 PROCEDURE — 99214 OFFICE O/P EST MOD 30 MIN: CPT | Performed by: NURSE PRACTITIONER

## 2022-07-20 PROCEDURE — 1123F ACP DISCUSS/DSCN MKR DOCD: CPT | Performed by: NURSE PRACTITIONER

## 2022-07-20 RX ORDER — ISOSORBIDE MONONITRATE 60 MG/1
60 TABLET, EXTENDED RELEASE ORAL DAILY
Qty: 90 TABLET | Refills: 1 | Status: SHIPPED | OUTPATIENT
Start: 2022-07-20 | End: 2022-11-03

## 2022-07-20 RX ORDER — OCTREOTIDE ACETATE,MI-SPHERES 20 MG
VIAL (EA) INTRAMUSCULAR
COMMUNITY
Start: 2022-06-09

## 2022-07-20 RX ORDER — CLOPIDOGREL BISULFATE 75 MG/1
75 TABLET ORAL DAILY
Qty: 90 TABLET | Refills: 1 | Status: SHIPPED | OUTPATIENT
Start: 2022-07-20

## 2022-07-20 NOTE — PROGRESS NOTES
Patient comes in for programming evaluation for her pacemaker. All sensing and pacing parameters are within normal range. Battery life 10.4 years  AP 95.6%.  <0.1%. No episodes noted. Patient remains on metoprolol. No changes need to be made at this time. Please see interrogation for more detail. Patient will see NPSR today and follow up in 3 months in office or remotely.

## 2022-07-25 NOTE — TELEPHONE ENCOUNTER
Per DCE, he would like patient to be scheduled in 2 weeks for LHC. I relayed this to patient and she expressed understanding. Please call patient to schedule procedure. Instructions were given to patient.

## 2022-08-14 NOTE — H&P
[facility]  H+P  Consult  OP Visit  FU Visit   CC HPI   UA Patient presents for PCI. Recurrent anemia historically precluding intervention. HISTORY/ALLERGY/ROS   MEDHx  has a past medical history of Anemia, Anesthesia, Anesthesia complication, Atrial fibrillation ([address] CAD (coronary artery disease), Chest pain, Chronic kidney disease, Depression, GERD (gastroesophageal reflux disease), Glaucoma, Hiatal hernia, Hyperlipidemia, Hypertension, Migraines, neuralgic, Morbid obesity ([address] Osteoarthritis, Sleep apnea, Type II or unspecified type diabetes mellitus without mention of complication, not stated as uncontrolled, Unspecified sleep apnea, Urinary incontinence, and UTI (urinary tract infection). SURGHx  has a past surgical history that includes Coronary artery bypass graft (1999); Hammer toe surgery; Ovary removal (Bilateral, 1997); Esophagus dilation; Breast lumpectomy; Knee arthroscopy; Gastric Band (12/20/11); Colonoscopy; Upper gastrointestinal endoscopy; Upper gastrointestinal endoscopy (10/30/15); Cosmetic surgery; joint replacement (1995); joint replacement (1995); orif humerus decompression (Left, 2/25/2016); Upper gastrointestinal endoscopy (10/14/2016); Bariatric Surgery (11/03/2016); ablation of dysrhythmic focus; Colonoscopy (10/08/2018); Colonoscopy (N/A, 10/8/2018); pr njx dx/ther sbst intrlmnr crv/thrc w/img gdn (N/A, 11/5/2018); Cardiac surgery (2/22/1999); Cardiac catheterization; Injection Procedure For Sacroiliac Joint (Left, 12/17/2018); Enteroscopy (N/A, 2/26/2019); ventral hernia repair (N/A, 10/29/2019); Abdomen surgery (N/A, 9/1/2020); pacemaker placement (05/2019); Upper gastrointestinal endoscopy (N/A, 3/22/2022); Upper gastrointestinal endoscopy (N/A, 3/22/2022); Upper gastrointestinal endoscopy (N/A, 5/3/2022); and Colonoscopy (N/A, 5/3/2022). SOCHx  reports that she has never smoked.  She has never used smokeless tobacco. She reports that she does not drink alcohol and does not use drugs. FAMHx family history includes Cancer in her mother; Heart Disease in her father; High Blood Pressure in her father and mother; Stroke in her sister. ALLERG Albuterol, Heparin, Niacin, Avelox [moxifloxacin hcl in nacl], Moxifloxacin, Niaspan [niacin er], Proventil [albuterol sulfate], and Tagamet [cimetidine]   ROS Full ROS obtained and negative except as mentioned in HPI   MEDICATIONS   Current Outpatient Medications   Medication Sig Dispense Refill    SANDOSTATIN LAR DEPOT 20 MG injection INJECT 1 KIT IN THE MUSCLE MONTHLY      clopidogrel (PLAVIX) 75 MG tablet Take 1 tablet by mouth in the morning. 90 tablet 1    isosorbide mononitrate (IMDUR) 60 MG extended release tablet Take 1 tablet by mouth in the morning.  90 tablet 1    metoprolol tartrate (LOPRESSOR) 25 MG tablet Take 0.5 tablets by mouth 2 times daily 90 tablet 1    losartan (COZAAR) 25 MG tablet Take 1 tablet by mouth daily 30 tablet 0    amLODIPine (NORVASC) 2.5 MG tablet Take 1 tablet by mouth 2 times daily 30 tablet 0    pantoprazole (PROTONIX) 40 MG tablet Take 1 tablet by mouth 2 times daily (before meals) 60 tablet 0    fexofenadine (ALLEGRA) 180 MG tablet Take 180 mg by mouth daily      Epoetin Leonard-epbx (RETACRIT IJ) Inject as directed Every friday      SUMAtriptan (IMITREX) 100 MG tablet Take 1 tablet by mouth daily as needed for Migraine 30 tablet 3    tiZANidine (ZANAFLEX) 4 MG tablet Take 1 tablet by mouth nightly as needed (leg spasms) 20 tablet 0    zinc sulfate (ZINCATE) 220 (50 Zn) MG capsule Take 50 mg by mouth 4 times daily       potassium chloride (KLOR-CON M) 20 MEQ extended release tablet Take 1 tablet by mouth daily (Patient taking differently: Take 10 mEq by mouth in the morning.) 90 tablet 3    aspirin 81 MG tablet Take 81 mg by mouth daily      furosemide (LASIX) 40 MG tablet Take 1 tablet by mouth daily 30 tablet 5    Ascorbic Acid (VITAMIN C) 1000 MG tablet Take 1,000 mg by mouth daily      Cyanocobalamin (VITAMIN B-12 PO) Take 3,000 mcg by mouth daily      estradiol (ESTRACE) 0.1 MG/GM vaginal cream Place 2 g vaginally See Admin Instructions Twice a week      Cranberry 450 MG CAPS Take by mouth daily       pioglitazone (ACTOS) 15 MG tablet Take 15 mg by mouth daily      vitamin D (CHOLECALCIFEROL) 1000 UNIT TABS tablet Take 1,000 Units by mouth daily       gabapentin (NEURONTIN) 100 MG capsule Take 400 mg by mouth 3 times daily. .      TraZODone HCl (DESYREL PO) Take 50 mg by mouth nightly       Calcium Citrate-Vitamin D (CALCIUM CITRATE + D PO) Take 1 tablet by mouth daily       SLOW-MAG 71.5-119 MG TBEC tablet Take 1 tablet by mouth daily 143 mg  5    rosuvastatin (CRESTOR) 40 MG tablet Take 20 mg by mouth every evening       ezetimibe (ZETIA) 10 MG tablet Take 5 mg by mouth nightly       escitalopram (LEXAPRO) 20 MG tablet Take 20 mg by mouth daily. nitroGLYCERIN (NITROLINGUAL) 0.4 MG/SPRAY spray Place 1 spray under the tongue every 5 minutes as needed. As directed for chest pain        No current facility-administered medications for this encounter. PHYSICAL EXAM   Vitals There were no vitals filed for this visit.    Gen Alert, coop, no distress Heart  Rrr, no mrg   Head NC, AT, no abnorm Abd  Soft, NT, +BS, no mass, no OM   Eyes PER, conj/corn clear Ext  Ext nl, AT, no C/C/E   Nose Nares nl, no drain, NT Pulse 2+ and symmetric   Throat Lips, mucosa, tongue nl Skin Col/text/turg nl, no vis rash/les   Neck S/S, TM, NT, no bruit/JVD Psych Nl mood and affect   Lung CTA-B, unlabored, no DTP Lymph   No cervical or axillary LA   Ch wall NT, no deform Neuro  Nl gross M/S exam        ASSESSMENT AND PLAN     *UA  Status Progressive cp and sob  Plan PCI, R/B/O discussed

## 2022-08-15 ENCOUNTER — HOSPITAL ENCOUNTER (OUTPATIENT)
Dept: CARDIAC CATH/INVASIVE PROCEDURES | Age: 72
Setting detail: OBSERVATION
Discharge: HOME | End: 2022-08-16
Attending: INTERNAL MEDICINE | Admitting: INTERNAL MEDICINE
Payer: MEDICARE

## 2022-08-15 DIAGNOSIS — I25.10 CAD, MULTIPLE VESSEL: Primary | ICD-10-CM

## 2022-08-15 PROBLEM — I20.0 UNSTABLE ANGINA (HCC): Status: ACTIVE | Noted: 2022-08-15

## 2022-08-15 LAB
ANION GAP SERPL CALCULATED.3IONS-SCNC: 14 MMOL/L (ref 3–16)
BUN BLDV-MCNC: 26 MG/DL (ref 7–20)
CALCIUM SERPL-MCNC: 9.4 MG/DL (ref 8.3–10.6)
CHLORIDE BLD-SCNC: 104 MMOL/L (ref 99–110)
CO2: 22 MMOL/L (ref 21–32)
CREAT SERPL-MCNC: 1 MG/DL (ref 0.6–1.2)
EKG ATRIAL RATE: 60 BPM
EKG ATRIAL RATE: 63 BPM
EKG DIAGNOSIS: NORMAL
EKG DIAGNOSIS: NORMAL
EKG P-R INTERVAL: 138 MS
EKG Q-T INTERVAL: 430 MS
EKG Q-T INTERVAL: 450 MS
EKG QRS DURATION: 90 MS
EKG QRS DURATION: 94 MS
EKG QTC CALCULATION (BAZETT): 440 MS
EKG QTC CALCULATION (BAZETT): 450 MS
EKG R AXIS: 25 DEGREES
EKG R AXIS: 26 DEGREES
EKG T AXIS: 51 DEGREES
EKG T AXIS: 57 DEGREES
EKG VENTRICULAR RATE: 60 BPM
EKG VENTRICULAR RATE: 63 BPM
GFR AFRICAN AMERICAN: >60
GFR NON-AFRICAN AMERICAN: 55
GLUCOSE BLD-MCNC: 175 MG/DL (ref 70–99)
HCT VFR BLD CALC: 27.1 % (ref 36–48)
HEMOGLOBIN: 8.7 G/DL (ref 12–16)
MCH RBC QN AUTO: 27.3 PG (ref 26–34)
MCHC RBC AUTO-ENTMCNC: 31.9 G/DL (ref 31–36)
MCV RBC AUTO: 85.5 FL (ref 80–100)
PDW BLD-RTO: 19.6 % (ref 12.4–15.4)
PLATELET # BLD: 224 K/UL (ref 135–450)
PMV BLD AUTO: 8.1 FL (ref 5–10.5)
POTASSIUM SERPL-SCNC: 4.2 MMOL/L (ref 3.5–5.1)
RBC # BLD: 3.17 M/UL (ref 4–5.2)
SODIUM BLD-SCNC: 140 MMOL/L (ref 136–145)
WBC # BLD: 7.3 K/UL (ref 4–11)

## 2022-08-15 PROCEDURE — C9600 PERC DRUG-EL COR STENT SING: HCPCS

## 2022-08-15 PROCEDURE — 6360000002 HC RX W HCPCS

## 2022-08-15 PROCEDURE — C1894 INTRO/SHEATH, NON-LASER: HCPCS

## 2022-08-15 PROCEDURE — 36415 COLL VENOUS BLD VENIPUNCTURE: CPT

## 2022-08-15 PROCEDURE — 93010 ELECTROCARDIOGRAM REPORT: CPT | Performed by: INTERNAL MEDICINE

## 2022-08-15 PROCEDURE — 92979 ENDOLUMINL IVUS OCT C EA: CPT | Performed by: INTERNAL MEDICINE

## 2022-08-15 PROCEDURE — 92978 ENDOLUMINL IVUS OCT C 1ST: CPT | Performed by: INTERNAL MEDICINE

## 2022-08-15 PROCEDURE — 6370000000 HC RX 637 (ALT 250 FOR IP): Performed by: INTERNAL MEDICINE

## 2022-08-15 PROCEDURE — C1753 CATH, INTRAVAS ULTRASOUND: HCPCS

## 2022-08-15 PROCEDURE — 92928 PRQ TCAT PLMT NTRAC ST 1 LES: CPT | Performed by: INTERNAL MEDICINE

## 2022-08-15 PROCEDURE — 99152 MOD SED SAME PHYS/QHP 5/>YRS: CPT | Performed by: INTERNAL MEDICINE

## 2022-08-15 PROCEDURE — 80048 BASIC METABOLIC PNL TOTAL CA: CPT

## 2022-08-15 PROCEDURE — 99153 MOD SED SAME PHYS/QHP EA: CPT

## 2022-08-15 PROCEDURE — C1769 GUIDE WIRE: HCPCS

## 2022-08-15 PROCEDURE — C1760 CLOSURE DEV, VASC: HCPCS

## 2022-08-15 PROCEDURE — 6360000004 HC RX CONTRAST MEDICATION: Performed by: INTERNAL MEDICINE

## 2022-08-15 PROCEDURE — 92979 ENDOLUMINL IVUS OCT C EA: CPT

## 2022-08-15 PROCEDURE — 2580000003 HC RX 258

## 2022-08-15 PROCEDURE — 85027 COMPLETE CBC AUTOMATED: CPT

## 2022-08-15 PROCEDURE — C1874 STENT, COATED/COV W/DEL SYS: HCPCS

## 2022-08-15 PROCEDURE — 93005 ELECTROCARDIOGRAM TRACING: CPT | Performed by: INTERNAL MEDICINE

## 2022-08-15 PROCEDURE — G0378 HOSPITAL OBSERVATION PER HR: HCPCS

## 2022-08-15 PROCEDURE — C1887 CATHETER, GUIDING: HCPCS

## 2022-08-15 PROCEDURE — 99152 MOD SED SAME PHYS/QHP 5/>YRS: CPT

## 2022-08-15 PROCEDURE — C1725 CATH, TRANSLUMIN NON-LASER: HCPCS

## 2022-08-15 PROCEDURE — 2500000003 HC RX 250 WO HCPCS

## 2022-08-15 PROCEDURE — 6370000000 HC RX 637 (ALT 250 FOR IP)

## 2022-08-15 PROCEDURE — 2580000003 HC RX 258: Performed by: INTERNAL MEDICINE

## 2022-08-15 PROCEDURE — 92978 ENDOLUMINL IVUS OCT C 1ST: CPT

## 2022-08-15 RX ORDER — ESCITALOPRAM OXALATE 10 MG/1
20 TABLET ORAL DAILY
Status: DISCONTINUED | OUTPATIENT
Start: 2022-08-15 | End: 2022-08-16 | Stop reason: HOSPADM

## 2022-08-15 RX ORDER — LOSARTAN POTASSIUM 25 MG/1
25 TABLET ORAL DAILY
Status: DISCONTINUED | OUTPATIENT
Start: 2022-08-16 | End: 2022-08-16 | Stop reason: HOSPADM

## 2022-08-15 RX ORDER — CLOPIDOGREL BISULFATE 75 MG/1
75 TABLET ORAL DAILY
Status: DISCONTINUED | OUTPATIENT
Start: 2022-08-16 | End: 2022-08-16 | Stop reason: HOSPADM

## 2022-08-15 RX ORDER — EZETIMIBE 10 MG/1
5 TABLET ORAL NIGHTLY
Status: DISCONTINUED | OUTPATIENT
Start: 2022-08-15 | End: 2022-08-16 | Stop reason: HOSPADM

## 2022-08-15 RX ORDER — PIOGLITAZONEHYDROCHLORIDE 15 MG/1
15 TABLET ORAL DAILY
Status: DISCONTINUED | OUTPATIENT
Start: 2022-08-15 | End: 2022-08-16 | Stop reason: HOSPADM

## 2022-08-15 RX ORDER — GABAPENTIN 400 MG/1
400 CAPSULE ORAL 3 TIMES DAILY
Status: DISCONTINUED | OUTPATIENT
Start: 2022-08-15 | End: 2022-08-15

## 2022-08-15 RX ORDER — ONDANSETRON 2 MG/ML
4 INJECTION INTRAMUSCULAR; INTRAVENOUS EVERY 6 HOURS PRN
Status: DISCONTINUED | OUTPATIENT
Start: 2022-08-15 | End: 2022-08-16 | Stop reason: HOSPADM

## 2022-08-15 RX ORDER — SODIUM CHLORIDE 9 MG/ML
INJECTION, SOLUTION INTRAVENOUS PRN
Status: DISCONTINUED | OUTPATIENT
Start: 2022-08-15 | End: 2022-08-16 | Stop reason: HOSPADM

## 2022-08-15 RX ORDER — PANTOPRAZOLE SODIUM 40 MG/1
40 TABLET, DELAYED RELEASE ORAL
Status: DISCONTINUED | OUTPATIENT
Start: 2022-08-15 | End: 2022-08-16 | Stop reason: HOSPADM

## 2022-08-15 RX ORDER — AMLODIPINE BESYLATE 5 MG/1
2.5 TABLET ORAL 2 TIMES DAILY
Status: DISCONTINUED | OUTPATIENT
Start: 2022-08-15 | End: 2022-08-16 | Stop reason: HOSPADM

## 2022-08-15 RX ORDER — SODIUM CHLORIDE 0.9 % (FLUSH) 0.9 %
5-40 SYRINGE (ML) INJECTION PRN
Status: DISCONTINUED | OUTPATIENT
Start: 2022-08-15 | End: 2022-08-16 | Stop reason: HOSPADM

## 2022-08-15 RX ORDER — SODIUM CHLORIDE 0.9 % (FLUSH) 0.9 %
5-40 SYRINGE (ML) INJECTION EVERY 12 HOURS SCHEDULED
Status: DISCONTINUED | OUTPATIENT
Start: 2022-08-15 | End: 2022-08-16 | Stop reason: HOSPADM

## 2022-08-15 RX ORDER — ASPIRIN 81 MG/1
81 TABLET ORAL DAILY
Status: DISCONTINUED | OUTPATIENT
Start: 2022-08-15 | End: 2022-08-16 | Stop reason: HOSPADM

## 2022-08-15 RX ORDER — ACETAMINOPHEN 325 MG/1
650 TABLET ORAL EVERY 4 HOURS PRN
Status: DISCONTINUED | OUTPATIENT
Start: 2022-08-15 | End: 2022-08-16 | Stop reason: HOSPADM

## 2022-08-15 RX ORDER — ROSUVASTATIN CALCIUM 20 MG/1
20 TABLET, COATED ORAL EVERY EVENING
Status: DISCONTINUED | OUTPATIENT
Start: 2022-08-15 | End: 2022-08-16 | Stop reason: HOSPADM

## 2022-08-15 RX ORDER — ISOSORBIDE MONONITRATE 60 MG/1
60 TABLET, EXTENDED RELEASE ORAL DAILY
Status: DISCONTINUED | OUTPATIENT
Start: 2022-08-15 | End: 2022-08-15

## 2022-08-15 RX ADMIN — AMLODIPINE BESYLATE 2.5 MG: 5 TABLET ORAL at 19:59

## 2022-08-15 RX ADMIN — ROSUVASTATIN 20 MG: 20 TABLET, FILM COATED ORAL at 16:25

## 2022-08-15 RX ADMIN — Medication 10 ML: at 20:05

## 2022-08-15 RX ADMIN — Medication 10 ML: at 11:45

## 2022-08-15 RX ADMIN — ESCITALOPRAM OXALATE 20 MG: 10 TABLET ORAL at 12:01

## 2022-08-15 RX ADMIN — METOPROLOL TARTRATE 12.5 MG: 25 TABLET, FILM COATED ORAL at 19:59

## 2022-08-15 RX ADMIN — PANTOPRAZOLE SODIUM 40 MG: 40 TABLET, DELAYED RELEASE ORAL at 16:25

## 2022-08-15 RX ADMIN — IOPAMIDOL 136 ML: 755 INJECTION, SOLUTION INTRAVENOUS at 10:03

## 2022-08-15 RX ADMIN — PIOGLITAZONE 15 MG: 15 TABLET ORAL at 12:02

## 2022-08-15 ASSESSMENT — PAIN SCALES - GENERAL
PAINLEVEL_OUTOF10: 0

## 2022-08-15 NOTE — PROGRESS NOTES
Patient brought over to CVU from cath lab and attached to CVU monitoring. Report reviewed with cath lab RN. R groin soft and no hematoma or oozing noted. Occlusive dressing dry and intact. Pedal pulses easily palpated. Angiomax gtt infusing @ 32.6mL/hr due to be stopped @ 1140 per cath lab RN. Bedrest until 1130.

## 2022-08-15 NOTE — OP NOTE
[address]  Procedure Note    Procedure: PCI of OM1 and PCI of LM  Indication: UA    Procedure Details:  Consent Access Bleed R Sedat Start Stop Versed Fentanyl Contrast Fluoro EBL Comp Spec   Yes RCFA low [facility] 0814 0932 6 200 136 12.2 <20 None None   US guidance used to determine aforementioned artery patency, size (>2mm), anatomic variations and ideal puncture location. Real-time US utilized concurrent with vascular needle entry into artery. Image(s) permanently recorded and reported in chart. Independent trained observer pushed medications at my direction. We monitored the patient's level of consciousness and vital   signs/physiologic status throughout the procedure duration (see start and stop times above, as well as medication dosages). Findings:  Artery Findings/Result   LM 80% mid to distal   % prox   Cx 80% prox to mid OM1   RI N/A   RCA NA   LVEDP NA   LVG NA     Intervention:   IVUS of LM - Diam 4.5 = MLA 4.8  IVUS of Cx and OM - Diam = 3.5 - MLA 3.2  PCI of OM1 with 3.5X38 Xience LAVELLE  PCI of LM with 4.0X18 Xience LAVELLE (PD to 4.5 with NC)    Post Cath Dx:   MVD as above    Med Rec:   Recommendation Indicated? Not Given Due To: Detail(s)   DAPT  Yes     STATIN - HIGH DOSE Yes     BETA BLOCKER Yes     ACE/ARB/ARNI yes     ALDACTONE no       Non-Med Rec:   Category Recommendation   EF ASSESSMENT Noninvasive assessment of EF if LVG deferred as IP/OP as appropriate   TOBACCO Avoidance of first and second hand smoke   DIET/EXERCISE Maintain healthy lifestyle with focus on diet, exercise and weight loss   CARDIAC REHAB Referral to outpatient cardiac rehab phase II will be deferred until patient follow-up in office and then determine patient safety and appropriateness to proceed. STATIN Patient started or continued on max tolerated dose of statin or high intensity statin as appropriate.   If no statin prescribed, secondary to intolerance, allergy or patient refusal.

## 2022-08-15 NOTE — CARE COORDINATION
Patient admitted as Observation/OPIB with an anticipated short hospitalization length of stay. Chart reviewed and it appears that patient has minimal needs for discharge at this time. Discussed with patients nurse and requested that case management be notified if discharge needs are identified. *Case management will continue to follow progress and update discharge plan as needed.        Emmy Mcconnell RN, BSN  [phone]

## 2022-08-15 NOTE — PRE SEDATION
[facility] Cardiology  Brief Pre-Op Note/Sedation Assessment    Alexis Farrar  1950  [id number]  7:48 AM    Planned Procedure: Cardiac Catheterization Procedure  Post Procedure Plan: Return to same level of care  Consent:   I have discussed with the patient and/or the patient representative the indication, alternatives, and the possible risks and/or complications of the planned procedure and the anesthesia methods. The patient and/or patient representative appear to understand and agree to proceed. Chief Complaint:   Chest Pain/Pressure  Anginal Equivalent  Dyspnea on Exertion  Dyspnea  Fatigue     Indications for Procedure:  Presentation New Onset Angina <= 2 months and Worsening Angina   Anginal Class (2 wks) CCS III - Symptoms with everyday living activities, i.e., moderate limitation   Anginal Sx Typical CP   CHF Class (2wks) No symptoms   CV Instability Yes     Prior Ischemic Workup/Eval:  Pre-Proc Meds Yes: ACE/ARB/ARNI, Aspirin, Beta Blockers, Ca Channel Blockers, and STATIN   Stress Test? No     Does Patient need surgery? Cardiac Valve Surgery No     Pre-Procedure Medical History:  Vital Signs BP (!) 145/53   Pulse 60   Temp 98.5 °F (36.9 °C) (Infrared)   Resp 18   Ht 4' 11\" (1.499 m)   Wt 205 lb (93 kg)   SpO2 98%   BMI 41.40 kg/m²    Allergies Reviewed in EMR   Medications Reviewed in EMR   Past Med Hx Reviewed in EMR   Surg Hx Reviewed in EMR     Pre-Sedation:  Pre-Sedation Exam I have personally completed a history, physical exam & review of systems for this patient (see notes). Hx Anesthesia Comps None   Mod Mallampati II   ASA Class Class 2 - A normal healthy patient with mild systemic disease     Rhonda Scale:   Activity 2 - Able to move 4 extrem on command   Respiration 2 - Able to breath deeply and cough freely   Circulation 2 - BP +/- 20mmHg of normal   Consciousness 2 - Fully awake   Oxygen Saturation 2 - Able to maintain oxygen sat >92% on room air     Sedation/Anesthesia Plan:  Guard patient safety and welfare. Minimize physical discomfort and pain. Minimize negative psychological responses to treatment by providing sedation and analgesia and maximize the potential amnesia. Patient to meet pre-procedure discharge plan.      Medication Planned:  Midazolam intravenously and fentanyl intravenously     Patient is an appropriate candidate for plan of sedation:   Yes    Electronically signed by Geraldo Ruiz MD on 8/15/2022 at 7:48 AM

## 2022-08-16 VITALS
HEART RATE: 65 BPM | DIASTOLIC BLOOD PRESSURE: 52 MMHG | TEMPERATURE: 97.7 F | WEIGHT: 205 LBS | OXYGEN SATURATION: 93 % | HEIGHT: 59 IN | SYSTOLIC BLOOD PRESSURE: 136 MMHG | RESPIRATION RATE: 18 BRPM | BODY MASS INDEX: 41.33 KG/M2

## 2022-08-16 LAB
ANION GAP SERPL CALCULATED.3IONS-SCNC: 10 MMOL/L (ref 3–16)
BUN BLDV-MCNC: 19 MG/DL (ref 7–20)
CALCIUM SERPL-MCNC: 9.4 MG/DL (ref 8.3–10.6)
CHLORIDE BLD-SCNC: 105 MMOL/L (ref 99–110)
CO2: 26 MMOL/L (ref 21–32)
CREAT SERPL-MCNC: 0.9 MG/DL (ref 0.6–1.2)
GFR AFRICAN AMERICAN: >60
GFR NON-AFRICAN AMERICAN: >60
GLUCOSE BLD-MCNC: 179 MG/DL (ref 70–99)
HCT VFR BLD CALC: 26.1 % (ref 36–48)
HEMOGLOBIN: 8.4 G/DL (ref 12–16)
MCH RBC QN AUTO: 27.6 PG (ref 26–34)
MCHC RBC AUTO-ENTMCNC: 32.1 G/DL (ref 31–36)
MCV RBC AUTO: 85.8 FL (ref 80–100)
PDW BLD-RTO: 19.9 % (ref 12.4–15.4)
PLATELET # BLD: 228 K/UL (ref 135–450)
PMV BLD AUTO: 8.1 FL (ref 5–10.5)
POTASSIUM SERPL-SCNC: 4.4 MMOL/L (ref 3.5–5.1)
RBC # BLD: 3.04 M/UL (ref 4–5.2)
SODIUM BLD-SCNC: 141 MMOL/L (ref 136–145)
WBC # BLD: 8.3 K/UL (ref 4–11)

## 2022-08-16 PROCEDURE — G0378 HOSPITAL OBSERVATION PER HR: HCPCS

## 2022-08-16 PROCEDURE — 2580000003 HC RX 258: Performed by: INTERNAL MEDICINE

## 2022-08-16 PROCEDURE — 6370000000 HC RX 637 (ALT 250 FOR IP): Performed by: INTERNAL MEDICINE

## 2022-08-16 PROCEDURE — 85027 COMPLETE CBC AUTOMATED: CPT

## 2022-08-16 PROCEDURE — 99217 PR OBSERVATION CARE DISCHARGE MANAGEMENT: CPT | Performed by: INTERNAL MEDICINE

## 2022-08-16 PROCEDURE — 80048 BASIC METABOLIC PNL TOTAL CA: CPT

## 2022-08-16 RX ADMIN — LOSARTAN POTASSIUM 25 MG: 25 TABLET, FILM COATED ORAL at 08:08

## 2022-08-16 RX ADMIN — PANTOPRAZOLE SODIUM 40 MG: 40 TABLET, DELAYED RELEASE ORAL at 05:09

## 2022-08-16 RX ADMIN — ESCITALOPRAM OXALATE 20 MG: 10 TABLET ORAL at 08:08

## 2022-08-16 RX ADMIN — CLOPIDOGREL BISULFATE 75 MG: 75 TABLET ORAL at 08:08

## 2022-08-16 RX ADMIN — METOPROLOL TARTRATE 12.5 MG: 25 TABLET, FILM COATED ORAL at 08:07

## 2022-08-16 RX ADMIN — AMLODIPINE BESYLATE 2.5 MG: 5 TABLET ORAL at 08:08

## 2022-08-16 RX ADMIN — Medication 10 ML: at 08:14

## 2022-08-16 RX ADMIN — ASPIRIN 81 MG: 81 TABLET, COATED ORAL at 08:07

## 2022-08-16 RX ADMIN — PIOGLITAZONE 15 MG: 15 TABLET ORAL at 08:07

## 2022-08-16 ASSESSMENT — PAIN SCALES - GENERAL
PAINLEVEL_OUTOF10: 0

## 2022-08-16 NOTE — DISCHARGE SUMMARY
I - DISCHARGE SUMMARY    Patient ID: Glory Dias [id number]  71 y.o.  1950  Admit Date: 8/15/2022  Disch Date: 8/16/22  Admit MD: Nicol Patel MD   Admit Dx: Atherosclerotic heart disease of native coronary artery with unspecified angina pectoris [I25.119]  Unstable angina (HCC) [I20.0]  Disch Dx:   Patient Active Problem List   Diagnosis    Coronary artery disease involving native coronary artery of native heart with angina pectoris (HCC)    Essential hypertension    RAHUL (obstructive sleep apnea)    Chronic kidney disease    Class 3 severe obesity due to excess calories with serious comorbidity and body mass index (BMI) of 40.0 to 44.9 in adult [facility])    Diabetes mellitus type 2 in obese (HCC)    Paroxysmal atrial fibrillation (HCC)    Anemia    Pacemaker    NSTEMI (non-ST elevated myocardial infarction) ([facility] [address]    Pneumonia    COVID-19    Melena    CAD, multiple vessel    [facility]stable ang[facility]      DischCond: Stable  Consults: IP CONSULT TO CARDIAC REHAB  IP CONSULT TO CARDIAC REHAB  Hosp Course: Glory Dias was admitted for [facility] due to UA. PCI of OM and LM performed. No complications overnight. Continue asa, plavix for at least 6 months. Subjective: Patient was seen and examined. No acute issues overnight and patient without concerns. Patient appropriate for discharge.       Exam:  BP (!) 164/45   Pulse 60   Temp 97.8 °F (36.6 °C) (Temporal)   Resp 18   Ht 4' 11\" (1.499 m)   Wt 205 lb (93 kg)   SpO2 93%   BMI 41.40 kg/m²    Intake/Output Summary (Last 24 hours) at 8/16/2022 0704  Last data filed at 8/15/2022 2005  Gross per 24 hour   Intake 1330 ml   Output --   Net 1330 ml     Gen Alert, coop, no distress Heart  RRR, no MRG, nl apical impulse   Head NC, AT, no abnorm Abd  Soft, NT, +BS, no mass, no OM   Eyes PERRLA, conj/corn clear Ext  Ext nl, AT, no C/C/E, groin c/d/i   Nose Nares nl, no drain, NT Pulse 2+ and symmetric   Throat Lips, mucosa, tongue nl Skin Color/text/turg nl, no rash/lesions   Neck S/S, TM, NT, no bruit/JVD Psych Nl mood and affect   Lung CTA-B, unlabored, no DTP Lymph   No cervical or axillary LA   Ch wall NT, no deform Neuro  Nl gross M/S exam   Stud/Labs: Reviewed, please see Epic for specific details  Disposition: Home  Discharge Medications:      Medication List        CHANGE how you take these medications      potassium chloride 20 MEQ extended release tablet  Commonly known as: KLOR-CON M  Take 1 tablet by mouth daily  What changed: how much to take            CONTINUE taking these medications      amLODIPine 2.5 MG tablet  Commonly known as: NORVASC  Take 1 tablet by mouth 2 times daily     aspirin 81 MG tablet     CALCIUM CITRATE + D PO     clopidogrel 75 MG tablet  Commonly known as: Plavix  Take 1 tablet by mouth in the morning. Cranberry 450 MG Caps     DESYREL PO     escitalopram 20 MG tablet  Commonly known as: LEXAPRO     estradiol 0.1 MG/GM vaginal cream  Commonly known as: ESTRACE     ezetimibe 10 MG tablet  Commonly known as: ZETIA     fexofenadine 180 MG tablet  Commonly known as: ALLEGRA     furosemide 40 MG tablet  Commonly known as: LASIX  Take 1 tablet by mouth daily     isosorbide mononitrate 60 MG extended release tablet  Commonly known as: IMDUR  Take 1 tablet by mouth in the morning.      losartan 25 MG tablet  Commonly known as: COZAAR  Take 1 tablet by mouth daily     metoprolol tartrate 25 MG tablet  Commonly known as: LOPRESSOR  Take 0.5 tablets by mouth 2 times daily     nitroGLYCERIN 0.4 MG/SPRAY 0.4 mg spray  Commonly known as: NITROLINGUAL     pantoprazole 40 MG tablet  Commonly known as: PROTONIX  Take 1 tablet by mouth 2 times daily (before meals)     pioglitazone 15 MG tablet  Commonly known as: ACTOS     RETACRIT IJ     rosuvastatin 40 MG tablet  Commonly known as: CRESTOR     SandoSTATIN LAR [place] 20 MG injection  Generic drug: octreotide ACETATE     Slow-Mag 71.5-119 MG Tbec tablet  Generic drug: magnesium cl-calcium carbonate     SUMAtriptan 100 MG tablet  Commonly known as: IMITREX  Take 1 tablet by mouth daily as needed for Migraine     tiZANidine 4 MG tablet  Commonly known as: ZANAFLEX  Take 1 tablet by mouth nightly as needed (leg spasms)     VITAMIN B-12 PO     vitamin C 1000 MG tablet     vitamin D 1000 UNIT Tabs tablet  Commonly known as: CHOLECALCIFEROL     zinc sulfate 220 (50 Zn) MG capsule  Commonly known as: ZINCATE            Summary:  ~Overall the patient is stable from CV standpoint  ~Cardiac testing reviewed.   No further inpatient testing indicated at this time  ~Risks of smoking discussed and cessation therapies recommended    ~Medication/diet/fluid compliance discussed in detail  ~Cardiac diet and physical activity discussed  Followup:  ~[facility]: 2 weeks  Signed:  Latonia Madrigal MD, MD, 8/16/2022, 7:04 AM  Time spent on discharge of patient: >31 minutes

## 2022-08-16 NOTE — DISCHARGE INSTRUCTIONS
Post Angiogram/ Intervention Discharge Instructions      Do you have the help you need at home? Activity:  You may drive in [place] unless instructed by your doctor   Resume normal activity in one week  Avoid lifting, pushing, or pulling more than 10 lbs. For one week. (A gallon of milk is 7 lbs)  Limit stair climbing to once a day for two weeks. You may walk at an easy pace on ground level. Plan rest periods during the day. Avoid tension and stress. Learn to manage your stress. Avoid work that increases muscle tension.        (straining with bowel movements, moving furniture)    Wound Care: You may shower, but no bathtubs, pools, or hot tubs for one week. Inspect area daily. Normal observations:  Soreness and tenderness that may last for one week, mild pink to red oozing from incision site for up to 24 hrs after discharge,    bruising that could last up to two weeks. Clean with soap and water. NO lotion or powder. Dry area thoroughly. Apply band    aide for 48 hrs. Nutrition:   Low fat, low salt diet (guidelines for Heart Healthy eating)  Limit caffeine to 1-2 cups per day (coffee, tea, chocolate, soda)  Eat high fiber to avoid constipation and straining during bowel movements (fresh veggies and fruit, whole grain)  Limit alcohol to two servings a day ( 8 oz beer, 1 oz liquor, 4 oz wine )  Drink at least 8 to 10 glasses of decaffeinated, non-alcoholic fluid for the next 24 hours to flush the x-ray dye used for your angiogram out of your body. Depression: It is not unusual to have feelings of anxiety, fear, or depression after your procedure. If you need help with these feelings, call your primary care physician. There are medications to help you and healing usually occurs sooner if you are not depressed. Cardiac Rehab Information Given : [phone]  Your physician has referred you to Cardiac Rehab. This is an important part of your recovery.    You have been given a brief explanation of Cardiac Rehab and instructions when to call Cardiac Rehab. The Cardiac Rehab team works with your cardiologist to start your Rehab at the appropriate time in your recovery. Remember to discuss Cardiac Rehab with your physician at your first follow-up visit. What symptoms or health problems do you need to look out for after you leave the hospital? Call your physician if you have any of these symptoms.      Increased shortness of breath, weakness, or increased fatigue  A fast or a slow heartbeat  Problems or questions with your medications  Bleeding that is not stopped after holding pressure for 10 min  Feeling lightheaded or dizzy  Increased swelling or bruising of the groin or leg  Unusual pain, numbness or tingling at the groin or down the leg  Any signs of infection ( redness, yellow drainage, swelling, pain, fever)  Unusual swelling of lower legs/feet, chest discomfort, unusual weakness or fatigue

## 2022-08-17 ENCOUNTER — TELEPHONE (OUTPATIENT)
Dept: CARDIOLOGY CLINIC | Age: 72
End: 2022-08-17

## 2022-08-22 ENCOUNTER — TELEPHONE (OUTPATIENT)
Dept: CARDIOLOGY CLINIC | Age: 72
End: 2022-08-22

## 2022-08-25 ENCOUNTER — OFFICE VISIT (OUTPATIENT)
Dept: CARDIOLOGY CLINIC | Age: 72
End: 2022-08-25
Payer: MEDICARE

## 2022-08-25 VITALS
HEART RATE: 60 BPM | DIASTOLIC BLOOD PRESSURE: 62 MMHG | BODY MASS INDEX: 40.78 KG/M2 | OXYGEN SATURATION: 98 % | HEIGHT: 59 IN | SYSTOLIC BLOOD PRESSURE: 132 MMHG | WEIGHT: 202.3 LBS

## 2022-08-25 DIAGNOSIS — I10 PRIMARY HYPERTENSION: ICD-10-CM

## 2022-08-25 DIAGNOSIS — I25.10 CORONARY ARTERY DISEASE INVOLVING NATIVE CORONARY ARTERY OF NATIVE HEART WITHOUT ANGINA PECTORIS: Primary | ICD-10-CM

## 2022-08-25 DIAGNOSIS — E78.2 MIXED HYPERLIPIDEMIA: ICD-10-CM

## 2022-08-25 PROCEDURE — 1090F PRES/ABSN URINE INCON ASSESS: CPT | Performed by: NURSE PRACTITIONER

## 2022-08-25 PROCEDURE — 3017F COLORECTAL CA SCREEN DOC REV: CPT | Performed by: NURSE PRACTITIONER

## 2022-08-25 PROCEDURE — G8427 DOCREV CUR MEDS BY ELIG CLIN: HCPCS | Performed by: NURSE PRACTITIONER

## 2022-08-25 PROCEDURE — G8417 CALC BMI ABV UP PARAM F/U: HCPCS | Performed by: NURSE PRACTITIONER

## 2022-08-25 PROCEDURE — 1036F TOBACCO NON-USER: CPT | Performed by: NURSE PRACTITIONER

## 2022-08-25 PROCEDURE — G8399 PT W/DXA RESULTS DOCUMENT: HCPCS | Performed by: NURSE PRACTITIONER

## 2022-08-25 PROCEDURE — 99214 OFFICE O/P EST MOD 30 MIN: CPT | Performed by: NURSE PRACTITIONER

## 2022-08-25 PROCEDURE — 1123F ACP DISCUSS/DSCN MKR DOCD: CPT | Performed by: NURSE PRACTITIONER

## 2022-08-25 RX ORDER — GABAPENTIN 400 MG/1
400 CAPSULE ORAL 3 TIMES DAILY
COMMUNITY

## 2022-08-25 NOTE — PROGRESS NOTES
[facility]     Outpatient Follow Up Note    CHIEF COMPLAINT / HPI: Hospital Follow Up secondary to status post coronary angiogram    Hospital record has been reviewed  Hospital Course progressed as follows per discharge summary:  Elective procedure  She had undergone a successful PTCA OM & LM on 8/15/22     Katrina Black is 71 y.o. female who presents today for a routine follow up after a recent hospitalization related to the above mentioned issues. Subjective:   Since the time of discharge, the patient admits their symptoms have improved. She denies significant chest pain. There is SOB is she walks too far. She's doing ok around the house. She used her rollator when she went out to walk around [employer]. She denies orthopnea/PND. She uses a CPAP. She has a little swelling in her feet. The patient is not experiencing palpitations. Her home BP runs ~ 130/60    These symptoms are improving over the last many days. With regard to medication therapy the patient has been compliant with prescribed regimen. They have tolerated therapy to date. atrial fibrillation ( follows with Dr. Jose Armando Sweet), HTN, HLD, RAHUL, CKD, CAD s/p CABG 1999. Ablation in 8/2018 and  PPM in 5/2019 for sick sinus syndrome. Past Medical History:   Diagnosis Date    Anemia     Anesthesia     trouble after egd 10/2016.   Anxiety and severe tremors after AF ablation 8/2018-responded well to Ativan    Anesthesia complication     flailing, yelling when waking up from anesthesia-ativan helps ([id number]/29/19: ativans works within seconds of administration)    Atrial fibrillation ([address] 10/01/2017    A. fib with SVR    CAD (coronary artery disease)     Chest pain 10/01/2017    Chronic kidney disease     ???    Depression     GERD (gastroesophageal reflux disease)     Glaucoma     Hiatal hernia     Hyperlipidemia     Hypertension     Migraines, neuralgic     Morbid obesity ([address]     Osteoarthritis     Sleep apnea     uses bi-pap Type II or unspecified type diabetes mellitus without mention of complication, not stated as uncontrolled     Unspecified sleep apnea     uses cpap    Urinary incontinence     UTI (urinary tract infection)      Social History:    Social History     Tobacco Use   Smoking Status Never   Smokeless Tobacco Never     Current Medications:  Current Outpatient Medications   Medication Sig Dispense Refill    gabapentin (NEURONTIN) 400 MG capsule Take 400 mg by mouth 3 times daily. SANDOSTATIN LAR DEPOT 20 MG injection INJECT 1 KIT IN THE MUSCLE MONTHLY      clopidogrel (PLAVIX) 75 MG tablet Take 1 tablet by mouth in the morning. 90 tablet 1    isosorbide mononitrate (IMDUR) 60 MG extended release tablet Take 1 tablet by mouth in the morning.  90 tablet 1    metoprolol tartrate (LOPRESSOR) 25 MG tablet Take 0.5 tablets by mouth 2 times daily 90 tablet 1    losartan (COZAAR) 25 MG tablet Take 1 tablet by mouth daily 30 tablet 0    amLODIPine (NORVASC) 2.5 MG tablet Take 1 tablet by mouth 2 times daily 30 tablet 0    pantoprazole (PROTONIX) 40 MG tablet Take 1 tablet by mouth 2 times daily (before meals) 60 tablet 0    fexofenadine (ALLEGRA) 180 MG tablet Take 180 mg by mouth daily      Epoetin Leonard-epbx (RETACRIT IJ) Inject as directed Every other week      tiZANidine (ZANAFLEX) 4 MG tablet Take 1 tablet by mouth nightly as needed (leg spasms) 20 tablet 0    zinc sulfate (ZINCATE) 220 (50 Zn) MG capsule Take 50 mg by mouth 4 times daily       potassium chloride (KLOR-CON M) 20 MEQ extended release tablet Take 1 tablet by mouth daily 90 tablet 3    aspirin 81 MG tablet Take 81 mg by mouth daily      furosemide (LASIX) 40 MG tablet Take 1 tablet by mouth daily 30 tablet 5    Cyanocobalamin (VITAMIN B-12 PO) Take 3,000 mcg by mouth daily      estradiol (ESTRACE) 0.1 MG/GM vaginal cream Place 2 g vaginally See Admin Instructions Twice a week      pioglitazone (ACTOS) 15 MG tablet Take 15 mg by mouth daily      vitamin D (CHOLECALCIFEROL) 1000 UNIT TABS tablet Take 1,000 Units by mouth daily       TraZODone HCl (DESYREL PO) Take 50 mg by mouth nightly       Calcium Citrate-Vitamin D (CALCIUM CITRATE + D PO) Take 1 tablet by mouth daily       SLOW-MAG 71.5-119 MG TBEC tablet Take 1 tablet by mouth daily 143 mg  5    rosuvastatin (CRESTOR) 40 MG tablet Take 40 mg by mouth every evening      ezetimibe (ZETIA) 10 MG tablet Take 5 mg by mouth nightly       escitalopram (LEXAPRO) 20 MG tablet Take 20 mg by mouth daily. SUMAtriptan (IMITREX) 100 MG tablet Take 1 tablet by mouth daily as needed for Migraine (Patient not taking: Reported on 8/25/2022) 30 tablet 3    nitroGLYCERIN (NITROLINGUAL) 0.4 MG/SPRAY spray Place 1 spray under the tongue every 5 minutes as needed. As directed for chest pain  (Patient not taking: Reported on 8/25/2022)       No current facility-administered medications for this visit. REVIEW OF SYSTEMS:   CONSTITUTIONAL: No major weight gain or loss, fatigue, weakness, night sweats or fever. There's been no change in energy level, sleep pattern, or activity level. HEENT: No new vision difficulties or ringing in the ears. RESPIRATORY: No new SOB, PND, orthopnea or cough. CARDIOVASCULAR: See HPI  GI: No nausea, vomiting, diarrhea, constipation, abdominal pain or changes in bowel habits. : No urinary frequency, urgency, incontinence hematuria or dysuria. SKIN: No cyanosis or skin lesions. MUSCULOSKELETAL: No new muscle or joint pain. NEUROLOGICAL: No syncope or TIA-like symptoms.   PSYCHIATRIC: No anxiety, pain, insomnia or depression    Objective:   PHYSICAL EXAM:        VITALS:  /60 (Site: Right Upper Arm, Position: Sitting, Cuff Size: Large Adult)   Pulse 60   Ht 4' 11\" (1.499 m)   Wt 202 lb 4.8 oz (91.8 kg)   SpO2 98%   BMI 40.86 kg/m²     CONSTITUTIONAL: Cooperative, no apparent distress, and appears well nourished / developed  NEUROLOGIC:  Awake and orientated to person, place and LABVLDL 54 02/14/2020    LABVLDL 35 06/18/2019     Radiology Review:  Pertinent images / reports were reviewed as a part of this visit and reveals the following:    NM: April '22:  Summary    The overall quality of the study is good. There is subdiaphragmatic    attenuation. Left ventricular cavity size is normal. Right ventricle is normal in size. SPECT images demonstrate homogeneous tracer distribution throughout the    myocardium. There is normal isotope uptake at stress and rest. There is no    evidence of myocardial ischemia or scar. Gated spect reveals normal    myocardial thickening and wall motion. Sum stress score of 4. No visual TID. Calculated TID of 0.93. Sensitivity of testing reduced on anti-anginals. Myocardial perfusion is normal. Low risk scan. Cardiac cath: 4/25/22:  Artery Findings/Result   LM 80%   LAD 99% ostial, 100% mid   Cx 70% mid OM1, 70% distal OM1   RI N/A   RCA Small, nondominant, 90% prox, multiple mid 70% lesions (unchanged)   R-OM patent   L-D-LAD Patent (*10mmHg pullback across proximal Left subclavian)   LVEDP 13   LVG NA      Progressive and obstructive LM and OM disease  Plan for staged PCI given need for risk discussion and given BUN/Cr high    Angiogram: 8/15/22  Artery Findings/Result   LM 80% mid to distal   % prox   Cx 80% prox to mid OM1   RI N/A   RCA NA   LVEDP NA   LVG NA      Intervention:         IVUS of LM - Diam 4.5 = MLA 4.8  IVUS of Cx and OM - Diam = 3.5 - MLA 3.2  PCI of OM1 with 3.5X38 Xience LAVELLE  PCI of LM with 4.0X18 Xience LAVELLE (PD to 4.5 with NC)      Assessment:      Diagnosis Orders   1. Coronary artery disease involving native coronary artery of native heart without angina pectoris   ~stable ; Denies angina  ~s/p PTCA LM 8/15  ~hx CABG '99  `DAPT/statin/BB       2. Primary hypertension   ~controlled       3.  Mixed hyperlipidemia   ~profile unknown : will attempt to obtain from PCP           Patient  is stable since hospital discharge. Plan:  Continue present management   F/U as scheduled with Dr. Tommy Najera      I have addressed the patient's cardiac risk factors and adjusted pharmacologic treatment as needed. In addition, I have reinforced the need for patient directed risk factor modification. Further evaluation will be based upon the patient's clinical course and testing results. All questions and concerns were addressed to the patient/family. Alternatives to  treatment were discussed. The patient  currently  is not smoking. The risks related to smoking were reviewed with the patient. Recommend maintaining a smoke-free lifestyle. Dual Antiplatelet therapy has been recommended / prescribed for this patient. Education conducted on adverse reactions including bleeding was discussed. The patient verbalizes understanding. ~off AC with hx of MGUS-anemia    Pt is on a BB  Pt is on an ace-i/ARB  Pt is on a statin      Saturated fat diet discussed  Exercise program discussed : home health plans to discharge later today    Thank you for allowing to us to participate [facility]  Documentation of today's visit sent to PCP

## 2022-09-01 ENCOUNTER — PATIENT MESSAGE (OUTPATIENT)
Dept: CARDIOLOGY CLINIC | Age: 72
End: 2022-09-01

## 2022-09-01 NOTE — TELEPHONE ENCOUNTER
Received refill request for Metoprolol from [facility] : 7/20/22 NPSR    Last EKG : 8/15/22     Last Refill : 6/2/22 90 w/1 refill     Next OV : 12/15/22 DCE

## 2022-09-20 ENCOUNTER — NURSE ONLY (OUTPATIENT)
Dept: CARDIOLOGY CLINIC | Age: 72
End: 2022-09-20
Payer: MEDICARE

## 2022-09-20 DIAGNOSIS — Z95.0 PACEMAKER: ICD-10-CM

## 2022-09-20 PROCEDURE — 93294 REM INTERROG EVL PM/LDLS PM: CPT | Performed by: INTERNAL MEDICINE

## 2022-09-20 PROCEDURE — 93296 REM INTERROG EVL PM/IDS: CPT | Performed by: INTERNAL MEDICINE

## 2022-09-20 NOTE — PROGRESS NOTES
We received remote transmission from patient's monitor at home. Transmission shows normal sensing and pacing function. EP physician will review. See interrogation under cardiology tab in the [address] [place] [address] [address] field for more details.  < 0.1% (MVP On)  AP 97.1%    End of 91-day monitoring period 9-20-22.

## 2022-09-24 ENCOUNTER — HOSPITAL ENCOUNTER (EMERGENCY)
Age: 72
Discharge: HOME | End: 2022-09-24
Attending: EMERGENCY MEDICINE
Payer: MEDICARE

## 2022-09-24 ENCOUNTER — APPOINTMENT (OUTPATIENT)
Dept: GENERAL RADIOLOGY | Age: 72
End: 2022-09-24
Payer: MEDICARE

## 2022-09-24 VITALS
OXYGEN SATURATION: 95 % | BODY MASS INDEX: 40.52 KG/M2 | SYSTOLIC BLOOD PRESSURE: 140 MMHG | DIASTOLIC BLOOD PRESSURE: 60 MMHG | HEIGHT: 59 IN | WEIGHT: 201 LBS | HEART RATE: 59 BPM | RESPIRATION RATE: 16 BRPM

## 2022-09-24 DIAGNOSIS — Z86.79 HISTORY OF CORONARY ARTERY DISEASE: ICD-10-CM

## 2022-09-24 DIAGNOSIS — S46.911A RIGHT SHOULDER STRAIN, INITIAL ENCOUNTER: Primary | ICD-10-CM

## 2022-09-24 LAB
ANION GAP SERPL CALCULATED.3IONS-SCNC: 10 MMOL/L (ref 3–16)
BASOPHILS ABSOLUTE: 0 K/UL (ref 0–0.2)
BASOPHILS RELATIVE PERCENT: 0.5 %
BUN BLDV-MCNC: 25 MG/DL (ref 7–20)
CALCIUM SERPL-MCNC: 9.5 MG/DL (ref 8.3–10.6)
CHLORIDE BLD-SCNC: 103 MMOL/L (ref 99–110)
CO2: 24 MMOL/L (ref 21–32)
CREAT SERPL-MCNC: 1 MG/DL (ref 0.6–1.2)
EKG ATRIAL RATE: 60 BPM
EKG DIAGNOSIS: NORMAL
EKG P AXIS: 65 DEGREES
EKG P-R INTERVAL: 266 MS
EKG Q-T INTERVAL: 446 MS
EKG QRS DURATION: 92 MS
EKG QTC CALCULATION (BAZETT): 446 MS
EKG R AXIS: 26 DEGREES
EKG T AXIS: 55 DEGREES
EKG VENTRICULAR RATE: 60 BPM
EOSINOPHILS ABSOLUTE: 0.1 K/UL (ref 0–0.6)
EOSINOPHILS RELATIVE PERCENT: 2.3 %
GFR AFRICAN AMERICAN: >60
GFR NON-AFRICAN AMERICAN: 55
GLUCOSE BLD-MCNC: 167 MG/DL (ref 70–99)
HCT VFR BLD CALC: 28.9 % (ref 36–48)
HEMOGLOBIN: 9.2 G/DL (ref 12–16)
LYMPHOCYTES ABSOLUTE: 1.4 K/UL (ref 1–5.1)
LYMPHOCYTES RELATIVE PERCENT: 24 %
MCH RBC QN AUTO: 28 PG (ref 26–34)
MCHC RBC AUTO-ENTMCNC: 31.9 G/DL (ref 31–36)
MCV RBC AUTO: 87.6 FL (ref 80–100)
MONOCYTES ABSOLUTE: 0.8 K/UL (ref 0–1.3)
MONOCYTES RELATIVE PERCENT: 14.7 %
NEUTROPHILS ABSOLUTE: 3.3 K/UL (ref 1.7–7.7)
NEUTROPHILS RELATIVE PERCENT: 58.5 %
PDW BLD-RTO: 21.3 % (ref 12.4–15.4)
PLATELET # BLD: 263 K/UL (ref 135–450)
PMV BLD AUTO: 7.7 FL (ref 5–10.5)
POTASSIUM REFLEX MAGNESIUM: 4.5 MMOL/L (ref 3.5–5.1)
RBC # BLD: 3.3 M/UL (ref 4–5.2)
SODIUM BLD-SCNC: 137 MMOL/L (ref 136–145)
TROPONIN: <0.01 NG/ML
WBC # BLD: 5.7 K/UL (ref 4–11)

## 2022-09-24 PROCEDURE — 85025 COMPLETE CBC W/AUTO DIFF WBC: CPT

## 2022-09-24 PROCEDURE — 93010 ELECTROCARDIOGRAM REPORT: CPT | Performed by: INTERNAL MEDICINE

## 2022-09-24 PROCEDURE — 6370000000 HC RX 637 (ALT 250 FOR IP): Performed by: EMERGENCY MEDICINE

## 2022-09-24 PROCEDURE — 36415 COLL VENOUS BLD VENIPUNCTURE: CPT

## 2022-09-24 PROCEDURE — 93005 ELECTROCARDIOGRAM TRACING: CPT | Performed by: EMERGENCY MEDICINE

## 2022-09-24 PROCEDURE — 73030 X-RAY EXAM OF SHOULDER: CPT

## 2022-09-24 PROCEDURE — 99285 EMERGENCY DEPT VISIT HI MDM: CPT

## 2022-09-24 PROCEDURE — 80048 BASIC METABOLIC PNL TOTAL CA: CPT

## 2022-09-24 PROCEDURE — 84484 ASSAY OF TROPONIN QUANT: CPT

## 2022-09-24 RX ORDER — HYDROCODONE BITARTRATE AND ACETAMINOPHEN 5; 325 MG/1; MG/1
1 TABLET ORAL EVERY 6 HOURS PRN
Qty: 10 TABLET | Refills: 0 | Status: SHIPPED | OUTPATIENT
Start: 2022-09-24 | End: 2022-09-27

## 2022-09-24 RX ORDER — LIDOCAINE 50 MG/G
1 PATCH TOPICAL DAILY
Qty: 10 PATCH | Refills: 0 | Status: SHIPPED | OUTPATIENT
Start: 2022-09-24 | End: 2022-10-04

## 2022-09-24 RX ORDER — LIDOCAINE 4 G/G
1 PATCH TOPICAL ONCE
Status: DISCONTINUED | OUTPATIENT
Start: 2022-09-24 | End: 2022-09-24 | Stop reason: HOSPADM

## 2022-09-24 RX ORDER — ACETAMINOPHEN 325 MG/1
325 TABLET ORAL ONCE
Status: COMPLETED | OUTPATIENT
Start: 2022-09-24 | End: 2022-09-24

## 2022-09-24 RX ORDER — OXYCODONE HYDROCHLORIDE AND ACETAMINOPHEN 5; 325 MG/1; MG/1
1 TABLET ORAL ONCE
Status: COMPLETED | OUTPATIENT
Start: 2022-09-24 | End: 2022-09-24

## 2022-09-24 RX ADMIN — ACETAMINOPHEN 325 MG: 325 TABLET ORAL at 06:52

## 2022-09-24 RX ADMIN — OXYCODONE AND ACETAMINOPHEN 1 TABLET: 5; 325 TABLET ORAL at 06:52

## 2022-09-24 ASSESSMENT — PAIN SCALES - GENERAL: PAINLEVEL_OUTOF10: 5

## 2022-09-24 ASSESSMENT — PAIN - FUNCTIONAL ASSESSMENT: PAIN_FUNCTIONAL_ASSESSMENT: 0-10

## 2022-09-24 NOTE — DISCHARGE INSTRUCTIONS
Please follow-up with your primary care physician within the next 2 days for reevaluation of your symptoms. I also recommend that you follow-up with orthopedics at the above consult information for your shoulder pain and possible rotator cuff injury or labrum injury. You may need further testing such as a CT scan or MRI for further evaluation of this possible injury. You may take Norco as needed for pain, do not combine this medication with your muscle relaxants. Stop using immediately if you feel drowsy or unsteady on your feet while taking this medication. Do not drive or operate machinery taking this medication. Return to the emergency department you have worsening or changing symptoms, chest pain, shortness of breath, muscle weakness or persistent numbness. Return if you have other new or changing symptoms that concern you and you wish to be reevaluated.

## 2022-09-24 NOTE — ED PROVIDER NOTES
EMERGENCY DEPARTMENT PROVIDER NOTE    Patient Identification  Pt Name: Alexis Farrar  MRN: [id number]  [place] 1950  Date of evaluation: 9/24/2022  Provider: Anabell Marcus DO  PCP: Bobbi Townsend DO    Chief Complaint  Shoulder Injury (Pt in from home. Pt fell in her bathroom a couple of weeks ago and wedged herself under her counter. Pt states that her R shoulder was bruised and sore, but seemed to be healing. Pt here today for continued pain in the R shoulder. Pt did call her PCP who schelduled her for a CT 10/6. Pt concerned that her shoulder pain has gotten worse. )      HPI  (History provided by patient)  This is a 71 y.o. female with pertinent past medical history of CAD, myelodysplastic syndrome, paroxysmal atrial fibrillation, hypertension, high cholesterol who was brought in by family for right shoulder pain ongoing intermittently for the past 2 weeks. Patient states pain began after she was leaning over in the bathroom and got stuck under her vanity counter, she twisted her arm and shoulder while attempting to get free. Since that time she has had aching and throbbing pain in her right shoulder which seems to come and go, worsened with movement. Nothing seems to make the symptoms better. Moderate in severity. Yesterday she had an episode where the pain migrated into her right upper chest and was associated with numbness in the right arm. No chest pain or paresthesia currently. She denies any fevers, chills, shortness of breath, muscle weakness or neck pain. Patient recent underwent coronary angiogram with stenting with Dr. Reji Farfan on 8/15/2022. She reports adherence to her antiplatelets.  Access site for cath was in groin not wrist.     ROS    Const:  No fevers, no chills, no generalized weakness  Skin:  No rash, no lesions  Eyes:  No visual changes, no blurry or double vision, no pain  ENT:  No sore throat, no difficulty swallowing, no ear pain, no sinus pain or congestion  Card:  No chest pain, no palpitations, no edema  Resp:  No shortness of breath, no cough, no wheezing  Abd:  No abdominal pain, no nausea, no vomiting, no diarrhea  Genitourinary:  No dysuria, no hematuria  MSK:  +joint pain, +myalgia  Neuro:  No focal weakness, no headache, no paresthesia    All other systems reviewed and negative unless otherwise noted in HPI      I have reviewed the following nursing documentation:  Allergies: Albuterol, Heparin, Niacin, Avelox [moxifloxacin hcl in nacl], Moxifloxacin, Niaspan [niacin er], Proventil [albuterol sulfate], and Tagamet [cimetidine]    Past medical history:   Past Medical History:   Diagnosis Date    Anemia     Anesthesia     trouble after egd 10/2016.   Anxiety and severe tremors after AF ablation 8/2018-responded well to Ativan    Anesthesia complication     flailing, yelling when waking up from anesthesia-ativan helps ([id number]/29/19: ativans works within seconds of administration)    Atrial fibrillation ([address] 10/01/2017    A. fib with SVR    CAD (coronary artery disease)     Chest pain 10/01/2017    Chronic kidney disease     ???    Depression     GERD (gastroesophageal reflux disease)     Glaucoma     Hiatal hernia     Hyperlipidemia     Hypertension     Migraines, neuralgic     Morbid obesity ([address]     Osteoarthritis     Sleep apnea     uses bi-pap    Type II or unspecified type diabetes mellitus without mention of complication, not stated as uncontrolled     Unspecified sleep apnea     uses cpap    Urinary incontinence     UTI (urinary tract infection)      Past surgical history:   Past Surgical History:   Procedure Laterality Date    ABDOMEN SURGERY N/A 9/1/2020    EXPLORATION OF ABDOMINAL WOUND performed by Edie Phillips MD at [address] [place] [address]  2/22/1999    quadruple by-pass, [facility]    COLONOSCOPY      COLONOSCOPY  10/08/2018    1 polyp removed    COLONOSCOPY N/A 10/8/2018    COLONOSCOPY POLYPECTOMY SNARE/COLD BIOPSY performed by Cuba Eugene MD at [facility] [address] N/A 5/3/2022    COLONOSCOPY  ABLATION performed by Cuba Eugene MD at [facility] SURGERY      face lift    ENTEROSCOPY N/A 2/26/2019    ENTEROSCOPY performed by Cuba Eugene MD at [address]  12/20/11    hiatal hernia repair    GASTRIC BAND REMOVAL  11/03/2016    laparoscopic     HAMMER TOE SURGERY      [facility] INJECTION PROCEDURE FOR SACROILIAC JOINT Left 12/17/2018    SACROILIAC JOINT INJECTION ON THE LEFT WITH FLUOROSCOPY performed by Angeli Pool MD at [address] [place] [address]    Left and Right knees, [facility]    KNEE ARTHROSCOPY      1982 left    ORIF HUMERUS DECOMPRESSION Left 2/25/2016    OPEN REDUCTION INTERNAL FIXATION LEFT PROXIMAL HUMERUS    OVARY REMOVAL Bilateral 1997    PACEMAKER PLACEMENT  05/2019    ND NJX DX/THER SBST INTRLMNR CRV/THRC W/IMG GDN N/A 11/5/2018    MIDLINE L4/L5 LUMBAR INTERLAMINAR EPIDURAL STEROID INJECTION WITH FLUOROSCOPY performed by Angeli Pool MD at [address]  10/30/15    UPPER GASTROINTESTINAL ENDOSCOPY  10/14/2016    with biopsy    UPPER GASTROINTESTINAL ENDOSCOPY N/A 3/22/2022    EGD BIOPSY performed by Cuba Eugene MD at [address] N/A 3/22/2022    EGD DILATION BALLOON performed by Cuba Eugene MD at [address] N/A 5/3/2022    ENTEROSCOPY WITH INTERVENTION performed by Cuba Eugene MD at [place] [address] N/A 10/29/2019    OPEN VENTRAL HERNIA REPAIR WITH  MESH performed by Joey Chauhan MD at [place] [address] medications:   Previous Medications    AMLODIPINE (NORVASC) 2.5 MG TABLET Take 1 tablet by mouth 2 times daily    ASPIRIN 81 MG TABLET    Take 81 mg by mouth daily    CALCIUM CITRATE-VITAMIN D (CALCIUM CITRATE + D PO)    Take 1 tablet by mouth daily     CLOPIDOGREL (PLAVIX) 75 MG TABLET    Take 1 tablet by mouth in the morning. CYANOCOBALAMIN (VITAMIN B-12 PO)    Take 3,000 mcg by mouth daily    EPOETIN CHRYSTAL-EPBX (RETACRIT IJ)    Inject as directed Every other week    ESCITALOPRAM (LEXAPRO) 20 MG TABLET    Take 20 mg by mouth daily. ESTRADIOL (ESTRACE) 0.1 MG/GM VAGINAL CREAM    Place 2 g vaginally See Admin Instructions Twice a week    EZETIMIBE (ZETIA) 10 MG TABLET    Take 5 mg by mouth nightly     FEXOFENADINE (ALLEGRA) 180 MG TABLET    Take 180 mg by mouth daily    FUROSEMIDE (LASIX) 40 MG TABLET    Take 1 tablet by mouth daily    GABAPENTIN (NEURONTIN) 400 MG CAPSULE    Take 400 mg by mouth 3 times daily. ISOSORBIDE MONONITRATE (IMDUR) 60 MG EXTENDED RELEASE TABLET    Take 1 tablet by mouth in the morning. LOSARTAN (COZAAR) 25 MG TABLET    Take 1 tablet by mouth daily    METOPROLOL TARTRATE (LOPRESSOR) 25 MG TABLET    Take 0.5 tablets by mouth 2 times daily    NITROGLYCERIN (NITROLINGUAL) 0.4 MG/SPRAY SPRAY    Place 1 spray under the tongue every 5 minutes as needed.  As directed for chest pain     PANTOPRAZOLE (PROTONIX) 40 MG TABLET    Take 1 tablet by mouth 2 times daily (before meals)    PIOGLITAZONE (ACTOS) 15 MG TABLET    Take 15 mg by mouth daily    POTASSIUM CHLORIDE (KLOR-CON M) 20 MEQ EXTENDED RELEASE TABLET    Take 1 tablet by mouth daily    ROSUVASTATIN (CRESTOR) 40 MG TABLET    Take 40 mg by mouth every evening    SANDOSTATIN LAR DEPOT 20 MG INJECTION    INJECT 1 KIT IN THE MUSCLE MONTHLY    SLOW-MAG 71.5-119 MG TBEC TABLET    Take 1 tablet by mouth daily 143 mg    SUMATRIPTAN (IMITREX) 100 MG TABLET    Take 1 tablet by mouth daily as needed for Migraine    TIZANIDINE (ZANAFLEX) 4 MG TABLET    Take 1 tablet by mouth nightly as needed (leg spasms)    TRAZODONE HCL (DESYREL PO)    Take 50 mg by mouth nightly     VITAMIN D (CHOLECALCIFEROL) 1000 UNIT TABS TABLET    Take 1,000 Units by mouth daily     ZINC SULFATE (ZINCATE) 220 (50 ZN) MG CAPSULE    Take 50 mg by mouth 4 times daily        Social history:  reports that she has never smoked. She has never used smokeless tobacco. She reports that she does not drink alcohol and does not use drugs. Family history:    Family History   Problem Relation Age of Onset    Cancer Mother         ovarian, colon    High Blood Pressure Mother     Heart Disease Father     High Blood Pressure Father     Stroke Sister         paralysed on right side B/C of stroke         Exam  ED Triage Vitals [09/24/22 0616]   BP Temp Temp src Heart Rate Resp SpO2 Height Weight   105/65 -- -- 60 16 95 % 4' 11\" (1.499 m) 201 lb (91.2 kg)     Nursing note and vitals reviewed. Constitutional: Well developed, well nourished. Non-toxic in appearance. HENT:      Head: Normocephalic and atraumatic. Ears: External ears normal.      Nose: Nose normal.     Mouth: Membrane mucosa moist and pink. Eyes: Anicteric sclera. No discharge. Neck: Supple. Trachea midline. Cardiovascular: RRR; no murmurs, rubs, or gallops. Radial 2+ and symmetric. Pulmonary/Chest: Effort normal. No respiratory distress. CTAB. No stridor. No wheezes. No rales. Abdominal: Soft. No distension. Musculoskeletal: Moves all extremities. No gross deformity. Tenderness along palpation of posterior shoulder and supraspinatus musculature. Tenderness reproduced with internal rotation of right arm and abduction against resistance. Neurological: Alert and orientedx4. Face symmetric. Speech is clear. 5 out of 5 motor and sensation grossly intact bilateral upper extremities. Skin: Warm and dry. No rash. Yellow-green ecchymosis along posterior right upper arm. Psychiatric: Normal mood and affect.  Behavior is normal.    Procedures      EKG    EKG was reviewed by emergency department physician in the absence of a cardiologist    Narrow complex paced rhythm, rate 60, normal axis, normal QRS intervals, normal Qtc, no ST elevations or depressions, normal T wave morphology, impression atrially paced rhythm, no STEMI      Radiology  XR SHOULDER RIGHT (MIN 2 VIEWS)   Final Result   No acute abnormality. Labs  Results for orders placed or performed during the hospital encounter of 09/24/22   BMP w/ Reflex to MG   Result Value Ref Range    Sodium 137 136 - 145 mmol/L    Potassium reflex Magnesium 4.5 3.5 - 5.1 mmol/L    Chloride 103 99 - 110 mmol/L    CO2 24 21 - 32 mmol/L    Anion Gap 10 3 - 16    Glucose 167 (H) 70 - 99 mg/dL    BUN 25 (H) 7 - 20 mg/dL    Creatinine 1.0 0.6 - 1.2 mg/dL    GFR Non-African American 55 (A) >60    GFR African American >60 >60    Calcium 9.5 8.3 - 10.6 mg/dL   CBC with Auto Differential   Result Value Ref Range    WBC 5.7 4.0 - 11.0 K/uL    RBC 3.30 (L) 4.00 - 5.20 M/uL    Hemoglobin 9.2 (L) 12.0 - 16.0 g/dL    Hematocrit 28.9 (L) 36.0 - 48.0 %    MCV 87.6 80.0 - 100.0 fL    MCH 28.0 26.0 - 34.0 pg    MCHC 31.9 31.0 - 36.0 g/dL    RDW 21.3 (H) 12.4 - 15.4 %    Platelets 051 615 - 707 K/uL    MPV 7.7 5.0 - 10.5 fL    Neutrophils % 58.5 %    Lymphocytes % 24.0 %    Monocytes % 14.7 %    Eosinophils % 2.3 %    Basophils % 0.5 %    Neutrophils Absolute 3.3 1.7 - 7.7 K/uL    Lymphocytes Absolute 1.4 1.0 - 5.1 K/uL    Monocytes Absolute 0.8 0.0 - 1.3 K/uL    Eosinophils Absolute 0.1 0.0 - 0.6 K/uL    Basophils Absolute 0.0 0.0 - 0.2 K/uL   Troponin   Result Value Ref Range    Troponin <0.01 <0.01 ng/mL       Screenings   Lucy Coma Scale  Eye Opening: Spontaneous  Best Verbal Response: Oriented  Best Motor Response: Obeys commands  Lucy Coma Scale Score: 15       Is this patient to be included in the SEP-1 Core Measure due to severe sepsis or septic shock? No   Exclusion criteria - the patient is NOT to be included for SEP-1 Core Measure due to:   Infection is not suspected      MDM and ED Course    Patient afebrile and nontoxic. No distress. Upper extremities are neurovascularly intact, nothing to suggest limb ischemia or CVA/TIA. Pain most consistent with musculoskeletal injury, however given patient's history of CAD with recent coronary artery stenting, did proceed with cardiac work-up. EKG with atrially paced rhythm, no evidence of acute ischemia or malignant dysrhythmia. Troponin normal, ACS is not evident. Given patient's symptoms ongoing for a couple weeks, no anticipated benefit from repeat troponin testing. Overall my clinical suspicion for cardiac etiology of her symptoms is extremely low. Nothing to suggest dissection. Plain films of right shoulder without acute fracture or dislocation. Given the degree of her bruising, mechanism and exam I do have elevated concern for rotator cuff injury or labral tear. I discussed these concerns with patient further evaluation by her PCP and orthopedist is necessary. Patient is felt safe for discharge to self-care with close PCP and orthopedic follow-up. She is agreeable with plan and feels comfortable returning to home. Strict return precautions to the emergency department were discussed. I Dr. Cuba Murray am the primary clinician of record. Final Impression  1. Right shoulder strain, initial encounter    2. History of coronary artery disease        Blood pressure 105/65, pulse 60, resp. rate 16, height 4' 11\" (1.499 m), weight 201 lb (91.2 kg), SpO2 95 %, not currently breastfeeding.      Disposition:  DISPOSITION Decision To Discharge 09/24/2022 07:52:17 AM      Patient Referrals:  Maxine Parker, [address] [facility]  [address]  [phone]    In 2 days      Lesley Rubin MD  [address] [facility] [address] [facility]. [address]  [phone]    In 3 days  Orthopedics - for your shoulder pain    Discharge Medications:  New Prescriptions    HYDROCODONE-ACETAMINOPHEN [facility]) 5-325 MG PER TABLET    Take 1 tablet by mouth every 6 hours as needed for Pain for up to 3 days. Intended supply: 3 days. Take lowest dose possible to manage pain    LIDOCAINE (LIDODERM) 5 %    Place 1 patch onto the skin daily for 10 days 12 hours on, 12 hours off. May substitute 4% patch if it 5% unaffordable for patient       Discontinued Medications:  Discontinued Medications    No medications on file       This chart was generated using the [address] [place] [address] [address] Net[employer]ation system. I created this record but it may contain dictation errors given the limitations of this technology.     Lara Benson DO (electronically signed)  Attending Emergency Physician       Lara Benson DO  09/24/22 5628

## 2022-10-11 ENCOUNTER — HOSPITAL ENCOUNTER (OUTPATIENT)
Dept: PHYSICAL THERAPY | Age: 72
Setting detail: THERAPIES SERIES
Discharge: HOME | End: 2022-10-11
Payer: MEDICARE

## 2022-10-11 PROCEDURE — 97162 PT EVAL MOD COMPLEX 30 MIN: CPT

## 2022-10-11 PROCEDURE — 97140 MANUAL THERAPY 1/> REGIONS: CPT

## 2022-10-11 PROCEDURE — 97110 THERAPEUTIC EXERCISES: CPT

## 2022-10-11 NOTE — PLAN OF CARE
[id number]  [facility], [address]  Phone: [phone]   Fax: [phone]                                                     Physical Therapy Certification    Dear Rachelle Thomson MD  ,    We had the pleasure of evaluating the following patient for physical therapy services at [address] [place] [address]. A summary of our findings can be found in the initial assessment below. This includes our plan of care. If you have any questions or concerns regarding these findings, please do not hesitate to contact me at the office phone number checked above. Thank you for the referral.       Physician Signature:_______________________________Date:__________________  By signing above (or electronic signature), therapists plan is approved by physician      Patient: Yamini Wallace   : 1950   MRN: [id number]  Referring Physician: Rachelle Thomson MD        Evaluation Date: 10/11/2022      Medical Diagnosis Information:  Other injury of muscle(s) and tendon(s) of the rotator cuff of right shoulder, initial encounter [S46.300F]   PT diagnosis: RTC weakness, decreased shoulder AROM, pain limiting functional mobility                                       Insurance information:  Medicare 80/20 plan, no auth, no hard max    Precautions/ Contra-indications: DM, HTN, pacemaker, CABG x4 '99, two cardiac stents placed 8/15/22, B TKR '94, OA, RA, osteoporosis, LBP with ablations  Latex Allergy:  [x]NO      []YES  Preferred Language for Healthcare:   [x]English       []Other:    C-SSRS Triggered by Intake questionnaire (Past 2 wk assessment ):   [x] No, Questionnaire did not trigger screening.   [] Yes, Patient intake triggered C-SSRS Screening     [] Completed, no further action required. [] Completed, PCP notified via Epic    SUBJECTIVE: Patient stated complaint:pt had a near fall and hit her R shoulder.   It hurt so badly that she went to the ER. Then she saw her PCP and then her ortho MD who thinks she injured her RTC per pt report.     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    Living Status/Prior Level of Function: Prior to this injury / incident, pt was independent with ADLs and IADLs, indep with increased pain      OBJECTIVE:   Hand dominance: R    Palpation: TTP R coracoid process    Functional Mobility/Transfers: indep with increased pain    Posture: overweight, with good posture    Bandages/Dressings/Incisions: na      Dermatomes Normal Abnormal Comments   Top of head (C1) x     Posterior occipital region (C2) x     Side of neck (C3) x     Top of shoulder (C4) x     Lateral deltoid (C5) x     Tip of thumb (C6) x     Distal middle finger (C7) x     Distal fifth finger (C8) x     Medial forearm (T1) x             Cervical AROM screen: [] [facility]  [x] abnormal:pt had some pain with cervical ext and L rotation - pulls on R UT region     PROM AROM    L R L R   Cervical Flexion        Cervical Extension        Cervical Rotation       Cervical Side-bend       Shoulder Flexion   0-140 0-180 0-80   Shoulder Abduction   0-180 0-180 0-90   Shoulder External Rotation   0-65 0-80 0-60   Shoulder Internal Rotation   0-55 0-60 0-50   Elbow Flexion        Elbow Extension        Pronation        Supination        Wrist Flexion        Wrist Extension        Radial Deviation        Ulnar Deviation            Strength (0-5) Left Right    Shoulder Shrug (C4)     Shoulder Flex 4 4   Shoulder Abd (C5) 4 4   Shoulder ER 5 3   Shoulder IR 5 4   Biceps (C6) 5 5   Triceps (C7) 5 5   Lats     Middle Trap     Rhomboids     Lower Trap      Pronation      Supination      Wrist Flex (C7)     Wrist Ext (C6)     Wrist Radial Deviation     Wrist Ulnar Deviation                    Joint mobility: R shoulder   []Normal    [x]Hypo   []Hyper                                        [x] Patient history, allergies, meds reviewed. Medical chart reviewed. See intake form. Review Of Systems (ROS):  [x]Performed Review of systems (Integumentary, CardioPulmonary, Neurological) by intake and observation. Intake form has been scanned into medical record. Patient has been instructed to contact their primary care physician regarding ROS issues if not already being addressed at this time.       Co-morbidities/Complexities (which will affect course of rehabilitation):   []None        []Hx of COVID   Arthritic conditions   [x]Rheumatoid arthritis (M05.9)  [x]Osteoarthritis (M19.91)  []Gout   Cardiovascular conditions   [x]Hypertension (I10)  []Hyperlipidemia (E78.5)  []Angina pectoris (I20)  [x]Atherosclerosis (I70)  [x]Pacemaker  [x]Hx of CABG/stent/  cardiac surgeries   Musculoskeletal conditions   []Disc pathology   []Congenital spine pathologies   [x]Osteoporosis (M81.8)  []Osteopenia (M85.8)  []Scoliosis       Endocrine conditions   []Hypothyroid (E03.9)  []Hyperthyroid Gastrointestinal conditions   []Constipation (I28.53)   Metabolic conditions   [x]Morbid obesity (E66.01)  [x]Diabetes type 1(E10.65) or 2 (E11.65)   [x]Neuropathy (G60.9)     Cardio/Pulmonary conditions   []Asthma (J45)  []Coughing   []COPD (J44.9)  []CHF  [x]A-fib   Psychological Disorders  [x]Anxiety (F41.9)  [x]Depression (F32.9)   []Other:   Developmental Disorders  []Autism (F84.0)  []CP (G80)  []Down Syndrome (Q90.9)  []Developmental delay     Neurological conditions  []Prior Stroke (I69.30)  []Parkinson's (G20)  []Encephalopathy (G93.40)  []MS (G35)  []Post-polio (G14)  []SCI  []TBI  []ALS Other conditions  []Fibromyalgia (M79.7)  []Vertigo  []Syncope  []Kidney Failure  []Cancer      []currently undergoing                treatment  []Pregnancy  []Incontinence   Prior surgeries  []involved limb  []previous spinal surgery  [] section birth  []hysterectomy  []bowel / bladder surgery  []other relevant surgeries   [x]Other:   B TKR           Barriers to/and or personal factors that will affect rehab potential:              [x]Age  []Sex    []Smoker              [x]Motivation/Lack of Motivation                        []Co-Morbidities              []Cognitive Function, education/learning barriers              []Environmental, home barriers              []profession/work barriers  [x]past PT/medical experience  []other:  Justification:      Falls Risk Assessment (30 days):   [x] Falls Risk assessed and no intervention required.   [] Falls Risk assessed and Patient requires intervention due to being higher risk   TUG score (>12s at risk):     [] Falls education provided, including       ASSESSMENT:   Functional Impairments   [x]Noted spinal or UE joint hypomobility   []Noted spinal or UE joint hypermobility   [x]Decreased UE functional ROM   [x]Decreased UE functional strength   []Abnormal reflexes/sensation/myotomal/dermatomal deficits   [x]Decreased RC/scapular/core strength and neuromuscular control   []other:      Functional Activity Limitations (from functional questionnaire and intake)   [x]Reduced ability to tolerate prolonged functional positions   [x]Reduced ability or difficulty with changes of positions or transfers between positions   []Reduced ability to maintain good posture and demonstrate good body mechanics with sitting, bending, and lifting   [] Reduced ability or tolerance with driving and/or computer work   [x]Reduced ability to sleep   [x]Reduced ability to perform lifting, reaching, carrying tasks   [x]Reduced ability to tolerate impact through UE   [x]Reduced ability to reach behind back   []Reduced ability to  or hold objects   []Reduced ability to throw or toss an object   []other:    Participation Restrictions   [x]Reduced participation in self care activities   [x]Reduced participation in home management activities   []Reduced participation in work activities   [x]Reduced participation in social activities. []Reduced participation in sport/recreation activities. Classification:   []Signs/symptoms consistent with post-surgical status including decreased ROM, strength and function.   [x]Signs/symptoms consistent with joint sprain/strain   [x]Signs/symptoms consistent with shoulder impingement   [x]Signs/symptoms consistent with shoulder/elbow/wrist tendinopathy   [x]Signs/symptoms consistent with Rotator cuff tear/dysfunction   []Signs/symptoms consistent with labral tear   []Signs/symptoms consistent with postural dysfunction    []Signs/symptoms consistent with Glenohumeral IR Deficit - <45 degrees   []Signs/symptoms consistent with facet dysfunction of cervical/thoracic spine    []Signs/symptoms consistent with pathology which may benefit from Dry needling     []other:     Prognosis/Rehab Potential:      []Excellent   [x]Good    []Fair   []Poor    Tolerance of evaluation/treatment:    []Excellent   [x]Good    []Fair   []Poor    Physical Therapy Evaluation Complexity Justification  [x] A history of present problem with:  [] no personal factors and/or comorbidities that impact the plan of care;  []1-2 personal factors and/or comorbidities that impact the plan of care  [x]3 personal factors and/or comorbidities that impact the plan of care  [x] An examination of body systems using standardized tests and measures addressing any of the following: body structures and functions (impairments), activity limitations, and/or participation restrictions;:  [] a total of 1-2 or more elements   [x] a total of 3 or more elements   [] a total of 4 or more elements   [x] A clinical presentation with:  [] stable and/or uncomplicated characteristics   [x] evolving clinical presentation with changing characteristics  [] unstable and unpredictable characteristics;   [x] Clinical decision making of [] low, [x] moderate, [] high complexity using standardized patient assessment instrument and/or measurable assessment of functional outcome. [] EVAL (LOW) 82823 (typically 15 minutes face-to-face)  [x] EVAL (MOD) 69026 (typically 30 minutes face-to-face)  [] EVAL (HIGH) 45670 (typically 45 minutes face-to-face)  [] RE-EVAL     PLAN:RTC strengthening, shoulder AROM/PROM, scapular stabilization ex, postural education, modalities as needed, manual therapy  Frequency/Duration:  2 days per week for 6 Weeks:  INTERVENTIONS:  [x] Therapeutic exercise including: strength training, ROM, for Upper extremity and core   [x]  NMR activation and proprioception for UE, scap and Core   [x] Manual therapy as indicated for shoulder, scapula and spine to include: Dry Needling/IASTM, STM, PROM, Gr I-IV mobilizations, manipulation. [x] Modalities as needed that may include: thermal agents, E-stim, Biofeedback, US, iontophoresis as indicated  [x] Patient education on joint protection, postural re-education, activity modification, progression of HEP. HEP instruction: Written HEP instructions provided and reviewed   Access Code: [email or web address]  URL: [email or web address]  Date: 10/11/2022  Prepared by: Ashwin Moore    Exercises  Shoulder Internal Rotation with Resistance - 3 x daily - 7 x weekly - 1 sets - 10 reps  Shoulder External Rotation Reactive Isometrics - 3 x daily - 7 x weekly - 1 sets - 10 reps    GOALS:  Patient stated goal: no pain  [] Progressing: [] Met: [] Not Met: [] Adjusted    Therapist goals for Patient:   Short Term Goals: To be achieved in: 2 weeks  1. Independent in HEP and progression per patient tolerance, in order to prevent re-injury. [] Progressing: [] Met: [] Not Met: [] Adjusted  2. Patient will have a decrease in pain to facilitate improvement in movement, function, and ADLs as indicated by Functional Deficits. [] Progressing: [] Met: [] Not Met: [] Adjusted    Long Term Goals: To be achieved in: 6 weeks  1. FOTO score of at least 50 to assist with reaching prior level of function. [] Progressing: [] Met: [] Not Met: [] Adjusted  2. Patient will demonstrate increased AROM to R shoulder flexion 0-145, abd 0-170, IR 0-70, ER 0-80 to allow for proper joint functioning as indicated by patients Functional Deficits. [] Progressing: [] Met: [] Not Met: [] Adjusted  3. Patient will demonstrate an increase in Strength to R ER mm to 4/5 to allow for proper functional mobility as indicated by patients Functional Deficits. [] Progressing: [] Met: [] Not Met: [] Adjusted  4. Patient will return to functional activities including donning/doffing bra without increased symptoms or restriction. [] Progressing: [] Met: [] Not Met: [] Adjusted  5.  Pt will be able to do her hair and makeup without pain or difficulty   [] Progressing: [] Met: [] Not Met: [] Adjusted     Electronically signed by:  Kendal Alarcon, PT, MPT [id number]

## 2022-10-11 NOTE — FLOWSHEET NOTE
[address] [facility] Physical Therapy  Phone: [phone]   Fax: [phone]    Physical Therapy Daily Treatment Note    Date:  10/11/2022     Patient Name:  Randell Rosenberg    :  1950  MRN: [id number]  Medical Diagnosis:  Other injury of muscle(s) and tendon(s) of the rotator cuff of right shoulder, initial encounter [S4.354W]  Treatment Diagnosis: PT diagnosis: RTC weakness, decreased shoulder AROM, pain limiting functional mobility                                       Insurance/Certification information:   Insurance information:  Medicare 80/20 plan, no auth, no hard max     Physician Information:  Sherice Estrella MD    Plan of care signed (Y/N): []  Yes [x]  No     Date of Patient follow up with Physician: ?     Progress Report: []  Yes  [x]  No     Date Range for reporting period:  Beginning: 10/11/2022  Ending:     Progress report due (10 Rx/or 30 days whichever is less): visit #10 or       Recertification due (POC duration/ or 90 days whichever is less): visit #20 or 22     Visit # Insurance Allowable Auth required? Date Range    [place]  Yes  [x]  No        Units approved Units used Date Range            Latex Allergy:  [x]NO      []YES  Preferred Language for Healthcare:   [x]English       []other:    Functional Scale:           Date assessed:  TO physical FS primary measure score = 38; risk adjusted = 39  10/11/22    Pain level:  4-5/10     SUBJECTIVE:  Patient stated complaint:pt had a near fall and hit her R shoulder. It hurt so badly that she went to the ER. Then she saw her PCP and then her ortho MD who thinks she injured her RTC per pt report.        OBJECTIVE: See eval      RESTRICTIONS/PRECAUTIONS: Precautions/ Contra-indications: DM, HTN, pacemaker, CABG x4 ', two cardiac stents placed 8/15/22, B TKR , OA, RA, osteoporosis, LBP with ablations    Exercises/Interventions:     Therapeutic Exercises (55841) Resistance / level Sets/sec Reps Notes   UBE add      Pulley: flx, abd add      Biceps curls add      RTC strengthening: IR  ER Orange band 1  1 10x  10x    Mid row  High row  Low row                                   Therapeutic Activities (16392)                                   Gait (33588)                                   Neuromuscular Re-ed (81559)                                                 Manual Intervention (31917)       PROM R shoulder 10min   With oscillations for pain relief                                          Modalities: none this date    Pt. Education:  10/11/2022  -patient educated on diagnosis, prognosis and expectations for rehab  -all patient questions were answered    Home Exercise Program:  HEP instruction: Written HEP instructions provided and reviewed   Access Code: [email or web address]  URL: [facility]/  Date: 10/11/2022  Prepared by: Laurel Barthel     Exercises  Shoulder Internal Rotation with Resistance - 3 x daily - 7 x weekly - 1 sets - 10 reps  Shoulder External Rotation Reactive Isometrics - 3 x daily - 7 x weekly - 1 sets - 10 reps         Therapeutic Exercise and NMR EXR  [x] (87092) Provided verbal/tactile cueing for activities related to strengthening, flexibility, endurance, ROM for improvements in  [] LE / Lumbar: LE, proximal hip, and core control with self care, mobility, lifting, ambulation. [x] UE / Cervical: cervical, postural, scapular, scapulothoracic and UE control with self care, reaching, carrying, lifting, house/yardwork, driving, computer work.  [] (18106) Provided verbal/tactile cueing for activities related to improving balance, coordination, kinesthetic sense, posture, motor skill, proprioception to assist with   [] LE / lumbar: LE, proximal hip, and core control in self care, mobility, lifting, ambulation and eccentric single leg control.    [] UE / cervical: cervical, scapular, scapulothoracic and UE control with self care, reaching, carrying, lifting, house/yardwork, driving, computer work.   [] (66762) Therapist is in constant attendance of 2 or more patients providing skilled therapy interventions, but not providing any significant amount of measurable one-on-one time to either patient, for improvements in  [] LE / lumbar: LE, proximal hip, and core control in self care, mobility, lifting, ambulation and eccentric single leg control. [] UE / cervical: cervical, scapular, scapulothoracic and UE control with self care, reaching, carrying, lifting, house/yardwork, driving, computer work. NMR and Therapeutic Activities:    [x] (99253 or 10894) Provided verbal/tactile cueing for activities related to improving balance, coordination, kinesthetic sense, posture, motor skill, proprioception and motor activation to allow for proper function of   [] LE: / Lumbar core, proximal hip and LE with self care and ADLs  [x] UE / Cervical: cervical, postural, scapular, scapulothoracic and UE control with self care, carrying, lifting, driving, computer work.   [] (76588) Gait Re-education- Provided training and instruction to the patient for proper LE, core and proximal hip recruitment and positioning and eccentric body weight control with ambulation re-education including up and down stairs     Home Management Training / Self Care:  [x] (56759) Provided self-care/home management training related to activities of daily living and compensatory training, and/or use of adaptive equipment for improvement with: ADLs and compensatory training, meal preparation, safety procedures and instruction in use of adaptive equipment, including bathing, grooming, dressing, personal hygiene, basic household cleaning and chores.      Home Exercise Program:    [x] (83064) Reviewed/Progressed HEP activities related to strengthening, flexibility, endurance, ROM of   [] LE / Lumbar: core, proximal hip and LE for functional self-care, mobility, lifting and ambulation/stair navigation   [] UE / Cervical: cervical, postural, scapular, scapulothoracic and UE control with self care, reaching, carrying, lifting, house/yardwork, driving, computer work  [] (49401)Reviewed/Progressed HEP activities related to improving balance, coordination, kinesthetic sense, posture, motor skill, proprioception of   [] LE: core, proximal hip and LE for self care, mobility, lifting, and ambulation/stair navigation    [] UE / Cervical: cervical, postural,  scapular, scapulothoracic and UE control with self care, reaching, carrying, lifting, house/yardwork, driving, computer work    Manual Treatments:  PROM / STM / Oscillations-Mobs:  G-I, II, III, IV (PA's, Inf., Post.)  [] (58011) Provided manual therapy to mobilize LE, proximal hip and/or LS spine soft tissue/joints for the purpose of modulating pain, promoting relaxation,  increasing ROM, reducing/eliminating soft tissue swelling/inflammation/restriction, improving soft tissue extensibility and allowing for proper ROM for normal function with   [] LE / lumbar: self care, mobility, lifting and ambulation. [] UE / Cervical: self care, reaching, carrying, lifting, house/yardwork, driving, computer work. Modalities:  [] (40110) Vasopneumatic compression: Utilized vasopneumatic compression to decrease edema / swelling for the purpose of improving mobility and quad tone / recruitment which will allow for increased overall function including but not limited to self-care, transfers, ambulation, and ascending / descending stairs.        Charges:  Timed Code Treatment Minutes: 25   Total Treatment Minutes: 50     [] EVAL - LOW (95806)   [x] EVAL - MOD (38159)  [] EVAL - HIGH (54457)  [] RE-EVAL (23916)  [x] EL(71881) x  1     [] Ionto  [] NMR (32844) x       [] Vaso  [x] Manual (28892) x  1     [] Ultrasound  [] TA x        [] Mech Traction (01495)  [] Aquatic Therapy x     [] ES (un) (70635):   [] Home Management Training x  [] ES(attended) (28337)   [] Dry Needling 1-2 muscles (04725):  [] Dry Needling 3+ muscles (001755)  [] Group:      [] Other:     GOALS: GOALS:  Patient stated goal: no pain  [] Progressing: [] Met: [] Not Met: [] Adjusted     Therapist goals for Patient:   Short Term Goals: To be achieved in: 2 weeks  1. Independent in HEP and progression per patient tolerance, in order to prevent re-injury. [] Progressing: [] Met: [] Not Met: [] Adjusted  2. Patient will have a decrease in pain to facilitate improvement in movement, function, and ADLs as indicated by Functional Deficits. [] Progressing: [] Met: [] Not Met: [] Adjusted     Long Term Goals: To be achieved in: 6 weeks  1. FOTO score of at least 50 to assist with reaching prior level of function. [] Progressing: [] Met: [] Not Met: [] Adjusted  2. Patient will demonstrate increased AROM to R shoulder flexion 0-145, abd 0-170, IR 0-70, ER 0-80 to allow for proper joint functioning as indicated by patients Functional Deficits. [] Progressing: [] Met: [] Not Met: [] Adjusted  3. Patient will demonstrate an increase in Strength to R ER mm to 4/5 to allow for proper functional mobility as indicated by patients Functional Deficits. [] Progressing: [] Met: [] Not Met: [] Adjusted  4. Patient will return to functional activities including donning/doffing bra without increased symptoms or restriction. [] Progressing: [] Met: [] Not Met: [] Adjusted  5. Pt will be able to do her hair and makeup without pain or difficulty   [] Progressing: [] Met: [] Not Met: [] Adjusted           Overall Progression Towards Functional goals/ Treatment Progress Update:  [] Patient is progressing as expected towards functional goals listed. [] Progression is slowed due to complexities/Impairments listed. [] Progression has been slowed due to co-morbidities.   [x] Plan just implemented, too soon to assess goals progression <30days   [] Goals require adjustment due to lack of progress  [] Patient is not progressing as expected and requires additional follow up with physician  [] Other    Persisting Functional Limitations/Impairments:  [x]Sleeping []Sitting               []Standing []Transfers        []Walking []Kneeling               []Stairs []Squatting / bending   [x]ADLs [x]Reaching  []Lifting  [x]Housework  [x]Driving []Job related tasks  [x]Sports/Recreation []Other:        ASSESSMENT:  See eval  Treatment/Activity Tolerance:  [x] Patient able to complete tx [] Patient limited by fatigue  [x] Patient limited by pain  [] Patient limited by other medical complications  [] Other:     Prognosis: [x] Good [] Fair  [] Poor    Patient Requires Follow-up: [x] Yes  [] No  PLAN:RTC strengthening, shoulder AROM/PROM, scapular stabilization ex, postural education, modalities as needed, manual therapy  Frequency/Duration:  2 days per week for 6 Weeks:  INTERVENTIONS:  [x] Therapeutic exercise including: strength training, ROM, for Upper extremity and core   [x]  NMR activation and proprioception for UE, scap and Core   [x] Manual therapy as indicated for shoulder, scapula and spine to include: Dry Needling/IASTM, STM, PROM, Gr I-IV mobilizations, manipulation. [x] Modalities as needed that may include: thermal agents, E-stim, Biofeedback, US, iontophoresis as indicated  [x] Patient education on joint protection, postural re-education, activity modification, progression of HEP. [] Continue per plan of care [] Alter current plan (see comments)  [x] Plan of care initiated [] Hold pending MD visit [] Discharge    Electronically signed by: Les Mcfarland, PT  [id number]    Note: If patient does not return for scheduled/ recommended follow up visits, this note will serve as a discharge from care along with most recent update on progress.

## 2022-10-13 ENCOUNTER — HOSPITAL ENCOUNTER (OUTPATIENT)
Dept: PHYSICAL THERAPY | Age: 72
Setting detail: THERAPIES SERIES
Discharge: HOME | End: 2022-10-13
Payer: MEDICARE

## 2022-10-13 PROCEDURE — 97140 MANUAL THERAPY 1/> REGIONS: CPT

## 2022-10-13 PROCEDURE — 97110 THERAPEUTIC EXERCISES: CPT

## 2022-10-13 PROCEDURE — 97530 THERAPEUTIC ACTIVITIES: CPT

## 2022-10-13 NOTE — FLOWSHEET NOTE
[address] [facility] Physical Therapy  Phone: [phone]   Fax: [phone]    Physical Therapy Daily Treatment Note    Date:  10/13/2022     Patient Name:  Елена Oquendo    :  1950  MRN: [id number]  Medical Diagnosis:  Other injury of muscle(s) and tendon(s) of the rotator cuff of right shoulder, initial encounter [S46.755A]  Treatment Diagnosis: PT diagnosis: RTC weakness, decreased shoulder AROM, pain limiting functional mobility                                       Insurance/Certification information:   Insurance information:  Medicare 80/20 plan, no auth, no hard max     Physician Information:  Cristian Stanford MD    Plan of care signed (Y/N): []  Yes [x]  No     Date of Patient follow up with Physician: ?     Progress Report: []  Yes  [x]  No     Date Range for reporting period:  Beginning: 10/13/2022  Ending:     Progress report due (10 Rx/or 30 days whichever is less): visit #10 or 10/50/61      Recertification due (POC duration/ or 90 days whichever is less): visit #20 or 22     Visit # Insurance Allowable Auth required? Date Range    [place]  [place]  [x]  No        Units approved Units used Date Range            Latex Allergy:  [x]NO      []YES  Preferred Language for Healthcare:   [x]English       []other:    Functional Scale:           Date assessed:  TO physical FS primary measure score = 38; risk adjusted = 39  10/11/22    Pain level:  4-5/10     SUBJECTIVE:  10/13: Pt with no new complaints this date. States she tolerated HEP well after last visit. OBJECTIVE: 10/13:  Pt with significant rounded shoulders and slight forward head.   UT tightness noted B and limited cervical rotation to R      RESTRICTIONS/PRECAUTIONS: Precautions/ Contra-indications: DM, HTN, pacemaker, CABG x4 ', two cardiac stents placed 8/15/22, B TKR ', OA, RA, osteoporosis, LBP with ablations    Exercises/Interventions:     Therapeutic Exercises (41382) Resistance / level Sets/sec Reps Notes   UBE  Fwd/retro 1.5 min each direction    Pulley: flx, abd   X 10 each    Biceps curls add             Pec stretch in doorway   2 x 30\"       Chest press with wand (seated)   X 10    Shoulder flexion (seated)   X 10                  Therapeutic Activities (74459)       SB roll up wall   X 10    RTC strengthening:orange band  Shoulder extension  Shoulder flexion  Shoulder adduction  IR   ER     X 10 B  X 10 B  X 10 R  X 10 R  X 10 R    Blue band:  Mid row  High row  Low row     X 10  X 10  X 10           Gait (13239)                                   Neuromuscular Re-ed (68218)                                                 Manual Intervention (52919)       PROM R shoulder in all directions with stretch at end range; caudal glides and post joint mobs. STM to R UT and gentle cervical traction and SOR. X 15min                                           Modalities: 10/13:  Pt declined this date    Pt. Education: 10/13:  Discussed with pt possible cervical involvement and educated on posture correction and proper body mechanics    -patient educated on diagnosis, prognosis and expectations for rehab  -all patient questions were answered    Home Exercise Program:  HEP instruction: Written HEP instructions provided and reviewed   Access Code: [email or web address]  URL: [employer]/  Date: 10/11/2022  Prepared by: Kp Darrion     Exercises  Shoulder Internal Rotation with Resistance - 3 x daily - 7 x weekly - 1 sets - 10 reps  Shoulder External Rotation Reactive Isometrics - 3 x daily - 7 x weekly - 1 sets - 10 reps         Therapeutic Exercise and NMR EXR  [x] (80873) Provided verbal/tactile cueing for activities related to strengthening, flexibility, endurance, ROM for improvements in  [] LE / Lumbar: LE, proximal hip, and core control with self care, mobility, lifting, ambulation.   [x] UE / Cervical: cervical, postural, scapular, scapulothoracic and UE control with self care, reaching, carrying, lifting, house/yardwork, driving, computer work.  [] (51838) Provided verbal/tactile cueing for activities related to improving balance, coordination, kinesthetic sense, posture, motor skill, proprioception to assist with   [] LE / lumbar: LE, proximal hip, and core control in self care, mobility, lifting, ambulation and eccentric single leg control. [] UE / cervical: cervical, scapular, scapulothoracic and UE control with self care, reaching, carrying, lifting, house/yardwork, driving, computer work.   [] (60584) Therapist is in constant attendance of 2 or more patients providing skilled therapy interventions, but not providing any significant amount of measurable one-on-one time to either patient, for improvements in  [] LE / lumbar: LE, proximal hip, and core control in self care, mobility, lifting, ambulation and eccentric single leg control. [] UE / cervical: cervical, scapular, scapulothoracic and UE control with self care, reaching, carrying, lifting, house/yardwork, driving, computer work.      NMR and Therapeutic Activities:    [x] (52921 or 48587) Provided verbal/tactile cueing for activities related to improving balance, coordination, kinesthetic sense, posture, motor skill, proprioception and motor activation to allow for proper function of   [] LE: / Lumbar core, proximal hip and LE with self care and ADLs  [x] UE / Cervical: cervical, postural, scapular, scapulothoracic and UE control with self care, carrying, lifting, driving, computer work.   [] (14024) Gait Re-education- Provided training and instruction to the patient for proper LE, core and proximal hip recruitment and positioning and eccentric body weight control with ambulation re-education including up and down stairs     Home Management Training / Self Care:  [x] (62369) Provided self-care/home management training related to activities of daily living and compensatory training, and/or use of adaptive equipment for improvement with: ADLs and compensatory training, meal preparation, safety procedures and instruction in use of adaptive equipment, including bathing, grooming, dressing, personal hygiene, basic household cleaning and chores. Home Exercise Program:    [x] (89848) Reviewed/Progressed HEP activities related to strengthening, flexibility, endurance, ROM of   [] LE / Lumbar: core, proximal hip and LE for functional self-care, mobility, lifting and ambulation/stair navigation   [] UE / Cervical: cervical, postural, scapular, scapulothoracic and UE control with self care, reaching, carrying, lifting, house/yardwork, driving, computer work  [] (97081)Reviewed/Progressed HEP activities related to improving balance, coordination, kinesthetic sense, posture, motor skill, proprioception of   [] LE: core, proximal hip and LE for self care, mobility, lifting, and ambulation/stair navigation    [] UE / Cervical: cervical, postural,  scapular, scapulothoracic and UE control with self care, reaching, carrying, lifting, house/yardwork, driving, computer work    Manual Treatments:  PROM / STM / Oscillations-Mobs:  G-I, II, III, IV (PA's, Inf., Post.)  [] (57439) Provided manual therapy to mobilize LE, proximal hip and/or LS spine soft tissue/joints for the purpose of modulating pain, promoting relaxation,  increasing ROM, reducing/eliminating soft tissue swelling/inflammation/restriction, improving soft tissue extensibility and allowing for proper ROM for normal function with   [] LE / lumbar: self care, mobility, lifting and ambulation. [] UE / Cervical: self care, reaching, carrying, lifting, house/yardwork, driving, computer work.      Modalities:  [] (55738) Vasopneumatic compression: Utilized vasopneumatic compression to decrease edema / swelling for the purpose of improving mobility and quad tone / recruitment which will allow for increased overall function including but not limited to self-care, transfers, ambulation, and ascending / descending stairs. Charges:  Timed Code Treatment Minutes: 45   Total Treatment Minutes: 45     [] EVAL - LOW (25728)   [] EVAL - MOD (08085)  [] EVAL - HIGH (86754)  [] RE-EVAL (67925)  [x] GE(47565) x  1     [] Ionto  [] NMR (56101) x       [] Vaso  [x] Manual (81298) x  1     [] Ultrasound  [x] TA x        [] Mech Traction (29299)  [] Aquatic Therapy x     [] ES (un) (18972):   [] Home Management Training x  [] ES(attended) (96434)   [] Dry Needling 1-2 muscles (66580):  [] Dry Needling 3+ muscles (506649)  [] Group:      [] Other:     GOALS: GOALS:  Patient stated goal: no pain  [] Progressing: [] Met: [] Not Met: [] Adjusted     Therapist goals for Patient:   Short Term Goals: To be achieved in: 2 weeks  1. Independent in HEP and progression per patient tolerance, in order to prevent re-injury. [] Progressing: [] Met: [] Not Met: [] Adjusted  2. Patient will have a decrease in pain to facilitate improvement in movement, function, and ADLs as indicated by Functional Deficits. [] Progressing: [] Met: [] Not Met: [] Adjusted     Long Term Goals: To be achieved in: 6 weeks  1. FOTO score of at least 50 to assist with reaching prior level of function. [] Progressing: [] Met: [] Not Met: [] Adjusted  2. Patient will demonstrate increased AROM to R shoulder flexion 0-145, abd 0-170, IR 0-70, ER 0-80 to allow for proper joint functioning as indicated by patients Functional Deficits. [] Progressing: [] Met: [] Not Met: [] Adjusted  3. Patient will demonstrate an increase in Strength to R ER mm to 4/5 to allow for proper functional mobility as indicated by patients Functional Deficits. [] Progressing: [] Met: [] Not Met: [] Adjusted  4. Patient will return to functional activities including donning/doffing bra without increased symptoms or restriction. [] Progressing: [] Met: [] Not Met: [] Adjusted  5.  Pt will be able to do her hair and makeup without pain or difficulty   [] Progressing: [] Met: [] Not Met: [] Adjusted           Overall Progression Towards Functional goals/ Treatment Progress Update:  [] Patient is progressing as expected towards functional goals listed. [] Progression is slowed due to complexities/Impairments listed. [] Progression has been slowed due to co-morbidities. [x] Plan just implemented, too soon to assess goals progression <30days   [] Goals require adjustment due to lack of progress  [] Patient is not progressing as expected and requires additional follow up with physician  [] Other    Persisting Functional Limitations/Impairments:  [x]Sleeping []Sitting               []Standing []Transfers        []Walking []Kneeling               []Stairs []Squatting / bending   [x]ADLs [x]Reaching  []Lifting  [x]Housework  [x]Driving []Job related tasks  [x]Sports/Recreation []Other:        ASSESSMENT:  Pt tolerated session well this date; probable cervical involvement contributing to shoulder pain as well as RTC irritation. Treatment/Activity Tolerance:  [x] Patient able to complete tx [] Patient limited by fatigue  [] Patient limited by pain  [] Patient limited by other medical complications  [] Other:     Prognosis: [x] Good [] Fair  [] Poor    Patient Requires Follow-up: [x] Yes  [] No  PLAN:RTC strengthening, shoulder AROM/PROM, scapular stabilization ex, postural education, modalities as needed, manual therapy  Frequency/Duration:  2 days per week for 6 Weeks:  INTERVENTIONS:  [x] Therapeutic exercise including: strength training, ROM, for Upper extremity and core   [x]  NMR activation and proprioception for UE, scap and Core   [x] Manual therapy as indicated for shoulder, scapula and spine to include: Dry Needling/IASTM, STM, PROM, Gr I-IV mobilizations, manipulation.              [x] Modalities as needed that may include: thermal agents, E-stim, Biofeedback, US, iontophoresis as indicated  [x] Patient education on joint protection, postural re-education, activity modification, progression of HEP. [x] Continue per plan of care [] Alter current plan (see comments)  [] Plan of care initiated [] Hold pending MD visit [] Discharge    Electronically signed by: Roma Burkitt, PT, DPT  [id number]    Note: If patient does not return for scheduled/ recommended follow up visits, this note will serve as a discharge from care along with most recent update on progress.

## 2022-10-18 ENCOUNTER — HOSPITAL ENCOUNTER (OUTPATIENT)
Dept: PHYSICAL THERAPY | Age: 72
Setting detail: THERAPIES SERIES
Discharge: HOME | End: 2022-10-18
Payer: MEDICARE

## 2022-10-18 PROCEDURE — 97140 MANUAL THERAPY 1/> REGIONS: CPT

## 2022-10-18 PROCEDURE — 97110 THERAPEUTIC EXERCISES: CPT

## 2022-10-18 PROCEDURE — 97530 THERAPEUTIC ACTIVITIES: CPT

## 2022-10-18 NOTE — FLOWSHEET NOTE
[address] [facility]  Phone: [phone]   Fax: [phone]    Physical Therapy Daily Treatment Note    Date:  10/18/2022     Patient Name:  Edna Jordan    :  1950  MRN: [id number]  Medical Diagnosis:  Other injury of muscle(s) and tendon(s) of the rotator cuff of right shoulder, initial encounter [S46.598L]  Treatment Diagnosis: PT diagnosis: RTC weakness, decreased shoulder AROM, pain limiting functional mobility                                       Insurance/Certification information:   Insurance information:  Medicare 80/20 plan, no auth, no hard max     Physician Information:  Kary Torres MD    Plan of care signed (Y/N): []  Yes [x]  No  Cosign requested however has not been completed yet    Date of Patient follow up with Physician: ?     Progress Report: []  Yes  [x]  No     Date Range for reporting period:  Beginning: 10/18/2022  Ending:     Progress report due (10 Rx/or 30 days whichever is less): visit #10 or 23/15/38      Recertification due (POC duration/ or 90 days whichever is less): visit #20 or 22     Visit # Insurance Allowable Auth required? Date Range   3/12 [place] []  Yes  [x]  No        Units approved Units used Date Range            Latex Allergy:  [x]NO      []YES  Preferred Language for Healthcare:   [x]English       []other:    Functional Scale:           Date assessed:  TO physical FS primary measure score = 38; risk adjusted = 39  10/11/22    Pain level:  3/10     SUBJECTIVE:  10/18: Pt with slight complaints of vertigo this date however states that has occurred several times in the past.  Reports she feels fine to continue with session. States she is feeling better today than on last visit. States she picked her great granddaughter up a lot yesterday and tolerated it well. States she has been able to sleep on her right side without increased pain. OBJECTIVE: 10/18:   Pt with significant rounded shoulders and slight forward head. UT tightness noted B and limited cervical rotation to R      RESTRICTIONS/PRECAUTIONS: Precautions/ Contra-indications: DM, HTN, pacemaker, CABG x4 '99, two cardiac stents placed 8/15/22, B TKR '94, OA, RA, osteoporosis, LBP with ablations    Exercises/Interventions:     Therapeutic Exercises (75910) Resistance / level Sets/sec Reps Notes   UBE  Fwd/retro 2 min each direction    Pulley: flx, abd   X 10 each    Biceps curls add             Pec stretch in doorway   2 x 30\"       Chest press with wand (seated)   X 10    Shoulder flexion (seated)   X 10                  Therapeutic Activities (48901)       SB roll up wall   X 10    RTC strengthening:orange band  Shoulder extension  Shoulder flexion  Shoulder adduction  IR   ER     X 10 B  X 10 B  X 10 R  X 10 R  X 10 R    Blue band:  Mid row  High row  Low row     X 15  X 15  X 15           Gait (78871)                                   Neuromuscular Re-ed (75456)                                                 Manual Intervention (88453)       PROM R shoulder in all directions with stretch at end range; caudal glides and post joint mobs. STM to R UT and gentle cervical traction and SOR. X 15min 10/18:  Done in semi-reclined position this date secondary to mild vertigo symptoms. Modalities: 10/18:  Pt declined this date    Pt. Education: 10/18:  Discussed with pt possible cervical involvement and reviewed posture correction and proper body mechanics    -patient educated on diagnosis, prognosis and expectations for rehab  -all patient questions were answered    Home Exercise Program: 10/18:  added chin tucks  HEP instruction: Written HEP instructions provided and reviewed   Access Code: [email or web address]  URL: [email or web address]  Date: 10/11/2022  Prepared by: Bowen Lester     Exercises  Shoulder Internal Rotation with Resistance - 3 x daily - 7 x weekly - 1 sets - 10 reps  Shoulder External Rotation Reactive Isometrics - 3 x daily - 7 x weekly - 1 sets - 10 reps         Therapeutic Exercise and NMR EXR  [x] (64346) Provided verbal/tactile cueing for activities related to strengthening, flexibility, endurance, ROM for improvements in  [] LE / Lumbar: LE, proximal hip, and core control with self care, mobility, lifting, ambulation. [x] UE / Cervical: cervical, postural, scapular, scapulothoracic and UE control with self care, reaching, carrying, lifting, house/yardwork, driving, computer work.  [] (55933) Provided verbal/tactile cueing for activities related to improving balance, coordination, kinesthetic sense, posture, motor skill, proprioception to assist with   [] LE / lumbar: LE, proximal hip, and core control in self care, mobility, lifting, ambulation and eccentric single leg control. [] UE / cervical: cervical, scapular, scapulothoracic and UE control with self care, reaching, carrying, lifting, house/yardwork, driving, computer work.   [] (73444) Therapist is in constant attendance of 2 or more patients providing skilled therapy interventions, but not providing any significant amount of measurable one-on-one time to either patient, for improvements in  [] LE / lumbar: LE, proximal hip, and core control in self care, mobility, lifting, ambulation and eccentric single leg control. [] UE / cervical: cervical, scapular, scapulothoracic and UE control with self care, reaching, carrying, lifting, house/yardwork, driving, computer work. NMR and Therapeutic Activities:    [x] (24262 or 48984) Provided verbal/tactile cueing for activities related to improving balance, coordination, kinesthetic sense, posture, motor skill, proprioception and motor activation to allow for proper function of   [] LE: / Lumbar core, proximal hip and LE with self care and ADLs  [x] UE / Cervical: cervical, postural, scapular, scapulothoracic and UE control with self care, carrying, lifting, driving, computer work. [] (01883) Gait Re-education- Provided training and instruction to the patient for proper LE, core and proximal hip recruitment and positioning and eccentric body weight control with ambulation re-education including up and down stairs     Home Management Training / Self Care:  [x] (79074) Provided self-care/home management training related to activities of daily living and compensatory training, and/or use of adaptive equipment for improvement with: ADLs and compensatory training, meal preparation, safety procedures and instruction in use of adaptive equipment, including bathing, grooming, dressing, personal hygiene, basic household cleaning and chores.      Home Exercise Program:    [x] (11656) Reviewed/Progressed HEP activities related to strengthening, flexibility, endurance, ROM of   [] LE / Lumbar: core, proximal hip and LE for functional self-care, mobility, lifting and ambulation/stair navigation   [] UE / Cervical: cervical, postural, scapular, scapulothoracic and UE control with self care, reaching, carrying, lifting, house/yardwork, driving, computer work  [] (33896)Reviewed/Progressed HEP activities related to improving balance, coordination, kinesthetic sense, posture, motor skill, proprioception of   [] LE: core, proximal hip and LE for self care, mobility, lifting, and ambulation/stair navigation    [] UE / Cervical: cervical, postural,  scapular, scapulothoracic and UE control with self care, reaching, carrying, lifting, house/yardwork, driving, computer work    Manual Treatments:  PROM / STM / Oscillations-Mobs:  G-I, II, III, IV (PA's, Inf., Post.)  [] (53400) Provided manual therapy to mobilize LE, proximal hip and/or LS spine soft tissue/joints for the purpose of modulating pain, promoting relaxation,  increasing ROM, reducing/eliminating soft tissue swelling/inflammation/restriction, improving soft tissue extensibility and allowing for proper ROM for normal function with   [] LE / lumbar: self care, mobility, lifting and ambulation. [] UE / Cervical: self care, reaching, carrying, lifting, house/yardwork, driving, computer work. Modalities:  [] (94584) Vasopneumatic compression: Utilized vasopneumatic compression to decrease edema / swelling for the purpose of improving mobility and quad tone / recruitment which will allow for increased overall function including but not limited to self-care, transfers, ambulation, and ascending / descending stairs. Charges:  Timed Code Treatment Minutes: 45   Total Treatment Minutes: 45     [] EVAL - LOW (20205)   [] EVAL - MOD (19126)  [] EVAL - HIGH (15793)  [] RE-EVAL (11405)  [x] LP(96852) x  1     [] Ionto  [] NMR (68161) x       [] Vaso  [x] Manual (98853) x  1     [] Ultrasound  [x] TA x        [] Mech Traction (84910)  [] Aquatic Therapy x     [] ES (un) (10327):   [] Home Management Training x  [] ES(attended) (48314)   [] Dry Needling 1-2 muscles (09261):  [] Dry Needling 3+ muscles (739191)  [] Group:      [] Other:     GOALS: GOALS:  Patient stated goal: no pain  [] Progressing: [] Met: [] Not Met: [] Adjusted     Therapist goals for Patient:   Short Term Goals: To be achieved in: 2 weeks  1. Independent in HEP and progression per patient tolerance, in order to prevent re-injury. [] Progressing: [] Met: [] Not Met: [] Adjusted  2. Patient will have a decrease in pain to facilitate improvement in movement, function, and ADLs as indicated by Functional Deficits. [] Progressing: [] Met: [] Not Met: [] Adjusted     Long Term Goals: To be achieved in: 6 weeks  1. FOTO score of at least 50 to assist with reaching prior level of function. [] Progressing: [] Met: [] Not Met: [] Adjusted  2. Patient will demonstrate increased AROM to R shoulder flexion 0-145, abd 0-170, IR 0-70, ER 0-80 to allow for proper joint functioning as indicated by patients Functional Deficits. [] Progressing: [] Met: [] Not Met: [] Adjusted  3.  Patient will demonstrate an increase in Strength to R ER mm to 4/5 to allow for proper functional mobility as indicated by patients Functional Deficits. [] Progressing: [] Met: [] Not Met: [] Adjusted  4. Patient will return to functional activities including donning/doffing bra without increased symptoms or restriction. [] Progressing: [] Met: [] Not Met: [] Adjusted  5. Pt will be able to do her hair and makeup without pain or difficulty   [] Progressing: [] Met: [] Not Met: [] Adjusted           Overall Progression Towards Functional goals/ Treatment Progress Update:  [] Patient is progressing as expected towards functional goals listed. [] Progression is slowed due to complexities/Impairments listed. [] Progression has been slowed due to co-morbidities. [x] Plan just implemented, too soon to assess goals progression <30days   [] Goals require adjustment due to lack of progress  [] Patient is not progressing as expected and requires additional follow up with physician  [] Other    Persisting Functional Limitations/Impairments:  [x]Sleeping []Sitting               []Standing []Transfers        []Walking []Kneeling               []Stairs []Squatting / bending   [x]ADLs [x]Reaching  []Lifting  [x]Housework  [x]Driving []Job related tasks  [x]Sports/Recreation []Other:        ASSESSMENT:  Pt tolerated session well this date; demonstrating overall decrease in pain, improving ROM and strength.       Treatment/Activity Tolerance:  [x] Patient able to complete tx [] Patient limited by fatigue  [] Patient limited by pain  [] Patient limited by other medical complications  [] Other:     Prognosis: [x] Good [] Fair  [] Poor    Patient Requires Follow-up: [x] Yes  [] No  PLAN:RTC strengthening, shoulder AROM/PROM, scapular stabilization ex, postural education, modalities as needed, manual therapy  Frequency/Duration:  2 days per week for 6 Weeks:  INTERVENTIONS:  [x] Therapeutic exercise including: strength training, ROM, for Upper extremity and core   [x]  NMR activation and proprioception for UE, scap and Core   [x] Manual therapy as indicated for shoulder, scapula and spine to include: Dry Needling/IASTM, STM, PROM, Gr I-IV mobilizations, manipulation. [x] Modalities as needed that may include: thermal agents, E-stim, Biofeedback, US, iontophoresis as indicated  [x] Patient education on joint protection, postural re-education, activity modification, progression of HEP. [x] Continue per plan of care [] Alter current plan (see comments)  [] Plan of care initiated [] Hold pending MD visit [] Discharge    Electronically signed by: Augie Amanda PT, DPT  [id number]    Note: If patient does not return for scheduled/ recommended follow up visits, this note will serve as a discharge from care along with most recent update on progress.

## 2022-10-20 ENCOUNTER — HOSPITAL ENCOUNTER (OUTPATIENT)
Dept: PHYSICAL THERAPY | Age: 72
Setting detail: THERAPIES SERIES
Discharge: HOME | End: 2022-10-20
Payer: MEDICARE

## 2022-10-20 PROCEDURE — 97530 THERAPEUTIC ACTIVITIES: CPT

## 2022-10-20 PROCEDURE — 97110 THERAPEUTIC EXERCISES: CPT

## 2022-10-20 PROCEDURE — 97140 MANUAL THERAPY 1/> REGIONS: CPT

## 2022-10-20 NOTE — FLOWSHEET NOTE
[address] [facility]  Phone: [phone]   Fax: [phone]    Physical Therapy Daily Treatment Note    Date:  10/20/2022     Patient Name:  Yandy Crocker    :  1950  MRN: [id number]  Medical Diagnosis:  Other injury of muscle(s) and tendon(s) of the rotator cuff of right shoulder, initial encounter [S46.860O]  Treatment Diagnosis: PT diagnosis: RTC weakness, decreased shoulder AROM, pain limiting functional mobility                                       Insurance/Certification information:   Insurance information:  Medicare  plan, no auth, no hard max     Physician Information:  Gonzalo Zuleta MD    Plan of care signed (Y/N): []  Yes [x]  No  Cosign requested however has not been completed yet    Date of Patient follow up with Physician: ?     Progress Report: []  Yes  [x]  No     Date Range for reporting period:  Beginning: 10/20/2022  Ending:     Progress report due (10 Rx/or 30 days whichever is less): visit #10 or       Recertification due (POC duration/ or 90 days whichever is less): visit #20 or 22     Visit # Insurance Allowable Auth required? Date Range    [place] []  Yes  [x]  No        Units approved Units used Date Range            Latex Allergy:  [x]NO      []YES  Preferred Language for Healthcare:   [x]English       []other:    Functional Scale:           Date assessed:  FOTO physical FS primary measure score = 38; risk adjusted = 39  10/11/22    Pain level:  310     SUBJECTIVE:  10/20: Pt states R shoulder is a little stiff today however not a significant amount of pain. States she has a bit of stiff neck today; feels she slept funny. States its difficult to turn head to L this date. .  Feels that she is doing well overall. OBJECTIVE: 10/20:  Pt with significant rounded shoulders and slight forward head.   UT tightness noted B and limited cervical rotation       RESTRICTIONS/PRECAUTIONS: Precautions/ Contra-indications: DM, HTN, pacemaker, CABG x4 '99, two cardiac stents placed 8/15/22, B TKR '94, OA, RA, osteoporosis, LBP with ablations    Exercises/Interventions:     Therapeutic Exercises (93359) Resistance / level Sets/sec Reps Notes   UBE  Fwd/retro 2 min each direction    Pulley: flx, abd   X 10 each    Biceps curls add             Pec stretch in doorway   2 x 30\"       Chest press with wand (supine)   X 10    Shoulder flexion (supine)   X 10    Supine punch R   X 10            Therapeutic Activities (61406)       SB roll up wall   X 10    RTC strengthening:orange band  Shoulder extension  Shoulder flexion  Shoulder adduction  IR   ER        Green band:  Mid row  High row  Low row     X 15  X 15  X 15           Gait (03205)                                   Neuromuscular Re-ed (54981)       Chin tucks seated  2 X 10                                       Manual Intervention (92093)       PROM R shoulder in all directions with stretch at end range; caudal glides and post joint mobs. STM to R UT and gentle cervical traction and SOR. X 15min                                             Modalities: 10/20:  Pt declined this date; states she uses heat when she gets home    Pt. Education: 10/20: Verbal cues for proper exercise technique; provided instruction on chin tuck and purpose for performing    -patient educated on diagnosis, prognosis and expectations for rehab  -all patient questions were answered    Home Exercise Program: 10/20:  added chin tucks; will provide written copy of HEP next visit with added exercises. HEP instruction: Written HEP instructions provided and reviewed   Access Code: [email or web address]  URL: [email or web address]  Date: 10/11/2022  Prepared by: Sima King     Exercises  Shoulder Internal Rotation with Resistance - 3 x daily - 7 x weekly - 1 sets - 10 reps  Shoulder External Rotation Reactive Isometrics - 3 x daily - 7 x weekly - 1 sets - 10 reps         Therapeutic Exercise and NMR EXR  [x] (28223) Provided verbal/tactile cueing for activities related to strengthening, flexibility, endurance, ROM for improvements in  [] LE / Lumbar: LE, proximal hip, and core control with self care, mobility, lifting, ambulation. [x] UE / Cervical: cervical, postural, scapular, scapulothoracic and UE control with self care, reaching, carrying, lifting, house/yardwork, driving, computer work.  [] (55361) Provided verbal/tactile cueing for activities related to improving balance, coordination, kinesthetic sense, posture, motor skill, proprioception to assist with   [] LE / lumbar: LE, proximal hip, and core control in self care, mobility, lifting, ambulation and eccentric single leg control. [] UE / cervical: cervical, scapular, scapulothoracic and UE control with self care, reaching, carrying, lifting, house/yardwork, driving, computer work.   [] (81609) Therapist is in constant attendance of 2 or more patients providing skilled therapy interventions, but not providing any significant amount of measurable one-on-one time to either patient, for improvements in  [] LE / lumbar: LE, proximal hip, and core control in self care, mobility, lifting, ambulation and eccentric single leg control. [] UE / cervical: cervical, scapular, scapulothoracic and UE control with self care, reaching, carrying, lifting, house/yardwork, driving, computer work.      NMR and Therapeutic Activities:    [x] (39422 or 63322) Provided verbal/tactile cueing for activities related to improving balance, coordination, kinesthetic sense, posture, motor skill, proprioception and motor activation to allow for proper function of   [] LE: / Lumbar core, proximal hip and LE with self care and ADLs  [x] UE / Cervical: cervical, postural, scapular, scapulothoracic and UE control with self care, carrying, lifting, driving, computer work.   [] (48227) Gait Re-education- Provided training and instruction to the patient for proper LE, core and proximal hip recruitment and positioning and eccentric body weight control with ambulation re-education including up and down stairs     Home Management Training / Self Care:  [x] (92594) Provided self-care/home management training related to activities of daily living and compensatory training, and/or use of adaptive equipment for improvement with: ADLs and compensatory training, meal preparation, safety procedures and instruction in use of adaptive equipment, including bathing, grooming, dressing, personal hygiene, basic household cleaning and chores. Home Exercise Program:    [x] (14485) Reviewed/Progressed HEP activities related to strengthening, flexibility, endurance, ROM of   [] LE / Lumbar: core, proximal hip and LE for functional self-care, mobility, lifting and ambulation/stair navigation   [] UE / Cervical: cervical, postural, scapular, scapulothoracic and UE control with self care, reaching, carrying, lifting, house/yardwork, driving, computer work  [] (73063)Reviewed/Progressed HEP activities related to improving balance, coordination, kinesthetic sense, posture, motor skill, proprioception of   [] LE: core, proximal hip and LE for self care, mobility, lifting, and ambulation/stair navigation    [] UE / Cervical: cervical, postural,  scapular, scapulothoracic and UE control with self care, reaching, carrying, lifting, house/yardwork, driving, computer work    Manual Treatments:  PROM / STM / Oscillations-Mobs:  G-I, II, III, IV (PA's, Inf., Post.)  [] (35909) Provided manual therapy to mobilize LE, proximal hip and/or LS spine soft tissue/joints for the purpose of modulating pain, promoting relaxation,  increasing ROM, reducing/eliminating soft tissue swelling/inflammation/restriction, improving soft tissue extensibility and allowing for proper ROM for normal function with   [] LE / lumbar: self care, mobility, lifting and ambulation.     [] UE / Cervical: self care, reaching, carrying, lifting, house/yardwork, driving, computer work. Modalities:  [] (31083) Vasopneumatic compression: Utilized vasopneumatic compression to decrease edema / swelling for the purpose of improving mobility and quad tone / recruitment which will allow for increased overall function including but not limited to self-care, transfers, ambulation, and ascending / descending stairs. Charges:  Timed Code Treatment Minutes: 45   Total Treatment Minutes: 45     [] EVAL - LOW (08527)   [] EVAL - MOD (37704)  [] EVAL - HIGH (53630)  [] RE-EVAL (86111)  [x] SS(05812) x  1     [] Ionto  [] NMR (01786) x       [] Vaso  [x] Manual (31875) x  1     [] Ultrasound  [x] TA x        [] Mech Traction (85303)  [] Aquatic Therapy x     [] ES (un) (60405):   [] Home Management Training x  [] ES(attended) (19071)   [] Dry Needling 1-2 muscles (82127):  [] Dry Needling 3+ muscles (253471)  [] Group:      [] Other:     GOALS: GOALS:  Patient stated goal: no pain  [] Progressing: [] Met: [] Not Met: [] Adjusted     Therapist goals for Patient:   Short Term Goals: To be achieved in: 2 weeks  1. Independent in HEP and progression per patient tolerance, in order to prevent re-injury. [] Progressing: [] Met: [] Not Met: [] Adjusted  2. Patient will have a decrease in pain to facilitate improvement in movement, function, and ADLs as indicated by Functional Deficits. [] Progressing: [] Met: [] Not Met: [] Adjusted     Long Term Goals: To be achieved in: 6 weeks  1. FOTO score of at least 50 to assist with reaching prior level of function. [] Progressing: [] Met: [] Not Met: [] Adjusted  2. Patient will demonstrate increased AROM to R shoulder flexion 0-145, abd 0-170, IR 0-70, ER 0-80 to allow for proper joint functioning as indicated by patients Functional Deficits. [] Progressing: [] Met: [] Not Met: [] Adjusted  3.  Patient will demonstrate an increase in Strength to R ER mm to 4/5 to allow for proper functional mobility as indicated by patients Functional Deficits. [] Progressing: [] Met: [] Not Met: [] Adjusted  4. Patient will return to functional activities including donning/doffing bra without increased symptoms or restriction. [] Progressing: [] Met: [] Not Met: [] Adjusted  5. Pt will be able to do her hair and makeup without pain or difficulty   [] Progressing: [] Met: [] Not Met: [] Adjusted           Overall Progression Towards Functional goals/ Treatment Progress Update:  [] Patient is progressing as expected towards functional goals listed. [] Progression is slowed due to complexities/Impairments listed. [] Progression has been slowed due to co-morbidities. [x] Plan just implemented, too soon to assess goals progression <30days   [] Goals require adjustment due to lack of progress  [] Patient is not progressing as expected and requires additional follow up with physician  [] Other    Persisting Functional Limitations/Impairments:  [x]Sleeping []Sitting               []Standing []Transfers        []Walking []Kneeling               []Stairs []Squatting / bending   [x]ADLs [x]Reaching  []Lifting  [x]Housework  [x]Driving []Job related tasks  [x]Sports/Recreation []Other:        ASSESSMENT:  Pt tolerated session well this date; demonstrating overall decrease in pain, improving ROM and strength.       Treatment/Activity Tolerance:  [x] Patient able to complete tx [] Patient limited by fatigue  [] Patient limited by pain  [] Patient limited by other medical complications  [] Other:     Prognosis: [x] Good [] Fair  [] Poor    Patient Requires Follow-up: [x] Yes  [] No  PLAN:RTC strengthening, shoulder AROM/PROM, scapular stabilization ex, postural education, modalities as needed, manual therapy  Frequency/Duration:  2 days per week for 6 Weeks:  INTERVENTIONS:  [x] Therapeutic exercise including: strength training, ROM, for Upper extremity and core   [x]  NMR activation and proprioception for UE, scap and Core   [x] Manual therapy as indicated for shoulder, scapula and spine to include: Dry Needling/IASTM, STM, PROM, Gr I-IV mobilizations, manipulation. [x] Modalities as needed that may include: thermal agents, E-stim, Biofeedback, US, iontophoresis as indicated  [x] Patient education on joint protection, postural re-education, activity modification, progression of HEP. [x] Continue per plan of care [] Alter current plan (see comments)  [] Plan of care initiated [] Hold pending MD visit [] Discharge    Electronically signed by: Augie Amanda PT, DPT  [id number]    Note: If patient does not return for scheduled/ recommended follow up visits, this note will serve as a discharge from care along with most recent update on progress.

## 2022-10-25 ENCOUNTER — HOSPITAL ENCOUNTER (OUTPATIENT)
Dept: PHYSICAL THERAPY | Age: 72
Setting detail: THERAPIES SERIES
Discharge: HOME | End: 2022-10-25
Payer: MEDICARE

## 2022-10-25 PROCEDURE — 97530 THERAPEUTIC ACTIVITIES: CPT

## 2022-10-25 PROCEDURE — 97110 THERAPEUTIC EXERCISES: CPT

## 2022-10-25 PROCEDURE — 97140 MANUAL THERAPY 1/> REGIONS: CPT

## 2022-10-25 NOTE — FLOWSHEET NOTE
[address] [facility] Therapy  Phone: [phone]   Fax: [phone]    Physical Therapy Daily Treatment Note    Date:  10/25/2022     Patient Name:  Ken Hurtado    :  1950  MRN: [id number]  Medical Diagnosis:  Other injury of muscle(s) and tendon(s) of the rotator cuff of right shoulder, initial encounter [S46.156G]  Treatment Diagnosis: PT diagnosis: RTC weakness, decreased shoulder AROM, pain limiting functional mobility                                       Insurance/Certification information:   Insurance information:  Medicare 80/20 plan, no auth, no hard max     Physician Information:  Carolyn Katz MD    Plan of care signed (Y/N): []  Yes [x]  No  Cosign requested however has not been completed yet    Date of Patient follow up with Physician: ?     Progress Report: []  Yes  [x]  No     Date Range for reporting period:  Beginning: 10/25/2022  Ending:     Progress report due (10 Rx/or 30 days whichever is less): visit #10 or       Recertification due (POC duration/ or 90 days whichever is less): visit #20 or 22     Visit # Insurance Allowable Auth required? Date Range    [place] []  Yes  [x]  No        Units approved Units used Date Range            Latex Allergy:  [x]NO      []YES  Preferred Language for Healthcare:   [x]English       []other:    Functional Scale:           Date assessed:  TO physical FS primary measure score = 38; risk adjusted = 39  10/11/22    Pain level:  3/10     SUBJECTIVE:  10/25: Pt states that she is able to move her R UE/shoulder more. Pt reports that she had severe vertigo after last PT - vomited when she got home. Her  got her meclizine, and she felt better after only taking one pill. Pt states she was lying flat last PT visit and that's why she had vertigo. OBJECTIVE: 10/20:  Pt with significant rounded shoulders and slight forward head.   UT tightness noted B and limited cervical rotation RESTRICTIONS/PRECAUTIONS: Precautions/ Contra-indications: DM, HTN, pacemaker, CABG x4 '99, two cardiac stents placed 8/15/22, B TKR '94, OA, RA, osteoporosis, LBP with ablations    Exercises/Interventions:     Therapeutic Exercises (81108) Resistance / level Sets/sec Reps Notes   UBE  Fwd/retro 2 min each direction 10/25: pt states she can go faster this date. Pulley: flx, abd   X 10 each    Biceps curls 3# 1 10x B New 10/25, seated          Pec stretch in doorway   2 x 30\"       HEP   HEP   HEP   Wall slides: flx, abd   X10ea R New 10/25, towel under R hand - pt c/o difficulty with abd   Therapeutic Activities (70278)          RTC strengthening: band  Shoulder extension  Shoulder flexion/punch  Shoulder adduction  IR   ER Green band    X 10 B  X 10 B  X 10 R  X 10 R  X 10 R    Green band:  Mid row  High row  Low row     X 15  X 15  X 15           Gait (13974)                                   Neuromuscular Re-ed (90565)       Chin tucks seated  2 X 10                                       Manual Intervention (24618)       PROM R shoulder in all directions with stretch at end range; caudal glides and post joint mobs. STM to R UT and gentle cervical traction and SOR. X 10min   10/25: Pt in recliner position vs supine due to reports of vertigo after lying in supine last session                                          Modalities: 10/20:  Pt declined this date; states she uses heat when she gets home    Pt. Education: 10/20: Verbal cues for proper exercise technique; provided instruction on chin tuck and purpose for performing    -patient educated on diagnosis, prognosis and expectations for rehab  -all patient questions were answered    Home Exercise Program: 10/20:  added chin tucks; will provide written copy of HEP next visit with added exercises. HEP instruction: Written HEP instructions provided and reviewed   Access Code: [email or web address]  URL: [email or web address]  Date: 10/11/2022  Prepared by: [facility]     Exercises  Shoulder Internal Rotation with Resistance - 3 x daily - 7 x weekly - 1 sets - 10 reps  Shoulder External Rotation Reactive Isometrics - 3 x daily - 7 x weekly - 1 sets - 10 reps         Therapeutic Exercise and NMR EXR  [x] (24952) Provided verbal/tactile cueing for activities related to strengthening, flexibility, endurance, ROM for improvements in  [] LE / Lumbar: LE, proximal hip, and core control with self care, mobility, lifting, ambulation. [x] UE / Cervical: cervical, postural, scapular, scapulothoracic and UE control with self care, reaching, carrying, lifting, house/yardwork, driving, computer work.  [] (02526) Provided verbal/tactile cueing for activities related to improving balance, coordination, kinesthetic sense, posture, motor skill, proprioception to assist with   [] LE / lumbar: LE, proximal hip, and core control in self care, mobility, lifting, ambulation and eccentric single leg control. [] UE / cervical: cervical, scapular, scapulothoracic and UE control with self care, reaching, carrying, lifting, house/yardwork, driving, computer work.   [] (64390) Therapist is in constant attendance of 2 or more patients providing skilled therapy interventions, but not providing any significant amount of measurable one-on-one time to either patient, for improvements in  [] LE / lumbar: LE, proximal hip, and core control in self care, mobility, lifting, ambulation and eccentric single leg control. [] UE / cervical: cervical, scapular, scapulothoracic and UE control with self care, reaching, carrying, lifting, house/yardwork, driving, computer work.      NMR and Therapeutic Activities:    [x] (28507 or 04823) Provided verbal/tactile cueing for activities related to improving balance, coordination, kinesthetic sense, posture, motor skill, proprioception and motor activation to allow for proper function of   [] LE: / Lumbar core, proximal hip and LE with self care and ADLs  [x] UE / Cervical: cervical, postural, scapular, scapulothoracic and UE control with self care, carrying, lifting, driving, computer work.   [] (70055) Gait Re-education- Provided training and instruction to the patient for proper LE, core and proximal hip recruitment and positioning and eccentric body weight control with ambulation re-education including up and down stairs     Home Management Training / Self Care:  [x] (82519) Provided self-care/home management training related to activities of daily living and compensatory training, and/or use of adaptive equipment for improvement with: ADLs and compensatory training, meal preparation, safety procedures and instruction in use of adaptive equipment, including bathing, grooming, dressing, personal hygiene, basic household cleaning and chores.      Home Exercise Program:    [x] (27629) Reviewed/Progressed HEP activities related to strengthening, flexibility, endurance, ROM of   [] LE / Lumbar: core, proximal hip and LE for functional self-care, mobility, lifting and ambulation/stair navigation   [] UE / Cervical: cervical, postural, scapular, scapulothoracic and UE control with self care, reaching, carrying, lifting, house/yardwork, driving, computer work  [] (89880)Reviewed/Progressed HEP activities related to improving balance, coordination, kinesthetic sense, posture, motor skill, proprioception of   [] LE: core, proximal hip and LE for self care, mobility, lifting, and ambulation/stair navigation    [] UE / Cervical: cervical, postural,  scapular, scapulothoracic and UE control with self care, reaching, carrying, lifting, house/yardwork, driving, computer work    Manual Treatments:  PROM / STM / Oscillations-Mobs:  G-I, II, III, IV (PA's, Inf., Post.)  [] (86787) Provided manual therapy to mobilize LE, proximal hip and/or LS spine soft tissue/joints for the purpose of modulating pain, promoting relaxation,  increasing ROM, reducing/eliminating soft tissue swelling/inflammation/restriction, improving soft tissue extensibility and allowing for proper ROM for normal function with   [] LE / lumbar: self care, mobility, lifting and ambulation. [] UE / Cervical: self care, reaching, carrying, lifting, house/yardwork, driving, computer work. Modalities:  [] (48760) Vasopneumatic compression: Utilized vasopneumatic compression to decrease edema / swelling for the purpose of improving mobility and quad tone / recruitment which will allow for increased overall function including but not limited to self-care, transfers, ambulation, and ascending / descending stairs. Charges:  Timed Code Treatment Minutes: 40   Total Treatment Minutes: 40     [] EVAL - LOW (13151)   [] EVAL - MOD (29832)  [] EVAL - HIGH (32636)  [] RE-EVAL (09766)  [x] GI(46770) x  1     [] Ionto  [] NMR (31987) x       [] Vaso  [x] Manual (16417) x  1     [] Ultrasound  [x] TA x        [] Mech Traction (44987)  [] Aquatic Therapy x     [] ES (un) (30190):   [] Home Management Training x  [] ES(attended) (76175)   [] Dry Needling 1-2 muscles (47771):  [] Dry Needling 3+ muscles (405454)  [] Group:      [] Other:     GOALS: GOALS:  Patient stated goal: no pain  [] Progressing: [] Met: [] Not Met: [] Adjusted     Therapist goals for Patient:   Short Term Goals: To be achieved in: 2 weeks  1. Independent in HEP and progression per patient tolerance, in order to prevent re-injury. [] Progressing: [] Met: [] Not Met: [] Adjusted  2. Patient will have a decrease in pain to facilitate improvement in movement, function, and ADLs as indicated by Functional Deficits. [] Progressing: [] Met: [] Not Met: [] Adjusted     Long Term Goals: To be achieved in: 6 weeks  1. FOTO score of at least 50 to assist with reaching prior level of function. [] Progressing: [] Met: [] Not Met: [] Adjusted  2.  Patient will demonstrate increased AROM to R shoulder flexion 0-145, abd 0-170, IR 0-70, ER 0-80 to allow for proper joint functioning as indicated by patients Functional Deficits. [] Progressing: [] Met: [] Not Met: [] Adjusted  3. Patient will demonstrate an increase in Strength to R ER mm to 4/5 to allow for proper functional mobility as indicated by patients Functional Deficits. [] Progressing: [] Met: [] Not Met: [] Adjusted  4. Patient will return to functional activities including donning/doffing bra without increased symptoms or restriction. [] Progressing: [] Met: [] Not Met: [] Adjusted  5. Pt will be able to do her hair and makeup without pain or difficulty   [] Progressing: [] Met: [] Not Met: [] Adjusted           Overall Progression Towards Functional goals/ Treatment Progress Update:  [] Patient is progressing as expected towards functional goals listed. [] Progression is slowed due to complexities/Impairments listed. [] Progression has been slowed due to co-morbidities. [x] Plan just implemented, too soon to assess goals progression <30days   [] Goals require adjustment due to lack of progress  [] Patient is not progressing as expected and requires additional follow up with physician  [] Other    Persisting Functional Limitations/Impairments:  [x]Sleeping []Sitting               []Standing []Transfers        []Walking []Kneeling               []Stairs []Squatting / bending   [x]ADLs [x]Reaching  []Lifting  [x]Housework  [x]Driving []Job related tasks  [x]Sports/Recreation []Other:        ASSESSMENT:  Pt tolerated session well this date; demonstrating overall decrease in pain, improving ROM and strength. Pt is unable to lie flat due to h/o vertigo - had a severe episode after last visit. PT reclined pt vs supine lying for ROM, STM at end of session. Pt demo good flexion and ER R shoulder. Abd and IR are still lacking for pt. Pt cont to benefit from skilled PT services to maximize functional mobility and resume PLOF such as dressing and cooking without severe R shoulder pain.     Treatment/Activity Tolerance:  [x] Patient able to complete tx [] Patient limited by fatigue  [] Patient limited by pain  [] Patient limited by other medical complications  [] Other:     Prognosis: [x] Good [] Fair  [] Poor    Patient Requires Follow-up: [x] Yes  [] No  PLAN:RTC strengthening, shoulder AROM/PROM, scapular stabilization ex, postural education, modalities as needed, manual therapy  Frequency/Duration:  2 days per week for 6 Weeks:  INTERVENTIONS:  [x] Therapeutic exercise including: strength training, ROM, for Upper extremity and core   [x]  NMR activation and proprioception for UE, scap and Core   [x] Manual therapy as indicated for shoulder, scapula and spine to include: Dry Needling/IASTM, STM, PROM, Gr I-IV mobilizations, manipulation. [x] Modalities as needed that may include: thermal agents, E-stim, Biofeedback, US, iontophoresis as indicated  [x] Patient education on joint protection, postural re-education, activity modification, progression of HEP. [x] Continue per plan of care [] Alter current plan (see comments)  [] Plan of care initiated [] Hold pending MD visit [] Discharge    Electronically signed by: Austen Ribera, PT, MPT  [id number]    Note: If patient does not return for scheduled/ recommended follow up visits, this note will serve as a discharge from care along with most recent update on progress.

## 2022-10-27 ENCOUNTER — HOSPITAL ENCOUNTER (OUTPATIENT)
Dept: PHYSICAL THERAPY | Age: 72
Setting detail: THERAPIES SERIES
Discharge: HOME | End: 2022-10-27
Payer: MEDICARE

## 2022-10-27 PROCEDURE — 97140 MANUAL THERAPY 1/> REGIONS: CPT

## 2022-10-27 PROCEDURE — 97110 THERAPEUTIC EXERCISES: CPT

## 2022-10-27 PROCEDURE — 97530 THERAPEUTIC ACTIVITIES: CPT

## 2022-10-27 NOTE — FLOWSHEET NOTE
level Sets/sec Reps Notes   UBE  Fwd/retro 2 min each direction 10/25: pt states she can go faster this date. Pulley: flx, abd   X 10 each    Biceps curls 3# 1 10x B New 10/25, seated          Pec stretch in doorway   2 x 30\"       HEP   HEP   HEP   Wall slides: flx, abd   X10 ea R New 10/25, towel under R hand - pt c/o difficulty with abd   Sidelying shoulder flexion  Sidelying shoulder abduction  Sidelying horizontal abduction  1#     2  2 10 R  10 R  10 R                                Therapeutic Activities (19336)          RTC strengthening: band  Shoulder extension  Shoulder flexion/punch  Shoulder adduction  IR   ER Green band   2    2  2  2   X 10 B  X 10 B  X 10 R  X 10 R  X 10 R    Green band:  Mid row  High row  Low row     X 15  X 15  X 15                                              Neuromuscular Re-ed (64931)       Chin tucks seated  2 X 10                                       Manual Intervention (05447)       PROM R shoulder in all directions with stretch at end range; caudal glides and post joint mobs. STM to R UT and gentle cervical traction and SOR. X 10min   10/25: Pt in recliner position vs supine due to reports of vertigo after lying in supine last session                                          Modalities: 10/20:  Pt declined this date; states she uses heat when she gets home    Pt. Education: 10/20: Verbal cues for proper exercise technique; provided instruction on chin tuck and purpose for performing    -patient educated on diagnosis, prognosis and expectations for rehab  -all patient questions were answered    Home Exercise Program: 10/20:  added chin tucks; will provide written copy of HEP next visit with added exercises. HEP instruction: Written HEP instructions provided and reviewed   Access Code: [email or web address]  URL: [email or web address]  Date: 10/11/2022  Prepared by: Kathy Hernandez     Exercises  Shoulder Internal Rotation with Resistance - 3 x daily - 7 x weekly - 1 sets - 10 reps  Shoulder External Rotation Reactive Isometrics - 3 x daily - 7 x weekly - 1 sets - 10 reps         Therapeutic Exercise and NMR EXR  [x] (19604) Provided verbal/tactile cueing for activities related to strengthening, flexibility, endurance, ROM for improvements in  [] LE / Lumbar: LE, proximal hip, and core control with self care, mobility, lifting, ambulation. [x] UE / Cervical: cervical, postural, scapular, scapulothoracic and UE control with self care, reaching, carrying, lifting, house/yardwork, driving, computer work.  [] (91328) Provided verbal/tactile cueing for activities related to improving balance, coordination, kinesthetic sense, posture, motor skill, proprioception to assist with   [] LE / lumbar: LE, proximal hip, and core control in self care, mobility, lifting, ambulation and eccentric single leg control. [] UE / cervical: cervical, scapular, scapulothoracic and UE control with self care, reaching, carrying, lifting, house/yardwork, driving, computer work.   [] (58161) Therapist is in constant attendance of 2 or more patients providing skilled therapy interventions, but not providing any significant amount of measurable one-on-one time to either patient, for improvements in  [] LE / lumbar: LE, proximal hip, and core control in self care, mobility, lifting, ambulation and eccentric single leg control. [] UE / cervical: cervical, scapular, scapulothoracic and UE control with self care, reaching, carrying, lifting, house/yardwork, driving, computer work.      NMR and Therapeutic Activities:    [x] (80999 or 77472) Provided verbal/tactile cueing for activities related to improving balance, coordination, kinesthetic sense, posture, motor skill, proprioception and motor activation to allow for proper function of   [] LE: / Lumbar core, proximal hip and LE with self care and ADLs  [x] UE / Cervical: cervical, postural, scapular, scapulothoracic and UE control with self care, carrying, function with   [] LE / lumbar: self care, mobility, lifting and ambulation. [] UE / Cervical: self care, reaching, carrying, lifting, house/yardwork, driving, computer work. Modalities:  [] (16961) Vasopneumatic compression: Utilized vasopneumatic compression to decrease edema / swelling for the purpose of improving mobility and quad tone / recruitment which will allow for increased overall function including but not limited to self-care, transfers, ambulation, and ascending / descending stairs. Charges:  Timed Code Treatment Minutes: 43   Total Treatment Minutes: 43     [] EVAL - LOW (09855)   [] EVAL - MOD (79007)  [] EVAL - HIGH (33658)  [] RE-EVAL (27352)  [x] NJ(58168) x  1     [] Ionto  [] NMR (30352) x       [] Vaso  [x] Manual (96424) x  1     [] Ultrasound  [x] TA x        [] Mech Traction (55467)  [] Aquatic Therapy x     [] ES (un) (11830):   [] Home Management Training x  [] ES(attended) (18386)   [] Dry Needling 1-2 muscles (12942):  [] Dry Needling 3+ muscles (627145)  [] Group:      [] Other:     GOALS: GOALS:  Patient stated goal: no pain  [] Progressing: [] Met: [] Not Met: [] Adjusted     Therapist goals for Patient:   Short Term Goals: To be achieved in: 2 weeks  1. Independent in HEP and progression per patient tolerance, in order to prevent re-injury. [] Progressing: [] Met: [] Not Met: [] Adjusted  2. Patient will have a decrease in pain to facilitate improvement in movement, function, and ADLs as indicated by Functional Deficits. [] Progressing: [] Met: [] Not Met: [] Adjusted     Long Term Goals: To be achieved in: 6 weeks  1. FOTO score of at least 50 to assist with reaching prior level of function. [] Progressing: [] Met: [] Not Met: [] Adjusted  2. Patient will demonstrate increased AROM to R shoulder flexion 0-145, abd 0-170, IR 0-70, ER 0-80 to allow for proper joint functioning as indicated by patients Functional Deficits.    [] Progressing: [] Met: [] Not Met: [] Adjusted  3. Patient will demonstrate an increase in Strength to R ER mm to 4/5 to allow for proper functional mobility as indicated by patients Functional Deficits. [] Progressing: [] Met: [] Not Met: [] Adjusted  4. Patient will return to functional activities including donning/doffing bra without increased symptoms or restriction. [] Progressing: [] Met: [] Not Met: [] Adjusted  5. Pt will be able to do her hair and makeup without pain or difficulty   [] Progressing: [] Met: [] Not Met: [] Adjusted           Overall Progression Towards Functional goals/ Treatment Progress Update:  [] Patient is progressing as expected towards functional goals listed. [] Progression is slowed due to complexities/Impairments listed. [] Progression has been slowed due to co-morbidities. [x] Plan just implemented, too soon to assess goals progression <30days   [] Goals require adjustment due to lack of progress  [] Patient is not progressing as expected and requires additional follow up with physician  [] Other    Persisting Functional Limitations/Impairments:  [x]Sleeping []Sitting               []Standing []Transfers        []Walking []Kneeling               []Stairs []Squatting / bending   [x]ADLs [x]Reaching  []Lifting  [x]Housework  [x]Driving []Job related tasks  [x]Sports/Recreation []Other:        ASSESSMENT:  Updated exercises as noted in bold above. Improving AROM but most struggles with deltoid/supraspinatus function. Able to participate in gravity assisted active motions with good form and no compensation. Continued with reclined manual therapy due to previous vertigo episode. Abd and IR are still lacking for pt. Pt cont to benefit from skilled PT services to maximize functional mobility and resume PLOF such as dressing and cooking without severe R shoulder pain.       Treatment/Activity Tolerance:  [x] Patient able to complete tx [] Patient limited by fatigue  [] Patient limited by pain  [] Patient limited by other medical complications  [] Other:     Prognosis: [x] Good [] Fair  [] Poor    Patient Requires Follow-up: [x] Yes  [] No  PLAN:RTC strengthening, shoulder AROM/PROM, scapular stabilization ex, postural education, modalities as needed, manual therapy  Frequency/Duration:  2 days per week for 6 Weeks:  INTERVENTIONS:  [x] Therapeutic exercise including: strength training, ROM, for Upper extremity and core   [x]  NMR activation and proprioception for UE, scap and Core   [x] Manual therapy as indicated for shoulder, scapula and spine to include: Dry Needling/IASTM, STM, PROM, Gr I-IV mobilizations, manipulation. [x] Modalities as needed that may include: thermal agents, E-stim, Biofeedback, US, iontophoresis as indicated  [x] Patient education on joint protection, postural re-education, activity modification, progression of HEP. [x] Continue per plan of care [] Alter current plan (see comments)  [] Plan of care initiated [] Hold pending MD visit [] Discharge    Electronically signed by: Isabella Klein PT, PT    Note: If patient does not return for scheduled/ recommended follow up visits, this note will serve as a discharge from care along with most recent update on progress.

## 2022-11-01 ENCOUNTER — HOSPITAL ENCOUNTER (OUTPATIENT)
Dept: PHYSICAL THERAPY | Age: 72
Setting detail: THERAPIES SERIES
Discharge: HOME | End: 2022-11-01
Payer: MEDICARE

## 2022-11-01 PROCEDURE — 97530 THERAPEUTIC ACTIVITIES: CPT

## 2022-11-01 PROCEDURE — 97110 THERAPEUTIC EXERCISES: CPT

## 2022-11-01 PROCEDURE — 97140 MANUAL THERAPY 1/> REGIONS: CPT

## 2022-11-01 NOTE — FLOWSHEET NOTE
[address] [facility]  Phone: [phone]   Fax: [phone]    Physical Therapy Daily Treatment Note    Date:  2022     Patient Name:  Hosea Spicer    :  1950  MRN: [id number]  Medical Diagnosis:  Other injury of muscle(s) and tendon(s) of the rotator cuff of right shoulder, initial encounter [S46.661Z]  Treatment Diagnosis: PT diagnosis: RTC weakness, decreased shoulder AROM, pain limiting functional mobility                                       Insurance/Certification information:  Insurance information:  Medicare 80/20 plan, no auth, no hard max     Physician Information:  Stefany Denise MD   Plan of care signed (Y/N): []  Yes [x]  No  Cosign requested however has not been completed yet    Date of Patient follow up with Physician: ?     Progress Report: []  Yes  [x]  No     Date Range for reporting period:  Beginnin2022  Ending:     Progress report due (10 Rx/or 30 days whichever is less): visit #10 or       Recertification due (POC duration/ or 90 days whichever is less): visit #20 or 22     Visit # Insurance Allowable Auth required? Date Range    [place] []  Yes  [x]  No        Units approved Units used Date Range            Latex Allergy:  [x]NO      []YES  Preferred Language for Healthcare:   [x]English       []other:    Functional Scale:           Date assessed:  [facility] physical FS primary measure score = 38; risk adjusted = 39  10/11/22    Pain level:  3/10     SUBJECTIVE: Pt reports that she can raise her arm up higher with less pain. Pt states she did not do much (HEP) on  or Monday because she was too busy. 10/25: Pt states that she is able to move her R UE/shoulder more. Pt reports that she had severe vertigo after last PT - vomited when she got home. Her  got her meclizine, and she felt better after only taking one pill.   Pt states she was lying flat last PT visit and that's why she had vertigo. OBJECTIVE: 10/20:  Pt with significant rounded shoulders and slight forward head. UT tightness noted B and limited cervical rotation   11/1 AROM R shoulder elevation 0-100      RESTRICTIONS/PRECAUTIONS: Precautions/ Contra-indications: DM, HTN, pacemaker, CABG x4 '99, two cardiac stents placed 8/15/22, B TKR '94, OA, RA, osteoporosis, LBP with ablations    Exercises/Interventions:     Therapeutic Exercises (75886) Resistance / level Sets/sec Reps Notes   UBE  Fwd/retro 2 min each direction 10/25: pt states she can go faster this date. Pulley: flx, abd   X 10 each    Biceps curls 3# 1 10x B New 10/25, seated          HEP   HEP   HEP   HEP   Wall slides: flx, abd   X10 ea R New 10/25, towel under R hand - pt c/o difficulty with abd   11/1 did not do as pt c/o vertigo with lying down                               Therapeutic Activities (30455)          RTC strengthening: band  Shoulder extension  Shoulder flexion/punch  Shoulder adduction  IR   ER Green band   2    2  2  2   X 10 B  X 10 B  X 10 R  X 10 R  X 10 R    Green band:  Mid row  High row  Low row    2  2  2   X 10  X 10  X 10                                              Neuromuscular Re-ed (15121)        2 X 10 HEP                                      Manual Intervention (11348)       PROM R shoulder in all directions with stretch at end range; caudal glides and post joint mobs. STM to R UT and gentle cervical traction and SOR. X 10min   11/1 Pt in recliner position vs supine due to reports of vertigo after lying in supine last session                                          Modalities: 10/20:  Pt declined this date; states she uses heat when she gets home    Pt.  Education: 10/20: Verbal cues for proper exercise technique; provided instruction on chin tuck and purpose for performing    -patient educated on diagnosis, prognosis and expectations for rehab  -all patient questions were answered    Home Exercise Program: 10/20:  added chin tucks; will provide written copy of HEP next visit with added exercises. HEP instruction: Written HEP instructions provided and reviewed   Access Code: [email or web address]  URL: [email or web address]  Date: 10/11/2022  Prepared by: Dawood Fierro     Exercises  Shoulder Internal Rotation with Resistance - 3 x daily - 7 x weekly - 1 sets - 10 reps  Shoulder External Rotation Reactive Isometrics - 3 x daily - 7 x weekly - 1 sets - 10 reps         Therapeutic Exercise and NMR EXR  [x] (67619) Provided verbal/tactile cueing for activities related to strengthening, flexibility, endurance, ROM for improvements in  [] LE / Lumbar: LE, proximal hip, and core control with self care, mobility, lifting, ambulation. [x] UE / Cervical: cervical, postural, scapular, scapulothoracic and UE control with self care, reaching, carrying, lifting, house/yardwork, driving, computer work.  [] (53111) Provided verbal/tactile cueing for activities related to improving balance, coordination, kinesthetic sense, posture, motor skill, proprioception to assist with   [] LE / lumbar: LE, proximal hip, and core control in self care, mobility, lifting, ambulation and eccentric single leg control. [] UE / cervical: cervical, scapular, scapulothoracic and UE control with self care, reaching, carrying, lifting, house/yardwork, driving, computer work.   [] (78184) Therapist is in constant attendance of 2 or more patients providing skilled therapy interventions, but not providing any significant amount of measurable one-on-one time to either patient, for improvements in  [] LE / lumbar: LE, proximal hip, and core control in self care, mobility, lifting, ambulation and eccentric single leg control. [] UE / cervical: cervical, scapular, scapulothoracic and UE control with self care, reaching, carrying, lifting, house/yardwork, driving, computer work.      NMR and Therapeutic Activities:    [x] (94237 or 90992) Provided verbal/tactile cueing for activities related to improving balance, coordination, kinesthetic sense, posture, motor skill, proprioception and motor activation to allow for proper function of   [] LE: / Lumbar core, proximal hip and LE with self care and ADLs  [x] UE / Cervical: cervical, postural, scapular, scapulothoracic and UE control with self care, carrying, lifting, driving, computer work.   [] (81159) Gait Re-education- Provided training and instruction to the patient for proper LE, core and proximal hip recruitment and positioning and eccentric body weight control with ambulation re-education including up and down stairs     Home Management Training / Self Care:  [x] (46932) Provided self-care/home management training related to activities of daily living and compensatory training, and/or use of adaptive equipment for improvement with: ADLs and compensatory training, meal preparation, safety procedures and instruction in use of adaptive equipment, including bathing, grooming, dressing, personal hygiene, basic household cleaning and chores.      Home Exercise Program:    [x] (88280) Reviewed/Progressed HEP activities related to strengthening, flexibility, endurance, ROM of   [] LE / Lumbar: core, proximal hip and LE for functional self-care, mobility, lifting and ambulation/stair navigation   [] UE / Cervical: cervical, postural, scapular, scapulothoracic and UE control with self care, reaching, carrying, lifting, house/yardwork, driving, computer work  [] (07933)Reviewed/Progressed HEP activities related to improving balance, coordination, kinesthetic sense, posture, motor skill, proprioception of   [] LE: core, proximal hip and LE for self care, mobility, lifting, and ambulation/stair navigation    [] UE / Cervical: cervical, postural,  scapular, scapulothoracic and UE control with self care, reaching, carrying, lifting, house/yardwork, driving, computer work    Manual Treatments:  PROM / STM / Oscillations-Mobs:  G-I, II, III, IV (PA's, Inf., Post.)  [] (78653) Provided manual therapy to mobilize LE, proximal hip and/or LS spine soft tissue/joints for the purpose of modulating pain, promoting relaxation,  increasing ROM, reducing/eliminating soft tissue swelling/inflammation/restriction, improving soft tissue extensibility and allowing for proper ROM for normal function with   [] LE / lumbar: self care, mobility, lifting and ambulation. [] UE / Cervical: self care, reaching, carrying, lifting, house/yardwork, driving, computer work. Modalities:  [] (77307) Vasopneumatic compression: Utilized vasopneumatic compression to decrease edema / swelling for the purpose of improving mobility and quad tone / recruitment which will allow for increased overall function including but not limited to self-care, transfers, ambulation, and ascending / descending stairs. Charges:  Timed Code Treatment Minutes: 48   Total Treatment Minutes: 48     [] EVAL - LOW (09134)   [] EVAL - MOD (23457)  [] EVAL - HIGH (66337)  [] RE-EVAL (52738)  [x] DG(28139) x  1     [] Ionto  [] NMR (47202) x       [] Vaso  [x] Manual (94669) x  1     [] Ultrasound  [x] TA x        [] Mech Traction (23828)  [] Aquatic Therapy x     [] ES (un) (97020):   [] Home Management Training x  [] ES(attended) (73697)   [] Dry Needling 1-2 muscles (21864):  [] Dry Needling 3+ muscles (293616)  [] Group:      [] Other:     GOALS: GOALS:  Patient stated goal: no pain  [] Progressing: [] Met: [] Not Met: [] Adjusted     Therapist goals for Patient:   Short Term Goals: To be achieved in: 2 weeks  1. Independent in HEP and progression per patient tolerance, in order to prevent re-injury. [] Progressing: [] Met: [] Not Met: [] Adjusted  2. Patient will have a decrease in pain to facilitate improvement in movement, function, and ADLs as indicated by Functional Deficits. [] Progressing: [] Met: [] Not Met: [] Adjusted     Long Term Goals: To be achieved in: 6 weeks  1.  FOTO score of at least 50 to assist with reaching prior level of function. [] Progressing: [] Met: [] Not Met: [] Adjusted  2. Patient will demonstrate increased AROM to R shoulder flexion 0-145, abd 0-170, IR 0-70, ER 0-80 to allow for proper joint functioning as indicated by patients Functional Deficits. [] Progressing: [] Met: [] Not Met: [] Adjusted  3. Patient will demonstrate an increase in Strength to R ER mm to 4/5 to allow for proper functional mobility as indicated by patients Functional Deficits. [] Progressing: [] Met: [] Not Met: [] Adjusted  4. Patient will return to functional activities including donning/doffing bra without increased symptoms or restriction. [] Progressing: [] Met: [] Not Met: [] Adjusted  5. Pt will be able to do her hair and makeup without pain or difficulty   [] Progressing: [] Met: [] Not Met: [] Adjusted           Overall Progression Towards Functional goals/ Treatment Progress Update:  [] Patient is progressing as expected towards functional goals listed. [] Progression is slowed due to complexities/Impairments listed. [] Progression has been slowed due to co-morbidities. [x] Plan just implemented, too soon to assess goals progression <30days   [] Goals require adjustment due to lack of progress  [] Patient is not progressing as expected and requires additional follow up with physician  [] Other    Persisting Functional Limitations/Impairments:  [x]Sleeping []Sitting               []Standing []Transfers        []Walking []Kneeling               []Stairs []Squatting / bending   [x]ADLs [x]Reaching  []Lifting  [x]Housework  [x]Driving []Job related tasks  [x]Sports/Recreation []Other:        ASSESSMENT:   Improving AROM and strengthening. Pt reports less pain. Pt demo better posture and mm control with ex - not shrugging shoulders anymore    Continued with reclined vs supine manual therapy due to previous vertigo episode. Abd and IR are still lacking for pt.  Pt cont to benefit from skilled PT services to maximize functional mobility and resume PLOF such as dressing and cooking without severe R shoulder pain. Treatment/Activity Tolerance:  [x] Patient able to complete tx [] Patient limited by fatigue  [] Patient limited by pain  [] Patient limited by other medical complications  [] Other:     Prognosis: [x] Good [] Fair  [] Poor    Patient Requires Follow-up: [x] Yes  [] No  PLAN:RTC strengthening, shoulder AROM/PROM, scapular stabilization ex, postural education, modalities as needed, manual therapy  Frequency/Duration:  2 days per week for 6 Weeks:  INTERVENTIONS:  [x] Therapeutic exercise including: strength training, ROM, for Upper extremity and core   [x]  NMR activation and proprioception for UE, scap and Core   [x] Manual therapy as indicated for shoulder, scapula and spine to include: Dry Needling/IASTM, STM, PROM, Gr I-IV mobilizations, manipulation. [x] Modalities as needed that may include: thermal agents, E-stim, Biofeedback, US, iontophoresis as indicated  [x] Patient education on joint protection, postural re-education, activity modification, progression of HEP. [x] Continue per plan of care [] Alter current plan (see comments)  [] Plan of care initiated [] Hold pending MD visit [] Discharge    Electronically signed by: Pa Park, PT, PT    Note: If patient does not return for scheduled/ recommended follow up visits, this note will serve as a discharge from care along with most recent update on progress.

## 2022-11-03 ENCOUNTER — OFFICE VISIT (OUTPATIENT)
Dept: INTERNAL MEDICINE CLINIC | Age: 72
End: 2022-11-03
Payer: MEDICARE

## 2022-11-03 ENCOUNTER — HOSPITAL ENCOUNTER (OUTPATIENT)
Dept: PHYSICAL THERAPY | Age: 72
Setting detail: THERAPIES SERIES
Discharge: HOME | End: 2022-11-03
Payer: MEDICARE

## 2022-11-03 VITALS
HEIGHT: 59 IN | DIASTOLIC BLOOD PRESSURE: 60 MMHG | BODY MASS INDEX: 40.32 KG/M2 | WEIGHT: 200 LBS | SYSTOLIC BLOOD PRESSURE: 122 MMHG | HEART RATE: 64 BPM

## 2022-11-03 DIAGNOSIS — Z91.81 AT HIGH RISK FOR FALLS: ICD-10-CM

## 2022-11-03 DIAGNOSIS — E11.69 DIABETES MELLITUS TYPE 2 IN OBESE (HCC): Primary | ICD-10-CM

## 2022-11-03 DIAGNOSIS — D64.9 ANEMIA, UNSPECIFIED TYPE: ICD-10-CM

## 2022-11-03 DIAGNOSIS — N18.31 STAGE 3A CHRONIC KIDNEY DISEASE (HCC): ICD-10-CM

## 2022-11-03 DIAGNOSIS — D75.9 BONE MARROW DISORDER: ICD-10-CM

## 2022-11-03 DIAGNOSIS — I25.10 CAD, MULTIPLE VESSEL: ICD-10-CM

## 2022-11-03 DIAGNOSIS — R35.0 URINARY FREQUENCY: ICD-10-CM

## 2022-11-03 DIAGNOSIS — I25.119 CORONARY ARTERY DISEASE INVOLVING NATIVE CORONARY ARTERY OF NATIVE HEART WITH ANGINA PECTORIS (HCC): ICD-10-CM

## 2022-11-03 DIAGNOSIS — Z23 NEED FOR PNEUMOCOCCAL VACCINATION: ICD-10-CM

## 2022-11-03 DIAGNOSIS — I21.4 NSTEMI (NON-ST ELEVATED MYOCARDIAL INFARCTION) (HCC): ICD-10-CM

## 2022-11-03 DIAGNOSIS — E66.9 DIABETES MELLITUS TYPE 2 IN OBESE (HCC): Primary | ICD-10-CM

## 2022-11-03 DIAGNOSIS — E66.01 CLASS 3 SEVERE OBESITY DUE TO EXCESS CALORIES WITH SERIOUS COMORBIDITY AND BODY MASS INDEX (BMI) OF 40.0 TO 44.9 IN ADULT (HCC): ICD-10-CM

## 2022-11-03 DIAGNOSIS — G47.33 OSA (OBSTRUCTIVE SLEEP APNEA): ICD-10-CM

## 2022-11-03 DIAGNOSIS — I10 ESSENTIAL HYPERTENSION: ICD-10-CM

## 2022-11-03 DIAGNOSIS — Z95.0 PACEMAKER: ICD-10-CM

## 2022-11-03 DIAGNOSIS — I48.0 PAROXYSMAL ATRIAL FIBRILLATION (HCC): ICD-10-CM

## 2022-11-03 LAB
BILIRUBIN, POC: NORMAL
BLOOD URINE, POC: NORMAL
CLARITY, POC: CLEAR
COLOR, POC: YELLOW
GLUCOSE URINE, POC: 500
KETONES, POC: NORMAL
LEUKOCYTE EST, POC: NORMAL
NITRITE, POC: NORMAL
PH, POC: 6
PROTEIN, POC: NORMAL
SPECIFIC GRAVITY, POC: 1.01
UROBILINOGEN, POC: 0.2

## 2022-11-03 PROCEDURE — 97110 THERAPEUTIC EXERCISES: CPT

## 2022-11-03 PROCEDURE — 2022F DILAT RTA XM EVC RTNOPTHY: CPT | Performed by: NURSE PRACTITIONER

## 2022-11-03 PROCEDURE — G8399 PT W/DXA RESULTS DOCUMENT: HCPCS | Performed by: NURSE PRACTITIONER

## 2022-11-03 PROCEDURE — 99204 OFFICE O/P NEW MOD 45 MIN: CPT | Performed by: NURSE PRACTITIONER

## 2022-11-03 PROCEDURE — G8427 DOCREV CUR MEDS BY ELIG CLIN: HCPCS | Performed by: NURSE PRACTITIONER

## 2022-11-03 PROCEDURE — G0009 ADMIN PNEUMOCOCCAL VACCINE: HCPCS | Performed by: NURSE PRACTITIONER

## 2022-11-03 PROCEDURE — 97140 MANUAL THERAPY 1/> REGIONS: CPT

## 2022-11-03 PROCEDURE — 1123F ACP DISCUSS/DSCN MKR DOCD: CPT | Performed by: NURSE PRACTITIONER

## 2022-11-03 PROCEDURE — 97530 THERAPEUTIC ACTIVITIES: CPT

## 2022-11-03 PROCEDURE — G8417 CALC BMI ABV UP PARAM F/U: HCPCS | Performed by: NURSE PRACTITIONER

## 2022-11-03 PROCEDURE — 90677 PCV20 VACCINE IM: CPT | Performed by: NURSE PRACTITIONER

## 2022-11-03 PROCEDURE — 1090F PRES/ABSN URINE INCON ASSESS: CPT | Performed by: NURSE PRACTITIONER

## 2022-11-03 PROCEDURE — 3051F HG A1C>EQUAL 7.0%<8.0%: CPT | Performed by: NURSE PRACTITIONER

## 2022-11-03 PROCEDURE — 1036F TOBACCO NON-USER: CPT | Performed by: NURSE PRACTITIONER

## 2022-11-03 PROCEDURE — 3078F DIAST BP <80 MM HG: CPT | Performed by: NURSE PRACTITIONER

## 2022-11-03 PROCEDURE — 3074F SYST BP LT 130 MM HG: CPT | Performed by: NURSE PRACTITIONER

## 2022-11-03 PROCEDURE — 3017F COLORECTAL CA SCREEN DOC REV: CPT | Performed by: NURSE PRACTITIONER

## 2022-11-03 PROCEDURE — 81002 URINALYSIS NONAUTO W/O SCOPE: CPT | Performed by: NURSE PRACTITIONER

## 2022-11-03 PROCEDURE — G8484 FLU IMMUNIZE NO ADMIN: HCPCS | Performed by: NURSE PRACTITIONER

## 2022-11-03 RX ORDER — OCTREOTIDE ACETATE 100 UG/ML
INJECTION, SOLUTION INTRAVENOUS; SUBCUTANEOUS
COMMUNITY

## 2022-11-03 RX ORDER — DAPAGLIFLOZIN 10 MG/1
TABLET, FILM COATED ORAL
COMMUNITY
Start: 2022-09-26

## 2022-11-03 RX ORDER — ISOSORBIDE MONONITRATE 60 MG/1
TABLET, EXTENDED RELEASE ORAL DAILY
COMMUNITY
End: 2022-11-30 | Stop reason: SDUPTHER

## 2022-11-03 SDOH — ECONOMIC STABILITY: FOOD INSECURITY: WITHIN THE PAST 12 MONTHS, THE FOOD YOU BOUGHT JUST DIDN'T LAST AND YOU DIDN'T HAVE MONEY TO GET MORE.: NEVER TRUE

## 2022-11-03 SDOH — ECONOMIC STABILITY: FOOD INSECURITY: WITHIN THE PAST 12 MONTHS, YOU WORRIED THAT YOUR FOOD WOULD RUN OUT BEFORE YOU GOT MONEY TO BUY MORE.: NEVER TRUE

## 2022-11-03 ASSESSMENT — ENCOUNTER SYMPTOMS
SHORTNESS OF BREATH: 0
COUGH: 0
CHEST TIGHTNESS: 0
WHEEZING: 0

## 2022-11-03 ASSESSMENT — SOCIAL DETERMINANTS OF HEALTH (SDOH): HOW HARD IS IT FOR YOU TO PAY FOR THE VERY BASICS LIKE FOOD, HOUSING, MEDICAL CARE, AND HEATING?: NOT HARD AT ALL

## 2022-11-03 ASSESSMENT — PATIENT HEALTH QUESTIONNAIRE - PHQ9
SUM OF ALL RESPONSES TO PHQ QUESTIONS 1-9: 0
2. FEELING DOWN, DEPRESSED OR HOPELESS: 0
SUM OF ALL RESPONSES TO PHQ QUESTIONS 1-9: 0

## 2022-11-03 NOTE — PROGRESS NOTES
11/3/2022    This is a 71 y.o. female   Chief Complaint   Patient presents with    New Patient     Previous PCP Miller Ledbetter, DO     HPI    Here to establish care. [facility] nurse, SEVERINO CELESTE home care - Romulo Gabriel recommended us to the pt. No longer getting HHC. Had it after an admission. T2DM- last A1c was 6.9 which was early Sept. Report home glucose is up and down-   160-170 at home, worse was 210. Is on pioglitazone. No longer on metformin. Recently started on farxiga in Sept and having increased urination (frequency and urgency). Denies any dysuria. CKD and microabluminuria - saw nephrology (Dr. Jose Krueger) in the past about 20 years ago. HTN, HLD, CAD, Afib and pacemaker - seeing Dr. Rani Dozier and Dr. Armand Becerra. Checking weight daily. Seeing Dr. Carmita Gabriel for GERD, hiatal hernia and concern for GI bleed. Had repair of the hernia at some point. Also had 2 abd hernia repair in 2019. Anemia and bone marrow disorder - seeing Dr. Milly Rich and sometimes needs iron infusions. Going to PT for her right shoulder after a fall. Past Medical History:   Diagnosis Date    Anemia     Anesthesia     trouble after egd 10/2016.   Anxiety and severe tremors after AF ablation 8/2018-responded well to Ativan    Anesthesia complication     flailing, yelling when waking up from anesthesia-ativan helps ([id number]/19: ativans works within seconds of administration)    Atrial fibrillation ([address] 10/01/2017    A. tamica with SVR    Basal cell carcinoma 08/2021    removed from her back    CAD (coronary artery disease)     Chest pain 10/01/2017    Chronic kidney disease     ???    COVID-19 01/2022    Depression     GERD (gastroesophageal reflux disease)     Glaucoma     Hiatal hernia     Hyperlipidemia     Hypertension     Migraines, neuralgic     Morbid obesity ([address]     Osteoarthritis     Sleep apnea     uses bi-pap    Type II or unspecified type diabetes mellitus without mention of complication, not stated as uncontrolled     Unspecified sleep apnea     bipap    Urinary incontinence     UTI (urinary tract infection)      Past Surgical History:   Procedure Laterality Date    ABDOMEN SURGERY N/A 09/01/2020    EXPLORATION OF ABDOMINAL WOUND performed by Nikki Meneses MD at [address]  2021    lumbar with Delgado    BREAST LUMPECTOMY      CARDIAC CATHETERIZATION      CARDIAC SURGERY  02/22/1999    quadruple by-pass, [facility]    COLONOSCOPY      COLONOSCOPY  10/08/2018    1 polyp removed    COLONOSCOPY N/A 10/08/2018    COLONOSCOPY POLYPECTOMY SNARE/COLD BIOPSY performed by Eleazar Pires MD at [facility] N/A 05/03/2022    COLONOSCOPY  ABLATION performed by Eleazar Pires MD at [facility]    COSMETIC SURGERY      face lift    ENTEROSCOPY N/A 02/26/2019    ENTEROSCOPY performed by Eleazar Pires MD at [facility], operated by [facility]  12/20/2011    hiatal hernia repair    GASTRIC BAND REMOVAL  11/03/2016    laparoscopic     HAMMER TOE SURGERY      [facility] INJECTION PROCEDURE FOR SACROILIAC JOINT Left 12/17/2018    SACROILIAC JOINT INJECTION ON THE LEFT WITH FLUOROSCOPY performed by Scottie Calhoun MD at [facility]    Left and Right knees, [facility]    KNEE ARTHROSCOPY      1982 left    ORIF HUMERUS DECOMPRESSION Left 02/25/2016    OPEN REDUCTION INTERNAL FIXATION LEFT PROXIMAL HUMERUS    OVARY REMOVAL Bilateral 1997    PACEMAKER PLACEMENT  05/2019    VT NJX DX/THER SBST INTRLMNR CRV/THRC W/IMG GDN N/A 11/05/2018    MIDLINE L4/L5 LUMBAR INTERLAMINAR EPIDURAL STEROID INJECTION WITH FLUOROSCOPY performed by Scottie Calhoun MD at [address]  10/30/2015    UPPER GASTROINTESTINAL ENDOSCOPY  10/14/2016    with biopsy    UPPER GASTROINTESTINAL ENDOSCOPY N/A 03/22/2022    EGD BIOPSY performed by Eleazar Pires MD at [address] [place] [address] N/A 03/22/2022    EGD DILATION BALLOON performed by Eleazar Pires MD at [address] [place] [address] N/A 05/03/2022    ENTEROSCOPY WITH INTERVENTION performed by Eleazar Pires MD at [place] [address] N/A 10/29/2019    OPEN VENTRAL HERNIA REPAIR WITH  MESH performed by Nikki Meneses MD at [address] [place] [address] History     Socioeconomic History    Marital status:      Spouse name: Not on file    Number of children: Not on file    Years of education: Not on file    Highest education level: Not on file   Occupational History    Not on file   Tobacco Use    Smoking status: Never    Smokeless tobacco: Never   Vaping Use    Vaping Use: Never used   Substance and Sexual Activity    Alcohol use: No     Alcohol/week: 0.0 standard drinks    Drug use: No    Sexual activity: Yes     Partners: Male   Other Topics Concern    Not on file   Social History Narrative    Not on file     Social Determinants of Health     Financial Resource Strain: Low Risk     Difficulty of Paying Living Expenses: Not hard at all   Food Insecurity: No Food Insecurity    Worried About Running Out of Food in the Last Year: Never true    Ran Out of Food in the Last Year: Never true   Transportation Needs: Not on file   Physical Activity: Not on file   Stress: Not on file   Social Connections: Not on file   Intimate Partner Violence: Not on file   Housing Stability: Not on file       Family History   Problem Relation Age of Onset    Cancer Mother         ovarian, colon    High Blood Pressure Mother     Heart Disease Father     High Blood Pressure Father     Stroke Sister         paralysed on right side B/C of stroke       Current Outpatient Medications   Medication Sig Dispense Refill    metoprolol tartrate (LOPRESSOR) 25 MG tablet Take 0.5 tablets by mouth 2 times daily 90 tablet 3 gabapentin (NEURONTIN) 400 MG capsule Take 400 mg by mouth 3 times daily. SANDOSTATIN LAR DEPOT 20 MG injection INJECT 1 KIT IN THE MUSCLE MONTHLY      clopidogrel (PLAVIX) 75 MG tablet Take 1 tablet by mouth in the morning. 90 tablet 1    losartan (COZAAR) 25 MG tablet Take 1 tablet by mouth daily 30 tablet 0    amLODIPine (NORVASC) 2.5 MG tablet Take 1 tablet by mouth 2 times daily 30 tablet 0    pantoprazole (PROTONIX) 40 MG tablet Take 1 tablet by mouth 2 times daily (before meals) 60 tablet 0    fexofenadine (ALLEGRA) 180 MG tablet Take 180 mg by mouth daily      Epoetin Leonard-epbx (RETACRIT IJ) Inject as directed Every other week      tiZANidine (ZANAFLEX) 4 MG tablet Take 1 tablet by mouth nightly as needed (leg spasms) 20 tablet 0    zinc sulfate (ZINCATE) 220 (50 Zn) MG capsule Take 50 mg by mouth 4 times daily       potassium chloride (KLOR-CON M) 20 MEQ extended release tablet Take 1 tablet by mouth daily 90 tablet 3    aspirin 81 MG tablet Take 81 mg by mouth daily      furosemide (LASIX) 40 MG tablet Take 1 tablet by mouth daily 30 tablet 5    Cyanocobalamin (VITAMIN B-12 PO) Take 3,000 mcg by mouth daily      estradiol (ESTRACE) 0.1 MG/GM vaginal cream Place 2 g vaginally See Admin Instructions Twice a week      pioglitazone (ACTOS) 15 MG tablet Take 15 mg by mouth daily      vitamin D (CHOLECALCIFEROL) 1000 UNIT TABS tablet Take 1,000 Units by mouth daily       TraZODone HCl (DESYREL PO) Take 50 mg by mouth nightly       Calcium Citrate-Vitamin D (CALCIUM CITRATE + D PO) Take 1 tablet by mouth daily       SLOW-MAG 71.5-119 MG TBEC tablet Take 1 tablet by mouth daily 143 mg  5    rosuvastatin (CRESTOR) 40 MG tablet Take 40 mg by mouth every evening      ezetimibe (ZETIA) 10 MG tablet Take 5 mg by mouth nightly       escitalopram (LEXAPRO) 20 MG tablet Take 20 mg by mouth daily.       FARXIGA 10 MG tablet TAKE 1 TABLET BY MOUTH DAILY      isosorbide mononitrate (IMDUR) 60 MG extended release tablet Take by mouth daily      octreotide (SANDOSTATIN) 100 MCG/ML SOLN injection Inject 20 mg. Indications: Receives monthly injections as of 10-26-22 BK      SUMAtriptan (IMITREX) 100 MG tablet Take 1 tablet by mouth daily as needed for Migraine (Patient not taking: No sig reported) 30 tablet 3    nitroGLYCERIN (NITROLINGUAL) 0.4 MG/SPRAY spray Place 1 spray under the tongue every 5 minutes as needed. As directed for chest pain  (Patient not taking: No sig reported)       No current facility-administered medications for this visit. Allergies   Allergen Reactions    Albuterol Other (See Comments)     Other reaction(s): Chest Pain  Crushing chest pain    Niacin Er Itching and Rash    Other     Heparin Other (See Comments)     Caused bruises and hematomas immediately when drip started.   MARKED BRUISING/HEMATOMAS    Niacin     Avelox [Moxifloxacin Hcl In Nacl] Rash    Moxifloxacin Rash    Proventil [Albuterol Sulfate] Palpitations    Tagamet [Cimetidine] Rash       Admission on 09/24/2022, Discharged on 09/24/2022   Component Date Value Ref Range Status    Ventricular Rate 09/24/2022 60  BPM Final    Atrial Rate 09/24/2022 60  BPM Final    P-R Interval 09/24/2022 266  ms Final    QRS Duration 09/24/2022 92  ms Final    Q-T Interval 09/24/2022 446  ms Final    QTc Calculation (Bazett) 09/24/2022 446  ms Final    P Axis 09/24/2022 65  degrees Final    R Axis 09/24/2022 26  degrees Final    T Axis 09/24/2022 55  degrees Final    Diagnosis 09/24/2022 Electronic atrial pacemakerLow voltage QRSCannot rule out Anterior infarct , age undeterminedAbnormal ECGConfirmed by Smita Mix MD, Annika Gambino ([id number]) on 9/24/2022 10:29:49 AM   Final    Sodium 09/24/2022 137  136 - 145 mmol/L Final    Potassium reflex Magnesium 09/24/2022 4.5  3.5 - 5.1 mmol/L Final    Chloride 09/24/2022 103  99 - 110 mmol/L Final    CO2 09/24/2022 24  21 - 32 mmol/L Final    Anion Gap 09/24/2022 10  3 - 16 Final    Glucose 09/24/2022 167 (A)  70 - 99 mg/dL Final BUN 09/24/2022 25 (A)  7 - 20 mg/dL Final    Creatinine 09/24/2022 1.0  0.6 - 1.2 mg/dL Final    GFR Non- 09/24/2022 55 (A)  >60 Final    Comment: >60 mL/min/1.73m2 EGFR, calc. for ages 25 and older using the  MDRD formula (not corrected for weight), is valid for stable  renal function. GFR  09/24/2022 >60  >60 Final    Comment: Chronic Kidney Disease: less than 60 ml/min/1.73 sq.m. Kidney Failure: less than 15 ml/min/1.73 sq.m. Results valid for patients 18 years and older. Calcium 09/24/2022 9.5  8.3 - 10.6 mg/dL Final    WBC 09/24/2022 5.7  4.0 - 11.0 K/uL Final    RBC 09/24/2022 3.30 (A)  4.00 - 5.20 M/uL Final    Hemoglobin 09/24/2022 9.2 (A)  12.0 - 16.0 g/dL Final    Hematocrit 09/24/2022 28.9 (A)  36.0 - 48.0 % Final    MCV 09/24/2022 87.6  80.0 - 100.0 fL Final    MCH 09/24/2022 28.0  26.0 - 34.0 pg Final    MCHC 09/24/2022 31.9  31.0 - 36.0 g/dL Final    RDW 09/24/2022 21.3 (A)  12.4 - 15.4 % Final    Platelets 53/13/3506 263  135 - 450 K/uL Final    MPV 09/24/2022 7.7  5.0 - 10.5 fL Final    Neutrophils % 09/24/2022 58.5  % Final    Lymphocytes % 09/24/2022 24.0  % Final    Monocytes % 09/24/2022 14.7  % Final    Eosinophils % 09/24/2022 2.3  % Final    Basophils % 09/24/2022 0.5  % Final    Neutrophils Absolute 09/24/2022 3.3  1.7 - 7.7 K/uL Final    Lymphocytes Absolute 09/24/2022 1.4  1.0 - 5.1 K/uL Final    Monocytes Absolute 09/24/2022 0.8  0.0 - 1.3 K/uL Final    Eosinophils Absolute 09/24/2022 0.1  0.0 - 0.6 K/uL Final    Basophils Absolute 09/24/2022 0.0  0.0 - 0.2 K/uL Final    Troponin 09/24/2022 <0.01  <0.01 ng/mL Final    Methodology by Troponin T     Review of Systems   Constitutional:  Negative for chills, fatigue and fever. Respiratory:  Negative for cough, chest tightness, shortness of breath and wheezing. Cardiovascular:  Negative for chest pain, palpitations and leg swelling.    Neurological:  Negative for dizziness, tremors, light-headedness and headaches. /60   Pulse 64   Ht 4' 11\" (1.499 m)   Wt 200 lb (90.7 kg)   BMI 40.40 kg/m²     Physical Exam  Constitutional:       General: She is not in acute distress. Appearance: Normal appearance. She is obese. She is not ill-appearing. HENT:      Head: Normocephalic and atraumatic. Cardiovascular:      Rate and Rhythm: Normal rate. Heart sounds: Normal heart sounds. Pulmonary:      Effort: Pulmonary effort is normal. No respiratory distress. Breath sounds: Normal breath sounds. Skin:     General: Skin is warm and dry. Neurological:      Mental Status: She is alert and oriented to person, place, and time. Mental status is at baseline. Psychiatric:         Mood and Affect: Mood normal.         Behavior: Behavior normal.     Diagnosis  Assessment and Plan  Diabetes mellitus type 2 in obese (HCC)  Chronic, controlled, last A1c was 6.9 at previous PCP.   - Lipid Panel; Future  - Hemoglobin A1C; Future    At high risk for falls/ shoulder pain   Improving with PT     Stage 3a chronic kidney disease (HCC)  Chronic, has been controlled on most recent lab work. Previously saw Dr. Cuate Esparza with nephrology, not recently   - Comprehensive Metabolic Panel; Future    Anemia, unspecified type/ Bone marrow disorder  Chronic, controlled. Continue seeing Dr. Bo Washington with [facility].   - CBC; Future    Urinary frequency  Intermittent and uncontrolled. Will treat if culture is positive   - POCT Urinalysis no Micro  - Culture, Urine    CAD, multiple vessel/ NSTEMI (non-ST elevated myocardial infarction) ([facility])/ Coronary artery disease involving native coronary artery of native heart with angina pectoris (HCC)/ Essential hypertension  Chronic controlled. Continue to follow with cardiology, Dr. Bo Sandoval.       Paroxysmal atrial fibrillation ([facility])/ Pacemaker  Chronic, controlled, continue to follow with EP, Dr. Tyler Joe 3 severe obesity due to excess calories with serious comorbidity and body mass index (BMI) of 40.0 to 44.9 in adult [facility])  Chronic, uncontrolled. Work on healthy diet/ exercise.      RAHUL (obstructive sleep apnea)  Chronic, controlled on bipap, continue seeing sleep specialist.     Need for pneumococcal vaccination  - Pneumococcal, PCV20, PREVNAR 21, (age 25 yrs+), IM, PF    Electronically signed by BILL Hubbard CNP on 11/3/2022 at 5:03 PM    On the basis of positive falls risk screening, assessment and plan is as follows: home safety tips provided, complete PT  .

## 2022-11-03 NOTE — FLOWSHEET NOTE
[address] [facility]  Phone: [phone]   Fax: [phone]    Physical Therapy Daily Treatment Note    Date:  2022     Patient Name:  German Cerrato    :  1950  MRN: [id number]  Medical Diagnosis:  Other injury of muscle(s) and tendon(s) of the rotator cuff of right shoulder, initial encounter [S43.449L]  Treatment Diagnosis: PT diagnosis: RTC weakness, decreased shoulder AROM, pain limiting functional mobility                                       Insurance/Certification information:  Insurance information:  Medicare 80/20 plan, no auth, no hard max     Physician Information:  Rick Lorenzo MD   Plan of care signed (Y/N): []  Yes [x]  No  Cosign requested however has not been completed yet    Date of Patient follow up with Physician: ?     Progress Report: []  Yes  [x]  No     Date Range for reporting period:  Beginnin/3/2022  Ending:     Progress report due (10 Rx/or 30 days whichever is less): visit #10 or       Recertification due (POC duration/ or 90 days whichever is less): visit #20 or 22     Visit # Insurance Allowable Auth required? Date Range    [place] []  Yes  [x]  No        Units approved Units used Date Range            Latex Allergy:  [x]NO      []YES  Preferred Language for Healthcare:   [x]English       []other:    Functional Scale:           Date assessed:  TO physical FS primary measure score = 38; risk adjusted = 39  10/11/22    Pain level:  0/10     SUBJECTIVE: 11/3:  Pt with no reports of pain this date; states she is doing well overall. Reports at least 50% improvement since initiation of PT.      10/25: Pt states that she is able to move her R UE/shoulder more. Pt reports that she had severe vertigo after last PT - vomited when she got home. Her  got her meclizine, and she felt better after only taking one pill.   Pt states she was lying flat last PT visit and that's why she had vertigo. OBJECTIVE: 11/3:  Pt with significant rounded shoulders and slight forward head. UT tightness noted B and limited cervical rotation. Noted improvement in AROM R shoulder  11/1 AROM R shoulder elevation 0-100      RESTRICTIONS/PRECAUTIONS: Precautions/ Contra-indications: DM, HTN, pacemaker, CABG x4 '99, two cardiac stents placed 8/15/22, B TKR '94, OA, RA, osteoporosis, LBP with ablations    Exercises/Interventions:     Therapeutic Exercises (22428) Resistance / level Sets/sec Reps Notes   UBE  Fwd/retro 2 min each direction 10/25: pt states she can go faster this date. Pulley: flx, abd   X 10 each    Biceps curls 3# 1 10x B New 10/25, seated          Pec stretch in doorway 2 x 30\"   HEP   HEP   HEP   HEP   Wall slides: flx, abd   X10 ea R Hand in pillowcase   11/1 did not do as pt c/o vertigo with lying down                               Therapeutic Activities (97265)          RTC strengthening: band  Shoulder extension  Shoulder flexion/punch  Shoulder adduction  IR   ER Green band   2  2  2  2  2   X 10 B  X 10 B  X 10 R  X 10 R  X 10 R    Green band:  Mid row  High row  Low row    2  2  2   X 10  X 10  X 10    Weighted yellow ball on wall Cw/ccw  X 10 each at 90 degrees/120 degrees                                       Neuromuscular Re-ed (76491)       Chin tucks seated   X 10 HEP                                      Manual Intervention (08738)       PROM R shoulder in all directions with stretch at end range; caudal glides and post joint mobs. STM to R UT and gentle cervical traction and SOR. X 10min   11/3 Pt in reclined position vs supine due to reports of vertigo after lying in supine                                          Modalities: 11/3:  Pt declined this date; states she uses heat when she gets home    Pt.  Education: 11/3: Verbal cues for proper exercise technique; cues for posture correction    -patient educated on diagnosis, prognosis and expectations for rehab  -all patient questions were answered    Home Exercise Program: 11/3:  No new additions this date  10/20:  added chin tucks; will provide written copy of HEP next visit with added exercises. HEP instruction: Written HEP instructions provided and reviewed   Access Code: [email or web address]  URL: [email or web address]  Date: 10/11/2022  Prepared by: Jeri Handy     Exercises  Shoulder Internal Rotation with Resistance - 3 x daily - 7 x weekly - 1 sets - 10 reps  Shoulder External Rotation Reactive Isometrics - 3 x daily - 7 x weekly - 1 sets - 10 reps         Therapeutic Exercise and NMR EXR  [x] (38722) Provided verbal/tactile cueing for activities related to strengthening, flexibility, endurance, ROM for improvements in  [] LE / Lumbar: LE, proximal hip, and core control with self care, mobility, lifting, ambulation. [x] UE / Cervical: cervical, postural, scapular, scapulothoracic and UE control with self care, reaching, carrying, lifting, house/yardwork, driving, computer work.  [] (89934) Provided verbal/tactile cueing for activities related to improving balance, coordination, kinesthetic sense, posture, motor skill, proprioception to assist with   [] LE / lumbar: LE, proximal hip, and core control in self care, mobility, lifting, ambulation and eccentric single leg control. [] UE / cervical: cervical, scapular, scapulothoracic and UE control with self care, reaching, carrying, lifting, house/yardwork, driving, computer work.   [] (56290) Therapist is in constant attendance of 2 or more patients providing skilled therapy interventions, but not providing any significant amount of measurable one-on-one time to either patient, for improvements in  [] LE / lumbar: LE, proximal hip, and core control in self care, mobility, lifting, ambulation and eccentric single leg control.    [] UE / cervical: cervical, scapular, scapulothoracic and UE control with self care, reaching, carrying, lifting, house/yardwork, driving, computer work.     NMR and Therapeutic Activities:    [x] (36896 or 48834) Provided verbal/tactile cueing for activities related to improving balance, coordination, kinesthetic sense, posture, motor skill, proprioception and motor activation to allow for proper function of   [] LE: / Lumbar core, proximal hip and LE with self care and ADLs  [x] UE / Cervical: cervical, postural, scapular, scapulothoracic and UE control with self care, carrying, lifting, driving, computer work.   [] (35042) Gait Re-education- Provided training and instruction to the patient for proper LE, core and proximal hip recruitment and positioning and eccentric body weight control with ambulation re-education including up and down stairs     Home Management Training / Self Care:  [x] (87485) Provided self-care/home management training related to activities of daily living and compensatory training, and/or use of adaptive equipment for improvement with: ADLs and compensatory training, meal preparation, safety procedures and instruction in use of adaptive equipment, including bathing, grooming, dressing, personal hygiene, basic household cleaning and chores.      Home Exercise Program:    [x] (19911) Reviewed/Progressed HEP activities related to strengthening, flexibility, endurance, ROM of   [] LE / Lumbar: core, proximal hip and LE for functional self-care, mobility, lifting and ambulation/stair navigation   [] UE / Cervical: cervical, postural, scapular, scapulothoracic and UE control with self care, reaching, carrying, lifting, house/yardwork, driving, computer work  [] (96502)Reviewed/Progressed HEP activities related to improving balance, coordination, kinesthetic sense, posture, motor skill, proprioception of   [] LE: core, proximal hip and LE for self care, mobility, lifting, and ambulation/stair navigation    [] UE / Cervical: cervical, postural,  scapular, scapulothoracic and UE control with self care, reaching, carrying, lifting, house/yardwork, driving, computer work    Manual Treatments:  PROM / STM / Oscillations-Mobs:  G-I, II, III, IV (PA's, Inf., Post.)  [x] (10432) Provided manual therapy to mobilize LE, proximal hip and/or LS spine soft tissue/joints for the purpose of modulating pain, promoting relaxation,  increasing ROM, reducing/eliminating soft tissue swelling/inflammation/restriction, improving soft tissue extensibility and allowing for proper ROM for normal function with   [] LE / lumbar: self care, mobility, lifting and ambulation. [x] UE / Cervical: self care, reaching, carrying, lifting, house/yardwork, driving, computer work. Modalities:  [] (32634) Vasopneumatic compression: Utilized vasopneumatic compression to decrease edema / swelling for the purpose of improving mobility and quad tone / recruitment which will allow for increased overall function including but not limited to self-care, transfers, ambulation, and ascending / descending stairs. Charges:  Timed Code Treatment Minutes: 45   Total Treatment Minutes: 45     [] EVAL - LOW (57129)   [] EVAL - MOD (60896)  [] EVAL - HIGH (88140)  [] RE-EVAL (19498)  [x] OV(31127) x  1     [] Ionto  [] NMR (07990) x       [] Vaso  [x] Manual (27177) x  1     [] Ultrasound  [x] TA x        [] Mech Traction (10767)  [] Aquatic Therapy x     [] ES (un) (50631):   [] Home Management Training x  [] ES(attended) (96972)   [] Dry Needling 1-2 muscles (96833):  [] Dry Needling 3+ muscles (665798)  [] Group:      [] Other:      GOALS:  Patient stated goal: no pain  [] Progressing: [] Met: [] Not Met: [] Adjusted     Therapist goals for Patient:   Short Term Goals: To be achieved in: 2 weeks  1. Independent in HEP and progression per patient tolerance, in order to prevent re-injury. [] Progressing: [] Met: [] Not Met: [] Adjusted  2. Patient will have a decrease in pain to facilitate improvement in movement, function, and ADLs as indicated by Functional Deficits.   [] Progressing: [] Met: [] Not Met: [] Adjusted     Long Term Goals: To be achieved in: 6 weeks  1. FOTO score of at least 50 to assist with reaching prior level of function. [] Progressing: [] Met: [] Not Met: [] Adjusted  2. Patient will demonstrate increased AROM to R shoulder flexion 0-145, abd 0-170, IR 0-70, ER 0-80 to allow for proper joint functioning as indicated by patients Functional Deficits. [] Progressing: [] Met: [] Not Met: [] Adjusted  3. Patient will demonstrate an increase in Strength to R ER mm to 4/5 to allow for proper functional mobility as indicated by patients Functional Deficits. [] Progressing: [] Met: [] Not Met: [] Adjusted  4. Patient will return to functional activities including donning/doffing bra without increased symptoms or restriction. [] Progressing: [] Met: [] Not Met: [] Adjusted  5. Pt will be able to do her hair and makeup without pain or difficulty   [] Progressing: [] Met: [] Not Met: [] Adjusted           Overall Progression Towards Functional goals/ Treatment Progress Update:  [] Patient is progressing as expected towards functional goals listed. [] Progression is slowed due to complexities/Impairments listed. [] Progression has been slowed due to co-morbidities. [x] Plan just implemented, too soon to assess goals progression <30days   [] Goals require adjustment due to lack of progress  [] Patient is not progressing as expected and requires additional follow up with physician  [] Other    Persisting Functional Limitations/Impairments:  [x]Sleeping []Sitting               []Standing []Transfers        []Walking []Kneeling               []Stairs []Squatting / bending   [x]ADLs [x]Reaching  []Lifting  [x]Housework  [x]Driving []Job related tasks  [x]Sports/Recreation []Other:        ASSESSMENT:   Improving AROM and strength - decreased pain overall.   Pt reports using R arm more functionally and with more ease   Continued with reclined vs supine manual therapy due to previous vertigo episode. Abd and IR are still lacking for pt. Pt cont to benefit from skilled PT services to maximize functional mobility and resume PLOF such as dressing and cooking without severe R shoulder pain. Treatment/Activity Tolerance:  [x] Patient able to complete tx [] Patient limited by fatigue  [] Patient limited by pain  [] Patient limited by other medical complications  [] Other:     Prognosis: [x] Good [] Fair  [] Poor    Patient Requires Follow-up: [x] Yes  [] No  PLAN:RTC strengthening, shoulder AROM/PROM, scapular stabilization ex, postural education, modalities as needed, manual therapy  Frequency/Duration:  2 days per week for 6 Weeks:  INTERVENTIONS:  [x] Therapeutic exercise including: strength training, ROM, for Upper extremity and core   [x]  NMR activation and proprioception for UE, scap and Core   [x] Manual therapy as indicated for shoulder, scapula and spine to include: Dry Needling/IASTM, STM, PROM, Gr I-IV mobilizations, manipulation. [x] Modalities as needed that may include: thermal agents, E-stim, Biofeedback, US, iontophoresis as indicated  [x] Patient education on joint protection, postural re-education, activity modification, progression of HEP. [x] Continue per plan of care [] Alter current plan (see comments)  [] Plan of care initiated [] Hold pending MD visit [] Discharge    Electronically signed by: Ruma Fiore, PT, DPT    Note: If patient does not return for scheduled/ recommended follow up visits, this note will serve as a discharge from care along with most recent update on progress.

## 2022-11-06 LAB
ORGANISM: ABNORMAL
URINE CULTURE, ROUTINE: ABNORMAL

## 2022-11-07 ENCOUNTER — TELEPHONE (OUTPATIENT)
Dept: INTERNAL MEDICINE CLINIC | Age: 72
End: 2022-11-07

## 2022-11-07 RX ORDER — SULFAMETHOXAZOLE AND TRIMETHOPRIM 800; 160 MG/1; MG/1
1 TABLET ORAL 2 TIMES DAILY
Qty: 6 TABLET | Refills: 0 | Status: SHIPPED | OUTPATIENT
Start: 2022-11-07 | End: 2022-11-10

## 2022-11-07 NOTE — TELEPHONE ENCOUNTER
See result note -     \"Good afternoon,   Your urine culture is positive for bacteria, I have sent over a short course of antibiotics to start. Drink lots of water to flush your kidneys. Thanks!  Jeremie Bagley"

## 2022-11-07 NOTE — TELEPHONE ENCOUNTER
Pt calling saw you last week and culture was sent  out --she got [place] message yesterday saying she had an infection---wanting to know what to do about it---please call the pt. Thanks.

## 2022-11-08 ENCOUNTER — HOSPITAL ENCOUNTER (OUTPATIENT)
Dept: PHYSICAL THERAPY | Age: 72
Setting detail: THERAPIES SERIES
Discharge: HOME | End: 2022-11-08
Payer: MEDICARE

## 2022-11-08 PROCEDURE — 97140 MANUAL THERAPY 1/> REGIONS: CPT

## 2022-11-08 PROCEDURE — 97110 THERAPEUTIC EXERCISES: CPT

## 2022-11-08 PROCEDURE — 97530 THERAPEUTIC ACTIVITIES: CPT

## 2022-11-08 NOTE — FLOWSHEET NOTE
[address] [facility] Physical Therapy  Phone: [phone]   Fax: [phone]    Physical Therapy Daily Treatment Note    Date:  2022     Patient Name:  Rommel Weston    :  1950  MRN: [id number]  Medical Diagnosis:  Other injury of muscle(s) and tendon(s) of the rotator cuff of right shoulder, initial encounter [S48.666B]  Treatment Diagnosis: PT diagnosis: RTC weakness, decreased shoulder AROM, pain limiting functional mobility                                       Insurance/Certification information:  Insurance information:  Medicare 80/20 plan, no auth, no hard max     Physician Information:  Beto Ricketts MD   Plan of care signed (Y/N): []  Yes [x]  No  Cosign requested however has not been completed yet    Date of Patient follow up with Physician: ?     Progress Report: []  Yes  [x]  No     Date Range for reporting period:  Beginnin2022  Ending:     Progress report due (10 Rx/or 30 days whichever is less): visit #10 or       Recertification due (POC duration/ or 90 days whichever is less): visit #20 or 22     Visit # Insurance Allowable Auth required? Date Range    [place] []  Yes  [x]  No        Units approved Units used Date Range            Latex Allergy:  [x]NO      []YES  Preferred Language for Healthcare:   [x]English       []other:    Functional Scale:           Date assessed:  TO physical FS primary measure score = 38; risk adjusted = 39  10/11/22    Pain level:  0/10     SUBJECTIVE:  : Pt cannot believe how much better her shoulder is. She can don/doff bra without pain or difficulty now. 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 11/3:  Pt with no reports of pain this date; states she is doing well overall. Reports at least 50% improvement since initiation of PT.      10/25: Pt states that she is able to move her R UE/shoulder more.   Pt reports that she had severe vertigo after last PT - vomited when she got home. Her  got her meclizine, and she felt better after only taking one pill. Pt states she was lying flat last PT visit and that's why she had vertigo. OBJECTIVE: 11/3:  Pt with significant rounded shoulders and slight forward head. UT tightness noted B and limited cervical rotation. Noted improvement in AROM R shoulder  11/1 AROM R shoulder elevation 0-100      RESTRICTIONS/PRECAUTIONS: Precautions/ Contra-indications: DM, HTN, pacemaker, CABG x4 '99, two cardiac stents placed 8/15/22, B TKR '94, OA, RA, osteoporosis, LBP with ablations    Exercises/Interventions:     Therapeutic Exercises (04618) Resistance / level Sets/sec Reps Notes   UBE  Fwd/retro 2 min each direction 10/25: pt states she can go faster this date. Pulley: flx, abd   X 10 each    Biceps curls 3# 1 10x B New 10/25, seated          Pec stretch in doorway 2 x 30\"   HEP   HEP   HEP   HEP   HEP   11/1 did not do as pt c/o vertigo with lying down                               Therapeutic Activities (71370)          RTC strengthening: band  Shoulder extension  Shoulder flexion/punch  IR   ER Green band   2  2  2  2   X 10 B  X 10 R/L  X 10 R  X 10 R    Green band:  Mid row  High row  Low row    2  2  2   X 10  X 10  X 10    Weighted yellow ball on wall Cw/ccw  X 10 each at 90 degrees/120 degrees                                       Neuromuscular Re-ed (70112)       HEP                                      Manual Intervention (68371)       PROM R shoulder in all directions with stretch at end range; caudal glides and post joint mobs. STM to R UT and gentle cervical traction and SOR. X 10min   11/3 Pt in reclined position vs supine due to reports of vertigo after lying in supine                                          Modalities: 11/3:  Pt declined this date; states she uses heat when she gets home    Pt.  Education: 11/3: Verbal cues for proper exercise technique; cues for posture correction    -patient educated on diagnosis, prognosis and expectations for rehab  -all patient questions were answered    Home Exercise Program: 11/3:  No new additions this date  10/20:  added chin tucks; will provide written copy of HEP next visit with added exercises. HEP instruction: Written HEP instructions provided and reviewed   Access Code: [email or web address]  URL: [email or web address]/  Date: 10/11/2022  Prepared by: Jazmin Delong     Exercises  Shoulder Internal Rotation with Resistance - 3 x daily - 7 x weekly - 1 sets - 10 reps  Shoulder External Rotation Reactive Isometrics - 3 x daily - 7 x weekly - 1 sets - 10 reps         Therapeutic Exercise and NMR EXR  [x] (13078) Provided verbal/tactile cueing for activities related to strengthening, flexibility, endurance, ROM for improvements in  [] LE / Lumbar: LE, proximal hip, and core control with self care, mobility, lifting, ambulation. [x] UE / Cervical: cervical, postural, scapular, scapulothoracic and UE control with self care, reaching, carrying, lifting, house/yardwork, driving, computer work.  [] (21412) Provided verbal/tactile cueing for activities related to improving balance, coordination, kinesthetic sense, posture, motor skill, proprioception to assist with   [] LE / lumbar: LE, proximal hip, and core control in self care, mobility, lifting, ambulation and eccentric single leg control. [] UE / cervical: cervical, scapular, scapulothoracic and UE control with self care, reaching, carrying, lifting, house/yardwork, driving, computer work.   [] (36693) Therapist is in constant attendance of 2 or more patients providing skilled therapy interventions, but not providing any significant amount of measurable one-on-one time to either patient, for improvements in  [] LE / lumbar: LE, proximal hip, and core control in self care, mobility, lifting, ambulation and eccentric single leg control.    [] UE / cervical: cervical, scapular, scapulothoracic and UE control with self care, reaching, carrying, lifting, house/yardwork, driving, computer work. NMR and Therapeutic Activities:    [x] (62052 or 47267) Provided verbal/tactile cueing for activities related to improving balance, coordination, kinesthetic sense, posture, motor skill, proprioception and motor activation to allow for proper function of   [] LE: / Lumbar core, proximal hip and LE with self care and ADLs  [x] UE / Cervical: cervical, postural, scapular, scapulothoracic and UE control with self care, carrying, lifting, driving, computer work.   [] (71733) Gait Re-education- Provided training and instruction to the patient for proper LE, core and proximal hip recruitment and positioning and eccentric body weight control with ambulation re-education including up and down stairs     Home Management Training / Self Care:  [x] (47237) Provided self-care/home management training related to activities of daily living and compensatory training, and/or use of adaptive equipment for improvement with: ADLs and compensatory training, meal preparation, safety procedures and instruction in use of adaptive equipment, including bathing, grooming, dressing, personal hygiene, basic household cleaning and chores.      Home Exercise Program:    [x] (38837) Reviewed/Progressed HEP activities related to strengthening, flexibility, endurance, ROM of   [] LE / Lumbar: core, proximal hip and LE for functional self-care, mobility, lifting and ambulation/stair navigation   [] UE / Cervical: cervical, postural, scapular, scapulothoracic and UE control with self care, reaching, carrying, lifting, house/yardwork, driving, computer work  [] (48185)Reviewed/Progressed HEP activities related to improving balance, coordination, kinesthetic sense, posture, motor skill, proprioception of   [] LE: core, proximal hip and LE for self care, mobility, lifting, and ambulation/stair navigation    [] UE / Cervical: cervical, postural, scapular, scapulothoracic and UE control with self care, reaching, carrying, lifting, house/yardwork, driving, computer work    Manual Treatments:  PROM / STM / Oscillations-Mobs:  G-I, II, III, IV (PA's, Inf., Post.)  [x] (75900) Provided manual therapy to mobilize LE, proximal hip and/or LS spine soft tissue/joints for the purpose of modulating pain, promoting relaxation,  increasing ROM, reducing/eliminating soft tissue swelling/inflammation/restriction, improving soft tissue extensibility and allowing for proper ROM for normal function with   [] LE / lumbar: self care, mobility, lifting and ambulation. [x] UE / Cervical: self care, reaching, carrying, lifting, house/yardwork, driving, computer work. Modalities:  [] (82051) Vasopneumatic compression: Utilized vasopneumatic compression to decrease edema / swelling for the purpose of improving mobility and quad tone / recruitment which will allow for increased overall function including but not limited to self-care, transfers, ambulation, and ascending / descending stairs. Charges:  Timed Code Treatment Minutes: 45   Total Treatment Minutes: 45     [] EVAL - LOW (32719)   [] EVAL - MOD (50097)  [] EVAL - HIGH (49624)  [] RE-EVAL (85990)  [x] DG(22725) x  1     [] Ionto  [] NMR (72018) x       [] Vaso  [x] Manual (61948) x  1     [] Ultrasound  [x] TA x        [] Mech Traction (61188)  [] Aquatic Therapy x     [] ES (un) (72315):   [] Home Management Training x  [] ES(attended) (17191)   [] Dry Needling 1-2 muscles (37668):  [] Dry Needling 3+ muscles (013309)  [] Group:      [] Other:      GOALS:  Patient stated goal: no pain  [] Progressing: [] Met: [] Not Met: [] Adjusted     Therapist goals for Patient:   Short Term Goals: To be achieved in: 2 weeks  1. Independent in HEP and progression per patient tolerance, in order to prevent re-injury. [] Progressing: [] Met: [] Not Met: [] Adjusted  2.  Patient will have a decrease in pain to facilitate improvement in movement, function, and ADLs as indicated by Functional Deficits. [] Progressing: [] Met: [] Not Met: [] Adjusted     Long Term Goals: To be achieved in: 6 weeks  1. FOTO score of at least 50 to assist with reaching prior level of function. [] Progressing: [] Met: [] Not Met: [] Adjusted  2. Patient will demonstrate increased AROM to R shoulder flexion 0-145, abd 0-170, IR 0-70, ER 0-80 to allow for proper joint functioning as indicated by patients Functional Deficits. [] Progressing: [] Met: [] Not Met: [] Adjusted  3. Patient will demonstrate an increase in Strength to R ER mm to 4/5 to allow for proper functional mobility as indicated by patients Functional Deficits. [] Progressing: [] Met: [] Not Met: [] Adjusted  4. Patient will return to functional activities including donning/doffing bra without increased symptoms or restriction. [] Progressing: [] Met: [] Not Met: [] Adjusted  5. Pt will be able to do her hair and makeup without pain or difficulty   [] Progressing: [] Met: [] Not Met: [] Adjusted           Overall Progression Towards Functional goals/ Treatment Progress Update:  [] Patient is progressing as expected towards functional goals listed. [] Progression is slowed due to complexities/Impairments listed. [] Progression has been slowed due to co-morbidities. [x] Plan just implemented, too soon to assess goals progression <30days   [] Goals require adjustment due to lack of progress  [] Patient is not progressing as expected and requires additional follow up with physician  [] Other    Persisting Functional Limitations/Impairments:  [x]Sleeping []Sitting               []Standing []Transfers        []Walking []Kneeling               []Stairs []Squatting / bending   [x]ADLs [x]Reaching  []Lifting  [x]Housework  [x]Driving []Job related tasks  [x]Sports/Recreation []Other:        ASSESSMENT:   Pt demo improved AROM and strength with decreased pain overall.   Pt reports donning/doffing bra without pain or difficulty. Pt was able to raise arms during [employer] at [employer] this past weekend. Pt has three visits remaining. Pt cont to benefit from skilled PT services to progress ex and maximize functional mobility. Treatment/Activity Tolerance:  [x] Patient able to complete tx [] Patient limited by fatigue  [] Patient limited by pain  [] Patient limited by other medical complications  [] Other:     Prognosis: [x] Good [] Fair  [] Poor    Patient Requires Follow-up: [x] Yes  [] No  PLAN:RTC strengthening, shoulder AROM/PROM, scapular stabilization ex, postural education, modalities as needed, manual therapy  Frequency/Duration:  2 days per week for 6 Weeks:  INTERVENTIONS:  [x] Therapeutic exercise including: strength training, ROM, for Upper extremity and core   [x]  NMR activation and proprioception for UE, scap and Core   [x] Manual therapy as indicated for shoulder, scapula and spine to include: Dry Needling/IASTM, STM, PROM, Gr I-IV mobilizations, manipulation. [x] Modalities as needed that may include: thermal agents, E-stim, Biofeedback, US, iontophoresis as indicated  [x] Patient education on joint protection, postural re-education, activity modification, progression of HEP. [x] Continue per plan of care [] Alter current plan (see comments)  [] Plan of care initiated [] Hold pending MD visit [] Discharge    Electronically signed by: Kelli Rashid PT, MPT [id number]    Note: If patient does not return for scheduled/ recommended follow up visits, this note will serve as a discharge from care along with most recent update on progress.

## 2022-11-09 ENCOUNTER — HOSPITAL ENCOUNTER (OUTPATIENT)
Dept: WOMENS IMAGING | Age: 72
Discharge: HOME | End: 2022-11-09
Payer: MEDICARE

## 2022-11-09 VITALS — HEIGHT: 60 IN | BODY MASS INDEX: 39.27 KG/M2 | WEIGHT: 200 LBS

## 2022-11-09 DIAGNOSIS — Z12.31 BREAST CANCER SCREENING BY MAMMOGRAM: ICD-10-CM

## 2022-11-09 PROCEDURE — 77063 BREAST TOMOSYNTHESIS BI: CPT

## 2022-11-10 ENCOUNTER — HOSPITAL ENCOUNTER (OUTPATIENT)
Dept: PHYSICAL THERAPY | Age: 72
Setting detail: THERAPIES SERIES
Discharge: HOME | End: 2022-11-10
Payer: MEDICARE

## 2022-11-10 NOTE — FLOWSHEET NOTE
[address]     Physical Therapy  Cancellation/No-show Note  Patient Name:  Mariluz Jha  :  1950   Date:  11/10/2022  Cancelled visits to date: 1  No-shows to date: 0    Patient status for today's appointment patient:  [x]  Cancelled 11/10  []  Rescheduled appointment  []  No-show     Reason given by patient:  [x]  Patient ill  []  Conflicting appointment  []  No transportation    []  Conflict with work  []  No reason given  []  Other:     Comments:      Phone call information:   []  Phone call made today to patient at _ time at number provided:      []  Patient answered, conversation as follows:    []  Patient did not answer, message left as follows:  []  Phone call not made today  [x]  Phone call not needed - pt contacted us to cancel and provided reason for cancellation.      Electronically signed by:  Kael Sparrow PT

## 2022-11-15 ENCOUNTER — HOSPITAL ENCOUNTER (OUTPATIENT)
Dept: PHYSICAL THERAPY | Age: 72
Setting detail: THERAPIES SERIES
Discharge: HOME | End: 2022-11-15
Payer: MEDICARE

## 2022-11-15 PROCEDURE — 97140 MANUAL THERAPY 1/> REGIONS: CPT

## 2022-11-15 PROCEDURE — 97110 THERAPEUTIC EXERCISES: CPT

## 2022-11-15 PROCEDURE — 97530 THERAPEUTIC ACTIVITIES: CPT

## 2022-11-15 NOTE — FLOWSHEET NOTE
[address] [facility] Physical Therapy  Phone: [phone]   Fax: [phone]    Physical Therapy Daily Treatment Note    Date:  11/15/2022     Patient Name:  Fallon Marshall    :  1950  MRN: [id number]  Medical Diagnosis:  Other injury of muscle(s) and tendon(s) of the rotator cuff of right shoulder, initial encounter [S43.881J]  Treatment Diagnosis: PT diagnosis: RTC weakness, decreased shoulder AROM, pain limiting functional mobility                                       Insurance/Certification information:  Insurance information:  Medicare 80/20 plan, no auth, no hard max     Physician Information:  Doroteo Cooks, MD   Plan of care signed (Y/N): []  Yes [x]  No  Cosign requested however has not been completed yet    Date of Patient follow up with Physician: ?     Progress Report: []  Yes  [x]  No     Date Range for reporting period:  Beginnin/15/2022  Ending:     Progress report due (10 Rx/or 30 days whichever is less): visit #10 or       Recertification due (POC duration/ or 90 days whichever is less): visit #20 or 22     Visit # Insurance Allowable Auth required? Date Range    [place] []  Yes  [x]  No        Units approved Units used Date Range            Latex Allergy:  [x]NO      []YES  Preferred Language for Healthcare:   [x]English       []other:    Functional Scale:           Date assessed:  TO physical FS primary measure score = 38; risk adjusted = 39  10/11/22    Pain level:  0/10     SUBJECTIVE:  11/15: pt reports having a very stiff neck, but it is better than it was over the weekend. Her neck was so bad that she did not go to [employer] over the weekend. Pt thinks she \"slept wrong\" which is what caused the stiff neck. Pt states her arm is better - she was able to hold her coffee cup in R hand while she drove into PT this date. : Pt cannot believe how much better her shoulder is.   She can don/doff bra without pain or difficulty now.  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 11/3:  Pt with no reports of pain this date; states she is doing well overall. Reports at least 50% improvement since initiation of PT.      10/25: Pt states that she is able to move her R UE/shoulder more. Pt reports that she had severe vertigo after last PT - vomited when she got home. Her  got her meclizine, and she felt better after only taking one pill. Pt states she was lying flat last PT visit and that's why she had vertigo. OBJECTIVE: 11/3:  Pt with significant rounded shoulders and slight forward head. UT tightness noted B and limited cervical rotation. Noted improvement in AROM R shoulder  11/1 AROM R shoulder elevation 0-100      RESTRICTIONS/PRECAUTIONS: Precautions/ Contra-indications: DM, HTN, pacemaker, CABG x4 '99, two cardiac stents placed 8/15/22, B TKR '94, OA, RA, osteoporosis, LBP with ablations    Exercises/Interventions:     Therapeutic Exercises (12620) Resistance / level Sets/sec Reps Notes   UBE  Fwd/retro 2 min each direction 10/25: pt states she can go faster this date.    Pulley: flx, abd   X 10 each    Biceps curls 3# 2 10x B New 10/25, seated 11/15: increased sets          HEP   HEP   HEP   HEP   HEP   11/1 did not do as pt c/o vertigo with lying down                               Therapeutic Activities (20948)          RTC strengthening: band  Shoulder extension  Shoulder flexion/punch  IR   ER Green band   2  2  2  2   X 10 B  X 10 R/L  X 10 R  X 10 R    Green band:  Mid row  High row  Low row    2  2  2   X 10  X 10  X 10    Weighted yellow ball on wall Cw/ccw  X 10 each at 90 degrees/120 degrees    Pt educ to do MET for Cspine - resisted R rotation  5sec 5x                                Neuromuscular Re-ed (53486)       HEP                                      Manual Intervention (05591)       PROM R shoulder in all directions with stretch at end range; caudal glides and post joint mobs.  STM to R UT and gentle cervical traction and SOR. X 15min   11/3 Pt in reclined position vs supine due to reports of vertigo after lying in supine; during manual therapy, pt had MH to Cspine                                           Modalities: 11/15:  MH to Cspine during manual therapy    Pt. Education: 11/3: Verbal cues for proper exercise technique; cues for posture correction    -patient educated on diagnosis, prognosis and expectations for rehab  -all patient questions were answered    Home Exercise Program: 11/3:  No new additions this date  10/20:  added chin tucks; will provide written copy of HEP next visit with added exercises. HEP instruction: Written HEP instructions provided and reviewed   Access Code: [email or web address]  URL: [email or web address]  Date: 10/11/2022  Prepared by: Jaylin Zhang     Exercises  Shoulder Internal Rotation with Resistance - 3 x daily - 7 x weekly - 1 sets - 10 reps  Shoulder External Rotation Reactive Isometrics - 3 x daily - 7 x weekly - 1 sets - 10 reps         Therapeutic Exercise and NMR EXR  [x] (64570) Provided verbal/tactile cueing for activities related to strengthening, flexibility, endurance, ROM for improvements in  [] LE / Lumbar: LE, proximal hip, and core control with self care, mobility, lifting, ambulation. [x] UE / Cervical: cervical, postural, scapular, scapulothoracic and UE control with self care, reaching, carrying, lifting, house/yardwork, driving, computer work.  [] (33017) Provided verbal/tactile cueing for activities related to improving balance, coordination, kinesthetic sense, posture, motor skill, proprioception to assist with   [] LE / lumbar: LE, proximal hip, and core control in self care, mobility, lifting, ambulation and eccentric single leg control.    [] UE / cervical: cervical, scapular, scapulothoracic and UE control with self care, reaching, carrying, lifting, house/yardwork, driving, computer work.   [] (63813) Therapist is in constant attendance of 2 or more patients providing skilled therapy interventions, but not providing any significant amount of measurable one-on-one time to either patient, for improvements in  [] LE / lumbar: LE, proximal hip, and core control in self care, mobility, lifting, ambulation and eccentric single leg control. [] UE / cervical: cervical, scapular, scapulothoracic and UE control with self care, reaching, carrying, lifting, house/yardwork, driving, computer work. NMR and Therapeutic Activities:    [x] (87932 or 32869) Provided verbal/tactile cueing for activities related to improving balance, coordination, kinesthetic sense, posture, motor skill, proprioception and motor activation to allow for proper function of   [] LE: / Lumbar core, proximal hip and LE with self care and ADLs  [x] UE / Cervical: cervical, postural, scapular, scapulothoracic and UE control with self care, carrying, lifting, driving, computer work.   [] (71316) Gait Re-education- Provided training and instruction to the patient for proper LE, core and proximal hip recruitment and positioning and eccentric body weight control with ambulation re-education including up and down stairs     Home Management Training / Self Care:  [x] (51391) Provided self-care/home management training related to activities of daily living and compensatory training, and/or use of adaptive equipment for improvement with: ADLs and compensatory training, meal preparation, safety procedures and instruction in use of adaptive equipment, including bathing, grooming, dressing, personal hygiene, basic household cleaning and chores.      Home Exercise Program:    [x] (50861) Reviewed/Progressed HEP activities related to strengthening, flexibility, endurance, ROM of   [] LE / Lumbar: core, proximal hip and LE for functional self-care, mobility, lifting and ambulation/stair navigation   [] UE / Cervical: cervical, postural, scapular, scapulothoracic and UE control with self care, reaching, carrying, lifting, house/yardwork, driving, computer work  [] (55847)Reviewed/Progressed HEP activities related to improving balance, coordination, kinesthetic sense, posture, motor skill, proprioception of   [] LE: core, proximal hip and LE for self care, mobility, lifting, and ambulation/stair navigation    [] UE / Cervical: cervical, postural,  scapular, scapulothoracic and UE control with self care, reaching, carrying, lifting, house/yardwork, driving, computer work    Manual Treatments:  PROM / STM / Oscillations-Mobs:  G-I, II, III, IV (PA's, Inf., Post.)  [x] (03946) Provided manual therapy to mobilize LE, proximal hip and/or LS spine soft tissue/joints for the purpose of modulating pain, promoting relaxation,  increasing ROM, reducing/eliminating soft tissue swelling/inflammation/restriction, improving soft tissue extensibility and allowing for proper ROM for normal function with   [] LE / lumbar: self care, mobility, lifting and ambulation. [x] UE / Cervical: self care, reaching, carrying, lifting, house/yardwork, driving, computer work. Modalities:  [] (89126) Vasopneumatic compression: Utilized vasopneumatic compression to decrease edema / swelling for the purpose of improving mobility and quad tone / recruitment which will allow for increased overall function including but not limited to self-care, transfers, ambulation, and ascending / descending stairs.        Charges:  Timed Code Treatment Minutes: 45   Total Treatment Minutes: 45     [] EVAL - LOW (12852)   [] EVAL - MOD (65112)  [] EVAL - HIGH (25717)  [] RE-EVAL (40255)  [x] LG(16332) x  1     [] Ionto  [] NMR (11523) x       [] Vaso  [x] Manual (64003) x  1     [] Ultrasound  [x] TA x        [] Mech Traction (24913)  [] Aquatic Therapy x     [] ES (un) (00316):   [] Home Management Training x  [] ES(attended) (81221)   [] Dry Needling 1-2 muscles (34874):  [] Dry Needling 3+ muscles (217234)  [] Group: [] Other:      GOALS:  Patient stated goal: no pain  [] Progressing: [] Met: [] Not Met: [] Adjusted     Therapist goals for Patient:   Short Term Goals: To be achieved in: 2 weeks  1. Independent in HEP and progression per patient tolerance, in order to prevent re-injury. [] Progressing: [] Met: [] Not Met: [] Adjusted  2. Patient will have a decrease in pain to facilitate improvement in movement, function, and ADLs as indicated by Functional Deficits. [] Progressing: [] Met: [] Not Met: [] Adjusted     Long Term Goals: To be achieved in: 6 weeks  1. FOTO score of at least 50 to assist with reaching prior level of function. [] Progressing: [] Met: [] Not Met: [] Adjusted  2. Patient will demonstrate increased AROM to R shoulder flexion 0-145, abd 0-170, IR 0-70, ER 0-80 to allow for proper joint functioning as indicated by patients Functional Deficits. [] Progressing: [] Met: [] Not Met: [] Adjusted  3. Patient will demonstrate an increase in Strength to R ER mm to 4/5 to allow for proper functional mobility as indicated by patients Functional Deficits. [] Progressing: [] Met: [] Not Met: [] Adjusted  4. Patient will return to functional activities including donning/doffing bra without increased symptoms or restriction. [] Progressing: [] Met: [] Not Met: [] Adjusted  5. Pt will be able to do her hair and makeup without pain or difficulty   [] Progressing: [] Met: [] Not Met: [] Adjusted           Overall Progression Towards Functional goals/ Treatment Progress Update:  [] Patient is progressing as expected towards functional goals listed. [] Progression is slowed due to complexities/Impairments listed. [] Progression has been slowed due to co-morbidities.   [x] Plan just implemented, too soon to assess goals progression <30days   [] Goals require adjustment due to lack of progress  [] Patient is not progressing as expected and requires additional follow up with physician  [] Other    Persisting Functional Limitations/Impairments:  [x]Sleeping []Sitting               []Standing []Transfers        []Walking []Kneeling               []Stairs []Squatting / bending   [x]ADLs [x]Reaching  []Lifting  [x]Housework  [x]Driving []Job related tasks  [x]Sports/Recreation []Other:        ASSESSMENT:   Pt is very excited that she can raise her arm up in the air and can reach up high on the wall during ex. Pt cont to demo improved AROM and strength with decreased pain overall. Pt has new onset of neck stiffness with pain at end range of R rotation. Pt was given MET of resisted R rotation and cerv retraction. Pt received STM B UT/levator mm. Pt has two visits remaining. Pt cont to benefit from skilled PT services to progress ex and maximize functional mobility. Treatment/Activity Tolerance:  [x] Patient able to complete tx [] Patient limited by fatigue  [] Patient limited by pain  [] Patient limited by other medical complications  [] Other:     Prognosis: [x] Good [] Fair  [] Poor    Patient Requires Follow-up: [x] Yes  [] No  PLAN:RTC strengthening, shoulder AROM/PROM, scapular stabilization ex, postural education, modalities as needed, manual therapy  Frequency/Duration:  2 days per week for 6 Weeks:  INTERVENTIONS:  [x] Therapeutic exercise including: strength training, ROM, for Upper extremity and core   [x]  NMR activation and proprioception for UE, scap and Core   [x] Manual therapy as indicated for shoulder, scapula and spine to include: Dry Needling/IASTM, STM, PROM, Gr I-IV mobilizations, manipulation. [x] Modalities as needed that may include: thermal agents, E-stim, Biofeedback, US, iontophoresis as indicated  [x] Patient education on joint protection, postural re-education, activity modification, progression of HEP.      [x] Continue per plan of care [] Alter current plan (see comments)  [] Plan of care initiated [] Hold pending MD visit [] Discharge    Electronically signed by: Clara Manjarrez MATTIE, PT, MPT 1202    Note: If patient does not return for scheduled/ recommended follow up visits, this note will serve as a discharge from care along with most recent update on progress.

## 2022-11-17 ENCOUNTER — HOSPITAL ENCOUNTER (OUTPATIENT)
Dept: PHYSICAL THERAPY | Age: 72
Setting detail: THERAPIES SERIES
Discharge: HOME | End: 2022-11-17
Payer: MEDICARE

## 2022-11-17 PROCEDURE — 97530 THERAPEUTIC ACTIVITIES: CPT

## 2022-11-17 PROCEDURE — 97110 THERAPEUTIC EXERCISES: CPT

## 2022-11-17 NOTE — FLOWSHEET NOTE
[facility]  Phone: [phone]   Fax: [phone]  Physical Therapy Re-Certification Plan of Care    Dear Parveen Baca MD  ,    We had the pleasure of treating the following patient for physical therapy services at [facility]. A summary of our findings can be found in the updated assessment below. This includes our plan of care. If you have any questions or concerns regarding these findings, please do not hesitate to contact me at the office phone number checked above. Thank you for the referral.     Physician Signature:________________________________Date:__________________  By signing above (or electronic signature), therapist's plan is approved by physician      Functional Outcome:     FOTO:  FOTO physical FS primary measure score = 38; risk adjusted = 39  10/11/22  FOTO physical FS primary measure score = 56     22      Overall Response to Treatment:   [x]Patient is responding well to treatment and improvement is noted with regards  to goals   []Patient should continue to improve in reasonable time if they continue HEP   []Patient has plateaued and is no longer responding to skilled PT intervention    []Patient is getting worse and would benefit from return to referring MD   []Patient unable to adhere to initial POC   []Other:     Date range of Visits: 10/11/22 - 22  Total Visits: 10    Recommendation:    [x]Continue PT 2 x / wk for 4  weeks.                []Hold PT, pending MD visit       Physical Therapy Daily Treatment Note    Date:  2022     Patient Name:  Angeline Krabbe    :  1950  MRN: [id number]  Medical Diagnosis:  Other injury of muscle(s) and tendon(s) of the rotator cuff of right shoulder, initial encounter [S46.940P]  Treatment Diagnosis: PT diagnosis: RTC weakness, decreased shoulder AROM, pain limiting functional mobility Insurance/Certification information:  Insurance information:  Medicare 80/20 plan, no auth, no hard max     Physician Information:  Meli Gentile MD   Plan of care signed (Y/N): [x]  Yes []  No      Date of Patient follow up with Physician: Pt reports she doesn't have a return appt with MD.       Progress Report: [x]  Yes  []  No     Date Range for reporting period:  Beginning: 10/11/22  Endin22    Progress report due (10 Rx/or 30 days whichever is less): Last visit     Recertification due (POC duration/ or 90 days whichever is less): visit #20 or 22     Visit # Insurance Allowable Auth required? Date Range   10/12 [place] []  Yes  [x]  No        Units approved Units used Date Range            Latex Allergy:  [x]NO      []YES  Preferred Language for Healthcare:   [x]English       []other:    Functional Scale:           Date assessed:  FOTO physical FS primary measure score = 38; risk adjusted = 39  10/11/22  FOTO physical FS primary measure score = 56     22    Pain level:  0/10     SUBJECTIVE:  : Pt reports at least 75% improvement overall since initiation of PT. Still with difficulty reaching out to right at times. Pt states she still has some neck pain/stiffness but overall feels very good. Report she is able to fix her hair and do her makeup without shoulder issues. Can fasten bra without difficulty. : Pt cannot believe how much better her shoulder is. She can don/doff bra without pain or difficulty now. 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 11/3:  Pt with no reports of pain this date; states she is doing well overall. Reports at least 50% improvement since initiation of PT.      10/25: Pt states that she is able to move her R UE/shoulder more. Pt reports that she had severe vertigo after last PT - vomited when she got home. Her  got her meclizine, and she felt better after only taking one pill.   Pt states she was lying flat last PT visit and that's why she had vertigo. OBJECTIVE: 11/17:  MMT RUE:  shoulder flexion 4+/5, shoulder abduction 5/5, shoulder adduction 5/5, IR 5/5, ER 5/5  AROM R shoulder:  flexion to 150 degrees, ER: thumb to C7, IR: thumb to T12    RESTRICTIONS/PRECAUTIONS: Precautions/ Contra-indications: DM, HTN, pacemaker, CABG x4 '99, two cardiac stents placed 8/15/22, B TKR '94, OA, RA, osteoporosis, LBP with ablations    Exercises/Interventions:     Therapeutic Exercises (24999) Resistance / level Sets/sec Reps Notes   UBE  Fwd/retro 2 min each direction 10/25: pt states she can go faster this date. Pulley: flx, abd   X 10 each    Biceps curls 3# 2 New 10/25, seated 11/15: increased sets         Pec stretch in doorway 2 x 30\"   HEP   HEP   HEP   HEP   HEP   11/1 did not do as pt c/o vertigo with lying down                               Therapeutic Activities (23076)       SB roll up wall X 5 reps with 5 taps at end range  RUE each rep    RTC strengthening: band  Shoulder extension  Shoulder flexion/punch  IR   ER Green band   2  2  2  2   X 10 B  X 10 R/L  X 10 R  X 10 R    Green band:  Mid row  High row  Low row    2  2  2   X 10  X 10  X 10    Weighted yellow ball on wall Cw/ccw  X 10 each at 90 degrees/120 degrees    Shelf reach  1# wt X 10 to bottom shelf; x 10 tapping middle shelf    Pt educ to do MET for Cspine - resisted R rotation                                 Neuromuscular Re-ed (69151)       Chin tucks seated   X 10 HEP                                      Manual Intervention (79550)           11/3 Pt in reclined position vs supine due to reports of vertigo after lying in supine; during manual therapy, pt had MH to Cspine           DTM to R UT   X 5 min                             Modalities: 11/17:  Pt declined this date    Pt. Education: 11/17: Verbal cues for proper exercise technique; cues for posture correction.   Discussed home program and pt reports consistent and independent performance of all home exercises. Discussed continuation of PT; pt would like to continue x several more visits to continue strengthening for R shoulder and to address neck stiffness/soreness. Reassessment completed this date.      -patient educated on diagnosis, prognosis and expectations for rehab  -all patient questions were answered    Home Exercise Program: 11/17:  No new additions this date; pt performing consistently and independently per her report  10/20:  added chin tucks; will provide written copy of HEP next visit with added exercises. HEP instruction: Written HEP instructions provided and reviewed   Access Code: [email or web address]  URL: [email or web address]  Date: 10/11/2022  Prepared by: Jaylin Zhang     Exercises  Shoulder Internal Rotation with Resistance - 3 x daily - 7 x weekly - 1 sets - 10 reps  Shoulder External Rotation Reactive Isometrics - 3 x daily - 7 x weekly - 1 sets - 10 reps         Therapeutic Exercise and NMR EXR  [x] (63452) Provided verbal/tactile cueing for activities related to strengthening, flexibility, endurance, ROM for improvements in  [] LE / Lumbar: LE, proximal hip, and core control with self care, mobility, lifting, ambulation. [x] UE / Cervical: cervical, postural, scapular, scapulothoracic and UE control with self care, reaching, carrying, lifting, house/yardwork, driving, computer work.  [] (52912) Provided verbal/tactile cueing for activities related to improving balance, coordination, kinesthetic sense, posture, motor skill, proprioception to assist with   [] LE / lumbar: LE, proximal hip, and core control in self care, mobility, lifting, ambulation and eccentric single leg control.    [] UE / cervical: cervical, scapular, scapulothoracic and UE control with self care, reaching, carrying, lifting, house/yardwork, driving, computer work.   [] (73286) Therapist is in constant attendance of 2 or more patients providing skilled therapy interventions, but not providing any significant amount of measurable one-on-one time to either patient, for improvements in  [] LE / lumbar: LE, proximal hip, and core control in self care, mobility, lifting, ambulation and eccentric single leg control. [] UE / cervical: cervical, scapular, scapulothoracic and UE control with self care, reaching, carrying, lifting, house/yardwork, driving, computer work. NMR and Therapeutic Activities:    [x] (34564 or 22100) Provided verbal/tactile cueing for activities related to improving balance, coordination, kinesthetic sense, posture, motor skill, proprioception and motor activation to allow for proper function of   [] LE: / Lumbar core, proximal hip and LE with self care and ADLs  [x] UE / Cervical: cervical, postural, scapular, scapulothoracic and UE control with self care, carrying, lifting, driving, computer work.   [] (40687) Gait Re-education- Provided training and instruction to the patient for proper LE, core and proximal hip recruitment and positioning and eccentric body weight control with ambulation re-education including up and down stairs     Home Management Training / Self Care:  [x] (55871) Provided self-care/home management training related to activities of daily living and compensatory training, and/or use of adaptive equipment for improvement with: ADLs and compensatory training, meal preparation, safety procedures and instruction in use of adaptive equipment, including bathing, grooming, dressing, personal hygiene, basic household cleaning and chores.      Home Exercise Program:    [x] (60771) Reviewed/Progressed HEP activities related to strengthening, flexibility, endurance, ROM of   [] LE / Lumbar: core, proximal hip and LE for functional self-care, mobility, lifting and ambulation/stair navigation   [] UE / Cervical: cervical, postural, scapular, scapulothoracic and UE control with self care, reaching, carrying, lifting, house/yardwork, driving, computer work  [] (96482)Reviewed/Progressed HEP activities related to improving balance, coordination, kinesthetic sense, posture, motor skill, proprioception of   [] LE: core, proximal hip and LE for self care, mobility, lifting, and ambulation/stair navigation    [] UE / Cervical: cervical, postural,  scapular, scapulothoracic and UE control with self care, reaching, carrying, lifting, house/yardwork, driving, computer work    Manual Treatments:  PROM / STM / Oscillations-Mobs:  G-I, II, III, IV (PA's, Inf., Post.)  [x] (12462) Provided manual therapy to mobilize LE, proximal hip and/or LS spine soft tissue/joints for the purpose of modulating pain, promoting relaxation,  increasing ROM, reducing/eliminating soft tissue swelling/inflammation/restriction, improving soft tissue extensibility and allowing for proper ROM for normal function with   [] LE / lumbar: self care, mobility, lifting and ambulation. [x] UE / Cervical: self care, reaching, carrying, lifting, house/yardwork, driving, computer work. Modalities:  [] (69047) Vasopneumatic compression: Utilized vasopneumatic compression to decrease edema / swelling for the purpose of improving mobility and quad tone / recruitment which will allow for increased overall function including but not limited to self-care, transfers, ambulation, and ascending / descending stairs.        Charges:  Timed Code Treatment Minutes: 45   Total Treatment Minutes: 45     [] EVAL - LOW (85640)   [] EVAL - MOD (32377)  [] EVAL - HIGH (11917)  [] RE-EVAL (00651)  [x] FK(81517) x  1     [] Ionto  [] NMR (04228) x       [] Vaso  [] Manual (68916) x  1     [] Ultrasound  [x] TA x  2      [] Mech Traction (34399)  [] Aquatic Therapy x     [] ES (un) (10740):   [] Home Management Training x  [] ES(attended) (35042)   [] Dry Needling 1-2 muscles (29995):  [] Dry Needling 3+ muscles (149345)  [] Group:      [] Other:      GOALS:  Patient stated goal: no pain  [x] Progressing: [] Met: [] Not Met: [] Adjusted     Therapist goals for Patient:   Short Term Goals: To be achieved in: 2 weeks  1. Independent in HEP and progression per patient tolerance, in order to prevent re-injury. [] Progressing: [x] Met: [] Not Met: [] Adjusted  2. Patient will have a decrease in pain to facilitate improvement in movement, function, and ADLs as indicated by Functional Deficits. [] Progressing: [x] Met: [] Not Met: [] Adjusted     Long Term Goals: To be achieved in: 6 weeks  1. FOTO score of at least 50 to assist with reaching prior level of function. [] Progressing: [x] Met: [] Not Met: [] Adjusted  2. Patient will demonstrate increased AROM to R shoulder flexion 0-145, abd 0-170, IR 0-70, ER 0-80 to allow for proper joint functioning as indicated by patients Functional Deficits. [] Progressing: [x] Met: [] Not Met: [] Adjusted  3. Patient will demonstrate an increase in Strength to R ER mm to 4/5 to allow for proper functional mobility as indicated by patients Functional Deficits. [] Progressing: [x] Met: [] Not Met: [] Adjusted  4. Patient will return to functional activities including donning/doffing bra without increased symptoms or restriction. [] Progressing: [x] Met: [] Not Met: [] Adjusted  5. Pt will be able to do her hair and makeup without pain or difficulty   [] Progressing: [x] Met: [] Not Met: [] Adjusted           Overall Progression Towards Functional goals/ Treatment Progress Update:  [x] Patient is progressing as expected towards functional goals listed. [] Progression is slowed due to complexities/Impairments listed. [] Progression has been slowed due to co-morbidities.   [] Plan just implemented, too soon to assess goals progression <30days   [] Goals require adjustment due to lack of progress  [] Patient is not progressing as expected and requires additional follow up with physician  [] Other    Persisting Functional Limitations/Impairments:  [x]Sleeping []Sitting               []Standing []Transfers        []Walking []Kneeling               []Stairs []Squatting / bending   [x]ADLs [x]Reaching  []Lifting  [x]Housework  [x]Driving []Job related tasks  [x]Sports/Recreation []Other:        ASSESSMENT:   Pt demonstrating significant improvement in R shoulder ROM and strength, functional activities are becoming much easier per her report. Neck stiffness/soreness still present this date. Will benefit from continued PT for further strengthening R shoulder and to address cervical issues as needed. Treatment/Activity Tolerance:  [x] Patient able to complete tx [] Patient limited by fatigue  [] Patient limited by pain  [] Patient limited by other medical complications  [] Other:     Prognosis: [x] Good [] Fair  [] Poor    Patient Requires Follow-up: [x] Yes  [] No  PLAN:RTC strengthening, shoulder AROM/PROM, scapular stabilization ex, postural education, modalities as needed, manual therapy  Frequency/Duration:  2 days per week for 4 Weeks:  INTERVENTIONS:  [x] Therapeutic exercise including: strength training, ROM, for Upper extremity and core   [x]  NMR activation and proprioception for UE, scap and Core   [x] Manual therapy as indicated for shoulder, scapula and spine to include: Dry Needling/IASTM, STM, PROM, Gr I-IV mobilizations, manipulation. [x] Modalities as needed that may include: thermal agents, E-stim, Biofeedback, US, iontophoresis as indicated  [x] Patient education on joint protection, postural re-education, activity modification, progression of HEP.      [x] Continue per plan of care [] Alter current plan (see comments)  [] Plan of care initiated [] Hold pending MD visit [] Discharge    Electronically signed by: Hemant Grady, PT, DPT  [id number]    Note: If patient does not return for scheduled/ recommended follow up visits, this note will serve as a discharge from care along with most recent update on progress.

## 2022-11-22 ENCOUNTER — HOSPITAL ENCOUNTER (OUTPATIENT)
Dept: PHYSICAL THERAPY | Age: 72
Setting detail: THERAPIES SERIES
Discharge: HOME | End: 2022-11-22
Payer: MEDICARE

## 2022-11-22 PROCEDURE — 97530 THERAPEUTIC ACTIVITIES: CPT

## 2022-11-22 PROCEDURE — 97140 MANUAL THERAPY 1/> REGIONS: CPT

## 2022-11-22 PROCEDURE — 97110 THERAPEUTIC EXERCISES: CPT

## 2022-11-22 NOTE — FLOWSHEET NOTE
[address] [facility] Physical Therapy  Phone: [phone]   Fax: [phone]       Physical Therapy Daily Treatment Note    Date:  2022     Patient Name:  Julius Sharma    :  1950  MRN: [id number]  Medical Diagnosis:  Other injury of muscle(s) and tendon(s) of the rotator cuff of right shoulder, initial encounter [S49.510K]  Treatment Diagnosis: PT diagnosis: RTC weakness, decreased shoulder AROM, pain limiting functional mobility                                       Insurance/Certification information:  Insurance information:  Medicare 80/20 plan, no auth, no hard max     Physician Information:  Caitlin Clement MD   Plan of care signed (Y/N): [x]  Yes []  No      Date of Patient follow up with Physician: Pt reports she doesn't have a return appt with MD.       Progress Report: [x]  Yes  []  No     Date Range for reporting period:  Beginning: 10/11/22  Endin22    Progress report due (10 Rx/or 30 days whichever is less): Last visit     Recertification due (POC duration/ or 90 days whichever is less): visit #20 or 22     Visit # Insurance Allowable Auth required? Date Range    +0/4 [place] []  Yes  [x]  No        Units approved Units used Date Range            Latex Allergy:  [x]NO      []YES  Preferred Language for Healthcare:   [x]English       []other:    Functional Scale:           Date assessed:  FOTO physical FS primary measure score = 38; risk adjusted = 39  10/11/22  FOTO physical FS primary measure score = 56     22    Pain level:  0/10     SUBJECTIVE:  : Pt states that her arm is feeling great today. She has two additional weeks of PT to meet all goals. : Pt reports at least 75% improvement overall since initiation of PT. Still with difficulty reaching out to right at times. Pt states she still has some neck pain/stiffness but overall feels very good.   Report she is able to fix her hair and do her makeup without shoulder issues. Can fasten bra without difficulty. 11/8: Pt cannot believe how much better her shoulder is. She can don/doff bra without pain or difficulty now. 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 11/3:  Pt with no reports of pain this date; states she is doing well overall. Reports at least 50% improvement since initiation of PT.      10/25: Pt states that she is able to move her R UE/shoulder more. Pt reports that she had severe vertigo after last PT - vomited when she got home. Her  got her meclizine, and she felt better after only taking one pill. Pt states she was lying flat last PT visit and that's why she had vertigo. OBJECTIVE: 11/17:  MMT RUE:  shoulder flexion 4+/5, shoulder abduction 5/5, shoulder adduction 5/5, IR 5/5, ER 5/5  AROM R shoulder:  flexion to 150 degrees, ER: thumb to C7, IR: thumb to T12    RESTRICTIONS/PRECAUTIONS: Precautions/ Contra-indications: DM, HTN, pacemaker, CABG x4 '99, two cardiac stents placed 8/15/22, B TKR '94, OA, RA, osteoporosis, LBP with ablations    Exercises/Interventions:     Therapeutic Exercises (49601) Resistance / level Sets/sec Reps Notes   UBE  Fwd/retro 2 min each direction 10/25: pt states she can go faster this date.    Pulley: flx, abd   X 10 each    Biceps curls 3# 2 10x B New 10/25, seated 11/15: increased sets         Pec stretch in doorway 2 x 30\"   HEP   HEP   HEP   HEP   HEP   11/1 did not do as pt c/o vertigo with lying down                               Therapeutic Activities (63004)       SB roll up wall X 5 reps with 5 taps at end range  RUE each rep    RTC strengthening: band  Shoulder extension  Shoulder flexion/punch  IR   ER Green band   2  2  2  2   X 10 B  X 10 R  X 10 R  X 10 R    Green band:  Mid row  High row  Low row    2  2  2   X 10  X 10  X 10    Weighted yellow ball on wall Cw/ccw  X 10 each at 90 degrees/120 degrees    Shelf reach  1# wt X 10 to bottom shelf; x 10 tapping middle shelf    Pt educ to do MET for Cspine - resisted R rotation                                 Neuromuscular Re-ed (51801)       Chin tucks seated   X 10 HEP                                      Manual Intervention (62602)       PROM R shoulder in all directions with stretch at end range; caudal glides and post joint mobs. STM to R UT and gentle cervical traction and SOR. X 15min   11/3 Pt in reclined position vs supine due to reports of vertigo after lying in supine; during manual therapy, pt had MH to Cspine   11/22: added manual UT str R                                      Modalities: 11/17:  Pt declined this date    Pt. Education: 11/17: Verbal cues for proper exercise technique; cues for posture correction. Discussed home program and pt reports consistent and independent performance of all home exercises. Discussed continuation of PT; pt would like to continue x several more visits to continue strengthening for R shoulder and to address neck stiffness/soreness. Reassessment completed this date.      -patient educated on diagnosis, prognosis and expectations for rehab  -all patient questions were answered    Home Exercise Program: 11/17:  No new additions this date; pt performing consistently and independently per her report  10/20:  added chin tucks; will provide written copy of HEP next visit with added exercises. HEP instruction: Written HEP instructions provided and reviewed   Access Code: [email or web address]  URL: [email or web address]  Date: 10/11/2022  Prepared by: Desean Rodriguez     Exercises  Shoulder Internal Rotation with Resistance - 3 x daily - 7 x weekly - 1 sets - 10 reps  Shoulder External Rotation Reactive Isometrics - 3 x daily - 7 x weekly - 1 sets - 10 reps         Therapeutic Exercise and NMR EXR  [x] (07819) Provided verbal/tactile cueing for activities related to strengthening, flexibility, endurance, ROM for improvements in  [] LE / Lumbar: LE, proximal hip, and core control with self care, mobility, lifting, ambulation. [x] UE / Cervical: cervical, postural, scapular, scapulothoracic and UE control with self care, reaching, carrying, lifting, house/yardwork, driving, computer work.  [] (58593) Provided verbal/tactile cueing for activities related to improving balance, coordination, kinesthetic sense, posture, motor skill, proprioception to assist with   [] LE / lumbar: LE, proximal hip, and core control in self care, mobility, lifting, ambulation and eccentric single leg control. [] UE / cervical: cervical, scapular, scapulothoracic and UE control with self care, reaching, carrying, lifting, house/yardwork, driving, computer work.   [] (25390) Therapist is in constant attendance of 2 or more patients providing skilled therapy interventions, but not providing any significant amount of measurable one-on-one time to either patient, for improvements in  [] LE / lumbar: LE, proximal hip, and core control in self care, mobility, lifting, ambulation and eccentric single leg control. [] UE / cervical: cervical, scapular, scapulothoracic and UE control with self care, reaching, carrying, lifting, house/yardwork, driving, computer work.      NMR and Therapeutic Activities:    [x] (05707 or 62315) Provided verbal/tactile cueing for activities related to improving balance, coordination, kinesthetic sense, posture, motor skill, proprioception and motor activation to allow for proper function of   [] LE: / Lumbar core, proximal hip and LE with self care and ADLs  [x] UE / Cervical: cervical, postural, scapular, scapulothoracic and UE control with self care, carrying, lifting, driving, computer work.   [] (62983) Gait Re-education- Provided training and instruction to the patient for proper LE, core and proximal hip recruitment and positioning and eccentric body weight control with ambulation re-education including up and down stairs     Home Management Training / Self Care:  [x] (25519) Provided self-care/home management training related to activities of daily living and compensatory training, and/or use of adaptive equipment for improvement with: ADLs and compensatory training, meal preparation, safety procedures and instruction in use of adaptive equipment, including bathing, grooming, dressing, personal hygiene, basic household cleaning and chores. Home Exercise Program:    [x] (54146) Reviewed/Progressed HEP activities related to strengthening, flexibility, endurance, ROM of   [] LE / Lumbar: core, proximal hip and LE for functional self-care, mobility, lifting and ambulation/stair navigation   [] UE / Cervical: cervical, postural, scapular, scapulothoracic and UE control with self care, reaching, carrying, lifting, house/yardwork, driving, computer work  [] (07705)Reviewed/Progressed HEP activities related to improving balance, coordination, kinesthetic sense, posture, motor skill, proprioception of   [] LE: core, proximal hip and LE for self care, mobility, lifting, and ambulation/stair navigation    [] UE / Cervical: cervical, postural,  scapular, scapulothoracic and UE control with self care, reaching, carrying, lifting, house/yardwork, driving, computer work    Manual Treatments:  PROM / STM / Oscillations-Mobs:  G-I, II, III, IV (PA's, Inf., Post.)  [x] (05858) Provided manual therapy to mobilize LE, proximal hip and/or LS spine soft tissue/joints for the purpose of modulating pain, promoting relaxation,  increasing ROM, reducing/eliminating soft tissue swelling/inflammation/restriction, improving soft tissue extensibility and allowing for proper ROM for normal function with   [] LE / lumbar: self care, mobility, lifting and ambulation. [x] UE / Cervical: self care, reaching, carrying, lifting, house/yardwork, driving, computer work.      Modalities:  [] (30164) Vasopneumatic compression: Utilized vasopneumatic compression to decrease edema / swelling for the purpose of improving mobility and quad tone / recruitment which will allow for increased overall function including but not limited to self-care, transfers, ambulation, and ascending / descending stairs. Charges:  Timed Code Treatment Minutes: 40   Total Treatment Minutes: 40     [] EVAL - LOW (30529)   [] EVAL - MOD (50178)  [] EVAL - HIGH (99142)  [] RE-EVAL (26216)  [x] OX(79853) x  1     [] Ionto  [] NMR (24152) x       [] Vaso  [x] Manual (64024) x  1     [] Ultrasound  [x] TA x 1      [] Mech Traction (46032)  [] Aquatic Therapy x     [] ES (un) (55997):   [] Home Management Training x  [] ES(attended) (14298)   [] Dry Needling 1-2 muscles (88147):  [] Dry Needling 3+ muscles (717312)  [] Group:      [] Other:      GOALS:  Patient stated goal: no pain  [x] Progressing: [] Met: [] Not Met: [] Adjusted     Therapist goals for Patient:   Short Term Goals: To be achieved in: 2 weeks  1. Independent in HEP and progression per patient tolerance, in order to prevent re-injury. [] Progressing: [x] Met: [] Not Met: [] Adjusted  2. Patient will have a decrease in pain to facilitate improvement in movement, function, and ADLs as indicated by Functional Deficits. [] Progressing: [x] Met: [] Not Met: [] Adjusted     Long Term Goals: To be achieved in: 6 weeks  1. FOTO score of at least 50 to assist with reaching prior level of function. NEW GOAL: FOTO 61  [] Progressing: [x] Met: [] Not Met: [] Adjusted  2. Patient will demonstrate increased AROM to R shoulder flexion 0-145, abd 0-170, IR 0-70, ER 0-80 to allow for proper joint functioning as indicated by patients Functional Deficits. NEW GOAL: AROM to R shoulder flexion 0-170, abd 0-170, IR 0-70, ER 0-90  [] Progressing: [x] Met: [] Not Met: [] Adjusted  3. Patient will demonstrate an increase in Strength to R ER mm to 4/5 to allow for proper functional mobility as indicated by patients Functional Deficits. NEW GOAL: 5/5 R ER  [] Progressing: [x] Met: [] Not Met: [] Adjusted  4. Patient will return to functional activities including donning/doffing bra without increased symptoms or restriction. [] Progressing: [x] Met: [] Not Met: [] Adjusted  5. Pt will be able to do her hair and makeup without pain or difficulty   [] Progressing: [x] Met: [] Not Met: [] Adjusted           Overall Progression Towards Functional goals/ Treatment Progress Update:  [x] Patient is progressing as expected towards functional goals listed. [] Progression is slowed due to complexities/Impairments listed. [] Progression has been slowed due to co-morbidities. [] Plan just implemented, too soon to assess goals progression <30days   [] Goals require adjustment due to lack of progress  [] Patient is not progressing as expected and requires additional follow up with physician  [] Other    Persisting Functional Limitations/Impairments:  [x]Sleeping []Sitting               []Standing []Transfers        []Walking []Kneeling               []Stairs []Squatting / bending   [x]ADLs [x]Reaching  []Lifting  [x]Housework  [x]Driving []Job related tasks  [x]Sports/Recreation []Other:        ASSESSMENT:   Pt demonstrating significant improvement in R shoulder ROM and strength, functional activities are becoming much easier per her report. New goals set. Neck stiffness/soreness still present this date. Will benefit from continued PT for further strengthening R shoulder and to address cervical issues as needed.       Treatment/Activity Tolerance:  [x] Patient able to complete tx [] Patient limited by fatigue  [] Patient limited by pain  [] Patient limited by other medical complications  [] Other:     Prognosis: [x] Good [] Fair  [] Poor    Patient Requires Follow-up: [x] Yes  [] No  PLAN:RTC strengthening, shoulder AROM/PROM, scapular stabilization ex, postural education, modalities as needed, manual therapy  Frequency/Duration:  2 days per week for 4 Weeks:  INTERVENTIONS:  [x] Therapeutic exercise including: strength training, ROM, for Upper extremity and core   [x]  NMR activation and proprioception for UE, scap and Core   [x] Manual therapy as indicated for shoulder, scapula and spine to include: Dry Needling/IASTM, STM, PROM, Gr I-IV mobilizations, manipulation. [x] Modalities as needed that may include: thermal agents, E-stim, Biofeedback, US, iontophoresis as indicated  [x] Patient education on joint protection, postural re-education, activity modification, progression of HEP. [x] Continue per plan of care [] Alter current plan (see comments)  [] Plan of care initiated [] Hold pending MD visit [] Discharge    Electronically signed by: Anthony Solis PT, DPT  [id number]    Note: If patient does not return for scheduled/ recommended follow up visits, this note will serve as a discharge from care along with most recent update on progress.

## 2022-11-23 ENCOUNTER — TELEPHONE (OUTPATIENT)
Dept: INTERNAL MEDICINE CLINIC | Age: 72
End: 2022-11-23

## 2022-11-23 NOTE — TELEPHONE ENCOUNTER
Clarified with Rosa--this can be done on Monday--she wanted to fax it because it was long list.--awaiting fax.

## 2022-11-29 ENCOUNTER — HOSPITAL ENCOUNTER (OUTPATIENT)
Dept: PHYSICAL THERAPY | Age: 72
Setting detail: THERAPIES SERIES
Discharge: HOME | End: 2022-11-29
Payer: MEDICARE

## 2022-11-29 PROCEDURE — 97140 MANUAL THERAPY 1/> REGIONS: CPT

## 2022-11-29 PROCEDURE — 97110 THERAPEUTIC EXERCISES: CPT

## 2022-11-29 PROCEDURE — 97530 THERAPEUTIC ACTIVITIES: CPT

## 2022-11-29 NOTE — FLOWSHEET NOTE
[facility]  Phone: [phone]   Fax: [phone]       Physical Therapy Daily Treatment Note    Date:  2022     Patient Name:  Keyur Ordonez    :  1950  MRN: [id number]  Medical Diagnosis:  Other injury of muscle(s) and tendon(s) of the rotator cuff of right shoulder, initial encounter [O69.508P]  Treatment Diagnosis: PT diagnosis: RTC weakness, decreased shoulder AROM, pain limiting functional mobility                                       Insurance/Certification information:  Insurance information:  Medicare 80/20 plan, no auth, no hard max     Physician Information:  Lm Story MD   Plan of care signed (Y/N): [x]  Yes []  No      Date of Patient follow up with Physician: Pt reports she doesn't have a return appt with MD.       Progress Report:   Yes  [x]  No   [x]  Date Range for reporting period:  Beginning: 10/11/22  Endin22    Progress report due (10 Rx/or 30 days whichever is less): Last visit     Recertification due (POC duration/ or 90 days whichever is less): visit #20 or 22     Visit # Insurance Allowable Auth required? Date Range    +0/4 [place] []  Yes  [x]  No        Units approved Units used Date Range            Latex Allergy:  [x]NO      []YES  Preferred Language for Healthcare:   [x]English       []other:    Functional Scale:           Date assessed:  FOTO physical FS primary measure score = 38; risk adjusted = 39  10/11/22  FOTO physical FS primary measure score = 56     22    Pain level:  0/10     SUBJECTIVE:  : Pt with no complaints this date. States she was able to cook thanksgiving dinner without difficulty. Was able to lift her great granddaughter without difficulty    : Pt reports at least 75% improvement overall since initiation of PT. Still with difficulty reaching out to right at times. Pt states she still has some neck pain/stiffness but overall feels very good. Report she is able to fix her hair and do her makeup without shoulder issues. Can fasten bra without difficulty. 11/8: Pt cannot believe how much better her shoulder is. She can don/doff bra without pain or difficulty now. 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 11/3:  Pt with no reports of pain this date; states she is doing well overall. Reports at least 50% improvement since initiation of PT.      10/25: Pt states that she is able to move her R UE/shoulder more. Pt reports that she had severe vertigo after last PT - vomited when she got home. Her  got her meclizine, and she felt better after only taking one pill. Pt states she was lying flat last PT visit and that's why she had vertigo. OBJECTIVE: 11/29: Reaching to shelf and pushing light weight OH without any difficulty this date. 11/17:  MMT RUE:  shoulder flexion 4+/5, shoulder abduction 5/5, shoulder adduction 5/5, IR 5/5, ER 5/5  AROM R shoulder:  flexion to 150 degrees, ER: thumb to C7, IR: thumb to T12    RESTRICTIONS/PRECAUTIONS: Precautions/ Contra-indications: DM, HTN, pacemaker, CABG x4 '99, two cardiac stents placed 8/15/22, B TKR '94, OA, RA, osteoporosis, LBP with ablations    Exercises/Interventions:     Therapeutic Exercises (54935) Resistance / level Sets/sec Reps Notes   UBE  Fwd/retro 2 min each direction 10/25: pt states she can go faster this date.    Pulley: flx, abd   X 10 each    Biceps curls 3# 2 10x B New 10/25, seated 11/15: increased sets         Pec stretch in doorway 2 x 30\"   HEP   HEP   HEP   HEP   HEP   11/1 did not do as pt c/o vertigo with lying down          OH press 1#wt B  X 10 11/29   Shoulder flexion to 90 degrees 1# wt B  X 10 11/29          Therapeutic Activities (22038)       SB roll up wall X 5 reps with 5 taps at end range  RUE each rep    RTC strengthening: band  Shoulder extension  Shoulder flexion/punch  IR   ER Green band   2  2  2  2   X 10 B  X 10 R  X 10 R  X 10 R    Cables:  Mid row  High row  Low row   25#  25#  25#      X 10  X 10  X 10    Weighted yellow ball on wall Cw/ccw  X 10 each at 90 degrees/120 degrees    Shelf reach  1# wt X 10 to bottom shelf; x 10 tapping middle shelf    PNF: D1 and D2 flexion   1#wt X 10 each R 11/29   Pt educ to do MET for Cspine - resisted R rotation                                 Neuromuscular Re-ed (96409)       Chin tucks seated   X 10 HEP                                      Manual Intervention (73481)       PROM R shoulder in all directions with stretch at end range; caudal glides and post joint mobs. STM to R UT and gentle cervical traction and SOR. X 15min   11/3 Pt in reclined position vs supine due to reports of vertigo after lying in supine; during manual therapy, pt had MH to Cspine   11/22: added manual UT str R                                      Modalities: 11/29:  Pt declined this date    Pt. Education: 11/29: Verbal cues for proper exercise technique; cues for posture correction. -patient educated on diagnosis, prognosis and expectations for rehab  -all patient questions were answered    Home Exercise Program: 11/29:  No new additions this date; pt performing consistently and independently per her report  10/20:  added chin tucks; will provide written copy of HEP next visit with added exercises. HEP instruction: Written HEP instructions provided and reviewed   Access Code: [email or web address]  URL: [email or web address]  Date: 10/11/2022  Prepared by: Malnida Sage     Exercises  Shoulder Internal Rotation with Resistance - 3 x daily - 7 x weekly - 1 sets - 10 reps  Shoulder External Rotation Reactive Isometrics - 3 x daily - 7 x weekly - 1 sets - 10 reps         Therapeutic Exercise and NMR EXR  [x] (47439) Provided verbal/tactile cueing for activities related to strengthening, flexibility, endurance, ROM for improvements in  [] LE / Lumbar: LE, proximal hip, and core control with self care, mobility, lifting, ambulation. [x] UE / Cervical: cervical, postural, scapular, scapulothoracic and UE control with self care, reaching, carrying, lifting, house/yardwork, driving, computer work.  [] (65664) Provided verbal/tactile cueing for activities related to improving balance, coordination, kinesthetic sense, posture, motor skill, proprioception to assist with   [] LE / lumbar: LE, proximal hip, and core control in self care, mobility, lifting, ambulation and eccentric single leg control. [] UE / cervical: cervical, scapular, scapulothoracic and UE control with self care, reaching, carrying, lifting, house/yardwork, driving, computer work.   [] (17891) Therapist is in constant attendance of 2 or more patients providing skilled therapy interventions, but not providing any significant amount of measurable one-on-one time to either patient, for improvements in  [] LE / lumbar: LE, proximal hip, and core control in self care, mobility, lifting, ambulation and eccentric single leg control. [] UE / cervical: cervical, scapular, scapulothoracic and UE control with self care, reaching, carrying, lifting, house/yardwork, driving, computer work.      NMR and Therapeutic Activities:    [x] (19583 or 11728) Provided verbal/tactile cueing for activities related to improving balance, coordination, kinesthetic sense, posture, motor skill, proprioception and motor activation to allow for proper function of   [] LE: / Lumbar core, proximal hip and LE with self care and ADLs  [x] UE / Cervical: cervical, postural, scapular, scapulothoracic and UE control with self care, carrying, lifting, driving, computer work.   [] (82281) Gait Re-education- Provided training and instruction to the patient for proper LE, core and proximal hip recruitment and positioning and eccentric body weight control with ambulation re-education including up and down stairs     Home Management Training / Self Care:  [x] (47019) Provided self-care/home management training related to activities of daily living and compensatory training, and/or use of adaptive equipment for improvement with: ADLs and compensatory training, meal preparation, safety procedures and instruction in use of adaptive equipment, including bathing, grooming, dressing, personal hygiene, basic household cleaning and chores. Home Exercise Program:    [x] (71665) Reviewed/Progressed HEP activities related to strengthening, flexibility, endurance, ROM of   [] LE / Lumbar: core, proximal hip and LE for functional self-care, mobility, lifting and ambulation/stair navigation   [] UE / Cervical: cervical, postural, scapular, scapulothoracic and UE control with self care, reaching, carrying, lifting, house/yardwork, driving, computer work  [] (72339)Reviewed/Progressed HEP activities related to improving balance, coordination, kinesthetic sense, posture, motor skill, proprioception of   [] LE: core, proximal hip and LE for self care, mobility, lifting, and ambulation/stair navigation    [] UE / Cervical: cervical, postural,  scapular, scapulothoracic and UE control with self care, reaching, carrying, lifting, house/yardwork, driving, computer work    Manual Treatments:  PROM / STM / Oscillations-Mobs:  G-I, II, III, IV (PA's, Inf., Post.)  [x] (79743) Provided manual therapy to mobilize LE, proximal hip and/or LS spine soft tissue/joints for the purpose of modulating pain, promoting relaxation,  increasing ROM, reducing/eliminating soft tissue swelling/inflammation/restriction, improving soft tissue extensibility and allowing for proper ROM for normal function with   [] LE / lumbar: self care, mobility, lifting and ambulation. [x] UE / Cervical: self care, reaching, carrying, lifting, house/yardwork, driving, computer work.      Modalities:  [] (35377) Vasopneumatic compression: Utilized vasopneumatic compression to decrease edema / swelling for the purpose of improving mobility and quad tone / recruitment which will allow for increased overall function including but not limited to self-care, transfers, ambulation, and ascending / descending stairs. Charges:  Timed Code Treatment Minutes: 43   Total Treatment Minutes: 43     [] EVAL - LOW (92687)   [] EVAL - MOD (03420)  [] EVAL - HIGH (85020)  [] RE-EVAL (91527)  [x] UN(82520) x  1     [] Ionto  [] NMR (10624) x       [] Vaso  [x] Manual (10002) x  1     [] Ultrasound  [x] TA x 1      [] Mech Traction (72703)  [] Aquatic Therapy x     [] ES (un) (37522):   [] Home Management Training x  [] ES(attended) (72454)   [] Dry Needling 1-2 muscles (08471):  [] Dry Needling 3+ muscles (648537)  [] Group:      [] Other:      GOALS:  Patient stated goal: no pain  [x] Progressing: [] Met: [] Not Met: [] Adjusted     Therapist goals for Patient:   Short Term Goals: To be achieved in: 2 weeks  1. Independent in HEP and progression per patient tolerance, in order to prevent re-injury. [] Progressing: [x] Met: [] Not Met: [] Adjusted  2. Patient will have a decrease in pain to facilitate improvement in movement, function, and ADLs as indicated by Functional Deficits. [] Progressing: [x] Met: [] Not Met: [] Adjusted     Long Term Goals: To be achieved in: 6 weeks  1. FOTO score of at least 50 to assist with reaching prior level of function. NEW GOAL: FOTO 61  [] Progressing: [x] Met: [] Not Met: [] Adjusted  2. Patient will demonstrate increased AROM to R shoulder flexion 0-145, abd 0-170, IR 0-70, ER 0-80 to allow for proper joint functioning as indicated by patients Functional Deficits. NEW GOAL: AROM to R shoulder flexion 0-170, abd 0-170, IR 0-70, ER 0-90  [] Progressing: [x] Met: [] Not Met: [] Adjusted  3. Patient will demonstrate an increase in Strength to R ER mm to 4/5 to allow for proper functional mobility as indicated by patients Functional Deficits. NEW GOAL: 5/5 R ER  [] Progressing: [x] Met: [] Not Met: [] Adjusted  4.  Patient will return to functional activities including donning/doffing bra without increased symptoms or restriction. [] Progressing: [x] Met: [] Not Met: [] Adjusted  5. Pt will be able to do her hair and makeup without pain or difficulty   [] Progressing: [x] Met: [] Not Met: [] Adjusted           Overall Progression Towards Functional goals/ Treatment Progress Update:  [x] Patient is progressing as expected towards functional goals listed. [] Progression is slowed due to complexities/Impairments listed. [] Progression has been slowed due to co-morbidities. [] Plan just implemented, too soon to assess goals progression <30days   [] Goals require adjustment due to lack of progress  [] Patient is not progressing as expected and requires additional follow up with physician  [] Other    Persisting Functional Limitations/Impairments:  [x]Sleeping []Sitting               []Standing []Transfers        []Walking []Kneeling               []Stairs []Squatting / bending   [x]ADLs [x]Reaching  []Lifting  [x]Housework  [x]Driving []Job related tasks  [x]Sports/Recreation []Other:        ASSESSMENT:   Pt demonstrating significant improvement in R shoulder ROM and strength, functional activities are becoming much easier per her report.     Treatment/Activity Tolerance:  [x] Patient able to complete tx [] Patient limited by fatigue  [] Patient limited by pain  [] Patient limited by other medical complications  [] Other:     Prognosis: [x] Good [] Fair  [] Poor    Patient Requires Follow-up: [x] Yes  [] No  PLAN:RTC strengthening, shoulder AROM/PROM, scapular stabilization ex, postural education, modalities as needed, manual therapy  Frequency/Duration:  2 days per week for 4 Weeks:  INTERVENTIONS:  [x] Therapeutic exercise including: strength training, ROM, for Upper extremity and core   [x]  NMR activation and proprioception for UE, scap and Core   [x] Manual therapy as indicated for shoulder, scapula and spine to include: Dry Needling/IASTM, STM, PROM, Gr I-IV mobilizations, manipulation. [x] Modalities as needed that may include: thermal agents, E-stim, Biofeedback, US, iontophoresis as indicated  [x] Patient education on joint protection, postural re-education, activity modification, progression of HEP. [x] Continue per plan of care [] Alter current plan (see comments)  [] Plan of care initiated [] Hold pending MD visit [] Discharge    Electronically signed by: Young Carrasco PT, DPT  [id number]    Note: If patient does not return for scheduled/ recommended follow up visits, this note will serve as a discharge from care along with most recent update on progress.

## 2022-11-30 RX ORDER — PIOGLITAZONEHYDROCHLORIDE 15 MG/1
15 TABLET ORAL DAILY
Qty: 90 TABLET | Refills: 0 | Status: SHIPPED | OUTPATIENT
Start: 2022-11-30

## 2022-11-30 RX ORDER — TIZANIDINE 4 MG/1
4 TABLET ORAL NIGHTLY PRN
Qty: 90 TABLET | Refills: 0 | Status: SHIPPED | OUTPATIENT
Start: 2022-11-30

## 2022-11-30 RX ORDER — GABAPENTIN 400 MG/1
400 CAPSULE ORAL 3 TIMES DAILY
Qty: 270 CAPSULE | Refills: 0 | Status: SHIPPED | OUTPATIENT
Start: 2022-11-30 | End: 2022-12-30

## 2022-11-30 RX ORDER — TRAZODONE HYDROCHLORIDE 50 MG/1
50 TABLET ORAL NIGHTLY
Qty: 90 TABLET | Refills: 0 | Status: SHIPPED | OUTPATIENT
Start: 2022-11-30

## 2022-11-30 RX ORDER — ESCITALOPRAM OXALATE 20 MG/1
20 TABLET ORAL DAILY
Qty: 90 TABLET | Refills: 0 | Status: SHIPPED | OUTPATIENT
Start: 2022-11-30

## 2022-11-30 RX ORDER — POTASSIUM CHLORIDE 20 MEQ/1
20 TABLET, EXTENDED RELEASE ORAL DAILY
Qty: 90 TABLET | Refills: 3 | Status: SHIPPED | OUTPATIENT
Start: 2022-11-30

## 2022-11-30 RX ORDER — PANTOPRAZOLE SODIUM 40 MG/1
40 TABLET, DELAYED RELEASE ORAL
Qty: 180 TABLET | Refills: 0 | Status: SHIPPED | OUTPATIENT
Start: 2022-11-30

## 2022-11-30 RX ORDER — FUROSEMIDE 40 MG/1
40 TABLET ORAL DAILY
Qty: 90 TABLET | Refills: 0 | Status: SHIPPED | OUTPATIENT
Start: 2022-11-30

## 2022-11-30 RX ORDER — ROSUVASTATIN CALCIUM 40 MG/1
20 TABLET, COATED ORAL EVERY EVENING
Qty: 90 TABLET | Refills: 0 | Status: SHIPPED | OUTPATIENT
Start: 2022-11-30

## 2022-11-30 RX ORDER — ISOSORBIDE MONONITRATE 60 MG/1
60 TABLET, EXTENDED RELEASE ORAL DAILY
Qty: 90 TABLET | Refills: 0 | Status: SHIPPED | OUTPATIENT
Start: 2022-11-30

## 2022-12-01 ENCOUNTER — HOSPITAL ENCOUNTER (OUTPATIENT)
Dept: PHYSICAL THERAPY | Age: 72
Setting detail: THERAPIES SERIES
Discharge: HOME | End: 2022-12-01
Payer: MEDICARE

## 2022-12-01 DIAGNOSIS — E66.9 DIABETES MELLITUS TYPE 2 IN OBESE (HCC): ICD-10-CM

## 2022-12-01 DIAGNOSIS — N18.31 STAGE 3A CHRONIC KIDNEY DISEASE (HCC): ICD-10-CM

## 2022-12-01 DIAGNOSIS — E11.69 DIABETES MELLITUS TYPE 2 IN OBESE (HCC): ICD-10-CM

## 2022-12-01 DIAGNOSIS — D64.9 ANEMIA, UNSPECIFIED TYPE: ICD-10-CM

## 2022-12-01 LAB
A/G RATIO: 1.6 (ref 1.1–2.2)
ALBUMIN SERPL-MCNC: 4.4 G/DL (ref 3.4–5)
ALP BLD-CCNC: 168 U/L (ref 40–129)
ALT SERPL-CCNC: 21 U/L (ref 10–40)
ANION GAP SERPL CALCULATED.3IONS-SCNC: 13 MMOL/L (ref 3–16)
AST SERPL-CCNC: 22 U/L (ref 15–37)
BILIRUB SERPL-MCNC: <0.2 MG/DL (ref 0–1)
BUN BLDV-MCNC: 22 MG/DL (ref 7–20)
CALCIUM SERPL-MCNC: 9.8 MG/DL (ref 8.3–10.6)
CHLORIDE BLD-SCNC: 102 MMOL/L (ref 99–110)
CO2: 26 MMOL/L (ref 21–32)
CREAT SERPL-MCNC: 1 MG/DL (ref 0.6–1.2)
GFR SERPL CREATININE-BSD FRML MDRD: >60 ML/MIN/{1.73_M2}
GLUCOSE BLD-MCNC: 160 MG/DL (ref 70–99)
HCT VFR BLD CALC: 33.8 % (ref 36–48)
HEMOGLOBIN: 10.6 G/DL (ref 12–16)
MCH RBC QN AUTO: 28.4 PG (ref 26–34)
MCHC RBC AUTO-ENTMCNC: 31.5 G/DL (ref 31–36)
MCV RBC AUTO: 90.4 FL (ref 80–100)
PDW BLD-RTO: 17.2 % (ref 12.4–15.4)
PLATELET # BLD: 235 K/UL (ref 135–450)
PMV BLD AUTO: 8.7 FL (ref 5–10.5)
POTASSIUM SERPL-SCNC: 5 MMOL/L (ref 3.5–5.1)
RBC # BLD: 3.74 M/UL (ref 4–5.2)
SODIUM BLD-SCNC: 141 MMOL/L (ref 136–145)
TOTAL PROTEIN: 7.2 G/DL (ref 6.4–8.2)
WBC # BLD: 6.6 K/UL (ref 4–11)

## 2022-12-01 PROCEDURE — 97530 THERAPEUTIC ACTIVITIES: CPT

## 2022-12-01 PROCEDURE — 97110 THERAPEUTIC EXERCISES: CPT

## 2022-12-01 PROCEDURE — 97140 MANUAL THERAPY 1/> REGIONS: CPT

## 2022-12-01 NOTE — FLOWSHEET NOTE
[address] [facility]  Phone: [phone]   Fax: [phone]       Physical Therapy Daily Treatment Note    Date:  2022     Patient Name:  Tyesha Rodriguez    :  1950  MRN: [id number]  Medical Diagnosis:  Other injury of muscle(s) and tendon(s) of the rotator cuff of right shoulder, initial encounter [S47.382C]  Treatment Diagnosis: PT diagnosis: RTC weakness, decreased shoulder AROM, pain limiting functional mobility                                       Insurance/Certification information:  Insurance information:  Medicare 80/20 plan, no auth, no hard max     Physician Information:  Meli Gentile MD   Plan of care signed (Y/N): [x]  Yes []  No      Date of Patient follow up with Physician: Pt reports she doesn't have a return appt with MD.       Progress Report:   Yes  [x]  No   States   Date Range for reporting period:  Beginning: 10/11/22  Endin22    Progress report due (10 Rx/or 30 days whichever is less): Last visit     Recertification due (POC duration/ or 90 days whichever is less): visit #20 or 22     Visit # Insurance Allowable Auth required? Date Range    + [place] []  Yes  [x]  No        Units approved Units used Date Range            Latex Allergy:  [x]NO      []YES  Preferred Language for Healthcare:   [x]English       []other:    Functional Scale:           Date assessed:  FOTO physical FS primary measure score = 38; risk adjusted = 39  10/11/22  FOTO physical FS primary measure score = 56     22    Pain level:  1-2/10     SUBJECTIVE:  :  Pt states she is slightly sore all over today - got all her [employer] decorations out yesterday. States arm is doing well overall and she had no limitations while decorating yesterday. : Pt reports at least 75% improvement overall since initiation of PT. Still with difficulty reaching out to right at times.   Pt states she still has some neck pain/stiffness but overall feels very good. Report she is able to fix her hair and do her makeup without shoulder issues. Can fasten bra without difficulty. 11/8: Pt cannot believe how much better her shoulder is. She can don/doff bra without pain or difficulty now. 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 11/3:  Pt with no reports of pain this date; states she is doing well overall. Reports at least 50% improvement since initiation of PT.      10/25: Pt states that she is able to move her R UE/shoulder more. Pt reports that she had severe vertigo after last PT - vomited when she got home. Her  got her meclizine, and she felt better after only taking one pill. Pt states she was lying flat last PT visit and that's why she had vertigo. OBJECTIVE: 12/1:   Pt continues to demonstrate improvements in AROM and overall strength    11/17:  MMT RUE:  shoulder flexion 4+/5, shoulder abduction 5/5, shoulder adduction 5/5, IR 5/5, ER 5/5  AROM R shoulder:  flexion to 150 degrees, ER: thumb to C7, IR: thumb to T12    RESTRICTIONS/PRECAUTIONS: Precautions/ Contra-indications: DM, HTN, pacemaker, CABG x4 '99, two cardiac stents placed 8/15/22, B TKR '94, OA, RA, osteoporosis, LBP with ablations    Exercises/Interventions:     Therapeutic Exercises (43324) Resistance / level Sets/sec Reps Notes   UBE  Fwd/retro 2 min each direction 10/25: pt states she can go faster this date.    Pulley: flx, abd   X 10 each    Biceps curls 3# 2 10x B New 10/25, seated 11/15: increased sets         Pec stretch in doorway 3 x 30\"   HEP   HEP   HEP   HEP   HEP   11/1 did not do as pt c/o vertigo with lying down          OH press Yellow weighted ball  X 10    Shoulder flexion to 90 degrees Yellow weighted ball  X 10           Therapeutic Activities (43192)       SB roll up wall X 5 reps with 5 taps at end range  RUE each rep    RTC strengthening: band  Shoulder extension  Shoulder flexion/punch  IR   ER Green band   2  2  2  2   X 10 B  X 10 R  X 10 R  X 10 R    Cables:  Mid row  High row  Low row   25#  25#  25#      X 10  X 10  X 10    Weighted yellow ball on wall Cw/ccw  X 10 each at 90 degrees/120 degrees    Shelf reach  1# wt X 10 to middle shelf; x 10 tapping top shelf 12/1:  shelf in treatment room used today. PNF: D1 and D2 flexion   1#wt X 10 each R 11/29   Pt educ to do MET for Cspine - resisted R rotation            W's with back to wall   3 x 15\"; W to V x 10 Unable to lay arms on wall; encouraged stretch at home in that position   Towel roll length of spine in semi-reclined for pec stretch   X 1 min           Neuromuscular Re-ed (14945)       Chin tucks seated   X 10 HEP                                      Manual Intervention ([phone])       PROM R shoulder in all directions with stretch at end range; caudal glides and post joint mobs. STM to R UT . X 15min   11/3 Pt in reclined position vs supine due to reports of vertigo after lying in supine; during manual therapy, pt had MH to Cspine   11/22: added manual UT str R                                      Modalities: 12/1:  semi-reclined with MHP to cervical spine and R shoulder    Pt. Education: 12/1: Verbal cues for proper exercise technique; cues for posture correction. -patient educated on diagnosis, prognosis and expectations for rehab  -all patient questions were answered    Home Exercise Program: 12/1:  Added  W's at wall and pec stretch with towel length of spine. 10/20:  added chin tucks; will provide written copy of HEP next visit with added exercises. HEP instruction: Written HEP instructions provided and reviewed   Access Code: [email or web address]  URL: [email or web address]  Date: 10/11/2022  Prepared by: Thang Martinez     Exercises  Shoulder Internal Rotation with Resistance - 3 x daily - 7 x weekly - 1 sets - 10 reps  Shoulder External Rotation Reactive Isometrics - 3 x daily - 7 x weekly - 1 sets - 10 reps Therapeutic Exercise and NMR EXR  [x] (74167) Provided verbal/tactile cueing for activities related to strengthening, flexibility, endurance, ROM for improvements in  [] LE / Lumbar: LE, proximal hip, and core control with self care, mobility, lifting, ambulation. [x] UE / Cervical: cervical, postural, scapular, scapulothoracic and UE control with self care, reaching, carrying, lifting, house/yardwork, driving, computer work.  [] (74908) Provided verbal/tactile cueing for activities related to improving balance, coordination, kinesthetic sense, posture, motor skill, proprioception to assist with   [] LE / lumbar: LE, proximal hip, and core control in self care, mobility, lifting, ambulation and eccentric single leg control. [] UE / cervical: cervical, scapular, scapulothoracic and UE control with self care, reaching, carrying, lifting, house/yardwork, driving, computer work.   [] (02522) Therapist is in constant attendance of 2 or more patients providing skilled therapy interventions, but not providing any significant amount of measurable one-on-one time to either patient, for improvements in  [] LE / lumbar: LE, proximal hip, and core control in self care, mobility, lifting, ambulation and eccentric single leg control. [] UE / cervical: cervical, scapular, scapulothoracic and UE control with self care, reaching, carrying, lifting, house/yardwork, driving, computer work.      NMR and Therapeutic Activities:    [x] (66816 or 64784) Provided verbal/tactile cueing for activities related to improving balance, coordination, kinesthetic sense, posture, motor skill, proprioception and motor activation to allow for proper function of   [] LE: / Lumbar core, proximal hip and LE with self care and ADLs  [x] UE / Cervical: cervical, postural, scapular, scapulothoracic and UE control with self care, carrying, lifting, driving, computer work.   [] (35560) Gait Re-education- Provided training and instruction to the patient for proper LE, core and proximal hip recruitment and positioning and eccentric body weight control with ambulation re-education including up and down stairs     Home Management Training / Self Care:  [x] (12995) Provided self-care/home management training related to activities of daily living and compensatory training, and/or use of adaptive equipment for improvement with: ADLs and compensatory training, meal preparation, safety procedures and instruction in use of adaptive equipment, including bathing, grooming, dressing, personal hygiene, basic household cleaning and chores. Home Exercise Program:    [x] (48555) Reviewed/Progressed HEP activities related to strengthening, flexibility, endurance, ROM of   [] LE / Lumbar: core, proximal hip and LE for functional self-care, mobility, lifting and ambulation/stair navigation   [] UE / Cervical: cervical, postural, scapular, scapulothoracic and UE control with self care, reaching, carrying, lifting, house/yardwork, driving, computer work  [] (49427)Reviewed/Progressed HEP activities related to improving balance, coordination, kinesthetic sense, posture, motor skill, proprioception of   [] LE: core, proximal hip and LE for self care, mobility, lifting, and ambulation/stair navigation    [] UE / Cervical: cervical, postural,  scapular, scapulothoracic and UE control with self care, reaching, carrying, lifting, house/yardwork, driving, computer work    Manual Treatments:  PROM / STM / Oscillations-Mobs:  G-I, II, III, IV (PA's, Inf., Post.)  [x] (58964) Provided manual therapy to mobilize LE, proximal hip and/or LS spine soft tissue/joints for the purpose of modulating pain, promoting relaxation,  increasing ROM, reducing/eliminating soft tissue swelling/inflammation/restriction, improving soft tissue extensibility and allowing for proper ROM for normal function with   [] LE / lumbar: self care, mobility, lifting and ambulation.     [x] UE / Cervical: self care, reaching, carrying, lifting, house/yardwork, driving, computer work. Modalities:  [] (37105) Vasopneumatic compression: Utilized vasopneumatic compression to decrease edema / swelling for the purpose of improving mobility and quad tone / recruitment which will allow for increased overall function including but not limited to self-care, transfers, ambulation, and ascending / descending stairs. Charges:  Timed Code Treatment Minutes: 43   Total Treatment Minutes: 55     [] EVAL - LOW (51247)   [] EVAL - MOD (82127)  [] EVAL - HIGH (02198)  [] RE-EVAL (61625)  [x] GG(17562) x  1     [] Ionto  [] NMR (12745) x       [] Vaso  [x] Manual (53566) x  1     [] Ultrasound  [x] TA x 1      [] Mech Traction (28902)  [] Aquatic Therapy x     [] ES (un) (00642):   [] Home Management Training x  [] ES(attended) (07351)   [] Dry Needling 1-2 muscles (49667):  [] Dry Needling 3+ muscles (977789)  [] Group:      [] Other:      GOALS:  Patient stated goal: no pain  [x] Progressing: [] Met: [] Not Met: [] Adjusted     Therapist goals for Patient:   Short Term Goals: To be achieved in: 2 weeks  1. Independent in HEP and progression per patient tolerance, in order to prevent re-injury. [] Progressing: [x] Met: [] Not Met: [] Adjusted  2. Patient will have a decrease in pain to facilitate improvement in movement, function, and ADLs as indicated by Functional Deficits. [] Progressing: [x] Met: [] Not Met: [] Adjusted     Long Term Goals: To be achieved in: 6 weeks  1. FOTO score of at least 50 to assist with reaching prior level of function. NEW GOAL: FOTO 61  [] Progressing: [x] Met: [] Not Met: [] Adjusted  2. Patient will demonstrate increased AROM to R shoulder flexion 0-145, abd 0-170, IR 0-70, ER 0-80 to allow for proper joint functioning as indicated by patients Functional Deficits. NEW GOAL: AROM to R shoulder flexion 0-170, abd 0-170, IR 0-70, ER 0-90  [] Progressing: [x] Met: [] Not Met: [] Adjusted  3.  Patient will demonstrate an increase in Strength to R ER mm to 4/5 to allow for proper functional mobility as indicated by patients Functional Deficits. NEW GOAL: 5/5 R ER  [] Progressing: [x] Met: [] Not Met: [] Adjusted  4. Patient will return to functional activities including donning/doffing bra without increased symptoms or restriction. [] Progressing: [x] Met: [] Not Met: [] Adjusted  5. Pt will be able to do her hair and makeup without pain or difficulty   [] Progressing: [x] Met: [] Not Met: [] Adjusted           Overall Progression Towards Functional goals/ Treatment Progress Update:  [x] Patient is progressing as expected towards functional goals listed. [] Progression is slowed due to complexities/Impairments listed. [] Progression has been slowed due to co-morbidities. [] Plan just implemented, too soon to assess goals progression <30days   [] Goals require adjustment due to lack of progress  [] Patient is not progressing as expected and requires additional follow up with physician  [] Other    Persisting Functional Limitations/Impairments:  [x]Sleeping []Sitting               []Standing []Transfers        []Walking []Kneeling               []Stairs []Squatting / bending   [x]ADLs [x]Reaching  []Lifting  [x]Housework  [x]Driving []Job related tasks  [x]Sports/Recreation []Other:        ASSESSMENT:   Pt continues to demonstrate significant improvement in R shoulder ROM and strength, functional activities are becoming much easier per her report. Treatment/Activity Tolerance:  [x] Patient able to complete tx [] Patient limited by fatigue  [] Patient limited by pain  [] Patient limited by other medical complications  [] Other:     Prognosis: [x] Good [] Fair  [] Poor    Patient Requires Follow-up: [x] Yes  [] No    PLAN:RTC strengthening, shoulder AROM/PROM, scapular stabilization ex, postural education, modalities as needed, manual therapy.   Continue x 2 visits then discharge with HEP    Frequency/Duration:  2 days per week for 4 Weeks:  INTERVENTIONS:  [x] Therapeutic exercise including: strength training, ROM, for Upper extremity and core   [x]  NMR activation and proprioception for UE, scap and Core   [x] Manual therapy as indicated for shoulder, scapula and spine to include: Dry Needling/IASTM, STM, PROM, Gr I-IV mobilizations, manipulation. [x] Modalities as needed that may include: thermal agents, E-stim, Biofeedback, US, iontophoresis as indicated  [x] Patient education on joint protection, postural re-education, activity modification, progression of HEP. [x] Continue per plan of care [] Alter current plan (see comments)  [] Plan of care initiated [] Hold pending MD visit [] Discharge    Electronically signed by: Azael Hinojosa PT, DPT  [id number]    Note: If patient does not return for scheduled/ recommended follow up visits, this note will serve as a discharge from care along with most recent update on progress.

## 2022-12-02 LAB
CHOLESTEROL, TOTAL: 136 MG/DL (ref 0–199)
ESTIMATED AVERAGE GLUCOSE: 162.8 MG/DL
HBA1C MFR BLD: 7.3 %
HDLC SERPL-MCNC: 39 MG/DL (ref 40–60)
LDL CHOLESTEROL CALCULATED: 50 MG/DL
TRIGL SERPL-MCNC: 234 MG/DL (ref 0–150)
VLDLC SERPL CALC-MCNC: 47 MG/DL

## 2022-12-05 ENCOUNTER — TELEPHONE (OUTPATIENT)
Dept: INTERNAL MEDICINE CLINIC | Age: 72
End: 2022-12-05

## 2022-12-05 NOTE — TELEPHONE ENCOUNTER
----- Message from Nimo Barriosalexiant sent at 12/5/2022  1:03 PM EST -----  Subject: Message to Provider    QUESTIONS  Information for Provider? Patient has an upcoming appointment on   Wednesday, 12/7 @ 2:40 pm. She has no symptoms of COVID, but her    tested positive for COVID today. Patient is wondering if she should keep   the appointment or reschedule. She doesn't think her insurance covers   virtual visits. Please advise. Thanks.  ---------------------------------------------------------------------------  --------------  Alexandr MARS  [phone]; OK to leave message on voicemail  ---------------------------------------------------------------------------  --------------  SCRIPT ANSWERS  Relationship to Patient?  Self

## 2022-12-05 NOTE — TELEPHONE ENCOUNTER
As long as she is asymptomatic and wears a mask she can come to the office on Weds. If she starts having symptoms then we can change appt to next week.  Luz Cabrera

## 2022-12-06 ENCOUNTER — APPOINTMENT (OUTPATIENT)
Dept: PHYSICAL THERAPY | Age: 72
End: 2022-12-06
Payer: MEDICARE

## 2022-12-07 ENCOUNTER — OFFICE VISIT (OUTPATIENT)
Dept: INTERNAL MEDICINE CLINIC | Age: 72
End: 2022-12-07
Payer: MEDICARE

## 2022-12-07 VITALS
HEIGHT: 60 IN | WEIGHT: 199 LBS | HEART RATE: 60 BPM | BODY MASS INDEX: 39.07 KG/M2 | DIASTOLIC BLOOD PRESSURE: 60 MMHG | OXYGEN SATURATION: 98 % | SYSTOLIC BLOOD PRESSURE: 126 MMHG

## 2022-12-07 DIAGNOSIS — Z00.00 INITIAL MEDICARE ANNUAL WELLNESS VISIT: Primary | ICD-10-CM

## 2022-12-07 PROCEDURE — G8484 FLU IMMUNIZE NO ADMIN: HCPCS | Performed by: NURSE PRACTITIONER

## 2022-12-07 PROCEDURE — 3017F COLORECTAL CA SCREEN DOC REV: CPT | Performed by: NURSE PRACTITIONER

## 2022-12-07 PROCEDURE — 1123F ACP DISCUSS/DSCN MKR DOCD: CPT | Performed by: NURSE PRACTITIONER

## 2022-12-07 PROCEDURE — G0438 PPPS, INITIAL VISIT: HCPCS | Performed by: NURSE PRACTITIONER

## 2022-12-07 PROCEDURE — 3074F SYST BP LT 130 MM HG: CPT | Performed by: NURSE PRACTITIONER

## 2022-12-07 PROCEDURE — 3078F DIAST BP <80 MM HG: CPT | Performed by: NURSE PRACTITIONER

## 2022-12-07 ASSESSMENT — PATIENT HEALTH QUESTIONNAIRE - PHQ9
2. FEELING DOWN, DEPRESSED OR HOPELESS: 0
SUM OF ALL RESPONSES TO PHQ QUESTIONS 1-9: 0
SUM OF ALL RESPONSES TO PHQ9 QUESTIONS 1 & 2: 0
1. LITTLE INTEREST OR PLEASURE IN DOING THINGS: 0

## 2022-12-07 ASSESSMENT — LIFESTYLE VARIABLES
HOW MANY STANDARD DRINKS CONTAINING ALCOHOL DO YOU HAVE ON A TYPICAL DAY: PATIENT DOES NOT DRINK
HOW OFTEN DO YOU HAVE A DRINK CONTAINING ALCOHOL: NEVER

## 2022-12-07 NOTE — PATIENT INSTRUCTIONS
Preventing Falls: Care Instructions  Your Care Instructions     Getting around your home safely can be a challenge if you have injuries or health problems that make it easy for you to fall. Loose rugs and furniture in walkways are among the dangers for many older people who have problems walking or who have poor eyesight. People who have conditions such as arthritis, osteoporosis, or dementia also have to be careful not to fall. You can make your home safer with a few simple measures. Follow-up care is a key part of your treatment and safety. Be sure to make and go to all appointments, and call your doctor if you are having problems. It's also a good idea to know your test results and keep a list of the medicines you take. How can you care for yourself at home? Taking care of yourself  Exercise regularly to improve your strength, muscle tone, and balance. Walk if you can. Swimming may be a good choice if you cannot walk easily. Have your vision and hearing checked each year or any time you notice a change. If you have trouble seeing and hearing, you might not be able to avoid objects and could lose your balance. Know the side effects of the medicines you take. Ask your doctor or pharmacist whether the medicines you take can affect your balance. Sleeping pills or sedatives can affect your balance. Limit the amount of alcohol you drink. Alcohol can impair your balance and other senses. Ask your doctor whether calluses or corns on your feet need to be removed. If you wear loose-fitting shoes because of calluses or corns, you can lose your balance and fall. Talk to your doctor if you have numbness in your feet. You may get dizzy if you do not drink enough water. To prevent dehydration, drink plenty of fluids. Choose water and other clear liquids. If you have kidney, heart, or liver disease and have to limit fluids, talk with your doctor before you increase the amount of fluids you drink.   Preventing falls at home  Remove raised doorway thresholds, throw rugs, and clutter. Repair loose carpet or raised areas in the floor. Move furniture and electrical cords to keep them out of walking paths. Use nonskid floor wax, and wipe up spills right away, especially on ceramic tile floors. If you use a walker or cane, put rubber tips on it. If you use crutches, clean the bottoms of them regularly with an abrasive pad, such as steel wool. Keep your house well lit, especially stairways, porches, and outside walkways. Use night-lights in areas such as hallways and bathrooms. Add extra light switches or use remote switches (such as switches that go on or off when you clap your hands) to make it easier to turn lights on if you have to get up during the night. Install sturdy handrails on stairways. Move items in your cabinets so that the things you use a lot are on the lower shelves (about waist level). Keep a cordless phone and a flashlight with new batteries by your bed. If possible, put a phone in each of the main rooms of your house, or carry a cell phone in case you fall and cannot reach a phone. Or, you can wear a device around your neck or wrist. You push a button that sends a signal for help. Wear low-heeled shoes that fit well and give your feet good support. Use footwear with nonskid soles. Check the heels and soles of your shoes for wear. Repair or replace worn heels or soles. Do not wear socks without shoes on wood floors. Walk on the grass when the sidewalks are slippery. If you live in an area that gets snow and ice in the winter, sprinkle salt on slippery steps and sidewalks. Or ask a family member or friend to do this for you. Preventing falls in the bath  Install grab bars and nonskid mats inside and outside your shower or tub and near the toilet and sinks. Use shower chairs and bath benches. Use a hand-held shower head that will allow you to sit while showering.   Get into a tub or shower by putting the weaker leg in first. Get out of a tub or shower with your strong side first.  Repair loose toilet seats and consider installing a raised toilet seat to make getting on and off the toilet easier. Keep your bathroom door unlocked while you are in the shower. Where can you learn more? Go to [email or web address] and sign in to your Jetabroadt account. Enter [id number] in the [facility] box to learn more about \"Preventing Falls: Care Instructions. \"     If you do not have an account, please click on the \"Sign Up Now\" link. Current as of: May 4, 2022               Content Version: 13.4  © 2006-2022 Healthwise, [employer]. Care instructions adapted under license by [facility] ([facility]). If you have questions about a medical condition or this instruction, always ask your healthcare professional. [place] [address] any warranty or liability for your use of this information. Learning About Being Active as an Older Adult  Why is being active important as you get older? Being active is one of the best things you can do for your health. And it's never too late to start. Being active--or getting active, if you aren't already--has definite benefits. It can:  Give you more energy,  Keep your mind sharp. Improve balance to reduce your risk of falls. Help you manage chronic illness with fewer medicines. No matter how old you are, how fit you are, or what health problems you have, there is a form of activity that will work for you. And the more physical activity you can do, the better your overall health will be. What kinds of activity can help you stay healthy? Being more active will make your daily activities easier. Physical activity includes planned exercise and things you do in daily life. There are four types of activity:  Aerobic. Doing aerobic activity makes your heart and lungs strong. Includes walking, dancing, and gardening.   Aim for at least 2½ hours spread throughout the week. It improves your energy and can help you sleep better. Muscle-strengthening. This type of activity can help maintain muscle and strengthen bones. Includes climbing stairs, using resistance bands, and lifting or carrying heavy loads. Aim for at least twice a week. It can help protect the knees and other joints. Stretching. Stretching gives you better range of motion in joints and muscles. Includes upper arm stretches, calf stretches, and gentle yoga. Aim for at least twice a week, preferably after your muscles are warmed up from other activities. It can help you function better in daily life. Balancing. This helps you stay coordinated and have good posture. Includes heel-to-toe walking, jesús chi, and certain types of yoga. Aim for at least 3 days a week. It can reduce your risk of falling. Even if you have a hard time meeting the recommendations, it's better to be more active than less active. All activity done in each category counts toward your weekly total. You'd be surprised how daily things like carrying groceries, keeping up with grandchildren, and taking the stairs can add up. What keeps you from being active? If you've had a hard time being more active, you're not alone. Maybe you remember being able to do more. Or maybe you've never thought of yourself as being active. It's frustrating when you can't do the things you want. Being more active can help. What's holding you back? Getting started. Have a goal, but break it into easy tasks. Small steps build into big accomplishments. Staying motivated. If you feel like skipping your activity, remember your goal. Maybe you want to move better and stay independent. Every activity gets you one step closer. Not feeling your best.  Start with 5 minutes of an activity you enjoy. Prove to yourself you can do it. As you get comfortable, increase your time. You may not be where you want to be.  But you're in the process of getting there. Everyone starts somewhere. How can you find safe ways to stay active? Talk with your doctor about any physical challenges you're facing. Make a plan with your doctor if you have a health problem or aren't sure how to get started with activity. If you're already active, ask your doctor if there is anything you should change to stay safe as your body and health change. If you tend to feel dizzy after you take medicine, avoid activity at that time. Try being active before you take your medicine. This will reduce your risk of falls. If you plan to be active at home, make sure to clear your space before you get started. Remove things like TV cords, coffee tables, and throw rugs. It's safest to have plenty of space to move freely. The key to getting more active is to take it slow and steady. Try to improve only a little bit at a time. Pick just one area to improve on at first. And if an activity hurts, stop and talk to your doctor. Where can you learn more? Go to https://Metrasenszarina.[email or web address] org and sign in to your Rustoria account. Enter P600 in the Search Health Information box to learn more about \"Learning About Being Active as an Older Adult. \"     If you do not have an account, please click on the \"Sign Up Now\" link. Current as of: January 26, 2022               Content Version: 13.4  © 2006-2022 Healthwise, Incorporated. Care instructions adapted under license by [facility] ([facility]). If you have questions about a medical condition or this instruction, always ask your healthcare professional. [place] [address] any warranty or liability for your use of this information. Hearing Loss: Care Instructions  Overview     Hearing loss is a sudden or slow decrease in how well you hear. It can range from mild to severe. Permanent hearing loss can occur with aging. It also can happen when you are exposed long-term to loud noise.  Examples include listening to loud music, riding motorcycles, or being around other loud machines. Hearing loss can affect your work and home life. It can make you feel lonely or depressed. You may feel that you have lost your independence. But hearing aids and other devices can help you hear better and feel connected to others. Follow-up care is a key part of your treatment and safety. Be sure to make and go to all appointments, and call your doctor if you are having problems. It's also a good idea to know your test results and keep a list of the medicines you take. How can you care for yourself at home? Avoid loud noises whenever possible. This helps keep your hearing from getting worse. Always wear hearing protection around loud noises. Wear a hearing aid as directed. See a professional who can help you pick a hearing aid that fits you. Have hearing tests as your doctor suggests. They can show whether your hearing has changed. Your hearing aid may need to be adjusted. Use other devices as needed. These may include:  Telephone amplifiers and hearing aids that can connect to a television, stereo, radio, or microphone. Devices that use lights or vibrations. These alert you to the doorbell, a ringing telephone, or a baby monitor. Television closed-captioning. This shows the words at the bottom of the screen. Most new TVs can do this. TTY (text telephone). This lets you type messages back and forth on the telephone instead of talking or listening. These devices are also called TDD. When messages are typed on the keyboard, they are sent over the phone line to a receiving TTY. The message is shown on a monitor. Use text messaging, social media, and email if it is hard for you to communicate by telephone. Try to learn a listening technique called speechreading. It is not lipreading. You pay attention to people's gestures, expressions, posture, and tone of voice. These clues can help you understand what a person is saying.  Face the person you are talking to, and have them face you. Make sure the lighting is good. You need to see the other person's face clearly. Think about counseling if you need help to adjust to your hearing loss. When should you call for help? Watch closely for changes in your health, and be sure to contact your doctor if:    You think your hearing is getting worse.     You have new symptoms, such as dizziness or nausea. Where can you learn more? Go to https://[email or web address] and sign in to your [employer] account. Enter M416 in the [employer] box to learn more about \"Hearing Loss: Care Instructions. \"     If you do not have an account, please click on the \"Sign Up Now\" link. Current as of: May 4, 2022               Content Version: 13.4  © 2006-2022 [employer]. Care instructions adapted under license by [facility] ([facility]). If you have questions about a medical condition or this instruction, always ask your healthcare professional. [place] [address] any warranty or liability for your use of this information. Advance Directives: Care Instructions  Overview  An advance directive is a legal way to state your wishes at the end of your life. It tells your family and your doctor what to do if you can't say what you want. There are two main types of advance directives. You can change them any time your wishes change. Living will. This form tells your family and your doctor your wishes about life support and other treatment. The form is also called a declaration. Medical power of . This form lets you name a person to make treatment decisions for you when you can't speak for yourself. This person is called a health care agent (health care proxy, health care surrogate). The form is also called a durable power of  for health care.   If you do not have an advance directive, decisions about your medical care may be made by a family member, or by a doctor or a  who doesn't know you. It may help to think of an advance directive as a gift to the people who care for you. If you have one, they won't have to make tough decisions by themselves. Follow-up care is a key part of your treatment and safety. Be sure to make and go to all appointments, and call your doctor if you are having problems. It's also a good idea to know your test results and keep a list of the medicines you take. What should you include in an advance directive? Many states have a unique advance directive form. (It may ask you to address specific issues.) Or you might use a universal form that's approved by many states. If your form doesn't tell you what to address, it may be hard to know what to include in your advance directive. Use the questions below to help you get started. Who do you want to make decisions about your medical care if you are not able to? What life-support measures do you want if you have a serious illness that gets worse over time or can't be cured? What are you most afraid of that might happen? (Maybe you're afraid of having pain, losing your independence, or being kept alive by machines.)  Where would you prefer to die? (Your home? A hospital? A nursing home?)  Do you want to donate your organs when you die? Do you want certain Presybeterian practices performed before you die? When should you call for help? Be sure to contact your doctor if you have any questions. Where can you learn more? Go to [email or web address] and sign in to your Mozes account. Enter R264 in the [facility] box to learn more about \"Advance Directives: Care Instructions. \"     If you do not have an account, please click on the \"Sign Up Now\" link. Current as of: June 16, 2022               Content Version: 13.4  © 4898-9918 Healthwise, Incorporated. Care instructions adapted under license by [facility] ([facility]).  If you have questions about a medical condition or this instruction, always ask your healthcare professional. [address] any warranty or liability for your use of this information. Eating Healthy Foods: Care Instructions  Your Care Instructions     Eating healthy foods can help lower your risk for disease. Healthy food gives you energy and keeps your heart strong, your brain active, your muscles working, and your bones strong. A healthy diet includes a variety of foods from the basic food groups: grains, vegetables, fruits, milk and milk products, and meat and beans. Some people may eat more of their favorite foods from only one food group and, as a result, miss getting the nutrients they need. So, it is important to pay attention not only to what you eat but also to what you are missing from your diet. You can eat a healthy, balanced diet by making a few small changes. Follow-up care is a key part of your treatment and safety. Be sure to make and go to all appointments, and call your doctor if you are having problems. It's also a good idea to know your test results and keep a list of the medicines you take. How can you care for yourself at home? Look at what you eat  Keep a food diary for a week or two and record everything you eat or drink. Track the number of servings you eat from each food group. For a balanced diet every day, eat a variety of:  6 or more ounce-equivalents of grains, such as cereals, breads, crackers, rice, or pasta, every day. An ounce-equivalent is 1 slice of bread, 1 cup of ready-to-eat cereal, or ½ cup of cooked rice, cooked pasta, or cooked cereal.  2½ cups of vegetables, especially:  Dark-green vegetables such as broccoli and spinach. Orange vegetables such as carrots and sweet potatoes. Dry beans (such as rosas and kidney beans) and peas (such as lentils). 2 cups of fresh, frozen, or canned fruit.  A small apple or 1 banana or orange equals 1 cup.  3 cups of nonfat or low-fat milk, yogurt, or other milk products. 5½ ounces of meat and beans, such as chicken, fish, lean meat, beans, nuts, and seeds. One egg, 1 tablespoon of peanut butter, ½ ounce nuts or seeds, or ¼ cup of cooked beans equals 1 ounce of meat. Learn how to read food labels for serving sizes and ingredients. Fast-food and convenience-food meals often contain few or no fruits or vegetables. Make sure you eat some fruits and vegetables to make the meal more nutritious. Look at your food diary. For each food group, add up what you have eaten and then divide the total by the number of days. This will give you an idea of how much you are eating from each food group. See if you can find some ways to change your diet to make it more healthy. Start small  Do not try to make dramatic changes to your diet all at once. You might feel that you are missing out on your favorite foods and then be more likely to fail. Start slowly, and gradually change your habits. Try some of the following:  Use whole wheat bread instead of white bread. Use nonfat or low-fat milk instead of whole milk. Eat brown rice instead of white rice, and eat whole wheat pasta instead of white-flour pasta. Try low-fat cheeses and low-fat yogurt. Add more fruits and vegetables to meals and have them for snacks. Add lettuce, tomato, cucumber, and onion to sandwiches. Add fruit to yogurt and cereal.  Enjoy food  You can still eat your favorite foods. You just may need to eat less of them. If your favorite foods are high in fat, salt, and sugar, limit how often you eat them, but do not cut them out entirely. Eat a wide variety of foods. Make healthy choices when eating out  The type of restaurant you choose can help you make healthy choices. Even fast-food chains are now offering more low-fat or healthier choices on the menu. Choose smaller portions, or take half of your meal home.   When eating out, try:  A veggie pizza with a whole wheat crust or grilled chicken (instead of sausage or pepperoni). Pasta with roasted vegetables, grilled chicken, or marinara sauce instead of cream sauce. A vegetable wrap or grilled chicken wrap. Broiled or poached food instead of fried or breaded items. Make healthy choices easy  Buy packaged, prewashed, ready-to-eat fresh vegetables and fruits, such as baby carrots, salad mixes, and chopped or shredded broccoli and cauliflower. Buy packaged, presliced fruits, such as melon or pineapple. Choose 100% fruit or vegetable juice instead of soda. Limit juice intake to 4 to 6 oz (½ to ¾ cup) a day. Blend low-fat yogurt, fruit juice, and canned or frozen fruit to make a smoothie for breakfast or a snack. Where can you learn more? Go to [email or web address]://Maker [email or web address] and sign in to your Dizzion account. Enter L330 in the [employer] box to learn more about \"Eating Healthy Foods: Care Instructions. \"     If you do not have an account, please click on the \"Sign Up Now\" link. Current as of: May 9, 2022               Content Version: 13.4  © 8446-6523 Healthwise, [facility]. Care instructions adapted under license by [facility] ([facility]). If you have questions about a medical condition or this instruction, always ask your healthcare professional. [address] any warranty or liability for your use of this information. Personalized Preventive Plan for Yamini Wallace - 12/7/2022  Medicare offers a range of preventive health benefits. Some of the tests and screenings are paid in full while other may be subject to a deductible, co-insurance, and/or copay. Some of these benefits include a comprehensive review of your medical history including lifestyle, illnesses that may run in your family, and various assessments and screenings as appropriate. After reviewing your medical record and screening and assessments performed today your provider may have ordered immunizations, labs, imaging, and/or referrals for you.   A list of these orders (if applicable) as well as your Preventive Care list are included within your After Visit Summary for your review. Other Preventive Recommendations:    A preventive eye exam performed by an eye specialist is recommended every 1-2 years to screen for glaucoma; cataracts, macular degeneration, and other eye disorders. A preventive dental visit is recommended every 6 months. Try to get at least 150 minutes of exercise per week or 10,000 steps per day on a pedometer . Order or download the FREE \"Exercise & Physical Activity: Your Everyday Guide\" from [employer]. Call 4-981.526.2656 or search [employer] online. You need 8107-4220 mg of calcium and 9981-1385 IU of vitamin D per day. It is possible to meet your calcium requirement with diet alone, but a vitamin D supplement is usually necessary to meet this goal.  When exposed to the sun, use a sunscreen that protects against both UVA and UVB radiation with an SPF of 30 or greater. Reapply every 2 to 3 hours or after sweating, drying off with a towel, or swimming. Always wear a seat belt when traveling in a car. Always wear a helmet when riding a bicycle or motorcycle.

## 2022-12-07 NOTE — PROGRESS NOTES
Medicare Annual Wellness Visit    Sam Ye is here for Results (Review labs ) and Medicare AWV    Assessment & Plan   Initial Medicare annual wellness visit  AWV and HM reviewed      Recommendations for Preventive Services Due: see orders and patient instructions/AVS.  Recommended screening schedule for the next 5-10 years is provided to the patient in written form: see Patient Instructions/AVS.     Return in 6 months (on 6/7/2023), or if symptoms worsen or fail to improve, for Medicare Annual Wellness Visit in 1 year. Subjective   The following acute and/or chronic problems were also addressed today:    AWV today   Recently had labs complete - DM not as well controlled. Increased actos and doing well on increased dose. Patient's complete Health Risk Assessment and screening values have been reviewed and are found in Flowsheets. The following problems were reviewed today and where indicated follow up appointments were made and/or referrals ordered.     Positive Risk Factor Screenings with Interventions:    Fall Risk:  Do you feel unsteady or are you worried about falling? : no  2 or more falls in past year?: no  Fall with injury in past year?: (!) yes     Interventions:    See AVS for additional education material  See A/P for any pertinent orders              Weight and Activity:  Physical Activity: Inactive    Days of Exercise per Week: 0 days    Minutes of Exercise per Session: 0 min     On average, how many days per week do you engage in moderate to strenuous exercise (like a brisk walk)?: 0 days  Have you lost any weight without trying in the past 3 months?: No  Body mass index: (!) 39.52  Obesity Interventions:  See AVS for additional education material  See A/P for any pertinent orders      Inactivity Interventions:  See AVS for additional education material  See A/P for any pertinent orders                           Objective   Vitals:    12/07/22 1448   BP: 126/60   Pulse: 60   SpO2: 98% Weight: 199 lb (90.3 kg)   Height: 4' 11.5\" (1.511 m)      Body mass index is 39.52 kg/m². Physical Exam  Constitutional:       General: She is not in acute distress. Appearance: Normal appearance. She is not ill-appearing. HENT:      Head: Normocephalic and atraumatic. Right Ear: Tympanic membrane and ear canal normal.      Left Ear: Tympanic membrane and ear canal normal.      Nose: No congestion. Cardiovascular:      Rate and Rhythm: Normal rate and regular rhythm. Pulmonary:      Effort: Pulmonary effort is normal. No respiratory distress. Breath sounds: Normal breath sounds. Neurological:      Mental Status: She is alert and oriented to person, place, and time. Mental status is at baseline. Psychiatric:         Mood and Affect: Mood normal.         Behavior: Behavior normal.          Allergies   Allergen Reactions    Albuterol Other (See Comments)     Other reaction(s): Chest Pain  Crushing chest pain    Niacin Er Itching and Rash    Other     Heparin Other (See Comments)     Caused bruises and hematomas immediately when drip started. MARKED BRUISING/HEMATOMAS    Niacin     Avelox [Moxifloxacin Hcl In Nacl] Rash    Moxifloxacin Rash    Proventil [Albuterol Sulfate] Palpitations    Tagamet [Cimetidine] Rash     Prior to Visit Medications    Medication Sig Taking? Authorizing Provider   gabapentin (NEURONTIN) 400 MG capsule Take 1 capsule by mouth 3 times daily for 30 days.  Yes Achilles Salt, APRN - CNP   escitalopram (LEXAPRO) 20 MG tablet Take 1 tablet by mouth daily Yes Achilles Salt, APRN - CNP   furosemide (LASIX) 40 MG tablet Take 1 tablet by mouth daily Yes Achilles Salt, APRN - CNP   tiZANidine (ZANAFLEX) 4 MG tablet Take 1 tablet by mouth nightly as needed (leg spasms) Yes Achilles Salt, APRN - CNP   rosuvastatin (CRESTOR) 40 MG tablet Take 0.5 tablets by mouth every evening Yes Achilles Salt, APRN - CNP   pantoprazole (PROTONIX) 40 MG tablet Take 1 tablet by mouth 2 times daily (before meals) Yes Darlington Shoulder, APRN - JAMES   isosorbide mononitrate (IMDUR) 60 MG extended release tablet Take 1 tablet by mouth daily Yes Darlington Shoulder, APRN - CNP   potassium chloride (KLOR-CON M) 20 MEQ extended release tablet Take 1 tablet by mouth daily Yes Darlington Shoulder, BILL - CNP   pioglitazone (ACTOS) 15 MG tablet Take 1 tablet by mouth daily  Patient taking differently: Take 15 mg by mouth in the morning and at bedtime Yes Darlington Shoulder, BILL - CNP   traZODone (DESYREL) 50 MG tablet Take 1 tablet by mouth nightly Yes Darlington Shoulder, BILL - CNP   FARXIGA 10 MG tablet TAKE 1 TABLET BY MOUTH DAILY Yes Historical Provider, MD   octreotide (SANDOSTATIN) 100 MCG/ML SOLN injection Inject 20 mg. Indications: Receives monthly injections as of 10-26-22 BK Yes Historical Provider, MD   metoprolol tartrate (LOPRESSOR) 25 MG tablet Take 0.5 tablets by mouth 2 times daily Yes BILL Laguna CNP   SANDOSTATIN LAR DEPOT 20 MG injection INJECT 1 KIT IN THE MUSCLE MONTHLY Yes Historical Provider, MD   clopidogrel (PLAVIX) 75 MG tablet Take 1 tablet by mouth in the morning.  Yes BILL Laguna CNP   losartan (COZAAR) 25 MG tablet Take 1 tablet by mouth daily Yes Renan Wang MD   amLODIPine (NORVASC) 2.5 MG tablet Take 1 tablet by mouth 2 times daily Yes Renan Wang MD   fexofenadine (ALLEGRA) 180 MG tablet Take 180 mg by mouth daily Yes Historical Provider, MD   Epoetin Leonard-epbx (RETACRIT IJ) Inject as directed Every other week Yes Historical Provider, MD   zinc sulfate (ZINCATE) 220 (50 Zn) MG capsule Take 50 mg by mouth 4 times daily  Yes Historical Provider, MD   aspirin 81 MG tablet Take 81 mg by mouth daily Yes Historical Provider, MD   Cyanocobalamin (VITAMIN B-12 PO) Take 3,000 mcg by mouth daily Yes Historical Provider, MD   estradiol (ESTRACE) 0.1 MG/GM vaginal cream Place 2 g vaginally See Admin Instructions Twice a week Yes Historical Provider, MD   vitamin D (CHOLECALCIFEROL) 1000 UNIT TABS tablet Take 1,000 Units by mouth daily  Yes Historical Provider, MD   Calcium Citrate-Vitamin D (CALCIUM CITRATE + D PO) Take 1 tablet by mouth daily  Yes Historical Provider, MD   SLOW-MAG 71.5-119 MG TBEC tablet Take 1 tablet by mouth daily 143 mg Yes Historical Provider, MD   ezetimibe (ZETIA) 10 MG tablet Take 5 mg by mouth nightly  Yes Historical Provider, MD   SUMAtriptan (IMITREX) 100 MG tablet Take 1 tablet by mouth daily as needed for Migraine  Patient not taking: No sig reported  Ricarda Cruz MD   nitroGLYCERIN (NITROLINGUAL) 0.4 MG/SPRAY spray Place 1 spray under the tongue every 5 minutes as needed.  As directed for chest pain   Patient not taking: No sig reported  Historical Provider, MD Nunez (Including outside providers/suppliers regularly involved in providing care):   Patient Care Team:  BILL Garcia CNP as PCP - General (Certified Nurse Practitioner)  IBLL Garcia CNP as PCP - [facility]  BILL Moreno CNP as Nurse Practitioner (Nurse Practitioner)  Gregor Delgado MD ([address] [place] [address])  Margie Grant [place] as Physician Assistant (Physician Assistant)  Svetlana Lam MD as Consulting Physician (Cardiology)  Sydni Lux MD (Cardiology)  Kayla Whealn MD as Consulting Physician (General Surgery)     Reviewed and updated this visit:  Allergies  Meds  Problems          Electronically signed by BILL Thapa CNP on 12/7/2022 at 3:16 PM

## 2022-12-08 ENCOUNTER — APPOINTMENT (OUTPATIENT)
Dept: PHYSICAL THERAPY | Age: 72
End: 2022-12-08
Payer: MEDICARE

## 2022-12-12 ENCOUNTER — TELEPHONE (OUTPATIENT)
Dept: CARDIOLOGY CLINIC | Age: 72
End: 2022-12-12

## 2022-12-12 NOTE — TELEPHONE ENCOUNTER
Pt states her  was dx with covid on 12/5 he is feeling better and no fever. Pt states she is fine with no symptoms. Asking if she can keep her 12/15 appt with dce. Please advise.

## 2022-12-20 ENCOUNTER — NURSE ONLY (OUTPATIENT)
Dept: CARDIOLOGY CLINIC | Age: 72
End: 2022-12-20
Payer: MEDICARE

## 2022-12-20 DIAGNOSIS — Z95.0 PACEMAKER: ICD-10-CM

## 2022-12-20 PROCEDURE — 93294 REM INTERROG EVL PM/LDLS PM: CPT | Performed by: INTERNAL MEDICINE

## 2022-12-20 PROCEDURE — 93296 REM INTERROG EVL PM/IDS: CPT | Performed by: INTERNAL MEDICINE

## 2022-12-20 NOTE — PROGRESS NOTES
We received remote transmission from patient's monitor at home. Transmission shows normal sensing and pacing function. EP physician will review. See interrogation under cardiology tab in the [address] [place] [address] [address] field for more details.  < 0.1% (MVP On)  AP 97.6%    End of 91-day monitoring period 12-20-22.

## 2023-01-03 ENCOUNTER — HOSPITAL ENCOUNTER (OUTPATIENT)
Dept: PHYSICAL THERAPY | Age: 73
Setting detail: THERAPIES SERIES
Discharge: HOME | End: 2023-01-03

## 2023-01-03 NOTE — FLOWSHEET NOTE
[facility]     Physical Therapy  Cancellation/No-show Note  Patient Name:  Teri Coronado  :  1950   Date:  1/3/2023  Cancelled visits to date: 2  No-shows to date: 0    Patient status for today's appointment patient:  [x]  Cancelled 11/10, 1/3/23  []  Rescheduled appointment  []  No-show     Reason given by patient:  []  Patient ill  []  Conflicting appointment  []  No transportation    []  Conflict with work  []  No reason given  [x]  Other:     Comments:  Pt called  this date and stated she didn't feel that she needed any further PT; cancelled today's appt. Status on last visit will be used for discharge information. Phone call information:   []  Phone call made today to patient at _ time at number provided:      []  Patient answered, conversation as follows:    []  Patient did not answer, message left as follows:  []  Phone call not made today  [x]  Phone call not needed - pt contacted us to cancel and provided reason for cancellation.      Electronically signed by:  Robert Alvarez PT

## 2023-01-04 ENCOUNTER — TELEPHONE (OUTPATIENT)
Dept: INTERNAL MEDICINE CLINIC | Age: 73
End: 2023-01-04

## 2023-01-04 NOTE — TELEPHONE ENCOUNTER
Recommend a VV to discuss. Stay hydrated with water and/or sports drinks, BRAT diet, okay to take second dose of imodium if needed prior to appt. If having dizziness, lightheadedness or notices blood then recommend going to the ED.  Maritza Randall

## 2023-01-04 NOTE — TELEPHONE ENCOUNTER
Pt calling has had diarrhea since Mn along with a low fever---as soon as she drinks water goes right through her---she took a few Immodium yesterday but didn't help much---she did do a covid test yesterday and that was negative---wanting to know what she should do---please call the pt. Thanks.

## 2023-01-05 ENCOUNTER — TELEMEDICINE (OUTPATIENT)
Dept: INTERNAL MEDICINE CLINIC | Age: 73
End: 2023-01-05
Payer: MEDICARE

## 2023-01-05 DIAGNOSIS — E11.69 DIABETES MELLITUS TYPE 2 IN OBESE (HCC): ICD-10-CM

## 2023-01-05 DIAGNOSIS — I10 ESSENTIAL HYPERTENSION: ICD-10-CM

## 2023-01-05 DIAGNOSIS — A08.4 VIRAL GASTROENTERITIS: Primary | ICD-10-CM

## 2023-01-05 DIAGNOSIS — I48.0 PAROXYSMAL ATRIAL FIBRILLATION (HCC): ICD-10-CM

## 2023-01-05 DIAGNOSIS — E66.9 DIABETES MELLITUS TYPE 2 IN OBESE (HCC): ICD-10-CM

## 2023-01-05 DIAGNOSIS — I25.119 CORONARY ARTERY DISEASE INVOLVING NATIVE CORONARY ARTERY OF NATIVE HEART WITH ANGINA PECTORIS (HCC): ICD-10-CM

## 2023-01-05 DIAGNOSIS — E66.01 SEVERE OBESITY (BMI 35.0-39.9) WITH COMORBIDITY (HCC): ICD-10-CM

## 2023-01-05 DIAGNOSIS — N18.31 STAGE 3A CHRONIC KIDNEY DISEASE (HCC): ICD-10-CM

## 2023-01-05 PROCEDURE — G8427 DOCREV CUR MEDS BY ELIG CLIN: HCPCS | Performed by: NURSE PRACTITIONER

## 2023-01-05 PROCEDURE — 3017F COLORECTAL CA SCREEN DOC REV: CPT | Performed by: NURSE PRACTITIONER

## 2023-01-05 PROCEDURE — G8399 PT W/DXA RESULTS DOCUMENT: HCPCS | Performed by: NURSE PRACTITIONER

## 2023-01-05 PROCEDURE — 2022F DILAT RTA XM EVC RTNOPTHY: CPT | Performed by: NURSE PRACTITIONER

## 2023-01-05 PROCEDURE — 3046F HEMOGLOBIN A1C LEVEL >9.0%: CPT | Performed by: NURSE PRACTITIONER

## 2023-01-05 PROCEDURE — 1090F PRES/ABSN URINE INCON ASSESS: CPT | Performed by: NURSE PRACTITIONER

## 2023-01-05 PROCEDURE — 1123F ACP DISCUSS/DSCN MKR DOCD: CPT | Performed by: NURSE PRACTITIONER

## 2023-01-05 PROCEDURE — 99214 OFFICE O/P EST MOD 30 MIN: CPT | Performed by: NURSE PRACTITIONER

## 2023-01-05 NOTE — PROGRESS NOTES
2023    TELEHEALTH EVALUATION -- Audio/Visual (During QOECX-43 public health emergency)    Chief Complaint   Patient presents with    Diarrhea     Advised yesterday to stay hydrated with water and/or sports drinks, BRAT diet, okay to take second dose of imodium if needed prior to appt. HPI:    Alex Carrasco (:  1950) has requested an audio/video evaluation for the following concern(s):    VV for diarrhea   Started Monday- watery stools   Today she is feeling better, now loose  Was having some abd discomfort - resolved  She has also had some congestion, taking mucinex with relief of symptoms. Denies fever, chills. Congestion and mucous is also improving. Covid test was negative     Doing well on medications. Glucose has been averaging 140s, this am 135. Recently increased actos for A1c 7.3. Review of Systems  Negative other than HPI    Prior to Visit Medications    Medication Sig Taking?  Authorizing Provider   escitalopram (LEXAPRO) 20 MG tablet Take 1 tablet by mouth daily Yes Jennett Duverney, APRN - CNP   furosemide (LASIX) 40 MG tablet Take 1 tablet by mouth daily Yes Jennett Duverney, APRN - CNP   tiZANidine (ZANAFLEX) 4 MG tablet Take 1 tablet by mouth nightly as needed (leg spasms) Yes Jennett Duverney, APRN - CNP   rosuvastatin (CRESTOR) 40 MG tablet Take 0.5 tablets by mouth every evening Yes Jennett Duverney, APRN - CNP   pantoprazole (PROTONIX) 40 MG tablet Take 1 tablet by mouth 2 times daily (before meals) Yes Jennett Duverney, APRN - CNP   isosorbide mononitrate (IMDUR) 60 MG extended release tablet Take 1 tablet by mouth daily Yes Jennett Duverney, APRN - CNP   potassium chloride (KLOR-CON M) 20 MEQ extended release tablet Take 1 tablet by mouth daily Yes Jennett Duverney, APRN - CNP   pioglitazone (ACTOS) 15 MG tablet Take 1 tablet by mouth daily  Patient taking differently: Take 15 mg by mouth in the morning and at bedtime Yes Jennett Duverney, APRN - CNP   traZODone (DESYREL) 50 MG tablet Take 1 tablet by mouth nightly Yes BILL Gusman CNP   FARXIGA 10 MG tablet TAKE 1 TABLET BY MOUTH DAILY Yes Historical Provider, MD   octreotide (SANDOSTATIN) 100 MCG/ML SOLN injection Inject 20 mg. Indications: Receives monthly injections as of 10-26-22 BK Yes Historical Provider, MD   metoprolol tartrate (LOPRESSOR) 25 MG tablet Take 0.5 tablets by mouth 2 times daily Yes Reynaldo Prader, APRN - CNP   SANDOSTATIN LAR DEPOT 20 MG injection INJECT 1 KIT IN THE MUSCLE MONTHLY Yes Historical Provider, MD   clopidogrel (PLAVIX) 75 MG tablet Take 1 tablet by mouth in the morning. Yes Reynaldo Prader, APRN - CNP   losartan (COZAAR) 25 MG tablet Take 1 tablet by mouth daily Yes Marisela Villareal MD   amLODIPine (NORVASC) 2.5 MG tablet Take 1 tablet by mouth 2 times daily Yes Marisela Villareal MD   fexofenadine (ALLEGRA) 180 MG tablet Take 180 mg by mouth daily Yes Historical Provider, MD   Epoetin Leonard-epbx (RETACRIT IJ) Inject as directed Every other week Yes Historical Provider, MD   zinc sulfate (ZINCATE) 220 (50 Zn) MG capsule Take 50 mg by mouth 4 times daily  Yes Historical Provider, MD   aspirin 81 MG tablet Take 81 mg by mouth daily Yes Historical Provider, MD   Cyanocobalamin (VITAMIN B-12 PO) Take 3,000 mcg by mouth daily Yes Historical Provider, MD   estradiol (ESTRACE) 0.1 MG/GM vaginal cream Place 2 g vaginally See Admin Instructions Twice a week Yes Historical Provider, MD   vitamin D (CHOLECALCIFEROL) 1000 UNIT TABS tablet Take 1,000 Units by mouth daily  Yes Historical Provider, MD   Calcium Citrate-Vitamin D (CALCIUM CITRATE + D PO) Take 1 tablet by mouth daily  Yes Historical Provider, MD   SLOW-MAG 71.5-119 MG TBEC tablet Take 1 tablet by mouth daily 143 mg Yes Historical Provider, MD   ezetimibe (ZETIA) 10 MG tablet Take 5 mg by mouth nightly  Yes Historical Provider, MD   gabapentin (NEURONTIN) 400 MG capsule Take 1 capsule by mouth 3 times daily for 30 days.   BILL Gusman CNP SUMAtriptan (IMITREX) 100 MG tablet Take 1 tablet by mouth daily as needed for Migraine  Patient not taking: No sig reported  Jamie Loera MD   nitroGLYCERIN (NITROLINGUAL) 0.4 MG/SPRAY spray Place 1 spray under the tongue every 5 minutes as needed. As directed for chest pain   Patient not taking: No sig reported  Historical Provider, MD     Social History     Tobacco Use    Smoking status: Never    Smokeless tobacco: Never   Vaping Use    Vaping Use: Never used   Substance Use Topics    Alcohol use: No     Alcohol/week: 0.0 standard drinks    Drug use: No            PHYSICAL EXAMINATION:  [ INSTRUCTIONS:  \"[x]\" Indicates a positive item  \"[]\" Indicates a negative item  -- DELETE ALL ITEMS NOT EXAMINED]  Vital Signs: (As obtained by patient/caregiver or practitioner observation)    No flowsheet data found. Physical Exam  Constitutional:       General: She is not in acute distress. Appearance: Normal appearance. She is not ill-appearing. HENT:      Head: Normocephalic and atraumatic. Pulmonary:      Effort: Pulmonary effort is normal. No respiratory distress. Neurological:      Mental Status: She is alert and oriented to person, place, and time. Mental status is at baseline. Psychiatric:         Mood and Affect: Mood normal.         Behavior: Behavior normal.        Other pertinent observable physical exam findings-     Due to this being a TeleHealth encounter, evaluation of the following organ systems is limited: Vitals/Constitutional/EENT/Resp/CV/GI//MS/Neuro/Skin/Heme-Lymph-Imm. ASSESSMENT/PLAN:  Viral gastroenteritis  Acute, improving with supportive care  Continue with supportive care   BRAT diet - advance as tolerated   Reviewed criteria for follow up    Stage 3a chronic kidney disease (HCC)/ Diabetes mellitus type 2 in obese (HCC)/ Severe obesity (BMI 35.0-39. 9) with comorbidity (HCC)  Chronic, improving. Continue with medications as prescribed - see above. Work on diet/ exercise. Paroxysmal atrial fibrillation (HCC)/ Coronary artery disease involving native coronary artery of native heart with angina pectoris (HCC)/  Essential hypertension   Chronic. Controlled. Continue seeing cardiology. FU as scheduled and prn     An  electronic signature was used to authenticate this note. --Webster Lesch, APRN - CNP on 1/5/2023 at 12:54 PM        Author Dickens, was evaluated through a synchronous (real-time) audio-video encounter. The patient (or guardian if applicable) is aware that this is a billable service, which includes applicable co-pays. This Virtual Visit was conducted with patient's (and/or legal guardian's) consent. The visit was conducted pursuant to the emergency declaration under the [address] [place] [address] authority and the [employer] General Act. Patient identification was verified, and a caregiver was present when appropriate. The patient was located at Home: [address] [place] [id number]. Provider was located at [facility] (Appt Dept): [address] [place] [address],  [address] [facility].

## 2023-01-16 ENCOUNTER — HOSPITAL ENCOUNTER (OUTPATIENT)
Dept: GENERAL RADIOLOGY | Age: 73
Discharge: HOME | End: 2023-01-16
Payer: MEDICARE

## 2023-01-16 ENCOUNTER — OFFICE VISIT (OUTPATIENT)
Dept: INTERNAL MEDICINE CLINIC | Age: 73
End: 2023-01-16
Payer: MEDICARE

## 2023-01-16 ENCOUNTER — HOSPITAL ENCOUNTER (OUTPATIENT)
Age: 73
Discharge: HOME | End: 2023-01-16
Payer: MEDICARE

## 2023-01-16 VITALS
SYSTOLIC BLOOD PRESSURE: 130 MMHG | HEART RATE: 60 BPM | DIASTOLIC BLOOD PRESSURE: 68 MMHG | OXYGEN SATURATION: 98 % | BODY MASS INDEX: 40.31 KG/M2 | WEIGHT: 203 LBS

## 2023-01-16 DIAGNOSIS — J30.9 ALLERGIC SINUSITIS: Primary | ICD-10-CM

## 2023-01-16 DIAGNOSIS — R07.81 RIB PAIN ON RIGHT SIDE: ICD-10-CM

## 2023-01-16 PROCEDURE — 99214 OFFICE O/P EST MOD 30 MIN: CPT | Performed by: INTERNAL MEDICINE

## 2023-01-16 PROCEDURE — 3075F SYST BP GE 130 - 139MM HG: CPT | Performed by: INTERNAL MEDICINE

## 2023-01-16 PROCEDURE — 3017F COLORECTAL CA SCREEN DOC REV: CPT | Performed by: INTERNAL MEDICINE

## 2023-01-16 PROCEDURE — 71046 X-RAY EXAM CHEST 2 VIEWS: CPT

## 2023-01-16 PROCEDURE — G8427 DOCREV CUR MEDS BY ELIG CLIN: HCPCS | Performed by: INTERNAL MEDICINE

## 2023-01-16 PROCEDURE — 3078F DIAST BP <80 MM HG: CPT | Performed by: INTERNAL MEDICINE

## 2023-01-16 PROCEDURE — G8399 PT W/DXA RESULTS DOCUMENT: HCPCS | Performed by: INTERNAL MEDICINE

## 2023-01-16 PROCEDURE — 1123F ACP DISCUSS/DSCN MKR DOCD: CPT | Performed by: INTERNAL MEDICINE

## 2023-01-16 PROCEDURE — G8484 FLU IMMUNIZE NO ADMIN: HCPCS | Performed by: INTERNAL MEDICINE

## 2023-01-16 PROCEDURE — 1036F TOBACCO NON-USER: CPT | Performed by: INTERNAL MEDICINE

## 2023-01-16 PROCEDURE — G8417 CALC BMI ABV UP PARAM F/U: HCPCS | Performed by: INTERNAL MEDICINE

## 2023-01-16 PROCEDURE — 1090F PRES/ABSN URINE INCON ASSESS: CPT | Performed by: INTERNAL MEDICINE

## 2023-01-16 RX ORDER — FLUTICASONE PROPIONATE 50 MCG
2 SPRAY, SUSPENSION (ML) NASAL DAILY
Qty: 48 G | Refills: 2 | Status: SHIPPED | OUTPATIENT
Start: 2023-01-16

## 2023-01-16 ASSESSMENT — PATIENT HEALTH QUESTIONNAIRE - PHQ9
SUM OF ALL RESPONSES TO PHQ QUESTIONS 1-9: 0
SUM OF ALL RESPONSES TO PHQ9 QUESTIONS 1 & 2: 0
SUM OF ALL RESPONSES TO PHQ QUESTIONS 1-9: 0
2. FEELING DOWN, DEPRESSED OR HOPELESS: 0
SUM OF ALL RESPONSES TO PHQ QUESTIONS 1-9: 0
1. LITTLE INTEREST OR PLEASURE IN DOING THINGS: 0
SUM OF ALL RESPONSES TO PHQ QUESTIONS 1-9: 0

## 2023-01-16 ASSESSMENT — ENCOUNTER SYMPTOMS
SINUS COMPLAINT: 1
SINUS PRESSURE: 1
SWOLLEN GLANDS: 0
COUGH: 0
SORE THROAT: 0

## 2023-01-16 NOTE — PROGRESS NOTES
Airam Carrillo (:  1950) is a 71 y.o. female,Established patient, here for evaluation of the following chief complaint(s):  Rib Pain (injury) (Reached over console in car for cane heard a noise, no bruising. x 1 week ago) and Sinus Problem (C/o green drainage (mentioned upon rooming scheduled for rib pain))         ASSESSMENT/PLAN:  1. Allergic sinusitis  -     fluticasone (FLONASE) 50 MCG/ACT nasal spray; 2 sprays by Each Nostril route daily, Disp-48 g, R-2Normal  2. Rib pain on right side  -     XR CHEST STANDARD (2 VW); Future  Patient her symptomatology her upper respiratory symptoms are consistent with allergic sinusitis at this point she is advised to use an antihistamine like Allegra in addition to using Flonase on regular basis prescription for the Flonase was sent to her pharmacy    The patient the pain in her rib on the lateral aspect of her chest is very localized it happened after an overextension at this point seems to be mild enough that we will get her treated symptomatically with Tylenol and heating pad a chest x-ray will be done just to make sure there is no pulmonary abnormality or any free air around the lung otherwise conservative management would be the way to go  Return if symptoms worsen or fail to improve, for As scheduled.          Subjective   SUBJECTIVE/OBJECTIVE:    Lab Review   Lab Results   Component Value Date/Time     2022 11:44 AM     2022 06:57 AM     2022 05:14 AM    K 5.0 2022 11:44 AM    K 4.5 2022 06:57 AM    K 4.4 2022 05:14 AM    K 4.2 08/15/2022 07:10 AM    K 4.2 05/10/2022 10:44 AM    K 4.0 2022 04:45 AM    CO2 26 2022 11:44 AM    CO2 24 2022 06:57 AM    CO2 26 2022 05:14 AM    BUN 22 2022 11:44 AM    BUN 25 2022 06:57 AM    BUN 19 2022 05:14 AM    CREATININE 1.0 2022 11:44 AM    CREATININE 1.0 2022 06:57 AM    CREATININE 0.9 2022 05:14 AM    GLUCOSE 160 12/01/2022 11:44 AM    GLUCOSE 167 09/24/2022 06:57 AM    GLUCOSE 179 08/16/2022 05:14 AM    GLUCOSE 143 09/23/2011 11:13 AM    CALCIUM 9.8 12/01/2022 11:44 AM    CALCIUM 9.5 09/24/2022 06:57 AM    CALCIUM 9.4 08/16/2022 05:14 AM     Lab Results   Component Value Date/Time    WBC 6.6 12/01/2022 11:44 AM    WBC 5.7 09/24/2022 06:57 AM    WBC 8.3 08/16/2022 05:14 AM    HGB 10.6 12/01/2022 11:44 AM    HGB 9.2 09/24/2022 06:57 AM    HGB 8.4 08/16/2022 05:14 AM    HCT 33.8 12/01/2022 11:44 AM    HCT 28.9 09/24/2022 06:57 AM    HCT 26.1 08/16/2022 05:14 AM    MCV 90.4 12/01/2022 11:44 AM    MCV 87.6 09/24/2022 06:57 AM    MCV 85.8 08/16/2022 05:14 AM     12/01/2022 11:44 AM     09/24/2022 06:57 AM     08/16/2022 05:14 AM     Lab Results   Component Value Date/Time    CHOL 136 12/01/2022 11:44 AM    CHOL 109 05/22/2020 02:02 PM    CHOL 142 02/14/2020 02:15 PM    TRIG 234 12/01/2022 11:44 AM    TRIG 132 05/22/2020 02:02 PM    TRIG 269 02/14/2020 02:15 PM    HDL 39 12/01/2022 11:44 AM    HDL 36 05/22/2020 02:02 PM    HDL 37 02/14/2020 02:15 PM    LDLDIRECT 90 01/18/2017 06:14 AM    LDLDIRECT 108 09/24/2016 11:15 AM    LDLDIRECT 109 08/13/2013 10:08 AM       Vitals 1/16/2023 12/7/2022 09/8/9932   SYSTOLIC 937 509 -   DIASTOLIC 68 60 -   Site - - -   Position - - -   Cuff Size - - -   Pulse 60 60 -   Temp - - -   Resp - - -   SpO2 98 98 -   Weight 203 lb 199 lb 200 lb   Height - 4' 11.5\" 4' 11.5\"   Body mass index - 39.52 kg/m2 39.71 kg/m2   Pain Level - - -   Some recent data might be hidden       Sinus Problem  This is a recurrent problem. The current episode started 1 to 4 weeks ago. The problem is unchanged. Associated symptoms include sinus pressure. Pertinent negatives include no chills, congestion, coughing, diaphoresis, sneezing, sore throat or swollen glands. Chest Pain   This is a new problem. The current episode started in the past 7 days. The onset quality is sudden.  Pertinent negatives include no cough or diaphoresis. Review of Systems   Constitutional:  Negative for chills and diaphoresis. HENT:  Positive for sinus pressure. Negative for congestion, sneezing and sore throat. Respiratory:  Negative for cough. Cardiovascular:  Positive for chest pain. Objective   Physical Exam  Vitals and nursing note reviewed. Constitutional:       General: She is not in acute distress. Appearance: Normal appearance. HENT:      Head: Normocephalic and atraumatic. Right Ear: Tympanic membrane normal.      Left Ear: Tympanic membrane normal.      Nose: Nose normal.   Eyes:      Extraocular Movements: Extraocular movements intact. Conjunctiva/sclera: Conjunctivae normal.      Pupils: Pupils are equal, round, and reactive to light. Neck:      Vascular: No carotid bruit. Cardiovascular:      Rate and Rhythm: Normal rate and regular rhythm. Pulses: Normal pulses. Heart sounds: No murmur heard. Pulmonary:      Effort: Pulmonary effort is normal. No respiratory distress. Breath sounds: Normal breath sounds. Abdominal:      General: Abdomen is flat. Bowel sounds are normal. There is no distension. Palpations: Abdomen is soft. Tenderness: There is no abdominal tenderness. Musculoskeletal:         General: No swelling, tenderness or deformity. Cervical back: Normal range of motion and neck supple. No rigidity or tenderness. Right lower leg: No edema. Left lower leg: No edema. Lymphadenopathy:      Cervical: No cervical adenopathy. Skin:     Coloration: Skin is not jaundiced. Findings: No bruising, erythema or lesion. Neurological:      General: No focal deficit present. Mental Status: She is alert and oriented to person, place, and time. Cranial Nerves: No cranial nerve deficit. Motor: No weakness. Gait: Gait normal.          This dictation was generated by voice recognition computer software.   Although all attempts are made to edit the dictation for accuracy, there may be errors in the transcription that are not intended. An electronic signature was used to authenticate this note.     --Trino Bowden MD

## 2023-01-25 RX ORDER — CLOPIDOGREL BISULFATE 75 MG/1
75 TABLET ORAL DAILY
Qty: 90 TABLET | Refills: 3 | Status: SHIPPED | OUTPATIENT
Start: 2023-01-25

## 2023-01-25 NOTE — TELEPHONE ENCOUNTER
Medication Refill    Medication needing refilled:clopidogrel (PLAVIX) 75 MG      Dosage of the medication:    How are you taking this medication (QD, BID, TID, QID, PRN): 1 tablet by mouth in the morning    30 or 90 day supply called in: 90 day with 3 refills    When will you run out of your medication:    Which Pharmacy are we sending the medication to?: [facility] - [address], [place] - [address] [place] [address] [phone] - f [phone]

## 2023-01-31 ENCOUNTER — NURSE ONLY (OUTPATIENT)
Dept: CARDIOLOGY CLINIC | Age: 73
End: 2023-01-31
Payer: MEDICARE

## 2023-01-31 ENCOUNTER — OFFICE VISIT (OUTPATIENT)
Dept: CARDIOLOGY CLINIC | Age: 73
End: 2023-01-31
Payer: MEDICARE

## 2023-01-31 VITALS
WEIGHT: 201 LBS | SYSTOLIC BLOOD PRESSURE: 100 MMHG | DIASTOLIC BLOOD PRESSURE: 52 MMHG | HEART RATE: 60 BPM | BODY MASS INDEX: 39.46 KG/M2 | OXYGEN SATURATION: 95 % | HEIGHT: 60 IN

## 2023-01-31 DIAGNOSIS — I48.0 PAROXYSMAL ATRIAL FIBRILLATION (HCC): ICD-10-CM

## 2023-01-31 DIAGNOSIS — Z95.0 PACEMAKER: ICD-10-CM

## 2023-01-31 DIAGNOSIS — I10 ESSENTIAL HYPERTENSION: ICD-10-CM

## 2023-01-31 DIAGNOSIS — R00.1 BRADYCARDIA: ICD-10-CM

## 2023-01-31 DIAGNOSIS — I48.0 PAROXYSMAL ATRIAL FIBRILLATION (HCC): Primary | ICD-10-CM

## 2023-01-31 DIAGNOSIS — G47.33 OSA (OBSTRUCTIVE SLEEP APNEA): ICD-10-CM

## 2023-01-31 DIAGNOSIS — I25.119 CORONARY ARTERY DISEASE INVOLVING NATIVE CORONARY ARTERY OF NATIVE HEART WITH ANGINA PECTORIS (HCC): Primary | ICD-10-CM

## 2023-01-31 DIAGNOSIS — E66.01 SEVERE OBESITY (BMI 35.0-39.9) WITH COMORBIDITY (HCC): ICD-10-CM

## 2023-01-31 DIAGNOSIS — D64.9 ANEMIA, UNSPECIFIED TYPE: ICD-10-CM

## 2023-01-31 PROCEDURE — 93000 ELECTROCARDIOGRAM COMPLETE: CPT | Performed by: INTERNAL MEDICINE

## 2023-01-31 PROCEDURE — G8484 FLU IMMUNIZE NO ADMIN: HCPCS | Performed by: INTERNAL MEDICINE

## 2023-01-31 PROCEDURE — 1090F PRES/ABSN URINE INCON ASSESS: CPT | Performed by: INTERNAL MEDICINE

## 2023-01-31 PROCEDURE — 1036F TOBACCO NON-USER: CPT | Performed by: INTERNAL MEDICINE

## 2023-01-31 PROCEDURE — G8417 CALC BMI ABV UP PARAM F/U: HCPCS | Performed by: INTERNAL MEDICINE

## 2023-01-31 PROCEDURE — 3074F SYST BP LT 130 MM HG: CPT | Performed by: INTERNAL MEDICINE

## 2023-01-31 PROCEDURE — G8399 PT W/DXA RESULTS DOCUMENT: HCPCS | Performed by: INTERNAL MEDICINE

## 2023-01-31 PROCEDURE — 3078F DIAST BP <80 MM HG: CPT | Performed by: INTERNAL MEDICINE

## 2023-01-31 PROCEDURE — 1123F ACP DISCUSS/DSCN MKR DOCD: CPT | Performed by: INTERNAL MEDICINE

## 2023-01-31 PROCEDURE — G8427 DOCREV CUR MEDS BY ELIG CLIN: HCPCS | Performed by: INTERNAL MEDICINE

## 2023-01-31 PROCEDURE — 99214 OFFICE O/P EST MOD 30 MIN: CPT | Performed by: INTERNAL MEDICINE

## 2023-01-31 PROCEDURE — 93280 PM DEVICE PROGR EVAL DUAL: CPT | Performed by: INTERNAL MEDICINE

## 2023-01-31 PROCEDURE — 3017F COLORECTAL CA SCREEN DOC REV: CPT | Performed by: INTERNAL MEDICINE

## 2023-01-31 RX ORDER — LOSARTAN POTASSIUM 25 MG/1
25 TABLET ORAL DAILY
Qty: 90 TABLET | Refills: 3 | Status: SHIPPED | OUTPATIENT
Start: 2023-01-31

## 2023-01-31 RX ORDER — DAPAGLIFLOZIN 10 MG/1
TABLET, FILM COATED ORAL
Qty: 90 TABLET | Refills: 3 | Status: SHIPPED | OUTPATIENT
Start: 2023-01-31

## 2023-01-31 NOTE — PROGRESS NOTES
Patient comes in for programming evaluation for her pacemaker. All sensing and pacing parameters are within normal range. Battery life 9.8 years  AP 97.5%.  <0.1%. No episodes noted. Patient remains on metoprolol. No changes need to be made at this time. Please see interrogation for more detail. Patient will see Dr. Tim Yang today and follow up in 3 months in office or remotely.

## 2023-01-31 NOTE — PROGRESS NOTES
[address]   Electrophysiology Follow up   Date: 1/31/2023  I had the privilege of visiting Rommel Weston in the office. CC: Follow up for Afib    HPI: Rommel Weston is a 71 y.o. female  with a history of CAD, s/p CABG and paroxysmal atrial fibrillation. RAHUL, CKD      10/1/17 presented with chest pressure, and had a cardiac catheterization by Dr. Li Meneses which showed patent graft and medical therapy recommended. 08/16/2018 s/p EPS RFA  atrial fibrillation and PVI     [place] [facility] [place] and had EGD, then OP colonoscopy which showed polyps. EGD 9/21/18>  In the gastric antrum, linear streaks of erythematous mucosa with erosions seen suggestive of GAVE, biopsies not obtained. Rec'd iron infusions. History of MGUS and receiving Iron transfusion. S/p dual chamber PPM 5/1/19 for sinus node dysfunction     C  04/25/2022 for progressive SOB and chest Pain showed LM and OM disease. Noted to have low hemoglobin and melena. PCI delayed. SBE and colonoscopy showed AVMs, healing ulcers and diverticulosis. On Octreotide injection for GI bleeding. 08/15/2022 PCI to OM1 and LM - resumed DAPT     Adrián Dolan presents today in follow up. She states she is fatigued and has some SOB. She attributes this to her anemia. She is receiving iron transfusions and sandostatin injections. She feels a few brief palpitations. She does not exercise but Is able to work around the house. She is doing well from a cardiac perspective. We discussed gradually increasing activity level and working on her weight. Assessment and plan:   - Sinus node dysfunction   S/p dual chamber pacemaker 05/01/2019    The CIED was interrogated and programmed and I supervised and reviewed all the data. All findings and changes are in device interrogation sheet and reflect my personal interpretation and changes and is scanned to Epic.    9.8 years remaining, AP 97.5% ,  < 0.1%   0% AT/AF burden per device interrogation today. Presenting ASVP @ 61 BPM, underlying Sinus linda rate 40 bpm.        - Paroxysmal atrial fibrillation   EKG today  Sinus    [place] on interrogation 0%  S/p RFA atrial fibrillation with PVI 08/16/2018  Continue metoprolol     Patient has a [id number] Score of 3( age, gender, HTN)   ~ not on AC due to anemia and MGUS and GI bleeding.    ~  Discussed if afib is recurrent will need to consider resuming AC vs MEENU closure  as she is a poor candidate for long term Anticoagulation. She verbalized understanding     Monitor closely with pacemaker interrogation. If recurrent Afib, will consider Watchman. - Anemia    Follows with GI    H/o MGUS, blood loss anemia, Melana    05/2022 - blood loss anemia requiring transfusion    5/2022 SBE showed no non-bleeding AVM, two shallow healing ulcers that had bled in the past. Cecal AVM with minimal oozing and Sigmoid diverticulosis. - CAD    PCI 08/15/2022   CABG x 4 1999    On DAPT - continue current medication    Follows With Dr. Rickey Clemons     - HTN  -Controlled  -BP goal <130/80  -Home BP monitoring encouraged, printed information provided on how to accurately measure BP at home.  -Counseled to follow a low salt diet to assure blood pressure remains controlled for cardiovascular risk factor modification.   -The patient is counseled to get regular exercise 3-5 times per week and maintain a healthy weight reduce cardiovascular risk factors. - RAHUL  -Stable: Uses CPAP/Bipap/APAP  -Encourage to use machine to prevent long term effects of untreated RAHUL    - Obesity  Body mass index is 39.92 kg/m². -Excessive weight is complicating assessment and treatment.  It is placing patient at risk for multiple co-morbidities as well as early death and contributing to the patient's presentation.  -Discussed weight management with diet and exercise           Patient Active Problem List    Diagnosis Date Noted    Stage 3a chronic kidney disease ([facility] 01/05/2023    Unstable angina ([facility] 08/15/2022    CAD, multiple vessel 05/02/2022    Melena 12/29/2021    COVID-19 12/27/2021    NSTEMI (non-ST elevated myocardial infarction) ([facility] 12/25/2021    Pneumonia 12/25/2021    Pacemaker 05/01/2019    Anemia 10/07/2018    Paroxysmal atrial fibrillation ([facility] 08/15/2018    Diabetes mellitus type 2 in obese ([facility] 04/23/2014    Chronic kidney disease     Severe obesity (BMI 35.0-39. 9) with comorbidity (HCC)     RAHUL (obstructive sleep apnea) 05/26/2011    Coronary artery disease involving native coronary artery of native heart with angina pectoris ([facility] 04/26/2011    Essential hypertension 04/26/2011     Diagnostic studies:   ECG 1/31/23  SR , QTcH 44,QRS 98     Cath 08/15/2022   Findings:  Artery Findings/Result  LM 80% mid to distal  % prox  Cx 80% prox to mid OM1  RI N/A  RCA NA  LVEDP NA  LVG NA     Intervention:         IVUS of LM - Diam 4.5 = MLA 4.8  IVUS of Cx and OM - Diam = 3.5 - MLA 3.2  PCI of OM1 with 3.5X38 Xience LAVELLE  PCI of LM with 4.0X18 Xience LAVELLE (PD to 4.5 with NC)     Post Cath Dx:       MVD as above     Med Rec:               Recommendation Indicated? Not Given Due To: Detail(s)  DAPT  Yes      STATIN - HIGH DOSE Yes      BETA BLOCKER Yes      ACE/ARB/ARNI yes      ALDACTONE no         Non-Med Rec:      Category Recommendation  EF ASSESSMENT Noninvasive assessment of EF if LVG deferred as IP/OP as appropriate  TOBACCO Avoidance of first and second hand smoke  DIET/EXERCISE Maintain healthy lifestyle with focus on diet, exercise and weight loss  CARDIAC REHAB Referral to outpatient cardiac rehab phase II will be deferred until patient follow-up in office and then determine patient safety and appropriateness to proceed. STATIN Patient started or continued on max tolerated dose of statin or high intensity statin as appropriate.   If no statin prescribed, secondary to intolerance, allergy or patient refusal.           NM stress 04/07/2022 Summary    The overall quality of the study is good. There is subdiaphragmatic    attenuation. Left ventricular cavity size is normal. Right ventricle is normal in size. SPECT images demonstrate homogeneous tracer distribution throughout the    myocardium. There is normal isotope uptake at stress and rest. There is no    evidence of myocardial ischemia or scar. Gated spect reveals normal    myocardial thickening and wall motion. Sum stress score of 4. No visual TID. Calculated TID of 0.93. Sensitivity of testing reduced on anti-anginals. Myocardial perfusion is normal. Low risk scan. ANGELITO: 4/25/2022   *Left ventricle - normal size and function with EF of 55%   *Aortic valve - sclerotic, mild regurgitation   *Mitral valve - mildly thickened and calcified, mild regurgitation   *Right heart pacer. Echo: 12/21   -Normal global systolic function with an ejection fraction estimated at 65%. -No regional wall motion abnormalities noted.   -Aortic valve appears sclerotic but opens adequately. There is mild aortic   insufficiency.   -Thickened mitral valve without evidence of stenosis. There is a moderate   sized mobile echodensity of unknown significant noted on posterior lateral   annulus of mitral valve which may represent increased calcification but was   not as prominent on the previous echo of 9/2019. No significant   regurgitation noted. -There is mild tricuspid regurgitation with a RVSP estimation of 34 mmHg. -Indeterminate diastolic function. Avg. E/e'=29.9   -Pacemaker / ICD lead is visualized in the right heart. Recommend ANGELITO after pt recovers from this COVID illness to check the MV   annulus. Cannot exclude a vegetation. I independently reviewed the cardiac diagnostic studies, ECG and relevant imaging studies.      Lab Results   Component Value Date    LVEF 55 04/25/2022    LVEFMODE Echo 08/30/2018     Lab Results   Component Value Date    TSH 1.95 05/22/2020         Physical Examination:  Vitals:    01/31/23 1424   BP: (!) 100/52   Pulse: 60   SpO2: 95%      Wt Readings from Last 3 Encounters:   01/31/23 201 lb (91.2 kg)   01/16/23 203 lb (92.1 kg)   12/07/22 199 lb (90.3 kg)       Constitutional: Oriented. No distress. Head: Normocephalic and atraumatic. Mouth/Throat: Oropharynx is clear and moist.   Eyes: Conjunctivae normal. EOM are normal.   Neck: Neck supple. No JVD present. Cardiovascular: Normal rate, regular rhythm, S1&S2. Pulmonary/Chest: Bilateral respiratory sounds. No rhonchi. Abdominal: Soft. No tenderness. Musculoskeletal: No tenderness. No edema    Lymphadenopathy: Has no cervical adenopathy. Neurological: Alert and oriented. Follows command, No Gross deficit   Skin: Skin is warm, No rash noted. Psychiatric: Has a normal behavior       Review of System:  [x] Full ROS obtained and negative except as mentioned in HPI    Prior to Admission medications    Medication Sig Start Date End Date Taking? Authorizing Provider   losartan (COZAAR) 25 MG tablet Take 1 tablet by mouth daily 1/31/23  Yes Amilcar Workman MD   FARXIGA 10 MG tablet TAKE 1 TABLET BY MOUTH DAILY 1/31/23  Yes Amilcar Workman MD   clopidogrel (PLAVIX) 75 MG tablet Take 1 tablet by mouth daily 1/25/23  Yes BILL Philip CNP   fluticasone (FLONASE) 50 MCG/ACT nasal spray 2 sprays by Each Nostril route daily 1/16/23  Yes Michaela Hylton MD   gabapentin (NEURONTIN) 400 MG capsule Take 1 capsule by mouth 3 times daily for 30 days.  11/30/22 1/31/23 Yes BILL Robison CNP   escitalopram (LEXAPRO) 20 MG tablet Take 1 tablet by mouth daily 11/30/22  Yes BILL Robison CNP   furosemide (LASIX) 40 MG tablet Take 1 tablet by mouth daily 11/30/22  Yes BILL Robison CNP   tiZANidine (ZANAFLEX) 4 MG tablet Take 1 tablet by mouth nightly as needed (leg spasms) 11/30/22  Yes BILL Robison CNP   rosuvastatin (CRESTOR) 40 MG tablet Take 0.5 tablets by mouth every evening 11/30/22  Yes BILL Chery - CNP   pantoprazole (PROTONIX) 40 MG tablet Take 1 tablet by mouth 2 times daily (before meals) 11/30/22  Yes BILL Chery - CNP   isosorbide mononitrate (IMDUR) 60 MG extended release tablet Take 1 tablet by mouth daily 11/30/22  Yes BILL Chery - CNP   potassium chloride (KLOR-CON M) 20 MEQ extended release tablet Take 1 tablet by mouth daily 11/30/22  Yes BILL Chery - CNP   pioglitazone (ACTOS) 15 MG tablet Take 1 tablet by mouth daily  Patient taking differently: Take 15 mg by mouth in the morning and at bedtime 11/30/22  Yes BILL Chery - CNP   traZODone (DESYREL) 50 MG tablet Take 1 tablet by mouth nightly 11/30/22  Yes BILL Chery - CNP   octreotide (SANDOSTATIN) 100 MCG/ML SOLN injection Inject 20 mg.  Indications: Receives monthly injections as of 10-26-22 BK   Yes Historical Provider, MD   metoprolol tartrate (LOPRESSOR) 25 MG tablet Take 0.5 tablets by mouth 2 times daily 9/9/22  Yes BILL Maya CNP   amLODIPine (NORVASC) 2.5 MG tablet Take 1 tablet by mouth 2 times daily 5/5/22  Yes Christiano Mohamud MD   fexofenadine (ALLEGRA) 180 MG tablet Take 180 mg by mouth daily   Yes Historical Provider, MD   Epoetin Leonard-epbx (RETACRIT IJ) Inject as directed Every other week   Yes Historical Provider, MD   SUMAtriptan (IMITREX) 100 MG tablet Take 1 tablet by mouth daily as needed for Migraine 1/2/22  Yes Libertad Sanchez MD   zinc sulfate (ZINCATE) 220 (50 Zn) MG capsule Take 50 mg by mouth 4 times daily    Yes Historical Provider, MD   aspirin 81 MG tablet Take 81 mg by mouth daily   Yes Historical Provider, MD   Cyanocobalamin (VITAMIN B-12 PO) Take 3,000 mcg by mouth daily   Yes Historical Provider, MD   vitamin D (CHOLECALCIFEROL) 1000 UNIT TABS tablet Take 1,000 Units by mouth daily    Yes Historical Provider, MD   Calcium Citrate-Vitamin D (CALCIUM CITRATE + D PO) Take 1 tablet by mouth daily    Yes Historical Provider, MD   SLOW-MAG 71.5-119 MG TBEC tablet Take 1 tablet by mouth daily 143 mg 11/3/15  Yes Historical Provider, MD   ezetimibe (ZETIA) 10 MG tablet Take 5 mg by mouth nightly    Yes Historical Provider, MD   nitroGLYCERIN (NITROLINGUAL) 0.4 MG/SPRAY spray Place 1 spray under the tongue every 5 minutes as needed As directed for chest pain   Yes Historical Provider, MD   SANDOSTATIN LAR DEPOT 20 MG injection INJECT 1 KIT IN THE MUSCLE MONTHLY 6/9/22   Historical Provider, MD   estradiol (ESTRACE) 0.1 MG/GM vaginal cream Place 2 g vaginally See Admin Instructions Twice a week    Historical Provider, MD       Allergies   Allergen Reactions    Albuterol Other (See Comments)     Other reaction(s): Chest Pain  Crushing chest pain    Niacin Er Itching and Rash    Other     Heparin Other (See Comments)     Caused bruises and hematomas immediately when drip started. MARKED BRUISING/HEMATOMAS    Niacin     Avelox [Moxifloxacin Hcl In Nacl] Rash    Moxifloxacin Rash    Proventil [Albuterol Sulfate] Palpitations    Tagamet [Cimetidine] Rash       Social History:  Reviewed. reports that she has never smoked. She has never used smokeless tobacco. She reports that she does not drink alcohol and does not use drugs. Family History:  Reviewed. Reviewed. No family history of SCD. Relevant and available labs, and cardiovascular diagnostics reviewed. Reviewed. I independently reviewed relevant and available cardiac diagnostic tests ECG, CXR, Echo, Stress test, Device interrogation, Holter, CT scan. Outside medical records via Care everywhere reviewed and summarized in H&P above. Complex medical condition with multiple medical problems affecting prognosis and outcome of EP interventions    All questions and concerns were addressed to the patient/family. Alternatives to my treatment were discussed.  I have discussed the above stated plan and the patient verbalized understanding and agreed with the plan. Scribe attestation: This note was scribed in the presence of Padmaja Patricio MD by Christiano Andrews RN  Physician Attestation: I, Dr. Padmaja Patricio, confirm that the scribe's documentation has been prepared under my direction and personally reviewed by me in its entirety. I also confirm that the note above accurately reflects all work, treatment, procedures, and medical decision making performed by me. NOTE: This report was transcribed using voice recognition software. Every effort was made to ensure accuracy, however, inadvertent computerized transcription errors may be present.      Padmaja Patricio MD, MPH  [facility]   Office: [phone]  Fax: [phone]

## 2023-01-31 NOTE — TELEPHONE ENCOUNTER
Pt  came in requesting refills for Dapagliflozin 10mg tab & Losartan 25mg tab. Pt needs these refills faxed to [facility] fax #: [phone]. 90 scripts & 3 refills is what they prefer. Please advise.      Last Visit: 01/16/2023  Next Visit: 06/07/2023

## 2023-02-28 ENCOUNTER — PATIENT MESSAGE (OUTPATIENT)
Dept: INTERNAL MEDICINE CLINIC | Age: 73
End: 2023-02-28

## 2023-02-28 NOTE — TELEPHONE ENCOUNTER
Last OV: 1/16/2023  Next OV: 6/7/2023      Last fill:11/30/22  Refills:0      From: Yeyo Fragoso  To: Cristy Ayala  Sent: 2/28/2023  4:14 PM EST  Subject: GABAPENTIN             &          PIOGLITAZONE    I NEED NEW PRESCRIPTIONS  FOR 90 DAY MAIL-INS TO [facility] BY MAIL;  NOT TO [facility]  GABAPENTIN 400 MG 3XDAY    &  PIOGLITAZONE 15MG        2   TABS A DAY(you increased dosage on 12-2-220)  NOTE;  CAN YOU SEND A SCRIPT ALSO FOR MY ALLERGY MEDICINE(FEXOFENADINE 180 MG (1tab daily)                         ALSO LOW DOSE ASPIRIN(81 MG 1 tab daily)  THESE GO TO [facility] BY MAIL ALSO        THIS WOULD BE MOST APPRECIATED AND COVERED BY MY INSURANCE( [employer])  you can  contact  me by phone @ [phone]. THANK YOU.

## 2023-03-01 RX ORDER — GABAPENTIN 400 MG/1
400 CAPSULE ORAL 3 TIMES DAILY
Qty: 270 CAPSULE | Refills: 0 | Status: SHIPPED | OUTPATIENT
Start: 2023-03-01 | End: 2023-03-31

## 2023-03-01 RX ORDER — PIOGLITAZONEHYDROCHLORIDE 15 MG/1
15 TABLET ORAL 2 TIMES DAILY
Qty: 180 TABLET | Refills: 0 | Status: SHIPPED | OUTPATIENT
Start: 2023-03-01 | End: 2023-05-30

## 2023-03-01 RX ORDER — ASPIRIN 81 MG/1
81 TABLET ORAL DAILY
Qty: 90 TABLET | Refills: 1 | Status: SHIPPED | OUTPATIENT
Start: 2023-03-01

## 2023-03-01 RX ORDER — FEXOFENADINE HCL 180 MG/1
180 TABLET ORAL DAILY
Qty: 90 TABLET | Refills: 0 | Status: SHIPPED | OUTPATIENT
Start: 2023-03-01 | End: 2023-05-30

## 2023-03-16 ENCOUNTER — OFFICE VISIT (OUTPATIENT)
Dept: INTERNAL MEDICINE CLINIC | Age: 73
End: 2023-03-16
Payer: MEDICARE

## 2023-03-16 VITALS
BODY MASS INDEX: 40.01 KG/M2 | WEIGHT: 203.8 LBS | DIASTOLIC BLOOD PRESSURE: 60 MMHG | HEIGHT: 60 IN | HEART RATE: 64 BPM | SYSTOLIC BLOOD PRESSURE: 106 MMHG

## 2023-03-16 DIAGNOSIS — I10 HYPERTENSION: ICD-10-CM

## 2023-03-16 DIAGNOSIS — I48.0 PAROXYSMAL ATRIAL FIBRILLATION (HCC): ICD-10-CM

## 2023-03-16 DIAGNOSIS — E78.2 MIXED HYPERLIPIDEMIA: ICD-10-CM

## 2023-03-16 DIAGNOSIS — I25.10 CAD, MULTIPLE VESSEL: ICD-10-CM

## 2023-03-16 DIAGNOSIS — R35.0 URINARY FREQUENCY: Primary | ICD-10-CM

## 2023-03-16 DIAGNOSIS — E66.9 DIABETES MELLITUS TYPE 2 IN OBESE (HCC): ICD-10-CM

## 2023-03-16 DIAGNOSIS — N18.31 STAGE 3A CHRONIC KIDNEY DISEASE (HCC): ICD-10-CM

## 2023-03-16 DIAGNOSIS — R13.10 DYSPHAGIA, UNSPECIFIED TYPE: ICD-10-CM

## 2023-03-16 DIAGNOSIS — I25.119 CORONARY ARTERY DISEASE INVOLVING NATIVE CORONARY ARTERY OF NATIVE HEART WITH ANGINA PECTORIS (HCC): ICD-10-CM

## 2023-03-16 DIAGNOSIS — F33.0 MAJOR DEPRESSIVE DISORDER, RECURRENT, MILD (HCC): ICD-10-CM

## 2023-03-16 DIAGNOSIS — G47.33 OSA (OBSTRUCTIVE SLEEP APNEA): ICD-10-CM

## 2023-03-16 DIAGNOSIS — I10 ESSENTIAL HYPERTENSION: ICD-10-CM

## 2023-03-16 DIAGNOSIS — Z95.0 PACEMAKER: ICD-10-CM

## 2023-03-16 DIAGNOSIS — E11.69 DIABETES MELLITUS TYPE 2 IN OBESE (HCC): ICD-10-CM

## 2023-03-16 DIAGNOSIS — E66.01 SEVERE OBESITY (BMI 35.0-39.9) WITH COMORBIDITY (HCC): ICD-10-CM

## 2023-03-16 PROBLEM — M79.7 FIBROMYOSITIS: Status: ACTIVE | Noted: 2020-12-10

## 2023-03-16 PROBLEM — U07.1 COVID-19: Status: RESOLVED | Noted: 2021-12-27 | Resolved: 2023-03-16

## 2023-03-16 PROBLEM — I25.2 HISTORY OF NON-ST ELEVATION MYOCARDIAL INFARCTION (NSTEMI): Status: ACTIVE | Noted: 2021-12-25

## 2023-03-16 PROBLEM — Z95.1 HISTORY OF CORONARY ARTERY BYPASS SURGERY: Status: ACTIVE | Noted: 2017-03-16

## 2023-03-16 LAB
ANION GAP SERPL CALCULATED.3IONS-SCNC: 16 MMOL/L (ref 3–16)
BILIRUBIN, POC: NORMAL
BLOOD URINE, POC: NORMAL
BUN SERPL-MCNC: 22 MG/DL (ref 7–20)
CALCIUM SERPL-MCNC: 9.4 MG/DL (ref 8.3–10.6)
CHLORIDE SERPL-SCNC: 101 MMOL/L (ref 99–110)
CLARITY, POC: NORMAL
CO2 SERPL-SCNC: 25 MMOL/L (ref 21–32)
COLOR, POC: NORMAL
CREAT SERPL-MCNC: 1.1 MG/DL (ref 0.6–1.2)
GFR SERPLBLD CREATININE-BSD FMLA CKD-EPI: 53 ML/MIN/{1.73_M2}
GLUCOSE SERPL-MCNC: 156 MG/DL (ref 70–99)
GLUCOSE URINE, POC: NORMAL
KETONES, POC: NORMAL
LEUKOCYTE EST, POC: NORMAL
NITRITE, POC: NORMAL
PH, POC: 5.5
POTASSIUM SERPL-SCNC: 4.8 MMOL/L (ref 3.5–5.1)
PROTEIN, POC: NORMAL
SODIUM SERPL-SCNC: 142 MMOL/L (ref 136–145)
SPECIFIC GRAVITY, POC: 1.01
UROBILINOGEN, POC: 0.2

## 2023-03-16 PROCEDURE — G8484 FLU IMMUNIZE NO ADMIN: HCPCS | Performed by: NURSE PRACTITIONER

## 2023-03-16 PROCEDURE — 1090F PRES/ABSN URINE INCON ASSESS: CPT | Performed by: NURSE PRACTITIONER

## 2023-03-16 PROCEDURE — 3017F COLORECTAL CA SCREEN DOC REV: CPT | Performed by: NURSE PRACTITIONER

## 2023-03-16 PROCEDURE — 3074F SYST BP LT 130 MM HG: CPT | Performed by: NURSE PRACTITIONER

## 2023-03-16 PROCEDURE — 3046F HEMOGLOBIN A1C LEVEL >9.0%: CPT | Performed by: NURSE PRACTITIONER

## 2023-03-16 PROCEDURE — 81002 URINALYSIS NONAUTO W/O SCOPE: CPT | Performed by: NURSE PRACTITIONER

## 2023-03-16 PROCEDURE — 3078F DIAST BP <80 MM HG: CPT | Performed by: NURSE PRACTITIONER

## 2023-03-16 PROCEDURE — G8417 CALC BMI ABV UP PARAM F/U: HCPCS | Performed by: NURSE PRACTITIONER

## 2023-03-16 PROCEDURE — 2022F DILAT RTA XM EVC RTNOPTHY: CPT | Performed by: NURSE PRACTITIONER

## 2023-03-16 PROCEDURE — G8427 DOCREV CUR MEDS BY ELIG CLIN: HCPCS | Performed by: NURSE PRACTITIONER

## 2023-03-16 PROCEDURE — 1036F TOBACCO NON-USER: CPT | Performed by: NURSE PRACTITIONER

## 2023-03-16 PROCEDURE — G8399 PT W/DXA RESULTS DOCUMENT: HCPCS | Performed by: NURSE PRACTITIONER

## 2023-03-16 PROCEDURE — 1123F ACP DISCUSS/DSCN MKR DOCD: CPT | Performed by: NURSE PRACTITIONER

## 2023-03-16 PROCEDURE — 99215 OFFICE O/P EST HI 40 MIN: CPT | Performed by: NURSE PRACTITIONER

## 2023-03-16 RX ORDER — TRAZODONE HYDROCHLORIDE 50 MG/1
50 TABLET ORAL NIGHTLY PRN
Qty: 90 TABLET | Refills: 3 | Status: SHIPPED | OUTPATIENT
Start: 2023-03-16

## 2023-03-16 RX ORDER — ISOSORBIDE MONONITRATE 60 MG/1
60 TABLET, EXTENDED RELEASE ORAL DAILY
Qty: 90 TABLET | Refills: 1 | Status: CANCELLED | OUTPATIENT
Start: 2023-03-16

## 2023-03-16 RX ORDER — PANTOPRAZOLE SODIUM 40 MG/1
40 TABLET, DELAYED RELEASE ORAL
Qty: 180 TABLET | Refills: 3 | Status: SHIPPED | OUTPATIENT
Start: 2023-03-16

## 2023-03-16 RX ORDER — TIZANIDINE 4 MG/1
4 TABLET ORAL NIGHTLY PRN
Qty: 90 TABLET | Refills: 3 | Status: SHIPPED | OUTPATIENT
Start: 2023-03-16

## 2023-03-16 RX ORDER — SULFAMETHOXAZOLE AND TRIMETHOPRIM 800; 160 MG/1; MG/1
1 TABLET ORAL 2 TIMES DAILY
Qty: 6 TABLET | Refills: 0 | Status: SHIPPED | OUTPATIENT
Start: 2023-03-16 | End: 2023-03-16

## 2023-03-16 RX ORDER — AMLODIPINE BESYLATE 2.5 MG/1
2.5 TABLET ORAL 2 TIMES DAILY
Qty: 180 TABLET | Refills: 1 | Status: SHIPPED | OUTPATIENT
Start: 2023-03-16

## 2023-03-16 RX ORDER — EZETIMIBE 10 MG/1
5 TABLET ORAL NIGHTLY
Qty: 45 TABLET | Refills: 3 | Status: SHIPPED | OUTPATIENT
Start: 2023-03-16

## 2023-03-16 RX ORDER — NITROGLYCERIN 0.4 MG/1
0.4 TABLET SUBLINGUAL EVERY 5 MIN PRN
Qty: 25 TABLET | Refills: 1 | Status: CANCELLED | OUTPATIENT
Start: 2023-03-16

## 2023-03-16 RX ORDER — BLOOD SUGAR DIAGNOSTIC
1 STRIP MISCELLANEOUS 2 TIMES DAILY
Qty: 200 EACH | Refills: 1 | Status: SHIPPED | OUTPATIENT
Start: 2023-03-16

## 2023-03-16 RX ORDER — SULFAMETHOXAZOLE AND TRIMETHOPRIM 800; 160 MG/1; MG/1
1 TABLET ORAL 2 TIMES DAILY
Qty: 6 TABLET | Refills: 0 | Status: SHIPPED | OUTPATIENT
Start: 2023-03-16 | End: 2023-03-19

## 2023-03-16 RX ORDER — ESCITALOPRAM OXALATE 20 MG/1
20 TABLET ORAL DAILY
Qty: 90 TABLET | Refills: 3 | Status: SHIPPED | OUTPATIENT
Start: 2023-03-16

## 2023-03-16 RX ORDER — FUROSEMIDE 40 MG/1
40 TABLET ORAL DAILY
Qty: 90 TABLET | Refills: 3 | Status: SHIPPED | OUTPATIENT
Start: 2023-03-16

## 2023-03-16 RX ORDER — LANCETS 30 GAUGE
1 EACH MISCELLANEOUS 2 TIMES DAILY
Qty: 200 EACH | Refills: 1 | Status: SHIPPED | OUTPATIENT
Start: 2023-03-16

## 2023-03-16 RX ORDER — INCONTINENCE PAD,LINER,DISP
EACH MISCELLANEOUS
Qty: 360 EACH | Refills: 2 | Status: SHIPPED | OUTPATIENT
Start: 2023-03-16

## 2023-03-16 RX ORDER — ROSUVASTATIN CALCIUM 40 MG/1
20 TABLET, COATED ORAL EVERY EVENING
Qty: 45 TABLET | Refills: 3 | Status: SHIPPED | OUTPATIENT
Start: 2023-03-16

## 2023-03-16 SDOH — ECONOMIC STABILITY: FOOD INSECURITY: WITHIN THE PAST 12 MONTHS, YOU WORRIED THAT YOUR FOOD WOULD RUN OUT BEFORE YOU GOT MONEY TO BUY MORE.: NEVER TRUE

## 2023-03-16 SDOH — ECONOMIC STABILITY: HOUSING INSECURITY
IN THE LAST 12 MONTHS, WAS THERE A TIME WHEN YOU DID NOT HAVE A STEADY PLACE TO SLEEP OR SLEPT IN A SHELTER (INCLUDING NOW)?: NO

## 2023-03-16 SDOH — ECONOMIC STABILITY: FOOD INSECURITY: WITHIN THE PAST 12 MONTHS, THE FOOD YOU BOUGHT JUST DIDN'T LAST AND YOU DIDN'T HAVE MONEY TO GET MORE.: NEVER TRUE

## 2023-03-16 SDOH — ECONOMIC STABILITY: INCOME INSECURITY: HOW HARD IS IT FOR YOU TO PAY FOR THE VERY BASICS LIKE FOOD, HOUSING, MEDICAL CARE, AND HEATING?: NOT HARD AT ALL

## 2023-03-16 ASSESSMENT — ENCOUNTER SYMPTOMS
CHEST TIGHTNESS: 0
COUGH: 0
SHORTNESS OF BREATH: 0
NAUSEA: 0
VOMITING: 0
WHEEZING: 0

## 2023-03-16 NOTE — PROGRESS NOTES
3/16/23     Chief Complaint   Patient presents with    Urinary Frequency     Pt c/o frequent urination x 1 mo. Uses 2 depends a day but could use more. Would like to discuss a rx for depends        HPI    Here today for evaluation of urinary symptoms x1 month. She has noticed she has increased frequency, urgency and volume of her urine. She feels that she cannot hold her urine and sometimes has accidents, she is wearing depends. She denies dysuria, hematuria or malodorous urine. She states she has had such increase in volume that when she gets the urgency to go she cannot hold it and it just comes out. She denies associated NV and flank pain associated with urination. She is also requesting refills on her medications today. She reports she is doing well on them and denies concerns. She continues to see cardiology (Dr. River Cabrales) and EP (Dr. Jeanette Gutiérrez). She denies any CP, palpitations, SOB. Allergies   Allergen Reactions    Albuterol Other (See Comments)     Other reaction(s): Chest Pain  Crushing chest pain    Niacin Er Itching and Rash    Other     Heparin Other (See Comments)     Caused bruises and hematomas immediately when drip started. MARKED BRUISING/HEMATOMAS    Niacin     Avelox [Moxifloxacin Hcl In Nacl] Rash    Moxifloxacin Rash    Proventil [Albuterol Sulfate] Palpitations    Tagamet [Cimetidine] Rash       Current Outpatient Medications   Medication Sig Dispense Refill    amLODIPine (NORVASC) 2.5 MG tablet Take 1 tablet by mouth 2 times daily 180 tablet 1    tiZANidine (ZANAFLEX) 4 MG tablet Take 1 tablet by mouth nightly as needed (muscle spasms) 90 tablet 3    blood glucose test strips (FREESTYLE TEST STRIPS) strip 1 each by In Vitro route 2 times daily As needed.  200 each 1    Lancets MISC 1 each by Does not apply route 2 times daily 200 each 1    furosemide (LASIX) 40 MG tablet Take 1 tablet by mouth daily 90 tablet 3    ezetimibe (ZETIA) 10 MG tablet Take 0.5 tablets by mouth nightly Take 5 mg by mouth nightly 45 tablet 3    escitalopram (LEXAPRO) 20 MG tablet Take 1 tablet by mouth daily 90 tablet 3    traZODone (DESYREL) 50 MG tablet Take 1 tablet by mouth nightly as needed for Sleep 90 tablet 3    pantoprazole (PROTONIX) 40 MG tablet Take 1 tablet by mouth 2 times daily (before meals) 180 tablet 3    rosuvastatin (CRESTOR) 40 MG tablet Take 0.5 tablets by mouth every evening 45 tablet 3    sulfamethoxazole-trimethoprim (BACTRIM DS;SEPTRA DS) 800-160 MG per tablet Take 1 tablet by mouth 2 times daily for 3 days 6 tablet 0    Incontinence Supply Disposable (DEPEND UNDERGARMENT EX ABSORB) MISC Change 2-3 times a day as needed 360 each 2    gabapentin (NEURONTIN) 400 MG capsule Take 1 capsule by mouth 3 times daily for 30 days. 270 capsule 0    pioglitazone (ACTOS) 15 MG tablet Take 1 tablet by mouth in the morning and at bedtime 180 tablet 0    fexofenadine (ALLEGRA) 180 MG tablet Take 1 tablet by mouth daily 90 tablet 0    aspirin EC 81 MG EC tablet Take 1 tablet by mouth daily 90 tablet 1    losartan (COZAAR) 25 MG tablet Take 1 tablet by mouth daily 90 tablet 3    FARXIGA 10 MG tablet TAKE 1 TABLET BY MOUTH DAILY 90 tablet 3    clopidogrel (PLAVIX) 75 MG tablet Take 1 tablet by mouth daily 90 tablet 3    fluticasone (FLONASE) 50 MCG/ACT nasal spray 2 sprays by Each Nostril route daily 48 g 2    isosorbide mononitrate (IMDUR) 60 MG extended release tablet Take 1 tablet by mouth daily 90 tablet 0    potassium chloride (KLOR-CON M) 20 MEQ extended release tablet Take 1 tablet by mouth daily 90 tablet 3    octreotide (SANDOSTATIN) 100 MCG/ML SOLN injection Inject 20 mg.  Indications: Receives monthly injections as of 10-26-22 BK      metoprolol tartrate (LOPRESSOR) 25 MG tablet Take 0.5 tablets by mouth 2 times daily 90 tablet 3    Epoetin Leonard-epbx (RETACRIT IJ) Inject as directed Every other week      SUMAtriptan (IMITREX) 100 MG tablet Take 1 tablet by mouth daily as needed for Migraine 30 tablet 3    zinc sulfate (ZINCATE) 220 (50 Zn) MG capsule Take 50 mg by mouth 4 times daily       Cyanocobalamin (VITAMIN B-12 PO) Take 3,000 mcg by mouth daily      estradiol (ESTRACE) 0.1 MG/GM vaginal cream Place 2 g vaginally See Admin Instructions Twice a week      vitamin D (CHOLECALCIFEROL) 1000 UNIT TABS tablet Take 1,000 Units by mouth daily       Calcium Citrate-Vitamin D (CALCIUM CITRATE + D PO) Take 1 tablet by mouth daily       SLOW-MAG 71.5-119 MG TBEC tablet Take 1 tablet by mouth daily 143 mg  5    nitroGLYCERIN (NITROLINGUAL) 0.4 MG/SPRAY spray Place 1 spray under the tongue every 5 minutes as needed As directed for chest pain       No current facility-administered medications for this visit. Review of Systems   Constitutional:  Negative for chills, fatigue and fever. HENT: Negative. Respiratory:  Negative for cough, chest tightness, shortness of breath and wheezing. Cardiovascular:  Negative for chest pain, palpitations and leg swelling. Gastrointestinal:  Negative for nausea and vomiting. Genitourinary:  Positive for frequency and urgency. Negative for dysuria, flank pain and hematuria. Skin: Negative. Neurological:  Negative for dizziness, tremors, light-headedness and headaches. Psychiatric/Behavioral: Negative. Vitals:    03/16/23 1458   BP: 106/60   Pulse: 64   Weight: 203 lb 12.8 oz (92.4 kg)   Height: 4' 11.5\" (1.511 m)      Physical Exam  Constitutional:       General: She is not in acute distress. Appearance: Normal appearance. She is not ill-appearing. HENT:      Head: Normocephalic and atraumatic. Cardiovascular:      Rate and Rhythm: Normal rate and regular rhythm. Pulses: Normal pulses. Heart sounds: Normal heart sounds. Pulmonary:      Effort: Pulmonary effort is normal. No respiratory distress. Skin:     General: Skin is warm and dry. Capillary Refill: Capillary refill takes less than 2 seconds.    Neurological:      Mental Status: She is alert and oriented to person, place, and time. Mental status is at baseline. Psychiatric:         Mood and Affect: Mood normal.         Behavior: Behavior normal.     Assessment/Plan:  Diagnoses and all orders for this visit:    Urinary frequency  Acute, uncontrolled   - POCT Urinalysis no Micro  - Culture, Urine  Covering with abx. Continue exercises per PT for incontinence   - sulfamethoxazole-trimethoprim (BACTRIM DS;SEPTRA DS) 800-160 MG per tablet; Take 1 tablet by mouth 2 times daily for 3 days  Dispense: 6 tablet; Refill: 0    Dysphagia, unspecified type  -     pantoprazole (PROTONIX) 40 MG tablet; Take 1 tablet by mouth 2 times daily (before meals)    CAD, multiple vessel/ Mixed hyperlipidemia/ Coronary artery disease involving native coronary artery of native heart with angina pectoris (HCC)/ s/p CABG in 1999  Chronic, controlled. Continue medications. Continue seeing cardiology as scheduled. -     ezetimibe (ZETIA) 10 MG tablet; Take 0.5 tablets by mouth nightly Take 5 mg by mouth nightly  -     rosuvastatin (CRESTOR) 40 MG tablet; Take 0.5 tablets by mouth every evening    Essential hypertension  Chronic, stable. Controlled on medications. -     amLODIPine (NORVASC) 2.5 MG tablet; Take 1 tablet by mouth 2 times daily  -     furosemide (LASIX) 40 MG tablet; Take 1 tablet by mouth daily    Major depressive disorder, recurrent, mild  Chronic, controlled. Continue medications. -     escitalopram (LEXAPRO) 20 MG tablet; Take 1 tablet by mouth daily  -     traZODone (DESYREL) 50 MG tablet; Take 1 tablet by mouth nightly as needed for Sleep    Diabetes mellitus type 2 in obese (HCC)  -     blood glucose test strips (FREESTYLE TEST STRIPS) strip; 1 each by In Vitro route 2 times daily As needed. -     Lancets MISC; 1 each by Does not apply route 2 times daily  -     Microalbumin / Creatinine Urine Ratio;  Future  -     Hemoglobin A1C; Future    Paroxysmal atrial fibrillation (HCC)/ Pacemaker  Chronic, controlled. Continue seeing EP. Stage 3a chronic kidney disease ([facility] [address]  -     Basic Metabolic Panel; Future    RAHUL (obstructive sleep apnea)  Chronic, controlled. Continue using bipap/cpap. Continue seeing sleep medicine. Severe obesity (BMI 35.0-39. 9) with comorbidity (HCC)/ Morbid obesity (HCC)  Chronic, uncontrolled. Work on healthy diet/ exercise. - ezetimibe (ZETIA) 10 MG tablet; Take 0.5 tablets by mouth nightly Take 5 mg by mouth nightly  Dispense: 45 tablet; Refill: 3    Dysphagia  Chronic, stable, controled on ppi   Continue the following medications and therapies:   - pantoprazole (PROTONIX) 40 MG tablet; Take 1 tablet by mouth 2 times daily (before meals)  Dispense: 180 tablet; Refill: 3    Discussed medications with patient, who voiced understanding of their use and indications. All questions answered. FU as scheduled in June and prn     Electronically signed by BILL George CNP on 3/16/2023 at 4:48 PM    45+ min spent with patient- >50 % continuity of care and/or counseling.

## 2023-03-17 LAB
EST. AVERAGE GLUCOSE BLD GHB EST-MCNC: 139.9 MG/DL
HBA1C MFR BLD: 6.5 %

## 2023-03-18 LAB
BACTERIA UR CULT: ABNORMAL
ORGANISM: ABNORMAL

## 2023-03-21 ENCOUNTER — NURSE ONLY (OUTPATIENT)
Dept: CARDIOLOGY CLINIC | Age: 73
End: 2023-03-21
Payer: MEDICARE

## 2023-03-21 DIAGNOSIS — Z95.0 PACEMAKER: ICD-10-CM

## 2023-03-21 PROCEDURE — 93296 REM INTERROG EVL PM/IDS: CPT | Performed by: INTERNAL MEDICINE

## 2023-03-21 PROCEDURE — 93294 REM INTERROG EVL PM/LDLS PM: CPT | Performed by: INTERNAL MEDICINE

## 2023-03-21 NOTE — PROGRESS NOTES
We received remote transmission from patient's monitor at home. Transmission shows normal sensing and pacing function. EP physician will review. See interrogation under cardiology tab in the [address] [place] [address] [address] field for more details.  < 0.1% (MVP On)  AP 98.0%    End of 91-day monitoring period 3-21-23.

## 2023-03-23 ENCOUNTER — TELEPHONE (OUTPATIENT)
Dept: INTERNAL MEDICINE CLINIC | Age: 73
End: 2023-03-23

## 2023-04-17 DIAGNOSIS — J30.9 ALLERGIC SINUSITIS: ICD-10-CM

## 2023-04-17 RX ORDER — FLUTICASONE PROPIONATE 50 MCG
2 SPRAY, SUSPENSION (ML) NASAL DAILY
Qty: 48 G | Refills: 2 | Status: SHIPPED | OUTPATIENT
Start: 2023-04-17

## 2023-05-04 ENCOUNTER — OFFICE VISIT (OUTPATIENT)
Dept: CARDIOLOGY CLINIC | Age: 73
End: 2023-05-04
Payer: MEDICARE

## 2023-05-04 VITALS
HEART RATE: 60 BPM | SYSTOLIC BLOOD PRESSURE: 116 MMHG | HEIGHT: 60 IN | OXYGEN SATURATION: 96 % | WEIGHT: 202.2 LBS | BODY MASS INDEX: 39.7 KG/M2 | DIASTOLIC BLOOD PRESSURE: 78 MMHG

## 2023-05-04 DIAGNOSIS — I25.83 CORONARY ARTERY DISEASE DUE TO LIPID RICH PLAQUE: Primary | ICD-10-CM

## 2023-05-04 DIAGNOSIS — E78.00 HYPERCHOLESTEROLEMIA: ICD-10-CM

## 2023-05-04 DIAGNOSIS — I48.0 PAROXYSMAL ATRIAL FIBRILLATION (HCC): ICD-10-CM

## 2023-05-04 DIAGNOSIS — I25.10 CORONARY ARTERY DISEASE DUE TO LIPID RICH PLAQUE: Primary | ICD-10-CM

## 2023-05-04 DIAGNOSIS — E66.01 SEVERE OBESITY (BMI 35.0-39.9) WITH COMORBIDITY (HCC): ICD-10-CM

## 2023-05-04 DIAGNOSIS — Z95.0 PACEMAKER: ICD-10-CM

## 2023-05-04 DIAGNOSIS — I10 ESSENTIAL HYPERTENSION: ICD-10-CM

## 2023-05-04 PROCEDURE — G8399 PT W/DXA RESULTS DOCUMENT: HCPCS | Performed by: INTERNAL MEDICINE

## 2023-05-04 PROCEDURE — 1036F TOBACCO NON-USER: CPT | Performed by: INTERNAL MEDICINE

## 2023-05-04 PROCEDURE — G8417 CALC BMI ABV UP PARAM F/U: HCPCS | Performed by: INTERNAL MEDICINE

## 2023-05-04 PROCEDURE — G8427 DOCREV CUR MEDS BY ELIG CLIN: HCPCS | Performed by: INTERNAL MEDICINE

## 2023-05-04 PROCEDURE — 99214 OFFICE O/P EST MOD 30 MIN: CPT | Performed by: INTERNAL MEDICINE

## 2023-05-04 PROCEDURE — 3074F SYST BP LT 130 MM HG: CPT | Performed by: INTERNAL MEDICINE

## 2023-05-04 PROCEDURE — 1123F ACP DISCUSS/DSCN MKR DOCD: CPT | Performed by: INTERNAL MEDICINE

## 2023-05-04 PROCEDURE — 3017F COLORECTAL CA SCREEN DOC REV: CPT | Performed by: INTERNAL MEDICINE

## 2023-05-04 PROCEDURE — 3078F DIAST BP <80 MM HG: CPT | Performed by: INTERNAL MEDICINE

## 2023-05-04 PROCEDURE — 1090F PRES/ABSN URINE INCON ASSESS: CPT | Performed by: INTERNAL MEDICINE

## 2023-05-16 ENCOUNTER — TELEPHONE (OUTPATIENT)
Dept: INTERNAL MEDICINE CLINIC | Age: 73
End: 2023-05-16

## 2023-05-16 RX ORDER — PIOGLITAZONEHYDROCHLORIDE 15 MG/1
15 TABLET ORAL 2 TIMES DAILY
Qty: 180 TABLET | Refills: 0 | Status: SHIPPED | OUTPATIENT
Start: 2023-05-16 | End: 2023-08-14

## 2023-05-16 RX ORDER — GABAPENTIN 400 MG/1
400 CAPSULE ORAL 3 TIMES DAILY
Qty: 270 CAPSULE | Refills: 0 | Status: SHIPPED | OUTPATIENT
Start: 2023-05-16 | End: 2023-06-15

## 2023-05-16 NOTE — TELEPHONE ENCOUNTER
Medication:  Gabapentin 400mg  Pioglitazone 15mg     Last Filled:      Patient Pharmacy: [facility]    Patient Phone Number: [phone] (home)     Last appt: 3/16/2023   Next appt: 6/7/2023    Last OARRS: No flowsheet data found.     Last Labs DM:   Lab Results   Component Value Date/Time    LABA1C 6.5 03/16/2023 04:05 PM     Last Lipid:   Lab Results   Component Value Date/Time    CHOL 136 12/01/2022 11:44 AM    TRIG 234 12/01/2022 11:44 AM    HDL 39 12/01/2022 11:44 AM    LDLCALC 50 12/01/2022 11:44 AM     Last PSA: No results found for: PSA  Last Thyroid:   Lab Results   Component Value Date/Time    TSH 1.95 05/22/2020 02:02 PM    S4MGGXY 5.7 04/26/2011 12:55 PM

## 2023-05-24 ENCOUNTER — APPOINTMENT (OUTPATIENT)
Dept: GENERAL RADIOLOGY | Age: 73
End: 2023-05-24
Payer: MEDICARE

## 2023-05-24 ENCOUNTER — HOSPITAL ENCOUNTER (EMERGENCY)
Age: 73
Discharge: HOME | End: 2023-05-24
Attending: EMERGENCY MEDICINE
Payer: MEDICARE

## 2023-05-24 ENCOUNTER — TELEPHONE (OUTPATIENT)
Dept: CARDIOLOGY CLINIC | Age: 73
End: 2023-05-24

## 2023-05-24 VITALS
RESPIRATION RATE: 18 BRPM | SYSTOLIC BLOOD PRESSURE: 122 MMHG | HEART RATE: 60 BPM | TEMPERATURE: 98 F | WEIGHT: 200.3 LBS | BODY MASS INDEX: 39.78 KG/M2 | DIASTOLIC BLOOD PRESSURE: 51 MMHG | OXYGEN SATURATION: 96 %

## 2023-05-24 DIAGNOSIS — R55 NEAR SYNCOPE: Primary | ICD-10-CM

## 2023-05-24 DIAGNOSIS — Z87.898 HISTORY OF CHEST PAIN: ICD-10-CM

## 2023-05-24 LAB
ALBUMIN SERPL-MCNC: 4.3 G/DL (ref 3.4–5)
ALBUMIN/GLOB SERPL: 1.5 {RATIO} (ref 1.1–2.2)
ALP SERPL-CCNC: 172 U/L (ref 40–129)
ALT SERPL-CCNC: 12 U/L (ref 10–40)
ANION GAP SERPL CALCULATED.3IONS-SCNC: 11 MMOL/L (ref 3–16)
AST SERPL-CCNC: 16 U/L (ref 15–37)
BASOPHILS # BLD: 0 K/UL (ref 0–0.2)
BASOPHILS NFR BLD: 0.6 %
BILIRUB SERPL-MCNC: 0.3 MG/DL (ref 0–1)
BUN SERPL-MCNC: 21 MG/DL (ref 7–20)
CALCIUM SERPL-MCNC: 9.5 MG/DL (ref 8.3–10.6)
CHLORIDE SERPL-SCNC: 100 MMOL/L (ref 99–110)
CO2 SERPL-SCNC: 26 MMOL/L (ref 21–32)
CREAT SERPL-MCNC: 1.1 MG/DL (ref 0.6–1.2)
DEPRECATED RDW RBC AUTO: 19.8 % (ref 12.4–15.4)
EOSINOPHIL # BLD: 0.1 K/UL (ref 0–0.6)
EOSINOPHIL NFR BLD: 1.2 %
GFR SERPLBLD CREATININE-BSD FMLA CKD-EPI: 53 ML/MIN/{1.73_M2}
GLUCOSE SERPL-MCNC: 145 MG/DL (ref 70–99)
HCT VFR BLD AUTO: 36.8 % (ref 36–48)
HGB BLD-MCNC: 11.7 G/DL (ref 12–16)
LYMPHOCYTES # BLD: 1.7 K/UL (ref 1–5.1)
LYMPHOCYTES NFR BLD: 31.1 %
MCH RBC QN AUTO: 28.7 PG (ref 26–34)
MCHC RBC AUTO-ENTMCNC: 31.8 G/DL (ref 31–36)
MCV RBC AUTO: 90.1 FL (ref 80–100)
MONOCYTES # BLD: 0.7 K/UL (ref 0–1.3)
MONOCYTES NFR BLD: 13.7 %
NEUTROPHILS # BLD: 2.9 K/UL (ref 1.7–7.7)
NEUTROPHILS NFR BLD: 53.4 %
NT-PROBNP SERPL-MCNC: 907 PG/ML (ref 0–124)
PLATELET # BLD AUTO: 248 K/UL (ref 135–450)
PMV BLD AUTO: 7.9 FL (ref 5–10.5)
POTASSIUM SERPL-SCNC: 4 MMOL/L (ref 3.5–5.1)
PROT SERPL-MCNC: 7.1 G/DL (ref 6.4–8.2)
RBC # BLD AUTO: 4.09 M/UL (ref 4–5.2)
SODIUM SERPL-SCNC: 137 MMOL/L (ref 136–145)
TROPONIN, HIGH SENSITIVITY: 16 NG/L (ref 0–14)
TROPONIN, HIGH SENSITIVITY: 18 NG/L (ref 0–14)
WBC # BLD AUTO: 5.4 K/UL (ref 4–11)

## 2023-05-24 PROCEDURE — 80053 COMPREHEN METABOLIC PANEL: CPT

## 2023-05-24 PROCEDURE — 85025 COMPLETE CBC W/AUTO DIFF WBC: CPT

## 2023-05-24 PROCEDURE — 93005 ELECTROCARDIOGRAM TRACING: CPT | Performed by: EMERGENCY MEDICINE

## 2023-05-24 PROCEDURE — 83880 ASSAY OF NATRIURETIC PEPTIDE: CPT

## 2023-05-24 PROCEDURE — 84484 ASSAY OF TROPONIN QUANT: CPT

## 2023-05-24 PROCEDURE — 99285 EMERGENCY DEPT VISIT HI MDM: CPT

## 2023-05-24 PROCEDURE — 36415 COLL VENOUS BLD VENIPUNCTURE: CPT

## 2023-05-24 PROCEDURE — 71045 X-RAY EXAM CHEST 1 VIEW: CPT

## 2023-05-24 ASSESSMENT — PAIN - FUNCTIONAL ASSESSMENT
PAIN_FUNCTIONAL_ASSESSMENT: 0-10
PAIN_FUNCTIONAL_ASSESSMENT: 0-10

## 2023-05-24 ASSESSMENT — PAIN SCALES - GENERAL: PAINLEVEL_OUTOF10: 0

## 2023-05-24 NOTE — ED PROVIDER NOTES
I independently saw performed a substantive portion of the visit (history, physical, and MDM) on Haleigh Magdaleno. All diagnostic, treatment, and disposition decisions were made by myself in conjunction with the advanced practice provider. I have participated in the medical decision making and directed the treatment plan and disposition of the patient. For further details of [facility] emergency department encounter, please see the advanced practice provider's documentation. Osesa Erwin MD, am the primary physician provider of record. CHIEF COMPLAINT  Chief Complaint   Patient presents with    Chest Pain     Started yesterday while putting on makeup. Pt reports CP was sharp and pt took nitro and pain subsided. Briefly, Haleigh Magdaleno is a 71 y.o. female  who presents to the ED complaining of an episode of lightheadedness and near syncope that occurred yesterday. She says that she has not had chest discomfort actually since several weeks ago. She is currently symptom-free. Sent here by PCP. Has a history of CAD and CABG. FOCUSED PHYSICAL EXAMINATION  BP (!) 122/51   Pulse 60   Temp 98 °F (36.7 °C) (Oral)   Resp 18   Wt 200 lb 4.8 oz (90.9 kg)   SpO2 96%   BMI 39.78 kg/m²    Focused physical examination notable for no acute distress, well-appearing, well-nourished, normal speech and mentation without obvious facial droop, no obvious rash. No obvious cranial nerve deficits on my initial exam. RRR CTAB, abd soft NTND.       EKG performed in ED:  The 12 lead EKG was interpreted by me independent of a cardiologist as follows:  Rate: normal with a rate of 60  Rhythm: electronic atrial pacemaker  Axis: normal  Intervals: narrow QRS normal QTc  ST segments: no ST elevations or depressions  T waves: no abnormal inversions  Non-specific T wave changes: not present  Prior EKG comparison: EKG dated 9/24/22 is not significantly different      RADIOLOGY  Any applicable radiology

## 2023-05-24 NOTE — CONSULTS
[address]  H+P  Consult  OP Visit  FU Visit   CC/HPI   CC New patient visit for cp. HPI 71 y.o., female  concerned with single episode of cp occurred on 5/9. Episode noted while doing makeup. Nonexertional, lasted a few minutes, took nitro and dissipated, no recurrence, right chest, no sob. Currently feels well. Also notes episode of world becoming becoming \"bright\" while putting on makeup. Denies cp or sob with \"bright\" concern. Patient with hx of severe CAD s/p CABG. Known distal and small vessel disease unamenable to intervention. Hx of protected PCI of LM into Cx 8/22. Cardiac Hx PCI, CABG, PPM, ABLATION   EKG NSR, No ischemia/injury   Troponin No results found for: TROPHS   HISTORY/ALLERGY/ROS   MEDHx  has a past medical history of Anemia, Anesthesia, Anesthesia complication, Atrial fibrillation (HCC), Basal cell carcinoma, CAD (coronary artery disease), Chest pain, Chronic kidney disease, COVID-19, Depression, GERD (gastroesophageal reflux disease), Glaucoma, Hiatal hernia, Hyperlipidemia, Hypertension, Migraines, neuralgic, Morbid obesity ([facility] [address] Osteoarthritis, Sleep apnea, Type II or unspecified type diabetes mellitus without mention of complication, not stated as uncontrolled, Unspecified sleep apnea, Urinary incontinence, and UTI (urinary tract infection). SURGHx  has a past surgical history that includes Coronary artery bypass graft (1999); Hammer toe surgery; Ovary removal (Bilateral, 1997); Esophagus dilation; Breast lumpectomy; Knee arthroscopy; Gastric Band (12/20/2011); Colonoscopy; Upper gastrointestinal endoscopy; Upper gastrointestinal endoscopy (10/30/2015); Cosmetic surgery; joint replacement (1995); joint replacement (1995); orif humerus decompression (Left, 02/25/2016); Upper gastrointestinal endoscopy (10/14/2016); Bariatric Surgery (11/03/2016); ablation of dysrhythmic focus; Colonoscopy (10/08/2018);  Colonoscopy (N/A, 10/08/2018); pr njx dx/ther sbst intrlmnr

## 2023-05-24 NOTE — TELEPHONE ENCOUNTER
Heart burn symptoms, jaw and neck tightness. Lasting 5-10 minutes. Chest pain went away with nitro but jaw and neck pain remained. Halo lights and weakness in her right hand while sitting down to do her makeup. She had difficulty picking up her makeup brush. Resolved after 10 minutes.  This has never happened before Will discuss with DCE

## 2023-05-24 NOTE — TELEPHONE ENCOUNTER
Pt has had 2 episodes this month. On 5/9/23 Tightness in jaw and neck and left chest felt like heart burn did take nitro and chest discomfort went away. /71  60    Yesterday 5/23/23 pt saw Bright lights and halo around her. Her finger on right hand went numb, she was weak and felt faint.  Lasted 5 min or so    Yesterday BP am 111/59  60     Please call to advise

## 2023-05-24 NOTE — ED PROVIDER NOTES
ALLERGIES     Albuterol, Niacin er, Heparin, Niacin, Avelox [moxifloxacin hcl in nacl], Moxifloxacin, Proventil [albuterol sulfate], and Tagamet [cimetidine]    FAMILYHISTORY       Family History   Problem Relation Age of Onset    Cancer Mother         ovarian, colon    High Blood Pressure Mother     Heart Disease Father     High Blood Pressure Father     Stroke Sister         paralysed on right side B/C of stroke    Breast Cancer Niece 48    Breast Cancer Maternal Aunt         SOCIAL HISTORY       Social History     Tobacco Use    Smoking status: Never    Smokeless tobacco: Never   Vaping Use    Vaping Use: Never used   Substance Use Topics    Alcohol use: No     Alcohol/week: 0.0 standard drinks    Drug use: No       SCREENINGS        [place] Coma Scale  Eye Opening: Spontaneous  Best Verbal Response: Oriented  Best Motor Response: Obeys commands  Lucy Coma Scale Score: 15                CIWA Assessment  BP: (!) 122/51  Pulse: 60           PHYSICAL EXAM  1 or more Elements     ED Triage Vitals   BP Temp Temp Source Pulse Respirations SpO2 Height Weight - Scale   05/24/23 1112 05/24/23 1112 05/24/23 1112 05/24/23 1112 05/24/23 1112 05/24/23 1112 -- 05/24/23 1114   (!) 122/51 98 °F (36.7 °C) Oral 60 18 96 %  200 lb 4.8 oz (90.9 kg)       Physical Exam  Vitals and nursing note reviewed. Constitutional:       Appearance: She is well-developed. She is not diaphoretic. HENT:      Head: Atraumatic. Nose: Nose normal.   Eyes:      General:         Right eye: No discharge. Left eye: No discharge. Cardiovascular:      Rate and Rhythm: Normal rate and regular rhythm. Heart sounds: No murmur heard. No friction rub. No gallop. Pulmonary:      Effort: Pulmonary effort is normal. No respiratory distress. Breath sounds: No stridor. No wheezing, rhonchi or rales. Abdominal:      General: Bowel sounds are normal. There is no distension. Palpations: Abdomen is soft.  There is no

## 2023-05-24 NOTE — ED NOTES
Patient discharged  to home in stable condition via private car. Discharge instructions reviewed with patient. Patient verbalized understanding. All belongings in tow including discharge paperwork.         Omi Riojas RN  05/24/23 [id number]

## 2023-05-26 ENCOUNTER — TELEPHONE (OUTPATIENT)
Dept: INTERNAL MEDICINE CLINIC | Age: 73
End: 2023-05-26

## 2023-05-26 NOTE — TELEPHONE ENCOUNTER
DCE has no openings.  Schedule with a NP if patient would like to be seen in the next 1-2 weeks (previously seen with TS but can also be seen with KA if she wants a sooner appointment) if pt will only see DCE schedule 7/3 at 945am per renetta

## 2023-05-26 NOTE — TELEPHONE ENCOUNTER
Pt states she went to the hospital and was told to see DCE in 3 days. Pt states she would like to see dce, she has a lot of questions.  Please advise

## 2023-05-27 LAB
EKG ATRIAL RATE: 60 BPM
EKG DIAGNOSIS: NORMAL
EKG P AXIS: 58 DEGREES
EKG P-R INTERVAL: 250 MS
EKG Q-T INTERVAL: 444 MS
EKG QRS DURATION: 86 MS
EKG QTC CALCULATION (BAZETT): 444 MS
EKG R AXIS: 16 DEGREES
EKG T AXIS: 50 DEGREES
EKG VENTRICULAR RATE: 60 BPM

## 2023-05-27 PROCEDURE — 93010 ELECTROCARDIOGRAM REPORT: CPT | Performed by: INTERNAL MEDICINE

## 2023-06-08 ENCOUNTER — TELEPHONE (OUTPATIENT)
Dept: INTERNAL MEDICINE CLINIC | Age: 73
End: 2023-06-08

## 2023-06-08 RX ORDER — ISOSORBIDE MONONITRATE 60 MG/1
60 TABLET, EXTENDED RELEASE ORAL DAILY
Qty: 90 TABLET | Refills: 0 | Status: CANCELLED | OUTPATIENT
Start: 2023-06-08

## 2023-06-08 RX ORDER — NITROGLYCERIN 400 UG/1
1 SPRAY ORAL EVERY 5 MIN PRN
Status: CANCELLED | OUTPATIENT
Start: 2023-06-08

## 2023-06-08 NOTE — TELEPHONE ENCOUNTER
Pt  calling requesting refill of Isosorbide Mononitrate (11/30/22 ?)  and Nitroglycerin   0.4 mg SL tabs   (Previous Dr)       Last written see above  Last OV 3/16/23  Next OV 6/12/23  Last recommended OV NA     Please send to     [id number]

## 2023-06-11 PROBLEM — R07.9 CHEST PAIN: Status: ACTIVE | Noted: 2023-06-11

## 2023-06-14 RX ORDER — ISOSORBIDE MONONITRATE 60 MG/1
60 TABLET, EXTENDED RELEASE ORAL DAILY
Qty: 90 TABLET | Refills: 3 | Status: SHIPPED | OUTPATIENT
Start: 2023-06-14

## 2023-06-16 NOTE — LETTER
 Cyanocobalamin (VITAMIN B-12 PO) Take 3,000 mcg by mouth daily      estradiol (ESTRACE) 0.1 MG/GM vaginal cream Place 2 g vaginally nightly      rivaroxaban (XARELTO) 20 MG TABS tablet Take 1 tablet by mouth daily 90 tablet 5    Cranberry 450 MG CAPS Take by mouth 2 times daily      pioglitazone (ACTOS) 15 MG tablet Take 15 mg by mouth daily      ferrous sulfate 325 (65 Fe) MG tablet Take 45 mg by mouth daily (with breakfast)       vitamin D (CHOLECALCIFEROL) 1000 UNIT TABS tablet Take 1,000 Units by mouth 2 times daily       spironolactone (ALDACTONE) 25 MG tablet Take 25 mg by mouth daily In pm      dexlansoprazole (DEXILANT) 60 MG CPDR delayed release capsule Take 60 mg by mouth daily      gabapentin (NEURONTIN) 100 MG capsule Take 400 mg by mouth 3 times daily. Ova Rubin TraZODone HCl (DESYREL PO) Take 50 mg by mouth nightly       losartan-hydrochlorothiazide (HYZAAR) 100-25 MG per tablet TK 1 T PO QAM  3    Calcium Citrate-Vitamin D (CALCIUM CITRATE + D PO) Take 2 tablets by mouth daily      SLOW-MAG 71.5-119 MG TBEC tablet Take 1 tablet by mouth daily   5    fexofenadine (ALLEGRA) 180 MG tablet Take 180 mg by mouth daily      rosuvastatin (CRESTOR) 40 MG tablet Take 20 mg by mouth every evening       metFORMIN (GLUCOPHAGE) 500 MG tablet Take 500 mg by mouth 2 times daily (with meals)       ezetimibe (ZETIA) 10 MG tablet Take 5 mg by mouth nightly       escitalopram (LEXAPRO) 20 MG tablet Take 20 mg by mouth daily.  latanoprost (XALATAN) 0.005 % ophthalmic solution Place 1 drop into both eyes nightly 1 drop in each eye       nitroGLYCERIN (NITROLINGUAL) 0.4 MG/SPRAY spray Place 1 spray under the tongue every 5 minutes as needed. As directed for chest pain        No current facility-administered medications for this visit.       Reviewed with patient and will remain unchanged except as mentioned in A/P  PHYSICAL EXAM     Vitals:    11/16/18 1402   BP: 124/62   Pulse: 72   SpO2: 97%
No

## 2023-06-19 ENCOUNTER — OFFICE VISIT (OUTPATIENT)
Dept: INTERNAL MEDICINE CLINIC | Age: 73
End: 2023-06-19

## 2023-06-19 VITALS
OXYGEN SATURATION: 100 % | HEART RATE: 60 BPM | WEIGHT: 204 LBS | DIASTOLIC BLOOD PRESSURE: 60 MMHG | SYSTOLIC BLOOD PRESSURE: 120 MMHG | BODY MASS INDEX: 41.2 KG/M2

## 2023-06-19 DIAGNOSIS — I48.0 PAF (PAROXYSMAL ATRIAL FIBRILLATION) (HCC): ICD-10-CM

## 2023-06-19 DIAGNOSIS — I25.119 CORONARY ARTERY DISEASE INVOLVING NATIVE CORONARY ARTERY OF NATIVE HEART WITH ANGINA PECTORIS (HCC): ICD-10-CM

## 2023-06-19 DIAGNOSIS — I10 ESSENTIAL HYPERTENSION: ICD-10-CM

## 2023-06-19 DIAGNOSIS — Z09 HOSPITAL DISCHARGE FOLLOW-UP: Primary | ICD-10-CM

## 2023-06-19 DIAGNOSIS — E66.9 DIABETES MELLITUS TYPE 2 IN OBESE (HCC): ICD-10-CM

## 2023-06-19 DIAGNOSIS — Z95.1 HISTORY OF CORONARY ARTERY BYPASS SURGERY: ICD-10-CM

## 2023-06-19 DIAGNOSIS — E11.69 DIABETES MELLITUS TYPE 2 IN OBESE (HCC): ICD-10-CM

## 2023-06-19 DIAGNOSIS — I25.10 CAD, MULTIPLE VESSEL: ICD-10-CM

## 2023-06-19 DIAGNOSIS — E78.2 MIXED HYPERLIPIDEMIA: ICD-10-CM

## 2023-06-19 DIAGNOSIS — G47.33 OSA (OBSTRUCTIVE SLEEP APNEA): ICD-10-CM

## 2023-06-19 LAB
CHOLEST SERPL-MCNC: 149 MG/DL (ref 0–199)
DEPRECATED RDW RBC AUTO: 20.5 % (ref 12.4–15.4)
HCT VFR BLD AUTO: 32.4 % (ref 36–48)
HDLC SERPL-MCNC: 36 MG/DL (ref 40–60)
HGB BLD-MCNC: 10.4 G/DL (ref 12–16)
LDLC SERPL CALC-MCNC: 73 MG/DL
MCH RBC QN AUTO: 29.6 PG (ref 26–34)
MCHC RBC AUTO-ENTMCNC: 32.2 G/DL (ref 31–36)
MCV RBC AUTO: 91.8 FL (ref 80–100)
PLATELET # BLD AUTO: 208 K/UL (ref 135–450)
PMV BLD AUTO: 8.9 FL (ref 5–10.5)
RBC # BLD AUTO: 3.53 M/UL (ref 4–5.2)
TRIGL SERPL-MCNC: 199 MG/DL (ref 0–150)
VLDLC SERPL CALC-MCNC: 40 MG/DL
WBC # BLD AUTO: 6.8 K/UL (ref 4–11)

## 2023-06-19 NOTE — PROGRESS NOTES
Post-Discharge Transitional Care  Follow Up      Dakota Sullivan   YOB: 1950    Date of Office Visit:  6/19/2023  Date of Hospital Admission: 6/10/23  Date of Hospital Discharge: 6/12/23  Risk of hospital readmission (high >=14%. Medium >=10%) :Readmission Risk Score: 13.1    Care management risk score Rising risk (score 2-5) and Complex Care (Scores >=6): No Risk Score On File     Non face to face  following discharge, date last encounter closed (first attempt may have been earlier): 06/13/2023    Call initiated 2 business days of discharge: Yes    ASSESSMENT/PLAN:   Hospital discharge follow-up  -     NE DISCHARGE MEDS RECONCILED W/ CURRENT OUTPATIENT MED LIST  - admission  notes, imaging and work up reviewed in detail with pt   - she is seeing cardiology, had HFU last week   - BP is controlled today   - denies needing medication refills today   Essential hypertension  Chronic, stable, controlled. Continue losartan 25 mg daily, amlodipine 2.5 mg twice daily, metoprolol 12.5 mg twice daily, furosemide 40 mg daily  Mixed hyperlipidemia/ CAD, multiple vessel/ History of coronary artery bypass surgery/ Coronary artery disease involving native coronary artery of native heart with angina pectoris (HCC)  Chronic, stable, controlled. Stress test was negative during admission. Continue Crestor 20 mg daily, Plavix 75 and aspirin. Continue isosorbide with cardiology. -     Lipid Panel; Future  Diabetes mellitus type 2 in obese (HCC)  Chronic, stable, controlled. Due for A1c. Continue Farxiga 10 mg daily, Actos 15 mg twice daily  -     Hemoglobin A1C; Future  PAF (paroxysmal atrial fibrillation) (HCC)  Chronic, controlled. Continue Plavix and aspirin. She is intolerant to alternate anticoagulation due to GI bleed. Continue seeing cardiology and EP as scheduled. Anemia  Chronic, repeating CBC today to verify stability.  -     CBC; Future  RAHUL (obstructive sleep apnea)  Chronic, stable, controlled.

## 2023-06-20 ENCOUNTER — NURSE ONLY (OUTPATIENT)
Dept: CARDIOLOGY CLINIC | Age: 73
End: 2023-06-20
Payer: MEDICARE

## 2023-06-20 DIAGNOSIS — Z95.0 PACEMAKER: ICD-10-CM

## 2023-06-20 DIAGNOSIS — I49.5 SINUS NODE DYSFUNCTION (HCC): Primary | ICD-10-CM

## 2023-06-20 LAB
EST. AVERAGE GLUCOSE BLD GHB EST-MCNC: 148.5 MG/DL
HBA1C MFR BLD: 6.8 %

## 2023-06-20 PROCEDURE — 93296 REM INTERROG EVL PM/IDS: CPT | Performed by: INTERNAL MEDICINE

## 2023-06-20 PROCEDURE — 93294 REM INTERROG EVL PM/LDLS PM: CPT | Performed by: INTERNAL MEDICINE

## 2023-06-20 NOTE — PROGRESS NOTES
We received remote transmission from patient's monitor at home. Transmission shows normal sensing and pacing function. EP physician will review. See interrogation under cardiology tab in the [address] [place] [address] [address] field for more details.  < 0.1% (MVP On)  AP 98.3%    End of 91-day monitoring period 6-20-23.

## 2023-08-03 NOTE — PATIENT INSTRUCTIONS
Patient Education        Atrial Fibrillation: Care Instructions  Your Care Instructions    Atrial fibrillation is an irregular and often fast heartbeat. Treating this condition is important for several reasons. It can cause blood clots, which can travel from your heart to your brain and cause a stroke. If you have a fast heartbeat, you may feel lightheaded, dizzy, and weak. An irregular heartbeat can also increase your risk for heart failure. Atrial fibrillation is often the result of another heart condition, such as high blood pressure or coronary artery disease. Making changes to improve your heart condition will help you stay healthy and active. Follow-up care is a key part of your treatment and safety. Be sure to make and go to all appointments, and call your doctor if you are having problems. It's also a good idea to know your test results and keep a list of the medicines you take. How can you care for yourself at home? Medicines    · Take your medicines exactly as prescribed. Call your doctor if you think you are having a problem with your medicine. You will get more details on the specific medicines your doctor prescribes.     · If your doctor has given you a blood thinner to prevent a stroke, be sure you get instructions about how to take your medicine safely. Blood thinners can cause serious bleeding problems.     · Do not take any vitamins, over-the-counter drugs, or herbal products without talking to your doctor first.    Lifestyle changes    · Do not smoke. Smoking can increase your chance of a stroke and heart attack. If you need help quitting, talk to your doctor about stop-smoking programs and medicines. These can increase your chances of quitting for good.     · Eat a heart-healthy diet.     · Stay at a healthy weight. Lose weight if you need to.     · Limit alcohol to 2 drinks a day for men and 1 drink a day for women. Too much alcohol can cause health problems.     · Avoid colds and flu.  Get a · You have symptoms of a stroke. These may include:  ¨ Sudden numbness, tingling, weakness, or loss of movement in your face, arm, or leg, especially on only one side of your body. ¨ Sudden vision changes. ¨ Sudden trouble speaking. ¨ Sudden confusion or trouble understanding simple statements. ¨ Sudden problems with walking or balance. ¨ A sudden, severe headache that is different from past headaches.     · You passed out (lost consciousness).    Call your doctor now or seek immediate medical care if:    · You have new or increased shortness of breath.     · You feel dizzy or lightheaded, or you feel like you may faint.     · Your heart rate becomes irregular.     · You can feel your heart flutter in your chest or skip heartbeats. Tell your doctor if these symptoms are new or worse.    Watch closely for changes in your health, and be sure to contact your doctor if you have any problems. Where can you learn more? Go to [email or web address]. org and sign in to your [employer] account. Enter U020 in the HouseCall box to learn more about \"Atrial Fibrillation: Care Instructions. \"     If you do not have an account, please click on the \"Sign Up Now\" link. Current as of: December 6, 2017  Content Version: 11.7  © 8194-6461 [employer]. Care instructions adapted under license by [facility] ([facility]). If you have questions about a medical condition or this instruction, always ask your healthcare professional. [place] [address] any warranty or liability for your use of this information. Statement Selected

## 2023-08-16 ENCOUNTER — PATIENT MESSAGE (OUTPATIENT)
Dept: INTERNAL MEDICINE CLINIC | Age: 73
End: 2023-08-16

## 2023-08-16 DIAGNOSIS — I10 ESSENTIAL HYPERTENSION: ICD-10-CM

## 2023-08-16 DIAGNOSIS — E66.9 DIABETES MELLITUS TYPE 2 IN OBESE (HCC): ICD-10-CM

## 2023-08-16 DIAGNOSIS — F33.0 MAJOR DEPRESSIVE DISORDER, RECURRENT, MILD (HCC): ICD-10-CM

## 2023-08-16 DIAGNOSIS — E11.69 DIABETES MELLITUS TYPE 2 IN OBESE (HCC): ICD-10-CM

## 2023-08-16 RX ORDER — BLOOD SUGAR DIAGNOSTIC
1 STRIP MISCELLANEOUS 2 TIMES DAILY
Qty: 200 EACH | Refills: 1 | Status: SHIPPED | OUTPATIENT
Start: 2023-08-16

## 2023-08-16 RX ORDER — GABAPENTIN 400 MG/1
400 CAPSULE ORAL 3 TIMES DAILY
Qty: 270 CAPSULE | Refills: 1 | Status: SHIPPED | OUTPATIENT
Start: 2023-08-16 | End: 2024-02-12

## 2023-08-16 RX ORDER — ESCITALOPRAM OXALATE 20 MG/1
20 TABLET ORAL DAILY
Qty: 90 TABLET | Refills: 3 | Status: SHIPPED | OUTPATIENT
Start: 2023-08-16

## 2023-08-16 RX ORDER — AMLODIPINE BESYLATE 2.5 MG/1
2.5 TABLET ORAL 2 TIMES DAILY
Qty: 180 TABLET | Refills: 1 | Status: SHIPPED | OUTPATIENT
Start: 2023-08-16

## 2023-08-16 RX ORDER — PIOGLITAZONEHYDROCHLORIDE 15 MG/1
15 TABLET ORAL 2 TIMES DAILY
Qty: 180 TABLET | Refills: 0 | Status: SHIPPED | OUTPATIENT
Start: 2023-08-16 | End: 2023-11-14

## 2023-08-16 RX ORDER — LANCETS 30 GAUGE
1 EACH MISCELLANEOUS 2 TIMES DAILY
Qty: 200 EACH | Refills: 1 | Status: SHIPPED | OUTPATIENT
Start: 2023-08-16

## 2023-08-16 NOTE — TELEPHONE ENCOUNTER
From: Fay Reyes  To: Feliberto Fernandes  Sent: 8/16/2023 2:42 PM EDT  Subject: no refills left need scripts    PLEASE SEND TO [facility] 90 DAY WITH 3 REFILLS  amlodipine besylate 2.5 mg tabs 2x aday -am & pm  estradiol 0.01% Vag cream 1gram 2x per week  pioglitazone hcl 15mg tab 2x aday- am & pm  gabapentin 400mg 1 capsule 3x aday   freestyle glucose test strips 2x aday  lancet freestyle 2x aday    Thank You

## 2023-09-22 ENCOUNTER — OFFICE VISIT (OUTPATIENT)
Dept: INTERNAL MEDICINE CLINIC | Age: 73
End: 2023-09-22
Payer: MEDICARE

## 2023-09-22 VITALS
DIASTOLIC BLOOD PRESSURE: 62 MMHG | SYSTOLIC BLOOD PRESSURE: 124 MMHG | OXYGEN SATURATION: 94 % | HEART RATE: 60 BPM | BODY MASS INDEX: 40.64 KG/M2 | WEIGHT: 201.6 LBS | HEIGHT: 59 IN

## 2023-09-22 DIAGNOSIS — E11.69 DIABETES MELLITUS TYPE 2 IN OBESE (HCC): ICD-10-CM

## 2023-09-22 DIAGNOSIS — I20.0 UNSTABLE ANGINA (HCC): ICD-10-CM

## 2023-09-22 DIAGNOSIS — E66.9 DIABETES MELLITUS TYPE 2 IN OBESE (HCC): ICD-10-CM

## 2023-09-22 DIAGNOSIS — I10 ESSENTIAL HYPERTENSION: ICD-10-CM

## 2023-09-22 DIAGNOSIS — M89.8X1 SHOULDER BLADE PAIN: Primary | ICD-10-CM

## 2023-09-22 PROBLEM — R07.9 ACUTE CHEST PAIN: Status: RESOLVED | Noted: 2023-06-11 | Resolved: 2023-09-22

## 2023-09-22 PROCEDURE — 3078F DIAST BP <80 MM HG: CPT | Performed by: INTERNAL MEDICINE

## 2023-09-22 PROCEDURE — 93000 ELECTROCARDIOGRAM COMPLETE: CPT | Performed by: INTERNAL MEDICINE

## 2023-09-22 PROCEDURE — 99214 OFFICE O/P EST MOD 30 MIN: CPT | Performed by: INTERNAL MEDICINE

## 2023-09-22 PROCEDURE — G8417 CALC BMI ABV UP PARAM F/U: HCPCS | Performed by: INTERNAL MEDICINE

## 2023-09-22 PROCEDURE — 1123F ACP DISCUSS/DSCN MKR DOCD: CPT | Performed by: INTERNAL MEDICINE

## 2023-09-22 PROCEDURE — 3017F COLORECTAL CA SCREEN DOC REV: CPT | Performed by: INTERNAL MEDICINE

## 2023-09-22 PROCEDURE — 2022F DILAT RTA XM EVC RTNOPTHY: CPT | Performed by: INTERNAL MEDICINE

## 2023-09-22 PROCEDURE — G8427 DOCREV CUR MEDS BY ELIG CLIN: HCPCS | Performed by: INTERNAL MEDICINE

## 2023-09-22 PROCEDURE — 3074F SYST BP LT 130 MM HG: CPT | Performed by: INTERNAL MEDICINE

## 2023-09-22 PROCEDURE — 3044F HG A1C LEVEL LT 7.0%: CPT | Performed by: INTERNAL MEDICINE

## 2023-09-22 PROCEDURE — 1036F TOBACCO NON-USER: CPT | Performed by: INTERNAL MEDICINE

## 2023-09-22 PROCEDURE — 1090F PRES/ABSN URINE INCON ASSESS: CPT | Performed by: INTERNAL MEDICINE

## 2023-09-22 PROCEDURE — G8399 PT W/DXA RESULTS DOCUMENT: HCPCS | Performed by: INTERNAL MEDICINE

## 2023-09-22 ASSESSMENT — ENCOUNTER SYMPTOMS
ABDOMINAL PAIN: 0
VOMITING: 0
CHEST TIGHTNESS: 0
WHEEZING: 0
COLOR CHANGE: 0
SORE THROAT: 0
SHORTNESS OF BREATH: 1
NAUSEA: 0
CONSTIPATION: 0
BACK PAIN: 1
COUGH: 0

## 2023-09-22 NOTE — ASSESSMENT & PLAN NOTE
reinforced recommendations to adhere to low-carb diet, last hemoglobin A1c in June is fairly well controlled

## 2023-09-22 NOTE — PROGRESS NOTES
ASSESSMENT/PLAN:  1. Shoulder blade pain  Assessment & Plan:  Explained to patient possibility of muscular sprain however given her cardiac history of unstable angina and coronary artery disease possibility of underlying cardiac event cannot be ruled out, EKG done in office this visit is not showing any acute changes , rhythm is paced sinus with similar RSR changes from before. Recommended she continues with Tylenol 500 mg every 6 hours as needed since it did give her some relief however also recommended she try sublingual nitroglycerin since noticing some heaviness in breathing. She will follow-up with cardiology on the 17th and she recently had her device checkup   Orders:  -     EKG 12 Lead  2. Unstable angina [facility])  Assessment & Plan:    as noted above advised patient to try the sublingual nitro, she does have an upcoming appointment with cardiology in 3 weeks, she is aware of alarming signs and symptoms acute coronary event and advised if any should go to the emergency room. Otherwise  will continue current meds and await further recommendations by cardiology at her upcoming appointment  Orders:  -     EKG 12 Lead  3. Essential hypertension  Assessment & Plan:    blood pressure stable and well-controlled on current medication regimens, continue same and follow-up with cardiology and regular provider as scheduled  4. Diabetes mellitus type 2 in obese [facility])  Assessment & Plan:    reinforced recommendations to adhere to low-carb diet, last hemoglobin A1c in June is fairly well controlled      Return for as scheduled.      SUBJECTIVE  HPI:   Patient here for acute visit complaining of pain between shoulder blades when taking deep breaths of 2 days duration  She has known history of coronary artery disease with unstable angina, she was hospitalized about 3 months ago for acute chest pain and was evaluated by cardiology, at that time no acute event documented and has been maintained on medical management  Reports

## 2023-09-22 NOTE — ASSESSMENT & PLAN NOTE
blood pressure stable and well-controlled on current medication regimens, continue same and follow-up with cardiology and regular provider as scheduled

## 2023-09-22 NOTE — ASSESSMENT & PLAN NOTE
as noted above advised patient to try the sublingual nitro, she does have an upcoming appointment with cardiology in 3 weeks, she is aware of alarming signs and symptoms acute coronary event and advised if any should go to the emergency room.   Otherwise  will continue current meds and await further recommendations by cardiology at her upcoming appointment

## 2023-09-22 NOTE — ASSESSMENT & PLAN NOTE
Explained to patient possibility of muscular sprain however given her cardiac history of unstable angina and coronary artery disease possibility of underlying cardiac event cannot be ruled out, EKG done in office this visit is not showing any acute changes , rhythm is paced sinus with similar RSR changes from before. Recommended she continues with Tylenol 500 mg every 6 hours as needed since it did give her some relief however also recommended she try sublingual nitroglycerin since noticing some heaviness in breathing.   She will follow-up with cardiology on the 17th and she recently had her device checkup

## 2023-09-29 PROCEDURE — 93296 REM INTERROG EVL PM/IDS: CPT | Performed by: INTERNAL MEDICINE

## 2023-09-29 PROCEDURE — 93294 REM INTERROG EVL PM/LDLS PM: CPT | Performed by: INTERNAL MEDICINE

## 2023-10-24 ENCOUNTER — OFFICE VISIT (OUTPATIENT)
Dept: CARDIOLOGY CLINIC | Age: 73
End: 2023-10-24

## 2023-10-24 VITALS
WEIGHT: 201.4 LBS | DIASTOLIC BLOOD PRESSURE: 62 MMHG | HEART RATE: 60 BPM | HEIGHT: 59 IN | BODY MASS INDEX: 40.6 KG/M2 | SYSTOLIC BLOOD PRESSURE: 112 MMHG | OXYGEN SATURATION: 99 %

## 2023-10-24 DIAGNOSIS — E78.2 MIXED HYPERLIPIDEMIA: ICD-10-CM

## 2023-10-24 DIAGNOSIS — I10 ESSENTIAL HYPERTENSION: ICD-10-CM

## 2023-10-24 DIAGNOSIS — I25.119 CORONARY ARTERY DISEASE INVOLVING NATIVE CORONARY ARTERY OF NATIVE HEART WITH ANGINA PECTORIS (HCC): Primary | ICD-10-CM

## 2023-10-24 NOTE — PROGRESS NOTES
[facility]     Outpatient Follow Up Note    CHIEF COMPLAINT / HPI: Hospital Follow Up secondary to [address] record has been reviewed  Hospital Course progressed as follows per discharge summary:      Pt states CP that started 2000, took 2 nitros on the way and continued and stopped at [facility] station, given [address] on way by EMS, no current CP and no SOB, seen on 23rd for same and d/c, scheduled for stress/echo Thursday from that visit. Sara Nayak is a 71 y.o. female with history of diabetes, hypertension, MGUS, atrial fibrillation, status post PVI in 2018, dual-chamber pacemaker followed by Dr. Jojo Delgado, CAD status post CABG in 1999 with PCI of OM and left main in 2022   ~presented with chest pain. Patient had retrosternal sharp chest pain without any radiation. She had similar symptoms about a month ago. Her troponins were elevated then and are the same level of now. She was seen by Dr. Juan Carlos Levin at that time and a stress test was ordered which has not been done yet. The patient sought care in the ED due to non exertional chest pain that was alleviated with NTG. She was admitted and followed by cardiology. Her stress test was negative. She was feeling well and was anxious to be d/c home. Other chronic conditions were stable during her stay:  PAF: s/p ablation , No AC due to anemia/ gi bleeding from AVM/ ulcers. Sinus node dysfunction:  s/p dual chamber  [employer] pacemaker   HTN: on ARB, BB, CCB and Jardiance   RAHUL: on CPAP       Sara Nayak is 68 y.o. female who presents today for a routine follow up after a recent hospitalization related to the above mentioned issues. Subjective:   Since the time of discharge, the patient admits their symptoms have not changed. She still has discomfort. Its right n the middle of her breast area. It comes on just sitting. It last 15 sec, go away and come back.    She felt a winded walking in from the parking lot to the main lobby

## 2023-10-27 ENCOUNTER — OFFICE VISIT (OUTPATIENT)
Dept: INTERNAL MEDICINE CLINIC | Age: 73
End: 2023-10-27
Payer: MEDICARE

## 2023-10-27 VITALS
DIASTOLIC BLOOD PRESSURE: 80 MMHG | WEIGHT: 203 LBS | OXYGEN SATURATION: 98 % | BODY MASS INDEX: 40.98 KG/M2 | SYSTOLIC BLOOD PRESSURE: 126 MMHG | HEART RATE: 78 BPM

## 2023-10-27 DIAGNOSIS — I10 ESSENTIAL HYPERTENSION: ICD-10-CM

## 2023-10-27 DIAGNOSIS — N18.31 STAGE 3A CHRONIC KIDNEY DISEASE (HCC): ICD-10-CM

## 2023-10-27 DIAGNOSIS — Z23 NEED FOR INFLUENZA VACCINATION: ICD-10-CM

## 2023-10-27 DIAGNOSIS — E66.9 DIABETES MELLITUS TYPE 2 IN OBESE (HCC): ICD-10-CM

## 2023-10-27 DIAGNOSIS — Z01.818 PREOP EXAM FOR INTERNAL MEDICINE: Primary | ICD-10-CM

## 2023-10-27 DIAGNOSIS — Z12.31 BREAST CANCER SCREENING BY MAMMOGRAM: ICD-10-CM

## 2023-10-27 DIAGNOSIS — E78.2 MIXED HYPERLIPIDEMIA: ICD-10-CM

## 2023-10-27 DIAGNOSIS — E66.01 SEVERE OBESITY (BMI 35.0-39.9) WITH COMORBIDITY (HCC): ICD-10-CM

## 2023-10-27 DIAGNOSIS — E11.69 DIABETES MELLITUS TYPE 2 IN OBESE (HCC): ICD-10-CM

## 2023-10-27 PROCEDURE — 1090F PRES/ABSN URINE INCON ASSESS: CPT | Performed by: NURSE PRACTITIONER

## 2023-10-27 PROCEDURE — 3017F COLORECTAL CA SCREEN DOC REV: CPT | Performed by: NURSE PRACTITIONER

## 2023-10-27 PROCEDURE — 90694 VACC AIIV4 NO PRSRV 0.5ML IM: CPT | Performed by: NURSE PRACTITIONER

## 2023-10-27 PROCEDURE — 99214 OFFICE O/P EST MOD 30 MIN: CPT | Performed by: NURSE PRACTITIONER

## 2023-10-27 PROCEDURE — G8399 PT W/DXA RESULTS DOCUMENT: HCPCS | Performed by: NURSE PRACTITIONER

## 2023-10-27 PROCEDURE — 3079F DIAST BP 80-89 MM HG: CPT | Performed by: NURSE PRACTITIONER

## 2023-10-27 PROCEDURE — G8427 DOCREV CUR MEDS BY ELIG CLIN: HCPCS | Performed by: NURSE PRACTITIONER

## 2023-10-27 PROCEDURE — G8484 FLU IMMUNIZE NO ADMIN: HCPCS | Performed by: NURSE PRACTITIONER

## 2023-10-27 PROCEDURE — 1123F ACP DISCUSS/DSCN MKR DOCD: CPT | Performed by: NURSE PRACTITIONER

## 2023-10-27 PROCEDURE — G8417 CALC BMI ABV UP PARAM F/U: HCPCS | Performed by: NURSE PRACTITIONER

## 2023-10-27 PROCEDURE — 3074F SYST BP LT 130 MM HG: CPT | Performed by: NURSE PRACTITIONER

## 2023-10-27 PROCEDURE — 2022F DILAT RTA XM EVC RTNOPTHY: CPT | Performed by: NURSE PRACTITIONER

## 2023-10-27 PROCEDURE — 3044F HG A1C LEVEL LT 7.0%: CPT | Performed by: NURSE PRACTITIONER

## 2023-10-27 PROCEDURE — 1036F TOBACCO NON-USER: CPT | Performed by: NURSE PRACTITIONER

## 2023-10-27 PROCEDURE — G0008 ADMIN INFLUENZA VIRUS VAC: HCPCS | Performed by: NURSE PRACTITIONER

## 2023-10-27 ASSESSMENT — ENCOUNTER SYMPTOMS
WHEEZING: 0
CHEST TIGHTNESS: 0
SHORTNESS OF BREATH: 0
COUGH: 0

## 2023-10-27 NOTE — ASSESSMENT & PLAN NOTE
Perioperative risk related to the patient's upcoming surgery is considered acceptable. Pt is medically cleared for low risk surgery. DVT prophylaxis per surgery team.  Requested work up: none. Pre-op exam was completed on 10/27/23.

## 2023-10-27 NOTE — ASSESSMENT & PLAN NOTE
Chronic, controlled. Continue to avoid nsaids and nephrotoxic agents. Work on hydration. Labs are up to date.

## 2023-10-27 NOTE — PROGRESS NOTES
sounds: Normal heart sounds. Pulmonary:      Effort: Pulmonary effort is normal. No respiratory distress. Breath sounds: Normal breath sounds. Neurological:      Mental Status: She is alert and oriented to person, place, and time. Mental status is at baseline. Psychiatric:         Mood and Affect: Mood normal.         Behavior: Behavior normal.       Diagnosis  Assessment and Plan  1. Preop exam for internal medicine  Assessment & Plan:  Perioperative risk related to the patient's upcoming surgery is considered acceptable. Pt is medically cleared for low risk surgery. DVT prophylaxis per surgery team.  Requested work up: none. Pre-op exam was completed on 10/27/23. 2. Stage 3a chronic kidney disease ([address] )  Assessment & Plan:   Chronic, controlled. Continue to avoid nsaids and nephrotoxic agents. Work on hydration. Labs are up to date. 3. Diabetes mellitus type 2 in obese [facility])  Assessment & Plan:  Chronic, controlled. Continue working on diet/exercise. Continue farxiga and actos as prescribed. 4. Essential hypertension  Assessment & Plan:  Chronic, controlled. Stable. Continue losartan 25 mg daily, amlodipine 2.5 mg daily, metoprolol 12.5 mg BID. 5. Severe obesity (BMI 35.0-39. 9) with comorbidity ([address])  Assessment & Plan:   Chronic, uncontrolled. Work on healthy diet/ exercise. 6. Mixed hyperlipidemia  Assessment & Plan:  Chronic, continue Crestor 20 mg and Zetia 5 mg daily. Continue seeing cardiology as recommended. 7. Need for influenza vaccination  -     Influenza, FLUAD, (age 72 y+), IM, PF, 0.5 mL  8. Breast cancer screening by mammogram  -     JANNIE DIGITAL SCREEN W OR WO CAD BILATERAL; Future     Keep appointment for follow-up as scheduled.     Electronically signed by BILL Amaya CNP on 10/27/2023 at 2:02 PM

## 2023-10-27 NOTE — ASSESSMENT & PLAN NOTE
Chronic, controlled. Stable. Continue losartan 25 mg daily, amlodipine 2.5 mg daily, metoprolol 12.5 mg BID.

## 2023-11-08 ENCOUNTER — TELEPHONE (OUTPATIENT)
Dept: INTERNAL MEDICINE CLINIC | Age: 73
End: 2023-11-08

## 2023-11-08 NOTE — TELEPHONE ENCOUNTER
[facility]  is calling---pt had preop done 10/27---they need the H&P and the EKG tracing from Sept. Please fax to [phone]. Thanks.

## 2023-11-13 ENCOUNTER — PATIENT MESSAGE (OUTPATIENT)
Dept: INTERNAL MEDICINE CLINIC | Age: 73
End: 2023-11-13

## 2023-11-13 ENCOUNTER — PATIENT MESSAGE (OUTPATIENT)
Dept: CARDIOLOGY CLINIC | Age: 73
End: 2023-11-13

## 2023-11-13 RX ORDER — ESTRADIOL 0.1 MG/G
2 CREAM VAGINAL SEE ADMIN INSTRUCTIONS
Status: CANCELLED | OUTPATIENT
Start: 2023-11-13

## 2023-11-13 RX ORDER — PIOGLITAZONEHYDROCHLORIDE 15 MG/1
15 TABLET ORAL 2 TIMES DAILY
Qty: 180 TABLET | Refills: 0 | Status: SHIPPED | OUTPATIENT
Start: 2023-11-13 | End: 2024-02-11

## 2023-11-13 NOTE — TELEPHONE ENCOUNTER
From: Cynthia Ordonez  To: Vijay Arevalo  Sent: 11/13/2023 2:33 PM EST  Subject: Estradiol 0.01% Vag Cream 1gram 2x week    Pedro Lock needs new script, last filled by Dr Ainsley Eugene

## 2023-11-13 NOTE — TELEPHONE ENCOUNTER
From: Emeka Laureano  To: Gilmer Vaughn  Sent: 11/13/2023 12:57 PM EST  Subject: Pioglitazone HCL 15mg 2x aday    need refills with [facility] 90 day with couple refills please Thank You

## 2023-11-13 NOTE — TELEPHONE ENCOUNTER
From: Flex Bronson  To: Alek Ludwig  Sent: 11/13/2023 2:14 PM EST  Subject: Metoprolol Tartrate 25mg     Lucie Mg needs new script 90 days to [facility]  with 3 refills please 25mg 1/2 am 1/2 pm   Thank You

## 2023-11-15 RX ORDER — ESTRADIOL 0.1 MG/G
1 CREAM VAGINAL SEE ADMIN INSTRUCTIONS
Qty: 1 EACH | Refills: 0 | Status: SHIPPED | OUTPATIENT
Start: 2023-11-15

## 2023-11-26 PROBLEM — Z01.818 PREOP EXAM FOR INTERNAL MEDICINE: Status: RESOLVED | Noted: 2023-10-27 | Resolved: 2023-11-26

## 2023-11-30 ENCOUNTER — TELEPHONE (OUTPATIENT)
Dept: INTERNAL MEDICINE CLINIC | Age: 73
End: 2023-11-30

## 2023-11-30 NOTE — TELEPHONE ENCOUNTER
----- Message from Pradeep Ingram sent at 11/29/2023  2:22 PM EST -----  Subject: Appointment Request    Reason for Call: Established Patient Appointment needed: Routine Medicare   AWV    QUESTIONS    Reason for appointment request? Available appointments did not meet   patient need     Additional Information for Provider? Pt called to reschedule AWV on 12/07. Pt has another appt that day. Only VV available in December and pt does   not want to do VV. Pt would like this appt to be in October but would like   in office visit.  Please advise.   ---------------------------------------------------------------------------  --------------  Latanya DOMINGO  [phone]; OK to leave message on voicemail  ---------------------------------------------------------------------------  --------------  SCRIPT ANSWERS

## 2023-12-01 ENCOUNTER — PATIENT MESSAGE (OUTPATIENT)
Dept: INTERNAL MEDICINE CLINIC | Age: 73
End: 2023-12-01

## 2023-12-07 ENCOUNTER — OFFICE VISIT (OUTPATIENT)
Dept: INTERNAL MEDICINE CLINIC | Age: 73
End: 2023-12-07
Payer: MEDICARE

## 2023-12-07 VITALS
HEART RATE: 74 BPM | SYSTOLIC BLOOD PRESSURE: 130 MMHG | DIASTOLIC BLOOD PRESSURE: 64 MMHG | WEIGHT: 201 LBS | HEIGHT: 59 IN | OXYGEN SATURATION: 98 % | BODY MASS INDEX: 40.52 KG/M2

## 2023-12-07 DIAGNOSIS — I48.0 PAF (PAROXYSMAL ATRIAL FIBRILLATION) (HCC): ICD-10-CM

## 2023-12-07 DIAGNOSIS — J30.9 ALLERGIC SINUSITIS: ICD-10-CM

## 2023-12-07 DIAGNOSIS — G47.33 OSA (OBSTRUCTIVE SLEEP APNEA): ICD-10-CM

## 2023-12-07 DIAGNOSIS — E11.69 DIABETES MELLITUS TYPE 2 IN OBESE (HCC): Primary | ICD-10-CM

## 2023-12-07 DIAGNOSIS — I25.10 CAD, MULTIPLE VESSEL: ICD-10-CM

## 2023-12-07 DIAGNOSIS — N18.31 STAGE 3A CHRONIC KIDNEY DISEASE (HCC): ICD-10-CM

## 2023-12-07 DIAGNOSIS — H91.93 BILATERAL HEARING LOSS, UNSPECIFIED HEARING LOSS TYPE: ICD-10-CM

## 2023-12-07 DIAGNOSIS — F33.0 MAJOR DEPRESSIVE DISORDER, RECURRENT, MILD (HCC): ICD-10-CM

## 2023-12-07 DIAGNOSIS — Z00.00 MEDICARE ANNUAL WELLNESS VISIT, SUBSEQUENT: ICD-10-CM

## 2023-12-07 DIAGNOSIS — D64.9 ANEMIA, UNSPECIFIED TYPE: ICD-10-CM

## 2023-12-07 DIAGNOSIS — I25.119 CORONARY ARTERY DISEASE INVOLVING NATIVE CORONARY ARTERY OF NATIVE HEART WITH ANGINA PECTORIS (HCC): ICD-10-CM

## 2023-12-07 DIAGNOSIS — E66.9 DIABETES MELLITUS TYPE 2 IN OBESE (HCC): Primary | ICD-10-CM

## 2023-12-07 DIAGNOSIS — I10 ESSENTIAL HYPERTENSION: ICD-10-CM

## 2023-12-07 LAB — HBA1C MFR BLD: 7.4 %

## 2023-12-07 PROCEDURE — 1090F PRES/ABSN URINE INCON ASSESS: CPT | Performed by: NURSE PRACTITIONER

## 2023-12-07 PROCEDURE — 1036F TOBACCO NON-USER: CPT | Performed by: NURSE PRACTITIONER

## 2023-12-07 PROCEDURE — G8399 PT W/DXA RESULTS DOCUMENT: HCPCS | Performed by: NURSE PRACTITIONER

## 2023-12-07 PROCEDURE — G8417 CALC BMI ABV UP PARAM F/U: HCPCS | Performed by: NURSE PRACTITIONER

## 2023-12-07 PROCEDURE — G0439 PPPS, SUBSEQ VISIT: HCPCS | Performed by: NURSE PRACTITIONER

## 2023-12-07 PROCEDURE — 3051F HG A1C>EQUAL 7.0%<8.0%: CPT | Performed by: NURSE PRACTITIONER

## 2023-12-07 PROCEDURE — 3074F SYST BP LT 130 MM HG: CPT | Performed by: NURSE PRACTITIONER

## 2023-12-07 PROCEDURE — G8484 FLU IMMUNIZE NO ADMIN: HCPCS | Performed by: NURSE PRACTITIONER

## 2023-12-07 PROCEDURE — 3017F COLORECTAL CA SCREEN DOC REV: CPT | Performed by: NURSE PRACTITIONER

## 2023-12-07 PROCEDURE — 83036 HEMOGLOBIN GLYCOSYLATED A1C: CPT | Performed by: NURSE PRACTITIONER

## 2023-12-07 PROCEDURE — 2022F DILAT RTA XM EVC RTNOPTHY: CPT | Performed by: NURSE PRACTITIONER

## 2023-12-07 PROCEDURE — 3078F DIAST BP <80 MM HG: CPT | Performed by: NURSE PRACTITIONER

## 2023-12-07 PROCEDURE — G8427 DOCREV CUR MEDS BY ELIG CLIN: HCPCS | Performed by: NURSE PRACTITIONER

## 2023-12-07 PROCEDURE — 1123F ACP DISCUSS/DSCN MKR DOCD: CPT | Performed by: NURSE PRACTITIONER

## 2023-12-07 PROCEDURE — 99214 OFFICE O/P EST MOD 30 MIN: CPT | Performed by: NURSE PRACTITIONER

## 2023-12-07 RX ORDER — DAPAGLIFLOZIN 10 MG/1
TABLET, FILM COATED ORAL
Qty: 90 TABLET | Refills: 3 | Status: SHIPPED | OUTPATIENT
Start: 2023-12-07

## 2023-12-07 RX ORDER — GABAPENTIN 400 MG/1
400 CAPSULE ORAL 3 TIMES DAILY
Qty: 270 CAPSULE | Refills: 1 | Status: SHIPPED | OUTPATIENT
Start: 2023-12-07 | End: 2024-06-04

## 2023-12-07 RX ORDER — POTASSIUM CHLORIDE 20 MEQ/1
20 TABLET, EXTENDED RELEASE ORAL DAILY
Qty: 90 TABLET | Refills: 3 | Status: SHIPPED | OUTPATIENT
Start: 2023-12-07

## 2023-12-07 RX ORDER — LOSARTAN POTASSIUM 25 MG/1
25 TABLET ORAL DAILY
Qty: 90 TABLET | Refills: 3 | Status: SHIPPED | OUTPATIENT
Start: 2023-12-07

## 2023-12-07 RX ORDER — ESTRADIOL 0.1 MG/G
1 CREAM VAGINAL SEE ADMIN INSTRUCTIONS
Qty: 1 EACH | Refills: 1 | Status: SHIPPED | OUTPATIENT
Start: 2023-12-07

## 2023-12-07 RX ORDER — PIOGLITAZONEHYDROCHLORIDE 15 MG/1
15 TABLET ORAL 2 TIMES DAILY
Qty: 180 TABLET | Refills: 1 | Status: SHIPPED | OUTPATIENT
Start: 2023-12-07 | End: 2024-06-04

## 2023-12-07 RX ORDER — FLUTICASONE PROPIONATE 50 MCG
2 SPRAY, SUSPENSION (ML) NASAL DAILY
Qty: 48 G | Refills: 2 | Status: SHIPPED | OUTPATIENT
Start: 2023-12-07

## 2023-12-07 RX ORDER — BLOOD SUGAR DIAGNOSTIC
1 STRIP MISCELLANEOUS 2 TIMES DAILY
Qty: 200 EACH | Refills: 1 | Status: SHIPPED | OUTPATIENT
Start: 2023-12-07

## 2023-12-07 RX ORDER — AMLODIPINE BESYLATE 2.5 MG/1
2.5 TABLET ORAL 2 TIMES DAILY
Qty: 180 TABLET | Refills: 1 | Status: SHIPPED | OUTPATIENT
Start: 2023-12-07

## 2023-12-07 ASSESSMENT — PATIENT HEALTH QUESTIONNAIRE - PHQ9
6. FEELING BAD ABOUT YOURSELF - OR THAT YOU ARE A FAILURE OR HAVE LET YOURSELF OR YOUR FAMILY DOWN: 0
10. IF YOU CHECKED OFF ANY PROBLEMS, HOW DIFFICULT HAVE THESE PROBLEMS MADE IT FOR YOU TO DO YOUR WORK, TAKE CARE OF THINGS AT HOME, OR GET ALONG WITH OTHER PEOPLE: 0
1. LITTLE INTEREST OR PLEASURE IN DOING THINGS: 0
2. FEELING DOWN, DEPRESSED OR HOPELESS: 0
SUM OF ALL RESPONSES TO PHQ QUESTIONS 1-9: 0
SUM OF ALL RESPONSES TO PHQ QUESTIONS 1-9: 0
5. POOR APPETITE OR OVEREATING: 0
SUM OF ALL RESPONSES TO PHQ QUESTIONS 1-9: 0
7. TROUBLE CONCENTRATING ON THINGS, SUCH AS READING THE NEWSPAPER OR WATCHING TELEVISION: 0
9. THOUGHTS THAT YOU WOULD BE BETTER OFF DEAD, OR OF HURTING YOURSELF: 0
3. TROUBLE FALLING OR STAYING ASLEEP: 0
SUM OF ALL RESPONSES TO PHQ QUESTIONS 1-9: 0
SUM OF ALL RESPONSES TO PHQ9 QUESTIONS 1 & 2: 0
4. FEELING TIRED OR HAVING LITTLE ENERGY: 0
8. MOVING OR SPEAKING SO SLOWLY THAT OTHER PEOPLE COULD HAVE NOTICED. OR THE OPPOSITE, BEING SO FIGETY OR RESTLESS THAT YOU HAVE BEEN MOVING AROUND A LOT MORE THAN USUAL: 0

## 2023-12-07 ASSESSMENT — LIFESTYLE VARIABLES
HOW MANY STANDARD DRINKS CONTAINING ALCOHOL DO YOU HAVE ON A TYPICAL DAY: 1 OR 2
HOW OFTEN DO YOU HAVE A DRINK CONTAINING ALCOHOL: NEVER

## 2023-12-07 NOTE — PATIENT INSTRUCTIONS
by [facility] ([facility]). If you have questions about a medical condition or this instruction, always ask your healthcare professional. [address] any warranty or liability for your use of this information. Personalized Preventive Plan for Tay Lau - 12/7/2023  Medicare offers a range of preventive health benefits. Some of the tests and screenings are paid in full while other may be subject to a deductible, co-insurance, and/or copay. Some of these benefits include a comprehensive review of your medical history including lifestyle, illnesses that may run in your family, and various assessments and screenings as appropriate. After reviewing your medical record and screening and assessments performed today your provider may have ordered immunizations, labs, imaging, and/or referrals for you. A list of these orders (if applicable) as well as your Preventive Care list are included within your After Visit Summary for your review. Other Preventive Recommendations:    A preventive eye exam performed by an eye specialist is recommended every 1-2 years to screen for glaucoma; cataracts, macular degeneration, and other eye disorders. A preventive dental visit is recommended every 6 months. Try to get at least 150 minutes of exercise per week or 10,000 steps per day on a pedometer . Order or download the FREE \"Exercise & Physical Activity: Your Everyday Guide\" from [employer]. Call 6-536.653.7046 or search [employer] online. You need 5983-5444 mg of calcium and 1139-9136 IU of vitamin D per day. It is possible to meet your calcium requirement with diet alone, but a vitamin D supplement is usually necessary to meet this goal.  When exposed to the sun, use a sunscreen that protects against both UVA and UVB radiation with an SPF of 30 or greater. Reapply every 2 to 3 hours or after sweating, drying off with a towel, or swimming.   Always wear a

## 2023-12-07 NOTE — PROGRESS NOTES
Medicare Annual Wellness Visit    Flex Bronson is here for Medicare AWV and Diabetes (6/19/23 = 6.8 fasting sugars 130-140)    Assessment & Plan   Diabetes mellitus type 2 in obese [facility])  Assessment & Plan:  Chronic, moderately. A1c 7.4 today on POCT. Admits to poor dietary compliance recently. Strongly encouraged pt start  working on diet/exercise. She will farxiga and actos as prescribed. Discussed adding medication, is wanting to work on diet/ exercise for a few months for better control. Orders:  -     POCT glycosylated hemoglobin (Hb A1C)  -     pioglitazone (ACTOS) 15 MG tablet; Take 1 tablet by mouth in the morning and at bedtime, Disp-180 tablet, R-1Normal  -     blood glucose test strips (FREESTYLE TEST STRIPS) strip; 1 each by In Vitro route 2 times daily As needed. , Disp-200 each, R-1Normal  -     FARXIGA 10 MG tablet; TAKE 1 TABLET BY MOUTH DAILY, Disp-90 tablet, R-3, DAWNormal  Essential hypertension  Assessment & Plan:  Chronic, controlled. Stable. Continue losartan 25 mg daily, amlodipine 2.5 mg daily, metoprolol 12.5 mg BID. Orders:  -     amLODIPine (NORVASC) 2.5 MG tablet; Take 1 tablet by mouth 2 times daily, Disp-180 tablet, R-1Normal  -     losartan (COZAAR) 25 MG tablet; Take 1 tablet by mouth daily, Disp-90 tablet, R-3Normal  Allergic sinusitis  -     fluticasone (FLONASE) 50 MCG/ACT nasal spray; 2 sprays by Each Nostril route daily, Disp-48 g, R-2Normal  CAD, multiple vessel  Assessment & Plan:  Chronic, controlled. Continue medications and follow up with cardiology as recommended. Currently on imdur 60 mg daily, crestor 20 mg daily, zetia 10 mg daily, metoprolol 12.5 mg BID. BP is controlled on losartan 25 mg daily, metoprolol 12.5 mg BID, amlodipine 2.5 mg daily and lasix   Major depressive disorder, recurrent, mild  Assessment & Plan:  Chronic, controlled. Continue lexapro 20 mg daily. Continue trazodone nightly prn for sleep.    Coronary artery disease involving native coronary

## 2023-12-08 NOTE — ASSESSMENT & PLAN NOTE
Chronic, controlled. Continue medications and follow up with cardiology as recommended. Currently on imdur 60 mg daily, crestor 20 mg daily, zetia 10 mg daily, metoprolol 12.5 mg BID.  BP is controlled on losartan 25 mg daily, metoprolol 12.5 mg BID, amlodipine 2.5 mg daily and lasix

## 2023-12-08 NOTE — ASSESSMENT & PLAN NOTE
Chronic, moderately. A1c 7.4 today on POCT. Admits to poor dietary compliance recently. Strongly encouraged pt start  working on diet/exercise. She will farxiga and actos as prescribed. Discussed adding medication, is wanting to work on diet/ exercise for a few months for better control.

## 2023-12-13 ENCOUNTER — TELEPHONE (OUTPATIENT)
Dept: INTERNAL MEDICINE CLINIC | Age: 73
End: 2023-12-13

## 2023-12-13 DIAGNOSIS — N64.4 BREAST PAIN: ICD-10-CM

## 2023-12-13 DIAGNOSIS — Z12.31 BREAST CANCER SCREENING BY MAMMOGRAM: Primary | ICD-10-CM

## 2023-12-13 NOTE — TELEPHONE ENCOUNTER
Pt having pain in left breast ---[facility] needs new orders --Diagnostic Bilatteral Mammogram and US left breast ---plase place in epic and let pt know when done-Thanks.

## 2023-12-14 ENCOUNTER — PROCEDURE VISIT (OUTPATIENT)
Dept: AUDIOLOGY | Age: 73
End: 2023-12-14
Payer: MEDICARE

## 2023-12-14 DIAGNOSIS — H93.13 TINNITUS OF BOTH EARS: ICD-10-CM

## 2023-12-14 DIAGNOSIS — H90.3 SENSORINEURAL HEARING LOSS (SNHL) OF BOTH EARS: Primary | ICD-10-CM

## 2023-12-14 PROCEDURE — 92567 TYMPANOMETRY: CPT

## 2023-12-14 PROCEDURE — 92557 COMPREHENSIVE HEARING TEST: CPT

## 2023-12-14 NOTE — PROGRESS NOTES
Earl Caceres   1950, 68 y.o. female   [id number]       Referring Provider: BILL Merino CNP   Referral Type: In an order in [address] [place] [address]    Reason for Visit: Evaluation of suspected change in hearing, tinnitus, or balance. ADULT AUDIOLOGIC EVALUATION      Earl Caceres is a 68 y.o. female seen today, 12/14/2023 , for an initial audiologic evaluation. AUDIOLOGIC AND OTHER PERTINENT MEDICAL HISTORY:      Earl Caceres reports difficulty hearing in both ears, with the left ear worse than the right ear. She noted that she is having to turn the television up and ask her  to repeat things. Patient reported an occasional tinnitus in both ears that is non-bothersome. Patient has a family history of age-related hearing. She denied otalgia, aural fullness, otorrhea, dizziness, history of falls, history of noise exposure, history of head trauma, and history of ear surgery    Date: 12/14/2023     IMPRESSIONS:      Today's results revealed a Normal sloping to Mild Sensorineural hearing loss in the right ear and a essentially normal hearing in the left ear. Excellent speech understanding when in quiet. Tympanometry indicates normal middle ear function. Discussed test results and implications with patient. Follow medical recommendations of BILL Merino CNP . ASSESSMENT AND FINDINGS:     Otoscopy revealed tympanosclerosis of the left tympanic membrane . RIGHT EAR:  Hearing Sensitivity: Normal sloping to Mild Sensorineural hearing loss  Speech Recognition Threshold: 15 dB HL  Word Recognition: Excellent 100%, based on NU-6 25-word list at 55 dBHL using recorded speech stimuli. Tympanometry: Normal peak pressure and compliance, Type A tympanogram, consistent with normal middle ear function.        LEFT EAR:  Hearing Sensitivity: Essentially normal hearing sensitivity 250-8000 Hz  Speech Recognition Threshold: 15 dB HL  Word Recognition: Excellent 100%, based on NU-6 25-word

## 2023-12-29 PROCEDURE — 93294 REM INTERROG EVL PM/LDLS PM: CPT | Performed by: INTERNAL MEDICINE

## 2023-12-29 PROCEDURE — 93296 REM INTERROG EVL PM/IDS: CPT | Performed by: INTERNAL MEDICINE

## 2024-01-23 ENCOUNTER — HOSPITAL ENCOUNTER (OUTPATIENT)
Dept: WOMENS IMAGING | Age: 74
Discharge: HOME | End: 2024-01-23
Payer: MEDICARE

## 2024-01-23 ENCOUNTER — HOSPITAL ENCOUNTER (OUTPATIENT)
Dept: ULTRASOUND IMAGING | Age: 74
Discharge: HOME | End: 2024-01-23
Payer: MEDICARE

## 2024-01-23 VITALS — HEIGHT: 60 IN | WEIGHT: 200 LBS | BODY MASS INDEX: 39.27 KG/M2

## 2024-01-23 DIAGNOSIS — N64.4 BREAST PAIN: ICD-10-CM

## 2024-01-23 DIAGNOSIS — Z12.31 BREAST CANCER SCREENING BY MAMMOGRAM: ICD-10-CM

## 2024-01-23 PROCEDURE — 76642 ULTRASOUND BREAST LIMITED: CPT

## 2024-01-23 PROCEDURE — G0279 TOMOSYNTHESIS, MAMMO: HCPCS

## 2024-01-24 NOTE — PROGRESS NOTES
[facility]   Electrophysiology Follow up   Date: 1/30/2024  I had the privilege of visiting Lexie Villa in the office.       CC: PAF   HPI: Lexie Villa is a 73 y.o. female with a history of CAD, s/p CABG and paroxysmal atrial fibrillation. RAHUL, CKD      10/1/17 presented with chest pressure, and had a cardiac catheterization by Dr. Butcher which showed patent graft and medical therapy recommended.       08/16/2018 s/p EPS RFA  atrial fibrillation and PVI      Admitted to [facility] Sept 2018 with GI bleeding and had EGD, then OP colonoscopy which showed polyps.      EGD 9/21/18>  In the gastric antrum, linear streaks of erythematous mucosa with erosions seen suggestive of GAVE, biopsies not obtained. Rec'd iron infusions. History of MGUS and receiving Iron transfusion.      S/p dual chamber PPM 5/1/19 for sinus node dysfunction      [facility]  04/25/2022 for progressive SOB and chest Pain showed LM and OM disease. Noted to have low hemoglobin and melena.  PCI delayed.      SBE and colonoscopy showed AVMs, healing ulcers and diverticulosis. On Octreotide injection for GI bleeding.       08/15/2022 PCI to OM1 and LM - resumed DAPT     Sarai presents today in annual follow up.  She is doing well from a cardiac perspective. She denies recurrence if afib.  She states she is tired and fatigued to her anemia which is unresolved. Her weight is stable.       Assessment and plan:   - Sinus node dysfunction              S/p dual chamber pacemaker 05/01/2019               Interrogation today shows:8.6 years remaining, AP 98.4 % ,  <0.1%,  0.0% AT/AF burden per device interrogation today, Underlying CHB@30 bpm, presenting APVS      - Paroxysmal atrial fibrillation              EKG today:  A Paced               [place] on interrogation 0.0%   S/p RFA atrial fibrillation with PVI 08/16/2018  Continue metoprolol         Patient has a [id number] Score of 3( age, gender, HTN)              ~ not on AC due to anemia and

## 2024-01-30 ENCOUNTER — NURSE ONLY (OUTPATIENT)
Dept: CARDIOLOGY CLINIC | Age: 74
End: 2024-01-30
Payer: MEDICARE

## 2024-01-30 ENCOUNTER — OFFICE VISIT (OUTPATIENT)
Dept: CARDIOLOGY CLINIC | Age: 74
End: 2024-01-30
Payer: MEDICARE

## 2024-01-30 ENCOUNTER — HOSPITAL ENCOUNTER (OUTPATIENT)
Age: 74
Discharge: HOME | End: 2024-01-30
Payer: MEDICARE

## 2024-01-30 VITALS
WEIGHT: 201.6 LBS | DIASTOLIC BLOOD PRESSURE: 64 MMHG | BODY MASS INDEX: 40.64 KG/M2 | HEART RATE: 60 BPM | OXYGEN SATURATION: 98 % | HEIGHT: 59 IN | SYSTOLIC BLOOD PRESSURE: 134 MMHG

## 2024-01-30 DIAGNOSIS — G47.33 OSA (OBSTRUCTIVE SLEEP APNEA): ICD-10-CM

## 2024-01-30 DIAGNOSIS — I49.5 SINUS NODE DYSFUNCTION (HCC): ICD-10-CM

## 2024-01-30 DIAGNOSIS — I48.0 PAF (PAROXYSMAL ATRIAL FIBRILLATION) (HCC): ICD-10-CM

## 2024-01-30 DIAGNOSIS — D62 ANEMIA DUE TO ACUTE BLOOD LOSS: ICD-10-CM

## 2024-01-30 DIAGNOSIS — I49.5 SINUS NODE DYSFUNCTION (HCC): Primary | ICD-10-CM

## 2024-01-30 DIAGNOSIS — Z95.0 PACEMAKER: Primary | ICD-10-CM

## 2024-01-30 DIAGNOSIS — E66.01 CLASS 2 SEVERE OBESITY WITH SERIOUS COMORBIDITY AND BODY MASS INDEX (BMI) OF 39.0 TO 39.9 IN ADULT, UNSPECIFIED OBESITY TYPE (HCC): ICD-10-CM

## 2024-01-30 DIAGNOSIS — I25.119 CORONARY ARTERY DISEASE INVOLVING NATIVE CORONARY ARTERY OF NATIVE HEART WITH ANGINA PECTORIS (HCC): ICD-10-CM

## 2024-01-30 LAB
ANION GAP SERPL CALCULATED.3IONS-SCNC: 12 MMOL/L (ref 3–16)
BUN SERPL-MCNC: 21 MG/DL (ref 7–20)
CALCIUM SERPL-MCNC: 8.9 MG/DL (ref 8.3–10.6)
CHLORIDE SERPL-SCNC: 103 MMOL/L (ref 99–110)
CO2 SERPL-SCNC: 25 MMOL/L (ref 21–32)
CREAT SERPL-MCNC: 0.9 MG/DL (ref 0.6–1.2)
DEPRECATED RDW RBC AUTO: 16.9 % (ref 12.4–15.4)
GFR SERPLBLD CREATININE-BSD FMLA CKD-EPI: >60 ML/MIN/{1.73_M2}
GLUCOSE SERPL-MCNC: 126 MG/DL (ref 70–99)
HCT VFR BLD AUTO: 30.1 % (ref 36–48)
HGB BLD-MCNC: 9.6 G/DL (ref 12–16)
MCH RBC QN AUTO: 28.5 PG (ref 26–34)
MCHC RBC AUTO-ENTMCNC: 32 G/DL (ref 31–36)
MCV RBC AUTO: 88.9 FL (ref 80–100)
PLATELET # BLD AUTO: 220 K/UL (ref 135–450)
PMV BLD AUTO: 8.1 FL (ref 5–10.5)
POTASSIUM SERPL-SCNC: 4.5 MMOL/L (ref 3.5–5.1)
RBC # BLD AUTO: 3.38 M/UL (ref 4–5.2)
SODIUM SERPL-SCNC: 140 MMOL/L (ref 136–145)
WBC # BLD AUTO: 6.4 K/UL (ref 4–11)

## 2024-01-30 PROCEDURE — G8399 PT W/DXA RESULTS DOCUMENT: HCPCS | Performed by: INTERNAL MEDICINE

## 2024-01-30 PROCEDURE — 36415 COLL VENOUS BLD VENIPUNCTURE: CPT

## 2024-01-30 PROCEDURE — 3078F DIAST BP <80 MM HG: CPT | Performed by: INTERNAL MEDICINE

## 2024-01-30 PROCEDURE — G8484 FLU IMMUNIZE NO ADMIN: HCPCS | Performed by: INTERNAL MEDICINE

## 2024-01-30 PROCEDURE — 1123F ACP DISCUSS/DSCN MKR DOCD: CPT | Performed by: INTERNAL MEDICINE

## 2024-01-30 PROCEDURE — 93280 PM DEVICE PROGR EVAL DUAL: CPT | Performed by: INTERNAL MEDICINE

## 2024-01-30 PROCEDURE — G8427 DOCREV CUR MEDS BY ELIG CLIN: HCPCS | Performed by: INTERNAL MEDICINE

## 2024-01-30 PROCEDURE — 3075F SYST BP GE 130 - 139MM HG: CPT | Performed by: INTERNAL MEDICINE

## 2024-01-30 PROCEDURE — 3017F COLORECTAL CA SCREEN DOC REV: CPT | Performed by: INTERNAL MEDICINE

## 2024-01-30 PROCEDURE — 1090F PRES/ABSN URINE INCON ASSESS: CPT | Performed by: INTERNAL MEDICINE

## 2024-01-30 PROCEDURE — 80048 BASIC METABOLIC PNL TOTAL CA: CPT

## 2024-01-30 PROCEDURE — 85027 COMPLETE CBC AUTOMATED: CPT

## 2024-01-30 PROCEDURE — G8417 CALC BMI ABV UP PARAM F/U: HCPCS | Performed by: INTERNAL MEDICINE

## 2024-01-30 PROCEDURE — 99214 OFFICE O/P EST MOD 30 MIN: CPT | Performed by: INTERNAL MEDICINE

## 2024-01-30 PROCEDURE — 1036F TOBACCO NON-USER: CPT | Performed by: INTERNAL MEDICINE

## 2024-01-30 PROCEDURE — 93000 ELECTROCARDIOGRAM COMPLETE: CPT | Performed by: INTERNAL MEDICINE

## 2024-02-05 ENCOUNTER — HOSPITAL ENCOUNTER (OUTPATIENT)
Dept: CT IMAGING | Age: 74
Discharge: HOME | End: 2024-02-05
Attending: INTERNAL MEDICINE
Payer: MEDICARE

## 2024-02-05 DIAGNOSIS — R10.32 LLQ PAIN: ICD-10-CM

## 2024-02-05 PROCEDURE — 6360000004 HC RX CONTRAST MEDICATION: Performed by: INTERNAL MEDICINE

## 2024-02-05 PROCEDURE — 74177 CT ABD & PELVIS W/CONTRAST: CPT

## 2024-02-05 RX ADMIN — IOPAMIDOL 75 ML: 755 INJECTION, SOLUTION INTRAVENOUS at 15:29

## 2024-02-28 ENCOUNTER — TELEPHONE (OUTPATIENT)
Dept: INTERNAL MEDICINE CLINIC | Age: 74
End: 2024-02-28

## 2024-02-28 DIAGNOSIS — I10 ESSENTIAL HYPERTENSION: ICD-10-CM

## 2024-02-28 DIAGNOSIS — E78.2 MIXED HYPERLIPIDEMIA: ICD-10-CM

## 2024-02-28 DIAGNOSIS — F33.0 MAJOR DEPRESSIVE DISORDER, RECURRENT, MILD (HCC): ICD-10-CM

## 2024-02-28 DIAGNOSIS — R13.10 DYSPHAGIA, UNSPECIFIED TYPE: ICD-10-CM

## 2024-02-28 RX ORDER — ROSUVASTATIN CALCIUM 40 MG/1
20 TABLET, COATED ORAL EVERY EVENING
Qty: 45 TABLET | Refills: 3 | Status: SHIPPED | OUTPATIENT
Start: 2024-02-28

## 2024-02-28 RX ORDER — CLOPIDOGREL BISULFATE 75 MG/1
75 TABLET ORAL DAILY
Qty: 90 TABLET | Refills: 3 | Status: SHIPPED | OUTPATIENT
Start: 2024-02-28

## 2024-02-28 RX ORDER — EZETIMIBE 10 MG/1
5 TABLET ORAL NIGHTLY
Qty: 45 TABLET | Refills: 3 | Status: SHIPPED | OUTPATIENT
Start: 2024-02-28

## 2024-02-28 RX ORDER — TRAZODONE HYDROCHLORIDE 50 MG/1
50 TABLET ORAL NIGHTLY PRN
Qty: 90 TABLET | Refills: 3 | Status: SHIPPED | OUTPATIENT
Start: 2024-02-28

## 2024-02-28 RX ORDER — TIZANIDINE 4 MG/1
4 TABLET ORAL NIGHTLY PRN
Qty: 90 TABLET | Refills: 3 | Status: SHIPPED | OUTPATIENT
Start: 2024-02-28

## 2024-02-28 RX ORDER — FUROSEMIDE 40 MG/1
40 TABLET ORAL DAILY
Qty: 90 TABLET | Refills: 3 | Status: SHIPPED | OUTPATIENT
Start: 2024-02-28

## 2024-02-28 RX ORDER — PANTOPRAZOLE SODIUM 40 MG/1
40 TABLET, DELAYED RELEASE ORAL
Qty: 180 TABLET | Refills: 3 | Status: SHIPPED | OUTPATIENT
Start: 2024-02-28

## 2024-02-28 NOTE — TELEPHONE ENCOUNTER
Medication Refill    Medication needing refilled:  clopidogrel (PLAVIX)   Dosage of the medication:  75 MG tablet   How are you taking this medication (QD, BID, TID, QID, PRN):  Take 1 tablet by mouth daily   30 or 90 day supply called in:  90 day supply with refills  When will you run out of your medication:  2 weeks  Which Pharmacy are we sending the medication to?:   [facility] [address] - P [phone] - F [phone]

## 2024-02-28 NOTE — TELEPHONE ENCOUNTER
Pt   calling requesting refill of Furosemide---Ezetimibe---Tizanidine---Trazodone---Pantoprazole and Rosuvastatin     Last written 3/16/23  Last OV 12/7/23  Next OV 4/9/24  Last recommended OV NA     Please send to Connie

## 2024-03-01 DIAGNOSIS — R13.10 DYSPHAGIA, UNSPECIFIED TYPE: ICD-10-CM

## 2024-03-01 DIAGNOSIS — F33.0 MAJOR DEPRESSIVE DISORDER, RECURRENT, MILD (HCC): ICD-10-CM

## 2024-03-01 DIAGNOSIS — E78.2 MIXED HYPERLIPIDEMIA: ICD-10-CM

## 2024-03-01 DIAGNOSIS — I10 ESSENTIAL HYPERTENSION: ICD-10-CM

## 2024-03-01 RX ORDER — TIZANIDINE 4 MG/1
4 TABLET ORAL NIGHTLY PRN
Qty: 90 TABLET | Refills: 3 | Status: CANCELLED | OUTPATIENT
Start: 2024-03-01

## 2024-03-01 RX ORDER — TRAZODONE HYDROCHLORIDE 50 MG/1
50 TABLET ORAL NIGHTLY PRN
Qty: 90 TABLET | Refills: 3 | Status: CANCELLED | OUTPATIENT
Start: 2024-03-01

## 2024-03-01 RX ORDER — FUROSEMIDE 40 MG/1
40 TABLET ORAL DAILY
Qty: 90 TABLET | Refills: 3 | Status: CANCELLED | OUTPATIENT
Start: 2024-03-01

## 2024-03-01 RX ORDER — PANTOPRAZOLE SODIUM 40 MG/1
40 TABLET, DELAYED RELEASE ORAL
Qty: 180 TABLET | Refills: 3 | Status: CANCELLED | OUTPATIENT
Start: 2024-03-01

## 2024-03-01 RX ORDER — CLOPIDOGREL BISULFATE 75 MG/1
75 TABLET ORAL DAILY
Qty: 90 TABLET | Refills: 3 | Status: CANCELLED | OUTPATIENT
Start: 2024-03-01

## 2024-03-01 RX ORDER — EZETIMIBE 10 MG/1
5 TABLET ORAL NIGHTLY
Qty: 45 TABLET | Refills: 3 | Status: CANCELLED | OUTPATIENT
Start: 2024-03-01

## 2024-03-01 RX ORDER — ROSUVASTATIN CALCIUM 40 MG/1
20 TABLET, COATED ORAL EVERY EVENING
Qty: 45 TABLET | Refills: 3 | Status: CANCELLED | OUTPATIENT
Start: 2024-03-01

## 2024-03-13 ENCOUNTER — TELEPHONE (OUTPATIENT)
Dept: CARDIOLOGY CLINIC | Age: 74
End: 2024-03-13

## 2024-03-13 RX ORDER — CLOPIDOGREL BISULFATE 75 MG/1
75 TABLET ORAL DAILY
Qty: 90 TABLET | Refills: 3 | Status: SHIPPED | OUTPATIENT
Start: 2024-03-13

## 2024-03-13 NOTE — TELEPHONE ENCOUNTER
Medication Refill    Medication needing refilled:  clopidogrel (PLAVIX)   Dosage of the medication:  75 mg   How are you taking this medication (QD, BID, TID, QID, PRN):    30 or 90 day supply called in:   90  When will you run out of your medication:    Which Pharmacy are we sending the medication to?:   [facility] [place] - [address] [place] [address] [phone] - F [phone]  [facility] [address] [address]  Phone: [phone]  Fax: [phone]

## 2024-03-13 NOTE — TELEPHONE ENCOUNTER
Tried to call the pharmacy to confirmed if they had received the refill request, no one picked up.    Received refill request for clopidogrel (PLAVIX)  from [facility].    Last ov:2024 RMM    Last labs:2024    Last EK2024    Last Refill:2024    Next appointment:2024 DCE

## 2024-03-18 ENCOUNTER — TELEPHONE (OUTPATIENT)
Dept: INTERNAL MEDICINE CLINIC | Age: 74
End: 2024-03-18

## 2024-03-18 NOTE — TELEPHONE ENCOUNTER
Pt calling ---went to the ER last week by nestor --her BP had dropped and was dehydrated----did it again this morning --although she didn't go to ER --needs ED f/u up this week--can't do Wed pm---please call pt Thanks.

## 2024-03-26 ENCOUNTER — OFFICE VISIT (OUTPATIENT)
Dept: INTERNAL MEDICINE CLINIC | Age: 74
End: 2024-03-26
Payer: MEDICARE

## 2024-03-26 VITALS
BODY MASS INDEX: 39.96 KG/M2 | HEIGHT: 59 IN | SYSTOLIC BLOOD PRESSURE: 114 MMHG | DIASTOLIC BLOOD PRESSURE: 64 MMHG | OXYGEN SATURATION: 96 % | WEIGHT: 198.2 LBS | HEART RATE: 67 BPM

## 2024-03-26 DIAGNOSIS — M53.3 SACRO ILIAL PAIN: Primary | ICD-10-CM

## 2024-03-26 DIAGNOSIS — E66.9 DIABETES MELLITUS TYPE 2 IN OBESE: ICD-10-CM

## 2024-03-26 DIAGNOSIS — E11.69 DIABETES MELLITUS TYPE 2 IN OBESE: ICD-10-CM

## 2024-03-26 PROCEDURE — 1123F ACP DISCUSS/DSCN MKR DOCD: CPT | Performed by: INTERNAL MEDICINE

## 2024-03-26 PROCEDURE — G8427 DOCREV CUR MEDS BY ELIG CLIN: HCPCS | Performed by: INTERNAL MEDICINE

## 2024-03-26 PROCEDURE — 1036F TOBACCO NON-USER: CPT | Performed by: INTERNAL MEDICINE

## 2024-03-26 PROCEDURE — G8399 PT W/DXA RESULTS DOCUMENT: HCPCS | Performed by: INTERNAL MEDICINE

## 2024-03-26 PROCEDURE — G8484 FLU IMMUNIZE NO ADMIN: HCPCS | Performed by: INTERNAL MEDICINE

## 2024-03-26 PROCEDURE — 2022F DILAT RTA XM EVC RTNOPTHY: CPT | Performed by: INTERNAL MEDICINE

## 2024-03-26 PROCEDURE — 1090F PRES/ABSN URINE INCON ASSESS: CPT | Performed by: INTERNAL MEDICINE

## 2024-03-26 PROCEDURE — 20552 NJX 1/MLT TRIGGER POINT 1/2: CPT | Performed by: INTERNAL MEDICINE

## 2024-03-26 PROCEDURE — 99214 OFFICE O/P EST MOD 30 MIN: CPT | Performed by: INTERNAL MEDICINE

## 2024-03-26 PROCEDURE — G8417 CALC BMI ABV UP PARAM F/U: HCPCS | Performed by: INTERNAL MEDICINE

## 2024-03-26 PROCEDURE — 3017F COLORECTAL CA SCREEN DOC REV: CPT | Performed by: INTERNAL MEDICINE

## 2024-03-26 PROCEDURE — 3078F DIAST BP <80 MM HG: CPT | Performed by: INTERNAL MEDICINE

## 2024-03-26 PROCEDURE — 3046F HEMOGLOBIN A1C LEVEL >9.0%: CPT | Performed by: INTERNAL MEDICINE

## 2024-03-26 PROCEDURE — 3074F SYST BP LT 130 MM HG: CPT | Performed by: INTERNAL MEDICINE

## 2024-03-26 RX ORDER — METHYLPREDNISOLONE ACETATE 40 MG/ML
40 INJECTION, SUSPENSION INTRA-ARTICULAR; INTRALESIONAL; INTRAMUSCULAR; SOFT TISSUE ONCE
Status: COMPLETED | OUTPATIENT
Start: 2024-03-26 | End: 2024-03-26

## 2024-03-26 RX ORDER — PREDNISONE 20 MG/1
20 TABLET ORAL 2 TIMES DAILY
Qty: 10 TABLET | Refills: 0 | Status: SHIPPED | OUTPATIENT
Start: 2024-03-26 | End: 2024-03-31

## 2024-03-26 RX ORDER — METHOCARBAMOL 750 MG/1
750 TABLET, FILM COATED ORAL 4 TIMES DAILY
Qty: 40 TABLET | Refills: 0 | Status: SHIPPED | OUTPATIENT
Start: 2024-03-26 | End: 2024-04-05

## 2024-03-26 RX ADMIN — METHYLPREDNISOLONE ACETATE 40 MG: 40 INJECTION, SUSPENSION INTRA-ARTICULAR; INTRALESIONAL; INTRAMUSCULAR; SOFT TISSUE at 15:37

## 2024-03-26 SDOH — ECONOMIC STABILITY: INCOME INSECURITY: HOW HARD IS IT FOR YOU TO PAY FOR THE VERY BASICS LIKE FOOD, HOUSING, MEDICAL CARE, AND HEATING?: NOT HARD AT ALL

## 2024-03-26 SDOH — ECONOMIC STABILITY: FOOD INSECURITY: WITHIN THE PAST 12 MONTHS, YOU WORRIED THAT YOUR FOOD WOULD RUN OUT BEFORE YOU GOT MONEY TO BUY MORE.: NEVER TRUE

## 2024-03-26 SDOH — ECONOMIC STABILITY: FOOD INSECURITY: WITHIN THE PAST 12 MONTHS, THE FOOD YOU BOUGHT JUST DIDN'T LAST AND YOU DIDN'T HAVE MONEY TO GET MORE.: NEVER TRUE

## 2024-03-26 ASSESSMENT — ENCOUNTER SYMPTOMS
BACK PAIN: 1
BOWEL INCONTINENCE: 0
ABDOMINAL PAIN: 0
VISUAL CHANGE: 0

## 2024-03-26 ASSESSMENT — PATIENT HEALTH QUESTIONNAIRE - PHQ9
8. MOVING OR SPEAKING SO SLOWLY THAT OTHER PEOPLE COULD HAVE NOTICED. OR THE OPPOSITE, BEING SO FIGETY OR RESTLESS THAT YOU HAVE BEEN MOVING AROUND A LOT MORE THAN USUAL: NOT AT ALL
3. TROUBLE FALLING OR STAYING ASLEEP: NOT AT ALL
SUM OF ALL RESPONSES TO PHQ QUESTIONS 1-9: 1
10. IF YOU CHECKED OFF ANY PROBLEMS, HOW DIFFICULT HAVE THESE PROBLEMS MADE IT FOR YOU TO DO YOUR WORK, TAKE CARE OF THINGS AT HOME, OR GET ALONG WITH OTHER PEOPLE: NOT DIFFICULT AT ALL
SUM OF ALL RESPONSES TO PHQ QUESTIONS 1-9: 1
4. FEELING TIRED OR HAVING LITTLE ENERGY: SEVERAL DAYS
SUM OF ALL RESPONSES TO PHQ QUESTIONS 1-9: 1
9. THOUGHTS THAT YOU WOULD BE BETTER OFF DEAD, OR OF HURTING YOURSELF: NOT AT ALL
SUM OF ALL RESPONSES TO PHQ QUESTIONS 1-9: 1
1. LITTLE INTEREST OR PLEASURE IN DOING THINGS: NOT AT ALL
2. FEELING DOWN, DEPRESSED OR HOPELESS: NOT AT ALL
5. POOR APPETITE OR OVEREATING: NOT AT ALL
7. TROUBLE CONCENTRATING ON THINGS, SUCH AS READING THE NEWSPAPER OR WATCHING TELEVISION: NOT AT ALL
6. FEELING BAD ABOUT YOURSELF - OR THAT YOU ARE A FAILURE OR HAVE LET YOURSELF OR YOUR FAMILY DOWN: NOT AT ALL
SUM OF ALL RESPONSES TO PHQ9 QUESTIONS 1 & 2: 0

## 2024-03-26 NOTE — PROGRESS NOTES
Lexie Villa (:  1950) is a 73 y.o. female,Established patient, here for evaluation of the following chief complaint(s):  Lower Back Pain         ASSESSMENT/PLAN:  1. Sacro ilial pain  -     methylPREDNISolone acetate (DEPO-MEDROL) injection 40 mg; 40 mg, IntraMUSCular, ONCE, 1 dose, On Tue 3/26/24 at 1500  -     83712 - MS INJECT TRIGGER POINT, 1 OR 2  -     predniSONE (DELTASONE) 20 MG tablet; Take 1 tablet by mouth 2 times daily for 5 days, Disp-10 tablet, R-0Normal  -     methocarbamol (ROBAXIN-750) 750 MG tablet; Take 1 tablet by mouth 4 times daily for 10 days, Disp-40 tablet, R-0Normal  2. Diabetes mellitus type 2 in obese (HCC)  Patient is been having for the last couple days significant pain in the lower back area more on the right than on the left causing her not to be able to get out of the bed or ambulate she is having today tenderness over the sacroiliac joint area on the right side after discussing the different options with the patient and obtaining verbal consent we went ahead and gave her 40 mg of Depo-Medrol into the sacroiliac joint area on the right side she tolerated the procedure well she is to use the prednisone and the muscle relaxant for the next 5 days she is also advised to use a heating pad 3-4 times per day 1 hour each time at the minimum and start doing the stretching exercises I gave her    Given that the patient is a diabetic and prednisone can increase her blood sugar significantly she is advised to watch it  the next 5 to 7 days in addition to drinking more fluids and modify her diet and to let me know if it goes anywhere higher than 250    No follow-ups on file.         Subjective   SUBJECTIVE/OBJECTIVE:    Lab Review   Lab Results   Component Value Date/Time     2024 02:28 PM     06/10/2023 10:18 PM     2023 11:19 AM    K 4.5 2024 02:28 PM    K 4.3 06/10/2023 10:18 PM    K 4.0 2023 11:19 AM    K 4.8 2023 04:05

## 2024-04-09 ENCOUNTER — OFFICE VISIT (OUTPATIENT)
Dept: INTERNAL MEDICINE CLINIC | Age: 74
End: 2024-04-09

## 2024-04-09 VITALS
HEART RATE: 60 BPM | BODY MASS INDEX: 40.04 KG/M2 | SYSTOLIC BLOOD PRESSURE: 122 MMHG | HEIGHT: 59 IN | DIASTOLIC BLOOD PRESSURE: 54 MMHG | OXYGEN SATURATION: 98 % | WEIGHT: 198.6 LBS

## 2024-04-09 DIAGNOSIS — E66.9 TYPE 2 DIABETES MELLITUS WITH OBESITY (HCC): ICD-10-CM

## 2024-04-09 DIAGNOSIS — E11.69 TYPE 2 DIABETES MELLITUS WITH OBESITY (HCC): ICD-10-CM

## 2024-04-09 DIAGNOSIS — I25.10 CAD, MULTIPLE VESSEL: ICD-10-CM

## 2024-04-09 DIAGNOSIS — D64.9 ANEMIA, UNSPECIFIED TYPE: ICD-10-CM

## 2024-04-09 DIAGNOSIS — N89.8 VAGINAL DISCHARGE: Primary | ICD-10-CM

## 2024-04-09 DIAGNOSIS — R35.0 URINARY FREQUENCY: ICD-10-CM

## 2024-04-09 DIAGNOSIS — I25.119 CORONARY ARTERY DISEASE INVOLVING NATIVE CORONARY ARTERY OF NATIVE HEART WITH ANGINA PECTORIS (HCC): ICD-10-CM

## 2024-04-09 DIAGNOSIS — I10 ESSENTIAL HYPERTENSION: ICD-10-CM

## 2024-04-09 DIAGNOSIS — D46.9 MDS (MYELODYSPLASTIC SYNDROME) (HCC): ICD-10-CM

## 2024-04-09 DIAGNOSIS — N18.31 STAGE 3A CHRONIC KIDNEY DISEASE (HCC): ICD-10-CM

## 2024-04-09 DIAGNOSIS — N89.8 VAGINAL ITCHING: ICD-10-CM

## 2024-04-09 DIAGNOSIS — E78.2 MIXED HYPERLIPIDEMIA: ICD-10-CM

## 2024-04-09 DIAGNOSIS — F33.0 MAJOR DEPRESSIVE DISORDER, RECURRENT, MILD (HCC): ICD-10-CM

## 2024-04-09 PROBLEM — K92.1 MELENA: Status: RESOLVED | Noted: 2021-12-29 | Resolved: 2024-04-09

## 2024-04-09 LAB
ALBUMIN SERPL-MCNC: 4.4 G/DL (ref 3.4–5)
ALBUMIN/GLOB SERPL: 1.8 {RATIO} (ref 1.1–2.2)
ALP SERPL-CCNC: 129 U/L (ref 40–129)
ALT SERPL-CCNC: 16 U/L (ref 10–40)
ANION GAP SERPL CALCULATED.3IONS-SCNC: 12 MMOL/L (ref 3–16)
AST SERPL-CCNC: 15 U/L (ref 15–37)
BASOPHILS # BLD: 0 K/UL (ref 0–0.2)
BASOPHILS NFR BLD: 0.4 %
BILIRUB SERPL-MCNC: 0.4 MG/DL (ref 0–1)
BILIRUBIN, POC: NEGATIVE
BLOOD URINE, POC: NORMAL
BUN SERPL-MCNC: 19 MG/DL (ref 7–20)
CALCIUM SERPL-MCNC: 9.4 MG/DL (ref 8.3–10.6)
CHLORIDE SERPL-SCNC: 97 MMOL/L (ref 99–110)
CHOLEST SERPL-MCNC: 168 MG/DL (ref 0–199)
CLARITY, POC: YELLOW
CO2 SERPL-SCNC: 27 MMOL/L (ref 21–32)
COLOR, POC: CLEAR
CREAT SERPL-MCNC: 0.9 MG/DL (ref 0.6–1.2)
DEPRECATED RDW RBC AUTO: 20.4 % (ref 12.4–15.4)
EOSINOPHIL # BLD: 0.1 K/UL (ref 0–0.6)
EOSINOPHIL NFR BLD: 1.1 %
FOLATE SERPL-MCNC: 10.42 NG/ML (ref 4.78–24.2)
GFR SERPLBLD CREATININE-BSD FMLA CKD-EPI: 68 ML/MIN/{1.73_M2}
GLUCOSE SERPL-MCNC: 131 MG/DL (ref 70–99)
GLUCOSE URINE, POC: >=1000
HCT VFR BLD AUTO: 36.6 % (ref 36–48)
HDLC SERPL-MCNC: 43 MG/DL (ref 40–60)
HGB BLD-MCNC: 11.9 G/DL (ref 12–16)
KETONES, POC: NEGATIVE
LDLC SERPL CALC-MCNC: 73 MG/DL
LEUKOCYTE EST, POC: NORMAL
LYMPHOCYTES # BLD: 1.9 K/UL (ref 1–5.1)
LYMPHOCYTES NFR BLD: 25.1 %
MCH RBC QN AUTO: 28.7 PG (ref 26–34)
MCHC RBC AUTO-ENTMCNC: 32.7 G/DL (ref 31–36)
MCV RBC AUTO: 87.9 FL (ref 80–100)
MONOCYTES # BLD: 0.9 K/UL (ref 0–1.3)
MONOCYTES NFR BLD: 12.4 %
NEUTROPHILS # BLD: 4.5 K/UL (ref 1.7–7.7)
NEUTROPHILS NFR BLD: 61 %
NITRITE, POC: NEGATIVE
PH, POC: 5.5
PLATELET # BLD AUTO: 214 K/UL (ref 135–450)
PMV BLD AUTO: 8.8 FL (ref 5–10.5)
POTASSIUM SERPL-SCNC: 4.3 MMOL/L (ref 3.5–5.1)
PROT SERPL-MCNC: 6.9 G/DL (ref 6.4–8.2)
PROTEIN, POC: NEGATIVE
RBC # BLD AUTO: 4.16 M/UL (ref 4–5.2)
REASON FOR REJECTION: NORMAL
REJECTED TEST: NORMAL
SODIUM SERPL-SCNC: 136 MMOL/L (ref 136–145)
SPECIFIC GRAVITY, POC: 1.01
TRIGL SERPL-MCNC: 259 MG/DL (ref 0–150)
UROBILINOGEN, POC: 0.2
VIT B12 SERPL-MCNC: 1580 PG/ML (ref 211–911)
VLDLC SERPL CALC-MCNC: 52 MG/DL
WBC # BLD AUTO: 7.4 K/UL (ref 4–11)

## 2024-04-09 NOTE — ASSESSMENT & PLAN NOTE
Chronic, controlled. Continue medications and follow up with cardiology as recommended. Currently on imdur 60 mg daily, crestor 40 mg daily, zetia 10 mg daily, metoprolol 25 mg BID. BP is controlled on losartan 25 mg daily, metoprolol 25 mg BID, amlodipine 2.5 mg daily and 40 mg lasix daily.

## 2024-04-09 NOTE — ASSESSMENT & PLAN NOTE
Chronic, controlled. Stable. Continue losartan 25 mg daily, amlodipine 2.5 mg daily, metoprolol 25 mg BID. Labs ordered.

## 2024-04-09 NOTE — PROGRESS NOTES
4/9/24     Chief Complaint   Patient presents with    Follow-up     ER f/u and DM, HTN,HLD    Urinary Tract Infection     Patient stated she is very dry and had blood when she wipes, Patient had white thick discharge. Urgency, frequency     HPI    Here today for ER follow-up visit    Went to ER 3/14 for lightheadedness and a near syncope event. No LOC. Reported she was not eating/drinking well. Hypotensive at hospital (SBP in 80s), improved with IVF. Work up was unremarkable.   Reports she is feeling better since then, is eating and drinking back to her baseline. She has since stopped taking tizanidine due to increased fatigue. Will finish course of robaxin BID next week.    Today reports white vaginal discharge. Also noticed blood on toilet paper this morning. Reports frequency and urgency. However, reports urgency is a chronic issue. Denies dysuria, flank pain, fevers, and malodorous urine.    CAD: metoprolol, plavix, aspirin. Follows with Dr. Butcher with cardiology, due to see him 4/25. HLD: rosuvastatin, ezetimibe  Hx of pacemaker - sees Dr Yayo PATIÑO    Anemia and MDS: Follows with hematology, Dr. Solares [facility]    T2DM: farxiga, actos. Checks blood sugars at home, averaging around 130s. Recently received steroid injection 3/26 and course of prednisone which she completed 3/31. Last POCT A1c was 7.4 in December 2023    HTN: losartan, furosemide, amlodipine. Takes BP at home twice daily. Reports ranges 120s/60-70s. Low BP today, reports she has been fasting today to prepare for labs    Allergies   Allergen Reactions    Albuterol Palpitations and Other (See Comments)     Other reaction(s): Chest Pain    Niacin Er Itching and Rash    Heparin Other (See Comments)     Caused bruises and hematomas immediately when drip started.  MARKED BRUISING/HEMATOMAS    Moxifloxacin Rash    Tagamet [Cimetidine] Rash     Current Outpatient Medications   Medication Sig Dispense Refill    clopidogrel (PLAVIX) 75 MG tablet Take 1 tablet by

## 2024-04-09 NOTE — ASSESSMENT & PLAN NOTE
Chronic, controlled. Continue to avoid nsaids and nephrotoxic agents. Work on hydration. Ordered labs

## 2024-04-09 NOTE — ASSESSMENT & PLAN NOTE
Chronic, uncontrolled. Continue with planned infusion, B12 supplements, and seeing hematology as scheduled. Checking labs today

## 2024-04-09 NOTE — ASSESSMENT & PLAN NOTE
Acute, uncontrolled. Symptoms began today. Sending urine for culture. Will treat if positive. Increase water intake. Reviewed supportive care.

## 2024-04-09 NOTE — ASSESSMENT & PLAN NOTE
Chronic, continue Crestor 40 mg and Zetia 10 mg daily.  Continue seeing cardiology as recommended. Labs ordered.

## 2024-04-09 NOTE — ASSESSMENT & PLAN NOTE
Chronic, controlled. Continue medications and follow up with cardiology as recommended. Currently on imdur 60 mg daily, crestor 40 mg daily, zetia 10 mg daily, metoprolol 25 mg BID. BP is controlled on losartan 25 mg daily, metoprolol 25 mg BID, amlodipine 2.5 mg daily and 40 mg lasix daily

## 2024-04-09 NOTE — ASSESSMENT & PLAN NOTE
Chronic, moderately controlled. Continue 10 mg farxiga daily and 15 mg actos BID. Checking A1c today.

## 2024-04-10 ENCOUNTER — TELEPHONE (OUTPATIENT)
Dept: INTERNAL MEDICINE CLINIC | Age: 74
End: 2024-04-10

## 2024-04-10 LAB
EST. AVERAGE GLUCOSE BLD GHB EST-MCNC: 159.9 MG/DL
HBA1C MFR BLD: 7.2 %

## 2024-04-10 NOTE — TELEPHONE ENCOUNTER
Carmen from [facility] calling about rejected lab--the Vag pathogen was rejected--not labeled---Thanks

## 2024-04-11 LAB
BACTERIA UR CULT: ABNORMAL
CANDIDA DNA VAG QL NAA+PROBE: ABNORMAL
G VAGINALIS DNA SPEC QL NAA+PROBE: ABNORMAL
ORGANISM: ABNORMAL
T VAGINALIS DNA VAG QL NAA+PROBE: ABNORMAL

## 2024-04-12 ENCOUNTER — TELEPHONE (OUTPATIENT)
Dept: INTERNAL MEDICINE CLINIC | Age: 74
End: 2024-04-12

## 2024-04-12 RX ORDER — METRONIDAZOLE 500 MG/1
500 TABLET ORAL 2 TIMES DAILY
Qty: 14 TABLET | Refills: 0 | Status: SHIPPED | OUTPATIENT
Start: 2024-04-12 | End: 2024-04-19

## 2024-04-12 NOTE — TELEPHONE ENCOUNTER
Please let patient know that she does have Gardnerella vaginalis which is a bacterial infection, treatment for it would be metronidazole a prescription has been sent to the pharmacy, I did send it to [facility]

## 2024-04-12 NOTE — TELEPHONE ENCOUNTER
Pt calling, saw lab results on [facility]. Is concerned with VAGINAL PATHOGENS PROBE *A, believes she has an infection. Can covering provider review? Please advise and call pt.

## 2024-04-15 RX ORDER — SULFAMETHOXAZOLE AND TRIMETHOPRIM 800; 160 MG/1; MG/1
1 TABLET ORAL 2 TIMES DAILY
Qty: 6 TABLET | Refills: 0 | Status: SHIPPED | OUTPATIENT
Start: 2024-04-15 | End: 2024-04-18

## 2024-04-25 ENCOUNTER — OFFICE VISIT (OUTPATIENT)
Dept: CARDIOLOGY CLINIC | Age: 74
End: 2024-04-25
Payer: MEDICARE

## 2024-04-25 VITALS
WEIGHT: 200.6 LBS | HEART RATE: 59 BPM | BODY MASS INDEX: 40.44 KG/M2 | OXYGEN SATURATION: 96 % | DIASTOLIC BLOOD PRESSURE: 52 MMHG | SYSTOLIC BLOOD PRESSURE: 130 MMHG | HEIGHT: 59 IN

## 2024-04-25 DIAGNOSIS — I48.0 PAF (PAROXYSMAL ATRIAL FIBRILLATION) (HCC): ICD-10-CM

## 2024-04-25 DIAGNOSIS — I25.10 CORONARY ARTERY DISEASE DUE TO LIPID RICH PLAQUE: Primary | ICD-10-CM

## 2024-04-25 DIAGNOSIS — E78.00 HYPERCHOLESTEROLEMIA: ICD-10-CM

## 2024-04-25 DIAGNOSIS — I10 ESSENTIAL HYPERTENSION: ICD-10-CM

## 2024-04-25 DIAGNOSIS — I25.83 CORONARY ARTERY DISEASE DUE TO LIPID RICH PLAQUE: Primary | ICD-10-CM

## 2024-04-25 PROCEDURE — 1123F ACP DISCUSS/DSCN MKR DOCD: CPT | Performed by: INTERNAL MEDICINE

## 2024-04-25 PROCEDURE — 1090F PRES/ABSN URINE INCON ASSESS: CPT | Performed by: INTERNAL MEDICINE

## 2024-04-25 PROCEDURE — 3078F DIAST BP <80 MM HG: CPT | Performed by: INTERNAL MEDICINE

## 2024-04-25 PROCEDURE — G8427 DOCREV CUR MEDS BY ELIG CLIN: HCPCS | Performed by: INTERNAL MEDICINE

## 2024-04-25 PROCEDURE — 3074F SYST BP LT 130 MM HG: CPT | Performed by: INTERNAL MEDICINE

## 2024-04-25 PROCEDURE — 1036F TOBACCO NON-USER: CPT | Performed by: INTERNAL MEDICINE

## 2024-04-25 PROCEDURE — 99214 OFFICE O/P EST MOD 30 MIN: CPT | Performed by: INTERNAL MEDICINE

## 2024-04-25 PROCEDURE — 3017F COLORECTAL CA SCREEN DOC REV: CPT | Performed by: INTERNAL MEDICINE

## 2024-04-25 PROCEDURE — G8417 CALC BMI ABV UP PARAM F/U: HCPCS | Performed by: INTERNAL MEDICINE

## 2024-04-25 PROCEDURE — G8399 PT W/DXA RESULTS DOCUMENT: HCPCS | Performed by: INTERNAL MEDICINE

## 2024-04-25 NOTE — PROGRESS NOTES
provided in AVS.   ASSESSMENT AND PLAN   *CAD    Date EF Results   Sx     No concerning   Hx 1999   CABGx3 LIMA-LAD, CMIH-IP4-HT2 ([facility])   [facility] 9/15  1/17  4/22  8/22 55%  55%    Patent grafts ([facility])  Patent grafts ([facility])  Progressive and obstructive LM and OM disease. Plan staged PCI ([facility])  PCI LM, OM1 ([facility])   MPI 9/15  6/23 72%  NL Anteroapical ischemia  No reversible ischemia   TTE 9/15  8/18  9/19 55%  55%  55%     ANGELITO 4/22 55%     Plan     Continue aggressive medical treatment at doses above  Hold norvasc due to DIONE, ambulatory BP monitoring, call >140 systolic   *AF  Status  parox, 8/18 Ablation, 5/19 PM implant, most recent TTE, monitors, EP procedures reviewed personally               Plan Per EP   *HTN  Status Advice Plan   Controlled Diet/salt/xcise/wt counseling Continue antihypertensives at current dosages   *CHOL  LDL Advice Plan   873, 4/24 Diet/salt/xcise/wt counseling Continue MT/HI statin   *FOLLOWUP  12 months

## 2024-05-29 ENCOUNTER — TELEPHONE (OUTPATIENT)
Dept: CARDIOLOGY CLINIC | Age: 74
End: 2024-05-29

## 2024-05-29 DIAGNOSIS — E78.2 MIXED HYPERLIPIDEMIA: ICD-10-CM

## 2024-05-29 DIAGNOSIS — I10 ESSENTIAL HYPERTENSION: ICD-10-CM

## 2024-05-29 DIAGNOSIS — R13.10 DYSPHAGIA, UNSPECIFIED TYPE: ICD-10-CM

## 2024-05-29 DIAGNOSIS — F33.0 MAJOR DEPRESSIVE DISORDER, RECURRENT, MILD (HCC): ICD-10-CM

## 2024-05-29 RX ORDER — EZETIMIBE 10 MG/1
5 TABLET ORAL NIGHTLY
Qty: 45 TABLET | Refills: 3 | Status: SHIPPED | OUTPATIENT
Start: 2024-05-29

## 2024-05-29 RX ORDER — PANTOPRAZOLE SODIUM 40 MG/1
40 TABLET, DELAYED RELEASE ORAL
Qty: 180 TABLET | Refills: 3 | Status: SHIPPED | OUTPATIENT
Start: 2024-05-29

## 2024-05-29 RX ORDER — FUROSEMIDE 40 MG/1
40 TABLET ORAL DAILY
Qty: 90 TABLET | Refills: 3 | Status: SHIPPED | OUTPATIENT
Start: 2024-05-29

## 2024-05-29 RX ORDER — ROSUVASTATIN CALCIUM 40 MG/1
20 TABLET, COATED ORAL EVERY EVENING
Qty: 45 TABLET | Refills: 3 | Status: SHIPPED | OUTPATIENT
Start: 2024-05-29

## 2024-05-29 RX ORDER — TRAZODONE HYDROCHLORIDE 50 MG/1
50 TABLET ORAL NIGHTLY PRN
Qty: 90 TABLET | Refills: 3 | Status: SHIPPED | OUTPATIENT
Start: 2024-05-29

## 2024-05-29 NOTE — TELEPHONE ENCOUNTER
4/25/2024 OV with DCE    Pt is on the call back list for 4/2025 for NPTS    4/09/2024 Labs     10/24/2023 Ov with NPTS

## 2024-05-29 NOTE — TELEPHONE ENCOUNTER
Medication Refill    Medication needing refilled on the following medications:  metoprolol tartrate (LOPRESSOR) 25 MG tablet     Isosorbide mononitrate (IMDUR) 60 MG extended release tablet         Which Pharmacy are we sending the medication to?:    [facility] [place] - [address] [phone] - F [phone]  [address] [phone]  Phone: [phone]  Fax: [phone]

## 2024-05-30 RX ORDER — ISOSORBIDE MONONITRATE 60 MG/1
60 TABLET, EXTENDED RELEASE ORAL DAILY
Qty: 90 TABLET | Refills: 3 | Status: SHIPPED | OUTPATIENT
Start: 2024-05-30

## 2024-05-30 NOTE — TELEPHONE ENCOUNTER
Prescription sent   PRINCIPAL DISCHARGE DIAGNOSIS  Diagnosis: Acute on chronic diastolic congestive heart failure  Assessment and Plan of Treatment:       SECONDARY DISCHARGE DIAGNOSES  Diagnosis: Pneumonia  Assessment and Plan of Treatment:     Diagnosis: CHF (congestive heart failure)  Assessment and Plan of Treatment:     Diagnosis: Severe pulmonary hypertension  Assessment and Plan of Treatment:     Diagnosis: Acute respiratory failure with hypoxia  Assessment and Plan of Treatment:     Diagnosis: Functional quadriplegia  Assessment and Plan of Treatment:     Diagnosis: Longstanding persistent atrial fibrillation  Assessment and Plan of Treatment:     Diagnosis: Anxiety  Assessment and Plan of Treatment:

## 2024-08-29 ENCOUNTER — PATIENT MESSAGE (OUTPATIENT)
Dept: INTERNAL MEDICINE CLINIC | Age: 74
End: 2024-08-29

## 2024-08-29 DIAGNOSIS — E11.69 TYPE 2 DIABETES MELLITUS WITH OBESITY (HCC): ICD-10-CM

## 2024-08-29 DIAGNOSIS — E66.9 TYPE 2 DIABETES MELLITUS WITH OBESITY (HCC): ICD-10-CM

## 2024-08-29 DIAGNOSIS — I10 ESSENTIAL HYPERTENSION: ICD-10-CM

## 2024-08-29 DIAGNOSIS — J30.9 ALLERGIC SINUSITIS: ICD-10-CM

## 2024-08-29 DIAGNOSIS — F33.0 MAJOR DEPRESSIVE DISORDER, RECURRENT, MILD (HCC): ICD-10-CM

## 2024-08-29 RX ORDER — BLOOD SUGAR DIAGNOSTIC
1 STRIP MISCELLANEOUS 2 TIMES DAILY
Qty: 200 EACH | Refills: 1 | Status: SHIPPED | OUTPATIENT
Start: 2024-08-29

## 2024-08-29 RX ORDER — FLUTICASONE PROPIONATE 50 MCG
2 SPRAY, SUSPENSION (ML) NASAL DAILY
Qty: 48 G | Refills: 2 | Status: SHIPPED | OUTPATIENT
Start: 2024-08-29

## 2024-08-29 RX ORDER — AMLODIPINE BESYLATE 2.5 MG/1
2.5 TABLET ORAL 2 TIMES DAILY
Qty: 180 TABLET | Refills: 1 | Status: SHIPPED | OUTPATIENT
Start: 2024-08-29

## 2024-08-29 RX ORDER — LANCETS 30 GAUGE
1 EACH MISCELLANEOUS 2 TIMES DAILY
Qty: 200 EACH | Refills: 1 | Status: SHIPPED | OUTPATIENT
Start: 2024-08-29

## 2024-08-29 RX ORDER — GABAPENTIN 400 MG/1
400 CAPSULE ORAL 3 TIMES DAILY
Qty: 270 CAPSULE | Refills: 1 | Status: SHIPPED | OUTPATIENT
Start: 2024-08-29 | End: 2025-02-25

## 2024-08-29 RX ORDER — ESTRADIOL 0.1 MG/G
1 CREAM VAGINAL SEE ADMIN INSTRUCTIONS
Qty: 1 EACH | Refills: 1 | Status: SHIPPED | OUTPATIENT
Start: 2024-08-29

## 2024-08-29 RX ORDER — PIOGLITAZONEHYDROCHLORIDE 15 MG/1
15 TABLET ORAL 2 TIMES DAILY
Qty: 180 TABLET | Refills: 1 | Status: SHIPPED | OUTPATIENT
Start: 2024-08-29 | End: 2025-02-25

## 2024-08-29 RX ORDER — ESCITALOPRAM OXALATE 20 MG/1
20 TABLET ORAL DAILY
Qty: 90 TABLET | Refills: 3 | Status: SHIPPED | OUTPATIENT
Start: 2024-08-29

## 2024-09-05 ENCOUNTER — APPOINTMENT (OUTPATIENT)
Dept: CT IMAGING | Age: 74
DRG: 690 | End: 2024-09-05
Payer: MEDICARE

## 2024-09-05 ENCOUNTER — APPOINTMENT (OUTPATIENT)
Dept: GENERAL RADIOLOGY | Age: 74
DRG: 690 | End: 2024-09-05
Payer: MEDICARE

## 2024-09-05 ENCOUNTER — HOSPITAL ENCOUNTER (INPATIENT)
Age: 74
LOS: 2 days | Discharge: HOME | DRG: 690 | End: 2024-09-08
Attending: STUDENT IN AN ORGANIZED HEALTH CARE EDUCATION/TRAINING PROGRAM | Admitting: STUDENT IN AN ORGANIZED HEALTH CARE EDUCATION/TRAINING PROGRAM
Payer: MEDICARE

## 2024-09-05 DIAGNOSIS — N30.00 ACUTE CYSTITIS WITHOUT HEMATURIA: ICD-10-CM

## 2024-09-05 DIAGNOSIS — A41.9 SEPTICEMIA (HCC): Primary | ICD-10-CM

## 2024-09-05 LAB
ALBUMIN SERPL-MCNC: 3.9 G/DL (ref 3.4–5)
ALBUMIN/GLOB SERPL: 1.4 {RATIO} (ref 1.1–2.2)
ALP SERPL-CCNC: 159 U/L (ref 40–129)
ALT SERPL-CCNC: 14 U/L (ref 10–40)
ANION GAP SERPL CALCULATED.3IONS-SCNC: 13 MMOL/L (ref 3–16)
AST SERPL-CCNC: 18 U/L (ref 15–37)
BASOPHILS # BLD: 0.1 K/UL (ref 0–0.2)
BASOPHILS NFR BLD: 0.4 %
BILIRUB SERPL-MCNC: 0.5 MG/DL (ref 0–1)
BUN SERPL-MCNC: 12 MG/DL (ref 7–20)
CALCIUM SERPL-MCNC: 8.6 MG/DL (ref 8.3–10.6)
CHLORIDE SERPL-SCNC: 98 MMOL/L (ref 99–110)
CO2 SERPL-SCNC: 21 MMOL/L (ref 21–32)
CREAT SERPL-MCNC: 0.9 MG/DL (ref 0.6–1.2)
DEPRECATED RDW RBC AUTO: 17.4 % (ref 12.4–15.4)
EOSINOPHIL # BLD: 0 K/UL (ref 0–0.6)
EOSINOPHIL NFR BLD: 0.1 %
GFR SERPLBLD CREATININE-BSD FMLA CKD-EPI: 67 ML/MIN/{1.73_M2}
GLUCOSE SERPL-MCNC: 160 MG/DL (ref 70–99)
HCT VFR BLD AUTO: 34.2 % (ref 36–48)
HGB BLD-MCNC: 10.9 G/DL (ref 12–16)
LACTATE BLDV-SCNC: 1.6 MMOL/L (ref 0.4–1.9)
LACTATE BLDV-SCNC: 2 MMOL/L (ref 0.4–1.9)
LIPASE SERPL-CCNC: 19 U/L (ref 13–60)
LYMPHOCYTES # BLD: 1.6 K/UL (ref 1–5.1)
LYMPHOCYTES NFR BLD: 12.2 %
MCH RBC QN AUTO: 29.5 PG (ref 26–34)
MCHC RBC AUTO-ENTMCNC: 31.7 G/DL (ref 31–36)
MCV RBC AUTO: 92.9 FL (ref 80–100)
MONOCYTES # BLD: 1.2 K/UL (ref 0–1.3)
MONOCYTES NFR BLD: 9.3 %
NEUTROPHILS # BLD: 10 K/UL (ref 1.7–7.7)
NEUTROPHILS NFR BLD: 78 %
PLATELET # BLD AUTO: 210 K/UL (ref 135–450)
PMV BLD AUTO: 8.4 FL (ref 5–10.5)
POTASSIUM SERPL-SCNC: 4.4 MMOL/L (ref 3.5–5.1)
PROCALCITONIN SERPL IA-MCNC: 0.23 NG/ML (ref 0–0.15)
PROT SERPL-MCNC: 6.7 G/DL (ref 6.4–8.2)
RBC # BLD AUTO: 3.68 M/UL (ref 4–5.2)
SODIUM SERPL-SCNC: 132 MMOL/L (ref 136–145)
WBC # BLD AUTO: 12.8 K/UL (ref 4–11)

## 2024-09-05 PROCEDURE — 71046 X-RAY EXAM CHEST 2 VIEWS: CPT

## 2024-09-05 PROCEDURE — 6360000002 HC RX W HCPCS: Performed by: PHYSICIAN ASSISTANT

## 2024-09-05 PROCEDURE — 87636 SARSCOV2 & INF A&B AMP PRB: CPT

## 2024-09-05 PROCEDURE — 96361 HYDRATE IV INFUSION ADD-ON: CPT

## 2024-09-05 PROCEDURE — 96374 THER/PROPH/DIAG INJ IV PUSH: CPT

## 2024-09-05 PROCEDURE — 74176 CT ABD & PELVIS W/O CONTRAST: CPT

## 2024-09-05 PROCEDURE — 85025 COMPLETE CBC W/AUTO DIFF WBC: CPT

## 2024-09-05 PROCEDURE — 83605 ASSAY OF LACTIC ACID: CPT

## 2024-09-05 PROCEDURE — 99285 EMERGENCY DEPT VISIT HI MDM: CPT

## 2024-09-05 PROCEDURE — 84145 PROCALCITONIN (PCT): CPT

## 2024-09-05 PROCEDURE — 6370000000 HC RX 637 (ALT 250 FOR IP): Performed by: PHYSICIAN ASSISTANT

## 2024-09-05 PROCEDURE — 80053 COMPREHEN METABOLIC PANEL: CPT

## 2024-09-05 PROCEDURE — 36415 COLL VENOUS BLD VENIPUNCTURE: CPT

## 2024-09-05 PROCEDURE — 83690 ASSAY OF LIPASE: CPT

## 2024-09-05 PROCEDURE — 2580000003 HC RX 258: Performed by: PHYSICIAN ASSISTANT

## 2024-09-05 RX ORDER — 0.9 % SODIUM CHLORIDE 0.9 %
1000 INTRAVENOUS SOLUTION INTRAVENOUS ONCE
Status: COMPLETED | OUTPATIENT
Start: 2024-09-05 | End: 2024-09-06

## 2024-09-05 RX ORDER — ACETAMINOPHEN 500 MG
1000 TABLET ORAL ONCE
Status: COMPLETED | OUTPATIENT
Start: 2024-09-05 | End: 2024-09-05

## 2024-09-05 RX ORDER — 0.9 % SODIUM CHLORIDE 0.9 %
500 INTRAVENOUS SOLUTION INTRAVENOUS ONCE
Status: DISCONTINUED | OUTPATIENT
Start: 2024-09-05 | End: 2024-09-05

## 2024-09-05 RX ORDER — ONDANSETRON 2 MG/ML
4 INJECTION INTRAMUSCULAR; INTRAVENOUS EVERY 6 HOURS PRN
Status: DISCONTINUED | OUTPATIENT
Start: 2024-09-05 | End: 2024-09-06 | Stop reason: HOSPADM

## 2024-09-05 RX ADMIN — ACETAMINOPHEN 1000 MG: 500 TABLET ORAL at 23:23

## 2024-09-05 RX ADMIN — ONDANSETRON 4 MG: 2 INJECTION, SOLUTION INTRAMUSCULAR; INTRAVENOUS at 23:23

## 2024-09-05 RX ADMIN — SODIUM CHLORIDE 1000 ML: 9 INJECTION, SOLUTION INTRAVENOUS at 23:26

## 2024-09-05 ASSESSMENT — PAIN SCALES - GENERAL
PAINLEVEL_OUTOF10: 10
PAINLEVEL_OUTOF10: 7

## 2024-09-05 ASSESSMENT — PAIN - FUNCTIONAL ASSESSMENT: PAIN_FUNCTIONAL_ASSESSMENT: 0-10

## 2024-09-05 ASSESSMENT — PAIN DESCRIPTION - LOCATION: LOCATION: HEAD

## 2024-09-06 PROBLEM — N39.0 UTI (URINARY TRACT INFECTION): Status: ACTIVE | Noted: 2024-09-06

## 2024-09-06 LAB
ALBUMIN SERPL-MCNC: 3.5 G/DL (ref 3.4–5)
ALBUMIN/GLOB SERPL: 1.3 {RATIO} (ref 1.1–2.2)
ALP SERPL-CCNC: 138 U/L (ref 40–129)
ALT SERPL-CCNC: 11 U/L (ref 10–40)
AMORPHOUS: PRESENT
ANION GAP SERPL CALCULATED.3IONS-SCNC: 14 MMOL/L (ref 3–16)
AST SERPL-CCNC: 14 U/L (ref 15–37)
BACTERIA URNS QL MICRO: ABNORMAL /HPF
BILIRUB SERPL-MCNC: 0.5 MG/DL (ref 0–1)
BILIRUB UR QL STRIP.AUTO: NEGATIVE
BUN SERPL-MCNC: 12 MG/DL (ref 7–20)
CALCIUM SERPL-MCNC: 8.4 MG/DL (ref 8.3–10.6)
CHLORIDE SERPL-SCNC: 104 MMOL/L (ref 99–110)
CLARITY UR: ABNORMAL
CO2 SERPL-SCNC: 19 MMOL/L (ref 21–32)
COLOR UR: YELLOW
CREAT SERPL-MCNC: 0.8 MG/DL (ref 0.6–1.2)
EPI CELLS #/AREA URNS AUTO: 1 /HPF (ref 0–5)
FLUAV RNA RESP QL NAA+PROBE: NOT DETECTED
FLUBV RNA RESP QL NAA+PROBE: NOT DETECTED
GFR SERPLBLD CREATININE-BSD FMLA CKD-EPI: 78 ML/MIN/{1.73_M2}
GLUCOSE BLD-MCNC: 119 MG/DL (ref 70–99)
GLUCOSE SERPL-MCNC: 140 MG/DL (ref 70–99)
GLUCOSE UR STRIP.AUTO-MCNC: >=1000 MG/DL
HGB UR QL STRIP.AUTO: NEGATIVE
HYALINE CASTS #/AREA URNS AUTO: 0 /LPF (ref 0–8)
KETONES UR STRIP.AUTO-MCNC: NEGATIVE MG/DL
LEUKOCYTE ESTERASE UR QL STRIP.AUTO: ABNORMAL
NITRITE UR QL STRIP.AUTO: POSITIVE
PERFORMED ON: ABNORMAL
PH UR STRIP.AUTO: 5.5 [PH] (ref 5–8)
POTASSIUM SERPL-SCNC: 4 MMOL/L (ref 3.5–5.1)
PROT SERPL-MCNC: 6.1 G/DL (ref 6.4–8.2)
PROT UR STRIP.AUTO-MCNC: 30 MG/DL
RBC CLUMPS #/AREA URNS AUTO: 0 /HPF (ref 0–4)
SARS-COV-2 RNA RESP QL NAA+PROBE: NOT DETECTED
SODIUM SERPL-SCNC: 137 MMOL/L (ref 136–145)
SP GR UR STRIP.AUTO: 1.01 (ref 1–1.03)
UA COMPLETE W REFLEX CULTURE PNL UR: YES
UA DIPSTICK W REFLEX MICRO PNL UR: YES
URN SPEC COLLECT METH UR: ABNORMAL
UROBILINOGEN UR STRIP-ACNC: 0.2 E.U./DL
WBC #/AREA URNS AUTO: 88 /HPF (ref 0–5)

## 2024-09-06 PROCEDURE — 6360000002 HC RX W HCPCS: Performed by: STUDENT IN AN ORGANIZED HEALTH CARE EDUCATION/TRAINING PROGRAM

## 2024-09-06 PROCEDURE — 2580000003 HC RX 258: Performed by: STUDENT IN AN ORGANIZED HEALTH CARE EDUCATION/TRAINING PROGRAM

## 2024-09-06 PROCEDURE — 6370000000 HC RX 637 (ALT 250 FOR IP): Performed by: STUDENT IN AN ORGANIZED HEALTH CARE EDUCATION/TRAINING PROGRAM

## 2024-09-06 PROCEDURE — 81001 URINALYSIS AUTO W/SCOPE: CPT

## 2024-09-06 PROCEDURE — 87086 URINE CULTURE/COLONY COUNT: CPT

## 2024-09-06 PROCEDURE — 51798 US URINE CAPACITY MEASURE: CPT

## 2024-09-06 PROCEDURE — 1200000000 HC SEMI PRIVATE

## 2024-09-06 PROCEDURE — 80053 COMPREHEN METABOLIC PANEL: CPT

## 2024-09-06 PROCEDURE — 96375 TX/PRO/DX INJ NEW DRUG ADDON: CPT

## 2024-09-06 PROCEDURE — 87186 SC STD MICRODIL/AGAR DIL: CPT

## 2024-09-06 PROCEDURE — 87077 CULTURE AEROBIC IDENTIFY: CPT

## 2024-09-06 RX ORDER — GABAPENTIN 400 MG/1
400 CAPSULE ORAL 3 TIMES DAILY
Status: DISCONTINUED | OUTPATIENT
Start: 2024-09-06 | End: 2024-09-08 | Stop reason: HOSPADM

## 2024-09-06 RX ORDER — SODIUM CHLORIDE 9 MG/ML
INJECTION, SOLUTION INTRAVENOUS CONTINUOUS
Status: ACTIVE | OUTPATIENT
Start: 2024-09-06 | End: 2024-09-08

## 2024-09-06 RX ORDER — ACETAMINOPHEN 650 MG/1
650 SUPPOSITORY RECTAL EVERY 6 HOURS PRN
Status: DISCONTINUED | OUTPATIENT
Start: 2024-09-06 | End: 2024-09-08 | Stop reason: HOSPADM

## 2024-09-06 RX ORDER — ROSUVASTATIN CALCIUM 20 MG/1
20 TABLET, COATED ORAL EVERY EVENING
Status: DISCONTINUED | OUTPATIENT
Start: 2024-09-06 | End: 2024-09-08 | Stop reason: HOSPADM

## 2024-09-06 RX ORDER — EZETIMIBE 10 MG/1
5 TABLET ORAL NIGHTLY
Status: DISCONTINUED | OUTPATIENT
Start: 2024-09-06 | End: 2024-09-06 | Stop reason: RX

## 2024-09-06 RX ORDER — SODIUM CHLORIDE 0.9 % (FLUSH) 0.9 %
5-40 SYRINGE (ML) INJECTION EVERY 12 HOURS SCHEDULED
Status: DISCONTINUED | OUTPATIENT
Start: 2024-09-06 | End: 2024-09-08 | Stop reason: HOSPADM

## 2024-09-06 RX ORDER — FEXOFENADINE HCL 180 MG/1
180 TABLET ORAL DAILY
COMMUNITY

## 2024-09-06 RX ORDER — SODIUM CHLORIDE 0.9 % (FLUSH) 0.9 %
5-40 SYRINGE (ML) INJECTION PRN
Status: DISCONTINUED | OUTPATIENT
Start: 2024-09-06 | End: 2024-09-08 | Stop reason: HOSPADM

## 2024-09-06 RX ORDER — FLUTICASONE PROPIONATE 50 MCG
2 SPRAY, SUSPENSION (ML) NASAL DAILY
Status: DISCONTINUED | OUTPATIENT
Start: 2024-09-06 | End: 2024-09-08 | Stop reason: HOSPADM

## 2024-09-06 RX ORDER — POTASSIUM CHLORIDE 7.45 MG/ML
10 INJECTION INTRAVENOUS PRN
Status: DISCONTINUED | OUTPATIENT
Start: 2024-09-06 | End: 2024-09-08 | Stop reason: HOSPADM

## 2024-09-06 RX ORDER — ACETAMINOPHEN 325 MG/1
650 TABLET ORAL EVERY 6 HOURS PRN
Status: DISCONTINUED | OUTPATIENT
Start: 2024-09-06 | End: 2024-09-08 | Stop reason: HOSPADM

## 2024-09-06 RX ORDER — TRAZODONE HYDROCHLORIDE 50 MG/1
50 TABLET, FILM COATED ORAL NIGHTLY PRN
Status: DISCONTINUED | OUTPATIENT
Start: 2024-09-06 | End: 2024-09-08 | Stop reason: HOSPADM

## 2024-09-06 RX ORDER — CLOPIDOGREL BISULFATE 75 MG/1
75 TABLET ORAL DAILY
Status: DISCONTINUED | OUTPATIENT
Start: 2024-09-06 | End: 2024-09-08 | Stop reason: HOSPADM

## 2024-09-06 RX ORDER — MAGNESIUM SULFATE IN WATER 40 MG/ML
2000 INJECTION, SOLUTION INTRAVENOUS PRN
Status: DISCONTINUED | OUTPATIENT
Start: 2024-09-06 | End: 2024-09-08 | Stop reason: HOSPADM

## 2024-09-06 RX ORDER — POTASSIUM CHLORIDE 1500 MG/1
40 TABLET, EXTENDED RELEASE ORAL PRN
Status: DISCONTINUED | OUTPATIENT
Start: 2024-09-06 | End: 2024-09-08 | Stop reason: HOSPADM

## 2024-09-06 RX ORDER — ESCITALOPRAM OXALATE 10 MG/1
20 TABLET ORAL DAILY
Status: DISCONTINUED | OUTPATIENT
Start: 2024-09-06 | End: 2024-09-08 | Stop reason: HOSPADM

## 2024-09-06 RX ORDER — SODIUM CHLORIDE 9 MG/ML
INJECTION, SOLUTION INTRAVENOUS PRN
Status: DISCONTINUED | OUTPATIENT
Start: 2024-09-06 | End: 2024-09-08 | Stop reason: HOSPADM

## 2024-09-06 RX ORDER — ASPIRIN 81 MG/1
81 TABLET ORAL DAILY
Status: DISCONTINUED | OUTPATIENT
Start: 2024-09-06 | End: 2024-09-08 | Stop reason: HOSPADM

## 2024-09-06 RX ORDER — POLYETHYLENE GLYCOL 3350 17 G/17G
17 POWDER, FOR SOLUTION ORAL DAILY PRN
Status: DISCONTINUED | OUTPATIENT
Start: 2024-09-06 | End: 2024-09-08 | Stop reason: HOSPADM

## 2024-09-06 RX ADMIN — WATER 1000 MG: 1 INJECTION INTRAMUSCULAR; INTRAVENOUS; SUBCUTANEOUS at 23:24

## 2024-09-06 RX ADMIN — WATER 1000 MG: 1 INJECTION INTRAMUSCULAR; INTRAVENOUS; SUBCUTANEOUS at 00:52

## 2024-09-06 RX ADMIN — CLOPIDOGREL BISULFATE 75 MG: 75 TABLET ORAL at 12:05

## 2024-09-06 RX ADMIN — FLUTICASONE PROPIONATE 2 SPRAY: 50 SPRAY, METERED NASAL at 12:06

## 2024-09-06 RX ADMIN — ROSUVASTATIN 20 MG: 20 TABLET, FILM COATED ORAL at 17:30

## 2024-09-06 RX ADMIN — GABAPENTIN 400 MG: 400 CAPSULE ORAL at 21:13

## 2024-09-06 RX ADMIN — SODIUM CHLORIDE: 9 INJECTION, SOLUTION INTRAVENOUS at 03:13

## 2024-09-06 RX ADMIN — ESCITALOPRAM OXALATE 20 MG: 10 TABLET ORAL at 12:05

## 2024-09-06 RX ADMIN — TRAZODONE HYDROCHLORIDE 50 MG: 50 TABLET ORAL at 23:23

## 2024-09-06 RX ADMIN — GABAPENTIN 400 MG: 400 CAPSULE ORAL at 14:51

## 2024-09-06 RX ADMIN — EMPAGLIFLOZIN 10 MG: 10 TABLET, FILM COATED ORAL at 12:05

## 2024-09-06 RX ADMIN — ASPIRIN 81 MG: 81 TABLET, COATED ORAL at 12:05

## 2024-09-06 RX ADMIN — SODIUM CHLORIDE, PRESERVATIVE FREE 10 ML: 5 INJECTION INTRAVENOUS at 23:24

## 2024-09-07 LAB
ALBUMIN SERPL-MCNC: 3.5 G/DL (ref 3.4–5)
ALBUMIN/GLOB SERPL: 1.3 {RATIO} (ref 1.1–2.2)
ALP SERPL-CCNC: 128 U/L (ref 40–129)
ALT SERPL-CCNC: 10 U/L (ref 10–40)
ANION GAP SERPL CALCULATED.3IONS-SCNC: 13 MMOL/L (ref 3–16)
AST SERPL-CCNC: 16 U/L (ref 15–37)
BASOPHILS # BLD: 0 K/UL (ref 0–0.2)
BASOPHILS NFR BLD: 0.3 %
BILIRUB SERPL-MCNC: 0.4 MG/DL (ref 0–1)
BUN SERPL-MCNC: 14 MG/DL (ref 7–20)
CALCIUM SERPL-MCNC: 8.6 MG/DL (ref 8.3–10.6)
CHLORIDE SERPL-SCNC: 106 MMOL/L (ref 99–110)
CO2 SERPL-SCNC: 19 MMOL/L (ref 21–32)
CREAT SERPL-MCNC: 0.7 MG/DL (ref 0.6–1.2)
DEPRECATED RDW RBC AUTO: 17.5 % (ref 12.4–15.4)
EOSINOPHIL # BLD: 0 K/UL (ref 0–0.6)
EOSINOPHIL NFR BLD: 0.5 %
GFR SERPLBLD CREATININE-BSD FMLA CKD-EPI: >90 ML/MIN/{1.73_M2}
GLUCOSE BLD-MCNC: 120 MG/DL (ref 70–99)
GLUCOSE BLD-MCNC: 147 MG/DL (ref 70–99)
GLUCOSE BLD-MCNC: 148 MG/DL (ref 70–99)
GLUCOSE BLD-MCNC: 163 MG/DL (ref 70–99)
GLUCOSE SERPL-MCNC: 128 MG/DL (ref 70–99)
HCT VFR BLD AUTO: 30.6 % (ref 36–48)
HGB BLD-MCNC: 10.1 G/DL (ref 12–16)
LYMPHOCYTES # BLD: 1.6 K/UL (ref 1–5.1)
LYMPHOCYTES NFR BLD: 22.3 %
MCH RBC QN AUTO: 30.7 PG (ref 26–34)
MCHC RBC AUTO-ENTMCNC: 33.1 G/DL (ref 31–36)
MCV RBC AUTO: 92.7 FL (ref 80–100)
MONOCYTES # BLD: 1.1 K/UL (ref 0–1.3)
MONOCYTES NFR BLD: 14.8 %
NEUTROPHILS # BLD: 4.6 K/UL (ref 1.7–7.7)
NEUTROPHILS NFR BLD: 62.1 %
PERFORMED ON: ABNORMAL
PLATELET # BLD AUTO: 180 K/UL (ref 135–450)
PMV BLD AUTO: 7.9 FL (ref 5–10.5)
POTASSIUM SERPL-SCNC: 4.2 MMOL/L (ref 3.5–5.1)
PROT SERPL-MCNC: 6.2 G/DL (ref 6.4–8.2)
RBC # BLD AUTO: 3.3 M/UL (ref 4–5.2)
SODIUM SERPL-SCNC: 138 MMOL/L (ref 136–145)
WBC # BLD AUTO: 7.3 K/UL (ref 4–11)

## 2024-09-07 PROCEDURE — 2580000003 HC RX 258: Performed by: STUDENT IN AN ORGANIZED HEALTH CARE EDUCATION/TRAINING PROGRAM

## 2024-09-07 PROCEDURE — 1200000000 HC SEMI PRIVATE

## 2024-09-07 PROCEDURE — 80053 COMPREHEN METABOLIC PANEL: CPT

## 2024-09-07 PROCEDURE — 85025 COMPLETE CBC W/AUTO DIFF WBC: CPT

## 2024-09-07 PROCEDURE — 6370000000 HC RX 637 (ALT 250 FOR IP): Performed by: STUDENT IN AN ORGANIZED HEALTH CARE EDUCATION/TRAINING PROGRAM

## 2024-09-07 RX ADMIN — GABAPENTIN 400 MG: 400 CAPSULE ORAL at 22:14

## 2024-09-07 RX ADMIN — ESCITALOPRAM OXALATE 20 MG: 10 TABLET ORAL at 10:30

## 2024-09-07 RX ADMIN — ROSUVASTATIN 20 MG: 20 TABLET, FILM COATED ORAL at 18:34

## 2024-09-07 RX ADMIN — SODIUM CHLORIDE, PRESERVATIVE FREE 10 ML: 5 INJECTION INTRAVENOUS at 22:17

## 2024-09-07 RX ADMIN — EMPAGLIFLOZIN 10 MG: 10 TABLET, FILM COATED ORAL at 10:31

## 2024-09-07 RX ADMIN — GABAPENTIN 400 MG: 400 CAPSULE ORAL at 10:30

## 2024-09-07 RX ADMIN — ASPIRIN 81 MG: 81 TABLET, COATED ORAL at 10:31

## 2024-09-07 RX ADMIN — SODIUM CHLORIDE, PRESERVATIVE FREE 10 ML: 5 INJECTION INTRAVENOUS at 09:00

## 2024-09-07 RX ADMIN — GABAPENTIN 400 MG: 400 CAPSULE ORAL at 14:04

## 2024-09-07 RX ADMIN — FLUTICASONE PROPIONATE 2 SPRAY: 50 SPRAY, METERED NASAL at 10:38

## 2024-09-07 RX ADMIN — TRAZODONE HYDROCHLORIDE 50 MG: 50 TABLET ORAL at 22:14

## 2024-09-07 RX ADMIN — CLOPIDOGREL BISULFATE 75 MG: 75 TABLET ORAL at 10:31

## 2024-09-07 ASSESSMENT — PAIN SCALES - GENERAL: PAINLEVEL_OUTOF10: 0

## 2024-09-08 VITALS
RESPIRATION RATE: 18 BRPM | HEART RATE: 60 BPM | TEMPERATURE: 98 F | BODY MASS INDEX: 39.92 KG/M2 | WEIGHT: 198 LBS | HEIGHT: 59 IN | SYSTOLIC BLOOD PRESSURE: 152 MMHG | OXYGEN SATURATION: 96 % | DIASTOLIC BLOOD PRESSURE: 79 MMHG

## 2024-09-08 LAB
ALBUMIN SERPL-MCNC: 3.4 G/DL (ref 3.4–5)
ALBUMIN/GLOB SERPL: 1.2 {RATIO} (ref 1.1–2.2)
ALP SERPL-CCNC: 120 U/L (ref 40–129)
ALT SERPL-CCNC: 10 U/L (ref 10–40)
ANION GAP SERPL CALCULATED.3IONS-SCNC: 13 MMOL/L (ref 3–16)
AST SERPL-CCNC: 15 U/L (ref 15–37)
BACTERIA UR CULT: ABNORMAL
BASOPHILS # BLD: 0 K/UL (ref 0–0.2)
BASOPHILS NFR BLD: 0.2 %
BILIRUB SERPL-MCNC: 0.5 MG/DL (ref 0–1)
BUN SERPL-MCNC: 13 MG/DL (ref 7–20)
CALCIUM SERPL-MCNC: 8.6 MG/DL (ref 8.3–10.6)
CHLORIDE SERPL-SCNC: 108 MMOL/L (ref 99–110)
CO2 SERPL-SCNC: 21 MMOL/L (ref 21–32)
CREAT SERPL-MCNC: 0.7 MG/DL (ref 0.6–1.2)
DEPRECATED RDW RBC AUTO: 17.1 % (ref 12.4–15.4)
EOSINOPHIL # BLD: 0.1 K/UL (ref 0–0.6)
EOSINOPHIL NFR BLD: 0.8 %
GFR SERPLBLD CREATININE-BSD FMLA CKD-EPI: >90 ML/MIN/{1.73_M2}
GLUCOSE BLD-MCNC: 145 MG/DL (ref 70–99)
GLUCOSE SERPL-MCNC: 133 MG/DL (ref 70–99)
HCT VFR BLD AUTO: 29.1 % (ref 36–48)
HGB BLD-MCNC: 9.5 G/DL (ref 12–16)
LYMPHOCYTES # BLD: 1.4 K/UL (ref 1–5.1)
LYMPHOCYTES NFR BLD: 18.4 %
MCH RBC QN AUTO: 29.9 PG (ref 26–34)
MCHC RBC AUTO-ENTMCNC: 32.7 G/DL (ref 31–36)
MCV RBC AUTO: 91.5 FL (ref 80–100)
MONOCYTES # BLD: 1 K/UL (ref 0–1.3)
MONOCYTES NFR BLD: 13.7 %
NEUTROPHILS # BLD: 5 K/UL (ref 1.7–7.7)
NEUTROPHILS NFR BLD: 66.9 %
ORGANISM: ABNORMAL
PERFORMED ON: ABNORMAL
PLATELET # BLD AUTO: 173 K/UL (ref 135–450)
PMV BLD AUTO: 7.8 FL (ref 5–10.5)
POTASSIUM SERPL-SCNC: 4.1 MMOL/L (ref 3.5–5.1)
PROT SERPL-MCNC: 6.3 G/DL (ref 6.4–8.2)
RBC # BLD AUTO: 3.18 M/UL (ref 4–5.2)
SODIUM SERPL-SCNC: 142 MMOL/L (ref 136–145)
WBC # BLD AUTO: 7.5 K/UL (ref 4–11)

## 2024-09-08 PROCEDURE — 2580000003 HC RX 258: Performed by: STUDENT IN AN ORGANIZED HEALTH CARE EDUCATION/TRAINING PROGRAM

## 2024-09-08 PROCEDURE — 80053 COMPREHEN METABOLIC PANEL: CPT

## 2024-09-08 PROCEDURE — 36415 COLL VENOUS BLD VENIPUNCTURE: CPT

## 2024-09-08 PROCEDURE — 85025 COMPLETE CBC W/AUTO DIFF WBC: CPT

## 2024-09-08 PROCEDURE — 97165 OT EVAL LOW COMPLEX 30 MIN: CPT

## 2024-09-08 PROCEDURE — 97161 PT EVAL LOW COMPLEX 20 MIN: CPT

## 2024-09-08 PROCEDURE — 6360000002 HC RX W HCPCS: Performed by: STUDENT IN AN ORGANIZED HEALTH CARE EDUCATION/TRAINING PROGRAM

## 2024-09-08 PROCEDURE — 6370000000 HC RX 637 (ALT 250 FOR IP): Performed by: PHYSICIAN ASSISTANT

## 2024-09-08 PROCEDURE — 83036 HEMOGLOBIN GLYCOSYLATED A1C: CPT

## 2024-09-08 PROCEDURE — 6370000000 HC RX 637 (ALT 250 FOR IP): Performed by: STUDENT IN AN ORGANIZED HEALTH CARE EDUCATION/TRAINING PROGRAM

## 2024-09-08 RX ORDER — METOPROLOL TARTRATE 25 MG/1
25 TABLET, FILM COATED ORAL 2 TIMES DAILY
Status: DISCONTINUED | OUTPATIENT
Start: 2024-09-08 | End: 2024-09-08 | Stop reason: HOSPADM

## 2024-09-08 RX ORDER — FLUCONAZOLE 100 MG/1
150 TABLET ORAL ONCE
Status: COMPLETED | OUTPATIENT
Start: 2024-09-08 | End: 2024-09-08

## 2024-09-08 RX ORDER — CEFDINIR 300 MG/1
300 CAPSULE ORAL 2 TIMES DAILY
Qty: 6 CAPSULE | Refills: 0 | Status: SHIPPED | OUTPATIENT
Start: 2024-09-08 | End: 2024-09-10 | Stop reason: ALTCHOICE

## 2024-09-08 RX ORDER — ISOSORBIDE MONONITRATE 30 MG/1
60 TABLET, EXTENDED RELEASE ORAL DAILY
Status: DISCONTINUED | OUTPATIENT
Start: 2024-09-08 | End: 2024-09-08 | Stop reason: HOSPADM

## 2024-09-08 RX ORDER — FLUCONAZOLE 150 MG/1
150 TABLET ORAL ONCE
Qty: 1 TABLET | Refills: 0 | Status: SHIPPED | OUTPATIENT
Start: 2024-09-11 | End: 2024-09-10 | Stop reason: ALTCHOICE

## 2024-09-08 RX ORDER — FUROSEMIDE 40 MG
40 TABLET ORAL DAILY
Status: DISCONTINUED | OUTPATIENT
Start: 2024-09-08 | End: 2024-09-08 | Stop reason: HOSPADM

## 2024-09-08 RX ADMIN — WATER 1000 MG: 1 INJECTION INTRAMUSCULAR; INTRAVENOUS; SUBCUTANEOUS at 01:24

## 2024-09-08 RX ADMIN — SODIUM CHLORIDE, PRESERVATIVE FREE 10 ML: 5 INJECTION INTRAVENOUS at 08:15

## 2024-09-08 RX ADMIN — GABAPENTIN 400 MG: 400 CAPSULE ORAL at 08:14

## 2024-09-08 RX ADMIN — FLUTICASONE PROPIONATE 2 SPRAY: 50 SPRAY, METERED NASAL at 08:15

## 2024-09-08 RX ADMIN — FLUCONAZOLE 150 MG: 100 TABLET ORAL at 10:22

## 2024-09-08 RX ADMIN — SODIUM CHLORIDE, PRESERVATIVE FREE 10 ML: 5 INJECTION INTRAVENOUS at 01:25

## 2024-09-08 RX ADMIN — FUROSEMIDE 40 MG: 40 TABLET ORAL at 10:22

## 2024-09-08 RX ADMIN — ISOSORBIDE MONONITRATE 60 MG: 30 TABLET, EXTENDED RELEASE ORAL at 10:22

## 2024-09-08 RX ADMIN — EMPAGLIFLOZIN 10 MG: 10 TABLET, FILM COATED ORAL at 08:15

## 2024-09-08 RX ADMIN — METOPROLOL TARTRATE 25 MG: 25 TABLET, FILM COATED ORAL at 10:23

## 2024-09-08 RX ADMIN — CLOPIDOGREL BISULFATE 75 MG: 75 TABLET ORAL at 08:15

## 2024-09-08 RX ADMIN — ESCITALOPRAM OXALATE 20 MG: 10 TABLET ORAL at 08:14

## 2024-09-08 RX ADMIN — ASPIRIN 81 MG: 81 TABLET, COATED ORAL at 08:15

## 2024-09-08 ASSESSMENT — PAIN SCALES - GENERAL
PAINLEVEL_OUTOF10: 0
PAINLEVEL_OUTOF10: 0

## 2024-09-09 ENCOUNTER — CARE COORDINATION (OUTPATIENT)
Dept: CASE MANAGEMENT | Age: 74
End: 2024-09-09

## 2024-09-09 LAB
EST. AVERAGE GLUCOSE BLD GHB EST-MCNC: 145.6 MG/DL
HBA1C MFR BLD: 6.7 %

## 2024-09-10 ENCOUNTER — CARE COORDINATION (OUTPATIENT)
Dept: CASE MANAGEMENT | Age: 74
End: 2024-09-10

## 2024-09-10 ENCOUNTER — OFFICE VISIT (OUTPATIENT)
Dept: INTERNAL MEDICINE CLINIC | Age: 74
End: 2024-09-10

## 2024-09-10 VITALS
WEIGHT: 201.4 LBS | BODY MASS INDEX: 40.68 KG/M2 | TEMPERATURE: 98.3 F | OXYGEN SATURATION: 96 % | SYSTOLIC BLOOD PRESSURE: 142 MMHG | DIASTOLIC BLOOD PRESSURE: 62 MMHG | HEART RATE: 60 BPM

## 2024-09-10 DIAGNOSIS — R19.7 ACUTE DIARRHEA: ICD-10-CM

## 2024-09-10 DIAGNOSIS — N39.0 RECURRENT UTI: Primary | ICD-10-CM

## 2024-09-10 DIAGNOSIS — E66.9 TYPE 2 DIABETES MELLITUS WITH OBESITY (HCC): ICD-10-CM

## 2024-09-10 DIAGNOSIS — Z09 HOSPITAL DISCHARGE FOLLOW-UP: ICD-10-CM

## 2024-09-10 DIAGNOSIS — E11.69 TYPE 2 DIABETES MELLITUS WITH OBESITY (HCC): ICD-10-CM

## 2024-09-16 ENCOUNTER — TELEPHONE (OUTPATIENT)
Dept: INTERNAL MEDICINE CLINIC | Age: 74
End: 2024-09-16

## 2024-09-16 NOTE — TELEPHONE ENCOUNTER
Patient called back and stated that she has been checking her BP 3 times a day and BP is running 150/80s. Patient states that her legs are swollen still and asked to seen sooner than what was rescheduled. Patient is scheduled for 9/18.

## 2024-09-16 NOTE — TELEPHONE ENCOUNTER
Patient came in for her appt. But she was 20 min late they said to reschedule the appt.! Her appt is reschedule! Patient stated she wanted to leave a message for the doctor. She stated that her ankles are really swollen and her BP is really high and wants to know what's going on.

## 2024-09-17 ENCOUNTER — CARE COORDINATION (OUTPATIENT)
Dept: CARE COORDINATION | Age: 74
End: 2024-09-17

## 2024-09-18 ENCOUNTER — OFFICE VISIT (OUTPATIENT)
Dept: INTERNAL MEDICINE CLINIC | Age: 74
End: 2024-09-18

## 2024-09-18 VITALS
OXYGEN SATURATION: 97 % | SYSTOLIC BLOOD PRESSURE: 138 MMHG | DIASTOLIC BLOOD PRESSURE: 60 MMHG | HEIGHT: 59 IN | HEART RATE: 60 BPM | BODY MASS INDEX: 40.68 KG/M2 | WEIGHT: 201.8 LBS

## 2024-09-18 DIAGNOSIS — I48.0 PAF (PAROXYSMAL ATRIAL FIBRILLATION) (HCC): ICD-10-CM

## 2024-09-18 DIAGNOSIS — I25.119 CORONARY ARTERY DISEASE INVOLVING NATIVE CORONARY ARTERY OF NATIVE HEART WITH ANGINA PECTORIS (HCC): ICD-10-CM

## 2024-09-18 DIAGNOSIS — D46.9 MDS (MYELODYSPLASTIC SYNDROME) (HCC): ICD-10-CM

## 2024-09-18 DIAGNOSIS — N18.31 STAGE 3A CHRONIC KIDNEY DISEASE (HCC): ICD-10-CM

## 2024-09-18 DIAGNOSIS — I10 ESSENTIAL HYPERTENSION: ICD-10-CM

## 2024-09-18 DIAGNOSIS — D64.9 ANEMIA, UNSPECIFIED TYPE: ICD-10-CM

## 2024-09-18 DIAGNOSIS — E66.01 SEVERE OBESITY (BMI 35.0-39.9) WITH COMORBIDITY (HCC): ICD-10-CM

## 2024-09-18 DIAGNOSIS — E66.9 TYPE 2 DIABETES MELLITUS WITH OBESITY (HCC): ICD-10-CM

## 2024-09-18 DIAGNOSIS — G47.33 OSA (OBSTRUCTIVE SLEEP APNEA): ICD-10-CM

## 2024-09-18 DIAGNOSIS — Z23 NEED FOR INFLUENZA VACCINATION: Primary | ICD-10-CM

## 2024-09-18 DIAGNOSIS — E11.69 TYPE 2 DIABETES MELLITUS WITH OBESITY (HCC): ICD-10-CM

## 2024-09-18 PROBLEM — R35.0 URINARY FREQUENCY: Status: RESOLVED | Noted: 2024-04-09 | Resolved: 2024-09-18

## 2024-09-18 PROBLEM — J18.9 PNEUMONIA: Status: RESOLVED | Noted: 2021-12-25 | Resolved: 2024-09-18

## 2024-09-18 PROBLEM — M89.8X1 SHOULDER BLADE PAIN: Status: RESOLVED | Noted: 2023-09-22 | Resolved: 2024-09-18

## 2024-09-18 PROBLEM — N89.8 VAGINAL ITCHING: Status: RESOLVED | Noted: 2024-04-09 | Resolved: 2024-09-18

## 2024-09-18 PROBLEM — N39.0 UTI (URINARY TRACT INFECTION): Status: RESOLVED | Noted: 2024-09-06 | Resolved: 2024-09-18

## 2024-09-18 PROBLEM — N89.8 VAGINAL DISCHARGE: Status: RESOLVED | Noted: 2024-04-09 | Resolved: 2024-09-18

## 2024-09-18 RX ORDER — MIRABEGRON 25 MG/1
25 TABLET, FILM COATED, EXTENDED RELEASE ORAL DAILY
COMMUNITY

## 2024-09-18 RX ORDER — LOSARTAN POTASSIUM 50 MG/1
50 TABLET ORAL DAILY
Qty: 90 TABLET | Refills: 3 | Status: SHIPPED | OUTPATIENT
Start: 2024-09-18

## 2024-09-18 RX ORDER — SULFAMETHOXAZOLE AND TRIMETHOPRIM 400; 80 MG/1; MG/1
0.5 TABLET ORAL DAILY
COMMUNITY

## 2024-09-19 ENCOUNTER — CARE COORDINATION (OUTPATIENT)
Dept: CARE COORDINATION | Age: 74
End: 2024-09-19

## 2024-09-26 ENCOUNTER — CARE COORDINATION (OUTPATIENT)
Dept: CARE COORDINATION | Age: 74
End: 2024-09-26

## 2024-10-02 ENCOUNTER — HOSPITAL ENCOUNTER (OUTPATIENT)
Dept: GENERAL RADIOLOGY | Age: 74
Discharge: HOME | End: 2024-10-02
Payer: MEDICARE

## 2024-10-02 ENCOUNTER — HOSPITAL ENCOUNTER (OUTPATIENT)
Age: 74
Discharge: HOME | End: 2024-10-02
Payer: MEDICARE

## 2024-10-02 ENCOUNTER — OFFICE VISIT (OUTPATIENT)
Dept: INTERNAL MEDICINE CLINIC | Age: 74
End: 2024-10-02

## 2024-10-02 VITALS
BODY MASS INDEX: 40.52 KG/M2 | DIASTOLIC BLOOD PRESSURE: 64 MMHG | HEART RATE: 61 BPM | SYSTOLIC BLOOD PRESSURE: 138 MMHG | OXYGEN SATURATION: 98 % | HEIGHT: 59 IN | WEIGHT: 201 LBS

## 2024-10-02 DIAGNOSIS — I10 ESSENTIAL HYPERTENSION: ICD-10-CM

## 2024-10-02 DIAGNOSIS — N18.31 STAGE 3A CHRONIC KIDNEY DISEASE (HCC): ICD-10-CM

## 2024-10-02 DIAGNOSIS — E66.9 TYPE 2 DIABETES MELLITUS WITH OBESITY (HCC): ICD-10-CM

## 2024-10-02 DIAGNOSIS — E11.69 TYPE 2 DIABETES MELLITUS WITH OBESITY (HCC): ICD-10-CM

## 2024-10-02 DIAGNOSIS — M25.572 ACUTE LEFT ANKLE PAIN: ICD-10-CM

## 2024-10-02 DIAGNOSIS — M25.572 ACUTE LEFT ANKLE PAIN: Primary | ICD-10-CM

## 2024-10-02 PROCEDURE — 73610 X-RAY EXAM OF ANKLE: CPT

## 2024-10-02 NOTE — ASSESSMENT & PLAN NOTE
chronic, controlled.  Continue metoprolol 25 mg daily, Lasix 40 mg daily and  losartan to 50 mg daily.  Monitor BP at home and return if not at goal.

## 2024-10-02 NOTE — PROGRESS NOTES
10/2/24     Chief Complaint   Patient presents with    Bleeding/Bruising     Bruising on inside left leg on ankle, popped up Monday morning and doesn't remember anything happening      HPI    Here for an acute visit today   Noticed bruising to left ankle on Monday  Denies any known injury, she was vacuuming / cleaning over the weekend.   She has an area of tenderness  Does not feel like it is improving since onset 2 days ago   She has propped it up and took tylenol which helps some during the day   Swelling and pain are worse at the end of the day   She denies any calf pain or redness. Denies any activity changes, surgery or travel.   She denies any personal history of DVT, or known family history.  Saw Dr. Solares on Friday and reports blood counts were pretty good. She is on plavix     Allergies   Allergen Reactions    Albuterol Palpitations and Other (See Comments)     Other reaction(s): Chest Pain    Niacin Er Itching and Rash    Heparin Other (See Comments)     Caused bruises and hematomas immediately when drip started.  MARKED BRUISING/HEMATOMAS    Farxiga [Dapagliflozin]      UTI and yeast infections    Moxifloxacin Rash    Tagamet [Cimetidine] Rash       Current Outpatient Medications   Medication Sig Dispense Refill    mirabegron (MYRBETRIQ) 25 MG TB24 Take 1 tablet by mouth daily      sulfamethoxazole-trimethoprim (BACTRIM;SEPTRA) 400-80 MG per tablet Take 0.5 tablets by mouth daily      losartan (COZAAR) 50 MG tablet Take 1 tablet by mouth daily 90 tablet 3    fexofenadine (ALLEGRA ALLERGY) 180 MG tablet Take 1 tablet by mouth daily      Lancets MISC 1 each by Does not apply route 2 times daily 200 each 1    fluticasone (FLONASE) 50 MCG/ACT nasal spray 2 sprays by Each Nostril route daily 48 g 2    escitalopram (LEXAPRO) 20 MG tablet Take 1 tablet by mouth daily 90 tablet 3    estradiol (ESTRACE) 0.1 MG/GM vaginal cream Place 1 g vaginally See Admin Instructions Twice a week (Patient taking differently:

## 2024-10-02 NOTE — ASSESSMENT & PLAN NOTE
Chronic, stable. Most recent A1c 6.7 during admission. Glucose is controlled at home.  Continue 15 mg actos BID.

## 2024-11-04 ENCOUNTER — HOSPITAL ENCOUNTER (OUTPATIENT)
Age: 74
Discharge: HOME | End: 2024-11-04
Payer: MEDICARE

## 2024-11-04 ENCOUNTER — OFFICE VISIT (OUTPATIENT)
Dept: INTERNAL MEDICINE CLINIC | Age: 74
End: 2024-11-04

## 2024-11-04 ENCOUNTER — TELEPHONE (OUTPATIENT)
Dept: INTERNAL MEDICINE CLINIC | Age: 74
End: 2024-11-04

## 2024-11-04 ENCOUNTER — HOSPITAL ENCOUNTER (OUTPATIENT)
Dept: GENERAL RADIOLOGY | Age: 74
Discharge: HOME | End: 2024-11-04
Payer: MEDICARE

## 2024-11-04 VITALS
BODY MASS INDEX: 40.72 KG/M2 | OXYGEN SATURATION: 98 % | HEIGHT: 59 IN | SYSTOLIC BLOOD PRESSURE: 139 MMHG | WEIGHT: 202 LBS | HEART RATE: 59 BPM | DIASTOLIC BLOOD PRESSURE: 82 MMHG

## 2024-11-04 DIAGNOSIS — M25.552 LEFT HIP PAIN: ICD-10-CM

## 2024-11-04 DIAGNOSIS — M25.552 LEFT HIP PAIN: Primary | ICD-10-CM

## 2024-11-04 PROCEDURE — 73502 X-RAY EXAM HIP UNI 2-3 VIEWS: CPT

## 2024-11-04 RX ORDER — BACLOFEN 10 MG/1
5 TABLET ORAL NIGHTLY PRN
Qty: 7 TABLET | Refills: 0 | Status: SHIPPED | OUTPATIENT
Start: 2024-11-04 | End: 2024-11-18

## 2024-11-04 ASSESSMENT — ENCOUNTER SYMPTOMS
TROUBLE SWALLOWING: 0
CONSTIPATION: 0
NAUSEA: 0
BLOOD IN STOOL: 0
VOMITING: 0
BACK PAIN: 0
SHORTNESS OF BREATH: 0
ABDOMINAL PAIN: 0
SORE THROAT: 0
CHEST TIGHTNESS: 0
DIARRHEA: 0
WHEEZING: 0
COUGH: 0

## 2024-11-04 NOTE — PROGRESS NOTES
Lexie Villa (:  1950) is a 74 y.o. female,Established patient, here for evaluation of the following chief complaint(s):  Hip Pain (Left hip pain x's 3 days.)      Assessment & Plan   ASSESSMENT/PLAN:  Assessment & Plan  Left hip pain  Patient has been having L hip pain since Saturday morning. She went to bathroom, work tried to sit up but started experiencing severe pain with difficulty standing and bearing weight. She tried to push up with her leg and feels that it made it worse. Denies any direct trauma or injury to the leg. Denies any locking/clicking or joint instability. Denies any focal weakness, numbness/paresthesias, bruising, joint swelling, rash, or back pain. Also denies any saddle anesthesia or urinary/bowel incontinence. She has been taking tylenol Q6H and heating pad and elevating the extremity without any significant relief    Patient's presentations and symptoms, including physical exam concerning for acute left hip injury/fracture/dislocation.  Have ordered stat left hip x-ray for further evaluation.  If any acute pathologies, will refer to orthopedics.  If negative for fracture or dislocation, will attempt to treat patient's pain with medication, physical therapy and exercises.  Patient given a prescription for baclofen low-dose, advised to stop only use as a last resort.  Avoid using during daytime or when performing activities.  Recommend using at nighttime for sleep and pain.    Patient was instructed to watch out for worsening symptoms including but not limited to fevers, chills, lightheadedness, headache, vision changes, shortness of breath, chest pain, nausea, vomiting, bleeding, syncope etc. If their clinical condition worsened, patient was instructed to either call the office or go straight to the ED.  Patient expressed understanding and agrees with the plan.      Orders:    baclofen (LIORESAL) 10 MG tablet; Take 0.5 tablets by mouth nightly as needed (muscle spasms)    XR

## 2024-11-05 NOTE — TELEPHONE ENCOUNTER
Pt called back and she was given referral info for [facility] and ortho referral .    Pt states she is already established at [facility] and will continue to go there. Someone did already contact her from the PT dept.     No call back needed to patient,.

## 2024-11-20 SDOH — HEALTH STABILITY: PHYSICAL HEALTH: ON AVERAGE, HOW MANY DAYS PER WEEK DO YOU ENGAGE IN MODERATE TO STRENUOUS EXERCISE (LIKE A BRISK WALK)?: 0 DAYS

## 2024-11-21 ENCOUNTER — OFFICE VISIT (OUTPATIENT)
Dept: ORTHOPEDIC SURGERY | Age: 74
End: 2024-11-21

## 2024-11-21 VITALS — HEIGHT: 59 IN | BODY MASS INDEX: 40.72 KG/M2 | WEIGHT: 202 LBS

## 2024-11-21 DIAGNOSIS — M54.32 SCIATICA, LEFT SIDE: Primary | ICD-10-CM

## 2024-11-21 RX ORDER — PREDNISONE 10 MG/1
TABLET ORAL
Qty: 26 TABLET | Refills: 0 | Status: SHIPPED | OUTPATIENT
Start: 2024-11-21

## 2024-11-21 NOTE — PROGRESS NOTES
quadruple by-pass, [facility]    COLONOSCOPY      COLONOSCOPY  10/08/2018    1 polyp removed    COLONOSCOPY N/A 10/08/2018    COLONOSCOPY POLYPECTOMY SNARE/COLD BIOPSY performed by Lincoln Mccain MD at [facility] ENDOSCOPY    COLONOSCOPY N/A 05/03/2022    COLONOSCOPY  ABLATION performed by Lincoln Mccain MD at [facility] ENDOSCOPY    CORONARY ARTERY BYPASS GRAFT  1999    quad    COSMETIC SURGERY      face lift    ENTEROSCOPY N/A 02/26/2019    ENTEROSCOPY performed by Lincoln Mccain MD at [facility] ENDOSCOPY    ESOPHAGEAL DILATATION      FRACTURE SURGERY      GASTRIC BAND  12/20/2011    hiatal hernia repair    GASTRIC BAND REMOVAL  11/03/2016    laparoscopic     HAMMER TOE SURGERY      HC INJECTION PROCEDURE FOR SACROILIAC JOINT Left 12/17/2018    SACROILIAC JOINT INJECTION ON THE LEFT WITH FLUOROSCOPY performed by Amanda Okeefe MD at [facility]    HERNIA REPAIR      JOINT REPLACEMENT  1995    both    JOINT REPLACEMENT  1995    Left and Right knees, [facility]    KNEE ARTHROSCOPY      1982 left    ORIF HUMERUS DECOMPRESSION Left 02/25/2016    OPEN REDUCTION INTERNAL FIXATION LEFT PROXIMAL HUMERUS    OVARY REMOVAL Bilateral 1997    PACEMAKER PLACEMENT  05/2019    ME NJX DX/THER SBST INTRLMNR CRV/THRC W/IMG GDN N/A 11/05/2018    MIDLINE L4/L5 LUMBAR INTERLAMINAR EPIDURAL STEROID INJECTION WITH FLUOROSCOPY performed by Amanda Okeefe MD at [facility]    UMBILICAL HERNIA REPAIR      UPPER GASTROINTESTINAL ENDOSCOPY      UPPER GASTROINTESTINAL ENDOSCOPY  10/30/2015    UPPER GASTROINTESTINAL ENDOSCOPY  10/14/2016    with biopsy    UPPER GASTROINTESTINAL ENDOSCOPY N/A 03/22/2022    EGD BIOPSY performed by Lincoln Mccain MD at [facility] ENDOSCOPY    UPPER GASTROINTESTINAL ENDOSCOPY N/A 03/22/2022    EGD DILATION BALLOON performed by Lincoln Mccain MD at [facility] ENDOSCOPY    UPPER GASTROINTESTINAL ENDOSCOPY N/A 05/03/2022    ENTEROSCOPY WITH INTERVENTION performed by Lincoln Mccain MD at [facility] ENDOSCOPY    VENTRAL HERNIA REPAIR N/A 10/29/2019

## 2024-11-25 ENCOUNTER — HOSPITAL ENCOUNTER (OUTPATIENT)
Dept: PHYSICAL THERAPY | Age: 74
Setting detail: THERAPIES SERIES
Discharge: HOME | End: 2024-11-25
Payer: MEDICARE

## 2024-11-25 DIAGNOSIS — R29.898 HIP WEAKNESS: Primary | ICD-10-CM

## 2024-11-25 DIAGNOSIS — M25.652 IMPAIRED RANGE OF MOTION OF LEFT HIP: ICD-10-CM

## 2024-11-25 DIAGNOSIS — M62.81 QUADRICEPS WEAKNESS: ICD-10-CM

## 2024-11-25 PROCEDURE — 97110 THERAPEUTIC EXERCISES: CPT

## 2024-11-25 PROCEDURE — 97161 PT EVAL LOW COMPLEX 20 MIN: CPT

## 2024-11-25 NOTE — PLAN OF CARE
[facility] - [facility] Therapy [address], [place] [address] office: [phone] fax: [phone]     Physical Therapy Initial Evaluation Certification      Dear Dr. Vinh Ames DO ,    We had the pleasure of evaluating the following patient for physical therapy services at [facility].  A summary of our findings can be found in the initial assessment below.  This includes our plan of care.  If you have any questions or concerns regarding these findings, please do not hesitate to contact me at the office phone number listed above.  Thank you for the referral.     Physician Signature:_______________________________Date:__________________  By signing above (or electronic signature), therapist’s plan is approved by physician       Physical Therapy: TREATMENT/PROGRESS NOTE   Patient: Lexie Villa (74 y.o. female)   Examination Date: 2024   :  1950 MRN: [id number]   Visit #:   Insurance Allowable Auth Needed   Med nec []Yes    []No    Insurance: Payor: MEDICARE / Plan: MEDICARE PART A AND B / Product Type: *No Product type* /   Insurance ID: [id number] - (Medicare)  Secondary Insurance (if applicable): [facility]   Treatment Diagnosis:     ICD-10-CM    1. Hip weakness  R29.898       2. Quadriceps weakness  M62.81       3. Impaired range of motion of left hip  M25.652          Medical Diagnosis:  Left hip pain [M25.552]   Referring Physician: Vinh Ames DO  PCP: Fabiola Bueno, APRN - CNP     Plan of care signed (Y/N):     Date of Patient follow up with Physician:      Plan of Care Report: EVAL today  POC update due: (10 visits /OR AUTH LIMITS, whichever is less)  2024                                             Medical History:  Comorbidities:  Cancer/Tumor  Diabetes (Type I or II)  Hypertension  Pacemaker  Osteoarthritis  Relevant Medical History: B TKA, previous lumbar ablation, myelodisplastic syndrome,

## 2024-12-03 ENCOUNTER — OFFICE VISIT (OUTPATIENT)
Dept: INTERNAL MEDICINE CLINIC | Age: 74
End: 2024-12-03

## 2024-12-03 VITALS
WEIGHT: 202 LBS | SYSTOLIC BLOOD PRESSURE: 130 MMHG | DIASTOLIC BLOOD PRESSURE: 82 MMHG | HEIGHT: 59 IN | BODY MASS INDEX: 40.72 KG/M2 | HEART RATE: 66 BPM | OXYGEN SATURATION: 98 %

## 2024-12-03 DIAGNOSIS — E87.1 HYPONATREMIA: ICD-10-CM

## 2024-12-03 DIAGNOSIS — E66.9 TYPE 2 DIABETES MELLITUS WITH OBESITY (HCC): ICD-10-CM

## 2024-12-03 DIAGNOSIS — N17.9 AKI (ACUTE KIDNEY INJURY) (HCC): ICD-10-CM

## 2024-12-03 DIAGNOSIS — E11.69 TYPE 2 DIABETES MELLITUS WITH OBESITY (HCC): ICD-10-CM

## 2024-12-03 DIAGNOSIS — Z09 HOSPITAL DISCHARGE FOLLOW-UP: ICD-10-CM

## 2024-12-03 DIAGNOSIS — E86.1 HYPOTENSION DUE TO HYPOVOLEMIA: ICD-10-CM

## 2024-12-03 DIAGNOSIS — N39.0 URINARY TRACT INFECTION WITHOUT HEMATURIA, SITE UNSPECIFIED: ICD-10-CM

## 2024-12-03 DIAGNOSIS — R55 SYNCOPE, UNSPECIFIED SYNCOPE TYPE: ICD-10-CM

## 2024-12-03 DIAGNOSIS — E86.0 DEHYDRATION: ICD-10-CM

## 2024-12-03 DIAGNOSIS — Z09 HOSPITAL DISCHARGE FOLLOW-UP: Primary | ICD-10-CM

## 2024-12-03 RX ORDER — CEFUROXIME AXETIL 500 MG/1
500 TABLET ORAL 2 TIMES DAILY
COMMUNITY
Start: 2024-11-29

## 2024-12-03 NOTE — PROGRESS NOTES
12/3/24     Chief Complaint   Patient presents with    Follow-up     Was seen at the ED at [facility] on 11/28 for dehydration and UTI.        HPI    Here for follow up from ER visit on 11/28.    Passed out on Thanksgiving and was taken to [facility] by ambulance.  Prior to passing out she had not eaten all day and fluid intake was minimal.  BP \"low\" in ambulance and pulse was \"weak\".  Was given IV fluids and 7 day course of Ceftin for UTI- still taking.  Feeling better, intermittent fatigue.  Denies lightheadedness or dizziness.     She reports feeling lightheaded and dizzy prior to syncopal episode. Denies CP, palpitations, SOB. She did something similar to this in the past that resolved with hydration.      ED work up:   Urine culture shows bacteria is sensitive to antibiotics.    GFR 29   Creat 1.8  Sodium 123  CBC was stable   proBNP 588  CXR showed mild CHF   CT head was not acute    Previously saw nephrology (Dr. Butler). Her renal function has been okay until this ED visit.     Is on long term bactrim with Dr. Hoffmann for UTI prevention. Reports bladder is enlarged and is holding urine - they discussed a stimulator for bladder control.     Recently started PT for hip / back and pain.     T2DM - glucose has been controlled on actos. Most recent A1c ws 6.7 in Sept.     She sees Dr. Butcher with cardiology for CAD s/p CABG, HLD, HTN. She sees Dr. Zee with EP for PAF. Has pacemaker in place and monitoring interrogation with EP.     Allergies   Allergen Reactions    Albuterol Palpitations and Other (See Comments)     Other reaction(s): Chest Pain    Niacin Er Itching and Rash    Heparin Other (See Comments)     Caused bruises and hematomas immediately when drip started.  MARKED BRUISING/HEMATOMAS    Farxiga [Dapagliflozin]      UTI and yeast infections    Moxifloxacin Rash    Tagamet [Cimetidine] Rash       Current Outpatient Medications   Medication Sig Dispense Refill    cefUROXime

## 2024-12-03 NOTE — ASSESSMENT & PLAN NOTE
Chronic, stable.  Glucose is controlled at home. Continue 15 mg actos BID.     Orders:    Hemoglobin A1C; Future    Albumin/Creat Ratio, Random Ur; Future

## 2024-12-04 ENCOUNTER — HOSPITAL ENCOUNTER (OUTPATIENT)
Dept: PHYSICAL THERAPY | Age: 74
Setting detail: THERAPIES SERIES
Discharge: HOME | End: 2024-12-04
Payer: MEDICARE

## 2024-12-04 LAB
ANION GAP SERPL CALCULATED.3IONS-SCNC: 10 MMOL/L (ref 3–16)
BUN SERPL-MCNC: 32 MG/DL (ref 7–20)
CALCIUM SERPL-MCNC: 8.9 MG/DL (ref 8.3–10.6)
CHLORIDE SERPL-SCNC: 98 MMOL/L (ref 99–110)
CO2 SERPL-SCNC: 24 MMOL/L (ref 21–32)
CREAT SERPL-MCNC: 1.1 MG/DL (ref 0.6–1.2)
CREAT UR-MCNC: 76.7 MG/DL (ref 28–259)
EST. AVERAGE GLUCOSE BLD GHB EST-MCNC: 142.7 MG/DL
GFR SERPLBLD CREATININE-BSD FMLA CKD-EPI: 53 ML/MIN/{1.73_M2}
GLUCOSE SERPL-MCNC: 111 MG/DL (ref 70–99)
HBA1C MFR BLD: 6.6 %
MICROALBUMIN UR DL<=1MG/L-MCNC: 10.3 MG/DL
MICROALBUMIN/CREAT UR: 134.3 MG/G (ref 0–30)
POTASSIUM SERPL-SCNC: 5 MMOL/L (ref 3.5–5.1)
SODIUM SERPL-SCNC: 132 MMOL/L (ref 136–145)

## 2024-12-04 PROCEDURE — 97110 THERAPEUTIC EXERCISES: CPT

## 2024-12-04 PROCEDURE — 97140 MANUAL THERAPY 1/> REGIONS: CPT

## 2024-12-04 NOTE — FLOWSHEET NOTE
[facility] - Outpatient Rehabilitation and Therapy [address], [place] [address] office: [phone] fax: [phone]       Physical Therapy: TREATMENT/PROGRESS NOTE   Patient: Lexie Villa (74 y.o. female)   Examination Date: 2024   :  1950 MRN: [id number]   Visit #:   Insurance Allowable Auth Needed   Med nec []Yes    [x]No    Insurance: Payor: MEDICARE / Plan: MEDICARE PART A AND B / Product Type: *No Product type* /   Insurance ID: [id number] - (Medicare)  Secondary Insurance (if applicable): [facility]   Treatment Diagnosis:     ICD-10-CM    1. Hip weakness  R29.898       2. Quadriceps weakness  M62.81       3. Impaired range of motion of left hip  M25.652          Medical Diagnosis:  Left hip pain [M25.552]   Referring Physician: Vinh Ames DO  PCP: Fabiola Bueno APRN - CNP     Plan of care signed (Y/N):     Date of Patient follow up with Physician:      Plan of Care Report: NO  POC update due: (10 visits /OR AUTH LIMITS, whichever is less)  2024                                             Medical History:  Comorbidities:  Cancer/Tumor  Diabetes (Type I or II)  Hypertension  Pacemaker  Osteoarthritis  Relevant Medical History: B TKA, previous lumbar ablation, myelodisplastic syndrome, stents, quadruple bypass                                          Precautions/ Contra-indications:           Latex allergy:  NO  Pacemaker:    YES  Contraindications for Manipulation: NA  Date of Surgery:   Other:    Red Flags:  None    Suicide Screening:   The patient did not verbalize a primary behavioral concern, suicidal ideation, suicidal intent, or demonstrate suicidal behaviors.    Preferred Language for Healthcare:   [x] English       [] other:    SUBJECTIVE EXAMINATION     Patient stated complaint: Pt reports pain is about the same. States she ended up in the ED on  because she was dehydrated and had a UTI. Followed up with PCP after ED

## 2024-12-07 ENCOUNTER — HOSPITAL ENCOUNTER (OUTPATIENT)
Dept: PHYSICAL THERAPY | Age: 74
Setting detail: THERAPIES SERIES
Discharge: HOME | End: 2024-12-07
Payer: MEDICARE

## 2024-12-07 PROCEDURE — 97110 THERAPEUTIC EXERCISES: CPT

## 2024-12-07 PROCEDURE — 97140 MANUAL THERAPY 1/> REGIONS: CPT

## 2024-12-07 NOTE — FLOWSHEET NOTE
face-to-face)     Neuromusc. Re-ed (89913)    Re-Eval (60238)     Manual (39903) 8 1  Estim Unattended (75470)     Ther. Act (15845)    Mech. Traction (49376)     Gait (07612)    Dry Needle 1-2 muscle (13548)     Aquatic Therex (13044)    Dry Needle 3+ muscle (20561)     Iontophoresis (06256)    VASO (67077)     Ultrasound (45236)    Group Therapy (69480)     Estim Attended (94758)    Canalith Repositioning (98493)     Physical Performance Test (80937)    Custom orthotic ()     Other:    Other:    Total Timed Code Tx Minutes 42 3       Total Treatment Minutes 42        Charge Justification:  (97198) THERAPEUTIC EXERCISE - Provided verbal/tactile cueing for activities related to strengthening, flexibility, endurance, ROM performed to prevent loss of range of motion, maintain or improve muscular strength or increase flexibility, following either an injury or surgery.   (06860) MANUAL THERAPY -  Manual therapy techniques, 1 or more regions, each 15 minutes (Mobilization/manipulation, manual lymphatic drainage, manual traction) for the purpose of modulating pain, promoting relaxation,  increasing ROM, reducing/eliminating soft tissue swelling/inflammation/restriction, improving soft tissue extensibility and allowing for proper ROM for normal function with self care, mobility, lifting and ambulation    GOALS     Patient stated goal: reduce pain   [] Progressing: [] Met: [] Not Met: [] Adjusted    Therapist goals for Patient:   Short Term Goals: To be achieved in: 2 weeks  1. Independent in HEP and progression per patient tolerance, in order to prevent re-injury.   [] Progressing: [] Met: [] Not Met: [] Adjusted  2. Patient will have a decrease in pain to <0/10 to facilitate improvement in movement, function, and ADLs as indicated by Functional Deficits.  [] Progressing: [] Met: [] Not Met: [] Adjusted    Long Term Goals: To be achieved in: 6 weeks  1. Disability index score of 30% or less for the LEFS to assist with

## 2024-12-10 ENCOUNTER — HOSPITAL ENCOUNTER (OUTPATIENT)
Dept: PHYSICAL THERAPY | Age: 74
Setting detail: THERAPIES SERIES
Discharge: HOME | End: 2024-12-10
Payer: MEDICARE

## 2024-12-10 PROCEDURE — 97110 THERAPEUTIC EXERCISES: CPT

## 2024-12-10 PROCEDURE — 97140 MANUAL THERAPY 1/> REGIONS: CPT

## 2024-12-10 NOTE — FLOWSHEET NOTE
NEEDED   Core stability   - TrA    5\"   15   12/7                               Therapeutic Activity (56091)  Sets/time     Step ups                                      Modalities:    No modalities applied this session    Education/Home Exercise Program: Patient HEP program created electronically.  Refer to [facility] access code:    Access Code: [email or web address]  URL: [email or web address]  Date: 11/25/2024  Prepared by: Lashon Da Silva    Exercises  - Supine Figure 4 Piriformis Stretch  - 1 x daily - 7 x weekly - 2 sets - 30 sec hold  - Supine Bridge  - 1 x daily - 7 x weekly - 2 sets - 10 reps  - Bent Knee Fallouts  - 1 x daily - 7 x weekly - 2 sets - 10 reps    ASSESSMENT     Today's Assessment:  performed exercises as noted above on flow sheet for continued LE strengthening as well as hip strengthening.  Pt continues to have difficulty with transitional movements on/off table.  Continue with strengthening as tolerated for improved strength and mobility with decreased restrictions.    Medical Necessity Documentation:  I certify that this patient meets the below criteria necessary for medical necessity for care and/or justification of therapy services:  The patient has functional impairments and/or activity limitations and would benefit from continued outpatient therapy services to address the deficits outlined in the patients goals    Return to Play: NA    Prognosis for POC: [x] Good [] Fair  [] Poor    Patient requires continued skilled intervention: [x] Yes  [] No      CHARGE CAPTURE     PT CHARGE GRID   CPT Code (TIMED) minutes # CPT Code (UNTIMED) #     Therex (79043)  32 2  EVAL:LOW (90661 - Typically 20 minutes face-to-face)     Neuromusc. Re-ed (77309)    Re-Eval (61449)     Manual (57102) 8 1  Estim Unattended (22515)     Ther. Act (87018)    Mech. Traction (62680)     Gait (49464)    Dry Needle 1-2 muscle (99768)     Aquatic Therex (01300)    Dry Needle 3+ muscle (64270)     Iontophoresis (24894)    VASO

## 2024-12-12 ENCOUNTER — HOSPITAL ENCOUNTER (OUTPATIENT)
Dept: PHYSICAL THERAPY | Age: 74
Setting detail: THERAPIES SERIES
Discharge: HOME | End: 2024-12-12
Payer: MEDICARE

## 2024-12-12 PROCEDURE — 97140 MANUAL THERAPY 1/> REGIONS: CPT

## 2024-12-12 PROCEDURE — 97110 THERAPEUTIC EXERCISES: CPT

## 2024-12-12 PROCEDURE — 97112 NEUROMUSCULAR REEDUCATION: CPT

## 2024-12-12 NOTE — FLOWSHEET NOTE
less for the LEFS to assist with reaching prior level of function with activities such as home management and standing and walking tolerance.  [] Progressing: [] Met: [] Not Met: [] Adjusted  2. Patient will demonstrate increased AROM of L hip ER and IR to WNL without pain to allow for proper joint functioning to enable patient to don shoes and socks without pain.   [] Progressing: [] Met: [] Not Met: [] Adjusted  3. Patient will demonstrate increased Strength of B hip to at least 5/5 throughout without pain to allow for proper functional mobility to enable patient to return to prolonged walking.   [] Progressing: [] Met: [] Not Met: [] Adjusted  4. Patient will return to standing for 30 min without increased symptoms or restriction to improve ability to complete home management.   [] Progressing: [] Met: [] Not Met: [] Adjusted       Overall Progression Towards Functional goals/ Treatment Progress Update:  [] Patient is progressing as expected towards functional goals listed.    [] Progression is slowed due to complexities/Impairments listed.  [] Progression has been slowed due to co-morbidities.  [x] Plan just implemented, too soon (<30days) to assess goals progression   [] Goals require adjustment due to lack of progress  [] Patient is not progressing as expected and requires additional follow up with physician  [] Other:     TREATMENT PLAN     Frequency/Duration: 2x/week for 6 weeks for the following treatment interventions:    Interventions:  Therapeutic Exercise (82549) including: strength training, ROM, and functional mobility  Therapeutic Activities (70348) including: functional mobility training and education.  Neuromuscular Re-education (36097) activation and proprioception, including postural re-education.    Manual Therapy (51607) as indicated to include: Passive Range of Motion, Soft Tissue Mobilization, and Trigger Point Release  Modalities as needed that may include: Thermal Agents  Patient education

## 2024-12-17 ENCOUNTER — HOSPITAL ENCOUNTER (OUTPATIENT)
Dept: PHYSICAL THERAPY | Age: 74
Setting detail: THERAPIES SERIES
Discharge: HOME | End: 2024-12-17
Payer: MEDICARE

## 2024-12-17 ENCOUNTER — HOSPITAL ENCOUNTER (OUTPATIENT)
Dept: PHYSICAL THERAPY | Age: 74
Setting detail: THERAPIES SERIES
End: 2024-12-17
Payer: MEDICARE

## 2024-12-17 PROCEDURE — 97110 THERAPEUTIC EXERCISES: CPT

## 2024-12-17 PROCEDURE — 97140 MANUAL THERAPY 1/> REGIONS: CPT

## 2024-12-17 NOTE — FLOWSHEET NOTE
[facility] - Outpatient Rehabilitation and Therapy [address], [place] [address] office: [phone] fax: [phone]       Physical Therapy: TREATMENT/PROGRESS NOTE   Patient: Lexie Villa (74 y.o. female)   Examination Date: 2024   :  1950 MRN: [id number]   Visit #:   Insurance Allowable Auth Needed   Med nec []Yes    [x]No    Insurance: Payor: MEDICARE / Plan: MEDICARE PART A AND B / Product Type: *No Product type* /   Insurance ID: [id number] - (Medicare)  Secondary Insurance (if applicable): [facility]   Treatment Diagnosis:     ICD-10-CM    1. Hip weakness  R29.898       2. Quadriceps weakness  M62.81       3. Impaired range of motion of left hip  M25.652          Medical Diagnosis:  Left hip pain [M25.552]   Referring Physician: Vinh Ames DO  PCP: Fabiola Bueno APRN - CNP     Plan of care signed (Y/N):     Date of Patient follow up with Physician:      Plan of Care Report: NO  POC update due: (10 visits /OR AUTH LIMITS, whichever is less)  2024                                             Medical History:  Comorbidities:  Cancer/Tumor  Diabetes (Type I or II)  Hypertension  Pacemaker  Osteoarthritis  Relevant Medical History: B TKA, previous lumbar ablation, myelodisplastic syndrome, stents, quadruple bypass                                          Precautions/ Contra-indications:           Latex allergy:  NO  Pacemaker:    YES  Contraindications for Manipulation: NA  Date of Surgery:   Other:    Red Flags:  None    Suicide Screening:   The patient did not verbalize a primary behavioral concern, suicidal ideation, suicidal intent, or demonstrate suicidal behaviors.    Preferred Language for Healthcare:   [x] English       [] other:    SUBJECTIVE EXAMINATION     Patient stated complaint:    Doing okay today, has been running late.  Denies pain currently.         Test used Initial score  24   Pain Summary VAS 3/10 0/10

## 2024-12-19 ENCOUNTER — HOSPITAL ENCOUNTER (OUTPATIENT)
Dept: PHYSICAL THERAPY | Age: 74
Setting detail: THERAPIES SERIES
Discharge: HOME | End: 2024-12-19
Payer: MEDICARE

## 2024-12-19 PROCEDURE — 97110 THERAPEUTIC EXERCISES: CPT

## 2024-12-19 PROCEDURE — 97140 MANUAL THERAPY 1/> REGIONS: CPT

## 2024-12-19 NOTE — FLOWSHEET NOTE
[facility] - Outpatient Rehabilitation and Therapy [address], [place] [address] office: [phone] fax: [phone]       Physical Therapy: TREATMENT/PROGRESS NOTE   Patient: Lexie Villa (74 y.o. female)   Examination Date: 2024   :  1950 MRN: [id number]   Visit #:   Insurance Allowable Auth Needed   Med nec []Yes    [x]No    Insurance: Payor: MEDICARE / Plan: MEDICARE PART A AND B / Product Type: *No Product type* /   Insurance ID: [id number] - (Medicare)  Secondary Insurance (if applicable): [facility]   Treatment Diagnosis:     ICD-10-CM    1. Hip weakness  R29.898       2. Quadriceps weakness  M62.81       3. Impaired range of motion of left hip  M25.652          Medical Diagnosis:  Left hip pain [M25.552]   Referring Physician: Vinh Ames DO  PCP: Fabiola Bueno APRN - CNP     Plan of care signed (Y/N):     Date of Patient follow up with Physician:      Plan of Care Report: NO  POC update due: (10 visits /OR AUTH LIMITS, whichever is less)  2024                                             Medical History:  Comorbidities:  Cancer/Tumor  Diabetes (Type I or II)  Hypertension  Pacemaker  Osteoarthritis  Relevant Medical History: B TKA, previous lumbar ablation, myelodisplastic syndrome, stents, quadruple bypass                                          Precautions/ Contra-indications:           Latex allergy:  NO  Pacemaker:    YES  Contraindications for Manipulation: NA  Date of Surgery:   Other:    Red Flags:  None    Suicide Screening:   The patient did not verbalize a primary behavioral concern, suicidal ideation, suicidal intent, or demonstrate suicidal behaviors.    Preferred Language for Healthcare:   [x] English       [] other:    SUBJECTIVE EXAMINATION     Patient stated complaint: pt reports she is doing well. States lateral step ups were hard LV.        Test used Initial score  24   Pain Summary VAS 3/10 0/10

## 2024-12-26 ENCOUNTER — HOSPITAL ENCOUNTER (OUTPATIENT)
Dept: PHYSICAL THERAPY | Age: 74
Setting detail: THERAPIES SERIES
Discharge: HOME | End: 2024-12-26
Payer: MEDICARE

## 2024-12-26 PROCEDURE — 97110 THERAPEUTIC EXERCISES: CPT

## 2024-12-26 PROCEDURE — 97140 MANUAL THERAPY 1/> REGIONS: CPT

## 2024-12-30 ENCOUNTER — APPOINTMENT (OUTPATIENT)
Dept: PHYSICAL THERAPY | Age: 74
End: 2024-12-30
Payer: MEDICARE

## 2025-01-03 ENCOUNTER — HOSPITAL ENCOUNTER (OUTPATIENT)
Dept: PHYSICAL THERAPY | Age: 75
Setting detail: THERAPIES SERIES
Discharge: HOME | End: 2025-01-03
Payer: MEDICARE

## 2025-01-03 ENCOUNTER — TELEPHONE (OUTPATIENT)
Dept: CARDIOLOGY CLINIC | Age: 75
End: 2025-01-03

## 2025-01-03 PROCEDURE — 97110 THERAPEUTIC EXERCISES: CPT

## 2025-01-03 NOTE — PLAN OF CARE
[facility] - Outpatient Rehabilitation and Therapy [address], [place] [address] office: [phone] fax: [phone]     Physical Therapy Re-Certification Plan of Care    Dear Dr. Vinh Ames, DO  ,    We had the pleasure of treating the following patient for physical therapy services at [facility]. A summary of our findings can be found in the updated assessment below.  This includes our plan of care.  If you have any questions or concerns regarding these findings, please do not hesitate to contact me at the office phone number checked above.  Thank you for the referral.     Physician Signature:________________________________Date:__________________  By signing above (or electronic signature), therapist's plan is approved by physician      Total Visits: 9     Overall Response to Treatment:  POC completed this date. Pt demonstrates improvement in LE strength and hip PROM. Pt continues with weakness in B hips, increased pain, and balance and gait impairments with no significant change in functional mobility since starting therapy. Balance exercises have been limited in clinic due to pain with standing. Pt has previously gotten an ablation and reports significant improvements following - pt to schedule appointment with Danny. Plan to complete remaining visits on POC and will likely hold pending MD follow up.      Pt reports recent SOB, swelling in B LE, and weight gain. No warmth or TTP in B calves and declines chest pain. Pt has follow up with [facility] at 2:00 today and will discuss symptoms with MD.     Recommendation:    [x] Continue PT per original POC. Pt to call to schedule with Danny to determine need for ablation.   [] Hold PT, pending MD visit   [] Discharge to [facility]. Follow up with PT or MD PRN.     Physical Therapy: TREATMENT/PROGRESS NOTE   Patient: Lexie Villa (74 y.o. female)   Examination Date: 2025   :  1950 MRN:

## 2025-01-03 NOTE — TELEPHONE ENCOUNTER
Patient states she is having increased swelling in her legs with a 3 pound weight gain over the past couple of days, she is also having increased SOB.  Please call to discuss #[phone]

## 2025-01-06 ENCOUNTER — TELEPHONE (OUTPATIENT)
Dept: CARDIOLOGY CLINIC | Age: 75
End: 2025-01-06

## 2025-01-06 NOTE — TELEPHONE ENCOUNTER
Discussed with DCE. Resume normal lasix dose. Watch salt intake. Called and left message with instructions

## 2025-01-07 ENCOUNTER — APPOINTMENT (OUTPATIENT)
Dept: PHYSICAL THERAPY | Age: 75
End: 2025-01-07
Payer: MEDICARE

## 2025-01-10 ENCOUNTER — HOSPITAL ENCOUNTER (OUTPATIENT)
Dept: PHYSICAL THERAPY | Age: 75
Setting detail: THERAPIES SERIES
End: 2025-01-10
Payer: MEDICARE

## 2025-01-14 ENCOUNTER — HOSPITAL ENCOUNTER (OUTPATIENT)
Dept: PHYSICAL THERAPY | Age: 75
Setting detail: THERAPIES SERIES
Discharge: HOME | End: 2025-01-14
Payer: MEDICARE

## 2025-01-14 PROCEDURE — 97110 THERAPEUTIC EXERCISES: CPT

## 2025-01-14 PROCEDURE — 97530 THERAPEUTIC ACTIVITIES: CPT

## 2025-01-14 NOTE — FLOWSHEET NOTE
[facility] - Outpatient Rehabilitation and Therapy [address], [place] [address] office: [phone] fax: [phone]      Physical Therapy: TREATMENT/PROGRESS NOTE   Patient: Lexie Villa (74 y.o. female)   Examination Date: 2025   :  1950 MRN: [id number]   Visit #: 10 / 12  Insurance Allowable Auth Needed   Med nec []Yes    [x]No    Insurance: Payor: MEDICARE / Plan: MEDICARE PART A AND B / Product Type: *No Product type* /   Insurance ID: [id number] - (Medicare)  Secondary Insurance (if applicable): [facility]   Treatment Diagnosis:     ICD-10-CM    1. Hip weakness  R29.898       2. Quadriceps weakness  M62.81       3. Impaired range of motion of left hip  M25.652          Medical Diagnosis:  Left hip pain [M25.552]   Referring Physician: Vinh Ames DO  PCP: Fabiola Bueno APRN - CNP     Plan of care signed (Y/N): yes    Date of Patient follow up with Physician:      Plan of Care Report: NO  POC update due: (10 visits /OR AUTH LIMITS, whichever is less)  2/3/25                                             Medical History:  Comorbidities:  Cancer/Tumor  Diabetes (Type I or II)  Hypertension  Pacemaker  Osteoarthritis  Relevant Medical History: B TKA, previous lumbar ablation, myelodisplastic syndrome, stents, quadruple bypass                                          Precautions/ Contra-indications:           Latex allergy:  NO  Pacemaker:    YES  Contraindications for Manipulation: NA  Date of Surgery:   Other:    Red Flags:  None    Suicide Screening:   The patient did not verbalize a primary behavioral concern, suicidal ideation, suicidal intent, or demonstrate suicidal behaviors.    Preferred Language for Healthcare:   [x] English       [] other:    SUBJECTIVE EXAMINATION     Patient stated complaint: Pt reports she is doing well. Cardiologist increased her lasix and she is back down to normal dose. Has been completing daily weights. Swelling and SOB have

## 2025-01-14 NOTE — PROGRESS NOTES
3/16/23  Yes Fabiola Bueno APRN - CNP   aspirin EC 81 MG EC tablet Take 1 tablet by mouth daily 3/1/23  Yes Fabiola Bueno APRN - CNP   octreotide (SANDOSTATIN) 100 MCG/ML SOLN injection Inject 20 mg. Indications: Receives monthly injections as of 10-26-22 BK   Yes Jeni East MD   Epoetin Leonard-epbx (RETACRIT IJ) Inject as directed Every other week   Yes Jeni East MD   zinc sulfate (ZINCATE) 220 (50 Zn) MG capsule Take 1 capsule by mouth 4 times daily   Yes Jeni East MD   Cyanocobalamin (VITAMIN B-12 PO) Take 3,000 mcg by mouth daily   Yes Jeni East MD   vitamin D (CHOLECALCIFEROL) 1000 UNIT TABS tablet Take 1 tablet by mouth daily   Yes Jeni East MD   Calcium Citrate-Vitamin D (CALCIUM CITRATE + D PO) Take 1 tablet by mouth daily    Yes Jeni East MD   SLOW-MAG 71.5-119 MG TBEC tablet Take 1 tablet by mouth daily 143 mg 11/3/15  Yes Jeni East MD   nitroGLYCERIN (NITROLINGUAL) 0.4 MG/SPRAY spray Place 1 spray under the tongue every 5 minutes as needed As directed for chest pain   Yes Jeni East MD   cefUROXime (CEFTIN) 500 MG tablet Take 1 tablet by mouth 2 times daily  Patient not taking: Reported on 1/15/2025 11/29/24   Jeni East MD     Social History:   reports that she has never smoked. She has never used smokeless tobacco. She reports that she does not drink alcohol and does not use drugs.   Family History:Reviewed. Denies family history of sudden cardiac death, arrhythmia, premature CAD  family history includes Breast Cancer in her maternal aunt; Breast Cancer (age of onset: 50) in her niece; Cancer in her mother; Diabetes in her sister; Heart Disease in her brother, father, and sister; High Blood Pressure in her father and mother; Stroke in her sister.     Review of System:  Full ROS obtained and negative except as mentioned in HPI  Physical Examination:  /76 (Site: Left Upper Arm, Position:

## 2025-01-15 ENCOUNTER — APPOINTMENT (OUTPATIENT)
Dept: PHYSICAL THERAPY | Age: 75
End: 2025-01-15
Payer: MEDICARE

## 2025-01-15 ENCOUNTER — OFFICE VISIT (OUTPATIENT)
Dept: CARDIOLOGY CLINIC | Age: 75
End: 2025-01-15
Payer: MEDICARE

## 2025-01-15 ENCOUNTER — NURSE ONLY (OUTPATIENT)
Dept: CARDIOLOGY CLINIC | Age: 75
End: 2025-01-15

## 2025-01-15 VITALS
OXYGEN SATURATION: 98 % | DIASTOLIC BLOOD PRESSURE: 76 MMHG | WEIGHT: 204 LBS | SYSTOLIC BLOOD PRESSURE: 120 MMHG | HEIGHT: 59 IN | BODY MASS INDEX: 41.12 KG/M2 | HEART RATE: 72 BPM

## 2025-01-15 DIAGNOSIS — I49.5 SINUS NODE DYSFUNCTION (HCC): ICD-10-CM

## 2025-01-15 DIAGNOSIS — I10 ESSENTIAL HYPERTENSION: ICD-10-CM

## 2025-01-15 DIAGNOSIS — I48.0 PAF (PAROXYSMAL ATRIAL FIBRILLATION) (HCC): ICD-10-CM

## 2025-01-15 DIAGNOSIS — I25.119 CORONARY ARTERY DISEASE INVOLVING NATIVE CORONARY ARTERY OF NATIVE HEART WITH ANGINA PECTORIS (HCC): ICD-10-CM

## 2025-01-15 DIAGNOSIS — I25.10 CAD, MULTIPLE VESSEL: Primary | ICD-10-CM

## 2025-01-15 DIAGNOSIS — I20.0 UNSTABLE ANGINA (HCC): ICD-10-CM

## 2025-01-15 DIAGNOSIS — Z95.0 PACEMAKER: Primary | ICD-10-CM

## 2025-01-15 PROCEDURE — G8399 PT W/DXA RESULTS DOCUMENT: HCPCS

## 2025-01-15 PROCEDURE — G8417 CALC BMI ABV UP PARAM F/U: HCPCS

## 2025-01-15 PROCEDURE — 1036F TOBACCO NON-USER: CPT

## 2025-01-15 PROCEDURE — 1123F ACP DISCUSS/DSCN MKR DOCD: CPT

## 2025-01-15 PROCEDURE — 1159F MED LIST DOCD IN RCRD: CPT

## 2025-01-15 PROCEDURE — 1090F PRES/ABSN URINE INCON ASSESS: CPT

## 2025-01-15 PROCEDURE — 99214 OFFICE O/P EST MOD 30 MIN: CPT

## 2025-01-15 PROCEDURE — 3074F SYST BP LT 130 MM HG: CPT

## 2025-01-15 PROCEDURE — 3078F DIAST BP <80 MM HG: CPT

## 2025-01-15 PROCEDURE — 93000 ELECTROCARDIOGRAM COMPLETE: CPT

## 2025-01-15 PROCEDURE — G8427 DOCREV CUR MEDS BY ELIG CLIN: HCPCS

## 2025-01-15 PROCEDURE — 3017F COLORECTAL CA SCREEN DOC REV: CPT

## 2025-01-21 ENCOUNTER — APPOINTMENT (OUTPATIENT)
Dept: PHYSICAL THERAPY | Age: 75
End: 2025-01-21
Payer: MEDICARE

## 2025-01-23 ENCOUNTER — HOSPITAL ENCOUNTER (OUTPATIENT)
Dept: PHYSICAL THERAPY | Age: 75
Setting detail: THERAPIES SERIES
Discharge: HOME | End: 2025-01-23
Payer: MEDICARE

## 2025-01-23 PROCEDURE — 97112 NEUROMUSCULAR REEDUCATION: CPT

## 2025-01-23 PROCEDURE — 97110 THERAPEUTIC EXERCISES: CPT

## 2025-01-23 NOTE — FLOWSHEET NOTE
[facility] - Outpatient Rehabilitation and Therapy [address], [place] [address] office: [phone] fax: [phone]      Physical Therapy: TREATMENT/PROGRESS NOTE   Patient: Lexie Villa (74 y.o. female)   Examination Date: 2025   :  1950 MRN: [id number]   Visit #:   Insurance Allowable Auth Needed   Med nec []Yes    [x]No    Insurance: Payor: MEDICARE / Plan: MEDICARE PART A AND B / Product Type: *No Product type* /   Insurance ID: [id number] - (Medicare)  Secondary Insurance (if applicable): [facility]   Treatment Diagnosis:     ICD-10-CM    1. Hip weakness  R29.898       2. Quadriceps weakness  M62.81       3. Impaired range of motion of left hip  M25.652          Medical Diagnosis:  Left hip pain [M25.552]   Referring Physician: Vinh Ames DO  PCP: Fabiola Bueno APRN - CNP     Plan of care signed (Y/N): yes    Date of Patient follow up with Physician:      Plan of Care Report: NO  POC update due: (10 visits /OR AUTH LIMITS, whichever is less)  2/3/25                                             Medical History:  Comorbidities:  Cancer/Tumor  Diabetes (Type I or II)  Hypertension  Pacemaker  Osteoarthritis  Relevant Medical History: B TKA, previous lumbar ablation, myelodisplastic syndrome, stents, quadruple bypass                                          Precautions/ Contra-indications:           Latex allergy:  NO  Pacemaker:    YES  Contraindications for Manipulation: NA  Date of Surgery:   Other:    Red Flags:  None    Suicide Screening:   The patient did not verbalize a primary behavioral concern, suicidal ideation, suicidal intent, or demonstrate suicidal behaviors.    Preferred Language for Healthcare:   [x] English       [] other:    SUBJECTIVE EXAMINATION     Patient stated complaint: Pt reports pain is a 1/10 today. Having some pain in the side of her L hip. Reports she got non healing wound on R ear biopsied.        Test used Initial

## 2025-01-28 ENCOUNTER — HOSPITAL ENCOUNTER (OUTPATIENT)
Dept: PHYSICAL THERAPY | Age: 75
Setting detail: THERAPIES SERIES
Discharge: HOME | End: 2025-01-28
Payer: MEDICARE

## 2025-01-28 DIAGNOSIS — R26.89 BALANCE DISORDER: ICD-10-CM

## 2025-01-28 DIAGNOSIS — Z91.81 RISK FOR FALLS: Primary | ICD-10-CM

## 2025-01-28 PROCEDURE — 97110 THERAPEUTIC EXERCISES: CPT

## 2025-01-28 NOTE — PLAN OF CARE
[facility] - Outpatient Rehabilitation and Therapy [address], [place] [address] office: [phone] fax: [phone]      Physical Therapy Re-Certification Plan of Care    Dear Dr. Vinh Ames, DO  ,    We had the pleasure of treating the following patient for physical therapy services at [facility] Physical Therapy. A summary of our findings can be found in the updated assessment below.  This includes our plan of care.  If you have any questions or concerns regarding these findings, please do not hesitate to contact me at the office phone number checked above.  Thank you for the referral.     Physician Signature:________________________________Date:__________________  By signing above (or electronic signature), therapist's plan is approved by physician      Total Visits: 12     Overall Response to Treatment:  POC completed this date. Pt demonstrates improvement in L hip ER and IR PROM and B LE strength. Functional improvement noted by pt report ability ability to stand to cook a meal without pain. Pt continues with B hip weakness and some pain in L ITB with hip ER PROM. Pt also presents with balance impairments with decreased ability to complete SLS - slight fall risk noted by TUG when completed with SPC. Pt continues with functional limitations in prolonged walking, home management, and stair navigation noted by LEFS. Pt will benefit from continued OP PT to further reduce pain and improve strength and balance to reduce risk for falls and improve functional mobility.     Recommendation:    [x] Continue PT 2x / wk for 3 weeks.   [] Hold PT, pending MD visit   [] Discharge to [facility]. Follow up with PT or MD PRN.     Physical Therapy: TREATMENT/PROGRESS NOTE   Patient: Lexie Villa (74 y.o. female)   Examination Date: 2025   :  1950 MRN: [id number]   Visit #:   Insurance Allowable Auth Needed   Med nec []Yes    [x]No    Insurance: Payor: MEDICARE /

## 2025-01-30 ENCOUNTER — HOSPITAL ENCOUNTER (OUTPATIENT)
Dept: PHYSICAL THERAPY | Age: 75
Setting detail: THERAPIES SERIES
Discharge: HOME | End: 2025-01-30
Payer: MEDICARE

## 2025-01-30 DIAGNOSIS — E11.69 TYPE 2 DIABETES MELLITUS WITH OBESITY (HCC): ICD-10-CM

## 2025-01-30 DIAGNOSIS — E66.9 TYPE 2 DIABETES MELLITUS WITH OBESITY (HCC): ICD-10-CM

## 2025-01-30 PROCEDURE — 97110 THERAPEUTIC EXERCISES: CPT

## 2025-01-30 PROCEDURE — 97112 NEUROMUSCULAR REEDUCATION: CPT

## 2025-01-30 RX ORDER — GABAPENTIN 400 MG/1
400 CAPSULE ORAL 3 TIMES DAILY
Qty: 270 CAPSULE | Refills: 1 | Status: SHIPPED | OUTPATIENT
Start: 2025-01-30 | End: 2025-07-29

## 2025-01-30 RX ORDER — CLOPIDOGREL BISULFATE 75 MG/1
75 TABLET ORAL DAILY
Qty: 90 TABLET | Refills: 3 | Status: SHIPPED | OUTPATIENT
Start: 2025-01-30

## 2025-01-30 RX ORDER — LANCETS 30 GAUGE
1 EACH MISCELLANEOUS 2 TIMES DAILY
Qty: 200 EACH | Refills: 3 | Status: SHIPPED | OUTPATIENT
Start: 2025-01-30

## 2025-01-30 RX ORDER — POTASSIUM CHLORIDE 1500 MG/1
20 TABLET, EXTENDED RELEASE ORAL DAILY
Qty: 90 TABLET | Refills: 1 | Status: SHIPPED | OUTPATIENT
Start: 2025-01-30

## 2025-01-30 RX ORDER — ESTRADIOL 0.1 MG/G
1 CREAM VAGINAL SEE ADMIN INSTRUCTIONS
Qty: 1 G | Refills: 5 | Status: SHIPPED | OUTPATIENT
Start: 2025-01-30

## 2025-01-30 RX ORDER — PIOGLITAZONE 15 MG/1
15 TABLET ORAL 2 TIMES DAILY
Qty: 180 TABLET | Refills: 1 | Status: SHIPPED | OUTPATIENT
Start: 2025-01-30 | End: 2025-07-29

## 2025-01-30 NOTE — TELEPHONE ENCOUNTER
Refill request   clopidogrel (PLAVIX) 75 MG     Last OV:1/15/25 NPSR    Last Lab:24 BMP    Last EK/15/25    Next Appt:25 NPTS

## 2025-01-30 NOTE — TELEPHONE ENCOUNTER
Medication Refill    Medication needing refilled:  clopidogrel     Dosage of the medication:   75mg    How are you taking this medication (QD, BID, TID, QID, PRN):   Take 1 tablet by mouth daily     30 or 90 day supply called in: 90    When will you run out of your medication:    Which Pharmacy are we sending the medication to?:      [facility] [address] [address]  Phone: [phone]  Fax: [phone]

## 2025-01-30 NOTE — FLOWSHEET NOTE
[facility] - Outpatient Rehabilitation and Therapy [address], [place] [address] office: [phone] fax: [phone]        Physical Therapy: TREATMENT/PROGRESS NOTE   Patient: Lexie Villa (74 y.o. female)   Examination Date: 2025   :  1950 MRN: [id number]   Visit #:   Insurance Allowable Auth Needed   Med nec []Yes    [x]No    Insurance: Payor: MEDICARE / Plan: MEDICARE PART A AND B / Product Type: *No Product type* /   Insurance ID: [id number] - (Medicare)  Secondary Insurance (if applicable): [facility]   Treatment Diagnosis:     ICD-10-CM    1. Hip weakness  R29.898       2. Quadriceps weakness  M62.81       3. Impaired range of motion of left hip  M25.652        ICD-10-CM    1. Risk for falls  Z91.81       2. Balance disorder  R26.89             Medical Diagnosis:  Left hip pain [M25.552]   Referring Physician: Vinh Ames DO  PCP: Fabiola Bueno APRN - CNP     Plan of care signed (Y/N): yes    Date of Patient follow up with Physician:      Plan of Care Report: NO  POC update due: (10 visits /OR AUTH LIMITS, whichever is less)  25                                             Medical History:  Comorbidities:  Cancer/Tumor  Diabetes (Type I or II)  Hypertension  Pacemaker  Osteoarthritis  Relevant Medical History: B TKA, previous lumbar ablation, myelodisplastic syndrome, stents, quadruple bypass                                          Precautions/ Contra-indications:           Latex allergy:  NO  Pacemaker:    YES  Contraindications for Manipulation: NA  Date of Surgery:   Other:    Red Flags:  None    Suicide Screening:   The patient did not verbalize a primary behavioral concern, suicidal ideation, suicidal intent, or demonstrate suicidal behaviors.    Preferred Language for Healthcare:   [x] English       [] other:    SUBJECTIVE EXAMINATION     Patient stated complaint: Pt reports pain is lower today. States she is doing well. Reports she

## 2025-02-06 ENCOUNTER — HOSPITAL ENCOUNTER (OUTPATIENT)
Dept: PHYSICAL THERAPY | Age: 75
Setting detail: THERAPIES SERIES
Discharge: HOME | End: 2025-02-06
Payer: MEDICARE

## 2025-02-06 PROCEDURE — 97110 THERAPEUTIC EXERCISES: CPT

## 2025-02-06 PROCEDURE — 97112 NEUROMUSCULAR REEDUCATION: CPT

## 2025-02-06 NOTE — FLOWSHEET NOTE
[facility] - Outpatient Rehabilitation and Therapy [address], [place] [address] office: [phone] fax: [phone]        Physical Therapy: TREATMENT/PROGRESS NOTE   Patient: Lexie Villa (74 y.o. female)   Examination Date: 2025   :  1950 MRN: [id number]   Visit #:   Insurance Allowable Auth Needed   Med nec []Yes    [x]No    Insurance: Payor: MEDICARE / Plan: MEDICARE PART A AND B / Product Type: *No Product type* /   Insurance ID: [id number] - (Medicare)  Secondary Insurance (if applicable): [facility]   Treatment Diagnosis:     ICD-10-CM    1. Hip weakness  R29.898       2. Quadriceps weakness  M62.81       3. Impaired range of motion of left hip  M25.652        ICD-10-CM    1. Risk for falls  Z91.81       2. Balance disorder  R26.89             Medical Diagnosis:  Left hip pain [M25.552]   Referring Physician: Vinh Ames DO  PCP: Fabiola Bueno APRN - CNP     Plan of care signed (Y/N): yes    Date of Patient follow up with Physician:      Plan of Care Report: NO  POC update due: (10 visits /OR AUTH LIMITS, whichever is less)  25                                             Medical History:  Comorbidities:  Cancer/Tumor  Diabetes (Type I or II)  Hypertension  Pacemaker  Osteoarthritis  Relevant Medical History: B TKA, previous lumbar ablation, myelodisplastic syndrome, stents, quadruple bypass                                          Precautions/ Contra-indications:           Latex allergy:  NO  Pacemaker:    YES  Contraindications for Manipulation: NA  Date of Surgery:   Other:    Red Flags:  None    Suicide Screening:   The patient did not verbalize a primary behavioral concern, suicidal ideation, suicidal intent, or demonstrate suicidal behaviors.    Preferred Language for Healthcare:   [x] English       [] other:    SUBJECTIVE EXAMINATION     Patient stated complaint: Pt reports she is doing well with no pain today. Has not heard back yet

## 2025-02-11 ENCOUNTER — APPOINTMENT (OUTPATIENT)
Dept: PHYSICAL THERAPY | Age: 75
End: 2025-02-11
Payer: MEDICARE

## 2025-02-13 ENCOUNTER — HOSPITAL ENCOUNTER (OUTPATIENT)
Dept: PHYSICAL THERAPY | Age: 75
Setting detail: THERAPIES SERIES
Discharge: HOME | End: 2025-02-13
Payer: MEDICARE

## 2025-02-13 PROCEDURE — 97112 NEUROMUSCULAR REEDUCATION: CPT

## 2025-02-13 PROCEDURE — 97110 THERAPEUTIC EXERCISES: CPT

## 2025-02-13 NOTE — FLOWSHEET NOTE
[facility] - Outpatient Rehabilitation and Therapy [address], [place] [address] office: [phone] fax: [phone]        Physical Therapy: TREATMENT/PROGRESS NOTE   Patient: Lexie Villa (74 y.o. female)   Examination Date: 2025   :  1950 MRN: [id number]   Visit #: 15 / 18  Insurance Allowable Auth Needed   Med nec []Yes    [x]No    Insurance: Payor: MEDICARE / Plan: MEDICARE PART A AND B / Product Type: *No Product type* /   Insurance ID: [id number] - (Medicare)  Secondary Insurance (if applicable): [facility]   Treatment Diagnosis:     ICD-10-CM    1. Hip weakness  R29.898       2. Quadriceps weakness  M62.81       3. Impaired range of motion of left hip  M25.652        ICD-10-CM    1. Risk for falls  Z91.81       2. Balance disorder  R26.89             Medical Diagnosis:  Left hip pain [M25.552]   Referring Physician: Vinh Ames DO  PCP: Fabiola Bueno APRN - CNP     Plan of care signed (Y/N): yes    Date of Patient follow up with Physician:      Plan of Care Report: NO  POC update due: (10 visits /OR AUTH LIMITS, whichever is less)  25                                             Medical History:  Comorbidities:  Cancer/Tumor  Diabetes (Type I or II)  Hypertension  Pacemaker  Osteoarthritis  Relevant Medical History: B TKA, previous lumbar ablation, myelodisplastic syndrome, stents, quadruple bypass                                          Precautions/ Contra-indications:           Latex allergy:  NO  Pacemaker:    YES  Contraindications for Manipulation: NA  Date of Surgery:   Other:    Red Flags:  None    Suicide Screening:   The patient did not verbalize a primary behavioral concern, suicidal ideation, suicidal intent, or demonstrate suicidal behaviors.    Preferred Language for Healthcare:   [x] English       [] other:    SUBJECTIVE EXAMINATION     Patient stated complaint: Pt reports she is doing well - had some pain last night but is doing

## 2025-02-18 ENCOUNTER — HOSPITAL ENCOUNTER (OUTPATIENT)
Dept: PHYSICAL THERAPY | Age: 75
Setting detail: THERAPIES SERIES
Discharge: HOME | End: 2025-02-18
Payer: MEDICARE

## 2025-02-18 PROCEDURE — 97112 NEUROMUSCULAR REEDUCATION: CPT

## 2025-02-18 PROCEDURE — 97110 THERAPEUTIC EXERCISES: CPT

## 2025-02-18 NOTE — FLOWSHEET NOTE
[facility] - Outpatient Rehabilitation and Therapy [address], [place] [address] office: [phone] fax: [phone]        Physical Therapy: TREATMENT/PROGRESS NOTE   Patient: Lexie Villa (74 y.o. female)   Examination Date: 2025   :  1950 MRN: [id number]   Visit #:   Insurance Allowable Auth Needed   Med nec []Yes    [x]No    Insurance: Payor: MEDICARE / Plan: MEDICARE PART A AND B / Product Type: *No Product type* /   Insurance ID: [id number] - (Medicare)  Secondary Insurance (if applicable): [facility]   Treatment Diagnosis:     ICD-10-CM    1. Hip weakness  R29.898       2. Quadriceps weakness  M62.81       3. Impaired range of motion of left hip  M25.652        ICD-10-CM    1. Risk for falls  Z91.81       2. Balance disorder  R26.89             Medical Diagnosis:  Left hip pain [M25.552]   Referring Physician: Vinh Ames DO  PCP: Fabiola Bueno APRN - CNP     Plan of care signed (Y/N): yes    Date of Patient follow up with Physician:      Plan of Care Report: NO  POC update due: (10 visits /OR AUTH LIMITS, whichever is less)  25                                             Medical History:  Comorbidities:  Cancer/Tumor  Diabetes (Type I or II)  Hypertension  Pacemaker  Osteoarthritis  Relevant Medical History: B TKA, previous lumbar ablation, myelodisplastic syndrome, stents, quadruple bypass                                          Precautions/ Contra-indications:           Latex allergy:  NO  Pacemaker:    YES  Contraindications for Manipulation: NA  Date of Surgery:   Other:    Red Flags:  None    Suicide Screening:   The patient did not verbalize a primary behavioral concern, suicidal ideation, suicidal intent, or demonstrate suicidal behaviors.    Preferred Language for Healthcare:   [x] English       [] other:    SUBJECTIVE EXAMINATION     Patient stated complaint: Pt reports she is doing well other than her knee pain. States she plans

## 2025-02-20 ENCOUNTER — PATIENT MESSAGE (OUTPATIENT)
Dept: INTERNAL MEDICINE CLINIC | Age: 75
End: 2025-02-20

## 2025-02-20 ENCOUNTER — HOSPITAL ENCOUNTER (OUTPATIENT)
Dept: PHYSICAL THERAPY | Age: 75
Setting detail: THERAPIES SERIES
Discharge: HOME | End: 2025-02-20
Payer: MEDICARE

## 2025-02-20 DIAGNOSIS — E66.9 TYPE 2 DIABETES MELLITUS WITH OBESITY (HCC): ICD-10-CM

## 2025-02-20 DIAGNOSIS — E11.69 TYPE 2 DIABETES MELLITUS WITH OBESITY (HCC): ICD-10-CM

## 2025-02-20 PROCEDURE — 97110 THERAPEUTIC EXERCISES: CPT

## 2025-02-20 PROCEDURE — 97112 NEUROMUSCULAR REEDUCATION: CPT

## 2025-02-20 RX ORDER — BLOOD SUGAR DIAGNOSTIC
1 STRIP MISCELLANEOUS 2 TIMES DAILY
Qty: 200 EACH | Refills: 1 | Status: SHIPPED | OUTPATIENT
Start: 2025-02-20

## 2025-02-20 NOTE — TELEPHONE ENCOUNTER
Last OV: 12/3/2024  Next OV: 3/11/2025    Next appointment due:3/3/2025     Last fill:8/29/24  Refills:1

## 2025-02-20 NOTE — FLOWSHEET NOTE
[facility] - Outpatient Rehabilitation and Therapy [address], [place] [address] office: [phone] fax: [phone]        Physical Therapy: TREATMENT/PROGRESS NOTE   Patient: Lexie Villa (74 y.o. female)   Examination Date: 2025   :  1950 MRN: [id number]   Visit #:   Insurance Allowable Auth Needed   Med nec []Yes    [x]No    Insurance: Payor: MEDICARE / Plan: MEDICARE PART A AND B / Product Type: *No Product type* /   Insurance ID: [id number] - (Medicare)  Secondary Insurance (if applicable): [facility]   Treatment Diagnosis:     ICD-10-CM    1. Hip weakness  R29.898       2. Quadriceps weakness  M62.81       3. Impaired range of motion of left hip  M25.652        ICD-10-CM    1. Risk for falls  Z91.81       2. Balance disorder  R26.89             Medical Diagnosis:  Left hip pain [M25.552]   Referring Physician: Vinh Ames DO  PCP: Fabiola Bueno APRN - CNP     Plan of care signed (Y/N): yes    Date of Patient follow up with Physician:      Plan of Care Report: NO  POC update due: (10 visits /OR AUTH LIMITS, whichever is less)  25                                             Medical History:  Comorbidities:  Cancer/Tumor  Diabetes (Type I or II)  Hypertension  Pacemaker  Osteoarthritis  Relevant Medical History: B TKA, previous lumbar ablation, myelodisplastic syndrome, stents, quadruple bypass                                          Precautions/ Contra-indications:           Latex allergy:  NO  Pacemaker:    YES  Contraindications for Manipulation: NA  Date of Surgery:   Other:    Red Flags:  None    Suicide Screening:   The patient did not verbalize a primary behavioral concern, suicidal ideation, suicidal intent, or demonstrate suicidal behaviors.    Preferred Language for Healthcare:   [x] English       [] other:    SUBJECTIVE EXAMINATION     Patient stated complaint: Pt reports she almost over slept - set her alarm for PM not AM. Reports

## 2025-02-25 ENCOUNTER — APPOINTMENT (OUTPATIENT)
Dept: PHYSICAL THERAPY | Age: 75
End: 2025-02-25
Payer: MEDICARE

## 2025-02-26 ENCOUNTER — APPOINTMENT (OUTPATIENT)
Dept: CT IMAGING | Age: 75
End: 2025-02-26
Payer: MEDICARE

## 2025-02-26 ENCOUNTER — APPOINTMENT (OUTPATIENT)
Dept: GENERAL RADIOLOGY | Age: 75
End: 2025-02-26
Payer: MEDICARE

## 2025-02-26 ENCOUNTER — HOSPITAL ENCOUNTER (EMERGENCY)
Age: 75
Discharge: HOME | End: 2025-02-26
Payer: MEDICARE

## 2025-02-26 VITALS
OXYGEN SATURATION: 99 % | WEIGHT: 193 LBS | BODY MASS INDEX: 38.91 KG/M2 | SYSTOLIC BLOOD PRESSURE: 158 MMHG | HEART RATE: 59 BPM | TEMPERATURE: 98.3 F | DIASTOLIC BLOOD PRESSURE: 54 MMHG | RESPIRATION RATE: 18 BRPM | HEIGHT: 59 IN
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
## 2025-02-26 DIAGNOSIS — S09.90XA CLOSED HEAD INJURY, INITIAL ENCOUNTER: ICD-10-CM

## 2025-02-26 DIAGNOSIS — S20.211A CONTUSION OF RIB ON RIGHT SIDE, INITIAL ENCOUNTER: ICD-10-CM

## 2025-02-26 LAB
ALBUMIN SERPL-MCNC: 3.8 G/DL (ref 3.4–5)
ALBUMIN/GLOB SERPL: 1.5 {RATIO} (ref 1.1–2.2)
ALP SERPL-CCNC: 141 U/L (ref 40–129)
ALT SERPL-CCNC: 11 U/L (ref 10–40)
ANION GAP SERPL CALCULATED.3IONS-SCNC: 10 MMOL/L (ref 3–16)
AST SERPL-CCNC: 23 U/L (ref 15–37)
BASOPHILS # BLD: 0 K/UL (ref 0–0.2)
BASOPHILS NFR BLD: 0.5 %
BILIRUB SERPL-MCNC: 0.3 MG/DL (ref 0–1)
BUN SERPL-MCNC: 22 MG/DL (ref 7–20)
CALCIUM SERPL-MCNC: 9.1 MG/DL (ref 8.3–10.6)
CHLORIDE SERPL-SCNC: 93 MMOL/L (ref 99–110)
CO2 SERPL-SCNC: 23 MMOL/L (ref 21–32)
CREAT SERPL-MCNC: 0.9 MG/DL (ref 0.6–1.2)
DEPRECATED RDW RBC AUTO: 18.3 % (ref 12.4–15.4)
EOSINOPHIL # BLD: 0.1 K/UL (ref 0–0.6)
EOSINOPHIL NFR BLD: 1.1 %
GFR SERPLBLD CREATININE-BSD FMLA CKD-EPI: 67 ML/MIN/{1.73_M2}
GLUCOSE SERPL-MCNC: 109 MG/DL (ref 70–99)
HCT VFR BLD AUTO: 29.4 % (ref 36–48)
HGB BLD-MCNC: 9.2 G/DL (ref 12–16)
LYMPHOCYTES # BLD: 1.1 K/UL (ref 1–5.1)
LYMPHOCYTES NFR BLD: 21.2 %
MCH RBC QN AUTO: 25.1 PG (ref 26–34)
MCHC RBC AUTO-ENTMCNC: 31.4 G/DL (ref 31–36)
MCV RBC AUTO: 79.9 FL (ref 80–100)
MONOCYTES # BLD: 0.8 K/UL (ref 0–1.3)
MONOCYTES NFR BLD: 14.3 %
NEUTROPHILS # BLD: 3.3 K/UL (ref 1.7–7.7)
NEUTROPHILS NFR BLD: 62.9 %
PLATELET # BLD AUTO: 229 K/UL (ref 135–450)
PMV BLD AUTO: 7.5 FL (ref 5–10.5)
POTASSIUM SERPL-SCNC: 4.8 MMOL/L (ref 3.5–5.1)
PROT SERPL-MCNC: 6.3 G/DL (ref 6.4–8.2)
RBC # BLD AUTO: 3.68 M/UL (ref 4–5.2)
SODIUM SERPL-SCNC: 126 MMOL/L (ref 136–145)
TROPONIN, HIGH SENSITIVITY: 17 NG/L (ref 0–14)
TROPONIN, HIGH SENSITIVITY: 18 NG/L (ref 0–14)
WBC # BLD AUTO: 5.3 K/UL (ref 4–11)

## 2025-02-26 PROCEDURE — 6370000000 HC RX 637 (ALT 250 FOR IP): Performed by: PHYSICIAN ASSISTANT

## 2025-02-26 PROCEDURE — 71260 CT THORAX DX C+: CPT

## 2025-02-26 PROCEDURE — 99285 EMERGENCY DEPT VISIT HI MDM: CPT

## 2025-02-26 PROCEDURE — 80053 COMPREHEN METABOLIC PANEL: CPT

## 2025-02-26 PROCEDURE — 85025 COMPLETE CBC W/AUTO DIFF WBC: CPT

## 2025-02-26 PROCEDURE — 93005 ELECTROCARDIOGRAM TRACING: CPT | Performed by: PHYSICIAN ASSISTANT

## 2025-02-26 PROCEDURE — 72125 CT NECK SPINE W/O DYE: CPT

## 2025-02-26 PROCEDURE — 84484 ASSAY OF TROPONIN QUANT: CPT

## 2025-02-26 PROCEDURE — 73030 X-RAY EXAM OF SHOULDER: CPT

## 2025-02-26 PROCEDURE — 70450 CT HEAD/BRAIN W/O DYE: CPT

## 2025-02-26 PROCEDURE — 6360000004 HC RX CONTRAST MEDICATION: Performed by: PHYSICIAN ASSISTANT

## 2025-02-26 RX ORDER — ONDANSETRON 4 MG/1
4 TABLET, ORALLY DISINTEGRATING ORAL ONCE
Status: COMPLETED | OUTPATIENT
Start: 2025-02-26 | End: 2025-02-26

## 2025-02-26 RX ORDER — IOPAMIDOL 755 MG/ML
75 INJECTION, SOLUTION INTRAVASCULAR
Status: COMPLETED | OUTPATIENT
Start: 2025-02-26 | End: 2025-02-26

## 2025-02-26 RX ORDER — HYDROCODONE BITARTRATE AND ACETAMINOPHEN 5; 325 MG/1; MG/1
1 TABLET ORAL ONCE
Status: COMPLETED | OUTPATIENT
Start: 2025-02-26 | End: 2025-02-26

## 2025-02-26 RX ADMIN — HYDROCODONE BITARTRATE AND ACETAMINOPHEN 1 TABLET: 5; 325 TABLET ORAL at 16:34

## 2025-02-26 RX ADMIN — IOPAMIDOL 75 ML: 755 INJECTION, SOLUTION INTRAVENOUS at 17:16

## 2025-02-26 RX ADMIN — ONDANSETRON 4 MG: 4 TABLET, ORALLY DISINTEGRATING ORAL at 16:34

## 2025-02-26 ASSESSMENT — ENCOUNTER SYMPTOMS
NAUSEA: 0
BACK PAIN: 0
VOMITING: 0
COUGH: 0
SHORTNESS OF BREATH: 0
CONSTIPATION: 0
DIARRHEA: 0
PHOTOPHOBIA: 0
ABDOMINAL PAIN: 0
COLOR CHANGE: 0
CHEST TIGHTNESS: 0
RESPIRATORY NEGATIVE: 1

## 2025-02-26 ASSESSMENT — PAIN SCALES - GENERAL: PAINLEVEL_OUTOF10: 5

## 2025-02-26 ASSESSMENT — PAIN - FUNCTIONAL ASSESSMENT: PAIN_FUNCTIONAL_ASSESSMENT: 0-10

## 2025-02-26 ASSESSMENT — PAIN DESCRIPTION - ORIENTATION: ORIENTATION: RIGHT

## 2025-02-26 NOTE — ED TRIAGE NOTES
Pt here via [facility] for c/o mechanical fall. Hit head, on blood thinners. Denies neck pain/tenderness. C/o right arm and right rib pain.

## 2025-02-27 ENCOUNTER — OFFICE VISIT (OUTPATIENT)
Dept: INTERNAL MEDICINE CLINIC | Age: 75
End: 2025-02-27

## 2025-02-27 ENCOUNTER — CARE COORDINATION (OUTPATIENT)
Dept: CARE COORDINATION | Age: 75
End: 2025-02-27

## 2025-02-27 ENCOUNTER — HOSPITAL ENCOUNTER (OUTPATIENT)
Dept: PHYSICAL THERAPY | Age: 75
Setting detail: THERAPIES SERIES
End: 2025-02-27
Payer: MEDICARE

## 2025-02-27 VITALS
OXYGEN SATURATION: 99 % | DIASTOLIC BLOOD PRESSURE: 50 MMHG | HEART RATE: 60 BPM | BODY MASS INDEX: 41.65 KG/M2 | HEIGHT: 59 IN | WEIGHT: 206.6 LBS | SYSTOLIC BLOOD PRESSURE: 130 MMHG

## 2025-02-27 DIAGNOSIS — I10 ESSENTIAL HYPERTENSION: Primary | ICD-10-CM

## 2025-02-27 LAB
EKG ATRIAL RATE: 60 BPM
EKG DIAGNOSIS: NORMAL
EKG P AXIS: -74 DEGREES
EKG P-R INTERVAL: 300 MS
EKG Q-T INTERVAL: 416 MS
EKG QRS DURATION: 86 MS
EKG QTC CALCULATION (BAZETT): 416 MS
EKG R AXIS: 20 DEGREES
EKG T AXIS: 48 DEGREES
EKG VENTRICULAR RATE: 60 BPM

## 2025-02-27 PROCEDURE — 93010 ELECTROCARDIOGRAM REPORT: CPT | Performed by: INTERNAL MEDICINE

## 2025-02-27 ASSESSMENT — PATIENT HEALTH QUESTIONNAIRE - PHQ9
9. THOUGHTS THAT YOU WOULD BE BETTER OFF DEAD, OR OF HURTING YOURSELF: NOT AT ALL
4. FEELING TIRED OR HAVING LITTLE ENERGY: MORE THAN HALF THE DAYS
1. LITTLE INTEREST OR PLEASURE IN DOING THINGS: NOT AT ALL
SUM OF ALL RESPONSES TO PHQ QUESTIONS 1-9: 2
SUM OF ALL RESPONSES TO PHQ9 QUESTIONS 1 & 2: 0
SUM OF ALL RESPONSES TO PHQ QUESTIONS 1-9: 2
3. TROUBLE FALLING OR STAYING ASLEEP: NOT AT ALL
8. MOVING OR SPEAKING SO SLOWLY THAT OTHER PEOPLE COULD HAVE NOTICED. OR THE OPPOSITE, BEING SO FIGETY OR RESTLESS THAT YOU HAVE BEEN MOVING AROUND A LOT MORE THAN USUAL: NOT AT ALL
7. TROUBLE CONCENTRATING ON THINGS, SUCH AS READING THE NEWSPAPER OR WATCHING TELEVISION: NOT AT ALL
SUM OF ALL RESPONSES TO PHQ QUESTIONS 1-9: 2
SUM OF ALL RESPONSES TO PHQ QUESTIONS 1-9: 2
2. FEELING DOWN, DEPRESSED OR HOPELESS: NOT AT ALL
5. POOR APPETITE OR OVEREATING: NOT AT ALL
6. FEELING BAD ABOUT YOURSELF - OR THAT YOU ARE A FAILURE OR HAVE LET YOURSELF OR YOUR FAMILY DOWN: NOT AT ALL

## 2025-02-27 NOTE — CARE COORDINATION
Ambulatory Care Coordination Note     2/27/2025 3:06 PM     Patient outreach attempt by this AC today to offer care management services. [facility] was unable to reach the patient by telephone today;   left voice message requesting a return phone call to this ACM. Attempt x1     ACM: Donna Montenegro, RN     Care Summary Note:   [facility]  2/26/25, fall     PCP/Specialist follow up:   Future Appointments         Provider Specialty Dept Phone    3/4/2025 2:40 PM Lashon Da Silva, PT Physical Therapy [phone]    3/11/2025 2:40 PM Fabiola Bueno, APRN - CNP Internal Medicine [phone]    4/30/2025 2:45 PM Maya Sanches APRN - CNP Cardiology [phone]    7/17/2025 2:30 PM [facility] [place] DEVICE CHECK Cardiology [phone]    7/17/2025 2:30 PM Petrona Abraham, APRN - CNP Cardiology [phone]            Follow Up:   Plan for next AC outreach in approximately 1 week to complete:  - outreach attempt to offer care management services.

## 2025-02-27 NOTE — PROGRESS NOTES
Office Visit   2/27/2025    Assessment and Plan:  1. Essential hypertension  Assessment & Plan:  -  Chronic, controlled.  Blood pressure today at goal.  -  Asymptomatic  -   Patient will check blood pressure with her arm cuff and log readings.  She was advised to bring BP logs and arm cuff machine to next office visit.  -  Continue metoprolol 25 mg daily, Lasix 40 mg daily and losartan 50 mg daily and Imdur 60 mg daily.  -   Keep follow-up appointment with primary care provider       Return for Keep appointment with primary care provider.     Subjective:  Chief Complaint   Patient presents with    Hypertension     Stopped taking Amlodipine 4/2024   Reports BP being 195/105        HPI:   Lexie Villa is a 74 y.o. female who presents to the clinic today for follow up.    Patient presents for elevated blood pressure.   present.  -   History of hypertension   -  taking metoprolol 25 mg 1/2 tablet bid, losartan 50 mg daily, imdur 60 mg daily, lasix 40 mg.  -  taking blood pressure at home on wrist cuff  -  home bp log shows ranges from 120-160's/70-90's  -  had a high reading 195/101 a few nights ago because her brother passed away.  -  denies chest pain, palpitations, shortness of breath, trouble breathing, lightheadedness, dizziness or blurred vision.  -   in ED yesterday for mechanical fall-feeling sore but better.      Review of Systems  All systems are negative except for HPI    Allergies   Allergen Reactions    Albuterol Palpitations and Other (See Comments)     Other reaction(s): Chest Pain    Niacin Er Itching and Rash    Cipro [Ciprofloxacin Hcl]     Ciprofloxacin Other (See Comments)     Sores in mouth per pt. as of 12-11-24 BK    Heparin Other (See Comments)     Caused bruises and hematomas immediately when drip started.  MARKED BRUISING/HEMATOMAS    Farxiga [Dapagliflozin]      UTI and yeast infections    Moxifloxacin Rash    Tagamet [Cimetidine] Rash     Current Outpatient Rx   Medication Sig

## 2025-02-27 NOTE — ED PROVIDER NOTES
I was available for consultation during patient's ED stay.  Patient was cared for by [facility].  I did not evaluate or participate in patient care.    EKG Interpretation    Interpreted by emergency department physician    Rhythm:  Atrial paced rhythm with prolonged AV conduction  Rate: normal  Axis: normal  Ectopy: none  Conduction: Atrial paced rhythm  ST Segments: Nonspecific changes  T Waves: normal  Q Waves: none    Clinical Impression: Atrial paced rhythm with prolonged AV conduction with normal QRS duration normal QT QTc unchanged from previous EKG dated January 15, 2025.  Nonspecific anterior changes also unchanged.  Interpreted by myself.    MD Clay Fowler Stephen W, MD  02/26/25 5660    
REMOVAL Bilateral 1997    PACEMAKER PLACEMENT  05/2019    MN NJX DX/THER SBST INTRLMNR CRV/THRC W/IMG GDN N/A 11/05/2018    MIDLINE L4/L5 LUMBAR INTERLAMINAR EPIDURAL STEROID INJECTION WITH FLUOROSCOPY performed by Amanda Okeefe MD at [facility] SIC    UMBILICAL HERNIA REPAIR      UPPER GASTROINTESTINAL ENDOSCOPY      UPPER GASTROINTESTINAL ENDOSCOPY  10/30/2015    UPPER GASTROINTESTINAL ENDOSCOPY  10/14/2016    with biopsy    UPPER GASTROINTESTINAL ENDOSCOPY N/A 03/22/2022    EGD BIOPSY performed by Lincoln Mccain MD at [facility] ENDOSCOPY    UPPER GASTROINTESTINAL ENDOSCOPY N/A 03/22/2022    EGD DILATION BALLOON performed by Lincoln Mccain MD at [facility] ENDOSCOPY    UPPER GASTROINTESTINAL ENDOSCOPY N/A 05/03/2022    ENTEROSCOPY WITH INTERVENTION performed by Lincoln Mccain MD at [facility] ENDOSCOPY    VENTRAL HERNIA REPAIR N/A 10/29/2019    OPEN VENTRAL HERNIA REPAIR WITH  MESH performed by Barrera Sweet MD at [facility] OR       CURRENTMEDICATIONS       Discharge Medication List as of 2/26/2025  7:02 PM        CONTINUE these medications which have NOT CHANGED    Details   clopidogrel (PLAVIX) 75 MG tablet Take 1 tablet by mouth daily, Disp-90 tablet, R-3Normal      aspirin EC 81 MG EC tablet Take 1 tablet by mouth daily, Disp-90 tablet, R-1Normal      blood glucose test strips (FREESTYLE TEST STRIPS) strip 1 each by In Vitro route 2 times daily As needed., Disp-200 each, R-1Normal      estradiol (ESTRACE) 0.1 MG/GM vaginal cream Place 1 g vaginally See Admin Instructions Three times a week, Disp-1 g, R-5Normal      gabapentin (NEURONTIN) 400 MG capsule Take 1 capsule by mouth 3 times daily for 180 days., Disp-270 capsule, R-1Normal      pioglitazone (ACTOS) 15 MG tablet Take 1 tablet by mouth in the morning and at bedtime, Disp-180 tablet, R-1Normal      potassium chloride (KLOR-CON M) 20 MEQ extended release tablet Take 1 tablet by mouth daily, Disp-90 tablet, R-1Normal      !! Lancets MISC 2 TIMES DAILY Starting Thu 1/30/2025,

## 2025-02-27 NOTE — ASSESSMENT & PLAN NOTE
-  Chronic, controlled.  Blood pressure today at goal.  -  Asymptomatic  -   Patient will check blood pressure with her arm cuff and log readings.  She was advised to bring in her BP logs and arm cuff machine to next office visit.  -  Continue metoprolol 25 mg daily, Lasix 40 mg daily and losartan 50 mg daily and Imdur 60 mg daily.  -   Keep follow-up appointment with primary care provider

## 2025-03-04 ENCOUNTER — HOSPITAL ENCOUNTER (OUTPATIENT)
Dept: PHYSICAL THERAPY | Age: 75
Setting detail: THERAPIES SERIES
End: 2025-03-04
Payer: MEDICARE

## 2025-03-06 ENCOUNTER — CARE COORDINATION (OUTPATIENT)
Dept: CARE COORDINATION | Age: 75
End: 2025-03-06

## 2025-03-06 NOTE — CARE COORDINATION
Ambulatory Care Coordination Note     3/6/2025 3:22 PM     Patient outreach attempt by this AC today to offer care management services.   [employer] message sent requesting patient to contact this ACM. Attempt x2    [facility] 2/26/25, fall     ACM: Juana Reilly, RN     Care Summary Note:     PCP/Specialist follow up:   Future Appointments         Provider Specialty Dept Phone    3/11/2025 2:40 PM Fabiola Bueno, APRN - [facility] Internal Medicine [phone]    3/13/2025 2:20 PM Lashon Da Silva, PT Physical Therapy [phone]    4/30/2025 2:45 PM Maya Sanches APRN - CNP Cardiology [phone]    7/17/2025 2:30 PM SCHEDULE, [facility] DEVICE CHECK Cardiology [phone]    7/17/2025 2:30 PM Petrona Abraham, APRN - [facility] Cardiology [phone]            Follow Up:   Plan for next AC outreach in approximately 1 week to complete:  - outreach attempt to offer care management services.

## 2025-03-11 ENCOUNTER — OFFICE VISIT (OUTPATIENT)
Dept: INTERNAL MEDICINE CLINIC | Age: 75
End: 2025-03-11

## 2025-03-11 VITALS
SYSTOLIC BLOOD PRESSURE: 138 MMHG | WEIGHT: 202 LBS | HEIGHT: 59 IN | BODY MASS INDEX: 40.72 KG/M2 | DIASTOLIC BLOOD PRESSURE: 72 MMHG | HEART RATE: 69 BPM | OXYGEN SATURATION: 97 %

## 2025-03-11 DIAGNOSIS — N18.31 STAGE 3A CHRONIC KIDNEY DISEASE (HCC): ICD-10-CM

## 2025-03-11 DIAGNOSIS — E11.69 TYPE 2 DIABETES MELLITUS WITH OBESITY (HCC): ICD-10-CM

## 2025-03-11 DIAGNOSIS — E78.2 MIXED HYPERLIPIDEMIA: ICD-10-CM

## 2025-03-11 DIAGNOSIS — Z12.31 BREAST CANCER SCREENING BY MAMMOGRAM: ICD-10-CM

## 2025-03-11 DIAGNOSIS — E66.9 TYPE 2 DIABETES MELLITUS WITH OBESITY (HCC): ICD-10-CM

## 2025-03-11 DIAGNOSIS — E66.01 SEVERE OBESITY (BMI 35.0-39.9) WITH COMORBIDITY: ICD-10-CM

## 2025-03-11 DIAGNOSIS — I49.5 SINUS NODE DYSFUNCTION (HCC): ICD-10-CM

## 2025-03-11 DIAGNOSIS — G47.33 OSA (OBSTRUCTIVE SLEEP APNEA): ICD-10-CM

## 2025-03-11 DIAGNOSIS — N60.02 BREAST CYST, LEFT: ICD-10-CM

## 2025-03-11 DIAGNOSIS — I48.0 PAF (PAROXYSMAL ATRIAL FIBRILLATION) (HCC): ICD-10-CM

## 2025-03-11 DIAGNOSIS — D46.9 MDS (MYELODYSPLASTIC SYNDROME) (HCC): ICD-10-CM

## 2025-03-11 DIAGNOSIS — I10 ESSENTIAL HYPERTENSION: ICD-10-CM

## 2025-03-11 DIAGNOSIS — I25.119 CORONARY ARTERY DISEASE INVOLVING NATIVE CORONARY ARTERY OF NATIVE HEART WITH ANGINA PECTORIS: ICD-10-CM

## 2025-03-11 DIAGNOSIS — Z00.00 MEDICARE ANNUAL WELLNESS VISIT, SUBSEQUENT: Primary | ICD-10-CM

## 2025-03-11 PROBLEM — I20.0 UNSTABLE ANGINA (HCC): Status: RESOLVED | Noted: 2022-08-15 | Resolved: 2025-03-11

## 2025-03-11 LAB
ANION GAP SERPL CALCULATED.3IONS-SCNC: 11 MMOL/L (ref 3–16)
BUN SERPL-MCNC: 24 MG/DL (ref 7–20)
CALCIUM SERPL-MCNC: 9.2 MG/DL (ref 8.3–10.6)
CHLORIDE SERPL-SCNC: 101 MMOL/L (ref 99–110)
CHOLEST SERPL-MCNC: 109 MG/DL (ref 0–199)
CO2 SERPL-SCNC: 24 MMOL/L (ref 21–32)
CREAT SERPL-MCNC: 1 MG/DL (ref 0.6–1.2)
GFR SERPLBLD CREATININE-BSD FMLA CKD-EPI: 59 ML/MIN/{1.73_M2}
GLUCOSE SERPL-MCNC: 108 MG/DL (ref 70–99)
HBA1C MFR BLD: 5.8 %
HDLC SERPL-MCNC: 40 MG/DL (ref 40–60)
LDLC SERPL CALC-MCNC: 50 MG/DL
POTASSIUM SERPL-SCNC: 4.9 MMOL/L (ref 3.5–5.1)
SODIUM SERPL-SCNC: 136 MMOL/L (ref 136–145)
TRIGL SERPL-MCNC: 93 MG/DL (ref 0–150)
VLDLC SERPL CALC-MCNC: 19 MG/DL

## 2025-03-11 NOTE — ASSESSMENT & PLAN NOTE
Chronic, controlled. Pacemaker in place. Continue medications and seeing EP as scheduled. She is not currently on AC due to anemia, MGUS and GI bleeding.

## 2025-03-11 NOTE — ASSESSMENT & PLAN NOTE
Chronic, controlled. Continue medications and follow up with cardiology as recommended. Currently on imdur 60 mg daily, crestor 40 mg daily, zetia 10 mg daily, metoprolol 25 mg BID, 40 mg lasix daily and losartan to 50 mg daily

## 2025-03-11 NOTE — PATIENT INSTRUCTIONS

## 2025-03-11 NOTE — PROGRESS NOTES
Fabiola Bueno APRN - CNP   rosuvastatin (CRESTOR) 40 MG tablet Take 0.5 tablets by mouth every evening Yes Fabiola Bueno APRN - CNP   pantoprazole (PROTONIX) 40 MG tablet Take 1 tablet by mouth 2 times daily (before meals) Yes Fabiola Bueno APRN - CNP   furosemide (LASIX) 40 MG tablet Take 1 tablet by mouth daily Yes Fbaiola Bueno APRN - CNP   ezetimibe (ZETIA) 10 MG tablet Take 0.5 tablets by mouth nightly Take 5 mg by mouth nightly Yes Fabiola Bueno APRN - CNP   Ascorbic Acid (HUMBLE-C PO) Take by mouth Yes ProviderJeni MD   metroNIDAZOLE (METROCREAM) 0.75 % cream  Yes ProviderJeni MD   acetaminophen (TYLENOL) 325 MG tablet Take 2 tablets by mouth every 6 hours as needed for Pain Yes ProviderJeni MD   Incontinence Supply Disposable (DEPEND UNDERGARMENT EX ABSORB) MISC Change 2-3 times a day as needed Yes Fabiola Bueno APRN - CNP   aspirin EC 81 MG EC tablet Take 1 tablet by mouth daily Yes Fabiola Bueno APRN - CNP   octreotide (SANDOSTATIN) 100 MCG/ML SOLN injection Inject 20 mg. Indications: Receives monthly injections as of 10-26-22 BK Yes ProviderJeni MD   Epoetin Leonard-epbx (RETACRIT IJ) Inject as directed Every other week Yes ProviderJeni MD   zinc sulfate (ZINCATE) 220 (50 Zn) MG capsule Take 1 capsule by mouth 4 times daily Yes ProviderJeni MD   Cyanocobalamin (VITAMIN B-12 PO) Take 3,000 mcg by mouth daily Yes ProviderJeni MD   vitamin D (CHOLECALCIFEROL) 1000 UNIT TABS tablet Take 1 tablet by mouth daily Yes Jeni East MD   Calcium Citrate-Vitamin D (CALCIUM CITRATE + D PO) Take 1 tablet by mouth daily Yes Jeni East MD   SLOW-MAG 71.5-119 MG TBEC tablet Take 1 tablet by mouth daily 143 mg Yes Jeni East MD   nitroGLYCERIN (NITROLINGUAL) 0.4 MG/SPRAY spray Place 1 spray under the tongue every 5 minutes as needed As directed for chest pain Yes Jeni East MD CareTeam

## 2025-03-11 NOTE — ASSESSMENT & PLAN NOTE
Chronic, controlled. Continue to avoid nsaids and nephrotoxic agents. Work on hydration.     Orders:    Basic Metabolic Panel; Future

## 2025-03-11 NOTE — ASSESSMENT & PLAN NOTE
Chronic, stable. Controlled. POCT A1c 5.8 today. Continue 15 mg actos BID.   Orders:    POCT glycosylated hemoglobin (Hb A1C)    Lipid Panel; Future

## 2025-03-11 NOTE — ASSESSMENT & PLAN NOTE
Chronic, continue Crestor 40 mg and Zetia 10 mg daily.  Continue seeing cardiology as recommended.

## 2025-03-11 NOTE — ASSESSMENT & PLAN NOTE
chronic, controlled.  Continue metoprolol 25 mg daily, Lasix 40 mg daily and  losartan to 50 mg daily.  Monitor BP at home and return if not at goal. Continue to work on limiting sodium, caffeine and etoh use.

## 2025-03-12 ENCOUNTER — RESULTS FOLLOW-UP (OUTPATIENT)
Dept: INTERNAL MEDICINE CLINIC | Age: 75
End: 2025-03-12

## 2025-03-13 ENCOUNTER — HOSPITAL ENCOUNTER (OUTPATIENT)
Dept: PHYSICAL THERAPY | Age: 75
Setting detail: THERAPIES SERIES
Discharge: HOME | End: 2025-03-13
Payer: MEDICARE

## 2025-03-13 ENCOUNTER — CARE COORDINATION (OUTPATIENT)
Dept: CARE COORDINATION | Age: 75
End: 2025-03-13

## 2025-03-13 PROCEDURE — 97110 THERAPEUTIC EXERCISES: CPT

## 2025-03-13 NOTE — PLAN OF CARE
[facility] - Outpatient Rehabilitation and Therapy [address], [place] [address] office: [phone] fax: [phone]       Physical Therapy Discharge Summary    Dear Dr. Vinh Ames DO   ,    We had the pleasure of treating the following patient for physical therapy services at [facility] Outpatient Physical Therapy.  A summary of our findings can be found in the discharge summary below.  If you have any questions or concerns regarding these findings, please do not hesitate to contact me at the office phone number checked above.  Thank you for the referral.     Total Visits: 18     Recommendation:   [] Hold PT, pending MD visit   [x] Discharge to HEP. Follow up with PT or MD PRN.     Reason for Discharge:  Pt d/c from OP PT this date. Pt has demonstrated significant improvement in lumbar and hip ROM, LE strength, and balance. Pt reports functional improvements with balance with improved confidence and stability when ambulating short distances without AD. However, no significant changes noted with LEFS. Pt continues with lateral L hip pain and was referred to spine specialist for ablation and planned to complete following PT. Pt currently not completing HEP due to rib pain s/p fall but advised to resume HEP once pain subsides. Pt advised to schedule appointment due to continued symptoms. Pt currently not completing HEP due to rib pain s/p fall but advised to resume HEP once pain subsides.     Physical Therapy: TREATMENT/PROGRESS NOTE   Patient: Lexie Villa (74 y.o. female)   Examination Date: 2025   :  1950 MRN: [id number]   Visit #:   Insurance Allowable Auth Needed   Med nec []Yes    [x]No    Insurance: Payor: MEDICARE / Plan: MEDICARE PART A AND B / Product Type: *No Product type* /   Insurance ID: [id number] - (Medicare)  Secondary Insurance (if applicable): [facility]   Treatment Diagnosis:     ICD-10-CM    1. Hip weakness  R29.898       2. Quadriceps

## 2025-03-13 NOTE — CARE COORDINATION
Ambulatory Care Coordination Note     3/13/2025 9:35 AM     Patient Current Location:  Home: [address] [place] [id number]     This patient was received as a referral from [facility] health report .    ACM contacted the patient by telephone. Verified name and  with patient as identifiers. Provided introduction to self, and explanation of the ACM role.   Patient declined care management services at this time.          ACM: Juana Reilly RN     Challenges to be reviewed by the provider   Additional needs identified to be addressed with provider No  none               Method of communication with provider: none.    Utilization: Has the patient been seen in the ED since your last call? Yes,   Discharge Date: 25   Discharge Facility: [facility]  Reason for ED Visit: fall  Visit Diagnosis: closed head injury, contusion of rib, fall    Number of ED visits in the last 6 months: 1      Do you have any ongoing symptoms? No  Did you call your PCP prior to going to the ED? No, did not call the PCP office.     Review of Discharge Instructions:   [x] AVS discharge instructions  [x] Right Care, Right Place, Right Time document  [x] Medication changes  [x] Follow up appointments  [] Referral follow up   []        Care Summary Note:     ACM outreached patient; introduced self and role of CC.  Patient reports she is doing well. Active with op physical therapy.   Denies questions or concerns.     Offered patient enrollment in the Remote Patient Monitoring (RPM) program for in-home monitoring: Yes, but did not enroll at this time: controlled chronic disease management.     Assessments Completed:   No new or worsening sx reported    Medications Reviewed:   Not completed during this call:      Advance Care Planning:   Not reviewed during this call     Care Planning:   Not completed during this call    PCP/Specialist follow up:   Future Appointments         Provider Specialty Dept Phone    3/13/2025  2:20

## 2025-03-20 ENCOUNTER — TELEPHONE (OUTPATIENT)
Dept: INTERNAL MEDICINE CLINIC | Age: 75
End: 2025-03-20

## 2025-03-20 DIAGNOSIS — Z12.31 BREAST CANCER SCREENING BY MAMMOGRAM: Primary | ICD-10-CM

## 2025-03-20 NOTE — TELEPHONE ENCOUNTER
Sagar with [facility] Scheduling requesting order for breast ultrasound. She said anytime a diagnostic mammogram is ordered an order for breast ultrasound is needed.

## 2025-03-24 ENCOUNTER — TELEPHONE (OUTPATIENT)
Dept: CARDIOLOGY CLINIC | Age: 75
End: 2025-03-24

## 2025-03-24 DIAGNOSIS — I10 ESSENTIAL HYPERTENSION: ICD-10-CM

## 2025-03-24 NOTE — TELEPHONE ENCOUNTER
Pt is calling with BP readings that she would like NPTS to review as she does not see her until 4/30/25.    3/19  Morning 153/54 - 60  Evening 166/55 - 59    3/20  Morning 136/51 - 60  Evening 132/51 - 59    3/21  Morning 162/55 - 59  Evening 156/65 - 60    3/22  Morning 149/56 - 59  Evening 141/58 - 59    3/23  Morning 151/55 - 59  Evening 184/62 - 60    3/24  Morning 183/60 - 59

## 2025-03-25 RX ORDER — LOSARTAN POTASSIUM 100 MG/1
100 TABLET ORAL DAILY
Qty: 90 TABLET | Refills: 1 | Status: SHIPPED | OUTPATIENT
Start: 2025-03-25

## 2025-03-28 ENCOUNTER — TRANSCRIBE ORDERS (OUTPATIENT)
Dept: ADMINISTRATIVE | Age: 75
End: 2025-03-28

## 2025-03-28 DIAGNOSIS — M47.816 LUMBAR SPONDYLOSIS: Primary | ICD-10-CM

## 2025-04-08 ENCOUNTER — HOSPITAL ENCOUNTER (OUTPATIENT)
Dept: ULTRASOUND IMAGING | Age: 75
Discharge: HOME | End: 2025-04-08
Payer: MEDICARE

## 2025-04-08 ENCOUNTER — RESULTS FOLLOW-UP (OUTPATIENT)
Dept: INTERNAL MEDICINE CLINIC | Age: 75
End: 2025-04-08

## 2025-04-08 ENCOUNTER — HOSPITAL ENCOUNTER (OUTPATIENT)
Dept: WOMENS IMAGING | Age: 75
Discharge: HOME | End: 2025-04-08
Payer: MEDICARE

## 2025-04-08 VITALS — HEIGHT: 59 IN | WEIGHT: 199 LBS | BODY MASS INDEX: 40.12 KG/M2

## 2025-04-08 DIAGNOSIS — N64.4 BREAST PAIN IN FEMALE: ICD-10-CM

## 2025-04-08 DIAGNOSIS — N64.4 BREAST PAIN: ICD-10-CM

## 2025-04-08 DIAGNOSIS — N60.02 BREAST CYST, LEFT: Primary | ICD-10-CM

## 2025-04-08 DIAGNOSIS — Z12.31 BREAST CANCER SCREENING BY MAMMOGRAM: ICD-10-CM

## 2025-04-08 DIAGNOSIS — N60.02 BREAST CYST, LEFT: ICD-10-CM

## 2025-04-08 PROCEDURE — 76642 ULTRASOUND BREAST LIMITED: CPT

## 2025-04-08 PROCEDURE — G0279 TOMOSYNTHESIS, MAMMO: HCPCS

## 2025-04-20 ENCOUNTER — HOSPITAL ENCOUNTER (EMERGENCY)
Age: 75
Discharge: HOME | End: 2025-04-20
Payer: MEDICARE

## 2025-04-20 VITALS
HEART RATE: 60 BPM | WEIGHT: 198 LBS | RESPIRATION RATE: 16 BRPM | SYSTOLIC BLOOD PRESSURE: 183 MMHG | BODY MASS INDEX: 39.92 KG/M2 | DIASTOLIC BLOOD PRESSURE: 78 MMHG | TEMPERATURE: 97.8 F | HEIGHT: 59 IN | OXYGEN SATURATION: 94 %

## 2025-04-20 DIAGNOSIS — R04.0 EPISTAXIS: Primary | ICD-10-CM

## 2025-04-20 PROCEDURE — 99283 EMERGENCY DEPT VISIT LOW MDM: CPT

## 2025-04-20 PROCEDURE — 30903 CONTROL OF NOSEBLEED: CPT

## 2025-04-20 RX ORDER — TRANEXAMIC ACID 100 MG/ML
INJECTION, SOLUTION INTRAVENOUS
Status: DISCONTINUED
Start: 2025-04-20 | End: 2025-04-20 | Stop reason: HOSPADM

## 2025-04-20 RX ORDER — OXYMETAZOLINE HYDROCHLORIDE 0.05 G/100ML
SPRAY NASAL
Status: DISCONTINUED
Start: 2025-04-20 | End: 2025-04-20 | Stop reason: HOSPADM

## 2025-04-20 RX ORDER — OXYMETAZOLINE HYDROCHLORIDE 0.05 G/100ML
2 SPRAY NASAL ONCE
Status: DISCONTINUED | OUTPATIENT
Start: 2025-04-20 | End: 2025-04-20 | Stop reason: HOSPADM

## 2025-04-20 NOTE — ED NOTES
Observed for bleeding for approximately 60 min.  Pt rhino taped to rt side of face.  Pt discharged home verbalized understanding.

## 2025-04-21 ENCOUNTER — TELEPHONE (OUTPATIENT)
Dept: ENT CLINIC | Age: 75
End: 2025-04-21

## 2025-04-21 NOTE — TELEPHONE ENCOUNTER
Pt went to ER had rhino Rocket placed on 4/20/24, please advise on when  you would like to see pt

## 2025-04-23 ENCOUNTER — OFFICE VISIT (OUTPATIENT)
Dept: ENT CLINIC | Age: 75
End: 2025-04-23
Payer: MEDICARE

## 2025-04-23 VITALS
WEIGHT: 199 LBS | OXYGEN SATURATION: 97 % | TEMPERATURE: 97.6 F | SYSTOLIC BLOOD PRESSURE: 164 MMHG | HEIGHT: 59 IN | DIASTOLIC BLOOD PRESSURE: 75 MMHG | HEART RATE: 65 BPM | BODY MASS INDEX: 40.12 KG/M2

## 2025-04-23 DIAGNOSIS — T45.515A ADVERSE EFFECT OF ANTICOAGULANT, INITIAL ENCOUNTER: ICD-10-CM

## 2025-04-23 DIAGNOSIS — R04.0 EPISTAXIS: Primary | ICD-10-CM

## 2025-04-23 PROCEDURE — 3077F SYST BP >= 140 MM HG: CPT | Performed by: OTOLARYNGOLOGY

## 2025-04-23 PROCEDURE — G8399 PT W/DXA RESULTS DOCUMENT: HCPCS | Performed by: OTOLARYNGOLOGY

## 2025-04-23 PROCEDURE — G8427 DOCREV CUR MEDS BY ELIG CLIN: HCPCS | Performed by: OTOLARYNGOLOGY

## 2025-04-23 PROCEDURE — 1123F ACP DISCUSS/DSCN MKR DOCD: CPT | Performed by: OTOLARYNGOLOGY

## 2025-04-23 PROCEDURE — 1090F PRES/ABSN URINE INCON ASSESS: CPT | Performed by: OTOLARYNGOLOGY

## 2025-04-23 PROCEDURE — 1036F TOBACCO NON-USER: CPT | Performed by: OTOLARYNGOLOGY

## 2025-04-23 PROCEDURE — G8417 CALC BMI ABV UP PARAM F/U: HCPCS | Performed by: OTOLARYNGOLOGY

## 2025-04-23 PROCEDURE — 1159F MED LIST DOCD IN RCRD: CPT | Performed by: OTOLARYNGOLOGY

## 2025-04-23 PROCEDURE — 99203 OFFICE O/P NEW LOW 30 MIN: CPT | Performed by: OTOLARYNGOLOGY

## 2025-04-23 PROCEDURE — 3017F COLORECTAL CA SCREEN DOC REV: CPT | Performed by: OTOLARYNGOLOGY

## 2025-04-23 PROCEDURE — 3078F DIAST BP <80 MM HG: CPT | Performed by: OTOLARYNGOLOGY

## 2025-04-23 ASSESSMENT — ENCOUNTER SYMPTOMS
RHINORRHEA: 0
SORE THROAT: 0

## 2025-04-23 NOTE — PROGRESS NOTES
[facility]   DIVISION OF OTOLARYNGOLOGY- HEAD & NECK SURGERY  [facility]           NEW PATIENT VISIT      PCP:  Fabiola Bueno APRN - JAMES      REFERRED BY:   [facility]       CHIEF COMPLAINT    Chief Complaint   Patient presents with    Epistaxis        HISTORY OF PRESENT ILLNESS    Lexie Villa is a 74 y.o. female who presented today for evaluation and management for epistaxis.  Patient stated that she developed a nosebleed Sunday morning, after blowing nose.  Bled for two hours and then went to [facility].  Put Rapid rhino epistat in about 10 AM Sunday.  Is on Augmentin for ten days.  Uses CPAP with humidification and nasal pillows at night   Past nosebleeds could see it when I blow my nose in the mucus and dripped and stopped on it's own within five minutes.  Since started using Flonase, about at least one year.  On Plavix for about 3 years after two stents.  On aspirin 81 mg daily for at least ten years, for heart problems including atrial fib and post four vessel CABG 1997.  Was not advised to stop aspirin or Plavix for this nosebleed.  I have myelodysplastic syndrome, followed by Dr. Solares.      REVIEW OF SYSTEMS   Review of Systems   Constitutional:  Negative for chills and fever.   HENT:  Negative for congestion, ear discharge, ear pain, hearing loss, rhinorrhea, sore throat and tinnitus.          PAST MEDICAL HISTORY    Past Medical History:   Diagnosis Date    Anemia     Anesthesia     trouble after egd 10/2016.  Anxiety and severe tremors after AF ablation 8/2018-responded well to Ativan    Anesthesia complication     flailing, yelling when waking up from anesthesia-ativan helps ([id number]/29/19: ativans works within seconds of administration)    Atrial fibrillation (HCC) 10/01/2017    A. fib with SVR    Basal cell carcinoma 08/2021    removed from her back    CAD (coronary artery disease)     Chronic back pain     Chronic kidney disease     ???    COVID-19 01/2022

## 2025-04-23 NOTE — PATIENT INSTRUCTIONS
Please read and follow these instructions.  Please call the office and speak to the MA or LPN with any questions regarding these instructions.        CARE FOR NOSEBLEED      For prevention of nose bleeds, use a saline (salt water) nasal spray/mist, (eg. Ocean nasal saline mist, AYR nasal spray).  This is available \"over the counter\" at your pharmacy.  Enola two sprays in each nostril two to three times daily, and as needed for dryness or excessive mucus crusting, while you are awake.     Do not pull crusts out of nose, refrain from \"nose-picking.\"    You may blow your nose gently, but do not move your nose around when blowing.      Over the next two weeks, do the following:     Use a 12 hour decongestant spray, such as 0.05% oxymetazoline nasal spray (eg. Afrin).  This is available \"over the counter\" at your pharmacy.  Enola two sprays in each nostril three times a day for three days, then two times a day for 2 days, and then stop for two days, and then repeat the cycle once.    Use a saline (salt water) nasal spray/mist, (eg. Ocean nasal saline mist, AYR nasal spray).  This is available \"over the counter\" at your pharmacy.  Enola two sprays in each nostril every two to three hours while you are awake, for two weeks.    Use a bedside humidifier, at night, while sleeping.     Use polysporin ointment in each nostril at bedtime.  This is available \"over the counter\" at your pharmacy.  “[place]” the ointment onto the lateral wall of each nostril, gently, with a “Q-tip” applicator, then gently pinch and rub the nostrils as instructed.  (Do not apply directly to the septum middle wall of the nose)    If bleeding occurs, pinch your nostrils together in the soft part of the nose and push gently toward your face, and hold for ten minutes.  If bleeding persists, then repeat.  If bleeding still persists, dampen a cotton ball with Afrin and insert into your nostril and repeat the previous pinch maneuver.  If nose bleed remains

## 2025-04-28 ENCOUNTER — NURSE ONLY (OUTPATIENT)
Dept: CARDIOLOGY CLINIC | Age: 75
End: 2025-04-28

## 2025-04-28 ENCOUNTER — HOSPITAL ENCOUNTER (OUTPATIENT)
Dept: MRI IMAGING | Age: 75
Discharge: HOME | End: 2025-04-28
Payer: MEDICARE

## 2025-04-28 VITALS — OXYGEN SATURATION: 97 % | HEART RATE: 84 BPM | SYSTOLIC BLOOD PRESSURE: 153 MMHG | DIASTOLIC BLOOD PRESSURE: 75 MMHG

## 2025-04-28 DIAGNOSIS — Z95.0 PACEMAKER: Primary | ICD-10-CM

## 2025-04-28 DIAGNOSIS — I48.0 PAF (PAROXYSMAL ATRIAL FIBRILLATION) (HCC): ICD-10-CM

## 2025-04-28 DIAGNOSIS — I49.5 SINUS NODE DYSFUNCTION (HCC): ICD-10-CM

## 2025-04-28 DIAGNOSIS — M47.816 LUMBAR SPONDYLOSIS: ICD-10-CM

## 2025-04-28 PROCEDURE — 6360000004 HC RX CONTRAST MEDICATION: Performed by: PHYSICAL MEDICINE & REHABILITATION

## 2025-04-28 PROCEDURE — A9577 INJ MULTIHANCE: HCPCS | Performed by: PHYSICAL MEDICINE & REHABILITATION

## 2025-04-28 PROCEDURE — 72158 MRI LUMBAR SPINE W/O & W/DYE: CPT

## 2025-04-28 RX ADMIN — GADOBENATE DIMEGLUMINE 18 ML: 529 INJECTION, SOLUTION INTRAVENOUS at 13:36

## 2025-04-30 ENCOUNTER — OFFICE VISIT (OUTPATIENT)
Dept: CARDIOLOGY CLINIC | Age: 75
End: 2025-04-30
Payer: MEDICARE

## 2025-04-30 VITALS
HEART RATE: 60 BPM | BODY MASS INDEX: 40.22 KG/M2 | SYSTOLIC BLOOD PRESSURE: 128 MMHG | DIASTOLIC BLOOD PRESSURE: 62 MMHG | HEIGHT: 59 IN | OXYGEN SATURATION: 95 % | WEIGHT: 199.5 LBS

## 2025-04-30 DIAGNOSIS — I25.10 CORONARY ARTERY DISEASE DUE TO LIPID RICH PLAQUE: Primary | ICD-10-CM

## 2025-04-30 DIAGNOSIS — E78.2 MIXED HYPERLIPIDEMIA: ICD-10-CM

## 2025-04-30 DIAGNOSIS — I10 ESSENTIAL HYPERTENSION: ICD-10-CM

## 2025-04-30 DIAGNOSIS — I25.83 CORONARY ARTERY DISEASE DUE TO LIPID RICH PLAQUE: Primary | ICD-10-CM

## 2025-04-30 PROCEDURE — G8427 DOCREV CUR MEDS BY ELIG CLIN: HCPCS | Performed by: NURSE PRACTITIONER

## 2025-04-30 PROCEDURE — 1036F TOBACCO NON-USER: CPT | Performed by: NURSE PRACTITIONER

## 2025-04-30 PROCEDURE — G8417 CALC BMI ABV UP PARAM F/U: HCPCS | Performed by: NURSE PRACTITIONER

## 2025-04-30 PROCEDURE — 99214 OFFICE O/P EST MOD 30 MIN: CPT | Performed by: NURSE PRACTITIONER

## 2025-04-30 PROCEDURE — 1123F ACP DISCUSS/DSCN MKR DOCD: CPT | Performed by: NURSE PRACTITIONER

## 2025-04-30 PROCEDURE — 3074F SYST BP LT 130 MM HG: CPT | Performed by: NURSE PRACTITIONER

## 2025-04-30 PROCEDURE — 1090F PRES/ABSN URINE INCON ASSESS: CPT | Performed by: NURSE PRACTITIONER

## 2025-04-30 PROCEDURE — 3078F DIAST BP <80 MM HG: CPT | Performed by: NURSE PRACTITIONER

## 2025-04-30 PROCEDURE — G8399 PT W/DXA RESULTS DOCUMENT: HCPCS | Performed by: NURSE PRACTITIONER

## 2025-04-30 PROCEDURE — 3017F COLORECTAL CA SCREEN DOC REV: CPT | Performed by: NURSE PRACTITIONER

## 2025-04-30 PROCEDURE — 1159F MED LIST DOCD IN RCRD: CPT | Performed by: NURSE PRACTITIONER

## 2025-04-30 RX ORDER — MULTIVIT,CALC,MINS/IRON/FOLIC 500-18-0.4
1 TABLET ORAL DAILY
COMMUNITY

## 2025-04-30 NOTE — PROGRESS NOTES
[facility]     Outpatient Follow Up Note    Lexie Villa is 74 y.o. female who presents today with a history of HTN, CAD s/p CABG '99, s/p PTCA OM & LM '22; SND s/p PPM '18 and AF s/p PVI '18.    Her other hx includes: GIB '18; EGD suggestive GAVE, MGUS, cecal AVM    CHIEF COMPLAINT / HPI:  Follow Up secondary to coronary artery disease    Subjective:   She denies significant chest pain. There is SOB at times walking too far. The patient denies orthopnea/PND. She uses a CPAP. The patient does not have swelling. The patients weight is stable . The patient is not experiencing  dizziness.   She gets a flutter in her chest at times. The last time she felt it , it lasted pretty much all day. It happened a couple of weeks ago.she had no associated symptoms; it just got her attention    These symptoms show no change since the last OV  With regard to medication therapy the patient has been compliant with prescribed regimen. They have tolerated therapy to date.     Past Medical History:   Diagnosis Date    Anemia     Anesthesia     trouble after egd 10/2016.  Anxiety and severe tremors after AF ablation 8/2018-responded well to Ativan    Anesthesia complication     flailing, yelling when waking up from anesthesia-ativan helps ([id number]/29/19: ativans works within seconds of administration)    Atrial fibrillation (HCC) 10/01/2017    A. tamica with SVR    Basal cell carcinoma 08/2021    removed from her back    CAD (coronary artery disease)     Chronic back pain     Chronic kidney disease     ???    COVID-19 01/2022    Depression     GERD (gastroesophageal reflux disease)     Glaucoma     Hiatal hernia     Hyperlipidemia     Hypertension     Migraines, neuralgic     Neuropathy     Obesity     Osteoarthritis     Pneumonia 12/25/2021    Restless legs syndrome     Sleep apnea     uses bi-pap    Type 2 diabetes mellitus without complication (HCC)     Unspecified sleep apnea     bipap    Urinary incontinence     UTI

## 2025-05-12 ENCOUNTER — TELEPHONE (OUTPATIENT)
Dept: ENT CLINIC | Age: 75
End: 2025-05-12

## 2025-05-12 NOTE — TELEPHONE ENCOUNTER
Pt called stating she got rhino rocket removed on 4/23. She would like to know she will be able to use her Flonase again, informed her I would send a msg and someone would give her a call back.

## 2025-05-12 NOTE — TELEPHONE ENCOUNTER
Phone call.  I spoke to Sarai.  She stated that she has had no further epistaxis since removal of the rapid Rhino balloon Epistat.  I advised her that she can start to use her Flonase again, once daily, as needed for allergies.  I also advised her to continue to use saline nasal mist every few hours to keep the nose moist for 2 to 3 weeks, and then as needed if it feels dry or crusty.  I advised her to be very gentle when blowing her nose so as not to cause mucosal bleeding.  I advised her to call the office if she has any further bleeding or other ear nose throat or sinus problems.

## 2025-06-11 ENCOUNTER — OFFICE VISIT (OUTPATIENT)
Dept: INTERNAL MEDICINE CLINIC | Age: 75
End: 2025-06-11

## 2025-06-11 VITALS
DIASTOLIC BLOOD PRESSURE: 72 MMHG | TEMPERATURE: 97.2 F | WEIGHT: 197 LBS | HEART RATE: 60 BPM | BODY MASS INDEX: 39.72 KG/M2 | OXYGEN SATURATION: 97 % | HEIGHT: 59 IN | SYSTOLIC BLOOD PRESSURE: 144 MMHG

## 2025-06-11 DIAGNOSIS — E78.2 MIXED HYPERLIPIDEMIA: ICD-10-CM

## 2025-06-11 DIAGNOSIS — F33.0 MAJOR DEPRESSIVE DISORDER, RECURRENT, MILD: ICD-10-CM

## 2025-06-11 DIAGNOSIS — I10 ESSENTIAL HYPERTENSION: ICD-10-CM

## 2025-06-11 DIAGNOSIS — R13.10 DYSPHAGIA, UNSPECIFIED TYPE: ICD-10-CM

## 2025-06-11 DIAGNOSIS — J30.9 ALLERGIC SINUSITIS: ICD-10-CM

## 2025-06-11 DIAGNOSIS — E11.69 TYPE 2 DIABETES MELLITUS WITH OBESITY (HCC): Primary | ICD-10-CM

## 2025-06-11 DIAGNOSIS — D46.9 MDS (MYELODYSPLASTIC SYNDROME) (HCC): ICD-10-CM

## 2025-06-11 DIAGNOSIS — E66.9 TYPE 2 DIABETES MELLITUS WITH OBESITY (HCC): Primary | ICD-10-CM

## 2025-06-11 DIAGNOSIS — B36.9 FUNGAL RASH OF TORSO: ICD-10-CM

## 2025-06-11 DIAGNOSIS — E66.01 SEVERE OBESITY (BMI 35.0-39.9) WITH COMORBIDITY (HCC): ICD-10-CM

## 2025-06-11 DIAGNOSIS — N18.31 STAGE 3A CHRONIC KIDNEY DISEASE (HCC): ICD-10-CM

## 2025-06-11 LAB — HBA1C MFR BLD: 6 %

## 2025-06-11 RX ORDER — FLUTICASONE PROPIONATE 50 MCG
2 SPRAY, SUSPENSION (ML) NASAL DAILY
Qty: 48 G | Refills: 2 | Status: CANCELLED | OUTPATIENT
Start: 2025-06-11

## 2025-06-11 RX ORDER — ROSUVASTATIN CALCIUM 20 MG/1
20 TABLET, COATED ORAL DAILY
Qty: 90 TABLET | Refills: 3 | Status: SHIPPED | OUTPATIENT
Start: 2025-06-11

## 2025-06-11 RX ORDER — POTASSIUM CHLORIDE 1500 MG/1
20 TABLET, EXTENDED RELEASE ORAL DAILY
Qty: 90 TABLET | Refills: 3 | Status: SHIPPED | OUTPATIENT
Start: 2025-06-11

## 2025-06-11 RX ORDER — ROSUVASTATIN CALCIUM 40 MG/1
20 TABLET, COATED ORAL EVERY EVENING
Qty: 45 TABLET | Refills: 3 | Status: CANCELLED | OUTPATIENT
Start: 2025-06-11

## 2025-06-11 RX ORDER — CLOTRIMAZOLE AND BETAMETHASONE DIPROPIONATE 10; .64 MG/G; MG/G
CREAM TOPICAL
Qty: 1 EACH | Refills: 1 | Status: SHIPPED | OUTPATIENT
Start: 2025-06-11

## 2025-06-11 RX ORDER — EZETIMIBE 10 MG/1
5 TABLET ORAL NIGHTLY
Qty: 45 TABLET | Refills: 3 | Status: SHIPPED | OUTPATIENT
Start: 2025-06-11

## 2025-06-11 RX ORDER — ESCITALOPRAM OXALATE 20 MG/1
20 TABLET ORAL DAILY
Qty: 90 TABLET | Refills: 3 | Status: SHIPPED | OUTPATIENT
Start: 2025-06-11

## 2025-06-11 RX ORDER — GABAPENTIN 400 MG/1
400 CAPSULE ORAL 3 TIMES DAILY
Qty: 270 CAPSULE | Refills: 1 | Status: SHIPPED | OUTPATIENT
Start: 2025-06-11 | End: 2025-12-08

## 2025-06-11 RX ORDER — DOXAZOSIN 1 MG/1
1 TABLET ORAL DAILY
Qty: 30 TABLET | Refills: 0 | Status: SHIPPED | OUTPATIENT
Start: 2025-06-11

## 2025-06-11 RX ORDER — TRAZODONE HYDROCHLORIDE 50 MG/1
50 TABLET ORAL NIGHTLY PRN
Qty: 90 TABLET | Refills: 3 | Status: SHIPPED | OUTPATIENT
Start: 2025-06-11

## 2025-06-11 RX ORDER — PANTOPRAZOLE SODIUM 40 MG/1
40 TABLET, DELAYED RELEASE ORAL DAILY
Qty: 90 TABLET | Refills: 3 | Status: SHIPPED | OUTPATIENT
Start: 2025-06-11

## 2025-06-11 RX ORDER — METOPROLOL TARTRATE 25 MG/1
12.5 TABLET, FILM COATED ORAL 2 TIMES DAILY
Qty: 90 TABLET | Refills: 3 | Status: CANCELLED | OUTPATIENT
Start: 2025-06-11

## 2025-06-11 RX ORDER — ISOSORBIDE MONONITRATE 60 MG/1
60 TABLET, EXTENDED RELEASE ORAL DAILY
Qty: 90 TABLET | Refills: 3 | Status: CANCELLED | OUTPATIENT
Start: 2025-06-11

## 2025-06-11 RX ORDER — FUROSEMIDE 40 MG/1
40 TABLET ORAL DAILY
Qty: 90 TABLET | Refills: 3 | Status: SHIPPED | OUTPATIENT
Start: 2025-06-11

## 2025-06-11 RX ORDER — PIOGLITAZONE 15 MG/1
15 TABLET ORAL 2 TIMES DAILY
Qty: 180 TABLET | Refills: 3 | Status: SHIPPED | OUTPATIENT
Start: 2025-06-11

## 2025-06-11 NOTE — PROGRESS NOTES
(LASIX) 40 MG tablet; Take 1 tablet by mouth daily    potassium chloride (KLOR-CON M) 20 MEQ extended release tablet; Take 1 tablet by mouth daily    doxazosin (CARDURA) 1 MG tablet; Take 1 tablet by mouth daily    Mixed hyperlipidemia  Chronic, stable. Labs reviewed. Continue statin and zetia.   Orders:    ezetimibe (ZETIA) 10 MG tablet; Take 0.5 tablets by mouth nightly    rosuvastatin (CRESTOR) 20 MG tablet; Take 1 tablet by mouth daily    Major depressive disorder, recurrent, mild  Chronic, stable. Continue lexapro and trazodone nightly prn for sleep.     Orders:    traZODone (DESYREL) 50 MG tablet; Take 1 tablet by mouth nightly as needed for Sleep    escitalopram (LEXAPRO) 20 MG tablet; Take 1 tablet by mouth daily    Allergic sinusitis         Dysphagia, unspecified type  Chronic, stable. Decrease to once daily dosing of PPI d/t renal function.     Orders:    pantoprazole (PROTONIX) 40 MG tablet; Take 1 tablet by mouth daily    Severe obesity (BMI 35.0-39.9) with comorbidity (HCC)   Chronic, uncontrolled. Work on healthy diet/ exercise.            Stage 3a chronic kidney disease (HCC)   Chronic, controlled. Continue to avoid nsaids and nephrotoxic agents. Work on hydration.           MDS (myelodysplastic syndrome) (HCC)   Chronic, continue to follow with [facility] (Dr. Solares). Reviewed recent labs.           Fungal rash of torso  Acute, uncontrolled  Keep clean and dry   Covering with topical antifungal     Orders:    clotrimazole-betamethasone (LOTRISONE) 1-0.05 % cream; Apply topically 2 times daily.      Discussed medications with patient, who voiced understanding of their use and indications. All questions answered.    Return in about 2 weeks (around 6/25/2025), or if symptoms worsen or fail to improve, for HTN .      Electronically signed by BILL Adamson CNP on 6/11/2025 at 3:46 PM

## 2025-06-11 NOTE — ASSESSMENT & PLAN NOTE
Chronic, stable. Labs reviewed. Continue statin and zetia.   Orders:    ezetimibe (ZETIA) 10 MG tablet; Take 0.5 tablets by mouth nightly    rosuvastatin (CRESTOR) 20 MG tablet; Take 1 tablet by mouth daily

## 2025-06-11 NOTE — ASSESSMENT & PLAN NOTE
Chronic, controlled. Continue to avoid nsaids and nephrotoxic agents. Work on hydration.

## 2025-06-11 NOTE — ASSESSMENT & PLAN NOTE
Chronic, stable. Continue lexapro and trazodone nightly prn for sleep.     Orders:    traZODone (DESYREL) 50 MG tablet; Take 1 tablet by mouth nightly as needed for Sleep    escitalopram (LEXAPRO) 20 MG tablet; Take 1 tablet by mouth daily

## 2025-06-11 NOTE — ASSESSMENT & PLAN NOTE
chronic, uncontrolled. Adding doxazosin 1 mg daily.  Continue metoprolol 25 mg daily, Lasix 40 mg daily and  losartan to 50 mg daily.  Monitor BP at home and bring log to next apt. Continue to work on limiting sodium, caffeine and etoh use.      Orders:    furosemide (LASIX) 40 MG tablet; Take 1 tablet by mouth daily    potassium chloride (KLOR-CON M) 20 MEQ extended release tablet; Take 1 tablet by mouth daily    doxazosin (CARDURA) 1 MG tablet; Take 1 tablet by mouth daily

## 2025-06-11 NOTE — ASSESSMENT & PLAN NOTE
Chronic, stable. Controlled. POCT A1c 6.0 today. Continue 15 mg actos BID.     Orders:    POCT glycosylated hemoglobin (Hb A1C)    pioglitazone (ACTOS) 15 MG tablet; Take 1 tablet by mouth in the morning and at bedtime

## 2025-06-13 ENCOUNTER — PATIENT MESSAGE (OUTPATIENT)
Dept: CARDIOLOGY CLINIC | Age: 75
End: 2025-06-13

## 2025-06-13 RX ORDER — METOPROLOL TARTRATE 25 MG/1
12.5 TABLET, FILM COATED ORAL 2 TIMES DAILY
Qty: 90 TABLET | Refills: 3 | Status: SHIPPED | OUTPATIENT
Start: 2025-06-13

## 2025-06-13 RX ORDER — ISOSORBIDE MONONITRATE 60 MG/1
60 TABLET, EXTENDED RELEASE ORAL DAILY
Qty: 90 TABLET | Refills: 3 | Status: SHIPPED | OUTPATIENT
Start: 2025-06-13

## 2025-06-13 NOTE — TELEPHONE ENCOUNTER
Received refill request for Isosorbide/ Metoprolol   from [facility] pharmacy.     Last OV: 4/30/2025 NPTS     Next OV: Recall list for DCE     Last Labs: [facility] 3/11/2025, 2/27/2025 EKG

## 2025-06-25 ENCOUNTER — OFFICE VISIT (OUTPATIENT)
Dept: INTERNAL MEDICINE CLINIC | Age: 75
End: 2025-06-25

## 2025-06-25 VITALS
SYSTOLIC BLOOD PRESSURE: 158 MMHG | BODY MASS INDEX: 40.2 KG/M2 | HEART RATE: 59 BPM | DIASTOLIC BLOOD PRESSURE: 70 MMHG | WEIGHT: 199.4 LBS | OXYGEN SATURATION: 98 % | HEIGHT: 59 IN

## 2025-06-25 DIAGNOSIS — K21.9 GASTROESOPHAGEAL REFLUX DISEASE, UNSPECIFIED WHETHER ESOPHAGITIS PRESENT: ICD-10-CM

## 2025-06-25 DIAGNOSIS — I10 ESSENTIAL HYPERTENSION: Primary | ICD-10-CM

## 2025-06-25 DIAGNOSIS — B36.9 FUNGAL RASH OF TORSO: ICD-10-CM

## 2025-06-25 RX ORDER — CLONIDINE HYDROCHLORIDE 0.1 MG/1
0.1 TABLET ORAL 2 TIMES DAILY
Qty: 60 TABLET | Refills: 0 | Status: SHIPPED | OUTPATIENT
Start: 2025-06-25

## 2025-06-25 RX ORDER — CLOTRIMAZOLE AND BETAMETHASONE DIPROPIONATE 10; .64 MG/G; MG/G
CREAM TOPICAL
Qty: 1 EACH | Refills: 1 | Status: SHIPPED | OUTPATIENT
Start: 2025-06-25

## 2025-06-25 ASSESSMENT — PATIENT HEALTH QUESTIONNAIRE - PHQ9
1. LITTLE INTEREST OR PLEASURE IN DOING THINGS: NOT AT ALL
SUM OF ALL RESPONSES TO PHQ QUESTIONS 1-9: 0
2. FEELING DOWN, DEPRESSED OR HOPELESS: NOT AT ALL
SUM OF ALL RESPONSES TO PHQ QUESTIONS 1-9: 0

## 2025-06-25 NOTE — PROGRESS NOTES
Tagamet [Cimetidine] Rash       Current Outpatient Medications   Medication Sig Dispense Refill    cloNIDine (CATAPRES) 0.1 MG tablet Take 1 tablet by mouth 2 times daily 60 tablet 0    clotrimazole-betamethasone (LOTRISONE) 1-0.05 % cream Apply topically 2 times daily. 1 each 1    isosorbide mononitrate (IMDUR) 60 MG extended release tablet Take 1 tablet by mouth daily 90 tablet 3    metoprolol tartrate (LOPRESSOR) 25 MG tablet Take 0.5 tablets by mouth 2 times daily 90 tablet 3    furosemide (LASIX) 40 MG tablet Take 1 tablet by mouth daily 90 tablet 3    ezetimibe (ZETIA) 10 MG tablet Take 0.5 tablets by mouth nightly 45 tablet 3    traZODone (DESYREL) 50 MG tablet Take 1 tablet by mouth nightly as needed for Sleep 90 tablet 3    pantoprazole (PROTONIX) 40 MG tablet Take 1 tablet by mouth daily 90 tablet 3    rosuvastatin (CRESTOR) 20 MG tablet Take 1 tablet by mouth daily 90 tablet 3    pioglitazone (ACTOS) 15 MG tablet Take 1 tablet by mouth in the morning and at bedtime 180 tablet 3    escitalopram (LEXAPRO) 20 MG tablet Take 1 tablet by mouth daily 90 tablet 3    gabapentin (NEURONTIN) 400 MG capsule Take 1 capsule by mouth 3 times daily for 180 days. 270 capsule 1    potassium chloride (KLOR-CON M) 20 MEQ extended release tablet Take 1 tablet by mouth daily 90 tablet 3    Multiple Vitamins-Calcium (ONE-A-DAY WOMENS FORMULA) TABS Take 1 tablet by mouth Daily      losartan (COZAAR) 100 MG tablet Take 1 tablet by mouth daily 90 tablet 1    blood glucose test strips (FREESTYLE TEST STRIPS) strip 1 each by In Vitro route 2 times daily As needed. 200 each 1    estradiol (ESTRACE) 0.1 MG/GM vaginal cream Place 1 g vaginally See Admin Instructions Three times a week 1 g 5    Lancets MISC 1 each by Does not apply route 2 times daily Testing BID. Dispense brand covered by insurance. 200 each 3    clopidogrel (PLAVIX) 75 MG tablet Take 1 tablet by mouth daily 90 tablet 3    mirabegron (MYRBETRIQ) 25 MG TB24 Take 1

## 2025-06-25 NOTE — ASSESSMENT & PLAN NOTE
chronic, unchanged.   Stop doxazosin due to s/e   Trial of clonidine 0.1 mg BID  Continue metoprolol 25 mg daily, Lasix 40 mg daily and  losartan to 100 mg daily.    Monitor BP at home and bring log to next appt.   Continue to work on limiting sodium, caffeine and etoh use.   Keep appt in 2 weeks with cardiology and take BP log to appt

## 2025-06-25 NOTE — ASSESSMENT & PLAN NOTE
Chronic, uncontrolled  Was taking pantoprazole 40 mg BID, recently decreased to once daily dosing  Increased bloating and symptoms likely secondary to decreased dose  Will try pantoprazole 20 mg BID for better control   If not improving, recommend following up with GI

## 2025-07-02 ENCOUNTER — PATIENT MESSAGE (OUTPATIENT)
Dept: INTERNAL MEDICINE CLINIC | Age: 75
End: 2025-07-02

## 2025-07-02 ENCOUNTER — TELEPHONE (OUTPATIENT)
Dept: INTERNAL MEDICINE CLINIC | Age: 75
End: 2025-07-02

## 2025-07-02 DIAGNOSIS — J30.9 ALLERGIC SINUSITIS: ICD-10-CM

## 2025-07-02 DIAGNOSIS — E11.69 TYPE 2 DIABETES MELLITUS WITH OBESITY (HCC): ICD-10-CM

## 2025-07-02 DIAGNOSIS — E66.9 TYPE 2 DIABETES MELLITUS WITH OBESITY (HCC): ICD-10-CM

## 2025-07-02 RX ORDER — BLOOD SUGAR DIAGNOSTIC
1 STRIP MISCELLANEOUS 2 TIMES DAILY
Qty: 200 EACH | Refills: 3 | Status: SHIPPED | OUTPATIENT
Start: 2025-07-02

## 2025-07-02 RX ORDER — FLUTICASONE PROPIONATE 50 MCG
2 SPRAY, SUSPENSION (ML) NASAL DAILY
Qty: 48 G | Refills: 3 | Status: SHIPPED | OUTPATIENT
Start: 2025-07-02

## 2025-07-02 NOTE — TELEPHONE ENCOUNTER
Received notice from MARU Lopez from  [facility] that they received a medication for pt at their office from [employer], Sandostatin. That med is not on pt's current med list.  has not seen that pt and  is noted as the prescribing provider. That MA called 's office to inform them-that office will call her back.

## 2025-07-16 ENCOUNTER — TELEPHONE (OUTPATIENT)
Dept: INTERNAL MEDICINE CLINIC | Age: 75
End: 2025-07-16

## 2025-07-16 RX ORDER — CLONIDINE HYDROCHLORIDE 0.1 MG/1
0.1 TABLET ORAL 2 TIMES DAILY
Qty: 60 TABLET | Refills: 1 | Status: SHIPPED | OUTPATIENT
Start: 2025-07-16

## 2025-07-16 NOTE — TELEPHONE ENCOUNTER
Pt  calling requesting refill of Clonidine    Last written 6/25/25  Last OV 6/25/25  Next OV 9/29/25  Last recommended OV NA     Please send to [facility] [address]

## 2025-07-16 NOTE — PROGRESS NOTES
[facility]   Electrophysiology Office Visit    Date: 7/17/2025  EP Attending: Dr. Lanier     Chief Complaint:   Chief Complaint   Patient presents with    Follow-up     Patient complains of twinges in heart at times.    Coronary Artery Disease     History of Present Illness: History obtained from patient and medical record.    Lexie Villa is 75 y.o. female with a past medical history of CAD, s/p CABG and paroxysmal atrial fibrillation. RAHUL, CKD      10/1/17 presented with chest pressure, and had a cardiac catheterization by Dr. Butcher which showed patent graft and medical therapy recommended.       08/16/2018 s/p EPS RFA  atrial fibrillation and PVI      Admitted to [place] Sept 2018 with GI bleeding and had EGD, then OP colonoscopy which showed polyps.      EGD 9/21/18>  In the gastric antrum, linear streaks of erythematous mucosa with erosions seen suggestive of GAVE, biopsies not obtained. Rec'd iron infusions. History of MGUS and receiving Iron transfusion.      S/p dual chamber PPM 5/1/19 for sinus node dysfunction      [facility]  04/25/2022 for progressive SOB and chest Pain showed LM and OM disease. Noted to have low hemoglobin and melena.  PCI delayed.      SBE and colonoscopy showed AVMs, healing ulcers and diverticulosis. On Octreotide injection for GI bleeding.       08/15/2022 PCI to OM1 and LM - resumed DAPT      Interval history: Today, Lexie Villa is being seen for 6 month follow up. She is doing well from a cardiac standpoint. No further episodes of syncope. Device is functioning properly.     Denies having chest pain, palpitations, shortness of breath, orthopnea/PND, cough, or dizziness at the time of this visit.    With regard to medication therapy the patient has been compliant with prescribed regimen. They have tolerated therapy to date.     Assessment and Plan:    Sinus node dysfunction  -S/p dual chamber pacemaker 05/01/2019               ~Interrogation today shows: 6.6 years

## 2025-07-17 ENCOUNTER — OFFICE VISIT (OUTPATIENT)
Dept: CARDIOLOGY CLINIC | Age: 75
End: 2025-07-17
Payer: MEDICARE

## 2025-07-17 ENCOUNTER — CLINICAL SUPPORT (OUTPATIENT)
Dept: CARDIOLOGY CLINIC | Age: 75
End: 2025-07-17

## 2025-07-17 VITALS
OXYGEN SATURATION: 96 % | SYSTOLIC BLOOD PRESSURE: 116 MMHG | BODY MASS INDEX: 39.07 KG/M2 | WEIGHT: 193.8 LBS | HEIGHT: 59 IN | DIASTOLIC BLOOD PRESSURE: 64 MMHG | HEART RATE: 61 BPM

## 2025-07-17 DIAGNOSIS — I48.0 PAF (PAROXYSMAL ATRIAL FIBRILLATION) (HCC): ICD-10-CM

## 2025-07-17 DIAGNOSIS — I25.83 CORONARY ARTERY DISEASE DUE TO LIPID RICH PLAQUE: Primary | ICD-10-CM

## 2025-07-17 DIAGNOSIS — I49.5 SINUS NODE DYSFUNCTION (HCC): ICD-10-CM

## 2025-07-17 DIAGNOSIS — Z95.0 PACEMAKER: Primary | ICD-10-CM

## 2025-07-17 DIAGNOSIS — I25.10 CORONARY ARTERY DISEASE DUE TO LIPID RICH PLAQUE: Primary | ICD-10-CM

## 2025-07-17 DIAGNOSIS — I48.0 PAROXYSMAL ATRIAL FIBRILLATION (HCC): ICD-10-CM

## 2025-07-17 PROCEDURE — 3078F DIAST BP <80 MM HG: CPT

## 2025-07-17 PROCEDURE — 1123F ACP DISCUSS/DSCN MKR DOCD: CPT

## 2025-07-17 PROCEDURE — 1036F TOBACCO NON-USER: CPT

## 2025-07-17 PROCEDURE — 99214 OFFICE O/P EST MOD 30 MIN: CPT

## 2025-07-17 PROCEDURE — 93000 ELECTROCARDIOGRAM COMPLETE: CPT

## 2025-07-17 PROCEDURE — G8399 PT W/DXA RESULTS DOCUMENT: HCPCS

## 2025-07-17 PROCEDURE — 3074F SYST BP LT 130 MM HG: CPT

## 2025-07-17 PROCEDURE — 1090F PRES/ABSN URINE INCON ASSESS: CPT

## 2025-07-17 PROCEDURE — G2211 COMPLEX E/M VISIT ADD ON: HCPCS

## 2025-07-17 PROCEDURE — G8417 CALC BMI ABV UP PARAM F/U: HCPCS

## 2025-07-17 PROCEDURE — G8427 DOCREV CUR MEDS BY ELIG CLIN: HCPCS

## 2025-07-17 PROCEDURE — 3017F COLORECTAL CA SCREEN DOC REV: CPT

## 2025-07-18 ENCOUNTER — OFFICE VISIT (OUTPATIENT)
Dept: INTERNAL MEDICINE CLINIC | Age: 75
End: 2025-07-18

## 2025-07-18 VITALS
DIASTOLIC BLOOD PRESSURE: 72 MMHG | WEIGHT: 196 LBS | BODY MASS INDEX: 39.51 KG/M2 | TEMPERATURE: 97.8 F | HEIGHT: 59 IN | SYSTOLIC BLOOD PRESSURE: 136 MMHG | RESPIRATION RATE: 14 BRPM | HEART RATE: 66 BPM | OXYGEN SATURATION: 98 %

## 2025-07-18 DIAGNOSIS — R31.9 HEMATURIA, UNSPECIFIED TYPE: Primary | ICD-10-CM

## 2025-07-18 DIAGNOSIS — R10.2 PELVIC PAIN: ICD-10-CM

## 2025-07-18 DIAGNOSIS — N93.9 VAGINAL BLEEDING: ICD-10-CM

## 2025-07-18 LAB
BILIRUBIN, POC: NEGATIVE
BLOOD URINE, POC: NORMAL
CLARITY, POC: CLEAR
COLOR, POC: YELLOW
GLUCOSE URINE, POC: NEGATIVE MG/DL
KETONES, POC: NEGATIVE MG/DL
LEUKOCYTE EST, POC: NORMAL
NITRITE, POC: NEGATIVE
PH, POC: 6
PROTEIN, POC: 100 MG/DL
SPECIFIC GRAVITY, POC: 1.01
UROBILINOGEN, POC: 0.2 MG/DL

## 2025-07-18 ASSESSMENT — ENCOUNTER SYMPTOMS
CONSTIPATION: 0
NAUSEA: 0
DIARRHEA: 1
VOMITING: 0

## 2025-07-18 NOTE — PROGRESS NOTES
Office Visit   7/18/2025    Assessment and Plan:  Diagnoses and all orders for this visit:  Hematuria, unspecified type  -     POCT Urinalysis no Micro  -     Culture, Urine  Vaginal bleeding  -     Non BS - External Referral To Gynecology  -     US NON OB TRANSVAGINAL; Future  Pelvic pain    Assessment & Plan  Hematuria:  - Urinalysis shows large hematuria and small leuks  - Urine Culture sent to further evaluation, will treat pending results  - Takes Bactrim 400/80 1/2 tablet daily   - Will contact patient with urine and transvaginal ultrasound results      Vaginal bleeding/pelvic pain:  -  Acute, new problem  -  On exam, large amounts of bright red blood in the vaginal canal,  -  Vitals are stable.  No fever, chills, nausea, vomiting, or dizziness  -  Bilat oophorectomy in 1997, mother had ovarian cancer  -  Order placed for transvaginal ultrasound  -  Referral to Gynecology given to patient    Return if symptoms worsen or fail to improve.       Subjective:  Chief Complaint   Patient presents with    Hematuria     Blood in urine and lower abdominal pain x 1 day        HPI:   Lexie Villa is a 75 y.o. female who presents to the clinic today for acute visit.    History of Present Illness  The patient presents for evaluation of lower abdominal pain and vaginal bleeding.    Patient reports lower pelvic pain and vaginal bleeding for 1 day  -  Noticed blood in the toilet,on the toilet paper and on her pad, believes it is coming from her vaginal area  -  Using estradiol cream for moisture, but has not used it for the past 2 to 3 weeks due to a delay in shipment  - Does not typically experience symptoms when she has a urinary tract infection (UTI)  -  Has overactive bladder, hx of recurrent UTI-takes Bactrim 400/80 1/2 tablet daily and Myrebetriq   - States she has never had bleeding associated with a UTI  - Last UTI was in 09/2024  - Ovaries removed but still has her uterus  - Experienced diarrhea a few days prior

## 2025-07-20 LAB
BACTERIA UR CULT: ABNORMAL
ORGANISM: ABNORMAL

## 2025-07-21 ENCOUNTER — HOSPITAL ENCOUNTER (OUTPATIENT)
Dept: ULTRASOUND IMAGING | Age: 75
Discharge: HOME | End: 2025-07-21
Payer: MEDICARE

## 2025-07-21 DIAGNOSIS — N93.9 VAGINAL BLEEDING: ICD-10-CM

## 2025-07-21 LAB
BACTERIA UR CULT: ABNORMAL
ORGANISM: ABNORMAL

## 2025-07-21 PROCEDURE — 76830 TRANSVAGINAL US NON-OB: CPT

## 2025-08-14 ENCOUNTER — PATIENT MESSAGE (OUTPATIENT)
Dept: CARDIOLOGY CLINIC | Age: 75
End: 2025-08-14

## 2025-08-14 DIAGNOSIS — I10 ESSENTIAL HYPERTENSION: ICD-10-CM

## 2025-08-15 RX ORDER — LOSARTAN POTASSIUM 100 MG/1
100 TABLET ORAL DAILY
Qty: 90 TABLET | Refills: 2 | Status: SHIPPED | OUTPATIENT
Start: 2025-08-15

## 2025-08-19 ENCOUNTER — PATIENT MESSAGE (OUTPATIENT)
Dept: CARDIOLOGY CLINIC | Age: 75
End: 2025-08-19

## 2025-08-29 ENCOUNTER — TELEPHONE (OUTPATIENT)
Dept: INTERNAL MEDICINE CLINIC | Age: 75
End: 2025-08-29

## (undated) DEVICE — MEDIA CONTRAST RX ISOVUE-300 61% 30ML VIALS

## (undated) DEVICE — 3M™ IOBAN™ 2 ANTIMICROBIAL INCISE DRAPE 6650EZ: Brand: IOBAN™ 2

## (undated) DEVICE — PROCEDURE KIT ENDOSCP CUST

## (undated) DEVICE — SUTURE VCRL SZ 2-0 L36IN ABSRB UD L36MM CT-1 1/2 CIR J945H

## (undated) DEVICE — UNIVERSAL BLOCK TRAY: Brand: AVANOS*

## (undated) DEVICE — STANDARD HYPODERMIC NEEDLE,POLYPROPYLENE HUB: Brand: MONOJECT

## (undated) DEVICE — STERILE POLYISOPRENE POWDER-FREE SURGICAL GLOVES: Brand: PROTEXIS

## (undated) DEVICE — Device

## (undated) DEVICE — MERCY FAIRFIELD TURNOVER KIT: Brand: MEDLINE INDUSTRIES, INC.

## (undated) DEVICE — GOWN AURORA NONREINF LG: Brand: MEDLINE INDUSTRIES, INC.

## (undated) DEVICE — MOUTHPIECE ENDOSCP L CTRL OPN AND SIDE PORTS DISP

## (undated) DEVICE — NEEDLE 25GAX5.0MM INJ CARR LOCKE

## (undated) DEVICE — BW-412T DISP COMBO CLEANING BRUSH: Brand: SINGLE USE COMBINATION CLEANING BRUSH

## (undated) DEVICE — SYRINGE MED 10ML TRNSLUC BRL PLUNG BLK MRK POLYPR CTRL

## (undated) DEVICE — SUTURE VCRL SZ 3-0 L18IN ABSRB UD L26MM SH 1/2 CIR J864D

## (undated) DEVICE — SOLUTION IV IRRIG WATER 500ML POUR BRL ST 2F7113

## (undated) DEVICE — TRAY PREP DRY W/ PREM GLV 2 APPL 6 SPNG 2 UNDPD 1 OVERWRAP

## (undated) DEVICE — Device: Brand: JELCO

## (undated) DEVICE — PEN: MARKING STD 100/CS: Brand: MEDICAL ACTION INDUSTRIES

## (undated) DEVICE — DISPOSABLE OR TOWEL: Brand: CARDINAL HEALTH

## (undated) DEVICE — SUTURE MCRYL SZ 4-0 L27IN ABSRB UD L19MM PS-2 1/2 CIR PRIM Y426H

## (undated) DEVICE — MAJOR SET UP PK

## (undated) DEVICE — SOLUTION IV IRRIG POUR BRL 0.9% SODIUM CHL 2F7124

## (undated) DEVICE — AIR/WATER CLEANING ADAPTER FOR OLYMPUS® GI ENDOSCOPE: Brand: BULLDOG®

## (undated) DEVICE — SUTURE PDS II SZ 0 L27IN ABSRB VLT L26MM CT-2 1/2 CIR Z334H

## (undated) DEVICE — ENDOSCOPIC KIT 6X3/16 FT COLON W/ 1.1 OZ 2 GWN W/O BRSH

## (undated) DEVICE — SHEET,DRAPE,53X77,STERILE: Brand: MEDLINE

## (undated) DEVICE — NEEDLE SPNL 22GA L5IN BLK HUB S STL W/ QNCKE PNT W/OUT

## (undated) DEVICE — SET VLV 3 PC AWS DISPOSABLE GRDIAN SCOPEVALET

## (undated) DEVICE — CHLORAPREP 26ML ORANGE

## (undated) DEVICE — LIGHT HANDLE: Brand: DEVON

## (undated) DEVICE — PORT VLV 2 W NDL FREE SMRTSITE

## (undated) DEVICE — FORCEPS BX L240CM WRK CHN 2.8MM STD CAP W/ NDL MIC MESH

## (undated) DEVICE — FIAPC® PROBE W/ FILTER 2200 C OD 2.3MM/6.9FR; L 2.2M/7.2FT: Brand: ERBE

## (undated) DEVICE — SUTURE ETHLN SZ 2-0 L30IN NONABSORBABLE BLK L36MM FSLX 3/8 1674H

## (undated) DEVICE — SHEET, T, LAPAROTOMY, STERILE: Brand: MEDLINE

## (undated) DEVICE — NEEDLE EPI L3.5IN OD20GA CLR POLYCARB HUB WNG N DEHP TUOHY

## (undated) DEVICE — SUTURE PROL SZ 0 L18IN NONABSORBABLE BLU MO-6 L26MM 1/2 CIR C845G

## (undated) DEVICE — SYRINGE MED 3ML CLR PLAS STD N CTRL LUERLOCK TIP DISP

## (undated) DEVICE — VALVE SUCTION AIR H2O SET ORCA POD + DISP

## (undated) DEVICE — TOWEL,OR,DSP,ST,BLUE,STD,4/PK,20PK/CS: Brand: MEDLINE

## (undated) DEVICE — SYRINGE INFL 60ML DISP ALLIANCE II

## (undated) DEVICE — 60 ML SYRINGE,REGULAR TIP: Brand: MONOJECT

## (undated) DEVICE — KIT OR ROOM TURNOVER W/STRAP

## (undated) DEVICE — DILATOR ENDOSCP L180CM DIA6FR BLLN L8CM DIA54-60FR

## (undated) DEVICE — SYSTEM SKIN CLSR 22CM DERMBND PRINEO

## (undated) DEVICE — SUTURE COAT VCRL SZ 0 L18IN ABSRB UD W/O NDL POLYGLACTIN J112T

## (undated) DEVICE — INTENDED FOR TISSUE SEPARATION, AND OTHER PROCEDURES THAT REQUIRE A SHARP SURGICAL BLADE TO PUNCTURE OR CUT.: Brand: BARD-PARKER ® CARBON RIB-BACK BLADES

## (undated) DEVICE — ADHESIVE SKIN CLSR 0.7ML TOP DERMBND ADV

## (undated) DEVICE — HYPODERMIC SAFETY NEEDLE: Brand: MAGELLAN

## (undated) DEVICE — ELECTRODE PT RET AD L9FT HI MOIST COND ADH HYDRGEL CORDED

## (undated) DEVICE — SKIN MARKER,REGULAR TIP WITH RULER: Brand: DEVON

## (undated) DEVICE — SYRINGE MED 10ML LUERLOCK TIP W/O SFTY DISP

## (undated) DEVICE — Z CONVERTED USE 2271043 CONTAINER SPEC COLL 4OZ SCR ON LID PEEL PCH

## (undated) DEVICE — SUTURE VCRL SZ 4-0 L18IN ABSRB UD L19MM PS-2 3/8 CIR PRIM J496H

## (undated) DEVICE — BLADE ES ELASTOMERIC COAT INSUL DURABLE BEND UPTO 90DEG

## (undated) DEVICE — STERILE LATEX POWDER-FREE SURGICAL GLOVESWITH NITRILE COATING: Brand: PROTEXIS

## (undated) DEVICE — Device: Brand: SPOT EX ENDOSCOPIC TATTOO

## (undated) DEVICE — SINGLE USE SPLINTING TUBE: Brand: SINGLE USE SPLINTING TUBE

## (undated) DEVICE — GAUZE,SPONGE,4"X4",16PLY,XRAY,STRL,LF: Brand: MEDLINE